# Patient Record
Sex: FEMALE | Race: WHITE | NOT HISPANIC OR LATINO | Employment: OTHER
[De-identification: names, ages, dates, MRNs, and addresses within clinical notes are randomized per-mention and may not be internally consistent; named-entity substitution may affect disease eponyms.]

---

## 2017-01-03 ENCOUNTER — RECORDS - HEALTHEAST (OUTPATIENT)
Dept: ADMINISTRATIVE | Facility: OTHER | Age: 77
End: 2017-01-03

## 2017-01-05 ENCOUNTER — RECORDS - HEALTHEAST (OUTPATIENT)
Dept: ADMINISTRATIVE | Facility: OTHER | Age: 77
End: 2017-01-05

## 2017-01-13 ENCOUNTER — RECORDS - HEALTHEAST (OUTPATIENT)
Dept: ADMINISTRATIVE | Facility: OTHER | Age: 77
End: 2017-01-13

## 2017-01-19 ENCOUNTER — RECORDS - HEALTHEAST (OUTPATIENT)
Dept: ADMINISTRATIVE | Facility: OTHER | Age: 77
End: 2017-01-19

## 2017-01-23 ENCOUNTER — RECORDS - HEALTHEAST (OUTPATIENT)
Dept: ADMINISTRATIVE | Facility: OTHER | Age: 77
End: 2017-01-23

## 2017-01-31 ENCOUNTER — RECORDS - HEALTHEAST (OUTPATIENT)
Dept: ADMINISTRATIVE | Facility: OTHER | Age: 77
End: 2017-01-31

## 2017-02-07 ENCOUNTER — RECORDS - HEALTHEAST (OUTPATIENT)
Dept: ADMINISTRATIVE | Facility: OTHER | Age: 77
End: 2017-02-07

## 2017-02-21 ENCOUNTER — RECORDS - HEALTHEAST (OUTPATIENT)
Dept: ADMINISTRATIVE | Facility: OTHER | Age: 77
End: 2017-02-21

## 2017-02-24 ENCOUNTER — RECORDS - HEALTHEAST (OUTPATIENT)
Dept: ADMINISTRATIVE | Facility: OTHER | Age: 77
End: 2017-02-24

## 2017-03-08 ENCOUNTER — AMBULATORY - HEALTHEAST (OUTPATIENT)
Dept: PHYSICAL MEDICINE AND REHAB | Facility: CLINIC | Age: 77
End: 2017-03-08

## 2017-03-08 ENCOUNTER — RECORDS - HEALTHEAST (OUTPATIENT)
Dept: ADMINISTRATIVE | Facility: OTHER | Age: 77
End: 2017-03-08

## 2017-03-08 DIAGNOSIS — M62.838 MUSCLE SPASM: ICD-10-CM

## 2017-03-08 DIAGNOSIS — G24.3 CERVICAL DYSTONIA: ICD-10-CM

## 2017-03-09 ENCOUNTER — RECORDS - HEALTHEAST (OUTPATIENT)
Dept: ADMINISTRATIVE | Facility: OTHER | Age: 77
End: 2017-03-09

## 2017-03-15 ENCOUNTER — RECORDS - HEALTHEAST (OUTPATIENT)
Dept: ADMINISTRATIVE | Facility: OTHER | Age: 77
End: 2017-03-15

## 2017-03-21 ENCOUNTER — HOSPITAL ENCOUNTER (OUTPATIENT)
Dept: PHYSICAL MEDICINE AND REHAB | Facility: CLINIC | Age: 77
Discharge: HOME | End: 2017-03-21
Attending: PHYSICIAN ASSISTANT

## 2017-03-21 ENCOUNTER — OFFICE VISIT - HEALTHEAST (OUTPATIENT)
Dept: INTERNAL MEDICINE | Facility: CLINIC | Age: 77
End: 2017-03-21

## 2017-03-21 DIAGNOSIS — M25.552 CHRONIC ARTHRALGIAS OF KNEES AND HIPS: ICD-10-CM

## 2017-03-21 DIAGNOSIS — M54.2 CHRONIC NECK PAIN: ICD-10-CM

## 2017-03-21 DIAGNOSIS — M47.812 CERVICAL SPONDYLOSIS: ICD-10-CM

## 2017-03-21 DIAGNOSIS — M25.551 CHRONIC ARTHRALGIAS OF KNEES AND HIPS: ICD-10-CM

## 2017-03-21 DIAGNOSIS — G89.29 CHRONIC ARTHRALGIAS OF KNEES AND HIPS: ICD-10-CM

## 2017-03-21 DIAGNOSIS — E03.9 HYPOTHYROIDISM: ICD-10-CM

## 2017-03-21 DIAGNOSIS — G24.3 CERVICAL DYSTONIA: ICD-10-CM

## 2017-03-21 DIAGNOSIS — G89.29 CHRONIC NECK PAIN: ICD-10-CM

## 2017-03-21 DIAGNOSIS — K21.9 GERD (GASTROESOPHAGEAL REFLUX DISEASE): ICD-10-CM

## 2017-03-21 DIAGNOSIS — I82.409 DVT (DEEP VENOUS THROMBOSIS) (H): ICD-10-CM

## 2017-03-21 DIAGNOSIS — M25.561 CHRONIC ARTHRALGIAS OF KNEES AND HIPS: ICD-10-CM

## 2017-03-21 DIAGNOSIS — I10 HTN (HYPERTENSION): ICD-10-CM

## 2017-03-21 DIAGNOSIS — M54.81 OCCIPITAL NEURALGIA: ICD-10-CM

## 2017-03-21 DIAGNOSIS — M47.816 OSTEOARTHRITIS OF LUMBAR SPINE: ICD-10-CM

## 2017-03-21 DIAGNOSIS — M47.812 FACET ARTHROPATHY, CERVICAL: ICD-10-CM

## 2017-03-21 DIAGNOSIS — E78.5 HLD (HYPERLIPIDEMIA): ICD-10-CM

## 2017-03-21 DIAGNOSIS — M25.562 CHRONIC ARTHRALGIAS OF KNEES AND HIPS: ICD-10-CM

## 2017-03-21 DIAGNOSIS — M19.90 OSTEOARTHRITIS: ICD-10-CM

## 2017-03-21 DIAGNOSIS — H20.00 ACUTE IRITIS: ICD-10-CM

## 2017-03-21 ASSESSMENT — MIFFLIN-ST. JEOR
SCORE: 1297.09
SCORE: 1301.63

## 2017-03-22 ENCOUNTER — COMMUNICATION - HEALTHEAST (OUTPATIENT)
Dept: INTERNAL MEDICINE | Facility: CLINIC | Age: 77
End: 2017-03-22

## 2017-03-22 ENCOUNTER — COMMUNICATION - HEALTHEAST (OUTPATIENT)
Dept: PHYSICAL MEDICINE AND REHAB | Facility: CLINIC | Age: 77
End: 2017-03-22

## 2017-03-29 ENCOUNTER — RECORDS - HEALTHEAST (OUTPATIENT)
Dept: ADMINISTRATIVE | Facility: OTHER | Age: 77
End: 2017-03-29

## 2017-03-30 ENCOUNTER — RECORDS - HEALTHEAST (OUTPATIENT)
Dept: ADMINISTRATIVE | Facility: OTHER | Age: 77
End: 2017-03-30

## 2017-04-03 ENCOUNTER — RECORDS - HEALTHEAST (OUTPATIENT)
Dept: RADIOLOGY | Facility: CLINIC | Age: 77
End: 2017-04-03

## 2017-04-06 ENCOUNTER — HOSPITAL ENCOUNTER (OUTPATIENT)
Dept: PHYSICAL MEDICINE AND REHAB | Facility: CLINIC | Age: 77
Discharge: HOME | End: 2017-04-06
Attending: PHYSICIAN ASSISTANT

## 2017-04-06 DIAGNOSIS — M12.88 OTHER SPECIFIC ARTHROPATHIES, NOT ELSEWHERE CLASSIFIED, OTHER SPECIFIED SITE: ICD-10-CM

## 2017-04-06 DIAGNOSIS — M47.812 FACET ARTHROPATHY, CERVICAL: ICD-10-CM

## 2017-04-06 ASSESSMENT — MIFFLIN-ST. JEOR: SCORE: 1298.45

## 2017-04-07 ENCOUNTER — COMMUNICATION - HEALTHEAST (OUTPATIENT)
Dept: PHYSICAL MEDICINE AND REHAB | Facility: CLINIC | Age: 77
End: 2017-04-07

## 2017-04-10 ENCOUNTER — COMMUNICATION - HEALTHEAST (OUTPATIENT)
Dept: PHYSICAL MEDICINE AND REHAB | Facility: CLINIC | Age: 77
End: 2017-04-10

## 2017-04-18 ENCOUNTER — OFFICE VISIT - HEALTHEAST (OUTPATIENT)
Dept: INTERNAL MEDICINE | Facility: CLINIC | Age: 77
End: 2017-04-18

## 2017-04-18 DIAGNOSIS — K21.9 GERD (GASTROESOPHAGEAL REFLUX DISEASE): ICD-10-CM

## 2017-04-18 DIAGNOSIS — M47.812 FACET ARTHROPATHY, CERVICAL: ICD-10-CM

## 2017-04-18 DIAGNOSIS — E03.9 HYPOTHYROIDISM: ICD-10-CM

## 2017-04-18 DIAGNOSIS — I10 HTN (HYPERTENSION): ICD-10-CM

## 2017-04-18 DIAGNOSIS — Z01.818 PREOP EXAMINATION: ICD-10-CM

## 2017-04-18 DIAGNOSIS — M19.90 OSTEOARTHRITIS: ICD-10-CM

## 2017-04-18 DIAGNOSIS — F32.9 REACTIVE DEPRESSION: ICD-10-CM

## 2017-04-18 DIAGNOSIS — E78.5 HLD (HYPERLIPIDEMIA): ICD-10-CM

## 2017-04-18 DIAGNOSIS — I82.409 DVT (DEEP VENOUS THROMBOSIS) (H): ICD-10-CM

## 2017-04-18 LAB
ATRIAL RATE - MUSE: 53 BPM
DIASTOLIC BLOOD PRESSURE - MUSE: NORMAL MMHG
INTERPRETATION ECG - MUSE: NORMAL
P AXIS - MUSE: 35 DEGREES
PR INTERVAL - MUSE: 178 MS
QRS DURATION - MUSE: 98 MS
QT - MUSE: 420 MS
QTC - MUSE: 394 MS
R AXIS - MUSE: 25 DEGREES
SYSTOLIC BLOOD PRESSURE - MUSE: NORMAL MMHG
T AXIS - MUSE: 77 DEGREES
VENTRICULAR RATE- MUSE: 53 BPM

## 2017-04-18 ASSESSMENT — MIFFLIN-ST. JEOR: SCORE: 1269.88

## 2017-04-19 ENCOUNTER — COMMUNICATION - HEALTHEAST (OUTPATIENT)
Dept: INTERNAL MEDICINE | Facility: CLINIC | Age: 77
End: 2017-04-19

## 2017-04-21 ENCOUNTER — HOSPITAL ENCOUNTER (OUTPATIENT)
Dept: PHYSICAL MEDICINE AND REHAB | Facility: CLINIC | Age: 77
Discharge: HOME | End: 2017-04-21
Attending: PHYSICIAN ASSISTANT

## 2017-04-21 DIAGNOSIS — G89.29 CHRONIC NECK PAIN: ICD-10-CM

## 2017-04-21 DIAGNOSIS — G24.3 CERVICAL DYSTONIA: ICD-10-CM

## 2017-04-21 DIAGNOSIS — M47.812 FACET ARTHROPATHY, CERVICAL: ICD-10-CM

## 2017-04-21 DIAGNOSIS — M54.2 CHRONIC NECK PAIN: ICD-10-CM

## 2017-04-29 ENCOUNTER — COMMUNICATION - HEALTHEAST (OUTPATIENT)
Dept: SCHEDULING | Facility: CLINIC | Age: 77
End: 2017-04-29

## 2017-05-09 ENCOUNTER — OFFICE VISIT - HEALTHEAST (OUTPATIENT)
Dept: INTERNAL MEDICINE | Facility: CLINIC | Age: 77
End: 2017-05-09

## 2017-05-09 DIAGNOSIS — F32.A DEPRESSION: ICD-10-CM

## 2017-05-10 ENCOUNTER — COMMUNICATION - HEALTHEAST (OUTPATIENT)
Dept: INTERNAL MEDICINE | Facility: CLINIC | Age: 77
End: 2017-05-10

## 2017-05-11 ASSESSMENT — MIFFLIN-ST. JEOR: SCORE: 1274.41

## 2017-05-16 ENCOUNTER — SURGERY - HEALTHEAST (OUTPATIENT)
Dept: SURGERY | Facility: CLINIC | Age: 77
End: 2017-05-16

## 2017-05-16 ENCOUNTER — ANESTHESIA - HEALTHEAST (OUTPATIENT)
Dept: SURGERY | Facility: CLINIC | Age: 77
End: 2017-05-16

## 2017-05-16 ENCOUNTER — HOME CARE/HOSPICE - HEALTHEAST (OUTPATIENT)
Dept: HOME HEALTH SERVICES | Facility: HOME HEALTH | Age: 77
End: 2017-05-16

## 2017-05-17 ASSESSMENT — MIFFLIN-ST. JEOR: SCORE: 1274.41

## 2017-05-22 ENCOUNTER — OFFICE VISIT - HEALTHEAST (OUTPATIENT)
Dept: GERIATRICS | Facility: CLINIC | Age: 77
End: 2017-05-22

## 2017-05-22 ENCOUNTER — HOME CARE/HOSPICE - HEALTHEAST (OUTPATIENT)
Dept: HOME HEALTH SERVICES | Facility: HOME HEALTH | Age: 77
End: 2017-05-22

## 2017-05-22 DIAGNOSIS — F39 MOOD DISORDER (H): ICD-10-CM

## 2017-05-22 DIAGNOSIS — E03.9 HYPOTHYROIDISM: ICD-10-CM

## 2017-05-22 DIAGNOSIS — Z96.642 HISTORY OF TOTAL LEFT HIP REPLACEMENT: ICD-10-CM

## 2017-05-22 DIAGNOSIS — D50.0 ANEMIA, BLOOD LOSS: ICD-10-CM

## 2017-05-22 DIAGNOSIS — I10 HYPERTENSION: ICD-10-CM

## 2017-05-23 ENCOUNTER — OFFICE VISIT - HEALTHEAST (OUTPATIENT)
Dept: GERIATRICS | Facility: CLINIC | Age: 77
End: 2017-05-23

## 2017-05-23 DIAGNOSIS — D50.0 ANEMIA, BLOOD LOSS: ICD-10-CM

## 2017-05-23 DIAGNOSIS — I10 HYPERTENSION: ICD-10-CM

## 2017-05-23 DIAGNOSIS — Z96.642 HISTORY OF TOTAL LEFT HIP REPLACEMENT: ICD-10-CM

## 2017-05-24 ENCOUNTER — AMBULATORY - HEALTHEAST (OUTPATIENT)
Dept: GERIATRICS | Facility: CLINIC | Age: 77
End: 2017-05-24

## 2017-05-24 ENCOUNTER — COMMUNICATION - HEALTHEAST (OUTPATIENT)
Dept: GERIATRICS | Facility: CLINIC | Age: 77
End: 2017-05-24

## 2017-05-25 ENCOUNTER — HOME CARE/HOSPICE - HEALTHEAST (OUTPATIENT)
Dept: HOME HEALTH SERVICES | Facility: HOME HEALTH | Age: 77
End: 2017-05-25

## 2017-05-25 ENCOUNTER — COMMUNICATION - HEALTHEAST (OUTPATIENT)
Dept: INTERNAL MEDICINE | Facility: CLINIC | Age: 77
End: 2017-05-25

## 2017-05-26 ENCOUNTER — HOME CARE/HOSPICE - HEALTHEAST (OUTPATIENT)
Dept: HOME HEALTH SERVICES | Facility: HOME HEALTH | Age: 77
End: 2017-05-26

## 2017-05-29 ENCOUNTER — HOME CARE/HOSPICE - HEALTHEAST (OUTPATIENT)
Dept: HOME HEALTH SERVICES | Facility: HOME HEALTH | Age: 77
End: 2017-05-29

## 2017-05-30 ENCOUNTER — HOME CARE/HOSPICE - HEALTHEAST (OUTPATIENT)
Dept: HOME HEALTH SERVICES | Facility: HOME HEALTH | Age: 77
End: 2017-05-30

## 2017-05-31 ENCOUNTER — OFFICE VISIT - HEALTHEAST (OUTPATIENT)
Dept: INTERNAL MEDICINE | Facility: CLINIC | Age: 77
End: 2017-05-31

## 2017-05-31 DIAGNOSIS — F34.1 DYSTHYMIA: ICD-10-CM

## 2017-05-31 DIAGNOSIS — D64.9 ANEMIA: ICD-10-CM

## 2017-05-31 DIAGNOSIS — M54.2 NECK PAIN: ICD-10-CM

## 2017-05-31 DIAGNOSIS — M19.90 OSTEOARTHRITIS: ICD-10-CM

## 2017-06-01 ENCOUNTER — HOME CARE/HOSPICE - HEALTHEAST (OUTPATIENT)
Dept: HOME HEALTH SERVICES | Facility: HOME HEALTH | Age: 77
End: 2017-06-01

## 2017-06-02 ENCOUNTER — HOME CARE/HOSPICE - HEALTHEAST (OUTPATIENT)
Dept: HOME HEALTH SERVICES | Facility: HOME HEALTH | Age: 77
End: 2017-06-02

## 2017-06-02 ENCOUNTER — COMMUNICATION - HEALTHEAST (OUTPATIENT)
Dept: INTERNAL MEDICINE | Facility: CLINIC | Age: 77
End: 2017-06-02

## 2017-06-05 ENCOUNTER — HOME CARE/HOSPICE - HEALTHEAST (OUTPATIENT)
Dept: HOME HEALTH SERVICES | Facility: HOME HEALTH | Age: 77
End: 2017-06-05

## 2017-06-06 ENCOUNTER — HOME CARE/HOSPICE - HEALTHEAST (OUTPATIENT)
Dept: HOME HEALTH SERVICES | Facility: HOME HEALTH | Age: 77
End: 2017-06-06

## 2017-06-08 ENCOUNTER — HOME CARE/HOSPICE - HEALTHEAST (OUTPATIENT)
Dept: HOME HEALTH SERVICES | Facility: HOME HEALTH | Age: 77
End: 2017-06-08

## 2017-06-09 ENCOUNTER — RECORDS - HEALTHEAST (OUTPATIENT)
Dept: ADMINISTRATIVE | Facility: OTHER | Age: 77
End: 2017-06-09

## 2017-06-09 ENCOUNTER — HOME CARE/HOSPICE - HEALTHEAST (OUTPATIENT)
Dept: HOME HEALTH SERVICES | Facility: HOME HEALTH | Age: 77
End: 2017-06-09

## 2017-06-12 ENCOUNTER — AMBULATORY - HEALTHEAST (OUTPATIENT)
Dept: LAB | Facility: CLINIC | Age: 77
End: 2017-06-12

## 2017-06-12 ENCOUNTER — HOME CARE/HOSPICE - HEALTHEAST (OUTPATIENT)
Dept: HOME HEALTH SERVICES | Facility: HOME HEALTH | Age: 77
End: 2017-06-12

## 2017-06-12 DIAGNOSIS — Z47.1 ORTHOPEDIC AFTERCARE FOR JOINT REPLACEMENT: ICD-10-CM

## 2017-06-12 DIAGNOSIS — I82.409 DVT (DEEP VENOUS THROMBOSIS) (H): ICD-10-CM

## 2017-06-13 ENCOUNTER — AMBULATORY - HEALTHEAST (OUTPATIENT)
Dept: LAB | Facility: CLINIC | Age: 77
End: 2017-06-13

## 2017-06-13 DIAGNOSIS — Z47.1 ORTHOPEDIC AFTERCARE FOR JOINT REPLACEMENT: ICD-10-CM

## 2017-06-13 DIAGNOSIS — I82.409 DVT (DEEP VENOUS THROMBOSIS) (H): ICD-10-CM

## 2017-06-16 ENCOUNTER — AMBULATORY - HEALTHEAST (OUTPATIENT)
Dept: LAB | Facility: CLINIC | Age: 77
End: 2017-06-16

## 2017-06-16 DIAGNOSIS — I82.409 DVT (DEEP VENOUS THROMBOSIS) (H): ICD-10-CM

## 2017-06-16 DIAGNOSIS — D64.9 ANEMIA: ICD-10-CM

## 2017-06-16 DIAGNOSIS — Z47.1 ORTHOPEDIC AFTERCARE FOR JOINT REPLACEMENT: ICD-10-CM

## 2017-06-19 ENCOUNTER — AMBULATORY - HEALTHEAST (OUTPATIENT)
Dept: LAB | Facility: CLINIC | Age: 77
End: 2017-06-19

## 2017-06-19 DIAGNOSIS — I82.409 DVT (DEEP VENOUS THROMBOSIS) (H): ICD-10-CM

## 2017-06-19 DIAGNOSIS — Z47.1 ORTHOPEDIC AFTERCARE FOR JOINT REPLACEMENT: ICD-10-CM

## 2017-06-20 ENCOUNTER — HOSPITAL ENCOUNTER (OUTPATIENT)
Dept: PHYSICAL MEDICINE AND REHAB | Facility: CLINIC | Age: 77
Discharge: HOME | End: 2017-06-20
Attending: PHYSICIAN ASSISTANT

## 2017-06-20 DIAGNOSIS — M54.2 CHRONIC NECK PAIN: ICD-10-CM

## 2017-06-20 DIAGNOSIS — G89.29 CHRONIC NECK PAIN: ICD-10-CM

## 2017-06-20 DIAGNOSIS — M47.812 FACET ARTHROPATHY, CERVICAL: ICD-10-CM

## 2017-06-22 ENCOUNTER — AMBULATORY - HEALTHEAST (OUTPATIENT)
Dept: LAB | Facility: CLINIC | Age: 77
End: 2017-06-22

## 2017-06-22 DIAGNOSIS — I82.409 DVT (DEEP VENOUS THROMBOSIS) (H): ICD-10-CM

## 2017-06-22 DIAGNOSIS — Z47.1 ORTHOPEDIC AFTERCARE FOR JOINT REPLACEMENT: ICD-10-CM

## 2017-06-26 ENCOUNTER — AMBULATORY - HEALTHEAST (OUTPATIENT)
Dept: LAB | Facility: CLINIC | Age: 77
End: 2017-06-26

## 2017-06-26 DIAGNOSIS — Z47.1 ORTHOPEDIC AFTERCARE FOR JOINT REPLACEMENT: ICD-10-CM

## 2017-06-26 DIAGNOSIS — I82.409 DVT (DEEP VENOUS THROMBOSIS) (H): ICD-10-CM

## 2017-06-27 ENCOUNTER — COMMUNICATION - HEALTHEAST (OUTPATIENT)
Dept: SCHEDULING | Facility: CLINIC | Age: 77
End: 2017-06-27

## 2017-06-29 ENCOUNTER — OFFICE VISIT - HEALTHEAST (OUTPATIENT)
Dept: INTERNAL MEDICINE | Facility: CLINIC | Age: 77
End: 2017-06-29

## 2017-06-29 DIAGNOSIS — K59.00 CONSTIPATION: ICD-10-CM

## 2017-06-29 DIAGNOSIS — D64.9 ANEMIA: ICD-10-CM

## 2017-07-06 ENCOUNTER — COMMUNICATION - HEALTHEAST (OUTPATIENT)
Dept: PHYSICAL MEDICINE AND REHAB | Facility: CLINIC | Age: 77
End: 2017-07-06

## 2017-07-07 ENCOUNTER — AMBULATORY - HEALTHEAST (OUTPATIENT)
Dept: PHYSICAL MEDICINE AND REHAB | Facility: CLINIC | Age: 77
End: 2017-07-07

## 2017-07-07 ENCOUNTER — HOSPITAL ENCOUNTER (OUTPATIENT)
Dept: PHYSICAL MEDICINE AND REHAB | Facility: CLINIC | Age: 77
Discharge: HOME | End: 2017-07-07
Attending: PHYSICIAN ASSISTANT

## 2017-07-07 DIAGNOSIS — Z79.01 ON WARFARIN THERAPY: ICD-10-CM

## 2017-07-07 DIAGNOSIS — M47.812 FACET ARTHROPATHY, CERVICAL: ICD-10-CM

## 2017-07-07 DIAGNOSIS — M12.88 OTHER SPECIFIC ARTHROPATHIES, NOT ELSEWHERE CLASSIFIED, OTHER SPECIFIED SITE: ICD-10-CM

## 2017-07-21 ENCOUNTER — HOSPITAL ENCOUNTER (OUTPATIENT)
Dept: PHYSICAL MEDICINE AND REHAB | Facility: CLINIC | Age: 77
Discharge: HOME | End: 2017-07-21
Attending: PHYSICIAN ASSISTANT

## 2017-07-21 DIAGNOSIS — M47.812 FACET ARTHROPATHY, CERVICAL: ICD-10-CM

## 2017-08-03 ENCOUNTER — COMMUNICATION - HEALTHEAST (OUTPATIENT)
Dept: INTERNAL MEDICINE | Facility: CLINIC | Age: 77
End: 2017-08-03

## 2017-08-03 DIAGNOSIS — Z88.9 HISTORY OF SEASONAL ALLERGIES: ICD-10-CM

## 2017-08-30 ENCOUNTER — OFFICE VISIT - HEALTHEAST (OUTPATIENT)
Dept: INTERNAL MEDICINE | Facility: CLINIC | Age: 77
End: 2017-08-30

## 2017-08-30 DIAGNOSIS — D50.9 IRON DEFICIENCY ANEMIA: ICD-10-CM

## 2017-08-30 DIAGNOSIS — E78.5 HLD (HYPERLIPIDEMIA): ICD-10-CM

## 2017-08-30 DIAGNOSIS — I10 HTN (HYPERTENSION): ICD-10-CM

## 2017-08-30 DIAGNOSIS — E03.9 ACQUIRED HYPOTHYROIDISM: ICD-10-CM

## 2017-08-30 DIAGNOSIS — F34.1 DYSTHYMIA: ICD-10-CM

## 2017-08-30 ASSESSMENT — MIFFLIN-ST. JEOR: SCORE: 1288.02

## 2017-09-04 ENCOUNTER — COMMUNICATION - HEALTHEAST (OUTPATIENT)
Dept: SCHEDULING | Facility: CLINIC | Age: 77
End: 2017-09-04

## 2017-09-04 ASSESSMENT — MIFFLIN-ST. JEOR
SCORE: 1268.74
SCORE: 1292.33

## 2017-09-05 ENCOUNTER — ANESTHESIA - HEALTHEAST (OUTPATIENT)
Dept: SURGERY | Facility: HOSPITAL | Age: 77
End: 2017-09-05

## 2017-09-05 ENCOUNTER — SURGERY - HEALTHEAST (OUTPATIENT)
Dept: SURGERY | Facility: HOSPITAL | Age: 77
End: 2017-09-05

## 2017-09-05 ASSESSMENT — MIFFLIN-ST. JEOR: SCORE: 1271.92

## 2017-09-07 ENCOUNTER — COMMUNICATION - HEALTHEAST (OUTPATIENT)
Dept: SCHEDULING | Facility: CLINIC | Age: 77
End: 2017-09-07

## 2017-09-13 ENCOUNTER — OFFICE VISIT - HEALTHEAST (OUTPATIENT)
Dept: INTERNAL MEDICINE | Facility: CLINIC | Age: 77
End: 2017-09-13

## 2017-09-13 DIAGNOSIS — K80.50 CHOLEDOCHOLITHIASIS: ICD-10-CM

## 2017-09-13 DIAGNOSIS — R79.89 ABNORMAL LFTS: ICD-10-CM

## 2017-09-13 DIAGNOSIS — K80.50 COMMON BILE DUCT CALCULUS: ICD-10-CM

## 2017-09-14 ENCOUNTER — COMMUNICATION - HEALTHEAST (OUTPATIENT)
Dept: INTERNAL MEDICINE | Facility: CLINIC | Age: 77
End: 2017-09-14

## 2017-09-26 ENCOUNTER — RECORDS - HEALTHEAST (OUTPATIENT)
Dept: ADMINISTRATIVE | Facility: OTHER | Age: 77
End: 2017-09-26

## 2017-11-02 ENCOUNTER — COMMUNICATION - HEALTHEAST (OUTPATIENT)
Dept: INTERNAL MEDICINE | Facility: CLINIC | Age: 77
End: 2017-11-02

## 2017-11-02 DIAGNOSIS — F34.1 DYSTHYMIA: ICD-10-CM

## 2017-11-15 ENCOUNTER — RECORDS - HEALTHEAST (OUTPATIENT)
Dept: ADMINISTRATIVE | Facility: OTHER | Age: 77
End: 2017-11-15

## 2017-12-19 ENCOUNTER — AMBULATORY - HEALTHEAST (OUTPATIENT)
Dept: INTERNAL MEDICINE | Facility: CLINIC | Age: 77
End: 2017-12-19

## 2017-12-26 ENCOUNTER — COMMUNICATION - HEALTHEAST (OUTPATIENT)
Dept: INTERNAL MEDICINE | Facility: CLINIC | Age: 77
End: 2017-12-26

## 2018-01-02 ENCOUNTER — OFFICE VISIT - HEALTHEAST (OUTPATIENT)
Dept: INTERNAL MEDICINE | Facility: CLINIC | Age: 78
End: 2018-01-02

## 2018-01-02 DIAGNOSIS — J32.9 RHINOSINUSITIS: ICD-10-CM

## 2018-01-02 DIAGNOSIS — R05.9 COUGH: ICD-10-CM

## 2018-01-03 ENCOUNTER — RECORDS - HEALTHEAST (OUTPATIENT)
Dept: ADMINISTRATIVE | Facility: OTHER | Age: 78
End: 2018-01-03

## 2018-02-15 ENCOUNTER — AMBULATORY - HEALTHEAST (OUTPATIENT)
Dept: INTERNAL MEDICINE | Facility: CLINIC | Age: 78
End: 2018-02-15

## 2018-02-15 ENCOUNTER — COMMUNICATION - HEALTHEAST (OUTPATIENT)
Dept: INTERNAL MEDICINE | Facility: CLINIC | Age: 78
End: 2018-02-15

## 2018-02-15 DIAGNOSIS — K80.50 CHOLEDOCHOLITHIASIS: ICD-10-CM

## 2018-02-16 ENCOUNTER — COMMUNICATION - HEALTHEAST (OUTPATIENT)
Dept: INTERNAL MEDICINE | Facility: CLINIC | Age: 78
End: 2018-02-16

## 2018-03-15 ENCOUNTER — COMMUNICATION - HEALTHEAST (OUTPATIENT)
Dept: INTERNAL MEDICINE | Facility: CLINIC | Age: 78
End: 2018-03-15

## 2018-03-16 ENCOUNTER — AMBULATORY - HEALTHEAST (OUTPATIENT)
Dept: INTERNAL MEDICINE | Facility: CLINIC | Age: 78
End: 2018-03-16

## 2018-03-16 DIAGNOSIS — K80.50 COMMON BILE DUCT CALCULUS: ICD-10-CM

## 2018-03-23 ENCOUNTER — RECORDS - HEALTHEAST (OUTPATIENT)
Dept: ADMINISTRATIVE | Facility: OTHER | Age: 78
End: 2018-03-23

## 2018-05-04 ENCOUNTER — COMMUNICATION - HEALTHEAST (OUTPATIENT)
Dept: INTERNAL MEDICINE | Facility: CLINIC | Age: 78
End: 2018-05-04

## 2018-05-04 DIAGNOSIS — F34.1 DYSTHYMIA: ICD-10-CM

## 2018-05-16 ENCOUNTER — RECORDS - HEALTHEAST (OUTPATIENT)
Dept: ADMINISTRATIVE | Facility: OTHER | Age: 78
End: 2018-05-16

## 2018-05-18 ENCOUNTER — RECORDS - HEALTHEAST (OUTPATIENT)
Dept: ADMINISTRATIVE | Facility: OTHER | Age: 78
End: 2018-05-18

## 2018-05-23 ENCOUNTER — RECORDS - HEALTHEAST (OUTPATIENT)
Dept: ADMINISTRATIVE | Facility: OTHER | Age: 78
End: 2018-05-23

## 2018-05-30 ENCOUNTER — RECORDS - HEALTHEAST (OUTPATIENT)
Dept: ADMINISTRATIVE | Facility: OTHER | Age: 78
End: 2018-05-30

## 2018-05-31 ENCOUNTER — OFFICE VISIT - HEALTHEAST (OUTPATIENT)
Dept: NEUROSURGERY | Facility: CLINIC | Age: 78
End: 2018-05-31

## 2018-05-31 DIAGNOSIS — M47.812 FACET ARTHROPATHY, CERVICAL: ICD-10-CM

## 2018-05-31 ASSESSMENT — MIFFLIN-ST. JEOR: SCORE: 1295.96

## 2018-06-05 ENCOUNTER — HOSPITAL ENCOUNTER (OUTPATIENT)
Dept: PHYSICAL MEDICINE AND REHAB | Facility: CLINIC | Age: 78
Discharge: HOME | End: 2018-06-05
Attending: PHYSICIAN ASSISTANT

## 2018-06-05 DIAGNOSIS — M54.81 OCCIPITAL NEURALGIA OF LEFT SIDE: ICD-10-CM

## 2018-06-05 DIAGNOSIS — M54.2 CHRONIC NECK PAIN: ICD-10-CM

## 2018-06-05 DIAGNOSIS — G89.29 CHRONIC NECK PAIN: ICD-10-CM

## 2018-06-05 DIAGNOSIS — M47.812 FACET ARTHROPATHY, CERVICAL: ICD-10-CM

## 2018-06-07 ENCOUNTER — AMBULATORY - HEALTHEAST (OUTPATIENT)
Dept: PHYSICAL MEDICINE AND REHAB | Facility: CLINIC | Age: 78
End: 2018-06-07

## 2018-06-07 ENCOUNTER — HOSPITAL ENCOUNTER (OUTPATIENT)
Dept: PHYSICAL MEDICINE AND REHAB | Facility: CLINIC | Age: 78
Discharge: HOME | End: 2018-06-07
Attending: PHYSICIAN ASSISTANT

## 2018-06-07 ENCOUNTER — AMBULATORY - HEALTHEAST (OUTPATIENT)
Dept: NEUROSURGERY | Facility: CLINIC | Age: 78
End: 2018-06-07

## 2018-06-07 DIAGNOSIS — M12.88 OTHER SPECIFIC ARTHROPATHIES, NOT ELSEWHERE CLASSIFIED, OTHER SPECIFIED SITE: ICD-10-CM

## 2018-06-07 DIAGNOSIS — M47.812 FACET ARTHROPATHY, CERVICAL: ICD-10-CM

## 2018-06-12 ENCOUNTER — AMBULATORY - HEALTHEAST (OUTPATIENT)
Dept: PHYSICAL MEDICINE AND REHAB | Facility: CLINIC | Age: 78
End: 2018-06-12

## 2018-06-12 DIAGNOSIS — M47.812 CERVICAL SPONDYLOSIS: ICD-10-CM

## 2018-06-13 ENCOUNTER — COMMUNICATION - HEALTHEAST (OUTPATIENT)
Dept: INTERNAL MEDICINE | Facility: CLINIC | Age: 78
End: 2018-06-13

## 2018-06-13 ENCOUNTER — HOSPITAL ENCOUNTER (OUTPATIENT)
Dept: PHYSICAL MEDICINE AND REHAB | Facility: CLINIC | Age: 78
Discharge: HOME | End: 2018-06-13
Attending: PHYSICIAN ASSISTANT

## 2018-06-13 DIAGNOSIS — M47.812 CERVICAL SPONDYLOSIS: ICD-10-CM

## 2018-06-19 ENCOUNTER — RECORDS - HEALTHEAST (OUTPATIENT)
Dept: RADIOLOGY | Facility: CLINIC | Age: 78
End: 2018-06-19

## 2018-06-20 ENCOUNTER — AMBULATORY - HEALTHEAST (OUTPATIENT)
Dept: INTERNAL MEDICINE | Facility: CLINIC | Age: 78
End: 2018-06-20

## 2018-06-20 DIAGNOSIS — Z12.31 VISIT FOR SCREENING MAMMOGRAM: ICD-10-CM

## 2018-06-27 ENCOUNTER — HOSPITAL ENCOUNTER (OUTPATIENT)
Dept: PHYSICAL MEDICINE AND REHAB | Facility: CLINIC | Age: 78
Discharge: HOME | End: 2018-06-27
Attending: PHYSICIAN ASSISTANT

## 2018-06-27 DIAGNOSIS — G89.29 CHRONIC NECK PAIN: ICD-10-CM

## 2018-06-27 DIAGNOSIS — M47.812 CERVICAL SPONDYLOSIS: ICD-10-CM

## 2018-06-27 DIAGNOSIS — M54.2 CHRONIC NECK PAIN: ICD-10-CM

## 2018-06-28 ENCOUNTER — HOSPITAL ENCOUNTER (OUTPATIENT)
Dept: MAMMOGRAPHY | Facility: CLINIC | Age: 78
Discharge: HOME | End: 2018-06-28

## 2018-06-28 ENCOUNTER — OFFICE VISIT - HEALTHEAST (OUTPATIENT)
Dept: INTERNAL MEDICINE | Facility: CLINIC | Age: 78
End: 2018-06-28

## 2018-06-28 ENCOUNTER — COMMUNICATION - HEALTHEAST (OUTPATIENT)
Dept: INTERNAL MEDICINE | Facility: CLINIC | Age: 78
End: 2018-06-28

## 2018-06-28 ENCOUNTER — AMBULATORY - HEALTHEAST (OUTPATIENT)
Dept: INTERNAL MEDICINE | Facility: CLINIC | Age: 78
End: 2018-06-28

## 2018-06-28 DIAGNOSIS — F34.1 DYSTHYMIA: ICD-10-CM

## 2018-06-28 DIAGNOSIS — I10 HTN (HYPERTENSION): ICD-10-CM

## 2018-06-28 DIAGNOSIS — R10.31 RLQ ABDOMINAL PAIN: ICD-10-CM

## 2018-06-28 DIAGNOSIS — R10.2 PELVIC PAIN IN FEMALE: ICD-10-CM

## 2018-06-28 DIAGNOSIS — E03.9 ACQUIRED HYPOTHYROIDISM: ICD-10-CM

## 2018-06-28 DIAGNOSIS — Z12.31 VISIT FOR SCREENING MAMMOGRAM: ICD-10-CM

## 2018-06-28 DIAGNOSIS — E78.5 HLD (HYPERLIPIDEMIA): ICD-10-CM

## 2018-06-28 LAB
ALBUMIN SERPL-MCNC: 3.9 G/DL (ref 3.5–5)
ALP SERPL-CCNC: 71 U/L (ref 45–120)
ALT SERPL W P-5'-P-CCNC: 15 U/L (ref 0–45)
AST SERPL W P-5'-P-CCNC: 20 U/L (ref 0–40)
BASOPHILS # BLD AUTO: 0 THOU/UL (ref 0–0.2)
BASOPHILS NFR BLD AUTO: 0 % (ref 0–2)
BILIRUB DIRECT SERPL-MCNC: 0.2 MG/DL
BILIRUB SERPL-MCNC: 0.5 MG/DL (ref 0–1)
C REACTIVE PROTEIN LHE: 0.3 MG/DL (ref 0–0.8)
EOSINOPHIL # BLD AUTO: 0.1 THOU/UL (ref 0–0.4)
EOSINOPHIL NFR BLD AUTO: 1 % (ref 0–6)
ERYTHROCYTE [DISTWIDTH] IN BLOOD BY AUTOMATED COUNT: 13 % (ref 11–14.5)
HCT VFR BLD AUTO: 36 % (ref 35–47)
HGB BLD-MCNC: 12.2 G/DL (ref 12–16)
LYMPHOCYTES # BLD AUTO: 2 THOU/UL (ref 0.8–4.4)
LYMPHOCYTES NFR BLD AUTO: 31 % (ref 20–40)
MCH RBC QN AUTO: 31.6 PG (ref 27–34)
MCHC RBC AUTO-ENTMCNC: 33.9 G/DL (ref 32–36)
MCV RBC AUTO: 93 FL (ref 80–100)
MONOCYTES # BLD AUTO: 0.7 THOU/UL (ref 0–0.9)
MONOCYTES NFR BLD AUTO: 11 % (ref 2–10)
NEUTROPHILS # BLD AUTO: 3.7 THOU/UL (ref 2–7.7)
NEUTROPHILS NFR BLD AUTO: 57 % (ref 50–70)
PLATELET # BLD AUTO: 280 THOU/UL (ref 140–440)
PMV BLD AUTO: 6.4 FL (ref 7–10)
PROT SERPL-MCNC: 6.7 G/DL (ref 6–8)
RBC # BLD AUTO: 3.86 MILL/UL (ref 3.8–5.4)
TSH SERPL DL<=0.005 MIU/L-ACNC: 1.18 UIU/ML (ref 0.3–5)
WBC: 6.4 THOU/UL (ref 4–11)

## 2018-07-03 ENCOUNTER — RECORDS - HEALTHEAST (OUTPATIENT)
Dept: ADMINISTRATIVE | Facility: OTHER | Age: 78
End: 2018-07-03

## 2018-07-09 ENCOUNTER — COMMUNICATION - HEALTHEAST (OUTPATIENT)
Dept: INTERNAL MEDICINE | Facility: CLINIC | Age: 78
End: 2018-07-09

## 2018-07-11 ENCOUNTER — HOSPITAL ENCOUNTER (OUTPATIENT)
Dept: PHYSICAL MEDICINE AND REHAB | Facility: CLINIC | Age: 78
Discharge: HOME | End: 2018-07-11
Attending: PHYSICIAN ASSISTANT

## 2018-07-11 ENCOUNTER — AMBULATORY - HEALTHEAST (OUTPATIENT)
Dept: PHYSICAL MEDICINE AND REHAB | Facility: CLINIC | Age: 78
End: 2018-07-11

## 2018-07-11 DIAGNOSIS — M54.16 LUMBAR RADICULITIS: ICD-10-CM

## 2018-07-11 DIAGNOSIS — M47.812 FACET ARTHROPATHY, CERVICAL: ICD-10-CM

## 2018-07-11 DIAGNOSIS — M54.2 CHRONIC NECK PAIN: ICD-10-CM

## 2018-07-11 DIAGNOSIS — M47.812 CERVICAL SPONDYLOSIS: ICD-10-CM

## 2018-07-11 DIAGNOSIS — M54.50 LUMBAR SPINE PAIN: ICD-10-CM

## 2018-07-11 DIAGNOSIS — G89.29 CHRONIC NECK PAIN: ICD-10-CM

## 2018-07-11 DIAGNOSIS — M54.81 OCCIPITAL NEURALGIA OF LEFT SIDE: ICD-10-CM

## 2018-07-11 DIAGNOSIS — M48.062 SPINAL STENOSIS OF LUMBAR REGION WITH NEUROGENIC CLAUDICATION: ICD-10-CM

## 2018-07-13 ENCOUNTER — RECORDS - HEALTHEAST (OUTPATIENT)
Dept: ADMINISTRATIVE | Facility: OTHER | Age: 78
End: 2018-07-13

## 2018-07-17 ENCOUNTER — RECORDS - HEALTHEAST (OUTPATIENT)
Dept: ADMINISTRATIVE | Facility: OTHER | Age: 78
End: 2018-07-17

## 2018-07-24 ENCOUNTER — AMBULATORY - HEALTHEAST (OUTPATIENT)
Dept: INTERNAL MEDICINE | Facility: CLINIC | Age: 78
End: 2018-07-24

## 2018-07-24 ENCOUNTER — COMMUNICATION - HEALTHEAST (OUTPATIENT)
Dept: INTERNAL MEDICINE | Facility: CLINIC | Age: 78
End: 2018-07-24

## 2018-07-24 ENCOUNTER — AMBULATORY - HEALTHEAST (OUTPATIENT)
Dept: LAB | Facility: CLINIC | Age: 78
End: 2018-07-24

## 2018-07-24 DIAGNOSIS — E03.9 ACQUIRED HYPOTHYROIDISM: ICD-10-CM

## 2018-07-24 DIAGNOSIS — I10 HTN (HYPERTENSION): ICD-10-CM

## 2018-07-24 DIAGNOSIS — E78.5 HLD (HYPERLIPIDEMIA): ICD-10-CM

## 2018-07-24 LAB
ALBUMIN SERPL-MCNC: 3.8 G/DL (ref 3.5–5)
ALP SERPL-CCNC: 65 U/L (ref 45–120)
ALT SERPL W P-5'-P-CCNC: 19 U/L (ref 0–45)
ANION GAP SERPL CALCULATED.3IONS-SCNC: 7 MMOL/L (ref 5–18)
AST SERPL W P-5'-P-CCNC: 22 U/L (ref 0–40)
BILIRUB SERPL-MCNC: 0.6 MG/DL (ref 0–1)
BUN SERPL-MCNC: 25 MG/DL (ref 8–28)
CALCIUM SERPL-MCNC: 9.5 MG/DL (ref 8.5–10.5)
CHLORIDE BLD-SCNC: 105 MMOL/L (ref 98–107)
CHOLEST SERPL-MCNC: 202 MG/DL
CO2 SERPL-SCNC: 31 MMOL/L (ref 22–31)
CREAT SERPL-MCNC: 1.01 MG/DL (ref 0.6–1.1)
FASTING STATUS PATIENT QL REPORTED: YES
GFR SERPL CREATININE-BSD FRML MDRD: 53 ML/MIN/1.73M2
GLUCOSE BLD-MCNC: 91 MG/DL (ref 70–125)
HDLC SERPL-MCNC: 61 MG/DL
LDLC SERPL CALC-MCNC: 117 MG/DL
POTASSIUM BLD-SCNC: 4.3 MMOL/L (ref 3.5–5)
PROT SERPL-MCNC: 6.2 G/DL (ref 6–8)
SODIUM SERPL-SCNC: 143 MMOL/L (ref 136–145)
TRIGL SERPL-MCNC: 119 MG/DL
TSH SERPL DL<=0.005 MIU/L-ACNC: 2.43 UIU/ML (ref 0.3–5)

## 2018-07-25 ENCOUNTER — HOSPITAL ENCOUNTER (OUTPATIENT)
Dept: PHYSICAL MEDICINE AND REHAB | Facility: CLINIC | Age: 78
Discharge: HOME | End: 2018-07-25
Attending: PHYSICIAN ASSISTANT

## 2018-07-25 DIAGNOSIS — M47.812 FACET ARTHROPATHY, CERVICAL: ICD-10-CM

## 2018-07-25 DIAGNOSIS — M54.81 OCCIPITAL NEURALGIA OF LEFT SIDE: ICD-10-CM

## 2018-07-25 DIAGNOSIS — M54.16 LUMBAR RADICULITIS: ICD-10-CM

## 2018-07-25 DIAGNOSIS — M48.061 LUMBAR SPINAL STENOSIS: ICD-10-CM

## 2018-07-30 ENCOUNTER — COMMUNICATION - HEALTHEAST (OUTPATIENT)
Dept: INTERNAL MEDICINE | Facility: CLINIC | Age: 78
End: 2018-07-30

## 2018-07-30 DIAGNOSIS — Z88.9 HISTORY OF SEASONAL ALLERGIES: ICD-10-CM

## 2018-08-01 ENCOUNTER — COMMUNICATION - HEALTHEAST (OUTPATIENT)
Dept: INTERNAL MEDICINE | Facility: CLINIC | Age: 78
End: 2018-08-01

## 2018-08-01 DIAGNOSIS — K21.9 GERD (GASTROESOPHAGEAL REFLUX DISEASE): ICD-10-CM

## 2018-08-02 ENCOUNTER — COMMUNICATION - HEALTHEAST (OUTPATIENT)
Dept: INTERNAL MEDICINE | Facility: CLINIC | Age: 78
End: 2018-08-02

## 2018-08-02 DIAGNOSIS — F34.1 DYSTHYMIA: ICD-10-CM

## 2018-08-14 ENCOUNTER — COMMUNICATION - HEALTHEAST (OUTPATIENT)
Dept: INTERNAL MEDICINE | Facility: CLINIC | Age: 78
End: 2018-08-14

## 2018-08-14 DIAGNOSIS — I10 HTN (HYPERTENSION): ICD-10-CM

## 2018-09-10 ENCOUNTER — COMMUNICATION - HEALTHEAST (OUTPATIENT)
Dept: PHYSICAL MEDICINE AND REHAB | Facility: CLINIC | Age: 78
End: 2018-09-10

## 2018-09-15 ENCOUNTER — COMMUNICATION - HEALTHEAST (OUTPATIENT)
Dept: INTERNAL MEDICINE | Facility: CLINIC | Age: 78
End: 2018-09-15

## 2018-09-15 DIAGNOSIS — E03.9 ACQUIRED HYPOTHYROIDISM: ICD-10-CM

## 2018-09-20 ENCOUNTER — OFFICE VISIT - HEALTHEAST (OUTPATIENT)
Dept: INTERNAL MEDICINE | Facility: CLINIC | Age: 78
End: 2018-09-20

## 2018-09-20 DIAGNOSIS — I10 HTN (HYPERTENSION): ICD-10-CM

## 2018-09-20 DIAGNOSIS — K21.9 GERD (GASTROESOPHAGEAL REFLUX DISEASE): ICD-10-CM

## 2018-09-20 DIAGNOSIS — E03.9 ACQUIRED HYPOTHYROIDISM: ICD-10-CM

## 2018-09-20 DIAGNOSIS — Z01.818 PREOPERATIVE EXAMINATION: ICD-10-CM

## 2018-09-20 DIAGNOSIS — H26.9 CATARACT: ICD-10-CM

## 2018-09-20 DIAGNOSIS — E78.5 HLD (HYPERLIPIDEMIA): ICD-10-CM

## 2018-09-20 DIAGNOSIS — M19.90 OSTEOARTHRITIS: ICD-10-CM

## 2018-09-20 DIAGNOSIS — F34.1 DYSTHYMIA: ICD-10-CM

## 2018-09-20 ASSESSMENT — MIFFLIN-ST. JEOR: SCORE: 1300.5

## 2018-10-03 ENCOUNTER — COMMUNICATION - HEALTHEAST (OUTPATIENT)
Dept: INTERNAL MEDICINE | Facility: CLINIC | Age: 78
End: 2018-10-03

## 2018-10-03 DIAGNOSIS — Z88.9 HISTORY OF SEASONAL ALLERGIES: ICD-10-CM

## 2018-10-09 ENCOUNTER — COMMUNICATION - HEALTHEAST (OUTPATIENT)
Dept: PHYSICAL MEDICINE AND REHAB | Facility: CLINIC | Age: 78
End: 2018-10-09

## 2018-11-04 ENCOUNTER — COMMUNICATION - HEALTHEAST (OUTPATIENT)
Dept: INTERNAL MEDICINE | Facility: CLINIC | Age: 78
End: 2018-11-04

## 2018-11-06 ENCOUNTER — COMMUNICATION - HEALTHEAST (OUTPATIENT)
Dept: INTERNAL MEDICINE | Facility: CLINIC | Age: 78
End: 2018-11-06

## 2018-12-12 ENCOUNTER — COMMUNICATION - HEALTHEAST (OUTPATIENT)
Dept: INTERNAL MEDICINE | Facility: CLINIC | Age: 78
End: 2018-12-12

## 2018-12-12 DIAGNOSIS — E03.9 ACQUIRED HYPOTHYROIDISM: ICD-10-CM

## 2019-01-30 ENCOUNTER — RECORDS - HEALTHEAST (OUTPATIENT)
Dept: ADMINISTRATIVE | Facility: OTHER | Age: 79
End: 2019-01-30

## 2019-02-13 ENCOUNTER — RECORDS - HEALTHEAST (OUTPATIENT)
Dept: ADMINISTRATIVE | Facility: OTHER | Age: 79
End: 2019-02-13

## 2019-02-19 ENCOUNTER — RECORDS - HEALTHEAST (OUTPATIENT)
Dept: ADMINISTRATIVE | Facility: OTHER | Age: 79
End: 2019-02-19

## 2019-02-19 ENCOUNTER — HOSPITAL ENCOUNTER (OUTPATIENT)
Dept: PHYSICAL MEDICINE AND REHAB | Facility: CLINIC | Age: 79
Discharge: HOME | End: 2019-02-19
Attending: PHYSICIAN ASSISTANT

## 2019-02-19 DIAGNOSIS — M47.812 FACET ARTHROPATHY, CERVICAL: ICD-10-CM

## 2019-02-19 DIAGNOSIS — M53.3 CHRONIC RIGHT SI JOINT PAIN: ICD-10-CM

## 2019-02-19 DIAGNOSIS — G89.29 CHRONIC RIGHT SI JOINT PAIN: ICD-10-CM

## 2019-02-19 DIAGNOSIS — M54.2 CHRONIC NECK PAIN: ICD-10-CM

## 2019-02-19 DIAGNOSIS — G89.29 CHRONIC NECK PAIN: ICD-10-CM

## 2019-02-22 ENCOUNTER — COMMUNICATION - HEALTHEAST (OUTPATIENT)
Dept: PHYSICAL MEDICINE AND REHAB | Facility: CLINIC | Age: 79
End: 2019-02-22

## 2019-05-29 ENCOUNTER — RECORDS - HEALTHEAST (OUTPATIENT)
Dept: ADMINISTRATIVE | Facility: OTHER | Age: 79
End: 2019-05-29

## 2019-06-10 ENCOUNTER — COMMUNICATION - HEALTHEAST (OUTPATIENT)
Dept: INTERNAL MEDICINE | Facility: CLINIC | Age: 79
End: 2019-06-10

## 2019-06-10 DIAGNOSIS — I10 HTN (HYPERTENSION): ICD-10-CM

## 2019-07-09 ENCOUNTER — COMMUNICATION - HEALTHEAST (OUTPATIENT)
Dept: SCHEDULING | Facility: CLINIC | Age: 79
End: 2019-07-09

## 2019-07-09 ENCOUNTER — OFFICE VISIT - HEALTHEAST (OUTPATIENT)
Dept: INTERNAL MEDICINE | Facility: CLINIC | Age: 79
End: 2019-07-09

## 2019-07-09 DIAGNOSIS — R19.8 ABDOMINAL FULLNESS: ICD-10-CM

## 2019-07-09 DIAGNOSIS — R14.2 BELCHING: ICD-10-CM

## 2019-07-09 DIAGNOSIS — R53.83 FATIGUE, UNSPECIFIED TYPE: ICD-10-CM

## 2019-07-09 LAB
ALBUMIN SERPL-MCNC: 3.8 G/DL (ref 3.5–5)
ALP SERPL-CCNC: 66 U/L (ref 45–120)
ALT SERPL W P-5'-P-CCNC: 19 U/L (ref 0–45)
ANION GAP SERPL CALCULATED.3IONS-SCNC: 9 MMOL/L (ref 5–18)
AST SERPL W P-5'-P-CCNC: 25 U/L (ref 0–40)
BASOPHILS # BLD AUTO: 0 THOU/UL (ref 0–0.2)
BASOPHILS NFR BLD AUTO: 0 % (ref 0–2)
BILIRUB SERPL-MCNC: 0.4 MG/DL (ref 0–1)
BUN SERPL-MCNC: 23 MG/DL (ref 8–28)
C REACTIVE PROTEIN LHE: 0.3 MG/DL (ref 0–0.8)
CALCIUM SERPL-MCNC: 10 MG/DL (ref 8.5–10.5)
CHLORIDE BLD-SCNC: 103 MMOL/L (ref 98–107)
CO2 SERPL-SCNC: 29 MMOL/L (ref 22–31)
CREAT SERPL-MCNC: 0.96 MG/DL (ref 0.6–1.1)
EOSINOPHIL # BLD AUTO: 0.1 THOU/UL (ref 0–0.4)
EOSINOPHIL NFR BLD AUTO: 1 % (ref 0–6)
ERYTHROCYTE [DISTWIDTH] IN BLOOD BY AUTOMATED COUNT: 14.1 % (ref 11–14.5)
GFR SERPL CREATININE-BSD FRML MDRD: 56 ML/MIN/1.73M2
GLUCOSE BLD-MCNC: 102 MG/DL (ref 70–125)
HCT VFR BLD AUTO: 35.3 % (ref 35–47)
HGB BLD-MCNC: 11.8 G/DL (ref 12–16)
LYMPHOCYTES # BLD AUTO: 1.6 THOU/UL (ref 0.8–4.4)
LYMPHOCYTES NFR BLD AUTO: 32 % (ref 20–40)
MCH RBC QN AUTO: 30.4 PG (ref 27–34)
MCHC RBC AUTO-ENTMCNC: 33.5 G/DL (ref 32–36)
MCV RBC AUTO: 91 FL (ref 80–100)
MONOCYTES # BLD AUTO: 0.5 THOU/UL (ref 0–0.9)
MONOCYTES NFR BLD AUTO: 10 % (ref 2–10)
NEUTROPHILS # BLD AUTO: 2.9 THOU/UL (ref 2–7.7)
NEUTROPHILS NFR BLD AUTO: 57 % (ref 50–70)
PLATELET # BLD AUTO: 313 THOU/UL (ref 140–440)
PMV BLD AUTO: 6.7 FL (ref 7–10)
POTASSIUM BLD-SCNC: 3.8 MMOL/L (ref 3.5–5)
PROT SERPL-MCNC: 6.7 G/DL (ref 6–8)
RBC # BLD AUTO: 3.89 MILL/UL (ref 3.8–5.4)
SODIUM SERPL-SCNC: 141 MMOL/L (ref 136–145)
WBC: 5.1 THOU/UL (ref 4–11)

## 2019-07-09 ASSESSMENT — MIFFLIN-ST. JEOR: SCORE: 1295.96

## 2019-07-10 ENCOUNTER — COMMUNICATION - HEALTHEAST (OUTPATIENT)
Dept: INTERNAL MEDICINE | Facility: CLINIC | Age: 79
End: 2019-07-10

## 2019-07-10 ENCOUNTER — HOSPITAL ENCOUNTER (OUTPATIENT)
Dept: ULTRASOUND IMAGING | Facility: HOSPITAL | Age: 79
Discharge: HOME | End: 2019-07-10

## 2019-07-10 DIAGNOSIS — R14.2 BELCHING: ICD-10-CM

## 2019-07-10 DIAGNOSIS — R53.83 FATIGUE, UNSPECIFIED TYPE: ICD-10-CM

## 2019-07-10 DIAGNOSIS — R19.8 ABDOMINAL FULLNESS: ICD-10-CM

## 2019-07-11 ENCOUNTER — OFFICE VISIT - HEALTHEAST (OUTPATIENT)
Dept: INTERNAL MEDICINE | Facility: CLINIC | Age: 79
End: 2019-07-11

## 2019-07-11 DIAGNOSIS — R10.11 RUQ ABDOMINAL PAIN: ICD-10-CM

## 2019-07-11 DIAGNOSIS — K80.50 COMMON BILE DUCT CALCULUS: ICD-10-CM

## 2019-07-19 ENCOUNTER — RECORDS - HEALTHEAST (OUTPATIENT)
Dept: ADMINISTRATIVE | Facility: OTHER | Age: 79
End: 2019-07-19

## 2019-07-21 ENCOUNTER — COMMUNICATION - HEALTHEAST (OUTPATIENT)
Dept: INTERNAL MEDICINE | Facility: CLINIC | Age: 79
End: 2019-07-21

## 2019-07-21 DIAGNOSIS — K21.9 GERD (GASTROESOPHAGEAL REFLUX DISEASE): ICD-10-CM

## 2019-07-21 DIAGNOSIS — E78.5 HLD (HYPERLIPIDEMIA): ICD-10-CM

## 2019-07-23 ENCOUNTER — OFFICE VISIT - HEALTHEAST (OUTPATIENT)
Dept: INTERNAL MEDICINE | Facility: CLINIC | Age: 79
End: 2019-07-23

## 2019-07-23 DIAGNOSIS — I10 ESSENTIAL HYPERTENSION: ICD-10-CM

## 2019-07-23 DIAGNOSIS — E03.9 ACQUIRED HYPOTHYROIDISM: ICD-10-CM

## 2019-07-23 DIAGNOSIS — F34.1 DYSTHYMIA: ICD-10-CM

## 2019-07-23 DIAGNOSIS — E78.01 FAMILIAL HYPERCHOLESTEROLEMIA: ICD-10-CM

## 2019-07-23 DIAGNOSIS — Z01.818 PREOPERATIVE EXAMINATION: ICD-10-CM

## 2019-07-23 DIAGNOSIS — K21.9 GASTROESOPHAGEAL REFLUX DISEASE WITHOUT ESOPHAGITIS: ICD-10-CM

## 2019-07-23 DIAGNOSIS — K80.50 COMMON BILE DUCT CALCULUS: ICD-10-CM

## 2019-07-23 DIAGNOSIS — Z86.718 HISTORY OF DVT (DEEP VEIN THROMBOSIS): ICD-10-CM

## 2019-07-23 ASSESSMENT — MIFFLIN-ST. JEOR: SCORE: 1298.23

## 2019-07-24 LAB
ATRIAL RATE - MUSE: 47 BPM
DIASTOLIC BLOOD PRESSURE - MUSE: NORMAL MMHG
INTERPRETATION ECG - MUSE: NORMAL
P AXIS - MUSE: 27 DEGREES
PR INTERVAL - MUSE: 152 MS
QRS DURATION - MUSE: 96 MS
QT - MUSE: 428 MS
QTC - MUSE: 378 MS
R AXIS - MUSE: 14 DEGREES
SYSTOLIC BLOOD PRESSURE - MUSE: NORMAL MMHG
T AXIS - MUSE: 63 DEGREES
VENTRICULAR RATE- MUSE: 47 BPM

## 2019-07-25 ENCOUNTER — ANESTHESIA - HEALTHEAST (OUTPATIENT)
Dept: SURGERY | Facility: HOSPITAL | Age: 79
End: 2019-07-25

## 2019-07-25 ENCOUNTER — COMMUNICATION - HEALTHEAST (OUTPATIENT)
Dept: INTERNAL MEDICINE | Facility: CLINIC | Age: 79
End: 2019-07-25

## 2019-07-25 ASSESSMENT — MIFFLIN-ST. JEOR: SCORE: 1295.96

## 2019-07-26 ENCOUNTER — SURGERY - HEALTHEAST (OUTPATIENT)
Dept: SURGERY | Facility: HOSPITAL | Age: 79
End: 2019-07-26

## 2019-08-03 ENCOUNTER — COMMUNICATION - HEALTHEAST (OUTPATIENT)
Dept: INTERNAL MEDICINE | Facility: CLINIC | Age: 79
End: 2019-08-03

## 2019-08-05 ENCOUNTER — OFFICE VISIT - HEALTHEAST (OUTPATIENT)
Dept: INTERNAL MEDICINE | Facility: CLINIC | Age: 79
End: 2019-08-05

## 2019-08-05 ENCOUNTER — COMMUNICATION - HEALTHEAST (OUTPATIENT)
Dept: SCHEDULING | Facility: CLINIC | Age: 79
End: 2019-08-05

## 2019-08-05 DIAGNOSIS — R10.13 EPIGASTRIC PAIN: ICD-10-CM

## 2019-08-05 DIAGNOSIS — K21.9 GASTROESOPHAGEAL REFLUX DISEASE WITHOUT ESOPHAGITIS: ICD-10-CM

## 2019-08-05 DIAGNOSIS — K29.00 OTHER ACUTE GASTRITIS WITHOUT HEMORRHAGE: ICD-10-CM

## 2019-08-06 ENCOUNTER — AMBULATORY - HEALTHEAST (OUTPATIENT)
Dept: LAB | Facility: CLINIC | Age: 79
End: 2019-08-06

## 2019-08-06 DIAGNOSIS — R10.13 EPIGASTRIC PAIN: ICD-10-CM

## 2019-08-07 ENCOUNTER — RECORDS - HEALTHEAST (OUTPATIENT)
Dept: ADMINISTRATIVE | Facility: OTHER | Age: 79
End: 2019-08-07

## 2019-08-07 LAB
H PYLORI AG STL QL IA: NORMAL
REPORT STATUS: NORMAL
SPECIMEN DESCRIPTION: NORMAL

## 2019-08-08 ENCOUNTER — HOSPITAL ENCOUNTER (OUTPATIENT)
Dept: RADIOLOGY | Facility: HOSPITAL | Age: 79
Discharge: HOME | End: 2019-08-08

## 2019-08-08 DIAGNOSIS — R14.2 FUNCTIONAL BURPING DISORDER: ICD-10-CM

## 2019-08-12 ENCOUNTER — COMMUNICATION - HEALTHEAST (OUTPATIENT)
Dept: INTERNAL MEDICINE | Facility: CLINIC | Age: 79
End: 2019-08-12

## 2019-08-14 ENCOUNTER — OFFICE VISIT - HEALTHEAST (OUTPATIENT)
Dept: INTERNAL MEDICINE | Facility: CLINIC | Age: 79
End: 2019-08-14

## 2019-08-14 DIAGNOSIS — K21.9 GASTROESOPHAGEAL REFLUX DISEASE WITHOUT ESOPHAGITIS: ICD-10-CM

## 2019-08-15 ENCOUNTER — COMMUNICATION - HEALTHEAST (OUTPATIENT)
Dept: INTERNAL MEDICINE | Facility: CLINIC | Age: 79
End: 2019-08-15

## 2019-08-20 ENCOUNTER — COMMUNICATION - HEALTHEAST (OUTPATIENT)
Dept: INTERNAL MEDICINE | Facility: CLINIC | Age: 79
End: 2019-08-20

## 2019-08-20 DIAGNOSIS — K21.9 GASTROESOPHAGEAL REFLUX DISEASE WITHOUT ESOPHAGITIS: ICD-10-CM

## 2019-08-20 DIAGNOSIS — K44.9 HIATAL HERNIA: ICD-10-CM

## 2019-08-26 ENCOUNTER — OFFICE VISIT - HEALTHEAST (OUTPATIENT)
Dept: SURGERY | Facility: CLINIC | Age: 79
End: 2019-08-26

## 2019-08-26 DIAGNOSIS — K44.9 HIATAL HERNIA: ICD-10-CM

## 2019-08-26 ASSESSMENT — MIFFLIN-ST. JEOR: SCORE: 1277.82

## 2019-09-04 ENCOUNTER — OFFICE VISIT - HEALTHEAST (OUTPATIENT)
Dept: INTERNAL MEDICINE | Facility: CLINIC | Age: 79
End: 2019-09-04

## 2019-09-04 DIAGNOSIS — E03.9 ACQUIRED HYPOTHYROIDISM: ICD-10-CM

## 2019-09-04 DIAGNOSIS — K44.9 HIATAL HERNIA: ICD-10-CM

## 2019-09-04 DIAGNOSIS — F34.1 DYSTHYMIA: ICD-10-CM

## 2019-09-04 DIAGNOSIS — Z01.818 PREOPERATIVE EXAMINATION: ICD-10-CM

## 2019-09-04 DIAGNOSIS — K21.9 GASTROESOPHAGEAL REFLUX DISEASE WITHOUT ESOPHAGITIS: ICD-10-CM

## 2019-09-04 DIAGNOSIS — I10 ESSENTIAL HYPERTENSION: ICD-10-CM

## 2019-09-04 ASSESSMENT — MIFFLIN-ST. JEOR: SCORE: 1286.89

## 2019-09-04 ASSESSMENT — PATIENT HEALTH QUESTIONNAIRE - PHQ9: SUM OF ALL RESPONSES TO PHQ QUESTIONS 1-9: 3

## 2019-09-05 ASSESSMENT — MIFFLIN-ST. JEOR: SCORE: 1268.74

## 2019-09-09 ENCOUNTER — ANESTHESIA - HEALTHEAST (OUTPATIENT)
Dept: SURGERY | Facility: AMBULATORY SURGERY CENTER | Age: 79
End: 2019-09-09

## 2019-09-10 ENCOUNTER — SURGERY - HEALTHEAST (OUTPATIENT)
Dept: SURGERY | Facility: AMBULATORY SURGERY CENTER | Age: 79
End: 2019-09-10

## 2019-09-10 ASSESSMENT — MIFFLIN-ST. JEOR: SCORE: 1268.74

## 2019-09-11 ENCOUNTER — COMMUNICATION - HEALTHEAST (OUTPATIENT)
Dept: INTERNAL MEDICINE | Facility: CLINIC | Age: 79
End: 2019-09-11

## 2019-09-11 DIAGNOSIS — J32.9 RHINOSINUSITIS: ICD-10-CM

## 2019-09-20 ENCOUNTER — OFFICE VISIT - HEALTHEAST (OUTPATIENT)
Dept: SURGERY | Facility: CLINIC | Age: 79
End: 2019-09-20

## 2019-09-20 DIAGNOSIS — K44.9 HIATAL HERNIA: ICD-10-CM

## 2019-09-20 DIAGNOSIS — K29.00 OTHER ACUTE GASTRITIS WITHOUT HEMORRHAGE: ICD-10-CM

## 2019-09-20 ASSESSMENT — MIFFLIN-ST. JEOR: SCORE: 1268.74

## 2019-09-29 ENCOUNTER — COMMUNICATION - HEALTHEAST (OUTPATIENT)
Dept: INTERNAL MEDICINE | Facility: CLINIC | Age: 79
End: 2019-09-29

## 2019-09-29 DIAGNOSIS — R10.13 EPIGASTRIC PAIN: ICD-10-CM

## 2019-09-30 ENCOUNTER — COMMUNICATION - HEALTHEAST (OUTPATIENT)
Dept: INTERNAL MEDICINE | Facility: CLINIC | Age: 79
End: 2019-09-30

## 2019-10-01 ENCOUNTER — AMBULATORY - HEALTHEAST (OUTPATIENT)
Dept: INTERNAL MEDICINE | Facility: CLINIC | Age: 79
End: 2019-10-01

## 2019-10-01 DIAGNOSIS — K21.9 GASTROESOPHAGEAL REFLUX DISEASE WITHOUT ESOPHAGITIS: ICD-10-CM

## 2019-10-11 ENCOUNTER — COMMUNICATION - HEALTHEAST (OUTPATIENT)
Dept: INTERNAL MEDICINE | Facility: CLINIC | Age: 79
End: 2019-10-11

## 2019-10-11 DIAGNOSIS — F34.1 DYSTHYMIA: ICD-10-CM

## 2019-10-16 ENCOUNTER — COMMUNICATION - HEALTHEAST (OUTPATIENT)
Dept: INTERNAL MEDICINE | Facility: CLINIC | Age: 79
End: 2019-10-16

## 2019-10-16 DIAGNOSIS — Z88.9 HISTORY OF SEASONAL ALLERGIES: ICD-10-CM

## 2019-11-01 ENCOUNTER — OFFICE VISIT - HEALTHEAST (OUTPATIENT)
Dept: SURGERY | Facility: CLINIC | Age: 79
End: 2019-11-01

## 2019-11-01 DIAGNOSIS — K44.9 HIATAL HERNIA: ICD-10-CM

## 2019-11-01 ASSESSMENT — MIFFLIN-ST. JEOR: SCORE: 1268.74

## 2019-11-05 ENCOUNTER — RECORDS - HEALTHEAST (OUTPATIENT)
Dept: ADMINISTRATIVE | Facility: OTHER | Age: 79
End: 2019-11-05

## 2019-11-27 ENCOUNTER — COMMUNICATION - HEALTHEAST (OUTPATIENT)
Dept: INTERNAL MEDICINE | Facility: CLINIC | Age: 79
End: 2019-11-27

## 2019-11-27 ENCOUNTER — OFFICE VISIT - HEALTHEAST (OUTPATIENT)
Dept: INTERNAL MEDICINE | Facility: CLINIC | Age: 79
End: 2019-11-27

## 2019-11-27 DIAGNOSIS — Z78.0 POST-MENOPAUSAL: ICD-10-CM

## 2019-11-27 DIAGNOSIS — Z00.00 ENCOUNTER FOR MEDICARE ANNUAL WELLNESS EXAM: ICD-10-CM

## 2019-11-27 DIAGNOSIS — K21.9 GASTROESOPHAGEAL REFLUX DISEASE WITHOUT ESOPHAGITIS: ICD-10-CM

## 2019-11-27 DIAGNOSIS — F34.1 DYSTHYMIA: ICD-10-CM

## 2019-11-27 DIAGNOSIS — M70.62 GREATER TROCHANTERIC BURSITIS OF LEFT HIP: ICD-10-CM

## 2019-11-27 DIAGNOSIS — I10 ESSENTIAL HYPERTENSION: ICD-10-CM

## 2019-11-27 DIAGNOSIS — J31.0 CHRONIC RHINITIS: ICD-10-CM

## 2019-11-27 DIAGNOSIS — E78.01 FAMILIAL HYPERCHOLESTEROLEMIA: ICD-10-CM

## 2019-11-27 DIAGNOSIS — E03.9 ACQUIRED HYPOTHYROIDISM: ICD-10-CM

## 2019-11-27 DIAGNOSIS — M19.90 OSTEOARTHRITIS, UNSPECIFIED OSTEOARTHRITIS TYPE, UNSPECIFIED SITE: ICD-10-CM

## 2019-11-27 LAB
CHOLEST SERPL-MCNC: 197 MG/DL
FASTING STATUS PATIENT QL REPORTED: YES
HDLC SERPL-MCNC: 57 MG/DL
LDLC SERPL CALC-MCNC: 123 MG/DL
TRIGL SERPL-MCNC: 86 MG/DL

## 2019-11-27 ASSESSMENT — PATIENT HEALTH QUESTIONNAIRE - PHQ9: SUM OF ALL RESPONSES TO PHQ QUESTIONS 1-9: 0

## 2019-11-27 ASSESSMENT — MIFFLIN-ST. JEOR: SCORE: 1277.82

## 2019-12-06 ENCOUNTER — RECORDS - HEALTHEAST (OUTPATIENT)
Dept: ADMINISTRATIVE | Facility: OTHER | Age: 79
End: 2019-12-06

## 2020-01-15 ENCOUNTER — COMMUNICATION - HEALTHEAST (OUTPATIENT)
Dept: INTERNAL MEDICINE | Facility: CLINIC | Age: 80
End: 2020-01-15

## 2020-01-16 ENCOUNTER — AMBULATORY - HEALTHEAST (OUTPATIENT)
Dept: INTERNAL MEDICINE | Facility: CLINIC | Age: 80
End: 2020-01-16

## 2020-01-16 DIAGNOSIS — J01.10 ACUTE NON-RECURRENT FRONTAL SINUSITIS: ICD-10-CM

## 2020-01-20 ENCOUNTER — COMMUNICATION - HEALTHEAST (OUTPATIENT)
Dept: SCHEDULING | Facility: CLINIC | Age: 80
End: 2020-01-20

## 2020-01-31 ENCOUNTER — COMMUNICATION - HEALTHEAST (OUTPATIENT)
Dept: SCHEDULING | Facility: CLINIC | Age: 80
End: 2020-01-31

## 2020-01-31 ENCOUNTER — OFFICE VISIT - HEALTHEAST (OUTPATIENT)
Dept: INTERNAL MEDICINE | Facility: CLINIC | Age: 80
End: 2020-01-31

## 2020-01-31 DIAGNOSIS — J11.1 INFLUENZA: ICD-10-CM

## 2020-01-31 DIAGNOSIS — R05.9 COUGH: ICD-10-CM

## 2020-01-31 LAB
FLUAV AG SPEC QL IA: NORMAL
FLUBV AG SPEC QL IA: NORMAL

## 2020-02-03 ENCOUNTER — COMMUNICATION - HEALTHEAST (OUTPATIENT)
Dept: SCHEDULING | Facility: CLINIC | Age: 80
End: 2020-02-03

## 2020-02-03 ENCOUNTER — OFFICE VISIT - HEALTHEAST (OUTPATIENT)
Dept: FAMILY MEDICINE | Facility: CLINIC | Age: 80
End: 2020-02-03

## 2020-02-03 DIAGNOSIS — R05.9 COUGH: ICD-10-CM

## 2020-02-10 ENCOUNTER — OFFICE VISIT - HEALTHEAST (OUTPATIENT)
Dept: INTERNAL MEDICINE | Facility: CLINIC | Age: 80
End: 2020-02-10

## 2020-02-10 DIAGNOSIS — J32.9 RHINOSINUSITIS: ICD-10-CM

## 2020-03-03 ENCOUNTER — COMMUNICATION - HEALTHEAST (OUTPATIENT)
Dept: INTERNAL MEDICINE | Facility: CLINIC | Age: 80
End: 2020-03-03

## 2020-03-03 DIAGNOSIS — K21.9 GASTROESOPHAGEAL REFLUX DISEASE WITHOUT ESOPHAGITIS: ICD-10-CM

## 2020-03-03 DIAGNOSIS — K44.9 HIATAL HERNIA: ICD-10-CM

## 2020-03-09 ENCOUNTER — RECORDS - HEALTHEAST (OUTPATIENT)
Dept: ADMINISTRATIVE | Facility: OTHER | Age: 80
End: 2020-03-09

## 2020-03-16 ENCOUNTER — COMMUNICATION - HEALTHEAST (OUTPATIENT)
Dept: INTERNAL MEDICINE | Facility: CLINIC | Age: 80
End: 2020-03-16

## 2020-03-16 DIAGNOSIS — K21.9 GASTROESOPHAGEAL REFLUX DISEASE WITHOUT ESOPHAGITIS: ICD-10-CM

## 2020-03-20 ENCOUNTER — COMMUNICATION - HEALTHEAST (OUTPATIENT)
Dept: INTERNAL MEDICINE | Facility: CLINIC | Age: 80
End: 2020-03-20

## 2020-03-29 ENCOUNTER — COMMUNICATION - HEALTHEAST (OUTPATIENT)
Dept: INTERNAL MEDICINE | Facility: CLINIC | Age: 80
End: 2020-03-29

## 2020-03-29 DIAGNOSIS — K21.9 GASTROESOPHAGEAL REFLUX DISEASE WITHOUT ESOPHAGITIS: ICD-10-CM

## 2020-04-20 ENCOUNTER — OFFICE VISIT - HEALTHEAST (OUTPATIENT)
Dept: INTERNAL MEDICINE | Facility: CLINIC | Age: 80
End: 2020-04-20

## 2020-04-20 ENCOUNTER — AMBULATORY - HEALTHEAST (OUTPATIENT)
Dept: CARE COORDINATION | Facility: CLINIC | Age: 80
End: 2020-04-20

## 2020-04-20 ENCOUNTER — COMMUNICATION - HEALTHEAST (OUTPATIENT)
Dept: INTERNAL MEDICINE | Facility: CLINIC | Age: 80
End: 2020-04-20

## 2020-04-20 DIAGNOSIS — E66.9 OBESITY: ICD-10-CM

## 2020-04-20 DIAGNOSIS — K62.5 BRIGHT RED RECTAL BLEEDING: ICD-10-CM

## 2020-04-20 DIAGNOSIS — E11.9 DIABETES MELLITUS (H): ICD-10-CM

## 2020-04-20 DIAGNOSIS — K59.01 SLOW TRANSIT CONSTIPATION: ICD-10-CM

## 2020-04-21 ENCOUNTER — COMMUNICATION - HEALTHEAST (OUTPATIENT)
Dept: NURSING | Facility: CLINIC | Age: 80
End: 2020-04-21

## 2020-04-24 ENCOUNTER — RECORDS - HEALTHEAST (OUTPATIENT)
Dept: ADMINISTRATIVE | Facility: OTHER | Age: 80
End: 2020-04-24

## 2020-04-28 ENCOUNTER — RECORDS - HEALTHEAST (OUTPATIENT)
Dept: ADMINISTRATIVE | Facility: OTHER | Age: 80
End: 2020-04-28

## 2020-04-28 ENCOUNTER — OFFICE VISIT - HEALTHEAST (OUTPATIENT)
Dept: INTERNAL MEDICINE | Facility: CLINIC | Age: 80
End: 2020-04-28

## 2020-04-28 ENCOUNTER — COMMUNICATION - HEALTHEAST (OUTPATIENT)
Dept: INTERNAL MEDICINE | Facility: CLINIC | Age: 80
End: 2020-04-28

## 2020-04-28 DIAGNOSIS — K59.00 CONSTIPATION, UNSPECIFIED CONSTIPATION TYPE: ICD-10-CM

## 2020-04-28 DIAGNOSIS — R10.13 EPIGASTRIC PAIN: ICD-10-CM

## 2020-04-28 DIAGNOSIS — R11.0 NAUSEA: ICD-10-CM

## 2020-04-29 ENCOUNTER — AMBULATORY - HEALTHEAST (OUTPATIENT)
Dept: INTERNAL MEDICINE | Facility: CLINIC | Age: 80
End: 2020-04-29

## 2020-04-29 DIAGNOSIS — K59.00 CONSTIPATION: ICD-10-CM

## 2020-04-30 ENCOUNTER — AMBULATORY - HEALTHEAST (OUTPATIENT)
Dept: LAB | Facility: CLINIC | Age: 80
End: 2020-04-30

## 2020-04-30 DIAGNOSIS — K59.00 CONSTIPATION: ICD-10-CM

## 2020-05-04 ENCOUNTER — AMBULATORY - HEALTHEAST (OUTPATIENT)
Dept: LAB | Facility: CLINIC | Age: 80
End: 2020-05-04

## 2020-05-04 ENCOUNTER — COMMUNICATION - HEALTHEAST (OUTPATIENT)
Dept: INTERNAL MEDICINE | Facility: CLINIC | Age: 80
End: 2020-05-04

## 2020-05-04 DIAGNOSIS — K59.00 CONSTIPATION: ICD-10-CM

## 2020-05-04 DIAGNOSIS — R10.13 EPIGASTRIC PAIN: ICD-10-CM

## 2020-05-04 LAB
ALBUMIN SERPL-MCNC: 3.6 G/DL (ref 3.5–5)
ALP SERPL-CCNC: 64 U/L (ref 45–120)
ALT SERPL W P-5'-P-CCNC: 17 U/L (ref 0–45)
ANION GAP SERPL CALCULATED.3IONS-SCNC: 8 MMOL/L (ref 5–18)
AST SERPL W P-5'-P-CCNC: 21 U/L (ref 0–40)
BASOPHILS # BLD AUTO: 0 THOU/UL (ref 0–0.2)
BASOPHILS NFR BLD AUTO: 1 % (ref 0–2)
BILIRUB SERPL-MCNC: 0.4 MG/DL (ref 0–1)
BUN SERPL-MCNC: 19 MG/DL (ref 8–28)
CALCIUM SERPL-MCNC: 9.1 MG/DL (ref 8.5–10.5)
CHLORIDE BLD-SCNC: 104 MMOL/L (ref 98–107)
CO2 SERPL-SCNC: 32 MMOL/L (ref 22–31)
CREAT SERPL-MCNC: 0.87 MG/DL (ref 0.6–1.1)
EOSINOPHIL # BLD AUTO: 0.1 THOU/UL (ref 0–0.4)
EOSINOPHIL NFR BLD AUTO: 1 % (ref 0–6)
ERYTHROCYTE [DISTWIDTH] IN BLOOD BY AUTOMATED COUNT: 14.6 % (ref 11–14.5)
GFR SERPL CREATININE-BSD FRML MDRD: >60 ML/MIN/1.73M2
GLUCOSE BLD-MCNC: 91 MG/DL (ref 70–125)
HCT VFR BLD AUTO: 38.2 % (ref 35–47)
HGB BLD-MCNC: 11.8 G/DL (ref 12–16)
LYMPHOCYTES # BLD AUTO: 1.2 THOU/UL (ref 0.8–4.4)
LYMPHOCYTES NFR BLD AUTO: 30 % (ref 20–40)
MCH RBC QN AUTO: 30.6 PG (ref 27–34)
MCHC RBC AUTO-ENTMCNC: 30.9 G/DL (ref 32–36)
MCV RBC AUTO: 99 FL (ref 80–100)
MONOCYTES # BLD AUTO: 0.5 THOU/UL (ref 0–0.9)
MONOCYTES NFR BLD AUTO: 13 % (ref 2–10)
NEUTROPHILS # BLD AUTO: 2.2 THOU/UL (ref 2–7.7)
NEUTROPHILS NFR BLD AUTO: 56 % (ref 50–70)
PLATELET # BLD AUTO: 262 THOU/UL (ref 140–440)
PMV BLD AUTO: 9.1 FL (ref 8.5–12.5)
POTASSIUM BLD-SCNC: 4 MMOL/L (ref 3.5–5)
PROT SERPL-MCNC: 6.3 G/DL (ref 6–8)
RBC # BLD AUTO: 3.85 MILL/UL (ref 3.8–5.4)
SODIUM SERPL-SCNC: 144 MMOL/L (ref 136–145)
TSH SERPL DL<=0.005 MIU/L-ACNC: 2.25 UIU/ML (ref 0.3–5)
WBC: 3.9 THOU/UL (ref 4–11)

## 2020-05-05 LAB
GLIADIN IGA SER-ACNC: 2.1 U/ML
GLIADIN IGG SER-ACNC: <0.4 U/ML
IGA SERPL-MCNC: 456 MG/DL (ref 65–400)
TTG IGA SER-ACNC: 1 U/ML
TTG IGG SER-ACNC: <0.6 U/ML

## 2020-05-07 ENCOUNTER — COMMUNICATION - HEALTHEAST (OUTPATIENT)
Dept: INTERNAL MEDICINE | Facility: CLINIC | Age: 80
End: 2020-05-07

## 2020-05-08 ENCOUNTER — RECORDS - HEALTHEAST (OUTPATIENT)
Dept: ADMINISTRATIVE | Facility: OTHER | Age: 80
End: 2020-05-08

## 2020-07-31 ENCOUNTER — COMMUNICATION - HEALTHEAST (OUTPATIENT)
Dept: INTERNAL MEDICINE | Facility: CLINIC | Age: 80
End: 2020-07-31

## 2020-07-31 ENCOUNTER — COMMUNICATION - HEALTHEAST (OUTPATIENT)
Dept: SCHEDULING | Facility: CLINIC | Age: 80
End: 2020-07-31

## 2020-08-03 ENCOUNTER — COMMUNICATION - HEALTHEAST (OUTPATIENT)
Dept: INTERNAL MEDICINE | Facility: CLINIC | Age: 80
End: 2020-08-03

## 2020-08-05 ENCOUNTER — COMMUNICATION - HEALTHEAST (OUTPATIENT)
Dept: CARDIOLOGY | Facility: CLINIC | Age: 80
End: 2020-08-05

## 2020-08-05 ENCOUNTER — RECORDS - HEALTHEAST (OUTPATIENT)
Dept: ADMINISTRATIVE | Facility: OTHER | Age: 80
End: 2020-08-05

## 2020-08-06 ENCOUNTER — OFFICE VISIT - HEALTHEAST (OUTPATIENT)
Dept: CARDIOLOGY | Facility: CLINIC | Age: 80
End: 2020-08-06

## 2020-08-06 ENCOUNTER — OFFICE VISIT - HEALTHEAST (OUTPATIENT)
Dept: INTERNAL MEDICINE | Facility: CLINIC | Age: 80
End: 2020-08-06

## 2020-08-06 DIAGNOSIS — I25.10 CORONARY ARTERY DISEASE DUE TO CALCIFIED CORONARY LESION: ICD-10-CM

## 2020-08-06 DIAGNOSIS — E78.01 FAMILIAL HYPERCHOLESTEROLEMIA: ICD-10-CM

## 2020-08-06 DIAGNOSIS — R07.9 CHEST PAIN, UNSPECIFIED TYPE: ICD-10-CM

## 2020-08-06 DIAGNOSIS — I10 ESSENTIAL HYPERTENSION: ICD-10-CM

## 2020-08-06 DIAGNOSIS — I25.84 CORONARY ARTERY DISEASE DUE TO CALCIFIED CORONARY LESION: ICD-10-CM

## 2020-08-06 DIAGNOSIS — K59.01 SLOW TRANSIT CONSTIPATION: ICD-10-CM

## 2020-08-06 DIAGNOSIS — I25.9 CHEST PAIN DUE TO MYOCARDIAL ISCHEMIA, UNSPECIFIED ISCHEMIC CHEST PAIN TYPE: ICD-10-CM

## 2020-08-06 DIAGNOSIS — N64.4 BREAST PAIN, LEFT: ICD-10-CM

## 2020-08-06 ASSESSMENT — MIFFLIN-ST. JEOR: SCORE: 1250.14

## 2020-08-08 ENCOUNTER — COMMUNICATION - HEALTHEAST (OUTPATIENT)
Dept: INTERNAL MEDICINE | Facility: CLINIC | Age: 80
End: 2020-08-08

## 2020-08-10 ENCOUNTER — COMMUNICATION - HEALTHEAST (OUTPATIENT)
Dept: INTERNAL MEDICINE | Facility: CLINIC | Age: 80
End: 2020-08-10

## 2020-08-11 ENCOUNTER — HOSPITAL ENCOUNTER (OUTPATIENT)
Dept: CT IMAGING | Facility: CLINIC | Age: 80
Discharge: HOME | End: 2020-08-11
Attending: INTERNAL MEDICINE

## 2020-08-11 ENCOUNTER — COMMUNICATION - HEALTHEAST (OUTPATIENT)
Dept: INTERNAL MEDICINE | Facility: CLINIC | Age: 80
End: 2020-08-11

## 2020-08-11 DIAGNOSIS — I25.9 CHEST PAIN DUE TO MYOCARDIAL ISCHEMIA, UNSPECIFIED ISCHEMIC CHEST PAIN TYPE: ICD-10-CM

## 2020-08-11 ASSESSMENT — MIFFLIN-ST. JEOR: SCORE: 1267.6

## 2020-08-17 ENCOUNTER — HOSPITAL ENCOUNTER (OUTPATIENT)
Dept: MAMMOGRAPHY | Facility: CLINIC | Age: 80
Discharge: HOME | End: 2020-08-17

## 2020-08-17 DIAGNOSIS — N64.4 BREAST PAIN, LEFT: ICD-10-CM

## 2020-08-27 ENCOUNTER — COMMUNICATION - HEALTHEAST (OUTPATIENT)
Dept: INTERNAL MEDICINE | Facility: CLINIC | Age: 80
End: 2020-08-27

## 2020-08-27 DIAGNOSIS — E78.5 HLD (HYPERLIPIDEMIA): ICD-10-CM

## 2020-08-27 DIAGNOSIS — E03.9 ACQUIRED HYPOTHYROIDISM: ICD-10-CM

## 2020-08-29 ENCOUNTER — COMMUNICATION - HEALTHEAST (OUTPATIENT)
Dept: INTERNAL MEDICINE | Facility: CLINIC | Age: 80
End: 2020-08-29

## 2020-08-29 DIAGNOSIS — I10 ESSENTIAL HYPERTENSION: ICD-10-CM

## 2020-08-31 ENCOUNTER — COMMUNICATION - HEALTHEAST (OUTPATIENT)
Dept: INTERNAL MEDICINE | Facility: CLINIC | Age: 80
End: 2020-08-31

## 2020-09-10 ENCOUNTER — AMBULATORY - HEALTHEAST (OUTPATIENT)
Dept: NURSING | Facility: CLINIC | Age: 80
End: 2020-09-10

## 2020-09-10 DIAGNOSIS — Z23 NEED FOR INFLUENZA VACCINATION: ICD-10-CM

## 2020-09-15 ENCOUNTER — COMMUNICATION - HEALTHEAST (OUTPATIENT)
Dept: CARDIOLOGY | Facility: CLINIC | Age: 80
End: 2020-09-15

## 2020-09-16 ENCOUNTER — OFFICE VISIT - HEALTHEAST (OUTPATIENT)
Dept: CARDIOLOGY | Facility: CLINIC | Age: 80
End: 2020-09-16

## 2020-09-16 DIAGNOSIS — I10 ESSENTIAL HYPERTENSION: ICD-10-CM

## 2020-09-16 DIAGNOSIS — E78.01 FAMILIAL HYPERCHOLESTEROLEMIA: ICD-10-CM

## 2020-09-16 DIAGNOSIS — I25.10 CORONARY ARTERY DISEASE INVOLVING NATIVE CORONARY ARTERY OF NATIVE HEART WITHOUT ANGINA PECTORIS: ICD-10-CM

## 2020-09-16 ASSESSMENT — MIFFLIN-ST. JEOR: SCORE: 1289.82

## 2020-09-21 ENCOUNTER — OFFICE VISIT - HEALTHEAST (OUTPATIENT)
Dept: INTERNAL MEDICINE | Facility: CLINIC | Age: 80
End: 2020-09-21

## 2020-09-21 DIAGNOSIS — I10 ESSENTIAL HYPERTENSION: ICD-10-CM

## 2020-09-21 DIAGNOSIS — E78.01 FAMILIAL HYPERCHOLESTEROLEMIA: ICD-10-CM

## 2020-09-21 DIAGNOSIS — K21.9 GASTROESOPHAGEAL REFLUX DISEASE WITHOUT ESOPHAGITIS: ICD-10-CM

## 2020-09-21 DIAGNOSIS — E03.9 ACQUIRED HYPOTHYROIDISM: ICD-10-CM

## 2020-09-21 DIAGNOSIS — I25.10 CORONARY ARTERY DISEASE INVOLVING NATIVE CORONARY ARTERY OF NATIVE HEART WITHOUT ANGINA PECTORIS: ICD-10-CM

## 2020-09-21 DIAGNOSIS — H02.834 DERMATOCHALASIS OF LEFT UPPER EYELID: ICD-10-CM

## 2020-09-21 DIAGNOSIS — Z01.818 PREOPERATIVE EXAMINATION: ICD-10-CM

## 2020-09-21 LAB
ERYTHROCYTE [DISTWIDTH] IN BLOOD BY AUTOMATED COUNT: 12.8 % (ref 11–14.5)
HCT VFR BLD AUTO: 36.2 % (ref 35–47)
HGB BLD-MCNC: 12.5 G/DL (ref 12–16)
MCH RBC QN AUTO: 31.9 PG (ref 27–34)
MCHC RBC AUTO-ENTMCNC: 34.5 G/DL (ref 32–36)
MCV RBC AUTO: 92 FL (ref 80–100)
PLATELET # BLD AUTO: 283 THOU/UL (ref 140–440)
PMV BLD AUTO: 6.8 FL (ref 7–10)
RBC # BLD AUTO: 3.93 MILL/UL (ref 3.8–5.4)
WBC: 5.4 THOU/UL (ref 4–11)

## 2020-09-21 ASSESSMENT — MIFFLIN-ST. JEOR: SCORE: 1276.21

## 2020-09-30 ENCOUNTER — COMMUNICATION - HEALTHEAST (OUTPATIENT)
Dept: INTERNAL MEDICINE | Facility: CLINIC | Age: 80
End: 2020-09-30

## 2020-09-30 DIAGNOSIS — J32.9 RHINOSINUSITIS: ICD-10-CM

## 2020-10-05 LAB
ANION GAP SERPL CALCULATED.3IONS-SCNC: 8 MMOL/L (ref 5–18)
BUN SERPL-MCNC: 22 MG/DL (ref 8–28)
CALCIUM SERPL-MCNC: 9.4 MG/DL (ref 8.5–10.5)
CHLORIDE BLD-SCNC: 103 MMOL/L (ref 98–107)
CO2 SERPL-SCNC: 31 MMOL/L (ref 22–31)
CREAT SERPL-MCNC: 1.12 MG/DL (ref 0.6–1.1)
GFR SERPL CREATININE-BSD FRML MDRD: 47 ML/MIN/1.73M2
GLUCOSE BLD-MCNC: 92 MG/DL (ref 70–125)
POTASSIUM BLD-SCNC: 4.3 MMOL/L (ref 3.5–5)
SODIUM SERPL-SCNC: 142 MMOL/L (ref 136–145)

## 2020-10-29 ENCOUNTER — AMBULATORY - HEALTHEAST (OUTPATIENT)
Dept: LAB | Facility: CLINIC | Age: 80
End: 2020-10-29

## 2020-10-29 ENCOUNTER — OFFICE VISIT - HEALTHEAST (OUTPATIENT)
Dept: INTERNAL MEDICINE | Facility: CLINIC | Age: 80
End: 2020-10-29

## 2020-10-29 DIAGNOSIS — I25.10 CORONARY ARTERY DISEASE INVOLVING NATIVE CORONARY ARTERY OF NATIVE HEART WITHOUT ANGINA PECTORIS: ICD-10-CM

## 2020-10-29 DIAGNOSIS — K21.9 GASTROESOPHAGEAL REFLUX DISEASE WITHOUT ESOPHAGITIS: ICD-10-CM

## 2020-10-29 DIAGNOSIS — Z01.818 PREOPERATIVE EXAMINATION: ICD-10-CM

## 2020-10-29 DIAGNOSIS — N18.31 STAGE 3A CHRONIC KIDNEY DISEASE (H): ICD-10-CM

## 2020-10-29 DIAGNOSIS — K59.00 CONSTIPATION, UNSPECIFIED CONSTIPATION TYPE: ICD-10-CM

## 2020-10-29 DIAGNOSIS — E03.9 ACQUIRED HYPOTHYROIDISM: ICD-10-CM

## 2020-10-29 DIAGNOSIS — I10 ESSENTIAL HYPERTENSION: ICD-10-CM

## 2020-10-29 DIAGNOSIS — R13.10 DYSPHAGIA, UNSPECIFIED TYPE: ICD-10-CM

## 2020-10-29 DIAGNOSIS — K80.50 COMMON BILE DUCT CALCULUS: ICD-10-CM

## 2020-10-29 LAB
ALT SERPL W P-5'-P-CCNC: 19 U/L (ref 0–45)
ANION GAP SERPL CALCULATED.3IONS-SCNC: 9 MMOL/L (ref 5–18)
BUN SERPL-MCNC: 22 MG/DL (ref 8–28)
CALCIUM SERPL-MCNC: 9.2 MG/DL (ref 8.5–10.5)
CHLORIDE BLD-SCNC: 103 MMOL/L (ref 98–107)
CHOLEST SERPL-MCNC: 156 MG/DL
CO2 SERPL-SCNC: 29 MMOL/L (ref 22–31)
CREAT SERPL-MCNC: 1.06 MG/DL (ref 0.6–1.1)
ERYTHROCYTE [DISTWIDTH] IN BLOOD BY AUTOMATED COUNT: 11.8 % (ref 11–14.5)
FASTING STATUS PATIENT QL REPORTED: NO
GFR SERPL CREATININE-BSD FRML MDRD: 50 ML/MIN/1.73M2
GLUCOSE BLD-MCNC: 86 MG/DL (ref 70–125)
HCT VFR BLD AUTO: 38.6 % (ref 35–47)
HDLC SERPL-MCNC: 66 MG/DL
HGB BLD-MCNC: 12.8 G/DL (ref 12–16)
LDLC SERPL CALC-MCNC: 71 MG/DL
MCH RBC QN AUTO: 31.6 PG (ref 27–34)
MCHC RBC AUTO-ENTMCNC: 33.1 G/DL (ref 32–36)
MCV RBC AUTO: 95 FL (ref 80–100)
PLATELET # BLD AUTO: 246 THOU/UL (ref 140–440)
PMV BLD AUTO: 6.7 FL (ref 7–10)
POTASSIUM BLD-SCNC: 4.3 MMOL/L (ref 3.5–5)
RBC # BLD AUTO: 4.05 MILL/UL (ref 3.8–5.4)
SODIUM SERPL-SCNC: 141 MMOL/L (ref 136–145)
TRIGL SERPL-MCNC: 95 MG/DL
WBC: 5.1 THOU/UL (ref 4–11)

## 2020-10-29 ASSESSMENT — MIFFLIN-ST. JEOR: SCORE: 1294.35

## 2020-12-13 ENCOUNTER — COMMUNICATION - HEALTHEAST (OUTPATIENT)
Dept: INTERNAL MEDICINE | Facility: CLINIC | Age: 80
End: 2020-12-13

## 2021-01-07 ENCOUNTER — OFFICE VISIT - HEALTHEAST (OUTPATIENT)
Dept: INTERNAL MEDICINE | Facility: CLINIC | Age: 81
End: 2021-01-07

## 2021-01-07 DIAGNOSIS — I25.10 CORONARY ARTERY DISEASE INVOLVING NATIVE CORONARY ARTERY OF NATIVE HEART WITHOUT ANGINA PECTORIS: ICD-10-CM

## 2021-01-07 DIAGNOSIS — F34.1 DYSTHYMIA: ICD-10-CM

## 2021-01-07 DIAGNOSIS — E03.9 ACQUIRED HYPOTHYROIDISM: ICD-10-CM

## 2021-01-07 DIAGNOSIS — E78.2 MIXED HYPERLIPIDEMIA: ICD-10-CM

## 2021-01-07 DIAGNOSIS — M19.90 OSTEOARTHRITIS, UNSPECIFIED OSTEOARTHRITIS TYPE, UNSPECIFIED SITE: ICD-10-CM

## 2021-01-07 DIAGNOSIS — I10 ESSENTIAL HYPERTENSION: ICD-10-CM

## 2021-01-07 DIAGNOSIS — N18.31 STAGE 3A CHRONIC KIDNEY DISEASE (H): ICD-10-CM

## 2021-01-07 DIAGNOSIS — M47.812 FACET ARTHROPATHY, CERVICAL: ICD-10-CM

## 2021-01-07 DIAGNOSIS — Z00.00 ENCOUNTER FOR ANNUAL WELLNESS EXAM IN MEDICARE PATIENT: ICD-10-CM

## 2021-01-07 DIAGNOSIS — K21.9 GASTROESOPHAGEAL REFLUX DISEASE WITHOUT ESOPHAGITIS: ICD-10-CM

## 2021-01-07 ASSESSMENT — PATIENT HEALTH QUESTIONNAIRE - PHQ9: SUM OF ALL RESPONSES TO PHQ QUESTIONS 1-9: 4

## 2021-01-07 ASSESSMENT — MIFFLIN-ST. JEOR: SCORE: 1298.89

## 2021-01-08 ENCOUNTER — COMMUNICATION - HEALTHEAST (OUTPATIENT)
Dept: CARDIOLOGY | Facility: CLINIC | Age: 81
End: 2021-01-08

## 2021-01-20 ENCOUNTER — OFFICE VISIT - HEALTHEAST (OUTPATIENT)
Dept: INTERNAL MEDICINE | Facility: CLINIC | Age: 81
End: 2021-01-20

## 2021-01-20 DIAGNOSIS — E78.2 MIXED HYPERLIPIDEMIA: ICD-10-CM

## 2021-01-20 DIAGNOSIS — Z86.718 HISTORY OF DVT (DEEP VEIN THROMBOSIS): ICD-10-CM

## 2021-01-20 DIAGNOSIS — N18.31 STAGE 3A CHRONIC KIDNEY DISEASE (H): ICD-10-CM

## 2021-01-20 DIAGNOSIS — I10 ESSENTIAL HYPERTENSION: ICD-10-CM

## 2021-01-20 DIAGNOSIS — I25.10 CORONARY ARTERY DISEASE INVOLVING NATIVE CORONARY ARTERY OF NATIVE HEART WITHOUT ANGINA PECTORIS: ICD-10-CM

## 2021-01-20 DIAGNOSIS — F34.1 DYSTHYMIA: ICD-10-CM

## 2021-01-20 DIAGNOSIS — Z01.818 PREOPERATIVE EXAMINATION: ICD-10-CM

## 2021-01-20 DIAGNOSIS — E03.9 ACQUIRED HYPOTHYROIDISM: ICD-10-CM

## 2021-01-20 DIAGNOSIS — K25.9 GASTRIC ULCER WITHOUT HEMORRHAGE OR PERFORATION, UNSPECIFIED CHRONICITY: ICD-10-CM

## 2021-01-20 DIAGNOSIS — K21.9 GASTROESOPHAGEAL REFLUX DISEASE WITHOUT ESOPHAGITIS: ICD-10-CM

## 2021-01-20 LAB
ANION GAP SERPL CALCULATED.3IONS-SCNC: 8 MMOL/L (ref 5–18)
BUN SERPL-MCNC: 26 MG/DL (ref 8–28)
CALCIUM SERPL-MCNC: 9.2 MG/DL (ref 8.5–10.5)
CHLORIDE BLD-SCNC: 102 MMOL/L (ref 98–107)
CO2 SERPL-SCNC: 31 MMOL/L (ref 22–31)
CREAT SERPL-MCNC: 1 MG/DL (ref 0.6–1.1)
CREAT UR-MCNC: 38.5 MG/DL
ERYTHROCYTE [DISTWIDTH] IN BLOOD BY AUTOMATED COUNT: 12.8 % (ref 11–14.5)
GFR SERPL CREATININE-BSD FRML MDRD: 53 ML/MIN/1.73M2
GLUCOSE BLD-MCNC: 81 MG/DL (ref 70–125)
HCT VFR BLD AUTO: 36.4 % (ref 35–47)
HGB BLD-MCNC: 12.2 G/DL (ref 12–16)
MCH RBC QN AUTO: 31.6 PG (ref 27–34)
MCHC RBC AUTO-ENTMCNC: 33.6 G/DL (ref 32–36)
MCV RBC AUTO: 94 FL (ref 80–100)
MICROALBUMIN UR-MCNC: <0.5 MG/DL (ref 0–1.99)
MICROALBUMIN/CREAT UR: NORMAL MG/G{CREAT}
PLATELET # BLD AUTO: 251 THOU/UL (ref 140–440)
PMV BLD AUTO: 6.5 FL (ref 7–10)
POTASSIUM BLD-SCNC: 4 MMOL/L (ref 3.5–5)
RBC # BLD AUTO: 3.86 MILL/UL (ref 3.8–5.4)
SODIUM SERPL-SCNC: 141 MMOL/L (ref 136–145)
WBC: 4.4 THOU/UL (ref 4–11)

## 2021-01-20 ASSESSMENT — MIFFLIN-ST. JEOR: SCORE: 1298.89

## 2021-01-31 ENCOUNTER — COMMUNICATION - HEALTHEAST (OUTPATIENT)
Dept: INTERNAL MEDICINE | Facility: CLINIC | Age: 81
End: 2021-01-31

## 2021-01-31 DIAGNOSIS — M19.90 OSTEOARTHRITIS, UNSPECIFIED OSTEOARTHRITIS TYPE, UNSPECIFIED SITE: ICD-10-CM

## 2021-01-31 DIAGNOSIS — F34.1 DYSTHYMIA: ICD-10-CM

## 2021-02-18 ENCOUNTER — AMBULATORY - HEALTHEAST (OUTPATIENT)
Dept: NURSING | Facility: CLINIC | Age: 81
End: 2021-02-18

## 2021-03-11 ENCOUNTER — AMBULATORY - HEALTHEAST (OUTPATIENT)
Dept: NURSING | Facility: CLINIC | Age: 81
End: 2021-03-11

## 2021-04-05 ENCOUNTER — RECORDS - HEALTHEAST (OUTPATIENT)
Dept: ADMINISTRATIVE | Facility: OTHER | Age: 81
End: 2021-04-05

## 2021-04-12 ENCOUNTER — COMMUNICATION - HEALTHEAST (OUTPATIENT)
Dept: SCHEDULING | Facility: CLINIC | Age: 81
End: 2021-04-12

## 2021-04-12 ENCOUNTER — OFFICE VISIT - HEALTHEAST (OUTPATIENT)
Dept: CARDIOLOGY | Facility: CLINIC | Age: 81
End: 2021-04-12

## 2021-04-12 DIAGNOSIS — I25.10 CORONARY ARTERY DISEASE INVOLVING NATIVE CORONARY ARTERY OF NATIVE HEART WITHOUT ANGINA PECTORIS: ICD-10-CM

## 2021-04-12 DIAGNOSIS — E78.2 MIXED HYPERLIPIDEMIA: ICD-10-CM

## 2021-04-12 DIAGNOSIS — I10 ESSENTIAL HYPERTENSION: ICD-10-CM

## 2021-04-12 DIAGNOSIS — Z01.810 PRE-OPERATIVE CARDIOVASCULAR EXAMINATION: ICD-10-CM

## 2021-04-12 ASSESSMENT — MIFFLIN-ST. JEOR: SCORE: 1298.89

## 2021-04-13 ENCOUNTER — AMBULATORY - HEALTHEAST (OUTPATIENT)
Dept: SURGERY | Facility: CLINIC | Age: 81
End: 2021-04-13

## 2021-04-13 DIAGNOSIS — Z11.59 ENCOUNTER FOR SCREENING FOR OTHER VIRAL DISEASES: ICD-10-CM

## 2021-04-19 ENCOUNTER — OFFICE VISIT - HEALTHEAST (OUTPATIENT)
Dept: INTERNAL MEDICINE | Facility: CLINIC | Age: 81
End: 2021-04-19

## 2021-04-19 DIAGNOSIS — M17.11 PRIMARY OSTEOARTHRITIS OF RIGHT KNEE: ICD-10-CM

## 2021-04-19 DIAGNOSIS — N18.31 STAGE 3A CHRONIC KIDNEY DISEASE (H): ICD-10-CM

## 2021-04-19 DIAGNOSIS — I25.10 CORONARY ARTERY DISEASE INVOLVING NATIVE CORONARY ARTERY OF NATIVE HEART WITHOUT ANGINA PECTORIS: ICD-10-CM

## 2021-04-19 DIAGNOSIS — F34.1 DYSTHYMIA: ICD-10-CM

## 2021-04-19 DIAGNOSIS — K21.9 GASTROESOPHAGEAL REFLUX DISEASE WITHOUT ESOPHAGITIS: ICD-10-CM

## 2021-04-19 DIAGNOSIS — Z01.818 PREOPERATIVE EXAMINATION: ICD-10-CM

## 2021-04-19 DIAGNOSIS — I10 ESSENTIAL HYPERTENSION: ICD-10-CM

## 2021-04-19 DIAGNOSIS — E03.9 ACQUIRED HYPOTHYROIDISM: ICD-10-CM

## 2021-04-19 DIAGNOSIS — E78.2 MIXED HYPERLIPIDEMIA: ICD-10-CM

## 2021-04-19 DIAGNOSIS — J31.0 CHRONIC RHINITIS: ICD-10-CM

## 2021-04-19 LAB
ANION GAP SERPL CALCULATED.3IONS-SCNC: 7 MMOL/L (ref 5–18)
ATRIAL RATE - MUSE: 47 BPM
BUN SERPL-MCNC: 28 MG/DL (ref 8–28)
CALCIUM SERPL-MCNC: 9.1 MG/DL (ref 8.5–10.5)
CHLORIDE BLD-SCNC: 100 MMOL/L (ref 98–107)
CO2 SERPL-SCNC: 31 MMOL/L (ref 22–31)
CREAT SERPL-MCNC: 0.98 MG/DL (ref 0.6–1.1)
DIASTOLIC BLOOD PRESSURE - MUSE: NORMAL
ERYTHROCYTE [DISTWIDTH] IN BLOOD BY AUTOMATED COUNT: 14.6 % (ref 11–14.5)
GFR SERPL CREATININE-BSD FRML MDRD: 54 ML/MIN/1.73M2
GLUCOSE BLD-MCNC: 80 MG/DL (ref 70–125)
HCT VFR BLD AUTO: 34.9 % (ref 35–47)
HGB BLD-MCNC: 11.2 G/DL (ref 12–16)
INTERPRETATION ECG - MUSE: NORMAL
MCH RBC QN AUTO: 30.9 PG (ref 27–34)
MCHC RBC AUTO-ENTMCNC: 32.1 G/DL (ref 32–36)
MCV RBC AUTO: 96 FL (ref 80–100)
P AXIS - MUSE: 33 DEGREES
PLATELET # BLD AUTO: 232 THOU/UL (ref 140–440)
PMV BLD AUTO: 8.6 FL (ref 7–10)
POTASSIUM BLD-SCNC: 4.2 MMOL/L (ref 3.5–5)
PR INTERVAL - MUSE: 180 MS
QRS DURATION - MUSE: 96 MS
QT - MUSE: 440 MS
QTC - MUSE: 389 MS
R AXIS - MUSE: 10 DEGREES
RBC # BLD AUTO: 3.63 MILL/UL (ref 3.8–5.4)
SODIUM SERPL-SCNC: 138 MMOL/L (ref 136–145)
SYSTOLIC BLOOD PRESSURE - MUSE: NORMAL
T AXIS - MUSE: 54 DEGREES
TSH SERPL DL<=0.005 MIU/L-ACNC: 2.06 UIU/ML (ref 0.3–5)
VENTRICULAR RATE- MUSE: 47 BPM
WBC: 5.9 THOU/UL (ref 4–11)

## 2021-04-19 ASSESSMENT — MIFFLIN-ST. JEOR: SCORE: 1298.89

## 2021-04-19 NOTE — ASSESSMENT & PLAN NOTE
No anginal symptoms and good functional capacity. She was cleared by cardiology to proceed from a cardiac standpoint

## 2021-04-25 ENCOUNTER — AMBULATORY - HEALTHEAST (OUTPATIENT)
Dept: FAMILY MEDICINE | Facility: CLINIC | Age: 81
End: 2021-04-25

## 2021-04-25 DIAGNOSIS — Z11.59 ENCOUNTER FOR SCREENING FOR OTHER VIRAL DISEASES: ICD-10-CM

## 2021-04-26 LAB
SARS-COV-2 PCR COMMENT: NORMAL
SARS-COV-2 RNA SPEC QL NAA+PROBE: NEGATIVE
SARS-COV-2 VIRUS SPECIMEN SOURCE: NORMAL

## 2021-04-27 ENCOUNTER — RECORDS - HEALTHEAST (OUTPATIENT)
Dept: ADMINISTRATIVE | Facility: OTHER | Age: 81
End: 2021-04-27

## 2021-04-27 ENCOUNTER — COMMUNICATION - HEALTHEAST (OUTPATIENT)
Dept: SCHEDULING | Facility: CLINIC | Age: 81
End: 2021-04-27

## 2021-04-27 ASSESSMENT — MIFFLIN-ST. JEOR: SCORE: 1271.67

## 2021-04-29 ENCOUNTER — RECORDS - HEALTHEAST (OUTPATIENT)
Dept: ADMINISTRATIVE | Facility: OTHER | Age: 81
End: 2021-04-29

## 2021-04-29 ENCOUNTER — ANESTHESIA - HEALTHEAST (OUTPATIENT)
Dept: SURGERY | Facility: CLINIC | Age: 81
End: 2021-04-29

## 2021-04-29 ENCOUNTER — SURGERY - HEALTHEAST (OUTPATIENT)
Dept: SURGERY | Facility: CLINIC | Age: 81
End: 2021-04-29
Payer: COMMERCIAL

## 2021-04-29 ASSESSMENT — MIFFLIN-ST. JEOR: SCORE: 1285.28

## 2021-05-04 ENCOUNTER — OFFICE VISIT - HEALTHEAST (OUTPATIENT)
Dept: INTERNAL MEDICINE | Facility: CLINIC | Age: 81
End: 2021-05-04

## 2021-05-04 DIAGNOSIS — Z09 HOSPITAL DISCHARGE FOLLOW-UP: ICD-10-CM

## 2021-05-04 DIAGNOSIS — Z98.890 STATUS POST KNEE SURGERY: ICD-10-CM

## 2021-05-11 ENCOUNTER — COMMUNICATION - HEALTHEAST (OUTPATIENT)
Dept: INTERNAL MEDICINE | Facility: CLINIC | Age: 81
End: 2021-05-11

## 2021-05-11 DIAGNOSIS — F34.1 DYSTHYMIA: ICD-10-CM

## 2021-05-13 ENCOUNTER — RECORDS - HEALTHEAST (OUTPATIENT)
Dept: ADMINISTRATIVE | Facility: OTHER | Age: 81
End: 2021-05-13

## 2021-05-14 ENCOUNTER — COMMUNICATION - HEALTHEAST (OUTPATIENT)
Dept: INTERNAL MEDICINE | Facility: CLINIC | Age: 81
End: 2021-05-14
Payer: COMMERCIAL

## 2021-05-20 ENCOUNTER — COMMUNICATION - HEALTHEAST (OUTPATIENT)
Dept: INTERNAL MEDICINE | Facility: CLINIC | Age: 81
End: 2021-05-20

## 2021-05-26 ASSESSMENT — PATIENT HEALTH QUESTIONNAIRE - PHQ9
SUM OF ALL RESPONSES TO PHQ QUESTIONS 1-9: 3
SUM OF ALL RESPONSES TO PHQ QUESTIONS 1-9: 0

## 2021-05-30 VITALS — BODY MASS INDEX: 27.94 KG/M2 | WEIGHT: 178 LBS | HEIGHT: 67 IN

## 2021-05-30 VITALS — WEIGHT: 172 LBS | BODY MASS INDEX: 27 KG/M2 | HEIGHT: 67 IN

## 2021-05-30 VITALS — BODY MASS INDEX: 28.09 KG/M2 | WEIGHT: 179 LBS | HEIGHT: 67 IN

## 2021-05-30 VITALS — WEIGHT: 172 LBS | BODY MASS INDEX: 27.35 KG/M2

## 2021-05-30 VITALS — BODY MASS INDEX: 26.49 KG/M2 | WEIGHT: 174.8 LBS | HEIGHT: 68 IN

## 2021-05-30 NOTE — TELEPHONE ENCOUNTER
----- Message from BHARGAV Joyce sent at 7/10/2019  2:04 PM CDT -----  Call patient: the results of the ultrasound show gas over the bile duct which make it difficult to tell if she has a stone or if it is just gas. The lab tests did not show any elevations in the white blood cell count, normal electrolytes, and normal kidney and liver function. No elevation in inflammatory markers.     How does she feel today? If she feels worse or the same as yesterday, please add her to my schedule for tomorrow to recheck. End of the day is okay to add her on (I do not currently have any openings)

## 2021-05-30 NOTE — ANESTHESIA CARE TRANSFER NOTE
Last vitals:   Vitals:    07/26/19 1330   BP: 136/73   Pulse: (!) 59   Resp: 15   Temp:    SpO2: 100%   temp: 98.7 F    Patient's level of consciousness is drowsy but awake. Dentition unchanged. Denies pain. Denies nausea.   Spontaneous respirations: yes  Maintains airway independently: yes  Dentition unchanged: yes  Oropharynx: oropharynx clear of all foreign objects    QCDR Measures:  ASA# 20 - Surgical Safety Checklist: WHO surgical safety checklist completed prior to induction    PQRS# 430 - Adult PONV Prevention: 4558F - Pt received => 2 anti-emetic agents (different classes) preop & intraop  ASA# 8 - Peds PONV Prevention: NA - Not pediatric patient, not GA or 2 or more risk factors NOT present  PQRS# 424 - Sobeida-op Temp Management: 4559F - At least one body temp DOCUMENTED => 35.5C or 95.9F within required timeframe  PQRS# 426 - PACU Transfer Protocol: - Transfer of care checklist used  ASA# 14 - Acute Post-op Pain: ASA14B - Patient did NOT experience pain >= 7 out of 10

## 2021-05-30 NOTE — PROGRESS NOTES
Preoperative Exam    Scheduled Procedure: ERCP  Surgery Date:  7/26/2019  Surgery Location: [facility], fax [phone]    Surgeon:  Dr Pastrana    Assessment/Plan:     1. Preoperative examination  2. Common bile duct calculus, ERCP x 5 in past. Cannot tolerate ursodiol  APPROVAL GIVEN to proceed with proposed procedure, without further diagnostic evaluation    3. Essential hypertension  - stable     4. Familial hypercholesterolemia  - continue simvastatin     5. History of DVT (deep vein thrombosis), right LE 12/2016  - stop aspirin     6. Gastroesophageal reflux disease without esophagitis  - More reflux symptoms recently, likely r/t CBD stone. Continue pantoprazole, will need to address further if not improved after surgery     7. Dysthymia  - continue citalopram, currently experiencing more anxiety with current symptoms     8. Acquired hypothyroidism  - euthyroid         Have you had prior anesthesia?: Yes  Have you or any family members had a previous anesthesia reaction:  No  Do you or any family members have a history of a clotting or bleeding disorder?: No  Cardiac Risk Assessment: no increased risk for major cardiac complications    APPROVAL GIVEN to proceed with proposed procedure, without further diagnostic evaluation    Functional Status: Independent  Patient plans to recover at home with family.     Subjective:      Geeta Berry is a 78 y.o. female who presents for a preoperative consultation. She has a history of recurrent common bile duct stones and has had multiple ERCPs for stone removal.  She recently began to have abdominal discomfort mostly in the upper abdomen and right abdomen with associated belching and indigestion. U/s showed a triangular 1 cm echogenic focus near the bile duct bifurcation. She was started on Actigall, previously she was intolerant of this medication but she is willing to try again. She is scheduled for an ERCP.    She has a history of constipation which is  currently well controlled with senna/docusate.    She is a history of hypothyroidism and is euthyroid as of her last blood test with the levothyroxine.    Hypertension was reviewed, she is not expensing any lightheadedness or dizziness with hydrochlorothiazide daily.    She has a history of anxiety and depression.  Her anxiety has been worse since having more symptoms but overall she is managing okay with citalopram 5 mg daily.    She continues a statin for hypercholesterolemia.    All other systems reviewed and are negative, other than those listed in the HPI.    Pertinent History  Do you have difficulty breathing or chest pain after walking up a flight of stairs: No  History of obstructive sleep apnea: No  Steroid use in the last 6 months: No  Frequent Aspirin/NSAID use: Yes: baby aspirin, tylenol occasionally   Prior Blood Transfusion: No  Prior Blood Transfusion Reaction: No  If for some reason prior to, during or after the procedure, if it is medically indicated, would you be willing to have a blood transfusion?:  There is no transfusion refusal.    Current Outpatient Medications   Medication Sig Dispense Refill     aluminum-magnesium hydroxide-simethicone (MAALOX ADVANCED) 200-200-20 mg/5 mL Susp Take 5 mL by mouth every 6 (six) hours as needed.       amoxicillin (AMOXIL) 500 MG capsule Take 2,000 mg by mouth once as needed (prior to dental procedures).       aspirin 81 MG EC tablet Take 81 mg by mouth daily.       azelastine (ASTELIN) 137 mcg (0.1 %) nasal spray 1 SPRAY INTO EACH NOSTRIL AT BEDTIME. USE IN EACH NOSTRIL AS DIRECTED 30 mL 2     calcium carbonate (OS-ALOK) 600 mg calcium (1,500 mg) tablet Take 600 mg by mouth daily.        cetirizine (ZYRTEC) 10 MG tablet Take 10 mg by mouth daily.       citalopram (CELEXA) 10 MG tablet Take 1 tablet (10 mg total) by mouth daily. (Patient taking differently: Take 5 mg by mouth daily. ) 90 tablet 3     fluticasone (FLONASE) 50 mcg/actuation nasal spray 1 spray  into each nostril daily. 16 g 3     gabapentin (NEURONTIN) 100 MG capsule Take 100 mg by mouth daily. Increase dose as directed by chart.  May increase to 1 tabs 3 times daily 90 capsule 2     hydroCHLOROthiazide (HYDRODIURIL) 25 MG tablet Take 1 tablet (25 mg total) by mouth daily. 90 tablet 2     levothyroxine (SYNTHROID, LEVOTHROID) 50 MCG tablet Take 1 tablet (50 mcg total) by mouth daily. 90 tablet 2     multivitamin with minerals (THERA-M) 9 mg iron-400 mcg Tab tablet Take 1 tablet by mouth daily.       pantoprazole (PROTONIX) 40 MG tablet TAKE 1 TABLET (40 MG TOTAL) BY MOUTH 2 (TWO) TIMES A DAY. 180 tablet 3     polyethylene glycol (MIRALAX) 17 gram packet Take 17 g by mouth 2 (two) times a day.        senna-docusate (PERICOLACE) 8.6-50 mg tablet Take 1 tablet by mouth 2 (two) times a day.        simvastatin (ZOCOR) 20 MG tablet TAKE 1 TABLET BY MOUTH EVERYDAY AT BEDTIME 90 tablet 3     No current facility-administered medications for this visit.         Allergies   Allergen Reactions     Atorvastatin Other (See Comments)     Leg pain     Codeine Nausea Only     Dipyridamole Other (See Comments)     Patient reports medication was injected during a stress test and it was thought she had a heart attack following administration       Patient Active Problem List   Diagnosis     History of DVT (deep vein thrombosis), right LE 12/2016     Acute iritis, h/o in 2012     Common bile duct calculus, ERCP x 5 in past. Cannot tolerate ursodiol     Constipation     GERD (gastroesophageal reflux disease)     HTN (hypertension)     HLD (hyperlipidemia)     Hypothyroidism     Osteoarthritis of lumbar spine     Osteoarthritis; knees, hips, cervical spine      Facet arthropathy, cervical     Dysthymia       Past Medical History:   Diagnosis Date     Chronic neck pain      Common bile duct calculus      Depression      DVT (deep venous thrombosis) (H)      GERD (gastroesophageal reflux disease)      History of blood clots       HLD (hyperlipidemia)      HTN (hypertension)      Hypothyroidism        Past Surgical History:   Procedure Laterality Date     CHOLECYSTECTOMY  1980     ERCP      x5 over 8 years. Last 8/2016     ERCP N/A 9/5/2017    Procedure: ENDOSCOPIC RETROGRADE CHOLANGIOPANCREATOGRAPHY, STONE EXTRACTION;  Surgeon: Henry Eller MD;  Location: [facility] Main OR;  Service:      HIP ARTHROSCOPY Left 1983     HIP SURGERY  2012    revision     REPLACEMENT TOTAL KNEE Right 2015     REVISION TOTAL HIP ARTHROPLASTY Left 5/16/2017    Procedure: REVISION LEFT TOTAL HIP ARTHROPLASTY, BOTH COMPONENTS;  Surgeon: Tyson Peoples MD;  Location: [facility] Main OR;  Service:      SHOULDER SURGERY Left 2010       Social History     Socioeconomic History     Marital status:      Spouse name: Not on file     Number of children: 2     Years of education: Not on file     Highest education level: Not on file   Occupational History     Occupation: Retired   Social Needs     Financial resource strain: Not on file     Food insecurity:     Worry: Not on file     Inability: Not on file     Transportation needs:     Medical: Not on file     Non-medical: Not on file   Tobacco Use     Smoking status: Never Smoker     Smokeless tobacco: Never Used   Substance and Sexual Activity     Alcohol use: No     Drug use: No     Sexual activity: Not on file   Lifestyle     Physical activity:     Days per week: Not on file     Minutes per session: Not on file     Stress: Not on file   Relationships     Social connections:     Talks on phone: Not on file     Gets together: Not on file     Attends [employer] service: Not on file     Active member of club or organization: Not on file     Attends meetings of clubs or organizations: Not on file     Relationship status: Not on file     Intimate partner violence:     Fear of current or ex partner: Not on file     Emotionally abused: Not on file     Physically abused: Not on file     Forced sexual activity: Not on file  "  Other Topics Concern     Not on file   Social History Narrative    4/6/17: The patient lives with her  in a condo.       Objective:     Vitals:    07/23/19 1133   BP: 112/66   Pulse: 74   Weight: 176 lb 8 oz (80.1 kg)   Height: 5' 7\" (1.702 m)         Physical Exam:  Gen: Well developed, well nourished, no acute distress but appears uncomfortable   HEENT: normocephalic/atraumatic, PERRLA/EOMI, TMs: Gray, normal light reflex, no nasal discharge.  Oral mucosa: no erythema/exudate  Neck: No LAD/masses/thyromegaly/bruits  Lungs: clear bilaterally  Heart: regular rate and rhythm  Abdomen: Normal bowel sounds, soft, mild RUQ and epigastric discomfort, non-distended, no masses, no rebound/guarding  Lymphatics: no supraclavicular/cervical LAD. No edema.  Neuro: A&O x 3.  Musculoskeletal: no gross deformities.  Skin: no rashes or lesions.        There are no Patient Instructions on file for this visit.      Labs:  Recent Results (from the past 240 hour(s))   Electrocardiogram Perform and Read    Collection Time: 07/23/19 11:41 AM   Result Value Ref Range    SYSTOLIC BLOOD PRESSURE  mmHg    DIASTOLIC BLOOD PRESSURE  mmHg    VENTRICULAR RATE 47 BPM    ATRIAL RATE 47 BPM    P-R INTERVAL 152 ms    QRS DURATION 96 ms    Q-T INTERVAL 428 ms    QTC CALCULATION (BEZET) 378 ms    P Axis 27 degrees    R AXIS 14 degrees    T AXIS 63 degrees    MUSE DIAGNOSIS       Sinus bradycardia  Otherwise normal ECG  When compared with ECG of 05-SEP-2017 17:19,  Nonspecific T wave abnormality, worse in Anterior leads  QT has shortened  Confirmed by DONTRELL CALDERON, LES LOC:YADIRA ([id number]) on 7/24/2019 2:10:21 PM         Immunization History   Administered Date(s) Administered     Influenza high dose, seasonal 08/30/2017, 09/20/2018     Influenza, inj, historic,unspecified 09/14/2016     Pneumo Conj 13-V (2010&after) 09/14/2015     Pneumo Polysac 23-V 01/25/2006     Td,adult,historic,unspecified 01/25/2006     Tdap 11/20/2013     ZOSTER, LIVE " 09/26/2008           Electronically signed by BHARGAV Pearson 07/25/19 11:34 AM

## 2021-05-30 NOTE — TELEPHONE ENCOUNTER
Patient Returning Call  Reason for call:  Patient returning phone call, she confirmed that she will be able to make the 1:40 pm appt for today with Jacy Mishra  Information relayed to patient:  Confirmed appt time for today will work  Patient has additional questions:  No  If YES, what are your questions/concerns:  n/a  Okay to leave a detailed message?: No call back needed

## 2021-05-30 NOTE — PROGRESS NOTES
Internal Medicine Office Visit  [facility]  Patient Name: Geeta Berry  Patient Age: 78 y.o.  YOB: 1940  MRN: [id number]    Date of Visit: 2019  Reason for Office Visit:   Chief Complaint   Patient presents with     Follow-up     still not feeling better           Assessment / Plan / Medical Decision Makin. RUQ abdominal pain  2. Common bile duct calculus, ERCP x 5 in past. Cannot tolerate ursodiol  - Ambulatory referral to Gastroenterology, scheduled for  for follow up. Suspect that she may have another common bile duct calculus. LFTs are normal and she does not have any red flag findings on exam so we discussed deferring CT scan as she may need a repeat ERCP vs MRCP for a recurrent choledocholithiasis. We reviewed s/s for which she should seek more emergent evaluation           Health Maintenance Review  Health Maintenance   Topic Date Due     ADVANCE DIRECTIVES DISCUSSED WITH PATIENT  1957     ZOSTER VACCINES (2 of 3) 2008     FALL RISK ASSESSMENT  2018     DEPRESSION FOLLOW UP  2019     INFLUENZA VACCINE RULE BASED (1) 2019     TD 18+ HE  2023     PNEUMOCOCCAL POLYSACCHARIDE VACCINE AGE 65 AND OVER  Completed     PNEUMOCOCCAL CONJUGATE VACCINE FOR ADULTS (PCV13 OR PREVNAR)  Completed     DXA SCAN  Discontinued         I am having Geeta Berry maintain her calcium carbonate, polyethylene glycol, senna-docusate, aluminum-magnesium hydroxide-simethicone, multivitamin with minerals, amoxicillin, aspirin, simvastatin, gabapentin, pantoprazole, citalopram, cetirizine, azelastine, fluticasone propionate, levothyroxine, and hydroCHLOROthiazide.      HPI:  Geeta Berry is a 78 y.o. year old who presents to the office today for follow up of abdominal fullness, nausea, and belching. Symptoms continue, have not worsened or improved. She is somewhat constipated today and took miralax this morning. No fevers or  chills. U/s yesterday showed 1 cm echogenic focus near the bile duct bifurcation in the liver, unable to ascertain if this is intrahepatic choledocholithiasis or air.       Review of Systems:  A 10-point ROS is negative except as stated in HPI         Current Scheduled Meds:  Outpatient Encounter Medications as of 7/11/2019   Medication Sig Dispense Refill     aluminum-magnesium hydroxide-simethicone (MAALOX ADVANCED) 200-200-20 mg/5 mL Susp Take 5 mL by mouth every 6 (six) hours as needed.       amoxicillin (AMOXIL) 500 MG capsule Take 2,000 mg by mouth once as needed (prior to dental procedures).       aspirin 81 MG EC tablet Take 81 mg by mouth daily.       azelastine (ASTELIN) 137 mcg (0.1 %) nasal spray 1 SPRAY INTO EACH NOSTRIL AT BEDTIME. USE IN EACH NOSTRIL AS DIRECTED 30 mL 2     calcium carbonate (OS-ALOK) 600 mg calcium (1,500 mg) tablet Take 600 mg by mouth daily.        cetirizine (ZYRTEC) 10 MG tablet Take 10 mg by mouth daily.       citalopram (CELEXA) 10 MG tablet Take 1 tablet (10 mg total) by mouth daily. (Patient taking differently: Take 5 mg by mouth daily. ) 90 tablet 3     fluticasone (FLONASE) 50 mcg/actuation nasal spray 1 spray into each nostril daily. 16 g 3     gabapentin (NEURONTIN) 100 MG capsule Take 100 mg by mouth daily. Increase dose as directed by chart.  May increase to 1 tabs 3 times daily 90 capsule 2     hydroCHLOROthiazide (HYDRODIURIL) 25 MG tablet Take 1 tablet (25 mg total) by mouth daily. 90 tablet 2     levothyroxine (SYNTHROID, LEVOTHROID) 50 MCG tablet Take 1 tablet (50 mcg total) by mouth daily. 90 tablet 2     multivitamin with minerals (THERA-M) 9 mg iron-400 mcg Tab tablet Take 1 tablet by mouth daily.       pantoprazole (PROTONIX) 40 MG tablet TAKE 1 TABLET (40 MG TOTAL) BY MOUTH 2 (TWO) TIMES A DAY. 180 tablet 3     polyethylene glycol (MIRALAX) 17 gram packet Take 17 g by mouth 2 (two) times a day.        senna-docusate (PERICOLACE) 8.6-50 mg tablet Take 1 tablet  by mouth 2 (two) times a day.        simvastatin (ZOCOR) 20 MG tablet Take 1 tablet (20 mg total) by mouth at bedtime. 90 tablet 3     No facility-administered encounter medications on file as of 7/11/2019.      Past Medical History:   Diagnosis Date     Chronic neck pain      Common bile duct calculus      Depression      DVT (deep venous thrombosis) (H)      GERD (gastroesophageal reflux disease)      History of blood clots      HLD (hyperlipidemia)      HTN (hypertension)      Hypothyroidism      Past Surgical History:   Procedure Laterality Date     CHOLECYSTECTOMY  1980     ERCP      x5 over 8 years. Last 8/2016     ERCP N/A 9/5/2017    Procedure: ENDOSCOPIC RETROGRADE CHOLANGIOPANCREATOGRAPHY, STONE EXTRACTION;  Surgeon: Henry Eller MD;  Location: [facility] Main OR;  Service:      HIP ARTHROSCOPY Left 1983     HIP SURGERY  2012    revision     REPLACEMENT TOTAL KNEE Right 2015     REVISION TOTAL HIP ARTHROPLASTY Left 5/16/2017    Procedure: REVISION LEFT TOTAL HIP ARTHROPLASTY, BOTH COMPONENTS;  Surgeon: Tyson Peoples MD;  Location: [facility] OR;  Service:      SHOULDER SURGERY Left 2010     Social History     Tobacco Use     Smoking status: Never Smoker     Smokeless tobacco: Never Used   Substance Use Topics     Alcohol use: No     Drug use: No       Objective / Physical Examination:  Vitals:    07/11/19 1409   BP: 96/60   Pulse: 64   Temp: 97.8  F (36.6  C)   TempSrc: Oral   SpO2: 99%   Weight: 176 lb (79.8 kg)     Wt Readings from Last 3 Encounters:   07/11/19 176 lb (79.8 kg)   07/09/19 176 lb (79.8 kg)   02/19/19 179 lb 1.6 oz (81.2 kg)     Body mass index is 27.57 kg/m .     General Appearance: Alert and oriented, cooperative, affect appropriate, speech clear, in no apparent distress  Lungs: Clear to auscultation bilaterally. Normal inspiratory and expiratory effort  Cardiovascular: Regular rate, normal S1, S2.   Abdomen: Bowel sounds active all four quadrants. Soft, mild RUQ, epigastric and  RLQ tenderness to palpation. No rebound tenderness or guarding. No hepatomegaly or splenomegaly.    Orders Placed This Encounter   Procedures     Ambulatory referral to Gastroenterology   Followup: Return in about 3 months (around 10/11/2019) for Next scheduled follow up, Annual physical. earlier if needed.      Jacy Mishra, CNP

## 2021-05-30 NOTE — ANESTHESIA POSTPROCEDURE EVALUATION
Patient: Geeta Berry  ENDOSCOPIC RETROGRADE CHOLANGIOPANCREATOGRAPHY, REMOVAL OF SLUDGE, DILATION OF STRICTURE  Anesthesia type: general    Patient location: PACU  Last vitals:   Vitals Value Taken Time   /74 7/26/2019  1:50 PM   Temp 36.8  C (98.3  F) 7/26/2019  1:29 PM   Pulse 53 7/26/2019  1:54 PM   Resp 18 7/26/2019  1:54 PM   SpO2 89 % 7/26/2019  1:54 PM   Vitals shown include unvalidated device data.  Post vital signs: stable  Level of consciousness: awake and responds to simple questions  Post-anesthesia pain: pain controlled  Post-anesthesia nausea and vomiting: no  Pulmonary: unassisted, return to baseline  Cardiovascular: stable and blood pressure at baseline  Hydration: adequate  Anesthetic events: no    QCDR Measures:  ASA# 11 - Sobeida-op Cardiac Arrest: ASA11B - Patient did NOT experience unanticipated cardiac arrest  ASA# 12 - Sobeida-op Mortality Rate: ASA12B - Patient did NOT die  ASA# 13 - PACU Re-Intubation Rate: ASA13B - Patient did NOT require a new airway mgmt  ASA# 10 - Composite Anes Safety: ASA10A - No serious adverse event    Additional Notes:

## 2021-05-30 NOTE — PROGRESS NOTES
Internal Medicine Office Visit  [facility]  Patient Name: Geeta Berry  Patient Age: 78 y.o.  YOB: 1940  MRN: [id number]    Date of Visit: 2019  Reason for Office Visit:   Chief Complaint   Patient presents with     Abdominal Pain     Gastroesophageal Reflux           Assessment / Plan / Medical Decision Makin. Abdominal fullness  2. Fatigue, unspecified type  3. Belching  - No red flag findings on exam and vital signs stable. We discussed possible broad differentials including but not limited to ulcer but she does not have typical epigastric discomfort, choledocholithiasis but no pain, viral gastroenteritis, reflux, or an infectious process.  We will proceed with labs today.  She has already eaten something today so we will plan to do an ultrasound of the abdomen tomorrow to evaluate the common bile duct. She is advised to seek emergent care if she develops severe abdominal pain, high fever, inability to remain hydrated. When labs and imaging are back will call to check tomorrow will check in with her.   - Advised supportive cares for now: clear fluids, Pepto bismol, rest          Health Maintenance Review  Health Maintenance   Topic Date Due     ADVANCE DIRECTIVES DISCUSSED WITH PATIENT  1958     ZOSTER VACCINES (2 of 3) 2008     FALL RISK ASSESSMENT  2018     DEPRESSION FOLLOW UP  2019     INFLUENZA VACCINE RULE BASED (1) 2019     TD 18+ HE  2023     PNEUMOCOCCAL POLYSACCHARIDE VACCINE AGE 65 AND OVER  Completed     PNEUMOCOCCAL CONJUGATE VACCINE FOR ADULTS (PCV13 OR PREVNAR)  Completed     DXA SCAN  Discontinued         I am having Geeta Berry maintain her calcium carbonate, polyethylene glycol, senna-docusate, aluminum-magnesium hydroxide-simethicone, multivitamin with minerals, amoxicillin, aspirin, simvastatin, gabapentin, pantoprazole, citalopram, cetirizine, azelastine, fluticasone propionate,  levothyroxine, and hydroCHLOROthiazide.      HPI:  Geeta Berry is a 78 y.o. year old who presents to the office today with her spouse for a complaint of 4 days of feeling fatigued and worn out.  She notes that she has had more acid reflux and belching.  She has an abdominal discomfort described as a full feeling no abdominal pain.  She denies any vomiting but has felt mildly nauseous.  Stools have been formed and not hard or soft.  She notes more right lateral hip and leg pain.  She has been taking acetaminophen and Tums.  Tums will temporarily relieve the reflux and belching.  She does have a prior history of a stomach ulcer, common bile duct calculus requiring ERCP x5 in the past, reflux.  She has not had fever or chills.      Review of Systems- pertinent positive in bold:  A 10-point ROS is negative except as stated in HPI. Negative for melena, hematochezia        Current Scheduled Meds:  Outpatient Encounter Medications as of 7/9/2019   Medication Sig Dispense Refill     aluminum-magnesium hydroxide-simethicone (MAALOX ADVANCED) 200-200-20 mg/5 mL Susp Take 5 mL by mouth every 6 (six) hours as needed.       amoxicillin (AMOXIL) 500 MG capsule Take 2,000 mg by mouth once as needed (prior to dental procedures).       aspirin 81 MG EC tablet Take 81 mg by mouth daily.       azelastine (ASTELIN) 137 mcg (0.1 %) nasal spray 1 SPRAY INTO EACH NOSTRIL AT BEDTIME. USE IN EACH NOSTRIL AS DIRECTED 30 mL 2     calcium carbonate (OS-ALOK) 600 mg calcium (1,500 mg) tablet Take 600 mg by mouth daily.        cetirizine (ZYRTEC) 10 MG tablet Take 10 mg by mouth daily.       citalopram (CELEXA) 10 MG tablet Take 1 tablet (10 mg total) by mouth daily. (Patient taking differently: Take 5 mg by mouth daily. ) 90 tablet 3     fluticasone (FLONASE) 50 mcg/actuation nasal spray 1 spray into each nostril daily. 16 g 3     gabapentin (NEURONTIN) 100 MG capsule Take 100 mg by mouth daily. Increase dose as directed by chart.  May  increase to 1 tabs 3 times daily 90 capsule 2     hydroCHLOROthiazide (HYDRODIURIL) 25 MG tablet Take 1 tablet (25 mg total) by mouth daily. 90 tablet 2     levothyroxine (SYNTHROID, LEVOTHROID) 50 MCG tablet Take 1 tablet (50 mcg total) by mouth daily. 90 tablet 2     multivitamin with minerals (THERA-M) 9 mg iron-400 mcg Tab tablet Take 1 tablet by mouth daily.       pantoprazole (PROTONIX) 40 MG tablet TAKE 1 TABLET (40 MG TOTAL) BY MOUTH 2 (TWO) TIMES A DAY. 180 tablet 3     polyethylene glycol (MIRALAX) 17 gram packet Take 17 g by mouth 2 (two) times a day.        senna-docusate (PERICOLACE) 8.6-50 mg tablet Take 1 tablet by mouth 2 (two) times a day.        simvastatin (ZOCOR) 20 MG tablet Take 1 tablet (20 mg total) by mouth at bedtime. 90 tablet 3     No facility-administered encounter medications on file as of 7/9/2019.      Past Medical History:   Diagnosis Date     Chronic neck pain      Common bile duct calculus      Depression      DVT (deep venous thrombosis) (H)      GERD (gastroesophageal reflux disease)      History of blood clots      HLD (hyperlipidemia)      HTN (hypertension)      Hypothyroidism      Past Surgical History:   Procedure Laterality Date     CHOLECYSTECTOMY  1980     ERCP      x5 over 8 years. Last 8/2016     ERCP N/A 9/5/2017    Procedure: ENDOSCOPIC RETROGRADE CHOLANGIOPANCREATOGRAPHY, STONE EXTRACTION;  Surgeon: Henry Eller MD;  Location: [facility] OR;  Service:      HIP ARTHROSCOPY Left 1983     HIP SURGERY  2012    revision     REPLACEMENT TOTAL KNEE Right 2015     REVISION TOTAL HIP ARTHROPLASTY Left 5/16/2017    Procedure: REVISION LEFT TOTAL HIP ARTHROPLASTY, BOTH COMPONENTS;  Surgeon: Tyson Peoples MD;  Location: [facility] OR;  Service:      SHOULDER SURGERY Left 2010     Social History     Tobacco Use     Smoking status: Never Smoker     Smokeless tobacco: Never Used   Substance Use Topics     Alcohol use: No     Drug use: No       Objective / Physical  "Examination:  Vitals:    07/09/19 1356   BP: 126/62   Pulse: 70   Temp: 97.6  F (36.4  C)   TempSrc: Oral   Weight: 176 lb (79.8 kg)   Height: 5' 7\" (1.702 m)     Wt Readings from Last 3 Encounters:   07/09/19 176 lb (79.8 kg)   02/19/19 179 lb 1.6 oz (81.2 kg)   09/20/18 177 lb (80.3 kg)     Body mass index is 27.57 kg/m .     General Appearance: Alert and oriented, cooperative, affect appropriate, speech clear, in no apparent distress  ENT: eyes non-icteric   Lungs: Clear to auscultation bilaterally. Normal inspiratory and expiratory effort  Cardiovascular: Regular rate, normal S1, S2  Abdomen: Bowel sounds active all four quadrants. Soft, mild left lateral/lower abdominal tenderness once with palpation but then unable to reproduce. No other tenderness. No rebound tenderness or guarding. No hepatomegaly or splenomegaly.  Extremities: No edema      Orders Placed This Encounter   Procedures     US Abdomen Complete     Comprehensive Metabolic Panel     C-Reactive Protein(CRP)     HM1 (CBC with Diff)   Followup: Return if symptoms worsen or fail to improve. earlier if needed.        Jacy Mishra, CNP    "

## 2021-05-30 NOTE — TELEPHONE ENCOUNTER
Pt has open order for US but it was completed on another order.  No imaging orders found - please advise if pt is needing MRI

## 2021-05-30 NOTE — TELEPHONE ENCOUNTER
Refill Approved    Rx renewed per Medication Renewal Policy. Medication was last renewed on 6/28/2018 .      Jim Bauer, [facility] Connection Triage/Med Refill 7/21/2019     Requested Prescriptions   Pending Prescriptions Disp Refills     simvastatin (ZOCOR) 20 MG tablet [Pharmacy Med Name: SIMVASTATIN 20 MG TABLET] 90 tablet 3     Sig: TAKE 1 TABLET BY MOUTH EVERYDAY AT BEDTIME       Statins Refill Protocol (Hmg CoA Reductase Inhibitors) Passed - 7/21/2019 10:23 AM        Passed - PCP or prescribing provider visit in past 12 months      Last office visit with prescriber/PCP: 7/11/2019 Jacy Mishra FNP OR same dept: 7/11/2019 Jacy Mishra FNP OR same specialty: 7/11/2019 Jacy Mishra FNP  Last physical: 9/20/2018 Last MTM visit: Visit date not found   Next visit within 3 mo: Visit date not found  Next physical within 3 mo: Visit date not found  Prescriber OR PCP: BHARGAV Pearson  Last diagnosis associated with med order: 1. GERD (gastroesophageal reflux disease)  - pantoprazole (PROTONIX) 40 MG tablet [Pharmacy Med Name: PANTOPRAZOLE SOD DR 40 MG TAB]; TAKE 1 TABLET (40 MG TOTAL) BY MOUTH 2 (TWO) TIMES A DAY.  Dispense: 180 tablet; Refill: 3    If protocol passes may refill for 12 months if within 3 months of last provider visit (or a total of 15 months).             pantoprazole (PROTONIX) 40 MG tablet [Pharmacy Med Name: PANTOPRAZOLE SOD DR 40 MG TAB] 180 tablet 3     Sig: TAKE 1 TABLET (40 MG TOTAL) BY MOUTH 2 (TWO) TIMES A DAY.       GI Medications Refill Protocol Passed - 7/21/2019 10:23 AM        Passed - PCP or prescribing provider visit in last 12 or next 3 months.     Last office visit with prescriber/PCP: 7/11/2019 Jacy Mishra FNP OR rodrigo dept: 7/11/2019 Jacy Mishra FNP OR same specialty: 7/11/2019 Jacy Mishra FNP  Last physical: 9/20/2018 Last MTM visit: Visit date not found   Next visit within 3 mo: Visit date not found  Next physical within 3 mo:  Visit date not found  Prescriber OR PCP: BHARGAV Pearson  Last diagnosis associated with med order: 1. GERD (gastroesophageal reflux disease)  - pantoprazole (PROTONIX) 40 MG tablet [Pharmacy Med Name: PANTOPRAZOLE SOD DR 40 MG TAB]; TAKE 1 TABLET (40 MG TOTAL) BY MOUTH 2 (TWO) TIMES A DAY.  Dispense: 180 tablet; Refill: 3    If protocol passes may refill for 12 months if within 3 months of last provider visit (or a total of 15 months).

## 2021-05-30 NOTE — ANESTHESIA PREPROCEDURE EVALUATION
Anesthesia Evaluation      Patient summary reviewed   No history of anesthetic complications     Airway   Mallampati: II  Neck ROM: limited   Pulmonary - normal exam                          Cardiovascular - normal exam  Exercise tolerance: > or = 4 METS  (+) hypertension, , hypercholesterolemia,     ECG reviewed        Neuro/Psych    (+) depression,     Endo/Other    (+) hypothyroidism, arthritis,      GI/Hepatic/Renal    (+) GERD,             Dental - normal exam                          Anesthesia Plan  Planned anesthetic: general endotracheal  - glidescope  ASA 2   Induction: intravenous   Anesthetic plan and risks discussed with: patient  Anesthesia plan special considerations: video-assisted,   Post-op plan: routine recovery

## 2021-05-30 NOTE — TELEPHONE ENCOUNTER
Left message on patients personal cell that I put her in for the 140 appt time and if it does not work to call us back

## 2021-05-31 VITALS — WEIGHT: 176 LBS | BODY MASS INDEX: 27.57 KG/M2

## 2021-05-31 VITALS — HEIGHT: 67 IN | WEIGHT: 176 LBS | BODY MASS INDEX: 27.62 KG/M2

## 2021-05-31 VITALS — WEIGHT: 173 LBS | BODY MASS INDEX: 27.5 KG/M2

## 2021-05-31 VITALS — BODY MASS INDEX: 27.25 KG/M2 | HEIGHT: 67 IN | WEIGHT: 173.6 LBS

## 2021-05-31 VITALS — BODY MASS INDEX: 28.3 KG/M2 | WEIGHT: 178 LBS

## 2021-05-31 VITALS — WEIGHT: 173 LBS | HEIGHT: 67 IN | BODY MASS INDEX: 27.15 KG/M2

## 2021-05-31 VITALS — BODY MASS INDEX: 26.63 KG/M2 | WEIGHT: 170 LBS

## 2021-05-31 VITALS — BODY MASS INDEX: 27.5 KG/M2 | WEIGHT: 173 LBS

## 2021-05-31 VITALS — BODY MASS INDEX: 27.63 KG/M2 | WEIGHT: 173.8 LBS

## 2021-05-31 VITALS — WEIGHT: 178.8 LBS | BODY MASS INDEX: 28.43 KG/M2

## 2021-05-31 NOTE — TELEPHONE ENCOUNTER
Pt is scheduled for follow up from swallow study.  Pt is wondering if she still needs Mammogram?  She got a call to schedule

## 2021-05-31 NOTE — PROGRESS NOTES
Internal Medicine Office Visit  [facility]  Patient Name: Geeta Berry  Patient Age: 78 y.o.  YOB: 1940  MRN: [id number]    Date of Visit: 2019  Reason for Office Visit:   Chief Complaint   Patient presents with     Hospital Visit Follow Up           Assessment / Plan / Medical Decision Makin. Epigastric pain  2. Gastroesophageal reflux disease without esophagitis  3. Other acute gastritis without hemorrhage  - Symptoms most consistent with gastritis and/or GERD. Will check stool for h. Pylori. She already has GI follow up on Wednesday  - START: ranitidine (ZANTAC) 150 MG tablet; Take 1 tablet (150 mg total) by mouth at bedtime.  She will take this in addition to protonix 40 mg two times a day   - Stop aspirin for now         Health Maintenance Review  Health Maintenance   Topic Date Due     ADVANCE CARE PLANNING  1958     MEDICARE ANNUAL WELLNESS VISIT  2004     ZOSTER VACCINES (2 of 3) 2008     FALL RISK ASSESSMENT  2018     DEPRESSION FOLLOW UP  2019     INFLUENZA VACCINE RULE BASED (1) 2019     TD 18+ HE  2023     PNEUMOCOCCAL POLYSACCHARIDE VACCINE AGE 65 AND OVER  Completed     PNEUMOCOCCAL CONJUGATE VACCINE FOR ADULTS (PCV13 OR PREVNAR)  Completed     DXA SCAN  Discontinued         I am having Geeta Berry start on ranitidine. I am also having her maintain her calcium carbonate, polyethylene glycol, senna-docusate, aluminum-magnesium hydroxide-simethicone, multivitamin with minerals, amoxicillin, aspirin, citalopram, azelastine, fluticasone propionate, levothyroxine, hydroCHLOROthiazide, simvastatin, and pantoprazole.      HPI:  Geeta Berry is a 78 y.o. year old who presents to the office today for follow up of an ED visit. On  she had a ERCP for recurrent symptomatic choledocholithiasis with findings of stenosis of the sphincterotomy site and a small amount of sludge in the CBD. Balloon was used  "to remove sludge and dilate.  She was seen on Saturday 8/3 for RUQ pain with normal white count, LFTs, lipase, troponin. Today she continues to have belching, nausea w/o vomiting which is worse in the morning, and epigastric discomfort. She is following a bland diet. She describes a feeling of \"fullness in the esophagus\". She is scheduled to see GI this Wednesday.     Review of Systems- pertinent positive in bold:  Constitutional: Fever, chills, night sweats, fainting, weight change, fatigue, seizures, dizziness, sleeping difficulties, loud snoring/pauses in breathing  Eyes: change in vision, blurred or double vision, redness/eye pain  Ears, nose, mouth, throat: change in hearing, ear pain, hoarseness, difficulty swallowing, sores in the mouth or throat  Respiratory: shortness of breath, cough, bloody sputum, wheezing  Cardiovascular: chest pain, palpitations   Gastrointestinal: abdominal pain, heartburn/indigestion, nausea/vomiting, change in appetite, change in bowel habits, constipation or diarrhea, rectal bleeding/dark stools, difficulty swallowing  Urinary: painful urination, frequent urination, urinary urgency/incontinence, blood in urine/dark urine, nocturia  Musculoskeletal: backache/back pain (new or increasing), weakness, joint pain/stiffness (new or increasing), muscle cramps, swelling of hands, feet, ankles, leg pain/redness      Current Scheduled Meds:  Outpatient Encounter Medications as of 8/5/2019   Medication Sig Dispense Refill     aluminum-magnesium hydroxide-simethicone (MAALOX ADVANCED) 200-200-20 mg/5 mL Susp Take 5 mL by mouth every 6 (six) hours as needed.       amoxicillin (AMOXIL) 500 MG capsule Take 2,000 mg by mouth once as needed (prior to dental procedures).       aspirin 81 MG EC tablet Take 81 mg by mouth daily.       azelastine (ASTELIN) 137 mcg (0.1 %) nasal spray 1 SPRAY INTO EACH NOSTRIL AT BEDTIME. USE IN EACH NOSTRIL AS DIRECTED 30 mL 2     calcium carbonate (OS-ALOK) 600 mg " calcium (1,500 mg) tablet Take 600 mg by mouth daily.        citalopram (CELEXA) 10 MG tablet Take 1 tablet (10 mg total) by mouth daily. (Patient taking differently: Take 5 mg by mouth daily. ) 90 tablet 3     fluticasone (FLONASE) 50 mcg/actuation nasal spray 1 spray into each nostril daily. 16 g 3     hydroCHLOROthiazide (HYDRODIURIL) 25 MG tablet Take 1 tablet (25 mg total) by mouth daily. 90 tablet 2     levothyroxine (SYNTHROID, LEVOTHROID) 50 MCG tablet Take 1 tablet (50 mcg total) by mouth daily. 90 tablet 2     multivitamin with minerals (THERA-M) 9 mg iron-400 mcg Tab tablet Take 1 tablet by mouth daily.       pantoprazole (PROTONIX) 40 MG tablet TAKE 1 TABLET (40 MG TOTAL) BY MOUTH 2 (TWO) TIMES A DAY. 180 tablet 3     polyethylene glycol (MIRALAX) 17 gram packet Take 17 g by mouth daily.              senna-docusate (PERICOLACE) 8.6-50 mg tablet Take 1 tablet by mouth 2 (two) times a day.        simvastatin (ZOCOR) 20 MG tablet TAKE 1 TABLET BY MOUTH EVERYDAY AT BEDTIME 90 tablet 3     ranitidine (ZANTAC) 150 MG tablet Take 1 tablet (150 mg total) by mouth at bedtime. 30 tablet 1     No facility-administered encounter medications on file as of 8/5/2019.      Past Medical History:   Diagnosis Date     Arthritis     osteo     Chronic neck pain      Common bile duct calculus      Depression      DVT (deep venous thrombosis) (H)      GERD (gastroesophageal reflux disease)      History of blood clots 2017    DVT right lower extremity     HLD (hyperlipidemia)      HTN (hypertension)      Hypothyroidism      Infectious viral hepatitis     hepatitis A in 1960s     Past Surgical History:   Procedure Laterality Date     bilateral TIMO      with revision on both hips     CHOLECYSTECTOMY  1980     ERCP      x5 over 8 years. Last 8/2016     ERCP N/A 9/5/2017    Procedure: ENDOSCOPIC RETROGRADE CHOLANGIOPANCREATOGRAPHY, STONE EXTRACTION;  Surgeon: Henry Eller MD;  Location: [facility];  Service:      ERCP N/A  7/26/2019    Procedure: ENDOSCOPIC RETROGRADE CHOLANGIOPANCREATOGRAPHY, REMOVAL OF SLUDGE, DILATION OF STRICTURE;  Surgeon: Ryan Pastrana MD;  Location: [facility] OR;  Service: Gastroenterology     HIP ARTHROSCOPY Left 1983     HIP SURGERY  2012    revision     JOINT REPLACEMENT       REPLACEMENT TOTAL KNEE Left 2015     REVISION TOTAL HIP ARTHROPLASTY Left 5/16/2017    Procedure: REVISION LEFT TOTAL HIP ARTHROPLASTY, BOTH COMPONENTS;  Surgeon: Tyson Peoples MD;  Location: [facility] OR;  Service:      SHOULDER SURGERY Left 2010    left total shoulder replacement     XR ERCP BILIARY AND PANCREAS  7/26/2019     Social History     Tobacco Use     Smoking status: Never Smoker     Smokeless tobacco: Never Used   Substance Use Topics     Alcohol use: No     Drug use: No       Objective / Physical Examination:  Study Result     EXAM: CT ABDOMEN PELVIS WO ORAL W IV CONTRAST  LOCATION: [facility]  DATE/TIME: 8/3/2019 1:49 PM     INDICATION: Upper abdominal pain ongoing 1 week s/p ERCP  COMPARISON: ERCP dated 07/26/2019.  TECHNIQUE: Helical enhanced thin-section CT scan of the abdomen and pelvis was performed following injection of IV contrast. Multiplanar reformats were obtained. Dose reduction techniques were used.  CONTRAST: Iohexol (Omni) 75mL     FINDINGS:   LUNG BASES: Negative.     ABDOMEN: The gallbladder is surgically absent. There is pneumobilia with air in the left intrahepatic ducts and the common bile duct. Small amount of fluid in the distal common bile duct at the pancreatic head no radiopaque common duct stones. There is   mild diffuse thickening of the duodenal bulb and distal stomach suggesting gastritis/duodenitis. No free intraperitoneal air or fluid no abscess is identified. No evidence for hepatic or splenic laceration no focal hepatic masses. No pancreatic masses   or. Pancreatic inflammatory change. No pseudocyst.  The adrenal glands at the right kidney are normal. There is  mild prominence of the left upper collecting system and ureter no radiopaque stones are seen in the visualized left upper collecting system or ureter. The distal ureter is obscured by streak   artifact from bilateral hip arthroplasties. If there is clinical concern for obstructing distal left ureteral stone upright KUB post CT  no abdominal aortic aneurysm. There is a small anterior abdominal wall hernia in the midline containing only fat no bowel obstruction or free intraperitoneal air or fluid no evidence for appendicitis no abdominal lymphadenopathy. May be helpful to assess   for obstruction of contrast column in the ureter.     PELVIS: No evidence for diverticulitis or abscess no stones in the visualized bladder again the bladder and distal ureters are partially obscured by streak artifact. No pelvic lymphadenopathy.     MUSCULOSKELETAL: Bilateral total hip arthroplasties. No acute fracture or suspicious bony lesion. There are degenerative changes in the lower lumbar spine with disc space narrowing and vacuum phenomenon.     IMPRESSION:   CONCLUSION:   1.  Pneumobilia. No evidence for retained common duct stone.  2.  Mild thickening of the distal stomach and proximal duodenum suggesting gastritis/duodenitis. No evidence for bowel perforation.  3.  Mild prominence the left upper collecting system and ureter no stones are identified the distal ureters are obscured by streak artifact from hip arthroplasties if there is clinical concern for left renal colic upright KUB may be helpful to evaluate   for obstruction of excreted contrast in the left ureter.       Vitals:    08/05/19 1406   BP: 106/70   Pulse: 68   Temp: 97.8  F (36.6  C)   TempSrc: Oral   Weight: 175 lb (79.4 kg)     Wt Readings from Last 3 Encounters:   08/05/19 175 lb (79.4 kg)   08/03/19 171 lb (77.6 kg)   07/25/19 176 lb (79.8 kg)     Body mass index is 27.41 kg/m .     Constitutional: Appears uncomfortable, less energetic than baseline for patient    Respiratory: Clear to auscultation bilaterally. Normal inspiratory and expiratory effort  Cardiovascular: Regular rate and rhythm. No murmurs, rubs, or gallops. No edema  Gastrointestinal: Bowel sounds active all four quadrants. Soft, mild epigastric, RUQ tenderness. No rebound tenderness or guarding       Orders Placed This Encounter   Procedures     H. pylori Antigen, Stool   Followup: Return in about 4 weeks (around 9/2/2019) for Recheck. earlier if needed.      Jacy Mishra, CNP

## 2021-05-31 NOTE — TELEPHONE ENCOUNTER
She does not need to continue with annual mammography. She could elect to do this test every other year which would be in 2020 for her OR she could stop screening altogether since she is over age 74.

## 2021-05-31 NOTE — TELEPHONE ENCOUNTER
Triage call:   ERCP on 7/26 and started feeling ill on Saturday- went to ER- they ran tests and diagnosed gastritis. Advised to F/U in clinic today or tomorrow.     Patient reports that she still feels ill today. Nauseated and weak- feverish- patient reports that it is a very low grade fever. On a bland diet. No diarrhea or problems urinating. Drinking adequate fluids. Resting at home. Patient would like to be seen.      Triaged to be seen today - reviewed additional care advice and patient verbalizes understanding. Patient warm transferred to scheduling for appointment. Appointment scheduled with Dr May @ 2:15pm today.     Karen Colvin RN [facility] Connection Triage/Med Refill 8/5/2019 11:12 AM    Reason for Disposition    Patient wants to be seen    Protocols used: WEAKNESS (GENERALIZED) AND FATIGUE-A-OH

## 2021-05-31 NOTE — PROGRESS NOTES
HPI: Geeta Berry is a 78 y.o. female referred to see me by Jacy Mishra FNP for a hiatal hernia and severe esophageal reflux.  This is a 78-year-old female with several year history of esophageal reflux.  She had a recent ERCP 6 weeks ago for retained common bile duct stones.  She since recovered from this and has been followed up by gastroenterology for evaluation of her dysphasia.  She is diagnosed as having questionable functional dyspepsia excessive belching, dysphagia in the upper mid esophageal region with discomfort in the epigastric region.  She denies any food associations with symptomatology.  Denies any dark-colored or black stool.  She does have intermittent planes of reflux and is currently taking Protonix as well as Zantac for her reflux.  She denies any nighttime coughs.  Subsequent to this visit she underwent an esophagram with the findings of significant gastroesophageal reflux disease.  Also a small hiatal hernia.    Allergies:Atorvastatin; Codeine; and Dipyridamole    Past Medical History:   Diagnosis Date     Arthritis     osteo     Chronic neck pain      Common bile duct calculus      Depression      DVT (deep venous thrombosis) (H)      GERD (gastroesophageal reflux disease)      History of blood clots 2017    DVT right lower extremity     HLD (hyperlipidemia)      HTN (hypertension)      Hypothyroidism      Infectious viral hepatitis     hepatitis A in 1960s       Past Surgical History:   Procedure Laterality Date     bilateral TIMO      with revision on both hips     CHOLECYSTECTOMY  1980     ERCP      x5 over 8 years. Last 8/2016     ERCP N/A 9/5/2017    Procedure: ENDOSCOPIC RETROGRADE CHOLANGIOPANCREATOGRAPHY, STONE EXTRACTION;  Surgeon: Henry Eller MD;  Location: [facility];  Service:      ERCP N/A 7/26/2019    Procedure: ENDOSCOPIC RETROGRADE CHOLANGIOPANCREATOGRAPHY, REMOVAL OF SLUDGE, DILATION OF STRICTURE;  Surgeon: Ryan Pastrana MD;  Location: [facility];   Service: Gastroenterology     HIP ARTHROSCOPY Left 1983     HIP SURGERY  2012    revision     JOINT REPLACEMENT       REPLACEMENT TOTAL KNEE Left 2015     REVISION TOTAL HIP ARTHROPLASTY Left 5/16/2017    Procedure: REVISION LEFT TOTAL HIP ARTHROPLASTY, BOTH COMPONENTS;  Surgeon: Tyson Peoples MD;  Location: [facility] OR;  Service:      SHOULDER SURGERY Left 2010    left total shoulder replacement     XR ERCP BILIARY AND PANCREAS  7/26/2019       CURRENT MEDS:    Current Outpatient Medications:      aluminum-magnesium hydroxide-simethicone (MAALOX ADVANCED) 200-200-20 mg/5 mL Susp, Take 5 mL by mouth every 6 (six) hours as needed., Disp: , Rfl:      amoxicillin (AMOXIL) 500 MG capsule, Take 2,000 mg by mouth once as needed (prior to dental procedures)., Disp: , Rfl:      azelastine (ASTELIN) 137 mcg (0.1 %) nasal spray, 1 SPRAY INTO EACH NOSTRIL AT BEDTIME. USE IN EACH NOSTRIL AS DIRECTED, Disp: 30 mL, Rfl: 2     calcium carbonate (OS-ALOK) 600 mg calcium (1,500 mg) tablet, Take 600 mg by mouth daily. , Disp: , Rfl:      citalopram (CELEXA) 10 MG tablet, Take 1 tablet (10 mg total) by mouth daily. (Patient taking differently: Take 5 mg by mouth daily. ), Disp: 90 tablet, Rfl: 3     diphenhydrAMINE-acetaminophen (TYLENOL PM EXTRA STRENGTH)  mg Tab, , Disp: , Rfl:      fluticasone (FLONASE) 50 mcg/actuation nasal spray, 1 spray into each nostril daily., Disp: 16 g, Rfl: 3     hydroCHLOROthiazide (HYDRODIURIL) 25 MG tablet, Take 1 tablet (25 mg total) by mouth daily., Disp: 90 tablet, Rfl: 2     levothyroxine (SYNTHROID, LEVOTHROID) 50 MCG tablet, Take 1 tablet (50 mcg total) by mouth daily., Disp: 90 tablet, Rfl: 2     multivitamin with minerals (THERA-M) 9 mg iron-400 mcg Tab tablet, Take 1 tablet by mouth daily., Disp: , Rfl:      pantoprazole (PROTONIX) 40 MG tablet, , Disp: , Rfl:      polyethylene glycol (MIRALAX) 17 gram packet, Take 17 g by mouth daily.    , Disp: , Rfl:      ranitidine (ZANTAC)  "150 MG tablet, Take 1 tablet (150 mg total) by mouth at bedtime., Disp: 30 tablet, Rfl: 1     senna-docusate (PERICOLACE) 8.6-50 mg tablet, Take 1 tablet by mouth 2 (two) times a day. , Disp: , Rfl:      simvastatin (ZOCOR) 20 MG tablet, TAKE 1 TABLET BY MOUTH EVERYDAY AT BEDTIME, Disp: 90 tablet, Rfl: 3     aspirin 81 MG EC tablet, Take 81 mg by mouth daily., Disp: , Rfl:     Family History   Problem Relation Age of Onset     Colon cancer Father      Diabetes Sister      Heart disease Mother         reports that she has never smoked. She has never used smokeless tobacco. She reports that she does not drink alcohol or use drugs.    Review of Systems:  The 12 system review is within normal limits except for as mentioned above.  General ROS: No complaints or constitutional symptoms  Ophthalmic ROS: No complaints of visual changes  Skin: No complaints or symptoms   Endocrine: No complaints or symptoms  Hematologic/Lymphatic: No symptoms or complaints  Psychiatric: No symptoms or complaints  Respiratory ROS: no cough, shortness of breath, or wheezing  Cardiovascular ROS: no chest pain or dyspnea on exertion  Gastrointestinal ROS: As per HPI  Genito-Urinary ROS: no dysuria, trouble voiding, or hematuria  Musculoskeletal ROS: no joint or muscle pain  Neurological ROS: no TIA or stroke symptoms      EXAM:  /74   Pulse (!) 53   Resp 14   Ht 5' 7\" (1.702 m)   Wt 172 lb (78 kg)   SpO2 92%   BMI 26.94 kg/m    GENERAL: Well developed female, No acute distress, pleasant and conversant   EYES: Pupils equal, round and reactive, no scleral icterus  CARDIAC: Regular rate and rhythm, no obvious murmurs noted  CHEST/LUNG: Clear to ascultation bilaterally, No ronchi, No wheezes  ABDOMEN: Soft, tender to palpation in the epigastric region with no obvious masses noted  SKIN: Pink, warm and dry, no obvious rashes or lesions   NEURO:No focal deficits, ambulatory  MUSCULOSKELETAL:No obvious deformities, no swelling, normal " appearing      LABS:  Lab Results   Component Value Date    WBC 4.3 08/03/2019    HGB 12.5 08/03/2019    HCT 38.3 08/03/2019    MCV 95 08/03/2019     08/03/2019     INR/Prothrombin Time      Lab Results   Component Value Date    ALT 19 08/03/2019    AST 23 08/03/2019    ALKPHOS 75 08/03/2019    BILITOT 0.4 08/03/2019       IMAGES:   Relevant images were reviewed and discussed with the patient.  Notable findings were:   EXAM: XR ESOPHAGRAM  LOCATION: [facility]  DATE/TIME: 8/8/2019 9:44 AM     INDICATION: Eructation  COMPARISON: None.     TECHNIQUE: Routine.     FINDINGS:  FLUOROSCOPIC TIME: 1.1 minutes  NUMBER OF IMAGES: 16     ESOPHAGUS: Normal peristalsis. No stricture, mass lesion, or mucosal abnormality. Small sliding hiatal hernia. Large amount of gastroesophageal reflux to the upper thoracic esophagus.     IMPRESSION:   CONCLUSION:  1.  Large amount of gastroesophageal reflux  Assessment/Plan:   Geeta Berry is a 78 y.o. female with signs and symptoms consistent with severe gastroesophageal reflux disease.  I discussed the findings and went over the images of the esophagram as well as a CT scan.  She has had multiple ERCPs but is unclear whether or not she has had any biopsies of her GE junction.  I will look through her notes to see if these were done.  If not then we will plan on EGD with biopsies to ensure there is no mucosal changes.  Pathophysiology of hiatal hernia as well as gastroesophageal reflux disease was discussed.  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  She has not had any formal biopsies of her GE junction that I can see based on her records and she does not think she has had any endoscopies with biopsies recently.  She was told that on her ERCP she does have a hiatal hernia.  Having said that, in order to evaluate for any mucosal changes we will get her  scheduled for an EGD with possible robotic Nissen fundoplication in the future.    Will Nash D.O. [facility]  [phone]  [facility] Department of Surgery

## 2021-05-31 NOTE — TELEPHONE ENCOUNTER
New Appointment Needed  What is the reason for the visit:    Inpatient/ED Follow Up Appt Request  At what hospital or facility were you seen?: [facility]    What is the reason you were seen?: Pain related to prior ERCP procedure on 7/26  What date were you admitted?: date: 08/03/19  What date were you discharged?: date: 08/03/19  What was the recommended timeframe for your follow up appointment?: 1-2 days Monday or Tuesday   Provider Preference: PCP only  How soon do you need to be seen?: tomorrow  Waitlist offered?: No  Okay to leave a detailed message:  Yes

## 2021-06-01 ENCOUNTER — COMMUNICATION - HEALTHEAST (OUTPATIENT)
Dept: INTERNAL MEDICINE | Facility: CLINIC | Age: 81
End: 2021-06-01
Payer: COMMERCIAL

## 2021-06-01 VITALS — BODY MASS INDEX: 27.25 KG/M2 | WEIGHT: 174 LBS

## 2021-06-01 VITALS — HEIGHT: 67 IN | BODY MASS INDEX: 27.62 KG/M2 | WEIGHT: 176 LBS

## 2021-06-01 NOTE — ANESTHESIA POSTPROCEDURE EVALUATION
Patient: Geeta Berry  ESOPHAGOGASTRODUODENOSCOPY (EGD)  Anesthesia type: general    Patient location: Phase II Recovery  Last vitals:   Vitals Value Taken Time   /57 9/10/2019 10:30 AM   Temp 36.2  C (97.2  F) 9/10/2019  9:31 AM   Pulse 57 9/10/2019 10:34 AM   Resp 16 9/10/2019 10:30 AM   SpO2 95 % 9/10/2019 10:34 AM   Vitals shown include unvalidated device data.  Post vital signs: stable  Level of consciousness: awake and responds to simple questions  Post-anesthesia pain: pain controlled  Post-anesthesia nausea and vomiting: no  Pulmonary: unassisted, return to baseline  Cardiovascular: stable and blood pressure at baseline  Hydration: adequate  Anesthetic events: no    QCDR Measures:  ASA# 11 - Sobeida-op Cardiac Arrest: ASA11B - Patient did NOT experience unanticipated cardiac arrest  ASA# 12 - Sobeida-op Mortality Rate: ASA12B - Patient did NOT die  ASA# 13 - PACU Re-Intubation Rate: ASA13B - Patient did NOT require a new airway mgmt  ASA# 10 - Composite Anes Safety: ASA10A - No serious adverse event    Additional Notes:

## 2021-06-01 NOTE — TELEPHONE ENCOUNTER
Refill Approved    Rx renewed per Medication Renewal Policy. Medication was last renewed on 8/5/19, last OV 9/4/19.    Jovita Leonard, [facility] Triage/Med Refill 9/29/2019     Requested Prescriptions   Pending Prescriptions Disp Refills     ranitidine (ZANTAC) 150 MG tablet [Pharmacy Med Name: RANITIDINE 150 MG TABLET] 30 tablet 1     Sig: TAKE 1 TABLET (150 MG TOTAL) BY MOUTH AT BEDTIME.       GI Medications Refill Protocol Passed - 9/29/2019 12:37 AM        Passed - PCP or prescribing provider visit in last 12 or next 3 months.     Last office visit with prescriber/PCP: 8/14/2019 Jacy Mishra FNP OR same dept: 8/14/2019 Jacy Mishra FNP OR same specialty: 8/14/2019 Jacy Mishra FNP  Last physical: 9/4/2019 Last MTM visit: Visit date not found   Next visit within 3 mo: Visit date not found  Next physical within 3 mo: Visit date not found  Prescriber OR PCP: BHARGAV Pearson  Last diagnosis associated with med order: 1. Epigastric pain  - ranitidine (ZANTAC) 150 MG tablet [Pharmacy Med Name: RANITIDINE 150 MG TABLET]; Take 1 tablet (150 mg total) by mouth at bedtime.  Dispense: 30 tablet; Refill: 1    If protocol passes may refill for 12 months if within 3 months of last provider visit (or a total of 15 months).

## 2021-06-01 NOTE — ANESTHESIA PREPROCEDURE EVALUATION
Anesthesia Evaluation      Patient summary reviewed   No history of anesthetic complications     Airway   Mallampati: II   Pulmonary - normal exam     ROS comment: Sinus drainage                         Cardiovascular - normal exam  Exercise tolerance: > or = 4 METS  (+) , hypercholesterolemia,     Hypertension: patient denies history of HTN.  ECG reviewed (SB)  Rhythm: regular  Rate: normal,      ROS comment: 9/17 TTE  1. Normal left ventricular size and systolic performance with a visually estimated ejection fraction of 60-65%.   2. There is trace aortic insufficiency.   3. Normal right ventricular size and systolic performance.         Neuro/Psych    (+) neuromuscular disease,  depression,     Comments: Neck pain  History of injections    Endo/Other    (+) hypothyroidism,      Comments: LE DVT    GI/Hepatic/Renal    (+) hiatal hernia, GERD (treated with protonix),     Impaired hepatic function: History of Hepatitis A 40 years ago.    Comments: Chronic belching     Other findings: Results for WILVER LEMONS (MRN [id number]) as of 9/10/2019 08:40    8/3/2019 10:08  Sodium: 142  Potassium: 3.9  Chloride: 102  CO2: 30  Anion Gap, Calculation: 10  BUN: 16  Creatinine: 1.02  GFR MDRD Af Amer: >60  Results for WILVER LEMONS (MRN [id number]) as of 9/10/2019 08:40    8/3/2019 10:08  WBC: 4.3  RBC: 4.05  Hemoglobin: 12.5  Hematocrit: 38.3  MCV: 95  MCH: 30.9  MCHC: 32.6  RDW: 15.5 (H)  Platelets: 272        Dental - normal exam                        Anesthesia Plan  Planned anesthetic: general endotracheal and total IV anesthesia  HH is causing chronic belching and reflux - would do better to protect the airway during procedure  GAETT  Antiemetics  RSI  ASA 2   Induction: intravenous   Anesthetic plan and risks discussed with: patient  Anesthesia plan special considerations: video-assisted, rapid sequence induction,   Post-op plan: routine recovery

## 2021-06-01 NOTE — ANESTHESIA CARE TRANSFER NOTE
Last vitals:   Vitals:    09/10/19 0931   BP: 113/58   Pulse: 63   Resp: 18   Temp: 36.2  C (97.2  F)   SpO2: 97%     Pt brought to phase 2 on 6L O2. Monitors applied. VSS.    Patient's level of consciousness is drowsy  Spontaneous respirations: yes  Maintains airway independently: yes  Dentition unchanged: yes  Oropharynx: oropharynx clear of all foreign objects    QCDR Measures:  ASA# 20 - Surgical Safety Checklist: WHO surgical safety checklist completed prior to induction    PQRS# 430 - Adult PONV Prevention: 4558F - Pt received => 2 anti-emetic agents (different classes) preop & intraop  ASA# 8 - Peds PONV Prevention: NA - Not pediatric patient, not GA or 2 or more risk factors NOT present  PQRS# 424 - Sobeida-op Temp Management: NA - MAC anesthesia or case < 60 minutes  PQRS# 426 - PACU Transfer Protocol: - Transfer of care checklist used  ASA# 14 - Acute Post-op Pain: ASA14B - Patient did NOT experience pain >= 7 out of 10

## 2021-06-01 NOTE — PROGRESS NOTES
" HPI: Geeta Berry is here for follow up after her EGD.  She continues to have loose bowel movements as well as intermittent belching.  She has mild discomfort in the abdominal region but otherwise is doing well.    Allergies, Medications, Social History, Past Medical History and Past Surgical History were reviewed and are noted in the chart.    /64   Pulse 61   Resp 14   Ht 5' 7\" (1.702 m)   Wt 170 lb (77.1 kg)   SpO2 98%   BMI 26.63 kg/m    Body mass index is 26.63 kg/m .      EXAM:   GENERAL: Appears well  Abdomen-soft    Incision 09/10/19 Surgical Throat (Active)         DIAGNOSIS:  A) STOMACH, ANTRUM, BIOPSY:       - ANTRAL MUCOSA WITH MODERATE CHRONIC GASTRITIS       - NO INTESTINAL METAPLASIA       - NO HELICOBACTER PYLORI ON HE AND IHC STAIN    B) STOMACH, FUNDUS, BIOPSY:       - FUNDIC MUCOSA WITH MILD CHRONIC INFLAMMATION       - NO INTESTINAL METAPLASIA       - NO HELICOBACTER PYLORI ON HE STAIN    C) GASTRIC ESOPHAGEAL JUNCTION, BIOPSY:       - SCANT SUPERFICIAL GASTRIC COLUMNAR AND SQUAMOUS EPITHELIAL CELLS       - NEGATIVE FOR INTESTINAL METAPLASIA  Assessment/Plan: Geeta Berry continues to do relatively well.  We discussed the pathology results of her biopsies.  She does have a component of gastritis.  Her hiatal hernia is also likely contributing to the epigastric discomfort in the excessive belching she is having.  However, her loose bowel movements are likely secondary to the excessive MiraLAX she says she is taking.  I have instructed her to hold back on taking her MiraLAX 4-5 times a day and see if we can bring it back down to a normal regimen.  Additionally, I have recommended that she maintain a food journal to see if there are any particular foods that increase her epigastric discomfort.  I will order some Carafate for her gastritis.  I have instructed her to hold off on taking her Prilosec but she may continue with her Zantac.  She will follow-up with me as needed in the next " month or 2    Will Nash DO [facility] Department of Surgery

## 2021-06-01 NOTE — PROGRESS NOTES
Preoperative Exam    Scheduled Procedure: ESOPHAGOGASTRODUODENOSCOPY (EGD)  Surgery Date:  9/11/2019  Surgery Location: [facility], fax [phone]    Surgeon:  Dr Nash    Assessment/Plan:     1. Preoperative examination  2. Gastroesophageal reflux disease without esophagitis  3. Hiatal hernia  - APPROVAL GIVEN to proceed with proposed procedure, without further diagnostic evaluation. She can call for approval to proceed with Nissen Fundoplication if this is scheduled within 30 days of this preoperative visit and will addend note after a quick phone review with her.   - CONTINUE: pantoprazole (PROTONIX) 40 MG tablet; Take 1 tablet (40 mg total) by mouth 2 (two) times a day.  Dispense: 180 tablet; Refill: 1    4. Essential hypertension  - stable   - hydroCHLOROthiazide (HYDRODIURIL) 25 MG tablet; Take 1 tablet (25 mg total) by mouth daily.  Dispense: 90 tablet; Refill: 3    5. Acquired hypothyroidism  - levothyroxine (SYNTHROID, LEVOTHROID) 50 MCG tablet; Take 1 tablet (50 mcg total) by mouth daily.  Dispense: 90 tablet; Refill: 3      Have you had prior anesthesia?: Yes  Have you or any family members had a previous anesthesia reaction:  No  Do you or any family members have a history of a clotting or bleeding disorder?: No    Functional Status: Independent  Patient plans to recover at home with family.     Subjective:      Geeta Berry is a 79 y.o. female who presents for a preoperative consultation. She has findings of a hiatal hernia and severe esophageal reflux.  She had an ERCP 6 weeks ago for retained common bile duct stones but minimal improvement in her symptoms overall.  She saw general surgery regarding this and has an EGD with biopsy with possible robotic Nissen fundoplication afterward.     Continues to have sinus fullness and pressure. She continues HOB elevation, astelin nasal spray plus flonase, and OTC antihistamine. Symptoms are not improved or worsened.     She has recurrent OA  related joint pains.     Hypothyroidism reviewed, she feels symptomatically euthyroid.     HLD is treated with simvastatin, no myalgias.     Depression reviewed, she is doing well with citralopram 5 mg daily.     All other systems reviewed and are negative, other than those listed in the HPI.    Pertinent History  Do you have difficulty breathing or chest pain after walking up a flight of stairs: No  History of obstructive sleep apnea: No  Steroid use in the last 6 months: No  Frequent Aspirin/NSAID use: Yes: stopped baby aspirin last month  Prior Blood Transfusion: No  Prior Blood Transfusion Reaction: No  If for some reason prior to, during or after the procedure, if it is medically indicated, would you be willing to have a blood transfusion?:  There is no transfusion refusal.    Current Outpatient Medications   Medication Sig Dispense Refill     aluminum-magnesium hydroxide-simethicone (MAALOX ADVANCED) 200-200-20 mg/5 mL Susp Take 5 mL by mouth every 6 (six) hours as needed.       amoxicillin (AMOXIL) 500 MG capsule Take 2,000 mg by mouth once as needed (prior to dental procedures).       aspirin 81 MG EC tablet Take 81 mg by mouth daily.       azelastine (ASTELIN) 137 mcg (0.1 %) nasal spray 1 SPRAY INTO EACH NOSTRIL AT BEDTIME. USE IN EACH NOSTRIL AS DIRECTED 30 mL 2     calcium carbonate (OS-ALOK) 600 mg calcium (1,500 mg) tablet Take 600 mg by mouth daily.        citalopram (CELEXA) 10 MG tablet Take 1 tablet (10 mg total) by mouth daily. (Patient taking differently: Take 5 mg by mouth daily. ) 90 tablet 3     diphenhydrAMINE-acetaminophen (TYLENOL PM EXTRA STRENGTH)  mg Tab        fluticasone (FLONASE) 50 mcg/actuation nasal spray 1 spray into each nostril daily. 16 g 3     multivitamin with minerals (THERA-M) 9 mg iron-400 mcg Tab tablet Take 1 tablet by mouth daily.       pantoprazole (PROTONIX) 40 MG tablet Take 1 tablet (40 mg total) by mouth 2 (two) times a day. 180 tablet 1     polyethylene  glycol (MIRALAX) 17 gram packet Take 17 g by mouth daily.              ranitidine (ZANTAC) 150 MG tablet Take 1 tablet (150 mg total) by mouth at bedtime. 30 tablet 1     senna-docusate (PERICOLACE) 8.6-50 mg tablet Take 1 tablet by mouth 2 (two) times a day.        simvastatin (ZOCOR) 20 MG tablet TAKE 1 TABLET BY MOUTH EVERYDAY AT BEDTIME 90 tablet 3     hydroCHLOROthiazide (HYDRODIURIL) 25 MG tablet Take 1 tablet (25 mg total) by mouth daily. 90 tablet 3     levothyroxine (SYNTHROID, LEVOTHROID) 50 MCG tablet Take 1 tablet (50 mcg total) by mouth daily. 90 tablet 3     No current facility-administered medications for this visit.         Allergies   Allergen Reactions     Atorvastatin Other (See Comments)     Leg pain     Codeine Nausea Only     Dipyridamole Other (See Comments)     Patient reports medication was injected during a stress test and it was thought she had a heart attack following administration       Patient Active Problem List   Diagnosis     History of DVT (deep vein thrombosis), right LE 12/2016     Acute iritis, h/o in 2012     Common bile duct calculus, ERCP x 6 in past. Cannot tolerate ursodiol     Constipation     GERD (gastroesophageal reflux disease)     HTN (hypertension)     HLD (hyperlipidemia)     Hypothyroidism     Osteoarthritis of lumbar spine     Osteoarthritis; knees, hips, cervical spine      Facet arthropathy, cervical     Dysthymia     Hiatal hernia       Past Medical History:   Diagnosis Date     Arthritis     osteo     Chronic neck pain      Common bile duct calculus      Depression      DVT (deep venous thrombosis) (H)      GERD (gastroesophageal reflux disease)      History of blood clots 2017    DVT right lower extremity     HLD (hyperlipidemia)      HTN (hypertension)      Hypothyroidism      Infectious viral hepatitis     hepatitis A in 1960s       Past Surgical History:   Procedure Laterality Date     bilateral TIMO      with revision on both hips     CHOLECYSTECTOMY  1980      ERCP      x5 over 8 years. Last 8/2016     ERCP N/A 9/5/2017    Procedure: ENDOSCOPIC RETROGRADE CHOLANGIOPANCREATOGRAPHY, STONE EXTRACTION;  Surgeon: Henry Eller MD;  Location: [facility] OR;  Service:      ERCP N/A 7/26/2019    Procedure: ENDOSCOPIC RETROGRADE CHOLANGIOPANCREATOGRAPHY, REMOVAL OF SLUDGE, DILATION OF STRICTURE;  Surgeon: Ryan Pastrana MD;  Location: [facility] OR;  Service: Gastroenterology     HIP ARTHROSCOPY Left 1983     HIP SURGERY  2012    revision     JOINT REPLACEMENT       REPLACEMENT TOTAL KNEE Left 2015     REVISION TOTAL HIP ARTHROPLASTY Left 5/16/2017    Procedure: REVISION LEFT TOTAL HIP ARTHROPLASTY, BOTH COMPONENTS;  Surgeon: Tyson Peoples MD;  Location: [facility] Main OR;  Service:      SHOULDER SURGERY Left 2010    left total shoulder replacement     XR ERCP BILIARY AND PANCREAS  7/26/2019       Social History     Socioeconomic History     Marital status:      Spouse name: Not on file     Number of children: 2     Years of education: Not on file     Highest education level: Not on file   Occupational History     Occupation: Retired   Social Needs     Financial resource strain: Not on file     Food insecurity:     Worry: Not on file     Inability: Not on file     Transportation needs:     Medical: Not on file     Non-medical: Not on file   Tobacco Use     Smoking status: Never Smoker     Smokeless tobacco: Never Used   Substance and Sexual Activity     Alcohol use: No     Drug use: No     Sexual activity: Not on file   Lifestyle     Physical activity:     Days per week: Not on file     Minutes per session: Not on file     Stress: Not on file   Relationships     Social connections:     Talks on phone: Not on file     Gets together: Not on file     Attends [employer] service: Not on file     Active member of club or organization: Not on file     Attends meetings of clubs or organizations: Not on file     Relationship status: Not on file     Intimate partner  "violence:     Fear of current or ex partner: Not on file     Emotionally abused: Not on file     Physically abused: Not on file     Forced sexual activity: Not on file   Other Topics Concern     Not on file   Social History Narrative    4/6/17: The patient lives with her  in a condo.         Objective:     Vitals:    09/04/19 1110   BP: 124/62   Pulse: 70   Weight: 174 lb (78.9 kg)   Height: 5' 7\" (1.702 m)         Physical Exam:  Gen: Well developed, well nourished, no acute distress.  HEENT: normocephalic/atraumatic, PERRLA/EOMI, TMs: Gray, normal light reflex, no nasal discharge.  Oral mucosa: no erythema/exudate  Neck: No LAD/masses/thyromegaly/bruits  Lungs: clear bilaterally  Heart: regular rate and rhythm, no murmurs/gallops/rubs  Abdomen: Normal bowel sounds, soft, mild RUQ and epigastric tenderness, non-distended, no masses, neg Roy's/McBurney's, no rebound/guarding  Lymphatics: no supraclavicular/axillary/epitrochlear/inguinal LAD. No edema.  Neuro: A&O x 3  Psych: Behavior appropriate, engaging.  Thought processes congruent, non-tangential.  Musculoskeletal: no gross deformities.  Skin: no rashes or lesions.        There are no Patient Instructions on file for this visit.    Immunization History   Administered Date(s) Administered     Influenza high dose,seasonal,PF, 65+ yrs 08/30/2017, 09/20/2018, 09/04/2019     Influenza, inj, historic,unspecified 09/14/2016     Pneumo Conj 13-V (2010&after) 09/14/2015     Pneumo Polysac 23-V 01/25/2006     Td,adult,historic,unspecified 01/25/2006     Tdap 11/20/2013     ZOSTER, LIVE 09/26/2008           Electronically signed by BHARGAV Pearson 09/06/19 11:11 AM    "

## 2021-06-01 NOTE — TELEPHONE ENCOUNTER
Refill Approved    Rx renewed per Medication Renewal Policy. Medication was last renewed on 11/7/18.    Candie Zelaya, [facility] Triage/Med Refill 9/11/2019     Requested Prescriptions   Pending Prescriptions Disp Refills     fluticasone propionate (FLONASE) 50 mcg/actuation nasal spray [Pharmacy Med Name: FLUTICASONE PROP 50 MCG SPRAY]  3     Sig: SPRAY 1 SPRAY INTO EACH NOSTRIL EVERY DAY       Nasal Steroid Refill Protocol Passed - 9/11/2019  1:49 AM        Passed - Patient has had office visit/physical in last 2 years     Last office visit with prescriber/PCP: 8/14/2019 OR same dept: 8/14/2019 Jacy Mishra FNP OR same specialty: 8/14/2019 Jacy Mishra FNP Last physical: 9/4/2019 Last MTM visit: Visit date not found    Next appt within 3 mo: Visit date not found  Next physical within 3 mo: Visit date not found  Prescriber OR PCP: BHARGAV Pearson  Last diagnosis associated with med order: There are no diagnoses linked to this encounter.   If protocol passes may refill for 12 months if within 3 months of last provider visit (or a total of 15 months).

## 2021-06-02 ENCOUNTER — OFFICE VISIT - HEALTHEAST (OUTPATIENT)
Dept: FAMILY MEDICINE | Facility: CLINIC | Age: 81
End: 2021-06-02

## 2021-06-02 VITALS — WEIGHT: 177 LBS | BODY MASS INDEX: 27.78 KG/M2 | HEIGHT: 67 IN

## 2021-06-02 VITALS — WEIGHT: 179.1 LBS | BODY MASS INDEX: 28.05 KG/M2

## 2021-06-02 DIAGNOSIS — R53.83 FATIGUE, UNSPECIFIED TYPE: ICD-10-CM

## 2021-06-02 DIAGNOSIS — M79.661 RIGHT CALF PAIN: ICD-10-CM

## 2021-06-02 LAB
ANION GAP SERPL CALCULATED.3IONS-SCNC: 10 MMOL/L (ref 5–18)
BASOPHILS # BLD AUTO: 0 THOU/UL (ref 0–0.2)
BASOPHILS NFR BLD AUTO: 1 % (ref 0–2)
BUN SERPL-MCNC: 27 MG/DL (ref 8–28)
CALCIUM SERPL-MCNC: 9 MG/DL (ref 8.5–10.5)
CHLORIDE BLD-SCNC: 104 MMOL/L (ref 98–107)
CO2 SERPL-SCNC: 28 MMOL/L (ref 22–31)
CREAT SERPL-MCNC: 0.97 MG/DL (ref 0.6–1.1)
EOSINOPHIL # BLD AUTO: 0.1 THOU/UL (ref 0–0.4)
EOSINOPHIL NFR BLD AUTO: 1 % (ref 0–6)
ERYTHROCYTE [DISTWIDTH] IN BLOOD BY AUTOMATED COUNT: 15.4 % (ref 11–14.5)
GFR SERPL CREATININE-BSD FRML MDRD: 55 ML/MIN/1.73M2
GLUCOSE BLD-MCNC: 89 MG/DL (ref 70–125)
HCT VFR BLD AUTO: 30.9 % (ref 35–47)
HGB BLD-MCNC: 9.6 G/DL (ref 12–16)
IMM GRANULOCYTES # BLD: 0 THOU/UL
IMM GRANULOCYTES NFR BLD: 0 %
LYMPHOCYTES # BLD AUTO: 1.6 THOU/UL (ref 0.8–4.4)
LYMPHOCYTES NFR BLD AUTO: 28 % (ref 20–40)
MCH RBC QN AUTO: 30 PG (ref 27–34)
MCHC RBC AUTO-ENTMCNC: 31.1 G/DL (ref 32–36)
MCV RBC AUTO: 97 FL (ref 80–100)
MONOCYTES # BLD AUTO: 0.6 THOU/UL (ref 0–0.9)
MONOCYTES NFR BLD AUTO: 11 % (ref 2–10)
NEUTROPHILS # BLD AUTO: 3.4 THOU/UL (ref 2–7.7)
NEUTROPHILS NFR BLD AUTO: 59 % (ref 50–70)
PLATELET # BLD AUTO: 269 THOU/UL (ref 140–440)
PMV BLD AUTO: 8 FL (ref 7–10)
POTASSIUM BLD-SCNC: 4 MMOL/L (ref 3.5–5)
RBC # BLD AUTO: 3.2 MILL/UL (ref 3.8–5.4)
SODIUM SERPL-SCNC: 142 MMOL/L (ref 136–145)
WBC: 5.7 THOU/UL (ref 4–11)

## 2021-06-02 NOTE — TELEPHONE ENCOUNTER
Refill Approved    Rx renewed per Medication Renewal Policy. Medication was last renewed on 10/4/18.    Candie Zelaya, [facility] Triage/Med Refill 10/17/2019     Requested Prescriptions   Pending Prescriptions Disp Refills     azelastine (ASTELIN) 137 mcg (0.1 %) nasal spray [Pharmacy Med Name: AZELASTINE 0.1% (137 MCG) SPRY]  2     Si SPRAY INTO EACH NOSTRIL AT BEDTIME. USE IN EACH NOSTRIL AS DIRECTED       Nasal Spray Protocol Passed - 10/16/2019  9:52 AM        Passed - Patient has had office visit/physical in last 1 year     Last office visit with prescriber/PCP: 2019 Jacy Mishra FNP OR same dept: 2019 Jacy Mishra FNP OR same specialty: 2019 Jacy Mishra FNP Last physical: 2019 Last MTM visit: Visit date not found    Next visit within 3 mo: Visit date not found  Next physical within 3 mo: Visit date not found  Prescriber OR PCP: BHARGAV Pearson  Last diagnosis associated with med order: 1. History of seasonal allergies  - azelastine (ASTELIN) 137 mcg (0.1 %) nasal spray [Pharmacy Med Name: AZELASTINE 0.1% (137 MCG) SPRY]; 1 SPRAY INTO EACH NOSTRIL AT BEDTIME. USE IN EACH NOSTRIL AS DIRECTED; Refill: 2

## 2021-06-02 NOTE — PROGRESS NOTES
" HPI: Geeta Berry is here for follow up to discuss plans going forward with regards to her paraesophageal hernia.  Overall she is doing very well.  After the Carafate and starting the Pepcid she has very minimal complaints.    Allergies, Medications, Social History, Past Medical History and Past Surgical History were reviewed and are noted in the chart.    /60 (Patient Site: Right Arm, Patient Position: Sitting, Cuff Size: Adult Regular)   Pulse (!) 55   Ht 5' 7\" (1.702 m)   Wt 170 lb (77.1 kg)   SpO2 91%   Breastfeeding? No   BMI 26.63 kg/m    Body mass index is 26.63 kg/m .      EXAM:   GENERAL: Appears well      Incision 09/10/19 Surgical Throat (Active)       Assessment/Plan: Geeta Berry continues to do well.  It appears as though she is much improved since her last visit.  Is likely that the Carafate and the newly prescribed Pepcid have helped her.  At this point we discussed operative versus observational management strategies.  Given her age and her abatement of symptoms it may be in her best interest to simply watch and wait to see how she does.  She is agreeable to this and would like to hold off on surgery if at all possible.  I think this is reasonable and we can continue to monitor.  If she has any further complaints or any other questions she can return to see me at any time for further discussion.    Will Nash  DO [facility] Department of Surgery  "

## 2021-06-02 NOTE — TELEPHONE ENCOUNTER
RN cannot approve Refill Request    RN can NOT refill this medication patient reports taking medication differently.   5 mg daily on 9/20/2018. Last office visit: 8/14/2019 Jacy Mishra FNP Last Physical: 9/4/2019 Last MTM visit: Visit date not found Last visit same specialty: 8/14/2019 Jacy Mishra FNP.  Next visit within 3 mo: Visit date not found  Next physical within 3 mo: Visit date not found  Last OV 9/4/2019 pre-op    Catrina Manuel, [facility] Triage/Med Refill 10/12/2019    Requested Prescriptions   Pending Prescriptions Disp Refills     citalopram (CELEXA) 10 MG tablet [Pharmacy Med Name: CITALOPRAM HBR 10 MG TABLET] 90 tablet 3     Sig: TAKE 1 TABLET BY MOUTH EVERY DAY       [facility] Refill Protocol  Passed - 10/11/2019  1:49 AM        Passed - PCP or prescribing provider visit in last year     Last office visit with prescriber/PCP: 8/14/2019 Jacy Mishra FNP OR same dept: 8/14/2019 Jacy Mishra FNP OR same specialty: 8/14/2019 Jacy Mishra FNP  Last physical: 9/4/2019 Last MTM visit: Visit date not found   Next visit within 3 mo: Visit date not found  Next physical within 3 mo: Visit date not found  Prescriber OR PCP: BHARGAV Pearson  Last diagnosis associated with med order: 1. Dysthymia  - citalopram (CELEXA) 10 MG tablet [Pharmacy Med Name: CITALOPRAM HBR 10 MG TABLET]; TAKE 1 TABLET BY MOUTH EVERY DAY  Dispense: 90 tablet; Refill: 3    If protocol passes may refill for 12 months if within 3 months of last provider visit (or a total of 15 months).

## 2021-06-03 VITALS
SYSTOLIC BLOOD PRESSURE: 114 MMHG | DIASTOLIC BLOOD PRESSURE: 60 MMHG | WEIGHT: 170 LBS | BODY MASS INDEX: 26.68 KG/M2 | HEART RATE: 55 BPM | OXYGEN SATURATION: 91 % | HEIGHT: 67 IN

## 2021-06-03 VITALS
DIASTOLIC BLOOD PRESSURE: 64 MMHG | BODY MASS INDEX: 26.68 KG/M2 | SYSTOLIC BLOOD PRESSURE: 122 MMHG | RESPIRATION RATE: 14 BRPM | HEIGHT: 67 IN | OXYGEN SATURATION: 98 % | WEIGHT: 170 LBS | HEART RATE: 61 BPM

## 2021-06-03 VITALS — HEIGHT: 67 IN | BODY MASS INDEX: 27 KG/M2 | WEIGHT: 172 LBS

## 2021-06-03 VITALS
HEIGHT: 67 IN | DIASTOLIC BLOOD PRESSURE: 62 MMHG | WEIGHT: 174 LBS | SYSTOLIC BLOOD PRESSURE: 124 MMHG | BODY MASS INDEX: 27.31 KG/M2 | HEART RATE: 70 BPM

## 2021-06-03 VITALS — BODY MASS INDEX: 27.62 KG/M2 | HEIGHT: 67 IN | WEIGHT: 176 LBS

## 2021-06-03 VITALS — WEIGHT: 170 LBS | HEIGHT: 67 IN | BODY MASS INDEX: 26.68 KG/M2

## 2021-06-03 VITALS — WEIGHT: 176 LBS | BODY MASS INDEX: 27.57 KG/M2

## 2021-06-03 VITALS — HEIGHT: 67 IN | BODY MASS INDEX: 27.62 KG/M2 | WEIGHT: 176 LBS

## 2021-06-03 VITALS — BODY MASS INDEX: 27.7 KG/M2 | WEIGHT: 176.5 LBS | HEIGHT: 67 IN

## 2021-06-03 VITALS — WEIGHT: 171 LBS | BODY MASS INDEX: 26.78 KG/M2

## 2021-06-03 VITALS — BODY MASS INDEX: 27.41 KG/M2 | WEIGHT: 175 LBS

## 2021-06-03 NOTE — PROGRESS NOTES
Assessment and Plan:     1. Encounter for Medicare annual wellness exam  - The following high BMI interventions were performed this visit: encouragement to exercise    2. Gastroesophageal reflux disease without esophagitis  - Continue pantoprazole and famotidine  - Dietary measures reviewed, she generally follows these recommendations     3. Essential hypertension  - stable     4. Familial hypercholesterolemia  - Continue simvastatin  - Lipid Profile    5. Acquired hypothyroidism  - Euthyroid with labs. Continue levothyroxine     6. Dysthymia  -Some situational depression but doing well with citalopram 5 mg daily    7. Greater trochanteric bursitis of left hip  -Offered corticosteroid injection, she has an appointment with orthopedics and will plan to get this injection at that appointment    8. Osteoarthritis, unspecified osteoarthritis type, unspecified site  -Reviewed recommendations to use acetaminophen as needed, over-the-counter muscle rubs, capsaicin     9. Post-menopausal  - DXA Bone Density Scan; Future    10. Chronic rhinitis  -Offered montelukast, she declines.  Continue Astelin nasal spray and fluticasone nasal spray.        The patient's current medical problems were reviewed.    The following health maintenance schedule was reviewed with the patient and provided in printed form in the after visit summary:   Health Maintenance   Topic Date Due     ADVANCE CARE PLANNING  08/29/1958     DEPRESSION FOLLOW UP  05/27/2020     MEDICARE ANNUAL WELLNESS VISIT  11/27/2020     FALL RISK ASSESSMENT  11/27/2020     TD 18+ HE  11/20/2023     LIPID  11/27/2024     PNEUMOCOCCAL IMMUNIZATION 65+ LOW/MEDIUM RISK  Completed     INFLUENZA VACCINE RULE BASED  Completed     ZOSTER VACCINES  Completed     DXA SCAN  Discontinued        Subjective:   Chief Complaint: Geeta Berry is an 79 y.o. female here for an Annual Wellness visit.     HPI: Geeta Berry is an 79 y.o. female here for an Annual Wellness visit.  She has  several additional concerns that she would like to address today.    She describes clear drainage and watering of her eyes.  She has been using rewetting drops as recommended by her ophthalmologist.  She also has chronic rhinitis.     Reflux was reviewed.  She is generally doing much better than she was previously and deferred any further surgical intervention for hiatal hernia.  She is advised not to restart aspirin due to recent study showing benefit does not outweigh risk for primary prevention.    She has had left lateral hip pain.  She does have a prior history of a hip replacement on the side.  It is hard to walk, stand, bend over due to this pain.  Cortisone injection was offered by orthopedics for greater trochanteric bursitis but she was not ready to at the time of her last visit.  Acetaminophen does provide her some relief.    She has been experience in left lateral upper arm pain.  It feels sore.  She does have a prior history of left shoulder surgery.    Depression was reviewed.  She struggles with her 's declining cognitive health and other health issues.  She walks for stress relief.  Overall she feels that she is doing okay given her circumstances.    Review of Systems:  Please see above.  The rest of the review of systems are negative for all systems.    Patient Care Team:  Jacy Mishra FNP as PCP - General (Nurse Practitioner)  Jacy Mishra FNP as Assigned PCP     Patient Active Problem List   Diagnosis     History of DVT (deep vein thrombosis), right LE 12/2016     Acute iritis, h/o in 2012     Common bile duct calculus, ERCP x 6 in past. Cannot tolerate ursodiol     Constipation     GERD (gastroesophageal reflux disease)     HTN (hypertension)     HLD (hyperlipidemia)     Hypothyroidism     Osteoarthritis of lumbar spine     Osteoarthritis; knees, hips, cervical spine      Facet arthropathy, cervical     Dysthymia     Hiatal hernia     Chronic rhinitis     Past Medical  History:   Diagnosis Date     Arthritis     osteo     Chronic neck pain      Common bile duct calculus      Depression      DVT (deep venous thrombosis) (H)      GERD (gastroesophageal reflux disease)      History of blood clots 2017    DVT right lower extremity     HLD (hyperlipidemia)      HTN (hypertension)      Hypothyroidism      Infectious viral hepatitis     hepatitis A in 1960s      Past Surgical History:   Procedure Laterality Date     bilateral TIMO      with revision on both hips     CHOLECYSTECTOMY  1980     ERCP      x5 over 8 years. Last 8/2016     ERCP N/A 9/5/2017    Procedure: ENDOSCOPIC RETROGRADE CHOLANGIOPANCREATOGRAPHY, STONE EXTRACTION;  Surgeon: Henry Eller MD;  Location: [facility];  Service:      ERCP N/A 7/26/2019    Procedure: ENDOSCOPIC RETROGRADE CHOLANGIOPANCREATOGRAPHY, REMOVAL OF SLUDGE, DILATION OF STRICTURE;  Surgeon: Ryan Pastrana MD;  Location: [facility];  Service: Gastroenterology     HIP ARTHROSCOPY Left 1983     HIP SURGERY  2012    revision     JOINT REPLACEMENT       MA ESOPHAGOGASTRODUODENOSCOPY TRANSORAL DIAGNOSTIC N/A 9/10/2019    Procedure: ESOPHAGOGASTRODUODENOSCOPY (EGD);  Surgeon: Will Nash DO;  Location: [facility];  Service: General     REPLACEMENT TOTAL KNEE Left 2015     REVISION TOTAL HIP ARTHROPLASTY Left 5/16/2017    Procedure: REVISION LEFT TOTAL HIP ARTHROPLASTY, BOTH COMPONENTS;  Surgeon: Tyson Peoples MD;  Location: [facility] OR;  Service:      SHOULDER SURGERY Left 2010    left total shoulder replacement     XR ERCP BILIARY AND PANCREAS  7/26/2019      Family History   Problem Relation Age of Onset     Colon cancer Father      Diabetes Sister      Heart disease Mother       Social History     Socioeconomic History     Marital status:      Spouse name: Not on file     Number of children: 2     Years of education: Not on file     Highest education level: Not on file   Occupational History     Occupation:  Retired   Social Needs     Financial resource strain: Not on file     Food insecurity:     Worry: Not on file     Inability: Not on file     Transportation needs:     Medical: Not on file     Non-medical: Not on file   Tobacco Use     Smoking status: Never Smoker     Smokeless tobacco: Never Used   Substance and Sexual Activity     Alcohol use: No     Drug use: No     Sexual activity: Not on file   Lifestyle     Physical activity:     Days per week: Not on file     Minutes per session: Not on file     Stress: Not on file   Relationships     Social connections:     Talks on phone: Not on file     Gets together: Not on file     Attends Caoda[employer] service: Not on file     Active member of club or organization: Not on file     Attends meetings of clubs or organizations: Not on file     Relationship status: Not on file     Intimate partner violence:     Fear of current or ex partner: Not on file     Emotionally abused: Not on file     Physically abused: Not on file     Forced sexual activity: Not on file   Other Topics Concern     Not on file   Social History Narrative    4/6/17: The patient lives with her  in a condo.      Current Outpatient Medications   Medication Sig Dispense Refill     aluminum-magnesium hydroxide-simethicone (MAALOX ADVANCED) 200-200-20 mg/5 mL Susp Take 5 mL by mouth every 6 (six) hours as needed.       amoxicillin (AMOXIL) 500 MG capsule Take 2,000 mg by mouth once as needed (prior to dental procedures).       azelastine (ASTELIN) 137 mcg (0.1 %) nasal spray 1 SPRAY INTO EACH NOSTRIL AT BEDTIME. USE IN EACH NOSTRIL AS DIRECTED 30 mL 11     calcium carbonate (OS-ALOK) 600 mg calcium (1,500 mg) tablet Take 600 mg by mouth daily.        citalopram (CELEXA) 10 MG tablet Take 0.5 tablets (5 mg total) by mouth daily. 90 tablet 1     diphenhydrAMINE-acetaminophen (TYLENOL PM EXTRA STRENGTH)  mg Tab        fluticasone propionate (FLONASE) 50 mcg/actuation nasal spray SPRAY 1 SPRAY INTO EACH  "NOSTRIL EVERY DAY 48 g 3     hydroCHLOROthiazide (HYDRODIURIL) 25 MG tablet Take 1 tablet (25 mg total) by mouth daily. 90 tablet 3     levothyroxine (SYNTHROID, LEVOTHROID) 50 MCG tablet Take 1 tablet (50 mcg total) by mouth daily. 90 tablet 3     multivitamin with minerals (THERA-M) 9 mg iron-400 mcg Tab tablet Take 1 tablet by mouth daily.       pantoprazole (PROTONIX) 40 MG tablet Take 1 tablet (40 mg total) by mouth 2 (two) times a day. 180 tablet 1     polyethylene glycol (MIRALAX) 17 gram packet Take 17 g by mouth daily.              senna-docusate (PERICOLACE) 8.6-50 mg tablet Take 1 tablet by mouth 2 (two) times a day.        simvastatin (ZOCOR) 20 MG tablet TAKE 1 TABLET BY MOUTH EVERYDAY AT BEDTIME 90 tablet 3     No current facility-administered medications for this visit.       Objective:   Vital Signs:   Visit Vitals  /70   Pulse 72   Ht 5' 7\" (1.702 m)   Wt 172 lb (78 kg)   BMI 26.94 kg/m         VisionScreening:  No exam data present     PHYSICAL EXAM  Gen: Well developed, well nourished, no acute distress.  HEENT: normocephalic/atraumatic, PERRL/EOMI, TMs: Gray, normal light reflex, no nasal discharge.  Oral mucosa: no erythema/exudate  Neck: No LAD/masses/thyromegaly/bruits  Lungs: clear bilaterally  Heart: regular rate and rhythm, no murmurs/gallops/rubs  Abdomen: Normal bowel sounds, soft, non-tender, non-distended, no masses, neg Roy's/McBurney's, no rebound/guarding  Lymphatics: no supraclavicular/cervical LAD. No edema.  Neuro: A&O x 3  Psych: Behavior appropriate, engaging.  Thought processes congruent, non-tangential.  Musculoskeletal: no gross deformities.  Skin: no rashes or lesions.      Assessment Results 11/27/2019   Activities of Daily Living No help needed   Instrumental Activities of Daily Living No help needed   Mini Cog Total Score 3   Some recent data might be hidden     A Mini-Cog score of 0-2 suggests the possibility of dementia, score of 3-5 suggests no " dementia    Identified Health Risks:     The patient was provided with suggestions to help her develop a healthy physical lifestyle.   Patient's advanced directive was discussed and I am comfortable with the patient's wishes. She is a FULL CODE

## 2021-06-03 NOTE — PATIENT INSTRUCTIONS - HE
Patient Education     Your Health Risk Assessment indicates you feel you are not in good physical health.    A healthy lifestyle helps keep the body fit and the mind alert. It helps protect you from disease, helps you fight disease, and helps prevent chronic disease (disease that doesn't go away) from getting worse. This is important as you get older and begin to notice twinges in muscles and joints and a decline in the strength and stamina you once took for granted. A healthy lifestyle includes good healthcare, good nutrition, weight control, recreation, and regular exercise. Avoid harmful substances and do what you can to keep safe. Another part of a healthy lifestyle is stay mentally active and socially involved.    Good healthcare     Have a wellness visit every year.     If you have new symptoms, let us know right away. Don't wait until the next checkup.     Take medicines exactly as prescribed and keep your medicines in a safe place. Tell us if your medicine causes problems.   Healthy diet and weight control     Eat 3 or 4 small, nutritious, low-fat, high-fiber meals a day. Include a variety of fruits, vegetables, and whole-grain foods.     Make sure you get enough calcium in your diet. Calcium, vitamin D, and exercise help prevent osteoporosis (bone thinning).     If you live alone, try eating with others when you can. That way you get a good meal and have company while you eat it.     Try to keep a healthy weight. If you eat more calories than your body uses for energy, it will be stored as fat and you will gain weight.     Recreation   Recreation is not limited to sports and team events. It includes any activity that provides relaxation, interest, enjoyment, and exercise. Recreation provides an outlet for physical, mental, and social energy. It can give a sense of worth and achievement. It can help you stay healthy.         Advance Directive  Patient s advance directive was discussed and I am comfortable  with the patient s wishes.  Patient Education   Personalized Prevention Plan  You are due for the preventive services outlined below.  Your care team is available to assist you in scheduling these services.  If you have already completed any of these items, please share that information with your care team to update in your medical record.  Health Maintenance   Topic Date Due     ADVANCE CARE PLANNING  08/29/1958     DEPRESSION FOLLOW UP  05/27/2020     MEDICARE ANNUAL WELLNESS VISIT  11/27/2020     FALL RISK ASSESSMENT  11/27/2020     TD 18+ HE  11/20/2023     LIPID  11/27/2024     PNEUMOCOCCAL IMMUNIZATION 65+ LOW/MEDIUM RISK  Completed     INFLUENZA VACCINE RULE BASED  Completed     ZOSTER VACCINES  Completed     DXA SCAN  Discontinued

## 2021-06-04 ENCOUNTER — OFFICE VISIT - HEALTHEAST (OUTPATIENT)
Dept: FAMILY MEDICINE | Facility: CLINIC | Age: 81
End: 2021-06-04

## 2021-06-04 VITALS — HEIGHT: 68 IN | BODY MASS INDEX: 25.46 KG/M2 | WEIGHT: 168 LBS

## 2021-06-04 VITALS
DIASTOLIC BLOOD PRESSURE: 70 MMHG | SYSTOLIC BLOOD PRESSURE: 120 MMHG | BODY MASS INDEX: 27 KG/M2 | HEIGHT: 67 IN | WEIGHT: 172 LBS | HEART RATE: 72 BPM

## 2021-06-04 DIAGNOSIS — K44.9 HIATAL HERNIA: ICD-10-CM

## 2021-06-04 DIAGNOSIS — R79.9 ABNORMAL FINDING OF BLOOD CHEMISTRY, UNSPECIFIED: ICD-10-CM

## 2021-06-04 DIAGNOSIS — E55.9 VITAMIN D DEFICIENCY, UNSPECIFIED: ICD-10-CM

## 2021-06-04 DIAGNOSIS — R53.83 FATIGUE, UNSPECIFIED TYPE: ICD-10-CM

## 2021-06-04 DIAGNOSIS — R09.A2 GLOBUS SENSATION: ICD-10-CM

## 2021-06-04 DIAGNOSIS — D64.9 ANEMIA, UNSPECIFIED TYPE: ICD-10-CM

## 2021-06-04 DIAGNOSIS — K92.2 GASTROINTESTINAL HEMORRHAGE, UNSPECIFIED GASTROINTESTINAL HEMORRHAGE TYPE: ICD-10-CM

## 2021-06-04 LAB
ALBUMIN SERPL-MCNC: 3.6 G/DL (ref 3.5–5)
ALP SERPL-CCNC: 85 U/L (ref 45–120)
ALT SERPL W P-5'-P-CCNC: 13 U/L (ref 0–45)
AST SERPL W P-5'-P-CCNC: 20 U/L (ref 0–40)
BILIRUB DIRECT SERPL-MCNC: 0.2 MG/DL
BILIRUB SERPL-MCNC: 0.3 MG/DL (ref 0–1)
ERYTHROCYTE [DISTWIDTH] IN BLOOD BY AUTOMATED COUNT: 15.4 % (ref 11–14.5)
FERRITIN SERPL-MCNC: 50 NG/ML (ref 10–130)
HCT VFR BLD AUTO: 29.8 % (ref 35–47)
HGB BLD-MCNC: 9.3 G/DL (ref 12–16)
MCH RBC QN AUTO: 30.2 PG (ref 27–34)
MCHC RBC AUTO-ENTMCNC: 31.2 G/DL (ref 32–36)
MCV RBC AUTO: 97 FL (ref 80–100)
PLATELET # BLD AUTO: 272 THOU/UL (ref 140–440)
PMV BLD AUTO: 8.1 FL (ref 7–10)
PROT SERPL-MCNC: 6.2 G/DL (ref 6–8)
RBC # BLD AUTO: 3.08 MILL/UL (ref 3.8–5.4)
WBC: 4.6 THOU/UL (ref 4–11)

## 2021-06-05 VITALS — BODY MASS INDEX: 26.7 KG/M2 | BODY MASS INDEX: 26.7 KG/M2 | HEIGHT: 68 IN | WEIGHT: 173 LBS

## 2021-06-05 VITALS — HEIGHT: 68 IN | BODY MASS INDEX: 26.23 KG/M2 | BODY MASS INDEX: 26.23 KG/M2 | WEIGHT: 170 LBS

## 2021-06-05 LAB — 25(OH)D3 SERPL-MCNC: 38.6 NG/ML (ref 30–80)

## 2021-06-05 NOTE — TELEPHONE ENCOUNTER
"Patient has taken Tamilful x 3 days for flu like symptoms. Stating her rapid flu test in clinic was negative. Patient reporting \"hard coughing.\" Afebrile. Denies difficulty breathing. Increased sinus pain and pressure. Ongoing fatigue. Patient feels she is worsening since seen on 1/31/20.  Per discharge instructions on clinic notes to follow up with in 3 days.  Advised patient to be seen today in clinic.    Appointment scheduled for 245 pm today.       Eugenie Singh RN  [facility]      Reason for Disposition    Using nasal washes and pain medicine > 24 hours and sinus pain (around cheekbone or eye) persists    Protocols used: INFLUENZA FOLLOW-UP CALL-A-OH      "

## 2021-06-05 NOTE — TELEPHONE ENCOUNTER
Last OV   19 Physical     10. Chronic rhinitis  -Offered montelukast, she declines.  Continue Astelin nasal spray and fluticasone nasal spray.      Nothing scheduled     azelastine (ASTELIN) 137 mcg (0.1 %) nasal spray 30 mL 11 10/17/2019  No   Si SPRAY INTO EACH NOSTRIL AT BEDTIME. USE IN EACH NOSTRIL AS DIRECTED   Sent to pharmacy as: azelastine 137 mcg (0.1 %) nasal spray aerosol (ASTELIN)   E-Prescribing Status: Receipt confirmed by pharmacy (10/17/2019  2:29 PM CDT)     fluticasone propionate (FLONASE) 50 mcg/actuation nasal spray 48 g 3 2019  No   Sig: SPRAY 1 SPRAY INTO EACH NOSTRIL EVERY DAY   Sent to pharmacy as: fluticasone propionate (FLONASE) 50 mcg/actuation nasal spray   E-Prescribing Status: Receipt confirmed by pharmacy (2019 10:05 PM CDT)

## 2021-06-05 NOTE — TELEPHONE ENCOUNTER
Staying with mom, daughter.    Mother started to vomit 6:30am.  She has been vomiting out of nose as well.    Patient's  is in the hospital with daughter there now.  Mom (patient) home alone.    Not with patient now.    No diarrhea.    Trying to sip 7up.    Now dry heaving. Vomiting x 5 hours 5 or more times.    Going to bring her to the ED.    Linn Gutiérrez RN [facility] Nurse Advisor      Reason for Disposition    MODERATE vomiting (e.g., 3 - 5 times/day) and age > 60    Protocols used: VOMITING-A-OH

## 2021-06-05 NOTE — TELEPHONE ENCOUNTER
Geeta was into ED for vomiting and diarrhea and was treated for virus.  Now Geeta is extremely weak and chest is congested.  Symptoms on Wednesday, Jan 29th.  Geeta is having aching also.  Denies fever.  Cough is not keeping her up at night.  Geeta is requesting an appointment.    Reason for Disposition    [1] MODERATE weakness (i.e., interferes with work, school, normal activities) AND [2] persists > 3 days    Protocols used: WEAKNESS (GENERALIZED) AND FATIGUE-A-AH

## 2021-06-05 NOTE — PROGRESS NOTES
"Internal Medicine Office Visit  [facility]  Patient Name: Geeta Berry  Patient Age: 79 y.o.  YOB: 1940  MRN: [id number]    Date of Visit: 2020  Reason for Office Visit:   Chief Complaint   Patient presents with     Cough     she feels \"warn out\" and weak, chills,  has a cough, no fever. was seen at [facility] on  for vomiting           Assessment / Plan / Medical Decision Makin. Cough  2. Influenza  - oseltamivir (TAMIFLU) 75 MG capsule; Take 1 capsule (75 mg total) by mouth 2 (two) times a day for 5 days.  Dispense: 10 capsule; Refill: 0  Recommend fluids, rest, utilization of over-the-counter pain and fever reducer, limiting exposure to others she may utilize over-the-counter Delsym for her cough, throat loss injures warm water with honey    Patient advised if symptoms persist, worsen or new symptoms arise they are to seek medical care.  All patients questions addressed. Patient verbalized understanding and agreement with plan.       Orders Placed This Encounter   Procedures     Influenza A/B Rapid Test- Nasal Swab   Followup: Return in about 3 days (around 2/3/2020). earlier if needed.    Health Maintenance Review  Health Maintenance   Topic Date Due     DEPRESSION ACTION PLAN  1940     MEDICARE ANNUAL WELLNESS VISIT  2020     FALL RISK ASSESSMENT  2020     TD 18+ HE  2023     LIPID  2024     ADVANCE CARE PLANNING  2024     PNEUMOCOCCAL IMMUNIZATION 65+ LOW/MEDIUM RISK  Completed     INFLUENZA VACCINE RULE BASED  Completed     ZOSTER VACCINES  Completed     DXA SCAN  Discontinued         I am having Geeta Berry start on oseltamivir. I am also having her maintain her calcium carbonate, polyethylene glycol, senna-docusate, aluminum-magnesium hydroxide-simethicone, multivitamin with minerals, amoxicillin, simvastatin, diphenhydrAMINE-acetaminophen, hydroCHLOROthiazide, levothyroxine, pantoprazole, fluticasone " propionate, citalopram, azelastine, and ondansetron.      HPI:  Geeta Berry is a 79 y.o. year old who presents to the office today with a chief concern of less than 24-hour history of headache, body aches fever, chills, weakness without diaphoresis no shortness of breath, chest pain, chest pressure palpitations.  Patient does report a mild cough.  She states that she is been taking some Tylenol as by mild relief though not total relief of symptoms.  Patient was seen in the ED on 1/20/2020 for gastroenteritis which she stated lasted approximate 24 hours resolved and felt as though she had been back to baseline prior to yesterday.  She had been visiting her  in transitional care unit and had gone to [facility] and felt as though she may have picked something up there.          Review of Systems- pertinent positive in bold:  Constitutional: Fever, chills, night sweats, fainting, weight change, fatigue, dizziness, sleeping difficulties, loud snoring/pauses in breathing  Eyes: change in vision, blurred or double vision, redness/eye pain  Ears, nose, mouth, throat: change in hearing, ear pain, hoarseness, difficulty swallowing, sores in the mouth or throat  Respiratory: shortness of breath, cough, bloody sputum, wheezing  Cardiovascular: chest pain, palpitations   Gastrointestinal: abdominal pain, heartburn/indigestion, nausea/vomiting, change in appetite, change in bowel habits, constipation or diarrhea, rectal bleeding/dark stools, difficulty swallowing  Urinary: painful urination, frequent urination, urinary urgency/incontinence, blood in urine/dark urine, nocturia (new or increasing), muscle cramps, swelling of hands, feet, ankles, leg pain/redness  Skin: change in moles/freckles, rash, nodules  Hematologic/lymphatic: swollen lymph glands, abnormal bruising/bleeding  Endocrine: excessive thirst/urination, cold or heat intolerance  Neurologic/emotional: worrisome memory change, numbness/tingling,  anxiety, mood swings      Current Scheduled Meds:  Outpatient Encounter Medications as of 1/31/2020   Medication Sig Dispense Refill     aluminum-magnesium hydroxide-simethicone (MAALOX ADVANCED) 200-200-20 mg/5 mL Susp Take 5 mL by mouth every 6 (six) hours as needed.       azelastine (ASTELIN) 137 mcg (0.1 %) nasal spray 1 SPRAY INTO EACH NOSTRIL AT BEDTIME. USE IN EACH NOSTRIL AS DIRECTED 30 mL 11     calcium carbonate (OS-ALOK) 600 mg calcium (1,500 mg) tablet Take 600 mg by mouth daily.        citalopram (CELEXA) 10 MG tablet Take 0.5 tablets (5 mg total) by mouth daily. 90 tablet 1     diphenhydrAMINE-acetaminophen (TYLENOL PM EXTRA STRENGTH)  mg Tab        fluticasone propionate (FLONASE) 50 mcg/actuation nasal spray SPRAY 1 SPRAY INTO EACH NOSTRIL EVERY DAY 48 g 3     hydroCHLOROthiazide (HYDRODIURIL) 25 MG tablet Take 1 tablet (25 mg total) by mouth daily. 90 tablet 3     levothyroxine (SYNTHROID, LEVOTHROID) 50 MCG tablet Take 1 tablet (50 mcg total) by mouth daily. 90 tablet 3     multivitamin with minerals (THERA-M) 9 mg iron-400 mcg Tab tablet Take 1 tablet by mouth daily.       ondansetron (ZOFRAN ODT) 4 MG disintegrating tablet Take 1 tablet (4 mg total) by mouth every 8 (eight) hours as needed for nausea. 12 tablet 0     pantoprazole (PROTONIX) 40 MG tablet Take 1 tablet (40 mg total) by mouth 2 (two) times a day. 180 tablet 1     polyethylene glycol (MIRALAX) 17 gram packet Take 17 g by mouth daily.              senna-docusate (PERICOLACE) 8.6-50 mg tablet Take 1 tablet by mouth 2 (two) times a day.        simvastatin (ZOCOR) 20 MG tablet TAKE 1 TABLET BY MOUTH EVERYDAY AT BEDTIME 90 tablet 3     amoxicillin (AMOXIL) 500 MG capsule Take 2,000 mg by mouth once as needed (prior to dental procedures).       oseltamivir (TAMIFLU) 75 MG capsule Take 1 capsule (75 mg total) by mouth 2 (two) times a day for 5 days. 10 capsule 0     No facility-administered encounter medications on file as of  1/31/2020.      Past Medical History:   Diagnosis Date     Arthritis     osteo     Chronic neck pain      Common bile duct calculus      Depression      DVT (deep venous thrombosis) (H)      GERD (gastroesophageal reflux disease)      History of blood clots 2017    DVT right lower extremity     HLD (hyperlipidemia)      HTN (hypertension)      Hypothyroidism      Infectious viral hepatitis     hepatitis A in 1960s     Past Surgical History:   Procedure Laterality Date     bilateral TIMO      with revision on both hips     CHOLECYSTECTOMY  1980     ERCP      x5 over 8 years. Last 8/2016     ERCP N/A 9/5/2017    Procedure: ENDOSCOPIC RETROGRADE CHOLANGIOPANCREATOGRAPHY, STONE EXTRACTION;  Surgeon: Henry Eller MD;  Location: [facility] [place];  Service:      ERCP N/A 7/26/2019    Procedure: ENDOSCOPIC RETROGRADE CHOLANGIOPANCREATOGRAPHY, REMOVAL OF SLUDGE, DILATION OF STRICTURE;  Surgeon: Ryan Pastrana MD;  Location: [facility] [place];  Service: Gastroenterology     HIP ARTHROSCOPY Left 1983     HIP SURGERY  2012    revision     JOINT REPLACEMENT       FL ESOPHAGOGASTRODUODENOSCOPY TRANSORAL DIAGNOSTIC N/A 9/10/2019    Procedure: ESOPHAGOGASTRODUODENOSCOPY (EGD);  Surgeon: Will Nash DO;  Location: [facility] [place] [facility];  Service: General     REPLACEMENT TOTAL KNEE Left 2015     REVISION TOTAL HIP ARTHROPLASTY Left 5/16/2017    Procedure: REVISION LEFT TOTAL HIP ARTHROPLASTY, BOTH COMPONENTS;  Surgeon: Tyson Peoples MD;  Location: [facility] OR;  Service:      SHOULDER SURGERY Left 2010    left total shoulder replacement     XR ERCP BILIARY AND PANCREAS  7/26/2019     Social History     Tobacco Use     Smoking status: Never Smoker     Smokeless tobacco: Never Used   Substance Use Topics     Alcohol use: No     Drug use: No     Family History   Problem Relation Age of Onset     Colon cancer Father      Diabetes Sister      Heart disease Mother      Social History     Social History Narrative    4/6/17:  The patient lives with her  in a condo.       Objective / Physical Examination:  Vitals:    01/31/20 1246   BP: 116/68   Pulse: 67   Temp: 98.5  F (36.9  C)   TempSrc: Oral   SpO2: 99%   Weight: 169 lb (76.7 kg)     Wt Readings from Last 3 Encounters:   01/31/20 169 lb (76.7 kg)   01/20/20 165 lb (74.8 kg)   11/27/19 172 lb (78 kg)     Body mass index is 26.47 kg/m .     General Appearance: Alert and oriented, cooperative, affect appropriate, speech clear, in no apparent distress  Head: Normocephalic, atraumatic  Ears: Tympanic membrane clear with landmarks well visualized bilaterally  Eyes: PERRL,  Conjunctivae clear and sclerae non-icteric  Nose: Septum midline, nares patent,  mucosa moist and without drainage  Throat: Lips and mucosa moist. Teeth in good repair, pharynx without erythema or exudate  Neck: Supple, trachea midline. No cervical adenopathy  Lungs: Clear to auscultation bilaterally. No adventitious lung sounds noted. Normal inspiratory and expiratory effort  Cardiovascular: Regular rate, normal S1, S2. No murmurs, rubs, or gallops  Extremities: Pulses 2+ and equal throughout. No edema.   Integumentary: Warm and dry.   Neuro: Alert and oriented, follows commands appropriately.     No results found.  Recent Results (from the past 240 hour(s))   Influenza A/B Rapid Test- Nasal Swab   Result Value Ref Range    Influenza  A, Rapid Antigen No Influenza A antigen detected No Influenza A antigen detected    Influenza B, Rapid Antigen No Influenza B antigen detected No Influenza B antigen detected           Natacha Ratliff, CNP

## 2021-06-05 NOTE — PROGRESS NOTES
Assessment/Plan:        1. Cough  Exam findings were discussed  - XR Chest 2 Views-normal chest per report    Plan:  Supportive management was discussed  - codeine-guaiFENesin (CODEINE-GUAIFENESIN)  mg/5 mL liquid; Take 5-10 mL by mouth 4 (four) times a day as needed for cough.  Dispense: 200 mL; Refill: 0   Close follow up if no significant change or improvement as anticipated.        At the conclusion of the encounter the plan of care, disposition and all questions were answered and reviewed, and the patient acknowledged understanding and was involved in the decision making regarding the overall care plan.     Patient Care Team:  Jacy Mishra FNP as PCP - General (Nurse Practitioner)  Jacy Mishra FNP as Assigned PCP          Subjective:    Patient ID:   Geeta Berry is a 79 y.o. female with a history of hypertension and hyperlipidemia comes in follow-up of a cough which started and was seen 3 days ago, testing negative for the flu but also started on Tamiflu.  She states that the coughing ( dry) has become worse over the course, and feeling very tired, wondering if there is more  going on.  She is concerned about its contagiousness with her  will be returning home from the [facility]CU having his hip replaced.  She has been treating the cough with Tessalon Perles which has not been helping much at all.  She denies fevers chills or having shortness of breath        Review of Systems  Allergy: reviewed  General : negative  A complete 5 point review of systems was obtained and is negative other than what is stated in the HPI.       The following patient's history were reviewed and updated as appropriate:   She  has a past medical history of Arthritis, Chronic neck pain, Common bile duct calculus, Depression, DVT (deep venous thrombosis) (H), GERD (gastroesophageal reflux disease), History of blood clots (2017), HLD (hyperlipidemia), HTN (hypertension), Hypothyroidism, and Infectious viral  hepatitis..      Outpatient Encounter Medications as of 2/3/2020   Medication Sig Dispense Refill     aluminum-magnesium hydroxide-simethicone (MAALOX ADVANCED) 200-200-20 mg/5 mL Susp Take 5 mL by mouth every 6 (six) hours as needed.       amoxicillin (AMOXIL) 500 MG capsule Take 2,000 mg by mouth once as needed (prior to dental procedures).       azelastine (ASTELIN) 137 mcg (0.1 %) nasal spray 1 SPRAY INTO EACH NOSTRIL AT BEDTIME. USE IN EACH NOSTRIL AS DIRECTED 30 mL 11     calcium carbonate (OS-ALOK) 600 mg calcium (1,500 mg) tablet Take 600 mg by mouth daily.        citalopram (CELEXA) 10 MG tablet Take 0.5 tablets (5 mg total) by mouth daily. 90 tablet 1     diphenhydrAMINE-acetaminophen (TYLENOL PM EXTRA STRENGTH)  mg Tab        fluticasone propionate (FLONASE) 50 mcg/actuation nasal spray SPRAY 1 SPRAY INTO EACH NOSTRIL EVERY DAY 48 g 3     hydroCHLOROthiazide (HYDRODIURIL) 25 MG tablet Take 1 tablet (25 mg total) by mouth daily. 90 tablet 3     levothyroxine (SYNTHROID, LEVOTHROID) 50 MCG tablet Take 1 tablet (50 mcg total) by mouth daily. 90 tablet 3     multivitamin with minerals (THERA-M) 9 mg iron-400 mcg Tab tablet Take 1 tablet by mouth daily.       ondansetron (ZOFRAN ODT) 4 MG disintegrating tablet Take 1 tablet (4 mg total) by mouth every 8 (eight) hours as needed for nausea. 12 tablet 0     oseltamivir (TAMIFLU) 75 MG capsule Take 1 capsule (75 mg total) by mouth 2 (two) times a day for 5 days. 10 capsule 0     pantoprazole (PROTONIX) 40 MG tablet Take 1 tablet (40 mg total) by mouth 2 (two) times a day. 180 tablet 1     polyethylene glycol (MIRALAX) 17 gram packet Take 17 g by mouth daily.              senna-docusate (PERICOLACE) 8.6-50 mg tablet Take 1 tablet by mouth 2 (two) times a day.        simvastatin (ZOCOR) 20 MG tablet TAKE 1 TABLET BY MOUTH EVERYDAY AT BEDTIME 90 tablet 3     No facility-administered encounter medications on file as of 2/3/2020.          Objective:   /64  (Patient Site: Right Arm, Patient Position: Sitting, Cuff Size: Adult Regular)   Pulse 97   Temp 98.3  F (36.8  C)   Wt 170 lb 8 oz (77.3 kg)   SpO2 94%   BMI 26.70 kg/m        Physical Exam  General Appearance:    Alert, well hydrated, no distress   Throat:   mucous membranes moist, pharynx normal without lesions   Neck:   Supple, symmetrical, trachea midline, no adenopathy;     thyroid:  no enlargement/tenderness/nodules;    Lungs:     clear to auscultation, no wheezes, rales or rhonchi, symmetric air entry     Heart:    Regular rate and rhythm, S1 and S2 normal, no murmur, rub   or gallop, no edema    Skin:   Skin color, texture, turgor normal, no rashes or lesions

## 2021-06-06 NOTE — TELEPHONE ENCOUNTER
famotidine (PEPCID) 40 MG tablet [[id number]]     Electronically signed by: Jacy Mishra FNP on 10/01/19 1440  Status: Discontinued    Ordering user: Jacy Mishra FNP 10/01/19 1440  Authorized by: Jacy Mishra FNP    Frequency: QPM 10/01/19 - 11/27/19  Discontinued by: Jacy Mishra FNP 11/27/19 0837 [Therapy completed]

## 2021-06-06 NOTE — TELEPHONE ENCOUNTER
Refill Approved    Rx renewed per Medication Renewal Policy. Medication was last renewed on 9/4/19.    Candie Zelaya, [facility] Connection Triage/Med Refill 3/3/2020     Requested Prescriptions   Pending Prescriptions Disp Refills     famotidine (PEPCID) 40 MG tablet [Pharmacy Med Name: FAMOTIDINE 40 MG TABLET] 90 tablet 1     Sig: TAKE 1 TABLET BY MOUTH EVERY DAY IN THE EVENING       GI Medications Refill Protocol Passed - 3/3/2020  2:10 AM        Passed - PCP or prescribing provider visit in last 12 or next 3 months.     Last office visit with prescriber/PCP: 2/10/2020 Jacy Mishra FNP OR same dept: 2/10/2020 Jacy Mishra FNP OR same specialty: 2/10/2020 Jacy Mishra FNP  Last physical: 11/27/2019 Last MTM visit: Visit date not found   Next visit within 3 mo: Visit date not found  Next physical within 3 mo: Visit date not found  Prescriber OR PCP: BHARGAV Pearson  Last diagnosis associated with med order: 1. Gastroesophageal reflux disease without esophagitis  - famotidine (PEPCID) 40 MG tablet [Pharmacy Med Name: FAMOTIDINE 40 MG TABLET]; TAKE 1 TABLET BY MOUTH EVERY DAY IN THE EVENING  Dispense: 90 tablet; Refill: 1  - pantoprazole (PROTONIX) 40 MG tablet [Pharmacy Med Name: PANTOPRAZOLE SOD DR 40 MG TAB]; TAKE 1 TABLET BY MOUTH TWICE A DAY  Dispense: 180 tablet; Refill: 1    2. Hiatal hernia  - pantoprazole (PROTONIX) 40 MG tablet [Pharmacy Med Name: PANTOPRAZOLE SOD DR 40 MG TAB]; TAKE 1 TABLET BY MOUTH TWICE A DAY  Dispense: 180 tablet; Refill: 1    If protocol passes may refill for 12 months if within 3 months of last provider visit (or a total of 15 months).             pantoprazole (PROTONIX) 40 MG tablet [Pharmacy Med Name: PANTOPRAZOLE SOD DR 40 MG TAB] 180 tablet 1     Sig: TAKE 1 TABLET BY MOUTH TWICE A DAY       GI Medications Refill Protocol Passed - 3/3/2020  2:10 AM        Passed - PCP or prescribing provider visit in last 12 or next 3 months.     Last office visit with  prescriber/PCP: 2/10/2020 Jacy Mishra FNP OR same dept: 2/10/2020 Jacy Mishra FNP OR same specialty: 2/10/2020 Jacy Mishra FNP  Last physical: 11/27/2019 Last MTM visit: Visit date not found   Next visit within 3 mo: Visit date not found  Next physical within 3 mo: Visit date not found  Prescriber OR PCP: BHARGAV Pearson  Last diagnosis associated with med order: 1. Gastroesophageal reflux disease without esophagitis  - famotidine (PEPCID) 40 MG tablet [Pharmacy Med Name: FAMOTIDINE 40 MG TABLET]; TAKE 1 TABLET BY MOUTH EVERY DAY IN THE EVENING  Dispense: 90 tablet; Refill: 1  - pantoprazole (PROTONIX) 40 MG tablet [Pharmacy Med Name: PANTOPRAZOLE SOD DR 40 MG TAB]; TAKE 1 TABLET BY MOUTH TWICE A DAY  Dispense: 180 tablet; Refill: 1    2. Hiatal hernia  - pantoprazole (PROTONIX) 40 MG tablet [Pharmacy Med Name: PANTOPRAZOLE SOD DR 40 MG TAB]; TAKE 1 TABLET BY MOUTH TWICE A DAY  Dispense: 180 tablet; Refill: 1    If protocol passes may refill for 12 months if within 3 months of last provider visit (or a total of 15 months).               famotidine (PEPCID) 40 MG tablet [[id number]]     Electronically signed by: Jacy Mishra FNP on 10/01/19 1440 Status: Discontinued   Ordering user: Jacy Mishra FNP 10/01/19 1440 Authorized by: Jacy Mishra FNP   Frequency: QPM 10/01/19 - 11/27/19  Discontinued by: Jacy Mishra FNP 11/27/19 0832 [Therapy completed]

## 2021-06-06 NOTE — PROGRESS NOTES
Internal Medicine Office Visit  [facility]   Patient Name: Geeta Berry  Patient Age: 79 y.o.  YOB: 1940  MRN: [id number]    Date of Visit: 2/10/2020  Reason for Office Visit:   Chief Complaint   Patient presents with     Cough           Assessment / Plan / Medical Decision Makin. Rhinosinusitis  - Patient was recently treated with Augmentin so will advance therapy to a respiratory fluoroquinolone, levofloxacin.  She is advised to monitor for joint pains.  - levoFLOXacin (LEVAQUIN) 500 MG tablet; Take 1 tablet (500 mg total) by mouth daily for 10 days.  Dispense: 10 tablet; Refill: 0  - Supportive cares reviewed including saline rinses and steamy showers  - Return if symptoms do not resolve after 10 day treatment      Health Maintenance Review  Health Maintenance   Topic Date Due     DEPRESSION ACTION PLAN  1940     MEDICARE ANNUAL WELLNESS VISIT  2020     FALL RISK ASSESSMENT  2020     TD 18+ HE  2023     LIPID  2024     ADVANCE CARE PLANNING  2024     PNEUMOCOCCAL IMMUNIZATION 65+ LOW/MEDIUM RISK  Completed     INFLUENZA VACCINE RULE BASED  Completed     ZOSTER VACCINES  Completed     DXA SCAN  Discontinued         I am having Geeta Berry start on levoFLOXacin. I am also having her maintain her calcium carbonate, polyethylene glycol, senna-docusate, aluminum-magnesium hydroxide-simethicone, multivitamin with minerals, amoxicillin, simvastatin, diphenhydrAMINE-acetaminophen, hydroCHLOROthiazide, levothyroxine, pantoprazole, fluticasone propionate, citalopram, azelastine, ondansetron, and codeine-guaiFENesin.      HPI:  Geeta Berry is a 79 y.o. year old who presents to the office today not feeling well. She was seen on  with a sinus infection and prescribed Augmentin.  She felt better after taking the antibiotic but then on  became ill with nausea and vomiting and was seen in the emergency room.  He was discharged to  home with supportive care including ondansetron.  She was then seen in internal medicine again on 1/30 with flulike symptoms and prescribed Tamiflu.  She returned on 2/3 with a cough and provided a cough syrup with codeine.  The codeine made her constipated and she did not feel well so she discontinued the medication.  Her symptoms now include yellow and green mucus, head pain particularly over the forehead, she feels weak and tired, she continues to cough but denies shortness of breath.  She has had intermittent teeth pain. She has been using over-the-counter Delsym but this does not seem to provide much relief for the cough.  Cough drops are effective. No fevers.     Review of Systems- pertinent positive in bold:  A 10-point ROS is negative except as stated in HPI         Current Scheduled Meds:  Outpatient Encounter Medications as of 2/10/2020   Medication Sig Dispense Refill     aluminum-magnesium hydroxide-simethicone (MAALOX ADVANCED) 200-200-20 mg/5 mL Susp Take 5 mL by mouth every 6 (six) hours as needed.       amoxicillin (AMOXIL) 500 MG capsule Take 2,000 mg by mouth once as needed (prior to dental procedures).       azelastine (ASTELIN) 137 mcg (0.1 %) nasal spray 1 SPRAY INTO EACH NOSTRIL AT BEDTIME. USE IN EACH NOSTRIL AS DIRECTED 30 mL 11     calcium carbonate (OS-ALOK) 600 mg calcium (1,500 mg) tablet Take 600 mg by mouth daily.        citalopram (CELEXA) 10 MG tablet Take 0.5 tablets (5 mg total) by mouth daily. 90 tablet 1     codeine-guaiFENesin (CODEINE-GUAIFENESIN)  mg/5 mL liquid Take 5-10 mL by mouth 4 (four) times a day as needed for cough. 200 mL 0     diphenhydrAMINE-acetaminophen (TYLENOL PM EXTRA STRENGTH)  mg Tab        fluticasone propionate (FLONASE) 50 mcg/actuation nasal spray SPRAY 1 SPRAY INTO EACH NOSTRIL EVERY DAY 48 g 3     hydroCHLOROthiazide (HYDRODIURIL) 25 MG tablet Take 1 tablet (25 mg total) by mouth daily. 90 tablet 3     levoFLOXacin (LEVAQUIN) 500 MG tablet  Take 1 tablet (500 mg total) by mouth daily for 10 days. 10 tablet 0     levothyroxine (SYNTHROID, LEVOTHROID) 50 MCG tablet Take 1 tablet (50 mcg total) by mouth daily. 90 tablet 3     multivitamin with minerals (THERA-M) 9 mg iron-400 mcg Tab tablet Take 1 tablet by mouth daily.       ondansetron (ZOFRAN ODT) 4 MG disintegrating tablet Take 1 tablet (4 mg total) by mouth every 8 (eight) hours as needed for nausea. 12 tablet 0     pantoprazole (PROTONIX) 40 MG tablet Take 1 tablet (40 mg total) by mouth 2 (two) times a day. 180 tablet 1     polyethylene glycol (MIRALAX) 17 gram packet Take 17 g by mouth daily.              senna-docusate (PERICOLACE) 8.6-50 mg tablet Take 1 tablet by mouth 2 (two) times a day.        simvastatin (ZOCOR) 20 MG tablet TAKE 1 TABLET BY MOUTH EVERYDAY AT BEDTIME 90 tablet 3     No facility-administered encounter medications on file as of 2/10/2020.        Past Medical History:   Diagnosis Date     Arthritis     osteo     Chronic neck pain      Common bile duct calculus      Depression      DVT (deep venous thrombosis) (H)      GERD (gastroesophageal reflux disease)      History of blood clots 2017    DVT right lower extremity     HLD (hyperlipidemia)      HTN (hypertension)      Hypothyroidism      Infectious viral hepatitis     hepatitis A in 1960s     Past Surgical History:   Procedure Laterality Date     bilateral TIMO      with revision on both hips     CHOLECYSTECTOMY  1980     ERCP      x5 over 8 years. Last 8/2016     ERCP N/A 9/5/2017    Procedure: ENDOSCOPIC RETROGRADE CHOLANGIOPANCREATOGRAPHY, STONE EXTRACTION;  Surgeon: Henry Eller MD;  Location: [facility] [place];  Service:      ERCP N/A 7/26/2019    Procedure: ENDOSCOPIC RETROGRADE CHOLANGIOPANCREATOGRAPHY, REMOVAL OF SLUDGE, DILATION OF STRICTURE;  Surgeon: Ryan Pastrana MD;  Location: [facility] OR;  Service: Gastroenterology     HIP ARTHROSCOPY Left 1983     HIP SURGERY  2012    revision     JOINT REPLACEMENT        AK ESOPHAGOGASTRODUODENOSCOPY TRANSORAL DIAGNOSTIC N/A 9/10/2019    Procedure: ESOPHAGOGASTRODUODENOSCOPY (EGD);  Surgeon: Will Nash DO;  Location: [facility] OR;  Service: General     REPLACEMENT TOTAL KNEE Left 2015     REVISION TOTAL HIP ARTHROPLASTY Left 5/16/2017    Procedure: REVISION LEFT TOTAL HIP ARTHROPLASTY, BOTH COMPONENTS;  Surgeon: Tyson Peoples MD;  Location: [facility] OR;  Service:      SHOULDER SURGERY Left 2010    left total shoulder replacement     XR ERCP BILIARY AND PANCREAS  7/26/2019     Social History     Tobacco Use     Smoking status: Never Smoker     Smokeless tobacco: Never Used   Substance Use Topics     Alcohol use: No     Drug use: No       Objective / Physical Examination:  Vitals:    02/10/20 1400   BP: 122/72   Pulse: (!) 56   Temp: 98.3  F (36.8  C)   TempSrc: Oral   SpO2: 98%     Wt Readings from Last 3 Encounters:   02/03/20 170 lb 8 oz (77.3 kg)   01/31/20 169 lb (76.7 kg)   01/20/20 165 lb (74.8 kg)     There is no height or weight on file to calculate BMI.     Constitutional: In no apparent distress  ENT: Tympanic membrane clear with landmarks well visualized bilaterally. Septum midline, nares patent, no visible polyps, mucosa moist and without drainage. Lips and mucosa moist. Pharynx without erythema or exudate. Neck is supple, trachea midline. She has left cervical adenopathy. +frontal and maxillary tenderness.   Respiratory: Clear to auscultation bilaterally. Normal inspiratory and expiratory effort. No crackles or wheezing   Cardiovascular: Regular rate and rhythm. No murmurs, rubs, or gallops. No edema  Psych: Alert and oriented x3.     No orders of the defined types were placed in this encounter.  Followup: Return if symptoms worsen or fail to improve. earlier if needed.        Jacy Mishra, CNP

## 2021-06-07 ENCOUNTER — COMMUNICATION - HEALTHEAST (OUTPATIENT)
Dept: SCHEDULING | Facility: CLINIC | Age: 81
End: 2021-06-07

## 2021-06-07 NOTE — TELEPHONE ENCOUNTER
Disp  Refills  Start  End  JEVON    famotidine (PEPCID) 40 MG tablet (Discontinued)  90 tablet  1  10/1/2019  11/27/2019  No    Sig - Route: Take 1 tablet (40 mg total) by mouth every evening. - Oral    Sent to pharmacy as: famotidine 40 mg tablet (PEPCID)    Reason for Discontinue: Therapy completed    E-Prescribing Status: Receipt confirmed by pharmacy (10/1/2019  2:40 PM CDT)       OV  10/01/2020     HPI: Geeta Berry is here for follow up to discuss plans going forward with regards to her paraesophageal hernia.  Overall she is doing very well.  After the Carafate and starting the Pepcid she has very minimal complaints.       AWV 11/27/2019    Assessment and Plan:      1. Encounter for Medicare annual wellness exam  - The following high BMI interventions were performed this visit: encouragement to exercise     2. Gastroesophageal reflux disease without esophagitis  - Continue pantoprazole and famotidine  - Dietary measures reviewed, she generally follows these recommendations      3. Essential hypertension  - stable      4. Familial hypercholesterolemia  - Continue simvastatin  - Lipid Profile     5. Acquired hypothyroidism  - Euthyroid with labs. Continue levothyroxine      6. Dysthymia  -Some situational depression but doing well with citalopram 5 mg daily     7. Greater trochanteric bursitis of left hip  -Offered corticosteroid injection, she has an appointment with orthopedics and will plan to get this injection at that appointment     8. Osteoarthritis, unspecified osteoarthritis type, unspecified site  -Reviewed recommendations to use acetaminophen as needed, over-the-counter muscle rubs, capsaicin      9. Post-menopausal  - DXA Bone Density Scan; Future     10. Chronic rhinitis  -Offered montelukast, she declines.  Continue Astelin nasal spray and fluticasone nasal spray.

## 2021-06-07 NOTE — PROGRESS NOTES
"Geeta Berry is a 79 y.o. female who is being evaluated via a billable telephone visit.      The patient has been notified of following:     \"This telephone visit will be conducted via a call between you and your physician/provider. We have found that certain health care needs can be provided without the need for a physical exam.  This service lets us provide the care you need with a short phone conversation.  If a prescription is necessary we can send it directly to your pharmacy.  If lab work is needed we can place an order for that and you can then stop by our lab to have the test done at a later time.    Telephone visits are billed at different rates depending on your insurance coverage. During this emergency period, for some insurers they may be billed the same as an in-person visit.  Please reach out to your insurance provider with any questions.    If during the course of the call the physician/provider feels a telephone visit is not appropriate, you will not be charged for this service.\"    Patient has given verbal consent to a Telephone visit? Yes    Patient would like to receive their AVS by AVS Preference: [place].    Internal Medicine Office Visit- TELEPHONE VISIT  [facility]   Patient Name: Geeta Berry  Patient Age: 79 y.o.  YOB: 1940  MRN: [id number]    Date of Visit: 2020  Reason for Telephone Visit:   Chief Complaint   Patient presents with     GI Problem     lots of burping, pressure in abdomen - gets worse with eating/intake - scheduled for blood work today     Constipation     started lactulose from GI MD on saturday - had diarrhea and now has resolved       Assessment / Plan / Medical Decision Makin. Epigastric pain  2. Nausea  3. Constipation, unspecified constipation type    Keep GI follow up  Trial ondansetron PRN  Add CMP, she has a history of recurrent common bile duct calculus, will evaluate LFTs. I emailed her a hard copy of the lab " order so that she could have this drawn with other labs she will have done today at [facility].  Consider also functional dyspepsia, gastritis (she has a history of this), and/or hiatal hernia causing epigastric discomfort.   Consider carafate if xray indicates resolution of stool burden and symptoms continue       Telephone visit duration: 19 minutes     Health Maintenance Review  Health Maintenance   Topic Date Due     MEDICARE ANNUAL WELLNESS VISIT  11/27/2020     FALL RISK ASSESSMENT  11/27/2020     TD 18+ HE  11/20/2023     LIPID  11/27/2024     ADVANCE CARE PLANNING  11/27/2024     DEPRESSION ACTION PLAN  Completed     PNEUMOCOCCAL IMMUNIZATION 65+ LOW/MEDIUM RISK  Completed     INFLUENZA VACCINE RULE BASED  Completed     ZOSTER VACCINES  Completed     DXA SCAN  Discontinued         I have discontinued Geeta Berry's codeine-guaiFENesin and pantoprazole. I am also having her maintain her calcium carbonate, polyethylene glycol, senna-docusate, aluminum-magnesium hydroxide-simethicone, multivitamin with minerals, amoxicillin, simvastatin, diphenhydrAMINE-acetaminophen, hydroCHLOROthiazide, levothyroxine, fluticasone propionate, citalopram, azelastine, famotidine, acetaminophen, lactulose, and ondansetron.      HPI:  Geeta Berry is 79 y.o. year old and was contacted today for a telephone visit due to the Coronavirus pandemic. She was seen in the ED on 4/17 with severe constipation and followed up in internal medicine on 4/20 and was referred to GI. With GI she was prescribed lactulose and stools have since been softer. She has lost 8 lbs over the past 2 months. She has a nauseated feeling and a discomfort in the upper part of her stomach which comes and goes. She is able to keep hydrated and is eating. She is supposed to have an abdominal xray and lab work, if there continues to be a large stool burden a colon cleanse was proposed, however, if no etiology noted she will likely proceed with colonoscopy.       Review  of Systems- pertinent positive in bold:  Constitutional: Fever, chills, night sweats, weight change, fatigue  Respiratory: shortness of breath  Cardiovascular: chest pain   Gastrointestinal: abdominal pain, heartburn/indigestion, nausea/vomiting, change in appetite, change in bowel habits, constipation or diarrhea, rectal bleeding/dark stools, difficulty swallowing        Current Scheduled Meds:  Outpatient Encounter Medications as of 4/28/2020   Medication Sig Dispense Refill     acetaminophen (TYLENOL) 500 MG tablet Take 500 mg by mouth 2 (two) times a day.       azelastine (ASTELIN) 137 mcg (0.1 %) nasal spray 1 SPRAY INTO EACH NOSTRIL AT BEDTIME. USE IN EACH NOSTRIL AS DIRECTED 30 mL 11     calcium carbonate (OS-ALOK) 600 mg calcium (1,500 mg) tablet Take 600 mg by mouth daily.        citalopram (CELEXA) 10 MG tablet Take 0.5 tablets (5 mg total) by mouth daily. 90 tablet 1     famotidine (PEPCID) 40 MG tablet Take 1 tablet (40 mg total) by mouth every evening. 90 tablet 1     fluticasone propionate (FLONASE) 50 mcg/actuation nasal spray SPRAY 1 SPRAY INTO EACH NOSTRIL EVERY DAY 48 g 3     hydroCHLOROthiazide (HYDRODIURIL) 25 MG tablet Take 1 tablet (25 mg total) by mouth daily. 90 tablet 3     lactulose (ENULOSE) 10 gram/15 mL (15 mL) Take 30 g by mouth once.       levothyroxine (SYNTHROID, LEVOTHROID) 50 MCG tablet Take 1 tablet (50 mcg total) by mouth daily. 90 tablet 3     multivitamin with minerals (THERA-M) 9 mg iron-400 mcg Tab tablet Take 1 tablet by mouth daily.       senna-docusate (PERICOLACE) 8.6-50 mg tablet Take 1 tablet by mouth 2 (two) times a day.        simvastatin (ZOCOR) 20 MG tablet TAKE 1 TABLET BY MOUTH EVERYDAY AT BEDTIME 90 tablet 3     aluminum-magnesium hydroxide-simethicone (MAALOX ADVANCED) 200-200-20 mg/5 mL Susp Take 5 mL by mouth every 6 (six) hours as needed.       amoxicillin (AMOXIL) 500 MG capsule Take 2,000 mg by mouth once as needed (prior to dental procedures).        diphenhydrAMINE-acetaminophen (TYLENOL PM EXTRA STRENGTH)  mg Tab        ondansetron (ZOFRAN ODT) 4 MG disintegrating tablet Take 1 tablet (4 mg total) by mouth every 8 (eight) hours as needed for nausea. 12 tablet 0     polyethylene glycol (MIRALAX) 17 gram packet Take 17 g by mouth daily.              [DISCONTINUED] codeine-guaiFENesin (CODEINE-GUAIFENESIN)  mg/5 mL liquid Take 5-10 mL by mouth 4 (four) times a day as needed for cough. 200 mL 0     [DISCONTINUED] ondansetron (ZOFRAN ODT) 4 MG disintegrating tablet Take 1 tablet (4 mg total) by mouth every 8 (eight) hours as needed for nausea. 12 tablet 0     [DISCONTINUED] pantoprazole (PROTONIX) 40 MG tablet TAKE 1 TABLET BY MOUTH TWICE A  tablet 3     No facility-administered encounter medications on file as of 4/28/2020.        Past Medical History:   Diagnosis Date     Arthritis     osteo     Chronic neck pain      Common bile duct calculus      Depression      DVT (deep venous thrombosis) (H)      GERD (gastroesophageal reflux disease)      History of blood clots 2017    DVT right lower extremity     HLD (hyperlipidemia)      HTN (hypertension)      Hypothyroidism      Infectious viral hepatitis     hepatitis A in 1960s     Past Surgical History:   Procedure Laterality Date     bilateral TIMO      with revision on both hips     CHOLECYSTECTOMY  1980     ERCP      x5 over 8 years. Last 8/2016     ERCP N/A 9/5/2017    Procedure: ENDOSCOPIC RETROGRADE CHOLANGIOPANCREATOGRAPHY, STONE EXTRACTION;  Surgeon: Henry Eller MD;  [place] [facility] [place]  Service:      ERCP N/A 7/26/2019    Procedure: ENDOSCOPIC RETROGRADE CHOLANGIOPANCREATOGRAPHY, REMOVAL OF SLUDGE, DILATION OF STRICTURE;  Surgeon: Ryan Pastrana MD;  Location: [facility] OR;  Service: Gastroenterology     HIP ARTHROSCOPY Left 1983     HIP SURGERY  2012    revision     JOINT REPLACEMENT       VT ESOPHAGOGASTRODUODENOSCOPY TRANSORAL DIAGNOSTIC N/A 9/10/2019    Procedure:  ESOPHAGOGASTRODUODENOSCOPY (EGD);  Surgeon: Will Nash DO;  Location: [facility] OR;  Service: General     REPLACEMENT TOTAL KNEE Left 2015     REVISION TOTAL HIP ARTHROPLASTY Left 5/16/2017    Procedure: REVISION LEFT TOTAL HIP ARTHROPLASTY, BOTH COMPONENTS;  Surgeon: Tyson Peoples MD;  Location: [facility] OR;  Service:      SHOULDER SURGERY Left 2010    left total shoulder replacement     XR ERCP BILIARY AND PANCREAS  7/26/2019     Social History     Tobacco Use     Smoking status: Never Smoker     Smokeless tobacco: Never Used   Substance Use Topics     Alcohol use: No     Drug use: No       Objective / Physical Examination:  Insight is good. Thought process is logical. Patient is speaking in full sentences.     Orders Placed This Encounter   Procedures     Comprehensive Metabolic Panel   Followup: Return in about 3 months (around 7/28/2020) for Recheck. earlier if needed.

## 2021-06-07 NOTE — TELEPHONE ENCOUNTER
Called patient to attempt to prescreen her for her lab visit today. She was not available . Will call when available.

## 2021-06-07 NOTE — PROGRESS NOTES
The Clinic Community Health Worker spoke with the patient today due to ED/Hospital Discharge to discuss possible Clinic Care Coordination enrollment.  The service was described to the patient and immediate needs were discussed.  The patient declined enrollment at this time.  The PCP is encouraged to refer in the future if the patient's needs change.      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 Has been to the ED frequently for constipation, emesis. Seen in clinic for flu and cough. Patient has many medications. Needs [facility] for assistance with managing meds, conditions, and having need for resources in community.

## 2021-06-07 NOTE — TELEPHONE ENCOUNTER
Adria Denney,  I just need the CMP that I ordered yesterday. As long as the orders from [facility] are in the system as well this should be it for the lab tests.     Regarding the xray, just make sure Nai knows where she can go for this. She was hoping to go to [facility] in the [place] office (note that this is not an [facility] location)

## 2021-06-07 NOTE — TELEPHONE ENCOUNTER
Jacy,    Did you want any other labs ordered? Once I receive the xray order I will enter it into the computer.    Jumana CRUZ LPN .......... 7:13 AM  04/29/20

## 2021-06-07 NOTE — TELEPHONE ENCOUNTER
"Called and spoke with patient.  She reports that she was evaluated and treated for constipation in the ED on 4/17/20.  Patient states that she has had 5 bowel movements so far today.  The initial 4 bowel movements were small and soft. The 5th was bigger and soft.  Patient reports nausea with no emesis.   Patient has eaten breakfast (cereal with banana) and tolerated well. She is hydrating adequately.  Patient denies any other symptoms or concerns at this time.    Patient was advised to continue managing the symptoms at home.  Patient to call the clinic or seek medical assistance if the symptoms persist or worsen and she has a virtual call scheduled for later today for ED follow-up.    Patient verbalized understanding and is agreeable with the plan of care.      Reason for Disposition    Mild constipation    Patient wants to be seen    Constipation persists > 1 week while using care advice    Answer Assessment - Initial Assessment Questions  1. STOOL PATTERN OR FREQUENCY: \"How often do you pass bowel movements (BMs)?\"  (Normal range: tid to q 3 days)  \"When was the last BM passed?\"        Patient reports having bowel movements X 5 today  2. STRAINING: \"Do you have to strain to have a BM?\"       No  3. RECTAL PAIN: \"Does your rectum hurt when the stool comes out?\" If so, ask: \"Do you have hemorrhoids? How bad is the pain?\"  (Scale 1-10; or mild, moderate, severe)      No  4. STOOL COMPOSITION: \"Are the stools hard?\"       Initial 4 bowel movements were small and soft. The 5th bowel movement was bigger and soft.  5. BLOOD ON STOOLS: \"Has there been any blood on the toilet tissue or on the surface of the BM?\" If so, ask: \"When was the last time?\"       No  6. CHRONIC CONSTIPATION: \"Is this a new problem for you?\"  If no, ask: How long have you had this problem?\" (days, weeks, months)       No  7. CHANGES IN DIET: \"Have there been any recent changes in your diet?\"       No  8. MEDICATIONS: \"Have you been taking any new " "medications?\"      Miralax and Senna-docusate 8.6-50 mg tablet   9. LAXATIVES: \"Have you been using any laxatives or enemas?\"  If yes, ask \"What, how often, and when was the last time?\"      No  10. CAUSE: \"What do you think is causing the constipation?\"         constipation  11. OTHER SYMPTOMS: \"Do you have any other symptoms?\" (e.g., abdominal pain, fever, vomiting)       No    Protocols used: CONSTIPATION-A-[place]      "

## 2021-06-07 NOTE — PROGRESS NOTES
"Geeta Berry is a 79 y.o. female who is being evaluated via a billable telephone visit.      The patient has been notified of following:     Patient has given verbal consent to a Telephone visit? Yes       Geeta Berry is a 79 y.o. female who presents for an ED follow up.    Thursday night was trying to have a BM and was constipated. She tried to pull it out herself and it hurt so much. Called daughter and went to the ED because couldn't sit or lay down due to the pain. In ED AXR showed nonspecific air fluid levels, no bowel dilation. There was paucity of bowel gas in left abdomen and pelvis. ED physician did fecal disimpaction and gave IV fentanyl for the pain. She got an enema, then had a very large bowel movement.     She is passing gas. Every 2 hours going to bathroom.Soft bowel movements, not diarrhea. Red spot every once in a while; has been using hemorrhoid cream twice a day. This morning already has had 5 BM's. Small little \"gunshot\" bowel movements. Last time, felt some pressure and then stool seemed larger. Drinking lots of fluids. Trying to eat more fiber. Still feels like she has some stool sitting in rectum, but no longer having the pain.     Taking miralax one to two times a day. Taking senna-docusate twice day. Usually has a BM once a day or twice a day. For last several months about once a month has been feeling more constipated and will do a suppository.     2015 colonoscopy had internal hemorrhoids and diverticulosis, no polyps; recommended to stop colon cancer screening.  Dad had colon cancer, didn't treat, and passed away 89.     Assessment/Plan: 78 y/o with hx of hypothyroidism, HTN, chronic constipation, and CBD calculus who presents for ED follow up after fecal disimpaction.    1. Slow transit constipation  2. Bright red rectal bleeding  Chronic constipation, but worse in last year. Last colonoscopy 2015 with diverticulosis and internal hemorrhoids, otherwise normal and recommended no " further colon cancer screening. Now having intermittent bright red rectal bleeding per rectum. She is doing better after fecal disimpaction from ED, now having bowel movements multiple times a day but still feeling like she has more to evacuate. D/w her to continue her senna-docusate, miralax, and to add metamucil to help increase her fiber intake. She is trying to drink more fluids and eat more fiber. D/w her whether she wants to see [facility] to discuss repeat colonoscopy at her age given the changes in her stools over last year and the rectal bleeding which could be due to hemorrhoids, or something more concerning like malignancy. She would like to do so.   - Ambulatory referral to Gastroenterology    F/u with PCP in 1 month to follow up constipation.    Phone call duration:  15 minutes    Carli Wooten MD

## 2021-06-09 NOTE — PROGRESS NOTES
ASSESSMENT: Geeta Berry is a 76 y.o. female with past medical history significant for DVT (on Xarelto), hypothyroidism, hypertension who presents today for new patient evaluation of left greater than right neck pain without radicular symptoms.  An MRI of the cervical spine showed edema involving the left atlantoaxial joint with a joint effusion.  The patient also has severe multilevel foraminal stenosis, facet arthropathy, and disc degeneration.  I believe the patient is most symptomatic from the C1/2 degeneration.  She may also have mild cervical dystonia and bilateral occipital neuralgia.    NDI:  60  WHO 5: 7     PLAN:  A shared decision making model was used.  The patient's values and choices were respected.  The following represents what was discussed and decided upon by the physician assistant and the patient.      1.  DIAGNOSTIC TESTS:   I reviewed the report of the MRI cervical spine.  We will obtain the actual images.    2.  PHYSICAL THERAPY: No further physical therapy is ordered.  The patient completed physical therapy through [facility] in the fall 2016.  She did not feel that it was helpful.    3.  MEDICATIONS: No changes are made to the patient's medications.  She uses tramadol 100 mg daily and extra strength Tylenol 5 times per day.    4.  INTERVENTIONS: I did discuss the patient's case with Dr. Stallworth.  She is willing to proceed with a left C1 to facet joint injection.  The patient will need to be off of her Xarelto for at least 4 days before the injection.  -I did call Dr. Peoples's nurse at [facility] to find out if she is able to have steroid injections prior to her knee surgery.  Per the nurse, she is able to have corticosteroid injections performed up to 2 weeks prior to her knee surgery, which is scheduled for April 20, 2017.  (Therefore, she could have steroid injections up until April 6, 2017.)  -If the patient does not have relief after that injection, I would recommend trying a  bilateral occipital nerve block.     5.  PATIENT EDUCATION: The patient was in agreement the above plan.  All questions were answered.    6.  FOLLOW-UP:   A nurse will call the patient with an update regarding her care plan.        SUBJECTIVE:  Geeta Berry  Is a 76 y.o. female who presents today in consultation at the request of Dr. Long for new patient evaluation of neck pain.  The patient states she has had neck pain for the past 1.5 years.  She denies any injury or event to cause the pain.  It is getting worse.  The patient previously been seeing Dr. Long from [facility].  She has had extensive conservative treatment including physical therapy and multiple injections including radiofrequency ablation and Botox injections.  Unfortunately, her symptoms persist.    The patient complains of left greater than right neck pain.  The pain is located at the top of her neck.  The pain radiates up into the head, especially on the left side.  The head pain radiates from the back of the head up to the top of the head behind the eye.  The pain also extends into the left greater than right upper trapezius muscle.  She denies any pain radiating further down the arm.  The pain is aggravated with turning her head.  Patient states that her range of motion is severely restricted.  The head pain is also aggravated with laying on her left side or lying flat on her back.  She denies any alleviating factors.  She tried using heat and ice which provided temporary relief of her pain.  The patient denies any numbness, tingling, or weakness in her upper extremities.  The patient does have significant orthopedic issues, which the patient states contributing to her legs feeling weak.  She is scheduled for right total knee arthroplasty next month.  The patient states that she is in need of a redo surgery for her left hip.    The patient completed physical therapy in the fall 2016 through [facility].  She did not feel that was  helpful.  She has tried cervical traction.  The patient does not go to a chiropractor.  She has never had any spine surgery.  The patient had a left C2, C3, C4, C5, C6 radiofrequency ablation on July 1, 2016.  The patient did not feel that that provided significant relief of her pain.  The patient has had Botox injections ×2.  Her most recent set of Botox injections was on February 24, 2017.  The patient reports that this has provided at the most 30% relief of her pain.  The patient uses tramadol 100 mg daily.  She also uses extra strength Tylenol 5 times per day.  The patient has tried multiple muscle relaxants.  She has not tolerated them due to drowsiness and dizziness.    Current Outpatient Prescriptions on File Prior to Encounter   Medication Sig Dispense Refill     acetaminophen (TYLENOL) 500 MG tablet Take 500 mg by mouth every 6 (six) hours as needed for pain.       albuterol sulfate 90 mcg/actuation AePB Inhale.       aluminum-magnesium hydroxide 200-200 mg/5 mL suspension Take by mouth every 6 (six) hours as needed for indigestion.       calcium carbonate (OS-ALOK) 600 mg calcium (1,500 mg) tablet Take 600 mg by mouth 2 (two) times a day with meals.       cholecalciferol, vitamin D3, 400 unit Tab Take 400 Units by mouth daily.       fexofenadine (ALLEGRA) 60 MG tablet Take 60 mg by mouth daily.       fluticasone (FLONASE) 50 mcg/actuation nasal spray SHAKE LQ AND U 2 SPRAYS IEN D  3     hydroCHLOROthiazide (HYDRODIURIL) 25 MG tablet TK 1 T PO D  3     levothyroxine (SYNTHROID, LEVOTHROID) 50 MCG tablet TK 1 T PO D  3     lidocaine (LIDODERM) 5 % Place 1 patch on the skin daily. Remove & Discard patch within 12 hours or as directed by MD       OMEGA-3/DHA/EPA/FISH OIL (FISH OIL-OMEGA-3 FATTY ACIDS) 300-1,000 mg capsule Take 2 g by mouth daily.       pantoprazole (PROTONIX) 40 MG tablet Take 40 mg by mouth daily.       polyethylene glycol (MIRALAX) 17 gram packet Take 17 g by mouth daily.       simvastatin  (ZOCOR) 20 MG tablet TK 1 T PO QPM  3     traMADol (ULTRAM-ER) 100 MG 24 hr tablet TK 1 T PO D  1     triamcinolone (KENALOG) 0.025 % cream Apply topically 2 (two) times a day.       XARELTO 20 mg Tab TK 1 T PO QPM WITH A MEAL  3       Allergies   Allergen Reactions     Atorvastatin      Codeine      Dipyridamole        Past medical history: Anxiety, DVT, hepatitis, shortness of breath, ulcers    Past surgical history: Left knee replacement 2014, left hip replacement 1983, right hip replacement 2016 complicated by infection and redone in 2010, left shoulder surgery.  The patient is also scheduled for a right total knee arthroplasty on April 20, 2017.    Family history: Father had colon cancer, sister had diabetes    Social history: The patient is .  She is a retired  of a volunteer program.  She denies tobacco, alcohol, or illicit drug use.    ROS: Positive for cough, dizziness, shortness of breath, anxiety, indigestion, back pain, joint pain, leg pain, headache.  Specifically negative for dysphagia, imbalance, fine motor skill difficulties, bowel/bladder dysfunction, fevers,chills, appetite changes, unexplained weight loss.   Otherwise 13 systems reviewed are negative.  Please see the patient's intake questionnaire from today for details.      OBJECTIVE:  PHYSICAL EXAMINATION:  CONSTITUTIONAL:  Vital signs as above.  No acute distress.  The patient is well nourished and well groomed.  PSYCHIATRIC:  The patient is awake, alert, oriented to person, place, time and answering questions appropriately with clear speech.    HEENT:  Normocephalic, atraumatic.  Sclera clear.    SKIN:  Skin over the face, bilateral upper extremities, and neck is clean, dry, intact without rashes.  LYMPH NODES:  No palpable or tender anterior/posterior cervical, submandibular, or supraclavicular lymph nodes.    MUSCLE STRENGTH:  5/5 strength for the bilateral shoulder abductors, elbow flexors/extensors, wrist  extensors, finger flexors/abductors.  NEURO:  CN III-XII are grossly intact.  1+ symmetric biceps, brachioradialis, triceps reflexes bilaterally.  Sensation to light touch is intact over bilateral upper extremities throughout.  Negative Hwang's bilaterally.    VASCULAR:  2/4 radial pulses bilaterally.  Warm upper limbs bilaterally.  Capillary refill in the upper extremities is less than 1 second.  MUSCULOSKELETAL: Cervical range of motion is severely restricted with lateral rotation, left greater than right.  It is also moderately restricted with extension and mildly restricted with flexion.  The patient tenderness palpation of the left upper cervical facets.  The patient also tenderness to palpation over the greater occipital nerves, left greater than right.    RESULTS: I reviewed the report of the MRI cervical spine from particulate dated November 21, 2016.  This shows degenerative disc disease with loss of disc space height at C5-6 and C6-7.  It also shows edema involving the left atlantoaxial joint with joint effusion.  At C2-3 there is moderate left foraminal stenosis due to uncovertebral and facet joint hypertrophy.  At C3-4 there is severe left and moderate right foraminal stenosis secondary to uncovertebral and facet joint hypertrophy.  At C4-5 there is severe bilateral foraminal stenosis secondary to uncovertebral and facet hypertrophy.  At C5-6 there is severe bilateral foraminal stenosis, right greater than left.  At C6-7 there is mild central canal stenosis and severe bilateral foraminal stenosis, right greater than left.  The radiologist compared this MRI to a previous MRI from 2015.  It was significant for increasing edema along the left atlantoaxial joint with effusion.  Please see report for further details.

## 2021-06-10 NOTE — PROGRESS NOTES
Internal Medicine Office Visit  Patient Name: Geeta Berry  Patient Age: 76 y.o.  YOB: 1940  MRN: [id number]  ?  Date of Visit: 2017  Reason for Office Visit:   Chief Complaint   Patient presents with     Follow-up     Surgery a week form today, left hip and knee pain.       Assessment / Plan / Medical Decision Makin. Depression  - Basic Metabolic Panel today to assure no electrolyte/sodium changes prior to scheduled surgery with recent medication change  - Continue citalopram 10 mg daily. Rec'd 8 months of treatment           Health Maintenance Review  Health Maintenance   Topic Date Due     ADVANCE DIRECTIVES DISCUSSED WITH PATIENT  1957     DXA SCAN  2004     DEPRESSION FOLLOW UP  2017     FALL RISK ASSESSMENT  2018     TD 18+ HE  2023     PNEUMOCOCCAL POLYSACCHARIDE VACCINE AGE 65 AND OVER  Completed     INFLUENZA VACCINE RULE BASED  Completed     PNEUMOCOCCAL CONJUGATE VACCINE FOR ADULTS (PCV13 OR PREVNAR)  Completed     ZOSTER VACCINE  Completed         I am having Ms. Berry maintain her fluticasone, traMADol, hydroCHLOROthiazide, simvastatin, levothyroxine, acetaminophen, albuterol sulfate, aluminum-magnesium hydroxide, calcium carbonate, cholecalciferol (vitamin D3), fexofenadine, lidocaine, fish oil-omega-3 fatty acids, pantoprazole, polyethylene glycol, SENNOSIDES (SENNA LAXATIVE ORAL), and citalopram.     HPI:   Encounter Diagnoses   Name Primary?     Depression Yes      Geeta Berry 76-year-old female who presents to the office today for follow-up of depression.  She recently started citalopram 10 mg daily.  She states that she has tolerated this medication well, no side effects that she is noticed.  Does not feel as tearful or upset as she was previously. Has more motivation, does not feel down or depressed aside from feeling discouraged about her hip and knee pain.     She has a revision of the left hip scheduled for . She is anxious to have  this procedure to get some pain relief.     Review of Systems: No thoughts of suicide or self harm.  Pertinent findings as in HPI    Current Scheduled Meds:  Outpatient Encounter Prescriptions as of 5/9/2017   Medication Sig Dispense Refill     acetaminophen (TYLENOL) 500 MG tablet Take 500 mg by mouth every 6 (six) hours as needed for pain.       albuterol sulfate 90 mcg/actuation AePB Inhale.       aluminum-magnesium hydroxide 200-200 mg/5 mL suspension Take by mouth every 6 (six) hours as needed for indigestion.       calcium carbonate (OS-ALOK) 600 mg calcium (1,500 mg) tablet Take 600 mg by mouth 2 (two) times a day with meals.       cholecalciferol, vitamin D3, 400 unit Tab Take 400 Units by mouth daily.       citalopram (CELEXA) 10 MG tablet Take 1 tablet (10 mg total) by mouth daily. 90 tablet 1     fexofenadine (ALLEGRA) 60 MG tablet Take 60 mg by mouth daily.       fluticasone (FLONASE) 50 mcg/actuation nasal spray SHAKE LQ AND U 2 SPRAYS IEN D  3     hydroCHLOROthiazide (HYDRODIURIL) 25 MG tablet TK 1 T PO D  3     levothyroxine (SYNTHROID, LEVOTHROID) 50 MCG tablet TK 1 T PO D  3     lidocaine (LIDODERM) 5 % Place 1 patch on the skin daily. Remove & Discard patch within 12 hours or as directed by MD       OMEGA-3/DHA/EPA/FISH OIL (FISH OIL-OMEGA-3 FATTY ACIDS) 300-1,000 mg capsule Take 2 g by mouth daily.       pantoprazole (PROTONIX) 40 MG tablet Take 40 mg by mouth daily.       polyethylene glycol (MIRALAX) 17 gram packet Take 17 g by mouth daily.       SENNOSIDES (SENNA LAXATIVE ORAL) Take by mouth.       simvastatin (ZOCOR) 20 MG tablet TK 1 T PO QPM  3     traMADol (ULTRAM-ER) 100 MG 24 hr tablet TK 1 T PO D  1     [DISCONTINUED] citalopram (CELEXA) 10 MG tablet Take 1 tablet (10 mg total) by mouth daily. 30 tablet 1     No facility-administered encounter medications on file as of 5/9/2017.      Past Medical History:   Diagnosis Date     Chronic neck pain      DVT (deep venous thrombosis)      GERD  (gastroesophageal reflux disease)      History of blood clots      HLD (hyperlipidemia)      HTN (hypertension)      Hypothyroidism      Past Surgical History:   Procedure Laterality Date     CHOLECYSTECTOMY  1980     ERCP      x5 over 8 years. Last 8/2016     HIP ARTHROSCOPY Left 1983     HIP SURGERY  2012    revision     REPLACEMENT TOTAL KNEE Right 2015     SHOULDER SURGERY Left 2010     Social History   Substance Use Topics     Smoking status: Never Smoker     Smokeless tobacco: Never Used     Alcohol use No       Objective / Physical Examination:  Vitals:    05/09/17 1254   BP: 110/66   Patient Site: Right Arm   Patient Position: Sitting   Cuff Size: Adult Regular   Pulse: (!) 51   Weight: 173 lb 12.8 oz (78.8 kg)     Wt Readings from Last 3 Encounters:   05/09/17 173 lb 12.8 oz (78.8 kg)   04/21/17 172 lb (78 kg)   04/18/17 172 lb (78 kg)     Body mass index is 27.63 kg/(m^2).     General Appearance: Alert and oriented, cooperative, affect appropriate, speech clear, in no apparent distress  Psych: Affect appropriate.  She is well groomed.  Maintains appropriate eye contact.  Seems to have more energy, good outlook today.  Insight good.    Orders Placed This Encounter   Procedures     Basic Metabolic Panel   Followup: Return in about 3 months (around 8/9/2017) for Next scheduled follow up. earlier if needed.    Total time spent with patient was 15 minutes with >50% of time spent in face-to-face counseling regarding the above plan       Jacy Mishra CNP  [facility]

## 2021-06-10 NOTE — ANESTHESIA POSTPROCEDURE EVALUATION
Patient: Geeta Berry  REVISION LEFT TOTAL HIP ARTHROPLASTY, BOTH COMPONENTS  Anesthesia type: spinal    Patient location: PACU  Last vitals:   Vitals:    05/16/17 1554   BP: 141/79   Pulse: 87   Resp: 16   Temp: 37.2  C (99  F)   SpO2: 100%     Post vital signs: stable  Level of consciousness: awake and responds to simple questions  Post-anesthesia pain: pain controlled  Post-anesthesia nausea and vomiting: no  Pulmonary: unassisted, return to baseline  Cardiovascular: stable and blood pressure at baseline  Hydration: adequate  Anesthetic events: no    QCDR Measures:  ASA# 11 - Sobeida-op Cardiac Arrest: ASA11B - Patient did NOT experience unanticipated cardiac arrest  ASA# 12 - Sobeida-op Mortality Rate: ASA12B - Patient did NOT die  ASA# 13 - PACU Re-Intubation Rate: NA - No ETT / LMA used for case  ASA# 10 - Composite Anes Safety: ASA10A - No serious adverse event  ASA# 38 - New Corneal Injury: ASA38A - No new exposure keratitis or corneal abrasion in PACU    Additional Notes:

## 2021-06-10 NOTE — PROGRESS NOTES
[facility] Patient Outreach:     Discharge Date::  5/23/17      Reason for TCU stay (discharge diagnosis)::  Left TIMO(revision), acute blood loss anemia      Are you feeling better, the same or worse since your discharge?:  Patient is feeling better          As part of your discharge plan, did they discuss home care with you?: Yes        Have your seen them yet, or are they scheduled to visit?: Yes                Do you have any follow up visits scheduled with your PCP or Specialist?:  Yes, with PCP      (RN) Is it scheduled soon enough (3-5 days)?: No        (RN) Is the patient okay with moving appointment up (if RN feels appropriate)?: Yes            (RN) Patient transferred to [facility]? **If immediate concers (e.g. patient is feeling worse and/or not taking new medictations), send in basket message to PCP with quick summary of concern.: Yes

## 2021-06-10 NOTE — PROGRESS NOTES
[facility]    Facility:   [facility] [[id number]]   Code Status: FULL CODE     Geeta is a 76 y.o. female who had a left total hip replacement in the past.  The prosthesis was failing it as result she was hospitalized on May 16 for complete revision of the left total hip with both components replaced.  She had 4 g drop in hemoglobin.  No other perioperative complications.  After stabilization she was transferred to the TCU for hip rehabilitation.  She and staff felt that she was doing very well.  Needed no further rehabilitative effort and good manage more comfortably at home.  She is being discharged today to home with services  hg  1. History of total left hip replacement   therapist feel that she can manage comfortably with walker and the assistance that she has with her family.  Pain is adequately controlled.  Wound is clean and there is an significant swelling.  Plan- discharge home with services.  30 oxycodone 5 mg as needed pain.  Instructed that if she needs more she will need to review this with her family physician and encouraged gradual reduction in opioid use.     2. Anemia, blood loss   hemoglobin has dropped from preop 12.7 to a amber of 8.1 on May 18.  Oral iron was started.  She was encouraged to follow-up with her family physician within the next few days for monitoring of her hemoglobin    3. Hypertension   blood pressures have been normal since admission to the TCU.       Lab Results   Component Value Date    HGB 8.1 (L) 05/18/2017    HGB 8.2 (L) 05/17/2017    HGB 11.9 (L) 05/16/2017    HGB 12.7 04/18/2017     Equipment needs: Carlos  I certify that services are/were furnished while this patient was under the care of a physician and that a physician or an allowed non-physician practitioner (NPP), had a face-to-face encounter that meets the physician face-to-face encounter requirements. The encounter was in whole, or in part, related to the primary reason  for home health. The patient is confined to his/her home and needs intermittent skilled nursing, physical therapy, speech-language pathology, or the continued need for occupational therapy. A plan of care has been established by a physician and is periodically reviewed by a physician.  Date of face-to-face encounter: May 23, 2017  She is in need of the following services: Physical therapy/Occupational Therapy.  Home health aide.  RN  Reason for the services is: Recent left total hip replacement.  Needs physical therapy/occupational therapy to optimize physical function toward independence.  Home health aide for safety with bathing.  RN for medication management and warfarin management and follow-up hemoglobin  She is homebound because of temporary disability secondary to recent hip replacement, need for walker, inability to drive.  Absences from home would require considerable and taxing effort.  The patient is, or has been, under my care and I have initiated the establishment of the plan of care. This patient will be followed by a physician who will periodically review the plan of care.      Electronically signed by: Manav Villavicencio MD

## 2021-06-10 NOTE — PROGRESS NOTES
[facility]      Facility:    [facility] [[id number]]  Code Status: FULL CODE      Chief Complaint/Reason for Visit:  Chief Complaint   Patient presents with     H & P       HPI:   Geeta is a 76 y.o. female who had a left total hip replacement in the past.  The prosthesis was failing it as result she was hospitalized on May 16 for complete revision of the left total hip with both components replaced.  She had 4 g drop in hemoglobin.  No other perioperative complications.  After stabilization she was transferred to the TCU for hip rehabilitation    Past Medical History:  Past Medical History:   Diagnosis Date     Chronic neck pain      Depression      DVT (deep venous thrombosis)      GERD (gastroesophageal reflux disease)      History of blood clots      HLD (hyperlipidemia)      HTN (hypertension)      Hypothyroidism            Surgical History:  Past Surgical History:   Procedure Laterality Date     CHOLECYSTECTOMY  1980     ERCP      x5 over 8 years. Last 8/2016     HIP ARTHROSCOPY Left 1983     HIP SURGERY  2012    revision     REPLACEMENT TOTAL KNEE Right 2015     REVISION TOTAL HIP ARTHROPLASTY Left 5/16/2017    Procedure: REVISION LEFT TOTAL HIP ARTHROPLASTY, BOTH COMPONENTS;  Surgeon: Tyson Peoples MD;  Location: [facility] OR;  Service:      SHOULDER SURGERY Left 2010       Family History:   Family History   Problem Relation Age of Onset     Colon cancer Father      Diabetes Sister      Heart disease Mother        Social History:    Social History     Social History     Marital status:      Spouse name: N/A     Number of children: 2     Years of education: N/A     Occupational History     Retired      Social History Main Topics     Smoking status: Never Smoker     Smokeless tobacco: Never Used     Alcohol use No     Drug use: No     Sexual activity: Not on file     Other Topics Concern     Not on file     Social History Narrative    4/6/17: The patient  lives with her  in a condo.          Review of Systems   Constitutional: Negative for fatigue, fever and unexpected weight change.   HENT: Negative for congestion, drooling, hearing loss, rhinorrhea, sore throat and trouble swallowing.    Eyes: Negative for discharge, redness and visual disturbance.   Respiratory: Negative for cough, shortness of breath and wheezing.    Cardiovascular: Negative for chest pain, palpitations and leg swelling.        Hypertension   Gastrointestinal: Negative for abdominal distention, constipation, diarrhea, nausea and vomiting.   Endocrine:        Hypothyroidism   Genitourinary: Negative for difficulty urinating, dysuria, enuresis, frequency and urgency.   Musculoskeletal: Positive for arthralgias (Knees) and neck pain. Negative for back pain, gait problem and joint swelling.   Skin: Negative for color change, pallor and rash.   Allergic/Immunologic: Negative for immunocompromised state.   Neurological: Negative for tremors, weakness, light-headedness and headaches.   Hematological: Does not bruise/bleed easily.        Anemia   Psychiatric/Behavioral: Negative for agitation, behavioral problems, confusion, dysphoric mood and sleep disturbance. The patient is not nervous/anxious.         vital signs reviewed since admission.  Low normal blood pressures.  Remainder the vital signs normal and stable.    Physical Exam   Constitutional: She is oriented to person, place, and time. She appears well-developed and well-nourished. She is cooperative. She is easily aroused. No distress.   HENT:   Head: Normocephalic and atraumatic.   Right Ear: External ear normal.   Left Ear: External ear normal.   Nose: Nose normal.   Eyes: Conjunctivae and EOM are normal. Pupils are equal, round, and reactive to light. Right eye exhibits no discharge. Left eye exhibits no discharge. No scleral icterus.   Neck: Phonation normal. No JVD present. No tracheal deviation present. No thyroid mass and no  thyromegaly present.   Cardiovascular: Normal rate, regular rhythm, normal heart sounds and intact distal pulses.  Exam reveals no gallop and no friction rub.    No murmur heard.  Pulmonary/Chest: Effort normal and breath sounds normal. No stridor. No respiratory distress. She has no wheezes. She has no rales.   Abdominal: Soft. Bowel sounds are normal. She exhibits no distension. There is no tenderness. There is no rebound and no guarding.   Musculoskeletal: She exhibits edema (Trace edema right lower leg).   Lymphadenopathy:     She has no cervical adenopathy.   Neurological: She is alert, oriented to person, place, and time and easily aroused. Coordination normal.   Skin: Skin is warm and dry. No rash noted. She is not diaphoretic. No erythema. No pallor.   Surgical wound clean and dry   Psychiatric: She has a normal mood and affect. Her behavior is normal. Thought content normal.   Vitals reviewed.      Medication List:  Current Outpatient Prescriptions   Medication Sig     acetaminophen (TYLENOL) 500 MG tablet Take 2 tablets (1,000 mg total) by mouth every 6 (six) hours as needed for pain.     azelastine (ASTELIN) 137 mcg (0.1 %) nasal spray 1 spray into each nostril at bedtime. Use in each nostril as directed      calcium carbonate (OS-ALOK) 600 mg calcium (1,500 mg) tablet Take 600 mg by mouth daily.      citalopram (CELEXA) 10 MG tablet Take 1 tablet (10 mg total) by mouth daily.     docusate sodium (COLACE) 100 MG capsule Take 1 capsule (100 mg total) by mouth 2 (two) times a day as needed for constipation.     fexofenadine (ALLEGRA) 180 MG tablet Take 180 mg by mouth daily.     fluticasone (FLONASE) 50 mcg/actuation nasal spray 1 spray into each nostril daily.     hydroCHLOROthiazide (HYDRODIURIL) 25 MG tablet Take 25 mg by mouth daily.     levothyroxine (SYNTHROID, LEVOTHROID) 50 MCG tablet Take 50 mcg by mouth daily.     OMEGA-3/DHA/EPA/FISH OIL (FISH OIL-OMEGA-3 FATTY ACIDS) 300-1,000 mg capsule Take 1 g  by mouth daily.      oxyCODONE (ROXICODONE) 5 MG immediate release tablet Take 1-2 tablets (5-10 mg total) by mouth every 4 (four) hours as needed for pain. Take 1 tab for pain 1-7/10, take 2 tabs for pain 8-10/10.     pantoprazole (PROTONIX) 40 MG tablet Take 40 mg by mouth 2 (two) times a day.      polyethylene glycol (MIRALAX) 17 gram packet Take 17 g by mouth daily.     senna-docusate (PERICOLACE) 8.6-50 mg tablet Take 1-2 tablets by mouth daily as needed for constipation.     simvastatin (ZOCOR) 20 MG tablet Take 20 mg by mouth at bedtime.     warfarin (COUMADIN) 2.5 MG tablet Take 2 tabs (5mg) Fri and Sat. Take 1 tab (2.5mg) Sun. Then take 1 to 2 tabs (2.5 to 5 mg) by mouth daily based on INR results as directed.       Labs:  Lab Results   Component Value Date    HGB 8.1 (L) 05/18/2017    HGB 8.2 (L) 05/17/2017    HGB 11.9 (L) 05/16/2017    HGB 12.7 04/18/2017     Results for orders placed or performed in visit on 05/09/17   Basic Metabolic Panel   Result Value Ref Range    Sodium 141 136 - 145 mmol/L    Potassium 4.3 3.5 - 5.0 mmol/L    Chloride 101 98 - 107 mmol/L    CO2 29 22 - 31 mmol/L    Anion Gap, Calculation 11 5 - 18 mmol/L    Glucose 75 70 - 125 mg/dL    Calcium 9.5 8.5 - 10.5 mg/dL    BUN 27 8 - 28 mg/dL    Creatinine 0.89 0.60 - 1.10 mg/dL    GFR MDRD Af Amer >60 >60 mL/min/1.73m2    GFR MDRD Non Af Amer >60 >60 mL/min/1.73m2     Lab Results   Component Value Date    TSH 2.77 04/18/2017           Assessment:    ICD-10-CM    1. 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  First left hip replacement 35 years ago.  History of teenage slipped femoral epiphysis.  Progressively worsening pain over the past 2 months with feeling of instability and inability to ambulate without a walker.  Underwent left total hip revision.  For gram blood loss no other complications.  Currently  on warfarin prophylaxis    2. Anemia, blood loss D50.0  preop hemoglobin 11.9.  Postop hemoglobin 8.1.  Will begin oral iron supplement    3. Hypertension I10  blood pressures low normal.  HCT 25 mg daily.  Potassium 4.3    4. Mood disorder F39  low-dose citalopram 10 mg.  Patient is upbeat, cheerful, anxious to be home.     5. Hypothyroidism E03.9  50 mcg L-thyroxine.  Mid normal TSH in April         Plan:  PT/OT for left hip rehabilitation.  Monitor hemoglobin.  Patient is bearing weight, ambulating comfortably with walker.  Will have PT and OT assess.  She may be able to be discharged quickly.  She has minimal pain med requirements using only Tylenol and infrequent oxycodone.      Electronically signed by: Manav Villavicencio MD

## 2021-06-10 NOTE — ANESTHESIA PREPROCEDURE EVALUATION
Anesthesia Evaluation      Patient summary reviewed   No history of anesthetic complications     Airway   Mallampati: II  Neck ROM: limited   Pulmonary - normal exam    breath sounds clear to auscultation  (-) shortness of breath, sleep apnea, not a smoker                         Cardiovascular   Exercise tolerance: > or = 4 METS  (+) hypertension, , hypercholesterolemia,     (-) angina, murmur  ECG reviewed  Rhythm: regular  Rate: normal,    no murmur   ROS comment: DVT history     Neuro/Psych    (+) depression,     Endo/Other    (+) hypothyroidism, arthritis,      GI/Hepatic/Renal    (+) GERD,             Dental - normal exam   (+) caps                       Anesthesia Plan  Planned anesthetic: spinal    ASA 3   Induction: intravenous   Anesthetic plan and risks discussed with: patient  Anesthesia plan special considerations: antiemetics,   Post-op plan: routine recovery
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
## 2021-06-10 NOTE — TELEPHONE ENCOUNTER
RN triage call from patient  She reports that she woke up in the middle of the night with left breast pain, very sore  Does not feel lumps did not see any redness  No fever  Pain does extend to Left shoulder and down her arm with numbness off and on in her arm and hand  Patient does state she has a lot of stress right  Hx of high blood pressure and high cholesterol  Pain is constant does not change with movement or rest.    Gave disposition for 911 call now and patient was agreeable. She will call to follow up with PCP afterwards.  Rosalina Albright, RN  [facility] Triage Nurse  8:48 AM  7/31/2020        Reason for Disposition    Chest pain lasting longer than 5 minutes and ANY of the following:* Over 50 years old* Over 30 years old and at least one cardiac risk factor (i.e., high blood pressure, diabetes, high cholesterol, obesity, smoker or strong family history of heart disease)* Pain is crushing, pressure-like, or heavy * Took nitroglycerin and chest pain was not relieved* History of heart disease (i.e., angina, heart attack, bypass surgery, angioplasty, CHF)    Protocols used: CHEST PAIN-A-[place]

## 2021-06-10 NOTE — ANESTHESIA PROCEDURE NOTES
Spinal Block    Patient location during procedure: OR  Start time: 5/16/2017 1:10 PM  End time: 5/16/2017 1:20 PM  Reason for block: primary anesthetic    Staffing:  Performing  Anesthesiologist: ABIGAIL ROWAN    Preanesthetic Checklist  Completed: patient identified, risks, benefits, and alternatives discussed, timeout performed, consent obtained, airway assessed, oxygen available, suction available, emergency drugs available and hand hygiene performed  Spinal Block  Patient position: sitting  Prep: ChloraPrep and site prepped and draped  Patient monitoring: heart rate, cardiac monitor, continuous pulse ox and blood pressure  Approach: midline  Location: L3-4  Injection technique: single-shot  Needle type: pencil-tip   Needle gauge: 24 G

## 2021-06-10 NOTE — TELEPHONE ENCOUNTER
Wellness Screening Tool  Symptom Screening:  Do you have one of the following NEW symptoms:    Fever (subjective or >100.0)?  No         A new cough?  No    Shortness of breath?  No     Chills? No     New loss of taste or smell? No     Generalized body aches? No     New persistent headache? No     New sore throat? No     Nausea, vomiting, or diarrhea?  No    Within the past 3 weeks, have you been exposed to someone with a known positive illness below:    COVID-19 (known or suspected)?  No    Chicken pox?  No    Mealses?  No    Pertussis?  No    Patient notified of visitor policy- They may have one person accompany them to their appointment, but they will need to wear a mask and will be screened upon arrival for symptoms: Yes  Pt informed to wear a mask: Yes  Pt notified if they develop any symptoms listed above, prior to their appointment, they are to call the clinic directly at [phone] for further instructions.  Yes  Patient's appointment status: Patient will be seen in clinic as scheduled on 8/6

## 2021-06-10 NOTE — PROGRESS NOTES
Internal Medicine Office Visit  [facility]   Patient Name: Geeta Berry  Patient Age: 79 y.o.  YOB: 1940  MRN: [id number]    Date of Visit: 2020  Reason for Office Visit:   Chief Complaint   Patient presents with     Hospital Visit Follow Up     still having the pain           Assessment / Plan / Medical Decision Makin. Slow transit constipation  - She will go to imaging today to do abdominal xray ordered by GI. If no order present, I will send in the order and fax results to her GI provider  - May need to repeat colon cleanse. Consider prophylactic medication with Linzess possibly if she has recurrent significant stool burden despite use of miralax and a dietary fiber supplement every day   - XR Abdomen 2 Views; Future  - XR Abdomen 2 Views    2. Breast pain, left  - Mammo Diagnostic Bilateral; Future  - US Breast Left Limited 1-3 Quadrants; Future    3. Chest pain, unspecified type  Cardiac evaluation pending with CTA calcium score         Health Maintenance Review  Health Maintenance   Topic Date Due     INFLUENZA VACCINE RULE BASED (1) 2020     MEDICARE ANNUAL WELLNESS VISIT  2020     FALL RISK ASSESSMENT  2020     TD 18+ HE  2023     LIPID  2024     ADVANCE CARE PLANNING  2024     DEPRESSION ACTION PLAN  Completed     PNEUMOCOCCAL IMMUNIZATION 65+ LOW/MEDIUM RISK  Completed     ZOSTER VACCINES  Completed     HEPATITIS B VACCINES  Aged Out     DXA SCAN  Discontinued         I have discontinued Geeta Berry's aluminum-magnesium hydroxide-simethicone. I am also having her maintain her calcium carbonate, polyethylene glycol, senna-docusate, multivitamin with minerals, amoxicillin, simvastatin, diphenhydrAMINE-acetaminophen, hydroCHLOROthiazide, levothyroxine, fluticasone propionate, citalopram, azelastine, famotidine, acetaminophen, lactulose, ondansetron, and omeprazole.      HPI:  Geeta Berry is a 79 y.o. year old who  presents to the office today for follow up of a recent ED visit. On 7/31 she was seen with a c/o persistent soreness of the left chest wall. She has achy pain over the left shoulder and into the left arm. Troponin was negative. New T wave inversion, nonspecific. No evidence of acute ischemia or infarction. CT of the chest was obtained and negative for PE, dissection, developing pneumonia, pleural effusion, or pulmonary congestion. She was referred to cardiology rapid access and had an appointment just prior to this visit. A CTA with calcium score was ordered. She continues to experience a pain in the left breast particularly with palpation. She notes that her bra seems to fit tighter.     I spoke with her for a VV on 4/28 with a c/o constipation, nausea, abdominal discomfort. She saw GI and had an abdominal xray and lab work, there was a moderate amount of stool burden so she did a colon cleanse, cannot recall whether she felt better or not. She continues miralax two times a day and docusate two times a day. She is again experiencing nausea and vomiting. She spoke with GI yesterday for a virtual visit and an abdominal xray was ordered. She believes the order was sent here to the [facility], however, we have had issues in getting the orders through in the past.     She will soon have a left eyelid lift surgery as it is impacting her vision.     Review of Systems- pertinent positive in bold:  Negative for dyspnea with exertion. Pertinent findings as in HPI       Current Scheduled Meds:  Outpatient Encounter Medications as of 8/6/2020   Medication Sig Dispense Refill     acetaminophen (TYLENOL) 500 MG tablet Take 500 mg by mouth 2 (two) times a day.       amoxicillin (AMOXIL) 500 MG capsule Take 2,000 mg by mouth once as needed (prior to dental procedures).       azelastine (ASTELIN) 137 mcg (0.1 %) nasal spray 1 SPRAY INTO EACH NOSTRIL AT BEDTIME. USE IN EACH NOSTRIL AS DIRECTED 30 mL 11     calcium carbonate  (OS-ALOK) 600 mg calcium (1,500 mg) tablet Take 600 mg by mouth daily.        citalopram (CELEXA) 10 MG tablet Take 0.5 tablets (5 mg total) by mouth daily. 90 tablet 1     diphenhydrAMINE-acetaminophen (TYLENOL PM EXTRA STRENGTH)  mg Tab        famotidine (PEPCID) 40 MG tablet Take 1 tablet (40 mg total) by mouth every evening. 90 tablet 1     fluticasone propionate (FLONASE) 50 mcg/actuation nasal spray SPRAY 1 SPRAY INTO EACH NOSTRIL EVERY DAY 48 g 3     hydroCHLOROthiazide (HYDRODIURIL) 25 MG tablet Take 1 tablet (25 mg total) by mouth daily. 90 tablet 3     lactulose (ENULOSE) 10 gram/15 mL (15 mL) Take 30 g by mouth once.       levothyroxine (SYNTHROID, LEVOTHROID) 50 MCG tablet Take 1 tablet (50 mcg total) by mouth daily. 90 tablet 3     multivitamin with minerals (THERA-M) 9 mg iron-400 mcg Tab tablet Take 1 tablet by mouth daily.       omeprazole (PRILOSEC) 20 MG capsule Take 20 mg by mouth daily before breakfast.       ondansetron (ZOFRAN ODT) 4 MG disintegrating tablet Take 1 tablet (4 mg total) by mouth every 8 (eight) hours as needed for nausea. 12 tablet 0     polyethylene glycol (MIRALAX) 17 gram packet Take 17 g by mouth daily.              senna-docusate (PERICOLACE) 8.6-50 mg tablet Take 1 tablet by mouth 2 (two) times a day.        simvastatin (ZOCOR) 20 MG tablet TAKE 1 TABLET BY MOUTH EVERYDAY AT BEDTIME 90 tablet 3     [] valACYclovir (VALTREX) 1000 MG tablet Take 1 tablet (1,000 mg total) by mouth 2 (two) times a day for 7 days. 14 tablet 0     [DISCONTINUED] aluminum-magnesium hydroxide-simethicone (MAALOX ADVANCED) 200-200-20 mg/5 mL Susp Take 5 mL by mouth every 6 (six) hours as needed.       No facility-administered encounter medications on file as of 2020.        Past Medical History:   Diagnosis Date     Arthritis     osteo     Chronic neck pain      Common bile duct calculus      Coronary artery disease due to calcified coronary lesion 2020     Depression      DVT  (deep venous thrombosis) (H)      GERD (gastroesophageal reflux disease)      History of blood clots 2017    DVT right lower extremity     HLD (hyperlipidemia)      HTN (hypertension)      Hypothyroidism      Infectious viral hepatitis     hepatitis A in 1960s     Past Surgical History:   Procedure Laterality Date     bilateral TIMO      with revision on both hips     CHOLECYSTECTOMY  1980     ERCP      x5 over 8 years. Last 8/2016     ERCP N/A 9/5/2017    Procedure: ENDOSCOPIC RETROGRADE CHOLANGIOPANCREATOGRAPHY, STONE EXTRACTION;  Surgeon: Henry Eller MD;  Location: [facility];  Service:      ERCP N/A 7/26/2019    Procedure: ENDOSCOPIC RETROGRADE CHOLANGIOPANCREATOGRAPHY, REMOVAL OF SLUDGE, DILATION OF STRICTURE;  Surgeon: Ryan Pastrana MD;  Location: [facility] OR;  Service: Gastroenterology     HIP ARTHROSCOPY Left 1983     HIP SURGERY  2012    revision     JOINT REPLACEMENT       MN ESOPHAGOGASTRODUODENOSCOPY TRANSORAL DIAGNOSTIC N/A 9/10/2019    Procedure: ESOPHAGOGASTRODUODENOSCOPY (EGD);  Surgeon: Will Nash DO;  Location: [facility] OR;  Service: General     REPLACEMENT TOTAL KNEE Left 2015     REVISION TOTAL HIP ARTHROPLASTY Left 5/16/2017    Procedure: REVISION LEFT TOTAL HIP ARTHROPLASTY, BOTH COMPONENTS;  Surgeon: Tyson Peoples MD;  Location: [facility] OR;  Service:      SHOULDER SURGERY Left 2010    left total shoulder replacement     XR ERCP BILIARY AND PANCREAS  7/26/2019     Social History     Tobacco Use     Smoking status: Never Smoker     Smokeless tobacco: Never Used   Substance Use Topics     Alcohol use: No     Drug use: No       Objective / Physical Examination:  Vitals:    08/06/20 1026   BP: 104/62   Pulse: 68   Weight: 168 lb (76.2 kg)     Wt Readings from Last 3 Encounters:   08/06/20 168 lb (76.2 kg)   08/06/20 167 lb (75.8 kg)   07/31/20 169 lb (76.7 kg)     Body mass index is 26.31 kg/m .     Study Result     EXAM: CTA CHEST PE RUN  LOCATION: [facility]  [facility]  DATE/TIME: 7/31/2020 10:52 AM     INDICATION: Left chest pain.  COMPARISON: CT abdomen 08/03/2019. No prior chest CTs.  TECHNIQUE: CT chest pulmonary angiogram during arterial phase injection of IV contrast. Multiplanar reformats and MIP reconstructions were performed. Dose reduction techniques were used.   CONTRAST: Iohexol (Omni) 75 mL.     FINDINGS:  ANGIOGRAM CHEST: Pulmonary arteries are normal caliber and negative for pulmonary emboli. Thoracic aorta is negative for dissection. No CT evidence of right heart strain.     LUNGS AND PLEURA: The lungs are clear. No pleural effusion or pneumothorax.     MEDIASTINUM/AXILLAE: No lymphadenopathy. Moderate coronary artery calcification.     UPPER ABDOMEN: Stable pneumobilia.     MUSCULOSKELETAL: Normal.     IMPRESSION:   1.  Negative CTA of the chest. No evidence of pulmonary embolus. The lungs are clear.  2.  Moderate coronary artery calcification.              Constitutional: In no apparent distress  Respiratory: Clear to auscultation bilaterally. Normal inspiratory and expiratory effort  Cardiovascular: Regular rate and rhythm. No edema.   Breasts: right breast is non-tender, no masses/skin changes, nipple discharge, or axillary LAD. The left breast is tenderness at 2:00 with some fullness in this area although no distinct/well circumscribed mass. No skin dimpling or puckering. No left axillary LAD or left nipple discharge   Psych: Alert and oriented x3.     Orders Placed This Encounter   Procedures     XR Abdomen 2 Views     Mammo Diagnostic Bilateral     US Breast Left Limited 1-3 Quadrants   Followup: Return in about 3 months (around 11/6/2020) for preopereative physical for eyelid surgery . earlier if needed.          Jacy Mishra, CNP

## 2021-06-10 NOTE — TELEPHONE ENCOUNTER
Tried calling pt and no answer. Needs cardiac assessment immediately. I will try again in a few min.

## 2021-06-10 NOTE — ANESTHESIA CARE TRANSFER NOTE
Last vitals:   Vitals:    05/16/17 1554   BP: 141/79   Pulse: 87   Resp: 16   Temp: 37.2  C (99  F)   SpO2: 100%     Patient's level of consciousness is drowsy  Spontaneous respirations: yes  Maintains airway independently: yes  Dentition unchanged: yes  Oropharynx: oropharynx clear of all foreign objects    QCDR Measures:  ASA# 20 - Surgical Safety Checklist: ASA20A - Safety Checks Done  PQRS# 430 - Adult PONV Prevention: 4558F - Pt received => 2 anti-emetic agents (different classes) preop & intraop  ASA# 8 - Peds PONV Prevention: NA - Not pediatric patient, not GA or 2 or more risk factors NOT present  PQRS# 424 - Sobeida-op Temp Management: 4559F - At least one body temp DOCUMENTED => 35.5C or 95.9F within required timeframe  PQRS# 426 - PACU Transfer Protocol: - Transfer of care checklist used  ASA# 14 - Acute Post-op Pain: ASA14B - Patient did NOT experience pain >= 7 out of 10    I completed my SBAR handoff to the receiving nurse per policy and procedure.

## 2021-06-11 NOTE — TELEPHONE ENCOUNTER
Last Office Visit  8/6/2020 Jacy Mishra, BHARGAV  Notes:  1. Slow transit constipation  - She will go to imaging today to do abdominal xray ordered by GI. If no order present, I will send in the order and fax results to her GI provider  - May need to repeat colon cleanse. Consider prophylactic medication with Linzess possibly if she has recurrent significant stool burden despite use of miralax and a dietary fiber supplement every day   - XR Abdomen 2 Views; Future  - XR Abdomen 2 Views     2. Breast pain, left  - Mammo Diagnostic Bilateral; Future  - US Breast Left Limited 1-3 Quadrants; Future     3. Chest pain, unspecified type  Cardiac evaluation pending with CTA calcium score        Last Filled:  simvastatin (ZOCOR) 20 MG tablet  90 tablet  3  7/21/2019   No    Sig: TAKE 1 TABLET BY MOUTH EVERYDAY AT BEDTIME    Sent to pharmacy as: simvastatin (ZOCOR) 20 MG tablet    E-Prescribing Status: Receipt confirmed by pharmacy (7/21/2019 11:32 AM CDT)        Next OV:  Nothing scheduled         Medication teed up for provider signature

## 2021-06-11 NOTE — PATIENT INSTRUCTIONS - HE
Geeta Berry,    It is my pleasure to see you today at the [facility].    My recommendations for this visit are:    1.  Discontinue simvastatin, star Crestor 20 mg at bedtime  2.  Recheck Lipid profile and LFT in 2 months  3.  Monitor symptoms including chest pain, exertional shortness of breath, fatigue.  4.  Will see you again in 6 months    Kathie Lemons MD, PhD

## 2021-06-11 NOTE — PROGRESS NOTES
Assessment:   Geeta Berry is a 76 y.o. y.o. female with past medical history significant for DVT, hypothyroidism, hypertension who presents today for follow-up regarding left greater than right neck pain without radicular symptoms.  MRI cervical spine shows edema involving the left atlantoaxial joint with joint effusion.  The patient is status post a left C1-2 facet joint injection on April 6, 2017 which provided 70-75% relief of her pain for over 6 weeks.  Patient's pain flared back up again while recovering from a left total hip arthroplasty and needing to use a walker.       Plan:     A shared decision making plan was used.  The patient's values and choices were respected.  The following represents what was discussed and decided upon by the physician assistant and the patient.      1.  DIAGNOSTIC TESTS: I reviewed the MRI cervical spine.  No further diagnostic tests were ordered.    2.  PHYSICAL THERAPY: No further physical therapy was ordered.  The patient completed physical therapy in the fall 2016.  I encouraged her to do her exercises.    3.  MEDICATIONS: No changes are made to the patient's medications.  She is currently using oxycodone for her postoperative pain.  She is also using Tylenol twice daily.    4.  INTERVENTIONS: I offered the patient a repeat left C1-2 facet joint injection.  The patient indicated she would like to proceed and an order was placed.  Of note, the patient had significant dizziness after her last injection.  I did tell the patient I cannot guarantee that she went to have another similar reaction.  She voiced understanding to this.  Additionally, the patient has been on Coumadin since her hip replacement surgery.  Her last dose of Coumadin is scheduled to be June 30.  I told patient she would have to be off of Coumadin for 4 days prior to her injection and her INR will need to be 1.2 her last.  She voiced understanding to this.    5.  PATIENT EDUCATION: The patient was in agreement  the above plan.  All questions were answered.    6.  FOLLOW-UP: The patient return to the clinic sometime after 4 July for her repeat left C1-2 facet joint injection.  If she has any questions or concerns in the meantime, she should not hesitate to contact our clinic.    Subjective:     Geeta Berry is a 76 y.o. female who presents today for follow-up regarding left-sided neck pain without radicular symptoms.  The patient status post a left C1-C2 facet joint injection on April 6, 2017.  The patient reports this injection provided 70-75% relief of her pain for about 6 weeks.  The patient states that since then her pain has returned.  She attributes this to recovering from hip surgery and needing to use a walker as she is only partial weightbearing at this point.    The patient complains of left-sided neck pain.  The pain does not radiate down the arm.  She does feel some pain radiate up into the left side of the head causing headache.  She rates her pain today as an 8-10 out of 10.  At its best it is a 2 out of 10.  At its worst it is a 10 out of 10.  The patient's pain is aggravated with turning her neck to the left and getting out of bed.  Her pain is alleviated with keeping her neck in a neutral position.  The patient denies any numbness, tingling, or weakness in the arms.    The patient to physical therapy for her neck in the fall 2016.  She has been doing her home exercises.  She has been using oxycodone for her postoperative pain.  She is no longer using tramadol.  She also uses Tylenol as needed.    Past medical history is reviewed and is pertinent for her left total hip arthroplasty on May 16, 2017.    Family history is reviewed and is unchanged in the interim.    Review of Systems:  Negative for numbness/tingling, loss of bowel/bladder, footdrop, weakness, headache, dizziness, nausea/vomiting, blurry vision, balance changes.     Objective:   CONSTITUTIONAL:  Vital signs as above.  No acute distress.  The  patient is well nourished and well groomed.    PSYCHIATRIC:  The patient is awake, alert, oriented to person, place and time.  The patient is answering questions appropriately with clear speech.  Normal affect.  HEENT: Normocephalic, atraumatic.  Sclera clear.  Neck is supple.  SKIN:  Skin over the face, neck bilateral upper extremities is clean, dry, intact without rashes.  MUSCULOSKELETAL: The patient significant tenderness palpation of the left upper cervical facets as well as the left upper trapezius muscle.  Cervical range of motion is severely restricted with flexion, extension, lateral rotation to the right, and especially with lateral rotation to left.  The patient has 5/5 strength for the bilateral shoulder abductors, elbow flexors/extensors, wrist extensors, finger flexors/abductors.   NEUROLOGICAL:    Sensation to light touch is intact over bilateral upper extremities throughout.    RESULTS:  MRI cervical spine from an outside facility dated November 21, 2016 was reviewed. This shows degenerative disc disease with loss of disc space height at C5-6 and C6-7. It also shows edema involving the left atlantoaxial joint with joint effusion. At C2-3 there is moderate left foraminal stenosis due to uncovertebral and facet joint hypertrophy. At C3-4 there is severe left and moderate right foraminal stenosis secondary to uncovertebral and facet joint hypertrophy. At C4-5 there is severe bilateral foraminal stenosis secondary to uncovertebral and facet hypertrophy. At C5-6 there is severe bilateral foraminal stenosis, right greater than left. At C6-7 there is mild central canal stenosis and severe bilateral foraminal stenosis, right greater than left. The radiologist compared this MRI to a previous MRI from 2015. It was significant for increasing edema along the left atlantoaxial joint with effusion. Please see report for further details.

## 2021-06-11 NOTE — TELEPHONE ENCOUNTER
Wellness Screening Tool  Symptom Screening:  Do you have one of the following NEW symptoms:    Fever (subjective or >100.0)?  No    A new cough?  No    Shortness of breath?  No     Chills? No     New loss of taste or smell? No     Generalized body aches? No     New persistent headache? No     New sore throat? No     Nausea, vomiting, or diarrhea?  No    Within the past 2 weeks, have you been exposed to someone with a known positive illness below:    COVID-19 (known or suspected)?  No    Chicken pox?  No    Mealses?  No    Pertussis?  No    Patient notified of visitor policy- They may have one person accompany them to their appointment, but they will need to wear a mask and will be screened upon arrival for symptoms: Yes  Pt informed to wear a mask: Yes  Pt notified if they develop any symptoms listed above, prior to their appointment, they are to call the clinic directly at [phone] for further instructions.  Yes  Patient's appointment status: Patient will be seen in clinic as scheduled on 9/16

## 2021-06-11 NOTE — TELEPHONE ENCOUNTER
Refill Approved    Rx renewed per Medication Renewal Policy. Medication was last renewed on 9/4/19.    Candie Zelaya, [facility] Triage/Med Refill 8/31/2020     Requested Prescriptions   Pending Prescriptions Disp Refills     hydroCHLOROthiazide (HYDRODIURIL) 25 MG tablet 90 tablet 3     Sig: Take 1 tablet (25 mg total) by mouth daily.       Diuretics/Combination Diuretics Refill Protocol  Passed - 8/29/2020  2:11 PM        Passed - Visit with PCP or prescribing provider visit in past 12 months     Last office visit with prescriber/PCP: 8/6/2020 Jacy Mishra FNP OR same dept: 8/6/2020 Jacy Mishra FNP OR same specialty: 8/6/2020 Jacy Mishra FNP  Last physical: 11/27/2019 Last MTM visit: Visit date not found   Next visit within 3 mo: Visit date not found  Next physical within 3 mo: Visit date not found  Prescriber OR PCP: BHARGAV Pearson  Last diagnosis associated with med order: 1. Essential hypertension  - hydroCHLOROthiazide (HYDRODIURIL) 25 MG tablet; Take 1 tablet (25 mg total) by mouth daily.  Dispense: 90 tablet; Refill: 3    If protocol passes may refill for 12 months if within 3 months of last provider visit (or a total of 15 months).             Passed - Serum Potassium in past 12 months      Lab Results   Component Value Date    Potassium 3.7 07/31/2020             Passed - Serum Sodium in past 12 months      Lab Results   Component Value Date    Sodium 144 07/31/2020             Passed - Blood pressure on file in past 12 months     BP Readings from Last 1 Encounters:   08/11/20 114/62             Passed - Serum Creatinine in past 12 months      Creatinine   Date Value Ref Range Status   07/31/2020 1.00 0.60 - 1.10 mg/dL Final

## 2021-06-11 NOTE — TELEPHONE ENCOUNTER
RN cannot approve Refill Request    RN can NOT refill this medication For review re Report of Leg Pain with use of other in-class medication (atorvastatin)  . Last office visit: 8/6/2020 Jacy Mishra FNP Last Physical: 11/27/2019 Last MTM visit: Visit date not found Last visit same specialty: 8/6/2020 Jacy Mishra FNP.  Next visit within 3 mo: Visit date not found  Next physical within 3 mo: Visit date not found      Jim H Juancarlos, [facility] Triage/Med Refill 8/30/2020    Requested Prescriptions   Pending Prescriptions Disp Refills     simvastatin (ZOCOR) 20 MG tablet 90 tablet 3       Statins Refill Protocol (Hmg CoA Reductase Inhibitors) Passed - 8/29/2020  9:40 AM        Passed - PCP or prescribing provider visit in past 12 months      Last office visit with prescriber/PCP: 8/6/2020 Jacy Mishra FNP OR same dept: 8/6/2020 Jacy Mishra FNP OR same specialty: 8/6/2020 Jacy Mishra FNP  Last physical: 11/27/2019 Last MTM visit: Visit date not found   Next visit within 3 mo: Visit date not found  Next physical within 3 mo: Visit date not found  Prescriber OR PCP: BHARGAV Pearson  Last diagnosis associated with med order: 1. HLD (hyperlipidemia)  - simvastatin (ZOCOR) 20 MG tablet  Dispense: 90 tablet; Refill: 3    2. Acquired hypothyroidism  - levothyroxine (SYNTHROID, LEVOTHROID) 50 MCG tablet; Take 1 tablet (50 mcg total) by mouth daily.  Dispense: 90 tablet; Refill: 2    If protocol passes may refill for 12 months if within 3 months of last provider visit (or a total of 15 months).              Signed Prescriptions Disp Refills    levothyroxine (SYNTHROID, LEVOTHROID) 50 MCG tablet 90 tablet 2     Sig: Take 1 tablet (50 mcg total) by mouth daily.       Thyroid Hormones Protocol Passed - 8/29/2020  9:40 AM        Passed - Provider visit in past 12 months or next 3 months     Last office visit with prescriber/PCP: 8/6/2020 Jacy Mishra FNP OR rodrigo dept:  8/6/2020 Jacy Mishra FNP OR same specialty: 8/6/2020 Jacy Mishra FNP  Last physical: 11/27/2019 Last MTM visit: Visit date not found   Next visit within 3 mo: Visit date not found  Next physical within 3 mo: Visit date not found  Prescriber OR PCP: BHARGAV Pearson  Last diagnosis associated with med order: 1. HLD (hyperlipidemia)  - simvastatin (ZOCOR) 20 MG tablet  Dispense: 90 tablet; Refill: 3    2. Acquired hypothyroidism  - levothyroxine (SYNTHROID, LEVOTHROID) 50 MCG tablet; Take 1 tablet (50 mcg total) by mouth daily.  Dispense: 90 tablet; Refill: 2    If protocol passes may refill for 12 months if within 3 months of last provider visit (or a total of 15 months).             Passed - TSH on file in past 12 months for patient age 12 & older     TSH   Date Value Ref Range Status   05/04/2020 2.25 0.30 - 5.00 uIU/mL Final

## 2021-06-11 NOTE — PROGRESS NOTES
Internal Medicine Office Visit  Patient Name: Geeta Berry  Patient Age: 76 y.o.  YOB: 1940  MRN: [id number]  ?  Date of Visit: 2017  Reason for Office Visit:   Chief Complaint   Patient presents with     Hospital Visit Follow Up     from hip replacement Left side. pt stated is new hip is wonderful pain is much better. no complications wondering about tramadol if she can restart or if she should not take it anymore period       Assessment / Plan / Medical Decision Makin. Anemia  2. Osteoarthritis  3. Neck pain  4. Dysthymia  - Reviewed hospital and rehab facility records and lab work   - Patient has both tramadol and oxycodone at home.  She is advised to separate taking these medications by at least 4 hours.  May switch to tramadol as pain has been more mild to moderate recently but should not take them in combination.  - Stool softener as needed, currently doing quite well with bowel program  - Continue home care/PT. Hemoglobin recheck in 2 weeks.  Reviewed recent hemoglobin drawn after discharge from the hospital which does show improvement from her discharge hemoglobin. Continue iron supplement for now  - Consider switching citalopram to duloxetine for the effect it may have on osteoarthritis pain  - Follow up in 3 months       Health Maintenance Review  Health Maintenance   Topic Date Due     ADVANCE DIRECTIVES DISCUSSED WITH PATIENT  1957     DXA SCAN  2004     DEPRESSION FOLLOW UP  2017     FALL RISK ASSESSMENT  2018     TD 18+ HE  2023     PNEUMOCOCCAL POLYSACCHARIDE VACCINE AGE 65 AND OVER  Completed     INFLUENZA VACCINE RULE BASED  Completed     PNEUMOCOCCAL CONJUGATE VACCINE FOR ADULTS (PCV13 OR PREVNAR)  Completed     ZOSTER VACCINE  Completed         I am having Ms. Berry maintain her calcium carbonate, fish oil-omega-3 fatty acids, pantoprazole, polyethylene glycol, citalopram, fexofenadine, fluticasone, azelastine, hydroCHLOROthiazide,  levothyroxine, senna-docusate, simvastatin, warfarin, oxyCODONE, ferrous sulfate, and acetaminophen.     HPI:   Encounter Diagnoses   Name Primary?     Anemia Yes     Osteoarthritis      Neck pain      Dysthymia         Left hip- doing well after surgery.  Pain has improved dramatically and the pain in both of her knees has also improved. She was initially scheduled to undergo a right knee replacement which was placed on hold given the severity of the left hip pain and need for surgical revision.     Neck- more pain recently because of the way that ambulates with a walker. She has noticed more tension in the neck. Takes oxycodone for hip surgery related pain. Counseled that she should not combine narcotic pain medications to prevent overdose.     Sinus headaches- worse in the morning, has some post nasal drip in the morning also. Nasal sprays seem to help as this then clears up as the day progresses.     Depression- doing well with citalopram.     Anemia- blood loss anemia after surgery. Hgb recheck with home care shows trending improvement. She is taking iron, no GI distress with this supplement. Currently anticoagulated with warfarin, monitored per orthopedics.     Review of Systems: No SOA, cough. No LE swelling/pain/redness.     Current Scheduled Meds:  Outpatient Encounter Prescriptions as of 5/31/2017   Medication Sig Dispense Refill     acetaminophen (TYLENOL) 500 MG tablet Take 500 mg by mouth every 6 (six) hours as needed for pain.       azelastine (ASTELIN) 137 mcg (0.1 %) nasal spray 1 spray into each nostril at bedtime. Use in each nostril as directed        calcium carbonate (OS-ALOK) 600 mg calcium (1,500 mg) tablet Take 600 mg by mouth daily.        citalopram (CELEXA) 10 MG tablet Take 1 tablet (10 mg total) by mouth daily. 90 tablet 1     ferrous sulfate 325 (65 FE) MG tablet Take 1 tablet by mouth daily with breakfast.       fexofenadine (ALLEGRA) 180 MG tablet Take 180 mg by mouth daily.        fluticasone (FLONASE) 50 mcg/actuation nasal spray 1 spray into each nostril daily.       hydroCHLOROthiazide (HYDRODIURIL) 25 MG tablet Take 25 mg by mouth daily.       levothyroxine (SYNTHROID, LEVOTHROID) 50 MCG tablet Take 50 mcg by mouth daily.       OMEGA-3/DHA/EPA/FISH OIL (FISH OIL-OMEGA-3 FATTY ACIDS) 300-1,000 mg capsule Take 1 g by mouth daily.        oxyCODONE (OXY-IR) 5 mg capsule Take 5 mg by mouth every 4 (four) hours as needed.       pantoprazole (PROTONIX) 40 MG tablet Take 40 mg by mouth 2 (two) times a day.        polyethylene glycol (MIRALAX) 17 gram packet Take 17 g by mouth daily.       senna-docusate (PERICOLACE) 8.6-50 mg tablet Take 1-2 tablets by mouth daily as needed for constipation.       simvastatin (ZOCOR) 20 MG tablet Take 20 mg by mouth at bedtime.       warfarin (COUMADIN) 2.5 MG tablet Take 2 tabs (5mg) Fri and Sat. Take 1 tab (2.5mg) Sun. Then take 1 to 2 tabs (2.5 to 5 mg) by mouth daily based on INR results as directed. 40 tablet 1     No facility-administered encounter medications on file as of 5/31/2017.      Past Medical History:   Diagnosis Date     Chronic neck pain      Depression      DVT (deep venous thrombosis)      GERD (gastroesophageal reflux disease)      History of blood clots      HLD (hyperlipidemia)      HTN (hypertension)      Hypothyroidism      Past Surgical History:   Procedure Laterality Date     CHOLECYSTECTOMY  1980     ERCP      x5 over 8 years. Last 8/2016     HIP ARTHROSCOPY Left 1983     HIP SURGERY  2012    revision     REPLACEMENT TOTAL KNEE Right 2015     REVISION TOTAL HIP ARTHROPLASTY Left 5/16/2017    Procedure: REVISION LEFT TOTAL HIP ARTHROPLASTY, BOTH COMPONENTS;  Surgeon: Tyson Peoples MD;  Location: [facility] OR;  Service:      SHOULDER SURGERY Left 2010     Social History   Substance Use Topics     Smoking status: Never Smoker     Smokeless tobacco: Never Used     Alcohol use No       Objective / Physical Examination:  Vitals:     05/31/17 1459   BP: 104/60   Patient Site: Right Arm   Patient Position: Sitting   Cuff Size: Adult Regular   Pulse: 61   Weight: 178 lb 12.8 oz (81.1 kg)     Wt Readings from Last 3 Encounters:   05/31/17 178 lb 12.8 oz (81.1 kg)   05/17/17 173 lb (78.5 kg)   05/09/17 173 lb 12.8 oz (78.8 kg)     Body mass index is 28.43 kg/(m^2).     General Appearance: Alert and oriented, cooperative, affect appropriate, speech clear, in no apparent distress  Lungs: Clear to auscultation bilaterally. Normal inspiratory and expiratory effort  Cardiovascular: Regular rate, normal S1, S2. No murmurs, rubs, or gallops  Extremities: LLE with mild ecchymosis noted around the knee    Orders Placed This Encounter   Procedures     Hemoglobin   Followup: Return in about 3 months (around 8/31/2017) for Recheck. earlier if needed.      Jacy Mishra, CNP  [facility]

## 2021-06-11 NOTE — PROGRESS NOTES
Internal Medicine Office Visit  Patient Name: Geeta Berry  Patient Age: 76 y.o.  YOB: 1940  MRN: [id number]  ?  Date of Visit: 2017  Reason for Office Visit:   Chief Complaint   Patient presents with     Follow-up     ER visit, constipation, doing much better       Assessment / Plan / Medical Decision Makin. Constipation  2. Anemia  - ER visit and imaging reviewed  - Continue iron  - Continue stool softeners as needed.  As she is able to discontinue the narcotic for pain status post left hip surgery anticipate that she will be able to resume MiraLAX for control of constipation and discontinue the other stool softeners  - Warfarin will be discontinued this  by her orthopedic surgeon  - Follow up in 6 months. Consider bone density given bone demineralization noted on abdominal x-ray       Health Maintenance Review  Health Maintenance   Topic Date Due     ADVANCE DIRECTIVES DISCUSSED WITH PATIENT  1958     DXA SCAN  2004     DEPRESSION FOLLOW UP  2017     FALL RISK ASSESSMENT  2018     TD 18+ HE  2023     PNEUMOCOCCAL POLYSACCHARIDE VACCINE AGE 65 AND OVER  Completed     INFLUENZA VACCINE RULE BASED  Completed     PNEUMOCOCCAL CONJUGATE VACCINE FOR ADULTS (PCV13 OR PREVNAR)  Completed     ZOSTER VACCINE  Completed         I have discontinued Ms. Berry's ferrous sulfate. I have also changed her hydroCHLOROthiazide, levothyroxine, simvastatin, and pantoprazole. Additionally, I am having her maintain her calcium carbonate, fish oil-omega-3 fatty acids, polyethylene glycol, citalopram, fexofenadine, fluticasone, azelastine, senna-docusate, warfarin, oxyCODONE, acetaminophen, and docusate sodium.     HPI:   Encounter Diagnoses   Name Primary?     Constipation Yes     Anemia       Geeta Berry is a 76-year-old female who presents to the office today for follow-up.  She was seen in the emergency department with constipation.  She has constipation at  baseline but after her recent hip surgery the combination of iron pills and narcotic pain medications have increased the severity of the constipation.  She was previously doing well with the bowel regimen but states that the day of the emergency department visit she had gradually increasing bloating and pain and eventually was unable to urinate.  She tried a suppository then an enema at home without relief of symptoms.  In the emergency department she had to be catheterized and given a medication for constipation which did prove effective.  Since that time she has been able to have a bowel movement by taking a higher dose Colace.  She had some blood loss anemia after her surgery and continues to take an iron supplement.  She does feel that her constipation symptoms have worsened with this medication.  No abdominal pain today.    Review of Systems: Pertinent findings as stated in HPI.  Negative for melena or hematochezia.    Current Scheduled Meds:  Outpatient Encounter Prescriptions as of 6/29/2017   Medication Sig Dispense Refill     acetaminophen (TYLENOL) 500 MG tablet Take 500 mg by mouth every 6 (six) hours as needed for pain.       azelastine (ASTELIN) 137 mcg (0.1 %) nasal spray 1 spray into each nostril at bedtime. Use in each nostril as directed        calcium carbonate (OS-ALOK) 600 mg calcium (1,500 mg) tablet Take 600 mg by mouth daily.        citalopram (CELEXA) 10 MG tablet Take 1 tablet (10 mg total) by mouth daily. 90 tablet 1     docusate sodium (COLACE) 250 MG capsule Take 1 capsule (250 mg total) by mouth 2 (two) times a day as needed for constipation. 10 capsule 0     fexofenadine (ALLEGRA) 180 MG tablet Take 180 mg by mouth daily.       fluticasone (FLONASE) 50 mcg/actuation nasal spray 1 spray into each nostril daily.       hydroCHLOROthiazide (HYDRODIURIL) 25 MG tablet Take 1 tablet (25 mg total) by mouth daily. 90 tablet 3     levothyroxine (SYNTHROID, LEVOTHROID) 50 MCG tablet Take 1 tablet  (50 mcg total) by mouth daily. 90 tablet 3     OMEGA-3/DHA/EPA/FISH OIL (FISH OIL-OMEGA-3 FATTY ACIDS) 300-1,000 mg capsule Take 1 g by mouth daily.        oxyCODONE (OXY-IR) 5 mg capsule Take 5 mg by mouth every 4 (four) hours as needed.       pantoprazole (PROTONIX) 40 MG tablet Take 1 tablet (40 mg total) by mouth 2 (two) times a day. 180 tablet 3     polyethylene glycol (MIRALAX) 17 gram packet Take 17 g by mouth daily.       senna-docusate (PERICOLACE) 8.6-50 mg tablet Take 1-2 tablets by mouth daily as needed for constipation.       simvastatin (ZOCOR) 20 MG tablet Take 1 tablet (20 mg total) by mouth at bedtime. 90 tablet 3     warfarin (COUMADIN) 2.5 MG tablet Take 2 tabs (5mg) Fri and Sat. Take 1 tab (2.5mg) Sun. Then take 1 to 2 tabs (2.5 to 5 mg) by mouth daily based on INR results as directed. 40 tablet 1     [DISCONTINUED] ferrous sulfate 325 (65 FE) MG tablet Take 1 tablet by mouth daily with breakfast.       [DISCONTINUED] hydroCHLOROthiazide (HYDRODIURIL) 25 MG tablet Take 25 mg by mouth daily.       [DISCONTINUED] levothyroxine (SYNTHROID, LEVOTHROID) 50 MCG tablet Take 50 mcg by mouth daily.       [DISCONTINUED] pantoprazole (PROTONIX) 40 MG tablet Take 40 mg by mouth 2 (two) times a day.        [DISCONTINUED] simvastatin (ZOCOR) 20 MG tablet Take 20 mg by mouth at bedtime.       No facility-administered encounter medications on file as of 6/29/2017.      Past Medical History:   Diagnosis Date     Chronic neck pain      Depression      DVT (deep venous thrombosis)      GERD (gastroesophageal reflux disease)      History of blood clots      HLD (hyperlipidemia)      HTN (hypertension)      Hypothyroidism      Past Surgical History:   Procedure Laterality Date     CHOLECYSTECTOMY  1980     ERCP      x5 over 8 years. Last 8/2016     HIP ARTHROSCOPY Left 1983     HIP SURGERY  2012    revision     REPLACEMENT TOTAL KNEE Right 2015     REVISION TOTAL HIP ARTHROPLASTY Left 5/16/2017    Procedure: REVISION  LEFT TOTAL HIP ARTHROPLASTY, BOTH COMPONENTS;  Surgeon: Tyson Peoples MD;  Location: [facility] OR;  Service:      SHOULDER SURGERY Left 2010     Social History   Substance Use Topics     Smoking status: Never Smoker     Smokeless tobacco: Never Used     Alcohol use No       Objective / Physical Examination:  Vitals:    06/29/17 0937   BP: 120/60   Pulse: 68   Weight: 173 lb (78.5 kg)     Wt Readings from Last 3 Encounters:   06/29/17 173 lb (78.5 kg)   06/26/17 170 lb (77.1 kg)   06/20/17 178 lb (80.7 kg)     Body mass index is 27.5 kg/(m^2).     Study Result   XR ABDOMEN FLAT AND UPRIGHT  6/26/2017 6:09 PM     INDICATION: constipation  COMPARISON: None.     FINDINGS: Moderate to large volume of dense stool in the rectum. Moderate to large volume of stool elsewhere in the colon. No dilated loops of bowel or evidence of bowel obstruction. Cholecystectomy clips in the right upper abdomen. Surgical changes of   bilateral femoral acetabular arthroplasty. Skeleton appears demineralized.         General Appearance: Alert and oriented, cooperative, affect appropriate, speech clear, in no apparent distress  Abdomen: Bowel sounds active all four quadrants. Soft, non-tender. No rebound tenderness or guarding.         No orders of the defined types were placed in this encounter.  Followup: Return in about 6 months (around 12/29/2017) for Next scheduled follow up. earlier if needed.      Jacy Mishra, CNP  [facility]

## 2021-06-12 NOTE — ANESTHESIA CARE TRANSFER NOTE
Last vitals:   Vitals:    09/05/17 1701   BP: 173/79   Pulse: 77   Resp: 16   Temp: 36.2  C (97.1  F)   SpO2: 100%     Patient's level of consciousness is drowsy  Spontaneous respirations: yes  Maintains airway independently: yes  Dentition unchanged: yes  Oropharynx: oropharynx clear of all foreign objects    QCDR Measures:  ASA# 20 - Surgical Safety Checklist: WHO surgical safety checklist completed prior to induction  PQRS# 430 - Adult PONV Prevention: 4558F - Pt received => 2 anti-emetic agents (different classes) preop & intraop  ASA# 8 - Peds PONV Prevention: NA - Not pediatric patient, not GA or 2 or more risk factors NOT present  PQRS# 424 - Sobeida-op Temp Management: 4559F - At least one body temp DOCUMENTED => 35.5C or 95.9F within required timeframe  PQRS# 426 - PACU Transfer Protocol: - Transfer of care checklist used  ASA# 14 - Acute Post-op Pain: ASA14B - Patient did NOT experience pain >= 7 out of 10

## 2021-06-12 NOTE — PROGRESS NOTES
Internal Medicine Office Visit  Patient Name: Geeta Berry  Patient Age: 77 y.o.  YOB: 1940  MRN: [id number]  ?  Date of Visit: 2017  Reason for Office Visit:   Chief Complaint   Patient presents with     Follow-up     3 month follow up. Concerns about Iron level. Wanting Flu shot. Jaw cracks when opening it at random times. Neck procedure is coming up for injections.        Assessment / Plan / Medical Decision Makin. Iron deficiency anemia  2. Dysthymia  3. HTN (hypertension)  4. HLD (hyperlipidemia)  5. Acquired hypothyroidism  - Influenza vaccine today  - Follow up with [facility] as needed  - No change in current medications  - Euthyroid as of 2017  - Follow up in 6 months       Health Maintenance Review  Health Maintenance   Topic Date Due     ADVANCE DIRECTIVES DISCUSSED WITH PATIENT  1958     DXA SCAN  2004     DEPRESSION FOLLOW UP  2017     FALL RISK ASSESSMENT  2018     TD 18+ HE  2023     PNEUMOCOCCAL POLYSACCHARIDE VACCINE AGE 65 AND OVER  Completed     INFLUENZA VACCINE RULE BASED  Completed     PNEUMOCOCCAL CONJUGATE VACCINE FOR ADULTS (PCV13 OR PREVNAR)  Completed     ZOSTER VACCINE  Completed           I have discontinued Ms. Berry's oxyCODONE. I am also having her maintain her calcium carbonate, polyethylene glycol, citalopram, fluticasone, senna-docusate, acetaminophen, hydroCHLOROthiazide, levothyroxine, simvastatin, pantoprazole, and azelastine.     HPI:   Encounter Diagnoses   Name Primary?     Iron deficiency anemia Yes     Dysthymia      HTN (hypertension)      HLD (hyperlipidemia)      Acquired hypothyroidism       Geeta Berry is a 78 y/o female who presents to the office today for follow up.     Her hip feels well and her knees are stiff but not overly bothersome. She has been riding her bike now.     She continues to have neck pain and sees [facility] Regularly for this. She had a test recently to see if she is a candidate for  radio frequency ablation ablation. She had to have two injections prior to insurance coverage of an additional procedure only if these procedures are not effective. She continues to have pain and stiffness in her neck with very limited ROM.    H/o iron deficiency anemia after recent hip surgery. She was having RLS symptoms but these have improved recently. If she is tired she has more RLS symptoms but walking around helps. She is no longer taking an iron supplement due to constipation.     Constipation- senna twice daily and she remains mostly regular.     Jaw discomfort- notes that it cracks on the right side when she opens and closes her mouth. No pain. Started about 1 month ago.     Rhinitis- stopped taking fexofenadine because it was keeping her awake. Advised that she take this medication in the morning since it was effective.       Review of Systems: Neck pain as in HPI. No chest pain. Minimal b/l knee pain       Current Scheduled Meds:  No facility-administered encounter medications on file as of 8/30/2017.      Outpatient Encounter Prescriptions as of 8/30/2017   Medication Sig Dispense Refill     acetaminophen (TYLENOL) 500 MG tablet Take 500 mg by mouth every 4 (four) hours as needed for pain.        azelastine (ASTELIN) 137 mcg (0.1 %) nasal spray 1 spray into each nostril at bedtime. Use in each nostril as directed 30 mL 2     calcium carbonate (OS-ALOK) 600 mg calcium (1,500 mg) tablet Take 600 mg by mouth daily.        citalopram (CELEXA) 10 MG tablet Take 1 tablet (10 mg total) by mouth daily. 90 tablet 1     fluticasone (FLONASE) 50 mcg/actuation nasal spray 1 spray into each nostril daily.       hydroCHLOROthiazide (HYDRODIURIL) 25 MG tablet Take 1 tablet (25 mg total) by mouth daily. 90 tablet 3     levothyroxine (SYNTHROID, LEVOTHROID) 50 MCG tablet Take 1 tablet (50 mcg total) by mouth daily. 90 tablet 3     pantoprazole (PROTONIX) 40 MG tablet Take 1 tablet (40 mg total) by mouth 2 (two) times a  "day. 180 tablet 3     polyethylene glycol (MIRALAX) 17 gram packet Take 17 g by mouth 2 (two) times a day.        senna-docusate (PERICOLACE) 8.6-50 mg tablet Take 1 tablet by mouth 2 (two) times a day.        simvastatin (ZOCOR) 20 MG tablet Take 1 tablet (20 mg total) by mouth at bedtime. 90 tablet 3     [DISCONTINUED] fexofenadine (ALLEGRA) 180 MG tablet Take 180 mg by mouth daily.       [DISCONTINUED] oxyCODONE (OXY-IR) 5 mg capsule Take 5 mg by mouth every 4 (four) hours as needed.       Past Medical History:   Diagnosis Date     Chronic neck pain      Common bile duct calculus      Depression      DVT (deep venous thrombosis)      GERD (gastroesophageal reflux disease)      History of blood clots      HLD (hyperlipidemia)      HTN (hypertension)      Hypothyroidism      Past Surgical History:   Procedure Laterality Date     CHOLECYSTECTOMY  1980     ERCP      x5 over 8 years. Last 8/2016     HIP ARTHROSCOPY Left 1983     HIP SURGERY  2012    revision     REPLACEMENT TOTAL KNEE Right 2015     REVISION TOTAL HIP ARTHROPLASTY Left 5/16/2017    Procedure: REVISION LEFT TOTAL HIP ARTHROPLASTY, BOTH COMPONENTS;  Surgeon: Tyson Peoples MD;  Location: [facility];  Service:      SHOULDER SURGERY Left 2010     Social History   Substance Use Topics     Smoking status: Never Smoker     Smokeless tobacco: Never Used     Alcohol use No       Objective / Physical Examination:  Vitals:    08/30/17 1020   BP: 106/58   Pulse: 62   Weight: 176 lb (79.8 kg)   Height: 5' 6.5\" (1.689 m)     Wt Readings from Last 3 Encounters:   09/06/17 173 lb 9.6 oz (78.7 kg)   09/04/17 170 lb (77.1 kg)   08/30/17 176 lb (79.8 kg)     Body mass index is 27.98 kg/(m^2).     General Appearance: Alert and oriented, cooperative, affect appropriate, speech clear, in no apparent distress  Head: Normocephalic, atraumatic. No crepitus noted with jaw opening closing   Ears: Right TM occluded by cerumen, advised Debrox drops   Neck: Supple, " trachea midline. No carotid bruits   Lungs: Clear to auscultation bilaterally. Normal inspiratory and expiratory effort  Cardiovascular: Regular rate, normal S1, S2    Orders Placed This Encounter   Procedures     Influenza High Dose, Seasonal 65+ yrs   Followup: Return in about 6 months (around 2/28/2018) for Next scheduled follow up. earlier if needed.      Jacy Mishra, CNP  [facility]

## 2021-06-12 NOTE — PROGRESS NOTES
Internal Medicine Office Visit  Patient Name: Geeta Berry  Patient Age: 77 y.o.  YOB: 1940  MRN: [id number]  ?  Date of Visit: 2017  Reason for Office Visit:   Chief Complaint   Patient presents with     Hospital Visit Follow Up     not really doing better, stomach ache, diarrhea        Assessment / Plan / Medical Decision Makin. Choledocholithiasis  2. Abnormal LFTs  - RUQ every 6 months to monitor for gallstones. Reminder set in computer so I will be notified to contact patient regarding this. First screening scan in March, but she will present to the office sooner if symptomatic   - Avoid acetaminophen until results of hepatic panel can be reviewed due to abnormal LFTs in the hospital      Health Maintenance Review  Health Maintenance   Topic Date Due     ADVANCE DIRECTIVES DISCUSSED WITH PATIENT  1957     DXA SCAN  2004     DEPRESSION FOLLOW UP  2017     FALL RISK ASSESSMENT  2018     TD 18+ HE  2023     PNEUMOCOCCAL POLYSACCHARIDE VACCINE AGE 65 AND OVER  Completed     INFLUENZA VACCINE RULE BASED  Completed     PNEUMOCOCCAL CONJUGATE VACCINE FOR ADULTS (PCV13 OR PREVNAR)  Completed     ZOSTER VACCINE  Completed         I am having Ms. Berry maintain her calcium carbonate, polyethylene glycol, citalopram, fluticasone, senna-docusate, hydroCHLOROthiazide, levothyroxine, simvastatin, pantoprazole, azelastine, aluminum-magnesium hydroxide-simethicone, diphenhydrAMINE, multivitamin with minerals, amoxicillin, ranitidine, and aspirin.     HPI:   Encounter Diagnoses   Name Primary?     Choledocholithiasis Yes     Abnormal LFTs      Common bile duct calculus, ERCP x 5 in past. Cannot tolerate ursodiol         Geeta Berry is a 78 y/o female with a PMH of multiple episodes of ERCP following cholecystectomy who presents to the office today for follow up of recent hospitalization. She went to the ED on  with abdominal pain in the RUQ, thought that she had  passed a gallstone possibly. She was then admitted to the hospital later on 9/4-9/6 when she had another episode of abdominal pain and was seen by GI for ERCP, gallstone removed and thick sludge noted in operative report. Ursodiol was recommended to prevent gallstone recurrence but she cannot tolerate this medication. She is high risk for recurrence.     She was started on ranitidine 150 mg BID. She had some stomach upset and belching in the hospital which is better now. However, she is now having softer stools and diarrhea-- this is the first time since she returned home from the hospital. She had constipation when she first came home from the hospital and was taking stool softeners for this. No nausea.     During hospitalization she had an episode of atrial flutter. Cardiology did not recommend and further treatment or work up. She will continue aspirin and statin therapy. We discussed risks of recurrence which could lead to stroke. She has not felt any heart palpitations since returning home from the hospital. At this time, she does not wish to pursue any further evaluation and will continue statin and aspirin therapy.     She tells me she is scheduled for neck radio frequency ablation procedure. She is looking forward to this, her neck is a source of a lot of pain.     Review of Systems: No abdominal pain, heart palpitations    Current Scheduled Meds:  Outpatient Encounter Prescriptions as of 9/13/2017   Medication Sig Dispense Refill     aluminum-magnesium hydroxide-simethicone (MAALOX ADVANCED) 200-200-20 mg/5 mL Susp Take 5 mL by mouth every 6 (six) hours as needed.       amoxicillin (AMOXIL) 500 MG capsule Take 2,000 mg by mouth once as needed (prior to dental procedures).       aspirin 81 MG EC tablet Take 81 mg by mouth daily.       azelastine (ASTELIN) 137 mcg (0.1 %) nasal spray 1 spray into each nostril at bedtime. Use in each nostril as directed 30 mL 2     calcium carbonate (OS-ALOK) 600 mg calcium  (1,500 mg) tablet Take 600 mg by mouth daily.        citalopram (CELEXA) 10 MG tablet Take 1 tablet (10 mg total) by mouth daily. 90 tablet 1     diphenhydrAMINE (BENADRYL) 25 mg tablet Take 25 mg by mouth at bedtime.       fluticasone (FLONASE) 50 mcg/actuation nasal spray 1 spray into each nostril daily.       hydroCHLOROthiazide (HYDRODIURIL) 25 MG tablet Take 1 tablet (25 mg total) by mouth daily. 90 tablet 3     levothyroxine (SYNTHROID, LEVOTHROID) 50 MCG tablet Take 1 tablet (50 mcg total) by mouth daily. 90 tablet 3     multivitamin with minerals (THERA-M) 9 mg iron-400 mcg Tab tablet Take 1 tablet by mouth daily.       pantoprazole (PROTONIX) 40 MG tablet Take 1 tablet (40 mg total) by mouth 2 (two) times a day. 180 tablet 3     polyethylene glycol (MIRALAX) 17 gram packet Take 17 g by mouth 2 (two) times a day.        ranitidine (ZANTAC) 150 MG tablet Take 1 tablet (150 mg total) by mouth 2 (two) times a day for 14 days. 28 tablet 0     senna-docusate (PERICOLACE) 8.6-50 mg tablet Take 1 tablet by mouth 2 (two) times a day.        simvastatin (ZOCOR) 20 MG tablet Take 1 tablet (20 mg total) by mouth at bedtime. 90 tablet 3     No facility-administered encounter medications on file as of 9/13/2017.      Past Medical History:   Diagnosis Date     Chronic neck pain      Common bile duct calculus      Depression      DVT (deep venous thrombosis)      GERD (gastroesophageal reflux disease)      History of blood clots      HLD (hyperlipidemia)      HTN (hypertension)      Hypothyroidism      Past Surgical History:   Procedure Laterality Date     CHOLECYSTECTOMY  1980     ERCP      x5 over 8 years. Last 8/2016     ERCP N/A 9/5/2017    Procedure: ENDOSCOPIC RETROGRADE CHOLANGIOPANCREATOGRAPHY, STONE EXTRACTION;  Surgeon: Henry Eller MD;  Location: [facility] OR;  Service:      HIP ARTHROSCOPY Left 1983     HIP SURGERY  2012    revision     REPLACEMENT TOTAL KNEE Right 2015     REVISION TOTAL HIP  ARTHROPLASTY Left 2017    Procedure: REVISION LEFT TOTAL HIP ARTHROPLASTY, BOTH COMPONENTS;  Surgeon: Tyson Peoples MD;  Location: [facility];  Service:      SHOULDER SURGERY Left      Social History   Substance Use Topics     Smoking status: Never Smoker     Smokeless tobacco: Never Used     Alcohol use No       Objective / Physical Examination:  Vitals:    17 1135   BP: 120/70   Pulse: (!) 59   Temp: 98.5  F (36.9  C)   TempSrc: Oral   Weight: 170 lb (77.1 kg)     Wt Readings from Last 3 Encounters:   17 170 lb (77.1 kg)   17 173 lb 9.6 oz (78.7 kg)   17 170 lb (77.1 kg)     Body mass index is 26.63 kg/(m^2).     Echo Complete   Order# [id number]   Reading physician: Donna Dwyer MD Ordering physician: Donna Dwyer MD Study date: 17   Patient Information   Patient Name MRN Sex              Age   Geeta Berry [id number] Female 1940 (77 y.o.)   Indications   Atrial flutter   Summary        Left ventricle ejection fraction is The calculated left ventricular ejection fraction is 67%.      1. Normal left ventricular size and systolic performance with a visually estimated ejection fraction of 60-65%.   2. There is trace aortic insufficiency.   3. Normal right ventricular size and systolic performance.          General Appearance: Alert and oriented, cooperative, affect appropriate, speech clear, in no apparent distress  Lungs: Clear to auscultation bilaterally. Normal inspiratory and expiratory effort  Cardiovascular: Regular rate, normal S1, S2  Abdomen: Bowel sounds active all four quadrants. Soft, non-tender  Extremities: Pulses 2+ and equal throughout. No edema      Orders Placed This Encounter   Procedures     Hepatic Profile   Followup: Return in about 6 months (around 3/13/2018) for Next scheduled follow up. earlier if needed.        Jacy Mishra, CNP  [facility]

## 2021-06-12 NOTE — TELEPHONE ENCOUNTER
Refill Approved    Rx renewed per Medication Renewal Policy. Medication was last renewed on 9/11/2019.    Rosalina Albright, [facility] Triage/Med Refill 10/4/2020     Requested Prescriptions   Pending Prescriptions Disp Refills     fluticasone propionate (FLONASE) 50 mcg/actuation nasal spray 48 g 3       Nasal Steroid Refill Protocol Passed - 9/30/2020  4:20 PM        Passed - Patient has had office visit/physical in last 2 years     Last office visit with prescriber/PCP: 8/6/2020 OR same dept: 8/6/2020 Jacy Mishra FNP OR same specialty: 8/6/2020 Jacy Mishra FNP Last physical: 9/21/2020 Last MTM visit: Visit date not found    Next appt within 3 mo: Visit date not found  Next physical within 3 mo: Visit date not found  Prescriber OR PCP: BHARGAV Pearson  Last diagnosis associated with med order: 1. Rhinosinusitis  - fluticasone propionate (FLONASE) 50 mcg/actuation nasal spray  Dispense: 48 g; Refill: 3     If protocol passes may refill for 12 months if within 3 months of last provider visit (or a total of 15 months).

## 2021-06-12 NOTE — ANESTHESIA POSTPROCEDURE EVALUATION
Patient: Geeta Berry  ENDOSCOPIC RETROGRADE CHOLANGIOPANCREATOGRAPHY, STONE EXTRACTION  Anesthesia type: general    Patient location: PACU  Last vitals:   Vitals:    09/05/17 1930   BP: 180/79   Pulse: 67   Resp:    Temp:    SpO2: 93%     Post vital signs: stable  Level of consciousness: awake and responds to simple questions  Post-anesthesia pain: pain controlled  Post-anesthesia nausea and vomiting: no  Pulmonary: unassisted, return to baseline  Cardiovascular: stable and blood pressure at baseline  Hydration: adequate  Anesthetic events: no    QCDR Measures:  ASA# 11 - Sobeida-op Cardiac Arrest: ASA11B - Patient did NOT experience unanticipated cardiac arrest  ASA# 12 - Sobeida-op Mortality Rate: ASA12B - Patient did NOT die  ASA# 13 - PACU Re-Intubation Rate: ASA13B - Patient did NOT require a new airway mgmt  ASA# 10 - Composite Anes Safety: ASA10A - No serious adverse event    Additional Notes:noted to have parox, a fluttter- vent rate in 70's, VSS. Cards consult ordered, labs nl..

## 2021-06-12 NOTE — PROGRESS NOTES
Assessment:   Geeta Berry is a 76 y.o. y.o. female with past medical history significant for DVT, hypothyroidism, hypertension who presents today for follow-up regarding left greater than right neck pain without radicular symptoms.  MRI of the cervical spine shows edema involving the left atlantoaxial joint with joint effusion.  The patient is status post a left C1/2 facet joint injection on July 7, 2017 which provided 100% relief but only lasted as long as the local anesthetic was working.  After that, she did not receive any pain relief.  She had previously had this same injection on April 6, 2017 which provided 70-75% relief of her pain for over 6 weeks.       Plan:     A shared decision making plan was used.  The patient's values and choices were respected.  The following represents what was discussed and decided upon by the physician assistant and the patient.      1.  DIAGNOSTIC TESTS: I reviewed the MRI cervical spine.  No further diagnostic tests were ordered.    2.  PHYSICAL THERAPY: No further physical therapy is ordered.  Patient completed physical therapy in the fall 2016.  I encouraged her to do her exercises.    3.  MEDICATIONS: No changes are made to the patient's medications.  She uses Tylenol 2-3 tabs per day.  She has been using Percocet for postoperative pain after a hip surgery.    4.  INTERVENTIONS: No further interventions were ordered.  Unfortunately we cannot do radio frequency ablation targeting the C1-C2 joint.    5.  REFERRALS: I placed an order for the patient to see Dr. Murphy at [facility] neurosurgery.    6.  FOLLOW-UP: The patient follow-up with me on an as-needed basis.  We will await Dr. Murphy's recommendations.  She has any questions or concerns in the meantime, she should not hesitate to contact our clinic.    Subjective:     Geeta Berry is a 76 y.o. female who presents today for follow-up regarding left neck pain.  The patient status post a left C1/2 facet joint injection on  July 7, 2017.  Patient reports that she had 100% relief of her pain but that relief only lasted as long as the local anesthetic was working.  After that, her pain returned to baseline.  She had had this same injection on April 6, 2017 and provided 70-75% relief of her pain for over 6 weeks.    The patient continues to complain of left upper neck pain.  She denies any pain radiating down her arm.  She rates her pain today as a 6 out of 10.  At its best it is a 4 out of 10.  At its worst it is a 10 out of 10.  The patient's pain is aggravated with turning her head, especially to the left.  It is also aggravated with moving her head up and down and laying in bed.  The patient has felt some extension of the pain around the left ear.  The patient states that she has been feeling the left neck catch which is very painful.  She denies any numbness, tingling, or weakness down the arms.    The patient to physical therapy for her neck in the fall 2016.  She is doing her home exercises.  Uses Tylenol as needed for pain.  She has also been using Percocet for postoperative pain after a hip surgery.    Past medical history is reviewed and is unchanged in the interim.    Family history is reviewed and is unchanged in the interim.    Review of Systems:  Positive for headache.  Negative for numbness/tingling, loss of bowel/bladder control, footdrop, weakness, dizziness, nausea/vomiting, blurry vision, balance changes.     Objective:   CONSTITUTIONAL:  Vital signs as above.  No acute distress.  The patient is well nourished and well groomed.    PSYCHIATRIC:  The patient is awake, alert, oriented to person, place and time.  The patient is answering questions appropriately with clear speech.  Normal affect.  HEENT: Normocephalic, atraumatic.  Sclera clear.  Neck is supple.  SKIN:  Skin over the face, neck bilateral upper extremities is clean, dry, intact without rashes.  MUSCULOSKELETAL: The patient has 5/5 strength for the bilateral  shoulder abductors, elbow flexors/extensors, wrist extensors, finger flexors/abductors.   NEUROLOGICAL:  Sensation to light touch is intact over bilateral upper extremities throughout.    RESULTS:  MRI cervical spine from an outside facility dated November 21, 2016 was reviewed. This shows degenerative disc disease with loss of disc space height at C5-6 and C6-7. It also shows edema involving the left atlantoaxial joint with joint effusion. At C2-3 there is moderate left foraminal stenosis due to uncovertebral and facet joint hypertrophy. At C3-4 there is severe left and moderate right foraminal stenosis secondary to uncovertebral and facet joint hypertrophy. At C4-5 there is severe bilateral foraminal stenosis secondary to uncovertebral and facet hypertrophy. At C5-6 there is severe bilateral foraminal stenosis, right greater than left. At C6-7 there is mild central canal stenosis and severe bilateral foraminal stenosis, right greater than left. The radiologist compared this MRI to a previous MRI from 2015. It was significant for increasing edema along the left atlantoaxial joint with effusion. Please see report for further details.

## 2021-06-14 ENCOUNTER — OFFICE VISIT - HEALTHEAST (OUTPATIENT)
Dept: INTERNAL MEDICINE | Facility: CLINIC | Age: 81
End: 2021-06-14

## 2021-06-14 DIAGNOSIS — D64.9 ANEMIA, UNSPECIFIED TYPE: ICD-10-CM

## 2021-06-14 LAB
ERYTHROCYTE [DISTWIDTH] IN BLOOD BY AUTOMATED COUNT: 15 % (ref 11–14.5)
FERRITIN SERPL-MCNC: 28 NG/ML (ref 10–130)
HCT VFR BLD AUTO: 30.2 % (ref 35–47)
HGB BLD-MCNC: 9.5 G/DL (ref 12–16)
IRON SATN MFR SERPL: 10 % (ref 20–50)
IRON SERPL-MCNC: 36 UG/DL (ref 42–175)
MCH RBC QN AUTO: 30.1 PG (ref 27–34)
MCHC RBC AUTO-ENTMCNC: 31.5 G/DL (ref 32–36)
MCV RBC AUTO: 96 FL (ref 80–100)
PLATELET # BLD AUTO: 345 THOU/UL (ref 140–440)
PMV BLD AUTO: 8.4 FL (ref 7–10)
RBC # BLD AUTO: 3.16 MILL/UL (ref 3.8–5.4)
TIBC SERPL-MCNC: 367 UG/DL (ref 313–563)
TRANSFERRIN SERPL-MCNC: 294 MG/DL (ref 212–360)
WBC: 5.4 THOU/UL (ref 4–11)

## 2021-06-14 NOTE — TELEPHONE ENCOUNTER
RN cannot approve Refill Request    RN can NOT refill this medication allergy/contraindication. Last office visit: 8/6/2020 Jacy Mishra FNP Last Physical: 1/20/2021 Last MTM visit: Visit date not found Last visit same specialty: 8/6/2020 Jacy Mishra FNP.  Next visit within 3 mo: Visit date not found  Next physical within 3 mo: Visit date not found      Jovita Leonard, [facility] Triage/Med Refill 1/31/2021    Requested Prescriptions   Pending Prescriptions Disp Refills     DULoxetine (CYMBALTA) 20 MG capsule [Pharmacy Med Name: DULOXETINE HCL DR 20 MG CAP] 30 capsule 0     Sig: TAKE 1 CAPSULE BY MOUTH EVERY DAY       Tricyclics/Misc Antidepressant/Antianxiety Meds Refill Protocol Passed - 1/31/2021  1:33 PM        Passed - PCP or prescribing provider visit in last year     Last office visit with prescriber/PCP: 8/6/2020 Jacy Mishra FNP OR same dept: 8/6/2020 Jacy Mishra FNP OR same specialty: 8/6/2020 Jacy Mishra FNP  Last physical: 1/20/2021 Last MTM visit: Visit date not found   Next visit within 3 mo: Visit date not found  Next physical within 3 mo: Visit date not found  Prescriber OR PCP: BHARGAV Pearson  Last diagnosis associated with med order: 1. Osteoarthritis, unspecified osteoarthritis type, unspecified site  - DULoxetine (CYMBALTA) 20 MG capsule [Pharmacy Med Name: DULOXETINE HCL DR 20 MG CAP]; TAKE 1 CAPSULE BY MOUTH EVERY DAY  Dispense: 30 capsule; Refill: 0    2. Dysthymia  - DULoxetine (CYMBALTA) 20 MG capsule [Pharmacy Med Name: DULOXETINE HCL DR 20 MG CAP]; TAKE 1 CAPSULE BY MOUTH EVERY DAY  Dispense: 30 capsule; Refill: 0    If protocol passes may refill for 12 months if within 3 months of last provider visit (or a total of 15 months).

## 2021-06-14 NOTE — PATIENT INSTRUCTIONS - HE
- Stop taking citalopram. Start instead duloxetine (Cymbalta) 20 mg daily. I will see you in a month and see how you are doing. If this has gone well, I will increase the dose to 30 mg daily.           Patient Education   Urinary Incontinence, Female (Adult)  Urinary incontinence means loss of control of the bladder. This problem affects many women, especially as they get older. If you have incontinence, you may be embarrassed to ask for help. But know that this problem can be treated.  Types of Incontinence  There are different types of incontinence. Two of the main types are described here. You can have more than one type.    Stress incontinence. With this type, urine leaks when pressure (stress) is put on the bladder. This may happen when you cough, sneeze, or laugh. Stress incontinence most often occurs because the pelvic floor muscles that support the bladder and urethra are weak. This can happen after pregnancy and vaginal childbirth or a hysterectomy. It can also be due to excess body weight or hormone changes.    Urge incontinence (also called overactive bladder). With this type, a sudden urge to urinate is felt often. This may happen even though there may not be much urine in the bladder. The need to urinate often during the night is common. Urge incontinence most often occurs because of bladder spasms. This may be due to bladder irritation or infection. Damage to bladder nerves or pelvic muscles, constipation, and certain medicines can also lead to urge incontinence.  Treatment of urinary incontinence depends on the cause. Further evaluation is needed to find the type you have. This will likely include an exam and certain tests. Based on the results, you and your healthcare provider can then plan treatment. Until a diagnosis is made, the home care tips below can help relieve symptoms.  Home care    Do pelvic floor muscle exercises, if they are prescribed. The pelvic floor muscles help support the bladder  and urethra. Many women find that their symptoms improve when doing special exercises that strengthen these muscles. To do the exercises contract the muscles you would use to stop your stream of urine, but do this when you re not urinating. Hold for 10 seconds, then relax. Repeat 10 to 20 times in a row, at least 3 times a day. Your provider may give you other instructions for how to do the exercises and how often.    Keep a bladder diary. This helps track how often and how much you urinate over a set period of time. Bring this diary with you to your next visit with the provider. The information can help your provider learn more about your bladder problem.    Lose weight, if advised to by your provider. Excess weight puts pressure on the bladder. Your provider can help you create a weight-loss plan that s right for you. This may include exercising more and making certain diet changes.    Don't consume foods and drinks that may irritate the bladder. These can include alcohol and caffeinated drinks.    Quit smoking. Smoking and other tobacco use can lead to chronic cough that strains the pelvic floor muscles. Smoking may also damage the bladder and urethra. Talk with your provider about treatments or methods you can use to quit smoking.    If drinking large amounts of fluid causes you to have symptoms, you may be advised to limit your fluid intake. You may also be advised to drink most of your fluids during the day and to limit fluids at night.    If you re worried about urine leakage or accidents, you may wear absorbent pads to catch urine. Change the pads often. This helps reduce discomfort. It may also reduce the risk of skin or bladder infections.  Follow-up care  Follow up with your healthcare provider, or as directed. It may take some to find the right treatment for your problem. Your treatment plan may include special therapies or medicines. Certain procedures or surgery may also be options. Be sure to discuss  any questions you have with your provider.  When to seek medical advice  Call the healthcare provider right away if any of these occur:    Fever of 100.4 F (38 C) or higher, or as directed by your provider    Bladder pain or fullness    Abdominal swelling    Nausea or vomiting    Back pain    Weakness, dizziness or fainting  Date Last Reviewed: 10/1/2017    5742-8891 [employer]. [address] [place] [address]. All rights reserved. This information is not intended as a substitute for professional medical care. Always follow your healthcare professional's instructions.       Advance Directive  Patient s advance directive was discussed and I am comfortable with the patient s wishes.  Patient Education   Personalized Prevention Plan  You are due for the preventive services outlined below.  Your care team is available to assist you in scheduling these services.  If you have already completed any of these items, please share that information with your care team to update in your medical record.  Health Maintenance   Topic Date Due     MEDICARE ANNUAL WELLNESS VISIT  01/07/2022     FALL RISK ASSESSMENT  01/07/2022     TD 18+ HE  11/20/2023     ADVANCE CARE PLANNING  11/27/2024     DEPRESSION ACTION PLAN  Completed     Pneumococcal Vaccine: 65+ Years  Completed     INFLUENZA VACCINE RULE BASED  Completed     ZOSTER VACCINES  Completed     Pneumococcal Vaccine: Pediatrics (0 to 5 Years) and At-Risk Patients (6 to 64 Years)  Aged Out     HEPATITIS B VACCINES  Aged Out     DEXA SCAN  Discontinued            What is Chronic Kidney Disease?    Chronic kidney disease (CKD) is the permanent loss of kidney function.  When the kidneys are damaged, they do not remove wastes and extra water from the blood as well as they should.  The most common causes of CKD are high blood pressure and diabetes.  It can also run in families, so you may be at higher risk if you have a blood relative with kidney failure.      CKD is a silent condition (having few or no symptoms) and develops so slowly that most people do not realize they are sick until the disease is advanced.  Left undetected and untreated CKD can eventually lead to further problems including hypertension, anemia, weak bones, poor nutrition, nerve damage, and in severe cases kidney failure (requiring dialysis or transplant).  CKD also increases a patient's risk for developing diseases of the heart and blood vessels.     We can diagnose CKD through blood and urine tests.  By detecting CKD in its early stages we can prevent or delay the complications of CKD, including kidney failure and heart disease.     The severity of your kidney disease and the follow up care it requires are based on two factors:      glomerular filtration rate (GFR)  o represents your level of kidney function  o typically assessed using a blood test    the amount of albumin (a protein) that is present in your urine  o represents the degree of your kidney damage  o typically assessed from a small amount of urine collected at the time of your appointment    Things you can do to slow down or avoid kidney failure:     If you have diabetes, control your blood sugar. Studies show that keeping tight control of blood sugar can delay or prevent kidney failure     If you have high blood pressure, it is important to lower your blood pressure. Medications called ACE (angiotensin-converting enzyme) inhibitors or ARBs (angiotensin receptor blockers) are used to keep your kidney disease from getting worse.     Be active by including exercise in your daily routine.     Eat a well balanced diet. If your CKD is advanced, we may have you watch the amount of protein you eat. Too much protein can make the kidneys work too hard.     Quit smoking. Smoking damages the kidneys. It also raises blood pressure.     Discuss all of your medications, including over-the-counter medications, with your doctor. You should avoid  certain medications, including popular medications such as Advil, Motrin, Aleve (and other  NSAIDs ) which can further damage your kidneys.     For more information on Chronic Kidney Disease, please see the National Kidney Foundation.  www.kidney.org

## 2021-06-14 NOTE — PROGRESS NOTES
Assessment and Plan:     1. Encounter for annual wellness exam in Medicare patient  The following high BMI interventions were performed this visit: encouragement to exercise    2. Coronary artery disease involving native coronary artery of native heart without angina pectoris  3. Mixed hyperlipidemia  No angina  Continue statin     4. Stage 3a chronic kidney disease  Reviewed diagnosis. Continue hydration, avoid NSAIDs  ACR at next visit     5. Gastroesophageal reflux disease without esophagitis  Not controlled at this time   Reviewed low FODMAPs diet  Follow up EGD for next month     6. Essential hypertension  stable    7. Acquired hypothyroidism  Euthyroid with levothyroxine     8. Osteoarthritis, unspecified osteoarthritis type, unspecified site  9. Facet arthropathy, cervical  Pain is not well controlled  - STOP: citalopram   - START: DULoxetine (CYMBALTA) 20 MG capsule; Take 1 capsule (20 mg total) by mouth daily.  Dispense: 30 capsule; Refill: 0  - Will likely need to increase dose for therapeutic effect but will start with lowest dose first   - Encouraged use of knee brace. Recommend orthopedic follow up for possible knee injection     10. Dysthymia  Change to duloxetine as noted above   - DULoxetine (CYMBALTA) 20 MG capsule; Take 1 capsule (20 mg total) by mouth daily.  Dispense: 30 capsule; Refill: 0      The patient's current medical problems were reviewed.      The following health maintenance schedule was reviewed with the patient and provided in printed form in the after visit summary:   Health Maintenance   Topic Date Due     MEDICARE ANNUAL WELLNESS VISIT  01/07/2022     FALL RISK ASSESSMENT  01/07/2022     TD 18+ HE  11/20/2023     ADVANCE CARE PLANNING  01/07/2026     DEPRESSION ACTION PLAN  Completed     Pneumococcal Vaccine: 65+ Years  Completed     INFLUENZA VACCINE RULE BASED  Completed     ZOSTER VACCINES  Completed     Pneumococcal Vaccine: Pediatrics (0 to 5 Years) and At-Risk Patients (6  to 64 Years)  Aged Out     HEPATITIS B VACCINES  Aged Out     DEXA SCAN  Discontinued        Subjective:   Chief Complaint: Geeta Berry is an 80 y.o. female here for an Annual Wellness visit.     HPI:  Geeta Berry is an 80 y.o. female here for an Annual Wellness visit.     Note trouble falling asleep. She ends up doing a lot of tasks in the evening as her  has dementia. Then she can't wind down in the evening. She has respite from family. She tried Melatonin but she developed a headache and wasn't sure if it was due to this so she stopped. Sometimes pain keeps her awake. Avoids napping.     She has had a sensation of fullness and chest discomfort.  EGD was performed on 11/6 and she was found to have a nonobstructing Schatzki ring as well as a nonbleeding gastric ulcer.  She was advised to continue omeprazole twice daily and avoid NSAIDs.  She is scheduled for a 2-month follow-up repeat upper endoscopy.     She has been experiencing some right knee discomfort and had a cortisone junction in November. She would do a knee replacement but doesn't have care for her . Has a brace for the knee. She has generalized achy pains.     Review of Systems:  Please see above.  The rest of the review of systems are negative for all systems.    Patient Care Team:  Jacy Mishra FNP as PCP - General (Nurse Practitioner)  Jacy Mishra FNP as Assigned PCP  Lc Blanc MD as Physician (Gastroenterology)  Kathie Lemons MD as Assigned Heart and Vascular Provider  Will Nash DO as Assigned Surgical Provider     Patient Active Problem List   Diagnosis     History of DVT (deep vein thrombosis), right LE 12/2016     Acute iritis, h/o in 2012     Common bile duct calculus, ERCP x 6 in past. Cannot tolerate ursodiol     Constipation     GERD (gastroesophageal reflux disease)     HTN (hypertension)     HLD (hyperlipidemia)     Hypothyroidism     Osteoarthritis of lumbar spine     Osteoarthritis;  knees, hips, cervical spine      Facet arthropathy, cervical     Dysthymia     Hiatal hernia     Chronic rhinitis     Coronary artery disease involving native coronary artery of native heart without angina pectoris     Chronic kidney disease, stage 3     Past Medical History:   Diagnosis Date     Arthritis     osteo     Chronic neck pain      Common bile duct calculus      Coronary artery disease due to calcified coronary lesion 8/6/2020     Depression      DVT (deep venous thrombosis) (H)      GERD (gastroesophageal reflux disease)      History of blood clots 2017    DVT right lower extremity     HLD (hyperlipidemia)      HTN (hypertension)      Hypothyroidism      Infectious viral hepatitis     hepatitis A in 1960s      Past Surgical History:   Procedure Laterality Date     bilateral TIMO      with revision on both hips     CHOLECYSTECTOMY  1980     ERCP      x5 over 8 years. Last 8/2016     ERCP N/A 9/5/2017    Procedure: ENDOSCOPIC RETROGRADE CHOLANGIOPANCREATOGRAPHY, STONE EXTRACTION;  Surgeon: Henry Eller MD;  Location: [facility] [place];  Service:      ERCP N/A 7/26/2019    Procedure: ENDOSCOPIC RETROGRADE CHOLANGIOPANCREATOGRAPHY, REMOVAL OF SLUDGE, DILATION OF STRICTURE;  Surgeon: Ryan Pastrana MD;  Location: [facility] [place];  Service: Gastroenterology     HIP ARTHROSCOPY Left 1983     HIP SURGERY  2012    revision     JOINT REPLACEMENT       MN ESOPHAGOGASTRODUODENOSCOPY TRANSORAL DIAGNOSTIC N/A 9/10/2019    Procedure: ESOPHAGOGASTRODUODENOSCOPY (EGD);  Surgeon: Will Nash DO;  Location: [facility] OR;  Service: General     REPLACEMENT TOTAL KNEE Left 2015     REVISION TOTAL HIP ARTHROPLASTY Left 5/16/2017    Procedure: REVISION LEFT TOTAL HIP ARTHROPLASTY, BOTH COMPONENTS;  Surgeon: Tyson Peoples MD;  Location: [facility] OR;  Service:      SHOULDER SURGERY Left 2010    left total shoulder replacement     XR ERCP BILIARY AND PANCREAS  7/26/2019      Family History   Problem  Relation Age of Onset     Colon cancer Father      Diabetes Sister      Heart disease Mother       Social History     Socioeconomic History     Marital status:      Spouse name: Not on file     Number of children: 2     Years of education: Not on file     Highest education level: Not on file   Occupational History     Occupation: Retired   Social Needs     Financial resource strain: Not on file     Food insecurity     Worry: Not on file     Inability: Not on file     Transportation needs     Medical: Not on file     Non-medical: Not on file   Tobacco Use     Smoking status: Never Smoker     Smokeless tobacco: Never Used   Substance and Sexual Activity     Alcohol use: No     Drug use: No     Sexual activity: Not on file   Lifestyle     Physical activity     Days per week: Not on file     Minutes per session: Not on file     Stress: Not on file   Relationships     Social connections     Talks on phone: Not on file     Gets together: Not on file     Attends Anabaptist service: Not on file     Active member of club or organization: Not on file     Attends meetings of clubs or organizations: Not on file     Relationship status: Not on file     Intimate partner violence     Fear of current or ex partner: Not on file     Emotionally abused: Not on file     Physically abused: Not on file     Forced sexual activity: Not on file   Other Topics Concern     Not on file   Social History Narrative    4/6/17: The patient lives with her  in a condo.      Current Outpatient Medications   Medication Sig Dispense Refill     acetaminophen (TYLENOL) 500 MG tablet Take 500 mg by mouth 2 (two) times a day.       amoxicillin (AMOXIL) 500 MG capsule Take 2,000 mg by mouth once as needed (prior to dental procedures).       azelastine (ASTELIN) 137 mcg (0.1 %) nasal spray 1 SPRAY INTO EACH NOSTRIL AT BEDTIME. USE IN EACH NOSTRIL AS DIRECTED 30 mL 11     calcium carbonate (OS-ALOK) 600 mg calcium (1,500 mg) tablet Take 600 mg by  "mouth daily.        citalopram (CELEXA) 10 MG tablet Take 0.5 tablets (5 mg total) by mouth daily. 90 tablet 1     diphenhydrAMINE-acetaminophen (TYLENOL PM EXTRA STRENGTH)  mg Tab        famotidine (PEPCID) 40 MG tablet Take 1 tablet (40 mg total) by mouth every evening. 90 tablet 1     fluticasone propionate (FLONASE) 50 mcg/actuation nasal spray SPRAY 1 SPRAY INTO EACH NOSTRIL EVERY DAY 48 g 3     hydroCHLOROthiazide (HYDRODIURIL) 25 MG tablet Take 1 tablet (25 mg total) by mouth daily. 90 tablet 3     levothyroxine (SYNTHROID, LEVOTHROID) 50 MCG tablet Take 1 tablet (50 mcg total) by mouth daily. 90 tablet 2     multivitamin with minerals (THERA-M) 9 mg iron-400 mcg Tab tablet Take 1 tablet by mouth daily.       omeprazole (PRILOSEC) 20 MG capsule Take 20 mg by mouth daily before breakfast.       polyethylene glycol (MIRALAX) 17 gram packet Take 17 g by mouth daily.              rosuvastatin (CRESTOR) 20 MG tablet Take 1 tablet (20 mg total) by mouth at bedtime. 30 tablet 11     senna-docusate (PERICOLACE) 8.6-50 mg tablet Take 1 tablet by mouth 2 (two) times a day.        DULoxetine (CYMBALTA) 20 MG capsule Take 1 capsule (20 mg total) by mouth daily. 30 capsule 0     No current facility-administered medications for this visit.       Objective:   Vital Signs:   Visit Vitals  /58   Pulse 68   Ht 5' 7.5\" (1.715 m)   Wt 176 lb (79.8 kg)   BMI 27.16 kg/m           VisionScreening:  No exam data present     PHYSICAL EXAM  Gen: Well developed, well nourished, no acute distress.  HEENT: PERRL/EOMI, TMs: Gray, normal light reflex.  Oral mucosa: no erythema/exudate  Neck: No LAD/masses/thyromegaly/bruits  Lungs: clear bilaterally  Heart: regular rate and rhythm. Right LE trace edema   Abdomen: Normal bowel sounds, soft, non-tender, non-distended  Lymphatics: no supraclavicular/cervical LAD. No edema.  Neuro: A&O x 3        Assessment Results 1/7/2021   Activities of Daily Living No help needed   Instrumental " Activities of Daily Living No help needed   Mini Cog Total Score 5   Some recent data might be hidden     A Mini-Cog score of 0-2 suggests the possibility of dementia, score of 3-5 suggests no dementia    Identified Health Risks:     Information on urinary incontinence and treatment options given to patient.  Patient's advanced directive was discussed and I am comfortable with the patient's wishes. She is a FULL CODE but would not want to be kept alive on machines if there is not hope for her recovery

## 2021-06-15 PROBLEM — M47.812 FACET ARTHROPATHY, CERVICAL: Status: ACTIVE | Noted: 2017-04-19

## 2021-06-15 PROBLEM — Z86.718 HISTORY OF DVT (DEEP VEIN THROMBOSIS): Status: ACTIVE | Noted: 2017-02-07

## 2021-06-15 NOTE — PROGRESS NOTES
Internal Medicine Office Visit  [facility]  Patient Name: Geeta Berry  Patient Age: 77 y.o.  YOB: 1940  MRN: [id number]    Date of Visit: 2018  Reason for Office Visit:   Chief Complaint   Patient presents with     Sinusitis     Cough     x3 weeks           Assessment / Plan / Medical Decision Makin. Rhinosinusitis  2. Cough  - Start Augmentin 875/125 1 tablet BID x 7 days. Recommended OTC probiotic. Recommended dextromethorphan for cough as  Needed. Increase fluid intake. Use saline rinses. Follow up if symptoms worsen or fail to improve.       Health Maintenance Review  Health Maintenance   Topic Date Due     ADVANCE DIRECTIVES DISCUSSED WITH PATIENT  1957     DXA SCAN  2004     DEPRESSION FOLLOW UP  2017     FALL RISK ASSESSMENT  2018     TD 18+ HE  2023     PNEUMOCOCCAL POLYSACCHARIDE VACCINE AGE 65 AND OVER  Completed     INFLUENZA VACCINE RULE BASED  Completed     PNEUMOCOCCAL CONJUGATE VACCINE FOR ADULTS (PCV13 OR PREVNAR)  Completed     ZOSTER VACCINE  Completed         I am having Ms. Berry start on amoxicillin-clavulanate. I am also having her maintain her calcium carbonate, polyethylene glycol, senna-docusate, hydroCHLOROthiazide, levothyroxine, simvastatin, pantoprazole, azelastine, aluminum-magnesium hydroxide-simethicone, diphenhydrAMINE, multivitamin with minerals, amoxicillin, aspirin, citalopram, and fluticasone.      HPI:  Geeta Berry is a 77 y.o. year old who presents to the office today for a sick visit. For the past 2 weeks she has had nasal congestion and pain behind both eyes. She is blowing her nose and has bright yellow mucus consistently. She has bilateral eye drainage. She has an occasional cough which is productive of yellow phlemg. Yesterday she had a low grade fever of 99 degrees. She is taking acetaminophen for sinus/allergy and at night she takes a cough medication which helps for the  cough.     Review of Systems- pertinent positive in bold:  As in HPI       Current Scheduled Meds:  Outpatient Encounter Prescriptions as of 1/2/2018   Medication Sig Dispense Refill     aluminum-magnesium hydroxide-simethicone (MAALOX ADVANCED) 200-200-20 mg/5 mL Susp Take 5 mL by mouth every 6 (six) hours as needed.       amoxicillin (AMOXIL) 500 MG capsule Take 2,000 mg by mouth once as needed (prior to dental procedures).       aspirin 81 MG EC tablet Take 81 mg by mouth daily.       azelastine (ASTELIN) 137 mcg (0.1 %) nasal spray 1 spray into each nostril at bedtime. Use in each nostril as directed 30 mL 2     calcium carbonate (OS-ALOK) 600 mg calcium (1,500 mg) tablet Take 600 mg by mouth daily.        citalopram (CELEXA) 10 MG tablet TAKE 1 TABLET (10 MG TOTAL) BY MOUTH DAILY. 90 tablet 1     diphenhydrAMINE (BENADRYL) 25 mg tablet Take 25 mg by mouth at bedtime.       fluticasone (FLONASE) 50 mcg/actuation nasal spray 1 spray into each nostril daily. 16 g 3     hydroCHLOROthiazide (HYDRODIURIL) 25 MG tablet Take 1 tablet (25 mg total) by mouth daily. 90 tablet 3     levothyroxine (SYNTHROID, LEVOTHROID) 50 MCG tablet Take 1 tablet (50 mcg total) by mouth daily. 90 tablet 3     multivitamin with minerals (THERA-M) 9 mg iron-400 mcg Tab tablet Take 1 tablet by mouth daily.       pantoprazole (PROTONIX) 40 MG tablet Take 1 tablet (40 mg total) by mouth 2 (two) times a day. 180 tablet 3     polyethylene glycol (MIRALAX) 17 gram packet Take 17 g by mouth 2 (two) times a day.        senna-docusate (PERICOLACE) 8.6-50 mg tablet Take 1 tablet by mouth 2 (two) times a day.        simvastatin (ZOCOR) 20 MG tablet Take 1 tablet (20 mg total) by mouth at bedtime. 90 tablet 3     amoxicillin-clavulanate (AUGMENTIN) 875-125 mg per tablet Take 1 tablet by mouth 2 (two) times a day for 7 days. 14 tablet 0     No facility-administered encounter medications on file as of 1/2/2018.      Past Medical History:   Diagnosis Date      Chronic neck pain      Common bile duct calculus      Depression      DVT (deep venous thrombosis)      GERD (gastroesophageal reflux disease)      History of blood clots      HLD (hyperlipidemia)      HTN (hypertension)      Hypothyroidism      Past Surgical History:   Procedure Laterality Date     CHOLECYSTECTOMY  1980     ERCP      x5 over 8 years. Last 8/2016     ERCP N/A 9/5/2017    Procedure: ENDOSCOPIC RETROGRADE CHOLANGIOPANCREATOGRAPHY, STONE EXTRACTION;  Surgeon: Henry Eller MD;  Location: [facility] Main OR;  Service:      HIP ARTHROSCOPY Left 1983     HIP SURGERY  2012    revision     REPLACEMENT TOTAL KNEE Right 2015     REVISION TOTAL HIP ARTHROPLASTY Left 5/16/2017    Procedure: REVISION LEFT TOTAL HIP ARTHROPLASTY, BOTH COMPONENTS;  Surgeon: Tyson Peoples MD;  Location: [facility] OR;  Service:      SHOULDER SURGERY Left 2010     Social History   Substance Use Topics     Smoking status: Never Smoker     Smokeless tobacco: Never Used     Alcohol use No       Objective / Physical Examination:  Vitals:    01/02/18 1317   BP: 128/66   Pulse: 80   Temp: 98.1  F (36.7  C)   TempSrc: Oral   Weight: 176 lb (79.8 kg)     Wt Readings from Last 3 Encounters:   01/02/18 176 lb (79.8 kg)   09/13/17 170 lb (77.1 kg)   09/06/17 173 lb 9.6 oz (78.7 kg)     Body mass index is 27.57 kg/(m^2).     General Appearance: Alert and oriented, cooperative, affect appropriate, speech clear, in no apparent distress  Head: Normocephalic, atraumatic. She has bilateral ethmoidal and right maxillary tenderness   Ears: Tympanic membrane clear with landmarks well visualized bilaterally  Eyes: PERRL  Throat: Lips and mucosa moist. Pharynx without erythema or exudate  Neck: Supple, trachea midline. No cervical adenopathy  Lungs: Clear to auscultation bilaterally. Normal inspiratory and expiratory effort  Cardiovascular: Regular rate, normal S1, S2. No murmurs, rubs, or gallops        No orders of the defined types were  placed in this encounter.  Followup: Return if symptoms worsen or fail to improve. earlier if needed.        Jacy Mishra, CNP

## 2021-06-16 PROBLEM — J31.0 CHRONIC RHINITIS: Status: ACTIVE | Noted: 2019-12-03

## 2021-06-16 PROBLEM — I25.10 CORONARY ARTERY DISEASE INVOLVING NATIVE CORONARY ARTERY OF NATIVE HEART WITHOUT ANGINA PECTORIS: Status: ACTIVE | Noted: 2020-08-06

## 2021-06-16 PROBLEM — K44.9 HIATAL HERNIA: Status: ACTIVE | Noted: 2019-08-21

## 2021-06-16 PROBLEM — Z98.890 STATUS POST KNEE SURGERY: Status: ACTIVE | Noted: 2021-04-29

## 2021-06-16 PROBLEM — N18.30 CHRONIC KIDNEY DISEASE, STAGE 3 (H): Status: ACTIVE | Noted: 2020-10-29

## 2021-06-16 NOTE — TELEPHONE ENCOUNTER
New Appointment Needed  What is the reason for the visit:    Pre-Op Appt Request  When is the surgery? :  3/29/21  Where is the surgery?: [facility]  Who is the surgeon? : pam  What type of surgery is being done?:right knee replacement  Provider Preference: PCP only  How soon do you need to be seen?: next week  Waitlist offered?: No  Okay to leave a detailed message:  Yes

## 2021-06-16 NOTE — PATIENT INSTRUCTIONS - HE
- 7 days prior to surgery, stop taking aspirin  - Take your other prescription medications as you normally would including the day of surgery take with a very small sip of water

## 2021-06-17 NOTE — TELEPHONE ENCOUNTER
Last Seen 05/04/21  Post op F/u    citalopram (CELEXA) 10 MG tablet 90 tablet 3 5/11/2021  No   Sig - Route: Take 0.5 tablets (5 mg total) by mouth daily. - Oral   Sent to pharmacy as: citalopram 10 mg tablet (celeXA)   E-Prescribing Status: Receipt confirmed by pharmacy (5/11/2021  4:41 PM CDT)

## 2021-06-17 NOTE — ANESTHESIA POSTPROCEDURE EVALUATION
Patient: Nai Berry  Procedure(s):  RIGHT TOTAL KNEE ARTHROPLASTY (Right)  Anesthesia type: spinal    Patient location: PACU  Last vitals:   Vitals Value Taken Time   /59 04/29/21 1330   Temp 37  C (98.6  F) 04/29/21 1248   Pulse 61 04/29/21 1336   Resp 23 04/29/21 1336   SpO2 97 % 04/29/21 1336   Vitals shown include unvalidated device data.  Post vital signs: stable  Level of consciousness: awake and responds to simple questions  Post-anesthesia pain: pain controlled  Post-anesthesia nausea and vomiting: no  Pulmonary: unassisted, return to baseline  Cardiovascular: stable and blood pressure at baseline  Hydration: adequate  Anesthetic events: no    QCDR Measures:  ASA# 11 - Sobeida-op Cardiac Arrest: ASA11B - Patient did NOT experience unanticipated cardiac arrest  ASA# 12 - Sobeida-op Mortality Rate: ASA12B - Patient did NOT die  ASA# 13 - PACU Re-Intubation Rate: NA - No ETT / LMA used for case  ASA# 10 - Composite Anes Safety: ASA10A - No serious adverse event    Additional Notes: doing well, no nausea or vomiting; resting comfortably

## 2021-06-17 NOTE — PROGRESS NOTES
Internal Medicine Office Visit  [facility]   Patient Name: Nai Berry  Patient Age: 80 y.o.  YOB: 1940  MRN: [id number]    Date of Visit: 5/4/2021  Reason for Office Visit:   Chief Complaint   Patient presents with     Post-op follow up           Assessment / Plan / Medical Decision Making:    Problem List Items Addressed This Visit     Status post knee surgery      Other Visit Diagnoses     Hospital discharge follow-up    -  Primary         1. Inpatient records reviewed  2. Continue current medications as prescribed  3. Patient is doing well in all regards related to the surgery. She will follow up with orthopedics as scheduled    I am having Nai Berry maintain her multivitamin with minerals, amoxicillin, citalopram, azelastine, omeprazole, levothyroxine, hydroCHLOROthiazide, rosuvastatin, fluticasone propionate, fish oil-omega-3 fatty acids, senna-docusate, polyethylene glycol, rivaroxaban ANTICOAGULANT, hydrOXYzine HCL, naproxen sodium, acetaminophen, and oxyCODONE.                No orders of the defined types were placed in this encounter.  Followup: Return in about 6 months (around 11/4/2021) for Next scheduled follow up. earlier if needed.        Jacy Mishra CNP        HPI:  Nai Berry is a 80 y.o. year old who presents to the office today for follow up with her daughter, Gabi. Bowel movements have been regular with senna. Pain is managed with naproxen and acetaminophen. No coughing or shortness of breath, nausea or vomiting. She denies any fevers or chills. There is swelling in the right lower extremity but it is not painful or increasing. She is on rivaroxaban due to her history of DVT in the past.        Health Maintenance Review  Health Maintenance   Topic Date Due     MEDICARE ANNUAL WELLNESS VISIT  01/07/2022     FALL RISK ASSESSMENT  01/07/2022     TD 18+ HE  11/20/2023     ADVANCE CARE PLANNING  01/07/2026     DEPRESSION ACTION PLAN  Completed      Pneumococcal Vaccine: 65+ Years  Completed     INFLUENZA VACCINE RULE BASED  Completed     ZOSTER VACCINES  Completed     COVID-19 Vaccine  Completed     Pneumococcal Vaccine: Pediatrics (0 to 5 Years) and At-Risk Patients (6 to 64 Years)  Aged Out     HEPATITIS B VACCINES  Aged Out     DEXA SCAN  Discontinued       Current Scheduled Meds:  Outpatient Encounter Medications as of 5/4/2021   Medication Sig Dispense Refill     acetaminophen (TYLENOL) 325 MG tablet Take 3 tablets (975 mg total) by mouth every 8 (eight) hours.  0     amoxicillin (AMOXIL) 500 MG capsule Take 2,000 mg by mouth once as needed (prior to dental procedures).       azelastine (ASTELIN) 137 mcg (0.1 %) nasal spray 1 SPRAY INTO EACH NOSTRIL AT BEDTIME. USE IN EACH NOSTRIL AS DIRECTED 30 mL 11     citalopram (CELEXA) 10 MG tablet Take 0.5 tablets (5 mg total) by mouth daily. 90 tablet 1     fish oil-omega-3 fatty acids (FISH OIL) 300-1,000 mg capsule Take 1 g by mouth daily.       fluticasone propionate (FLONASE) 50 mcg/actuation nasal spray Apply 1 spray into each nostril every morning.        hydroCHLOROthiazide (HYDRODIURIL) 25 MG tablet Take 1 tablet (25 mg total) by mouth daily. 90 tablet 3     hydrOXYzine HCL (ATARAX) 10 MG tablet Take 1 tablet (10 mg total) by mouth every 6 (six) hours as needed for itching (with pain, moderate pain). 30 tablet 0     levothyroxine (SYNTHROID, LEVOTHROID) 50 MCG tablet Take 1 tablet (50 mcg total) by mouth daily. 90 tablet 2     multivitamin with minerals (THERA-M) 9 mg iron-400 mcg Tab tablet Take 1 tablet by mouth daily.       naproxen sodium (ALEVE) 220 MG tablet Take 1 tablet (220 mg total) by mouth 2 (two) times a day with meals for 21 days.  0     omeprazole (PRILOSEC) 20 MG capsule Take 20 mg by mouth daily before breakfast.       oxyCODONE (ROXICODONE) 5 MG immediate release tablet Take 1 tablet (5 mg total) by mouth every 4 (four) hours as needed for pain. 30 tablet 0     polyethylene glycol  (MIRALAX) 17 gram packet Take 1 packet (17 g total) by mouth daily. 7 packet 0     rivaroxaban ANTICOAGULANT (XARELTO) 10 mg tablet Take 1 tablet (10 mg total) by mouth daily. Continue for 3 weeks, then discontinue and resume Aspirin at that point. 21 tablet 0     rosuvastatin (CRESTOR) 20 MG tablet Take 1 tablet (20 mg total) by mouth at bedtime. 30 tablet 11     senna-docusate (PERICOLACE) 8.6-50 mg tablet Take 1-2 tablets by mouth 2 (two) times a day. Take while on oral narcotics to prevent or treat constipation. 30 tablet 0     No facility-administered encounter medications on file as of 5/4/2021.      Post Discharge Medication Reconciliation Status: discharge medications reconciled, continue medications without change      Objective / Physical Examination:  Vitals:    05/04/21 1109   BP: 94/56   Pulse: (!) 56   Temp: 98  F (36.7  C)   TempSrc: Oral   Weight: 183 lb (83 kg)     Wt Readings from Last 3 Encounters:   05/04/21 183 lb (83 kg)   04/29/21 173 lb (78.5 kg)   04/19/21 176 lb (79.8 kg)     Body mass index is 28.24 kg/m .     Constitutional: In no apparent distress  Respiratory: Clear to auscultation bilaterally. Normal inspiratory and expiratory effort  Cardiovascular: Regular rate and rhythm. No murmurs, rubs, or gallops.   Gastrointestinal: Bowel sounds active all four quadrants. Soft, non-tender.   Skin: Right knee incision is covered by a dressing but there is no surrounding erythema or tenderness. Ecchymosis on the right foot and right upper leg.  Psych: Alert and oriented x3.

## 2021-06-17 NOTE — ANESTHESIA CARE TRANSFER NOTE
Last vitals:   Vitals:    04/29/21 1248   BP: (P) 122/60   Pulse: (P) 70   Resp: (P) 20   Temp: (P) 37  C (98.6  F)   SpO2: (P) 96%     Patient's level of consciousness is awake and drowsy  Spontaneous respirations: yes  Maintains airway independently: yes  Dentition unchanged: yes  Oropharynx: oropharynx clear of all foreign objects    QCDR Measures:  ASA# 20 - Surgical Safety Checklist: WHO surgical safety checklist completed prior to induction    PQRS# 430 - Adult PONV Prevention: 4558F - Pt received => 2 anti-emetic agents (different classes) preop & intraop  ASA# 8 - Peds PONV Prevention: NA - Not pediatric patient, not GA or 2 or more risk factors NOT present  PQRS# 424 - Sobeida-op Temp Management: 4559F - At least one body temp DOCUMENTED => 35.5C or 95.9F within required timeframe  PQRS# 426 - PACU Transfer Protocol: - Transfer of care checklist used  ASA# 14 - Acute Post-op Pain: ASA14B - Patient did NOT experience pain >= 7 out of 10

## 2021-06-17 NOTE — ANESTHESIA PROCEDURE NOTES
Peripheral Block    Patient location during procedure: pre-op  Start time: 4/29/2021 9:37 AM  End time: 4/29/2021 9:46 AM  post-op analgesia per surgeon order as noted in medical record  Staffing:  Performing  Anesthesiologist: Daniel Car MD  Preanesthetic Checklist  Completed: patient identified, site marked, risks, benefits, and alternatives discussed, timeout performed, consent obtained, at patient's request, airway assessed, oxygen available, suction available, emergency drugs available and hand hygiene performed  Peripheral Block  Block type: saphenous, adductor canal block  Prep: ChloraPrep  Patient position: supine  Patient monitoring: cardiac monitor, continuous pulse oximetry, blood pressure and heart rate  Laterality: right  Injection technique: ultrasound guided    Ultrasound used to visualize needle placement in proximity to nerve being blocked: yes   US used to visualize anesthetic spread  Visualized anatomic structures normal  No Pathological Findings  Permanent ultrasound image captured for medical record  Sterile gel and probe cover used for ultrasound.  Needle  Needle type: Stimuplex   Needle gauge: 21 G  Needle length: 4 in  no peripheral nerve catheter placed  Assessment  Injection assessment: negative aspiration for heme, no difficulty with injection, no paresthesia on injection and incremental injection

## 2021-06-17 NOTE — ANESTHESIA PROCEDURE NOTES
Spinal Block    Patient location during procedure: OR  Start time: 4/29/2021 10:56 AM  End time: 4/29/2021 11:00 AM  Reason for block: primary anesthetic    Staffing:  Performing  Anesthesiologist: Daniel Car MD    Preanesthetic Checklist  Completed: patient identified, risks, benefits, and alternatives discussed, timeout performed, consent obtained, airway assessed, oxygen available, suction available, emergency drugs available and hand hygiene performed  Spinal Block  Patient position: sitting  Prep: ChloraPrep  Patient monitoring: heart rate, cardiac monitor, continuous pulse ox and blood pressure  Approach: midline  Location: L3-4  Injection technique: single-shot  Needle type: pencil-tip   Needle gauge: 25 G      Additional Notes:  One needle pass, good CSF flow in all four quadrants, no heme appreciated

## 2021-06-17 NOTE — ANESTHESIA PREPROCEDURE EVALUATION
Anesthesia Evaluation      Patient summary reviewed   No history of anesthetic complications     Airway   Mallampati: II  Neck ROM: full   Pulmonary - normal exam   (-) not a smoker    ROS comment: Chronic rhinitis.                         Cardiovascular - normal exam  (+) hypertension, CAD, ,     ECG reviewed (SB)     ROS comment: DVT.     Neuro/Psych    (+) depression, chronic pain    Endo/Other    (+) hypothyroidism, arthritis, obesity,      GI/Hepatic/Renal    (+) hiatal hernia, GERD,   chronic renal disease,     Comments: Hep A.          Dental - normal exam                        Anesthesia Plan  Planned anesthetic: peripheral nerve block and spinal  Saphenous nerve block for POP per surgeon request.  Decadron, Zofran.  Diprivan infusion.  Ketamine (0.5 mg/kg).  Magnesium.  ASA 3     Anesthetic plan and risks discussed with: patient  Anesthesia plan special considerations: antiemetics,   Post-op plan: routine recovery

## 2021-06-17 NOTE — TELEPHONE ENCOUNTER
Last seen 05/04/21 Post Op follow up    citalopram (CELEXA) 10 MG tablet 90 tablet 1 10/14/2019  No   Sig - Route: Take 0.5 tablets (5 mg total) by mouth daily. - Oral

## 2021-06-18 ENCOUNTER — COMMUNICATION - HEALTHEAST (OUTPATIENT)
Dept: CARDIOLOGY | Facility: CLINIC | Age: 81
End: 2021-06-18

## 2021-06-18 DIAGNOSIS — I25.10 CORONARY ARTERY DISEASE INVOLVING NATIVE CORONARY ARTERY OF NATIVE HEART WITHOUT ANGINA PECTORIS: ICD-10-CM

## 2021-06-18 LAB
HEMOCCULT SP1 STL QL: POSITIVE
HEMOCCULT SP2 STL QL: POSITIVE
HEMOCCULT SP3 STL QL: POSITIVE

## 2021-06-18 NOTE — PROGRESS NOTES
Pt was here today for her Left C2-3, C3-4, & C4-5 Facet Injections.  Procedure was Cx'd, d/2 pt having a steroid injection 1 week ago at another facility.  Dr. Stallworth reminded pt that she needed to wait at least 1 more week prior to getting a steroid injection with us.  Pt will be calling back to let us know when she would like to schedule, after going home and taking a look at her calendar.

## 2021-06-18 NOTE — PROGRESS NOTES
Neurosurgery consultation was requested by: Former Pt of Dr. Moran, (Yury Auguste)  Pain: Neck pain   Radicular Pain is present:Radiates into head and into left shoulder and left arm   Lhermitte sign: No  Motor complaints: Denies weakness  Sensory complaints: Denies numbness and tingling   Gait and balance issues: Denies   Bowel or bladder issues: Denies  Duration of SX is:year   The symptoms are worse with: moving head   The symptoms are better with: staying still   Injury: Denies   Severity is:Mild - moderate  Patient has tried the following conservative measures: has done radiofrequency and lessoned symtpoms  NDI score is : 56%  HALLIE Felix

## 2021-06-18 NOTE — PROGRESS NOTES
NEUROSURGERY CONSULTATION NOTE:    Assessment:    1. Facet arthropathy, cervical  Ambulatory referral to Spine Care       Plan: She needs to have the facets on the left side at C2-3, 3 -4 and C4-5 addressed.  I have spoken with Dr. Stallworth.  I have ordered a referral to spine care.  This is not a specific surgical issue.  She does not have radiculopathy.    Geeta BISWAS Kristi   [address]  [facility] [address]  77 y.o. female is sent to me in consultation   by BHARGAV Pearson     CC:    Chief Complaint   Patient presents with     Neck Pain       HPI:  Neurosurgery consultation was requested by: Former Pt of Dr. Moran, (Yury Auguste)  Pain: Neck pain   Radicular Pain is present:Radiates into head and into left shoulder.  Left arm is only occasionally involved.  Lhermitte sign: No  Motor complaints: Denies weakness  Sensory complaints: Denies numbness and tingling   Gait and balance issues: Denies   Bowel or bladder issues: Denies  Duration of SX is:years  The symptoms are worse with: moving head   The symptoms are better with: staying still   Injury: Denies   Severity is:Mild - moderate  Patient has tried the following conservative measures: Has done radiofrequency and lessoned symtpoms.  This RF lesion was done at C4-5, 5 6 and 6 7.  NDI score is : 56%      PROBLEM LIST:  1. Facet arthropathy, cervical            REVIEW OF SYSTEMS:  A 12 point review of systems was completed and reviewed with patient.  She has seasonal allergies and significant arthritis.  The neurological review is listed above and otherwise, the review is negative.      Past Medical History:   Diagnosis Date     Chronic neck pain      Common bile duct calculus      Depression      DVT (deep venous thrombosis)      GERD (gastroesophageal reflux disease)      History of blood clots      HLD (hyperlipidemia)      HTN (hypertension)      Hypothyroidism          Past Surgical History:   Procedure Laterality Date     CHOLECYSTECTOMY   1980     ERCP      x5 over 8 years. Last 8/2016     ERCP N/A 9/5/2017    Procedure: ENDOSCOPIC RETROGRADE CHOLANGIOPANCREATOGRAPHY, STONE EXTRACTION;  Surgeon: Henry Eller MD;  Location: [facility] OR;  Service:      HIP ARTHROSCOPY Left 1983     HIP SURGERY  2012    revision     REPLACEMENT TOTAL KNEE Right 2015     REVISION TOTAL HIP ARTHROPLASTY Left 5/16/2017    Procedure: REVISION LEFT TOTAL HIP ARTHROPLASTY, BOTH COMPONENTS;  Surgeon: Tyson Peoples MD;  Location: [facility] OR;  Service:      SHOULDER SURGERY Left 2010         MEDICATIONS:  Current Outpatient Prescriptions   Medication Sig Dispense Refill     aluminum-magnesium hydroxide-simethicone (MAALOX ADVANCED) 200-200-20 mg/5 mL Susp Take 5 mL by mouth every 6 (six) hours as needed.       amoxicillin (AMOXIL) 500 MG capsule Take 2,000 mg by mouth once as needed (prior to dental procedures).       aspirin 81 MG EC tablet Take 81 mg by mouth daily.       azelastine (ASTELIN) 137 mcg (0.1 %) nasal spray 1 spray into each nostril at bedtime. Use in each nostril as directed 30 mL 2     calcium carbonate (OS-ALOK) 600 mg calcium (1,500 mg) tablet Take 600 mg by mouth daily.        citalopram (CELEXA) 10 MG tablet Take 1 tablet (10 mg total) by mouth daily. 90 tablet 0     fluticasone (FLONASE) 50 mcg/actuation nasal spray 1 spray into each nostril daily. 16 g 3     hydroCHLOROthiazide (HYDRODIURIL) 25 MG tablet Take 1 tablet (25 mg total) by mouth daily. 90 tablet 3     Lactobacillus rhamnosus GG (CULTURELLE) 10-15 Billion cell capsule Take 1 capsule by mouth daily.       levothyroxine (SYNTHROID, LEVOTHROID) 50 MCG tablet Take 1 tablet (50 mcg total) by mouth daily. 90 tablet 3     multivitamin with minerals (THERA-M) 9 mg iron-400 mcg Tab tablet Take 1 tablet by mouth daily.       pantoprazole (PROTONIX) 40 MG tablet Take 1 tablet (40 mg total) by mouth 2 (two) times a day. 180 tablet 3     polyethylene glycol (MIRALAX) 17 gram packet Take 17  "g by mouth 2 (two) times a day.        senna-docusate (PERICOLACE) 8.6-50 mg tablet Take 1 tablet by mouth 2 (two) times a day.        simvastatin (ZOCOR) 20 MG tablet Take 1 tablet (20 mg total) by mouth at bedtime. 90 tablet 3     diphenhydrAMINE (BENADRYL) 25 mg tablet Take 25 mg by mouth at bedtime.       No current facility-administered medications for this visit.          ALLERGIES/SENSITIVITIES:     Allergies   Allergen Reactions     Atorvastatin Other (See Comments)     Leg pain     Codeine Nausea Only     Dipyridamole Other (See Comments)     Patient reports medication was injected during a stress test and it was thought she had a heart attack following administration       PERTINENT SOCIAL HISTORY:   Social History     Social History     Marital status:      Spouse name: N/A     Number of children: 2     Years of education: N/A     Occupational History     Retired      Social History Main Topics     Smoking status: Never Smoker     Smokeless tobacco: Never Used     Alcohol use No     Drug use: No     Sexual activity: Not Asked     Other Topics Concern     None     Social History Narrative    4/6/17: The patient lives with her  in a condo.         FAMILY HISTORY:  Family History   Problem Relation Age of Onset     Colon cancer Father      Diabetes Sister      Heart disease Mother         PHYSICAL EXAM:   Constitutional: /67  Pulse (!) 50  Ht 5' 7\" (1.702 m)  Wt 176 lb (79.8 kg)  SpO2 95%  BMI 27.57 kg/m2    General appearance: Appropriately groomed.  No acute distress.  Interactive.     Mental Status: Mental status: Alert and oriented, mood and affect appropriate, language reception and expression normal, recent and remote memory is normal, higher cortical function normal. Attention span, concentration and ability to follow commands is normal.       Cranial Nerves: Face is symmetric.  Extraocular movements are full, conjugate and without nystagmus.  Hearing is preserved.  Shoulder " position is symmetric.  Tongue is midline with normal motion.       Motor: Motor exam nl bilateral UE. Tone nl, bulk nl and strength 5/5 all groups.      Sensory: Sensory exam by subjective report intact to LT,PP,Position and Vib. in the UE and  LE.     Station and Gait:  Station and Gait- nl stride length,  balance and adelina..     Reflexes; supinator, biceps, triceps, knee/ ankle jerk intact. No hoffmans/babinski/ clonus.    IMAGING:  I have personally reviewed the images and discussed the findings with Geeta Berry.  She has significant facet arthropathy on the left.  This is present at C2-3, C3-4 and C4-5.  She had an injection at C1-2 in the past that gave her significant relief of her pain.  I am not quite sure why she had an injection at C4-5, C5-6 and C6-7 for radiofrequency.    CC:     Jacy Mishra, [id number] [place] [address]

## 2021-06-18 NOTE — PROGRESS NOTES
Received outside records regarding cervical ablation, physical therapy notes and office notes today from [facility]. Paper records to be scanned into patient's chart after provider reviews.

## 2021-06-18 NOTE — PROGRESS NOTES
Assessment:   Geeta Berry is a 77 y.o. y.o. female with past medical history significant for DVT, hypothyroidism, hypertension who presents today for follow-up regarding left-sided neck pain.  MRI cervical spine shows moderate to advanced cervical spondylosis with left-sided facet arthropathy from C2-3 through C4-5.  Patient had a left C4-5, C5-6, C6-7 radio frequency ablation through [facility] in November 2017 which provided 50% relief of her pain, and her pain has been gradually returning since then.  She was seen by Dr. Moran on May 31, 2018.  Dr. Moran has recommended treatment for the left C2-3, C3-4, C4-5 facet joints.  -The patient does have some mild left arm pain which could potentially represent a left cervical radiculitis she does have multilevel moderate to severe foraminal stenosis.  -Patient also had significant tenderness palpation of the left occipital nerve.  She may have occipital neuralgia.       Plan:     A shared decision making plan was used.  The patient's values and choices were respected.  The following represents what was discussed and decided upon by the physician assistant and the patient.      1.  DIAGNOSTIC TESTS: I reviewed the MRI cervical spine.  No further diagnostic tests were ordered.    2.  PHYSICAL THERAPY: No further physical therapy is ordered.  The patient participate in physical therapy after her radiofrequency ablation in November 2017.  She does her home exercises on a regular basis.    3.  MEDICATIONS: No changes are made to the patient's medications.  Uses Tylenol twice daily.  She tried using tramadol but did not tolerate side effects.    4.  INTERVENTIONS: I recommended a left C2, C3, C4, C5 medial branch block as the next step.  I suspect that the patient is symptomatic from these joints which appear most severely arthritic on her most recent MRI.  If she has a positive response to the medial branch blocks, I recommend a left C2-3, C3-4, C4-5 facet joint  injection.  If that provided only short-term relief of her pain, we could pursue radiofrequency ablation for those joints.  The patient is in agreement with this plan.  An order was placed for the left C2, C3, C4, C5 medial branch blocks.    5.  PATIENT EDUCATION: Patient is in agreement with the above plan.  All questions were answered.    6.  FOLLOW-UP: Patient return to the clinic for her left C2, C3, C4, C5 the branch block.  She has any questions or concerns in the meantime, she should not hesitate to contact our clinic.    Subjective:     Geeta Berry is a 77 y.o. female who presents today for follow-up regarding chronic left neck pain.  I last saw the patient in July 2017.  She did a left C1-2 facet joint injection ×2.  The first injection provided significant relief of her pain lasting 6 weeks.  The second injection provided 100% relief of her pain but only lasted as long as the local anesthetic was working.  The patient states that she was at [facility] following up for a hip problem and she ended up being seen there for her neck pain.  She had a left C4, C5, C6, C7 radiofrequency ablation in November 2017.  The patient reports this provided 50% relief of her pain.  Since the radiofrequency ablation, her pain is gradually been returning.  A friend recommended that she see Dr. Moran.  She saw Dr. Moran on May 31, 2018.  Dr. Moran recommended that she follow-up with us here to have treatment for the left C2-3, C3-4, C4-5 facet joints as these do appear to be the most arthritic joints of her most recent MRI spine.    Patient complains of left-sided neck pain.  Pain spans from the base of the skull into the upper trapezius muscle.  And radiates up into the head.  Patient states that she feels like she got hit in the back of the head with a baseball bat.  When it is severe this causes a headache.  She states that she occasionally feels a tired and sore sensation down the left arm, she is not sure if this  is coming from the neck coming from her shoulder.  She has had left shoulder surgery.  Patient rates her pain today as a 2-4 out of 10.  At its best it is a 4 out of 10.  At its worst is an 8 out of 10.  Patient's pain is aggravated with movement of her neck.  It is alleviated with keeping her neck still.  She feels generally weak but denies focal weakness.  Denies any numbness or tingling.    The patient did physical therapy following her radiofrequency ablation in November 2017.  She does her home exercises on a daily basis.  Uses Tylenol 1 tab twice daily.  She tried tramadol but did not tolerate side effects.    Past medical history is reviewed and is pertinent for the radiofrequency ablation at [facility].    Family history is reviewed and is unchanged in the interim.    Review of Systems:  Positive for weakness, headache, blurry vision.  Negative for numbness/tingling, loss of bowel/bladder control, footdrop, dizziness, nausea/vomiting, balance changes.     Objective:   CONSTITUTIONAL:  Vital signs as above.  No acute distress.  The patient is well nourished and well groomed.    PSYCHIATRIC:  The patient is awake, alert, oriented to person, place and time.  The patient is answering questions appropriately with clear speech.  Normal affect.  HEENT: Normocephalic, atraumatic.  Sclera clear.  Neck is supple.  SKIN:  Skin over the face, neck bilateral upper extremities is clean, dry, intact without rashes.  MUSCULOSKELETAL: Cervical range of motion is severely restricted in all directions.  Positive Kemps test on the left.  Tender to palpation of the cervical facets throughout.  Tender to palpation of left greater occipital nerve.  The patient has 5/5 strength for the bilateral shoulder abductors, elbow flexors/extensors, wrist extensors, finger flexors/abductors.   NEUROLOGICAL: 1-2+ biceps, brachioradialis, triceps reflexes bilaterally.  Negative Hwang's bilaterally.  Sensation to light touch is intact  over bilateral upper extremities throughout.    RESULTS: I reviewed the MRI cervical spine from [facility] dated May 21, 2018.  Patient has moderate to advanced cervical spondylosis with disc degeneration greatest from C4 through the abdomen and preferential left-sided facet arthropathy C2 C5.  There are also degenerative changes of the left atlantoaxial joint.  There is mild spinal canal stenosis at C5-6 and C6-7.  There is multilevel moderate to severe foraminal stenosis C3 through C7.  Please see report for further details.

## 2021-06-19 NOTE — PROGRESS NOTES
Received outside records from [facility]. Treating provider to review and to be scanned into patient's chart.      MRI cervical spine report  5/21/18  MRI lumbar spine report 5/21/18

## 2021-06-19 NOTE — PROGRESS NOTES
Procedures  Pre-procedure diagnosis: Left occipital neuralgia  Post-procedure diagnosis:  Same  PROCEDURE: Left occipital nerve block.    Preprocedure pain score: 5/10  Postprocedure pain score: 0/10    The risks of the procedure were discussed with the patient.  These include infection, bleeding, increased pain, no change in pain were discussed with the patient.  The patient gave written and verbal consent to proceed with the procedure.    The most tender area of the left occipital nerve at the nuchal ridge was palpated.  This area was then cleansed with a betadine swab x 3.  A solution of 1 mL of 40 mg of Depomedrol mixed with 2 mL of 1% lidocaine was drawn up.  A 27 guage 1.25 inch needle was inserted down to os.  After aspiration was negative, 1 mL of the solution was injected.  Without withdrawing the needle from the skin, the needle was redirected.  After aspiration was negative, 1 mL of the solution was injected.  Without withdrawing the needle from the skin, the needle was once again redirected.  After aspiration was negative, 1 mL of the remaining solution was injected.      The patient tolerated the procedure well.  The patient was monitored for a short period of time and then discharged under her own power.

## 2021-06-19 NOTE — PROGRESS NOTES
Assessment:   Geeta Berry is a 77 y.o. y.o. female with past medical history significant for DVT, hypothyroidism, hypertension who presents today for follow-up regarding 2 areas of pain.  1.  Left-sided neck and head pain.  MRI cervical spine shows moderate to advanced cervical spondylosis with left-sided facet arthropathy from C2-3 through C4-5.  Patient status post a left C2-3, C3-4, C4-5 facet joint injection on June 27, 2018 which provided 50-60% relief of her neck pain.  She continued to have pain in the occipital region.  She is status post a left occipital nerve block on July 11, 2018 which has provided 80% relief of her occipital pain.  2.  One-year history of right low back pain with radiation into the right hip and groin.  MRI lumbar spine shows foraminal stenosis on the right at L1-L2.  There is also significant spinal canal stenosis at L4-5.  In my opinion, I think she is likely more symptomatic from the foraminal stenosis at L1-L2 based on the distribution of her pain.  She did have a bilateral L5 transforaminal epidural steroid injection at [facility] on June 6, 2018 which provided 50% relief of her pain but only lasted 2 weeks.       Plan:     A shared decision making plan was used.  The patient's values and choices were respected.  The following represents what was discussed and decided upon by the physician assistant and the patient.      1.  DIAGNOSTIC TESTS: I reviewed the MRI cervical spine and MRI lumbar spine.  No further diagnostic tests were ordered.    2.  PHYSICAL THERAPY: No further physical therapy was ordered.  Patient participating physical therapy in November 2017.      3.  MEDICATIONS: Gabapentin 100 mg as prescribed.  She is given the dosage titration chart.  She may increase her dose to 100 mg 3 times daily.  We could consider titrating this dose higher, depending on her response.  -Patient can continue using Tylenol as needed.    4.  INTERVENTIONS: No interventions were  ordered.  Patient has had cervical facet joint injections, lumbar epidural steroid injection, and a left occipital nerve block since June 2018.  I told the patient that she can only have 2 more steroid injections before June 2019.  I recommended that if pain is at a manageable level now, that we hold off on any additional steroid injections, just in case pain worsens later on this year.  Patient was in agreement with this plan.  -If right low back/groin pain fails to improve, I would recommend a right L1-L2 transforaminal epidural steroid injection.  -If left occipital pain were to return, we could repeat the left occipital nerve block.  -If neck pain were to return, we could consider repeating a left C2-3, C3-4, C4-5 facet joint injections.  -Patient has also had radiofrequency ablation left C4 through C7.    5.  PATIENT EDUCATION: Patient is in agreement with the above plan.  All questions were answered.    6.  FOLLOW-UP: I offered to follow-up with the patient to see how she responds to the gabapentin.  She declined.  She prefers to follow-up as needed.  If she has any questions or concerns, she should not hesitate to call.  Left neck and head pain and right low back    Subjective:     Geeta Berry is a 77 y.o. female who presents today for follow-up regarding pain radiating to the right groin.  Patient status post a left occipital nerve block on July 11, 2018.  Patient reports 80% improvement in her left occipital pain since the accident on her back.    Patient states that she continues to have intermittent pain in the left occipital region.  She continues to have mild pain in the left neck.  She is still unable to turn her head to the left.  She also continues to complain of right low back pain.  Pain radiates into the right lateral hip and around into the right lower pelvic area/groin.  Patient states that last week she woke up with severe pain in this region and had difficulty straightening up when she got  out of bed.  Patient states that she applied ice for several days and did improve.  She rates her pain today as a 3 out of 10.  At its best it is a 2 out of 10.  At its worst it is a 4 out of 10 in the neck and an 8 out of 10 in the lower back.  Pain is aggravated with walking for the lower back and turning her neck the neck pain.  Pain is alleviated with not moving.  Patient denies any numbness, tingling, or weakness down the legs.    Patient did physical therapy last year.  She uses Tylenol as needed for pain.    Past medical history is reviewed and is unchanged in the interim.    Family history is reviewed and is unchanged in the interim.    Review of Systems:  Positive for headache, blurry vision.  Negative for numbness/tingling, loss of bowel/bladder, footdrop, weakness, dizziness, nausea/vomiting.     Objective:   CONSTITUTIONAL:  Vital signs as above.  No acute distress.  The patient is well nourished and well groomed.    PSYCHIATRIC:  The patient is awake, alert, oriented to person, place and time.  The patient is answering questions appropriately with clear speech.  Normal affect.  HEENT: Normocephalic, atraumatic.  Sclera clear.  Neck is supple.  SKIN:  Skin over the face, neck bilateral upper extremities is clean, dry, intact without rashes.  MUSCULOSKELETAL: The patient has 5/5 strength for the bilateral shoulder abductors, elbow flexors/extensors, wrist extensors, finger flexors/abductors, hip flexors, knee flexors/extensors, ankle dorsi/plantar flexors.  Cervical range of motion is restricted, especially with lateral rotation to the left.  Patient is tenderness palpation right mid lumbar paraspinous muscles.  NEUROLOGICAL:    Sensation to light touch is intact over bilateral upper and lower extremities throughout.    RESULTS: I reviewed the MRI cervical spine from [facility] dated May 21, 2018.  Patient has moderate to advanced cervical spondylosis with disc degeneration greatest from C4  through the abdomen and preferential left-sided facet arthropathy C2 C5.  There are also degenerative changes of the left atlantoaxial joint.  There is mild spinal canal stenosis at C5-6 and C6-7.  There is multilevel moderate to severe foraminal stenosis C3 through C7.  Please see report for further details.      I also reviewed the MRI lumbar spine from [facility] dated May 21, 2018.  There is mild right L1-L2 foraminal stenosis.  At L2-3 there is left lateral recess stenosis due to disc bulge.  At L4-5 there is significant facet arthropathy and a disc bulge which results in moderate spinal stenosis.  Please see report for further details.

## 2021-06-19 NOTE — PROGRESS NOTES
Assessment:   Geeta Berry is a 77 y.o. y.o. female with past medical history significant for DVT, hypothyroidism, hypertension who presents today for follow-up regarding 2 areas of pain.  1.  Left-sided neck pain.  MRI cervical spine shows moderate to advanced cervical spondylosis with left-sided facet arthropathy from C2-3 through C4-5.  The patient status post a left C2-3, C3-4, C4-5 facet joint injection on June 27, 2018 which has provided 50-60% relief of her neck pain.  Her primary area of pain is now in the occipital region.  I suspect that this is due to occipital neuralgia.  2. One-year history of right low back pain radiating into the right hip and groin.  Patient had an MRI lumbar spine2.  Which shows significant spinal canal stenosis at L4-5.  In my opinion, there also appears to be some foraminal stenosis on the right at L2-3.  The patient status post a bilateral L5 transforaminal epidural steroid injections at [facility] on June 6, 2018 which provided 50% relief of her pain but only lasted 2 weeks.  I think she may be more symptomatic from the foraminal stenosis on the right at L2-3.  Patient did demonstrate slight weakness in right hip flexors on exam.       Plan:     A shared decision making plan was used.  The patient's values and choices were respected.  The following represents what was discussed and decided upon by the physician assistant and the patient.      1.  DIAGNOSTIC TESTS: I reviewed the MRI cervical spine.  Also reviewed the MRI lumbar spine.  We will have the patient sign a release of information so that I can obtain the radiology report.    2.  PHYSICAL THERAPY: No further physical therapy is ordered.  Patient participated in physical therapy in November 2017.  She does her home exercises.    3.  MEDICATIONS: No changes are made to the patient's medications.  She uses Tylenol twice daily.    4.  INTERVENTIONS:    -A left occipital nerve block was performed today.  Please see  separate procedure note for details.  -patient may also benefit from  interventional pain management for the right low back/hip and groin.  In my opinion, patient could benefit from a right L2-3 transforaminal epidural steroid injection.  She also some tenderness palpation over the right SI joint so we could consider an SI joint injection as well..    5.  PATIENT EDUCATION: Patient is in agreement with the above plan.  All questions were answered.     6.  FOLLOW-UP: I will see the patient back in the clinic in 2 weeks.  If she has any questions or concerns in the meantime, she should not hesitate to contact the clinic.    Subjective:     Geeta Berry is a 77 y.o. female who presents today for follow-up regarding 2 areas of pain.    Primary concern is neck pain.  Patient status post a left C2-3, C3-4, C4-5 facet joint injection on June 27, 2018.  Patient reports that this injection has provided 50-60% relief of her neck pain.  He states the pain in the neck has improved significantly, however, her main concern is now pain in the back of the head.  The patient states that it feels like someone hit her in the back of the head.  She had to lay on her back for an ultrasound recently and that made her pain much worse.  She denies any pain radiating down the arms.    Secondary concern is a 1 year history of right low back pain radiating into the hip and groin.  Patient has been working with [facility] for this pain.  She has a history of a right hip replacement.  Patient had a bilateral L5 transforaminal epidural steroid injection on June 6, 2018 which provided 50% relief of her pain but only lasted 2 weeks.  He begins in the right low back.  It extends into the upper buttock, to the lateral hip, and into the groin.  It occasionally extends down the proximal anterolateral thigh.  She denies any pain radiating distal to that.  She denies any numbness or tingling down the legs.  She does feel weak in the right leg,  especially when climbing stairs.    Patient participated physical therapy in November 2017.  She does home exercises.  Uses Tylenol twice daily.    Past medical history is reviewed and is unchanged in the interim.    Family history is reviewed and is unchanged in the interim.    Review of Systems:  Positive for headache, blurry vision.  Negative for numbness/tingling, loss of bowel/bladder control, footdrop, weakness, dizziness, nausea/vomiting, balance changes.     Objective:   CONSTITUTIONAL:  Vital signs as above.  No acute distress.  The patient is well nourished and well groomed.    PSYCHIATRIC:  The patient is awake, alert, oriented to person, place and time.  The patient is answering questions appropriately with clear speech.  Normal affect.  HEENT: Normocephalic, atraumatic.  Sclera clear.  Neck is supple.  SKIN:  Skin over the face, neck bilateral upper extremities is clean, dry, intact without rashes.  MUSCULOSKELETAL: The patient has 5/5 strength for the bilateral shoulder abductors, elbow flexors/extensors, wrist extensors, finger flexors/abductors.  The patient has 4/5 strength right hip flexors, otherwise 5/5 strength left hip flexors, bilateral knee flexors/extensors, ankle dorsi/plantar flexors.  Range of motion the right hip is mildly restricted with  internal and external rotation.  NEUROLOGICAL: 1-2+ biceps, brachioradialis, triceps reflexes bilaterally.  1+ bilateral patellar, absent bilateral Achilles reflexes.  Negative Hwang's bilaterally.  Sensation to light touch is intact over bilateral upper and lower extremity throughout.  Patient significant tenderness palpation over the left greater occipital nerve (prison between the inion in the left mastoid process).    RESULTS:    I reviewed the MRI cervical spine from [facility] dated May 21, 2018.  Patient has moderate to advanced cervical spondylosis with disc degeneration greatest from C4 through the abdomen and preferential  left-sided facet arthropathy C2 C5.  There are also degenerative changes of the left atlantoaxial joint.  There is mild spinal canal stenosis at C5-6 and C6-7.  There is multilevel moderate to severe foraminal stenosis C3 through C7.  Please see report for further details.       I reviewed an MRI lumbar spine from an outside facility dated May 21, 2018.  She appears to have autofusion at L5-S1.  At L4-5 there is what I would describe is at least moderate spinal canal stenosis.  The patient also has multilevel foraminal stenosis.  There appears to be significant foraminal stenosis on the right at L2-3.

## 2021-06-20 NOTE — LETTER
Letter by Jacy Mishra FNP at      Author: Jacy Mishra FNP Service: -- Author Type: --    Filed:  Encounter Date: 8/8/2020 Status: (Other)         Nai Berry  [address]  [facility] [address]         August 8, 2020         Dear Dr. Blanc,    Below are the results from Nai Kristi's recent visit:    Resulted Orders   XR Abdomen 2 Views    Narrative    EXAM DATE:         08/06/2020    EXAM: X-RAY ABDOMEN, 2 VIEWS  LOCATION: [facility]  DATE/TIME: 8/6/2020 11:15 AM    INDICATION: Constipation  COMPARISON: 05/04/2020    IMPRESSION: The bowel gas pattern is nonobstructive with a moderate stool burden  in the colon. No free air. Surgical clips in the gallbladder fossa. Stable  bilateral hip arthroplasty. Severe degenerative changes of the spine. Stable  pneumobilia.                 Sincerely,        Electronically signed by BHARGAV Pearson   P: [phone]

## 2021-06-20 NOTE — PROGRESS NOTES
Assessment/Plan:      Visit for Preoperative Exam.     1. Preoperative examination  2. Cataract  - Patient is approved for surgery  - May take medications with a sip of water on the morning of surgery     3. HTN (hypertension)  - Stable, continue HCTZ    4. HLD (hyperlipidemia)  - Tolerates simvastatin, continue     5. GERD (gastroesophageal reflux disease)  - Stable     6. Acquired hypothyroidism  - euthyroid with last check     7. Dysthymia  - Continue citalopram, okay to take 1/2 dose: (CELEXA) 10 MG tablet; Take 1 tablet (10 mg total) by mouth daily.  Dispense: 90 tablet; Refill: 3    8. Osteoarthritis  - Continue [facility] follow up  - Cervicalgia is the best it has felt in years      - Shingrix vaccine recommended, she will check on cost       Subjective:     Scheduled Procedure: Cataract Rt Eye  Surgery Date:  10/01/2018  Surgery Location:  [facility]  Surgeon:  Dr Sosa    HPI: Geeta ZULAY Berry is here for a preoperative physical for a catarct procedure. Distance vision is effected, progressively worsening.     Anxiety/depression- she stopped taking citalopram but became angry and weepy. Has now resumed citalopram at 5 mg daily.     HTN- blood pressure is well controlled with current medication. No lightheadedness or dizziness associated with treatment.     HLD- She is taking a statin for this, denies myalgias or weakness associated with treatment.     Hypothyroidism- euthyroid with last check.     Osteoarthritis- neck feels the best it has in a long time. Weaning off of gabapentin as she did not like how she felt when taking the medication. She also has right low back pain/sciatica. Doing well if she keeps up with her exercises.     GERD- stable    Has restless legs in the evenings, worse in the spring and fall and seems to be most associated with weather changes. Acetaminophen was effective.     Current Outpatient Prescriptions   Medication Sig Dispense Refill     aluminum-magnesium hydroxide-simethicone  (MAALOX ADVANCED) 200-200-20 mg/5 mL Susp Take 5 mL by mouth every 6 (six) hours as needed.       amoxicillin (AMOXIL) 500 MG capsule Take 2,000 mg by mouth once as needed (prior to dental procedures).       aspirin 81 MG EC tablet Take 81 mg by mouth daily.       azelastine (ASTELIN) 137 mcg (0.1 %) nasal spray 1 SPRAY INTO EACH NOSTRIL AT BEDTIME. USE IN EACH NOSTRIL AS DIRECTED 30 mL 2     calcium carbonate (OS-ALOK) 600 mg calcium (1,500 mg) tablet Take 600 mg by mouth daily.        cetirizine (ZYRTEC) 10 MG tablet Take 10 mg by mouth daily.       citalopram (CELEXA) 10 MG tablet Take 1 tablet (10 mg total) by mouth daily. (Patient taking differently: Take 5 mg by mouth daily. ) 90 tablet 3     fluticasone (FLONASE) 50 mcg/actuation nasal spray 1 spray into each nostril daily. 16 g 3     gabapentin (NEURONTIN) 100 MG capsule Take 100 mg by mouth daily. Increase dose as directed by chart.  May increase to 1 tabs 3 times daily 90 capsule 2     hydroCHLOROthiazide (HYDRODIURIL) 25 MG tablet TAKE 1 TABLET (25 MG TOTAL) BY MOUTH DAILY. 90 tablet 2     levothyroxine (SYNTHROID, LEVOTHROID) 50 MCG tablet TAKE 1 TABLET (50 MCG TOTAL) BY MOUTH DAILY. 90 tablet 2     multivitamin with minerals (THERA-M) 9 mg iron-400 mcg Tab tablet Take 1 tablet by mouth daily.       pantoprazole (PROTONIX) 40 MG tablet TAKE 1 TABLET (40 MG TOTAL) BY MOUTH 2 (TWO) TIMES A DAY. 180 tablet 3     polyethylene glycol (MIRALAX) 17 gram packet Take 17 g by mouth 2 (two) times a day.        senna-docusate (PERICOLACE) 8.6-50 mg tablet Take 1 tablet by mouth 2 (two) times a day.        simvastatin (ZOCOR) 20 MG tablet Take 1 tablet (20 mg total) by mouth at bedtime. 90 tablet 3     No current facility-administered medications for this visit.        Allergies   Allergen Reactions     Atorvastatin Other (See Comments)     Leg pain     Codeine Nausea Only     Dipyridamole Other (See Comments)     Patient reports medication was injected during a  stress test and it was thought she had a heart attack following administration       Immunization History   Administered Date(s) Administered     Influenza high dose, seasonal 08/30/2017, 09/20/2018     Influenza, inj, historic,unspecified 09/14/2016     Pneumo Conj 13-V (2010&after) 09/14/2015     Pneumo Polysac 23-V 01/25/2006     Td,adult,historic,unspecified 01/25/2006     Tdap 11/20/2013     ZOSTER, LIVE 09/26/2008       Patient Active Problem List   Diagnosis     History of DVT (deep vein thrombosis), right LE 12/2016     Acute iritis, h/o in 2012     Common bile duct calculus, ERCP x 5 in past. Cannot tolerate ursodiol     Constipation     GERD (gastroesophageal reflux disease)     HTN (hypertension)     HLD (hyperlipidemia)     Hypothyroidism     Osteoarthritis of lumbar spine     Osteoarthritis; knees, hips, cervical spine      Psoriasis     Facet arthropathy, cervical     Dysthymia       Past Medical History:   Diagnosis Date     Chronic neck pain      Common bile duct calculus      Depression      DVT (deep venous thrombosis) (H)      GERD (gastroesophageal reflux disease)      History of blood clots      HLD (hyperlipidemia)      HTN (hypertension)      Hypothyroidism        Social History     Social History     Marital status:      Spouse name: N/A     Number of children: 2     Years of education: N/A     Occupational History     Retired      Social History Main Topics     Smoking status: Never Smoker     Smokeless tobacco: Never Used     Alcohol use No     Drug use: No     Sexual activity: Not on file     Other Topics Concern     Not on file     Social History Narrative    4/6/17: The patient lives with her  in a condo.       Past Surgical History:   Procedure Laterality Date     CHOLECYSTECTOMY  1980     ERCP      x5 over 8 years. Last 8/2016     ERCP N/A 9/5/2017    Procedure: ENDOSCOPIC RETROGRADE CHOLANGIOPANCREATOGRAPHY, STONE EXTRACTION;  Surgeon: Henry Eller MD;  Location: [facility];  Service:      HIP ARTHROSCOPY Left 1983     HIP SURGERY  2012    revision     REPLACEMENT TOTAL KNEE Right 2015     REVISION TOTAL HIP ARTHROPLASTY Left 5/16/2017    Procedure: REVISION LEFT TOTAL HIP ARTHROPLASTY, BOTH COMPONENTS;  Surgeon: Tyson Peoples MD;  Location: [facility];  Service:      SHOULDER SURGERY Left 2010           History of Present Illness  Recent Health  Fever: no  Chills: no  Fatigue: no  Chest Pain: no  Cough: no  Dyspnea: no  Urinary Frequency: no  Nausea: no  Vomiting: no  Diarrhea: no  Abdominal Pain: no  Easy Bruising: no  Lower Extremity Swelling: no  Poor Exercise Tolerance: no    Pertinent History  Prior Anesthesia: yes  Previous Anesthesia Reaction:  no  Diabetes: no  Cardiovascular Disease: no  Pulmonary Disease: no  Renal Disease: no  GI Disease: no  Sleep Apnea: no  Thromboembolic Problems: no  Clotting Disorder: no  Bleeding Disorder: no  Impaired Immunity: no  Steroid use in the last 6 months: no  Frequent Aspirin use: no    Family history: There is no family history of abnormal reaction to anesthesia or sudden unexplained death    Review of Systems: Positives in bold  Constitutional: Fever, chills, night sweats, fainting, weight change, fatigue, seizures, dizziness, sleeping difficulties, loud snoring/pauses in breathing  Eyes: change in vision, blurred or double vision, redness/eye pain  Ears, nose, mouth, throat: change in hearing, ear pain, hoarseness, difficulty swallowing, sores in the mouth or throat  Respiratory: shortness of breath, cough, bloody sputum, wheezing  Cardiovascular: chest pain, palpitations   Gastrointestinal: abdominal pain, heartburn/indigestion, nausea/vomiting, change in appetite, change in bowel habits, constipation or diarrhea, rectal bleeding/dark stools, difficulty swallowing  Urinary: painful urination, frequent urination, urinary urgency/incontinence, blood in urine/dark urine, nocturia  Genital: WOMEN: vaginal discharge  "or odor, bleeding/pain with intercourse, pelvic pain, vulvar/vaginal itching or burning, excessive menstrual bleeding, problems with sexual function  Musculoskeletal: backache/back pain, weakness, joint pain/stiffness, muscle cramps, swelling of hands, feet, ankles, leg pain/redness  Skin: change in moles/freckles, rash, nodules  Hematologic/lymphatic: swollen lymph glands, abnormal bruising/bleeding  Endocrine: excessive thirst/urination, cold or heat intolerance  Breast: breast lump, breast pain, nipple discharge/skin changes  Neurologic/emotional: worrisome memory change, numbness/tingling, anxiety, mood swings      Objective:       Vitals:    09/20/18 0945   BP: 108/60   Pulse: 64   Weight: 177 lb (80.3 kg)   Height: 5' 7\" (1.702 m)         Physical Exam:  General Appearance: Alert, cooperative, no distress, appears stated age  Head: Normocephalic, without obvious abnormality, atraumatic  Eyes: PERRL, conjunctiva/corneas clear, EOM's intact  Ears: Normal TM's and external ear canals, both ears  Throat: Lips, mucosa, and tongue normal; teeth and gums normal  Neck: Supple, symmetrical, trachea midline, no adenopathy;  thyroid: not enlarged, symmetric, no tenderness/mass/nodules  Lungs: Clear to auscultation bilaterally, respirations unlabored  Heart: Regular rate and rhythm, S1 and S2 normal, no murmur, rub, or gallop  Abdomen: Soft, non-tender, bowel sounds active all four quadrants,  no masses, no organomegaly  Extremities: Extremities normal, atraumatic, no cyanosis or edema  Skin: Skin color, texture, turgor normal, no rashes or lesions  Lymph nodes: Cervical, supraclavicular nodes normal  Neurologic: Normal        No results found for this or any previous visit (from the past 240 hour(s)).        Jacy Mishra, CNP  [facility]       "

## 2021-06-21 ENCOUNTER — COMMUNICATION - HEALTHEAST (OUTPATIENT)
Dept: INTERNAL MEDICINE | Facility: CLINIC | Age: 81
End: 2021-06-21

## 2021-06-21 DIAGNOSIS — E03.9 ACQUIRED HYPOTHYROIDISM: ICD-10-CM

## 2021-06-22 ENCOUNTER — AMBULATORY - HEALTHEAST (OUTPATIENT)
Dept: INTERNAL MEDICINE | Facility: CLINIC | Age: 81
End: 2021-06-22

## 2021-06-22 DIAGNOSIS — D50.0 IRON DEFICIENCY ANEMIA DUE TO CHRONIC BLOOD LOSS: ICD-10-CM

## 2021-06-24 NOTE — TELEPHONE ENCOUNTER
"Pt returned call. She reports \"I am feeling so much better. Please let Krista know this. Thank you so much for checking in. As much as I like you guys, I hope that you don't have to hear from me for a long time\". Informed pt not to hesitate to call with any questions or concerns, and to follow-up with [facility] as needed.   "

## 2021-06-24 NOTE — TELEPHONE ENCOUNTER
Call to pt in follow-up from last appt on 2/19/2019 with Krista SARGENT. Left message to return call.

## 2021-06-24 NOTE — PROGRESS NOTES
Assessment:   Geeta Berry is a 78 y.o. y.o. female with past medical history significant for DVT, hypothyroidism, hypertension who presents today for follow-up regarding 2 areas of pain.  1.  Acute on chronic bilateral neck pain at approximately the C2-3, C3-4 levels.  MRI cervical spine from May 2018 showed advanced cervical spondylosis with preferential left-sided facet arthropathy C2 through C5.  Patient has previously had improvement in neck pain with cervical facet joint injections on the left and a radiofrequency ablation on the right.  She has also had occipital nerve pain in the past which has improved with an occipital nerve block.  Patient reports that her current pain is different than any of her previous episodes of pain in the location and quality of the pain.  I do think this is from cervical facet arthropathy.  She has actually had significant improvement in her pain over the past 24 hours.  2.  Chronic right upper buttock pain.  MRI lumbar spine shows foraminal stenosis on the right at L1-L2 as well as significant spinal stenosis L4-5.  She has previously seem symptomatic from the L1-L2 foraminal stenosis with pain radiating to the left hip, groin, and lower pelvis.  However, now pain is localized to the upper buttock.  This pain actually seems most consistent with sacroiliac joint dysfunction.       Plan:     A shared decision making plan was used.  The patient's values and choices were respected.  The following represents what was discussed and decided upon by the physician assistant and the patient.      1.  DIAGNOSTIC TESTS: I reviewed the MRI scan from May 2018.  No additional diagnostic tests were ordered.  If her pain fails to improve significantly over the next few days, I will order an updated MRI cervical spine.  Patient is quite concerned and that this pain felt much different than her typical neck pain.  She does have a daughter with a history of brain cancer, which makes the patient  feel quite anxious.    2.  PHYSICAL THERAPY: No further physical therapy was ordered.  Patient participate in physical therapy in November 2017.  She does her home exercises every day.    3.  MEDICATIONS: No changes are made to the patient's medications.  She did not tolerate gabapentin.  She uses Tylenol as needed.  She has not been taking Tylenol because she felt like it was making her lightheaded while she was taking antibiotics for a finger infection.  I told the patient once she completes her antibiotics, she should try the Tylenol again.  She has tolerated in the past.  Patient reports that she does not want to take any additional pain relief medications.    4.  INTERVENTIONS: No interventions were ordered.  Patient could potentially benefit from interventional pain management if pain fails to improve.  I would like to see the updated MRI cervical spine first.    5.  PATIENT EDUCATION: Patient is in agreement the above plan.  All questions were answered.    6.  FOLLOW-UP: A nurse will call the patient at the end of the week.  If her pain is not improved, I will order the MRI cervical spine and we will follow-up accordingly.  If she has any other questions or concerns in the meantime, she should not hesitate to call.    Subjective:     Geeta Berry is a 78 y.o. female who presents today for follow-up regarding 2 areas of pain.    Primary concern is acute on chronic neck pain.  Patient has previously had neck pain related to cervical facet arthropathy.  She has had left cervical facet joint injections and a right cervical rhizotomy.  Both of these were helpful.  In the past, she has also been symptomatic from occipital neuralgia which is improved after occipital nerve block.  Patient states that her current pain feels different than any of her other episodes of pain.  She states that this pain is on both sides equally.  It is located in the upper cervical spine, almost at the craniocervical junction.  She  states that the pain comes and goes.  She states that it feels like something catches in her neck and she cannot move her neck for a period of time.  This began a couple of weeks ago.  She denies any injury to cause the pain.  She does admit that she has been doing some exercise machines, but that she did not feel like these were straining that area of her neck.  The pain is worse with lying on her back.  Certain movements of the neck also trigger the pain.  Applying ice and heat helps.  She denies any pain in the occipital region.  She denies any pain radiating into the shoulders or down the arms.  She denies any numbness, tingling, or weakness down the arms.    Next concern is right upper buttock pain.  Patient has previously had right low back and leg symptoms.  When I last saw her she is experiencing pain radiating into the right lower pelvis and groin which I thought was related to foraminal stenosis at L1-L2.  Patient reports that she is no longer having the pain wrapping around.  This pain is localized in the right upper buttock only.  She denies any pain radiating further down the leg.  She denies any numbness, tingling, or weakness down the legs.  The pain is worse when she first gets up in the morning.  She states that the pain improves as she does her exercises.    Overall, patient rates her pain today is a 2 out of 10.  At its best it is a 2 out of 10.  At its worst it is a 6 out of 10.  Patient did physical therapy November 2017.  She does exercises for her neck and her lower back every day.  She did not tolerate the gabapentin I prescribed so she stopped taking it.  She had been using Tylenol as needed.  She is currently taking an antibiotic for a finger infection.  Patient felt the combination of the antibiotic and the Tylenol causing her to feel lightheaded so she stopped taking the Tylenol.    Past medical history is reviewed and is pertinent for current treatment for right middle finger skin  infection with Keflex.    Family history is reviewed and is unchanged in the interim.    Review of Systems:  Positive for headache.  Negative for numbness/tingling, loss of bowel/bladder control, footdrop, weakness, dizziness, nausea/vomiting, blurry vision, balance changes.     Objective:   CONSTITUTIONAL:  Vital signs as above.  No acute distress.  The patient is well nourished and well groomed.    PSYCHIATRIC:  The patient is awake, alert, oriented to person, place and time.  The patient is answering questions appropriately with clear speech.  Normal affect.  HEENT: Normocephalic, atraumatic.  Sclera clear.  Neck is supple.  SKIN:  Skin over the face, neck bilateral upper extremities is clean, dry, intact without rashes.  MUSCULOSKELETAL: The patient has 5/5 strength for the bilateral shoulder abductors, elbow flexors/extensors, wrist extensors, finger flexors/abductors, hip flexors, knee flexors/extensors, ankle dorsi/plantar flexors.  Tender palpation bilateral upper cervical facets.  Cervical range of motion is significantly restricted with all movements.  Positive Kemps maneuver bilaterally.  Significant tenderness palpation right sacroiliac joint.  NEUROLOGICAL: 1-2+ biceps, brachioradialis, triceps, patellar, and Achilles reflexes bilaterally.  Negative Hwang's bilaterally.  No ankle clonus.  Negative Babinski's bilaterally.  Sensation to light touch is intact over bilateral upper and lower extremities throughout.    RESULTS:    I reviewed the MRI cervical spine from [facility] dated May 21, 2018.  Patient has moderate to advanced cervical spondylosis with disc degeneration greatest from C4 through the abdomen and preferential left-sided facet arthropathy C2 C5.  There are also degenerative changes of the left atlantoaxial joint.  There is mild spinal canal stenosis at C5-6 and C6-7.  There is multilevel moderate to severe foraminal stenosis C3 through C7.  Please see report for further  details.      I also reviewed the MRI lumbar spine from [facility] dated May 21, 2018.  There is mild right L1-L2 foraminal stenosis.  At L2-3 there is left lateral recess stenosis due to disc bulge.  At L4-5 there is significant facet arthropathy and a disc bulge which results in moderate spinal stenosis.  Please see report for further details.

## 2021-06-25 NOTE — TELEPHONE ENCOUNTER
New Appointment Needed  What is the reason for the visit:    1 WEEK FU  Provider Preference: Danica or Jenifer  How soon do you need to be seen?: 6/11/21  Waitlist offered?: No  Okay to leave a detailed message:  Yes

## 2021-06-25 NOTE — PROGRESS NOTES
Progress Notes by Jacy Mishra FNP at 3/21/2017 10:20 AM     Author: Jacy Mishra FNP Service: -- Author Type: Nurse Practitioner    Filed: 3/27/2017  2:59 PM Encounter Date: 3/21/2017 Status: Addendum    : Jacy Mishra FNP (Nurse Practitioner)    Related Notes: Original Note by Jacy Mishra FNP (Nurse Practitioner) filed at 3/22/2017  8:39 AM       Internal Medicine Office Visit  Patient Name: Geeta Berry  Patient Age: 76 y.o.  YOB: 1940  MRN: [id number]  ?  Date of Visit: 3/21/2017  Reason for Office Visit:   Chief Complaint   Patient presents with   ? Establish Care     New patient, [facility]       Assessment / Plan / Medical Decision Makin. Chronic arthralgias of knees and hips  2. Osteoarthritis of lumbar spine  3. Osteoarthritis  4. Hypothyroidism  5. HTN (hypertension)  6. HLD (hyperlipidemia)  7. GERD (gastroesophageal reflux disease)  8. DVT (deep venous thrombosis)  9. Acute iritis, h/o in   10. Paronychia     -Reviewed recent lab results done 2016 through [facility]. Euthyroid as of 16 with stable results over past 2 years of checks noted. Repeat 2017  -Check rheumatoid factor screen although her symptoms seem most consistent with a history of osteoarthritis.  She does have a remote history of iritis  -Regarding reported left knee and hip pain.  We discussed pain treatment options of pain care referral, follow-up with orthopedics for a another opinion, pain patches such as lidocaine or diclofenac.  She elects to follow-up with orthopedics at this time  -Appointment scheduled for later today regarding cervical neck pain with spine clinic  -We will need to have a preoperative physical for right total knee replacement.  Her orthopedic surgeon will coordinate with hematology regarding anticoagulation bridging during this procedure  -Regarding paronychia, advised hand soaks and using a cuticle pusher to  push the lateral sides of the nailbed tissue away from the nail.  There is no active infection seen on exam today and no pus or drainage.  - Follow up for pre-op physical in the next 2 weeks      Health Maintenance Review  Health Maintenance   Topic Date Due   ? ADVANCE DIRECTIVES DISCUSSED WITH PATIENT  08/29/1957   ? DXA SCAN  08/29/2004   ? FALL RISK ASSESSMENT  03/21/2018   ? TD 18+ HE  11/20/2023   ? PNEUMOCOCCAL POLYSACCHARIDE VACCINE AGE 65 AND OVER  Completed   ? INFLUENZA VACCINE RULE BASED  Completed   ? PNEUMOCOCCAL CONJUGATE VACCINE FOR ADULTS (PCV13 OR PREVNAR)  Completed   ? ZOSTER VACCINE  Completed         I am having Ms. Berry maintain her fluticasone, traMADol, hydroCHLOROthiazide, XARELTO, simvastatin, levothyroxine, acetaminophen, albuterol sulfate, aluminum-magnesium hydroxide, calcium carbonate, cholecalciferol (vitamin D3), fexofenadine, lidocaine, fish oil-omega-3 fatty acids, pantoprazole, polyethylene glycol, and triamcinolone.     HPI:   Encounter Diagnoses   Name Primary?   ? Chronic arthralgias of knees and hips Yes   ? Osteoarthritis of lumbar spine    ? Osteoarthritis    ? Hypothyroidism    ? HTN (hypertension)    ? HLD (hyperlipidemia)    ? GERD (gastroesophageal reflux disease)    ? DVT (deep venous thrombosis)    ? Acute iritis, h/o in 2012    ? Paronychia         Geeta Berry is a 77 y/o female who presents to the office today to establish care.     She was diagnosed with a right LE DVT 12/2016 after reporting a several month history of dyspnea. She was negative for PE. She was started on Xarelto for this. She was then evaluated by asthma and allergy for the dyspnea although this reportedly improved after hospitalization.     H/o severe degenerative arthritis with multiple joint replacement surgeries. Right knee arthroplasty discussed but delayed due to recent DVT.  Is also complaining of left hip pain and left leg weakness.  She was told previously by orthopedics that the cement  "from her previous hip replacement seems to be deteriorating.  The pain is so severe that sometimes the pain in the left knee is worse than the right knee for which she is scheduled to have replacement.  She is questioning whether she may have an inflammatory arthritis.  She has had some workup for this in the past but was told that her symptoms most resemble and osteoarthritis.  She has a remote history of iritis in the past.  She would like to know what she could do further for pain.  She is currently taking 5 Tylenol throughout the day, typically takes a dose every 4-5 hours.  She has tried Vicodin and Percocet in the past but these made her dizzy and \"knock me out\".  She has used lidocaine patches which did not seem helpful but lidocaine gel did provide her some relief.  She is currently taking extended release tramadol but this relieves the pain in her neck not in the lower extremities.    She has a history of mild duct stones and has had ERCP procedures ×5 in the fat in the past.    She has a history of headache which worsened in response to seasonal allergies.  Symptoms improve and she remains on an antihistamine such as fexofenadine.    We reviewed her history of hypertension, she states she is doing well with no concerns of side effects regarding current medications.    She has a history of hypothyroidism, last check was 12/2016 and euthyroid at that time.    There is a history of hyperlipidemia, tolerate simvastatin 20 mg daily well.    She is taking vitamin D and calcium supplement for bone health.    There is a history of constipation for which she takes a as needed medication.    She has acid reflux with pantoprazole 40 mg daily this is well controlled.    She has a new concern today regarding right hand third digit.  There is occasional drainage and pus around to the lateral nail fold. No pain/drainage today.       Review of Systems: If positive, the following ROS is bolded. As indicated on ROS on " health history form:  Constitutional: Fever, chills, night sweats fainting, unexplained weight change, fatigue  Eyes: Visual changes, eye pain, double vision, blurred vision  HENT: Changes in hearing, ear pain, tinnitus, hoarseness, difficulty swallowing  Respiratory: Wheeze, shortness of breath, cough, and exercise intolerance   Cardiovascular: Chest pain, dyspnea, tachycardia, palpitations, syncope, dizziness or lightheadedness  Gastrointestinal: Nausea, vomiting, diarrhea, dyspepsia, irregular BMs, and melena   Genitourinary: Dysuria, frequency, hematuria, nocturia  Integumentary: Pruritis, rashes, lesions, wounds   Musculoskeletal: Backache/back pain, weakness, joint pain/stiffness, swelling of hands, feet, ankles, leg pain/redness  Neurological: Changes in balance, mental status, paresthesias, weakness, headache  Behavioral/Psych: Difficulty concentrating, excessive moodiness, depression or anxiety, insomnia    Current Scheduled Meds:  Outpatient Encounter Prescriptions as of 3/21/2017   Medication Sig Dispense Refill   ? acetaminophen (TYLENOL) 500 MG tablet Take 500 mg by mouth every 6 (six) hours as needed for pain.     ? albuterol sulfate 90 mcg/actuation AePB Inhale.     ? aluminum-magnesium hydroxide 200-200 mg/5 mL suspension Take by mouth every 6 (six) hours as needed for indigestion.     ? calcium carbonate (OS-ALOK) 600 mg calcium (1,500 mg) tablet Take 600 mg by mouth 2 (two) times a day with meals.     ? cholecalciferol, vitamin D3, 400 unit Tab Take 400 Units by mouth daily.     ? fexofenadine (ALLEGRA) 60 MG tablet Take 60 mg by mouth daily.     ? fluticasone (FLONASE) 50 mcg/actuation nasal spray SHAKE LQ AND U 2 SPRAYS IEN D  3   ? hydroCHLOROthiazide (HYDRODIURIL) 25 MG tablet TK 1 T PO D  3   ? levothyroxine (SYNTHROID, LEVOTHROID) 50 MCG tablet TK 1 T PO D  3   ? lidocaine (LIDODERM) 5 % Place 1 patch on the skin daily. Remove & Discard patch within 12 hours or as directed by MD     ?  "OMEGA-3/DHA/EPA/FISH OIL (FISH OIL-OMEGA-3 FATTY ACIDS) 300-1,000 mg capsule Take 2 g by mouth daily.     ? pantoprazole (PROTONIX) 40 MG tablet Take 40 mg by mouth daily.     ? polyethylene glycol (MIRALAX) 17 gram packet Take 17 g by mouth daily.     ? simvastatin (ZOCOR) 20 MG tablet TK 1 T PO QPM  3   ? traMADol (ULTRAM-ER) 100 MG 24 hr tablet TK 1 T PO D  1   ? triamcinolone (KENALOG) 0.025 % cream Apply topically 2 (two) times a day.     ? XARELTO 20 mg Tab TK 1 T PO QPM WITH A MEAL  3     No facility-administered encounter medications on file as of 3/21/2017.      No past medical history on file.  No past surgical history on file.  Social History   Substance Use Topics   ? Smoking status: Never Smoker   ? Smokeless tobacco: Never Used   ? Alcohol use No       Objective / Physical Examination:  Vitals:    03/21/17 1023   BP: 112/70   Patient Site: Left Arm   Patient Position: Sitting   Cuff Size: Adult Regular   Pulse: (!) 59   Weight: 178 lb (80.7 kg)   Height: 5' 6.5\" (1.689 m)     Wt Readings from Last 3 Encounters:   03/21/17 179 lb (81.2 kg)   03/21/17 178 lb (80.7 kg)     Body mass index is 28.3 kg/(m^2).     XR Femur Lt 2 Views1/19/2017  [facility]  Result Narrative       FINDINGS:  Knee prosthesis appears in normal anatomic alignment. Bilateral hip prostheses appear in normal alignment. No definite hardware complication. No acute finding.         XR Pelvis W Lt Lateral Hip1/19/2017  [facility]  Result Narrative       FINDINGS:  Knee prosthesis appears in normal anatomic alignment. Bilateral hip prostheses appear in normal alignment. No definite hardware complication. No acute finding.         US VENOUS RIGHT LOWER EXTREM [id number]/30/2016  [facility]  Result Impression   IMPRESSION:    Exam is positive for deep venous thrombosis with occlusive thrombus in the gastrocnemius vein of the mid calf.    Critical results called to DAYDAY DILLARD on 12/30/2016 at 4:50 pm.   Result Narrative "   COMPARISON:  None.    CLINICAL HISTORY:  calf pain and SOB    FINDINGS: The venous system of the right lower extremity was visualized using color-flow Doppler technique.  The common femoral, proximal deep femoral, femoral, popliteal, and visualized portions of the posterior tibial and peroneal veins show normal compressibility, color flow, and response to augmentation.     Thrombus is seen with in the gastrocnemius vein of the mid calf.    The greater saphenous vein has normal compression.        CT Angio Chest W IV Cont PE Study12/30/2016  [facility]  Result Impression   IMPRESSION:  1. No pulmonary embolism identified.  2. Stable CT of the chest.   Result Narrative   COMPARISON: None.    TECHNIQUE: Images were obtained through the chest following the administration of 100 mL IOPAMIDOL 76 % IV SOLN contrast using a pulmonary embolus protocol.    FINDINGS: No pulmonary embolism. No adenopathy. Lungs stable. No effusion. Limited imaging of the upper abdomen stable including stable biliary dilatation and prominent pneumobilia. No aggressive or destructive bony lesions.     XR Portable Chest 1 View12/30/2016  [facility]  Result Impression   IMPRESSION:  Single portable AP chest shows normal cardiomediastinal silhouette and pulmonary vasculature.  Lungs are well aerated and clear.  No significant effusion.          ECG 12 Lead [id number]/30/2016  [facility]  Result Narrative   Sinus bradycardia  Nonspecific T wave abnormality  Abnormal ECG  When compared with ECG of 09-DEC-2016 13:49,  Nonspecific T wave abnormality now evident in Anterior leads  Confirmed by DAYDAY DILLARD ([id number]),  GA LEE ([id number]) on 1/1/2017 12:32:26 PM           General Appearance: Alert and oriented, cooperative, affect appropriate, speech clear, in no apparent distress  HENT: conjunctivae clear, sclera non-icteric   MSK: lateral rotation of the neck restricted     Orders Placed This Encounter   Procedures   ? Rheumatoid  Factor Screen   Followup: Return in about 2 weeks (around 4/4/2017) for Recheck, preop physical . earlier if needed.    Time with patient: 60 minutes with >50% of time spent in face-to-face counseling and coordination of care    Jacy Mishra, NERI  [facility]

## 2021-06-25 NOTE — PROGRESS NOTES
Progress Notes by Jacy Mishra FNP at 4/18/2017 10:45 AM     Author: Jacy Mishra FNP Service: -- Author Type: Nurse Practitioner    Filed: 4/19/2017  6:46 AM Encounter Date: 4/18/2017 Status: Signed    : Jacy Mishra FNP (Nurse Practitioner)       Assessment/Plan:      Visit for Preoperative Exam.        1. Patient is medically optimized for scheduled surgery  2. Standard VTE prophylaxis throughout surgery; hx of recent DVT 12/2016. Per thrombosis clinic, recommend Xarelto 10 mg daily x 1 month after surgery for VTE prophylaxis  3. Patient is advised to take only citalopram with a small sip of water the morning of surgery. Avoid NSAID x 7 days prior to surgery. She declines any other pain medications due to narcotic intolerance in the past.   4.  Suggest temporary rehabilitation facility placement post surgery due to patient's role as a caregiver for her .  5. Start citalopram 10 mg daily. Follow up in 3 weeks for recheck. Reviewed possible side effects     Time with patient: 40 minutes with >50% of time spent in counseling and coordination of care; discussion of new diagnosis of depression and treatment options     Subjective:     Scheduled Procedure: Revision of total hip arthroplasty, left   Surgery Date:  5/16/17  Surgery Location:  [facility]  Surgeon:  Dr. Tyson Peoples   HPI: The patient is a 76-year-old female who presents to the office today for preoperative physical.  She is scheduled to undergo a revision of the left hip.  There is a history of previous arthroscopic of the left hip 34 years ago, pain in this hip has worsened significantly recently leading her to cancel a right knee replacement in lieu of this surgery first.  She is ambulating with a walker and her gait is guarded.      History of DVT 12/2016.  She is followed through [facility].  She was recently taken off of her anticoagulation therapy.  It was advised at her last appointment that she  "should resume Xarelto 10 mg daily ×1 month following joint replacement surgeries.    Hypertension: Well controlled with current medications.    Hypothyroidism: Euthyroid as of last check.  Will repeat today.    Constipation: Recently experienced constipation over the weekend and took extra doses of her laxative medications.  She reports some diarrhea related to this and cramping.  She states that her temperature was 98 and was concerned that this represented a fever as her baseline temperature is typically 97.    Seasonal allergies: Well controlled with fexofenadine and fluticasone nasal spray.    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  Her chronic pain and the stress of caring for her  who also has significant medical conditions has been trying to her of late.  Her daughter states that her mother has been more tearful recently.  She does not seem \"like herself\".  Patient admits that she has been feeling down and depressed.  She would consider a medication for this and her daughter strongly encourages her to do so.    Current Outpatient Prescriptions   Medication Sig Dispense Refill   ? acetaminophen (TYLENOL) 500 MG tablet Take 500 mg by mouth every 6 (six) hours as needed for pain.     ? albuterol sulfate 90 mcg/actuation AePB Inhale.     ? aluminum-magnesium hydroxide 200-200 mg/5 mL suspension Take by mouth every 6 (six) hours as needed for indigestion.     ? calcium carbonate (OS-ALOK) 600 mg calcium (1,500 mg) tablet Take 600 mg by mouth 2 (two) times a day with meals.     ? cholecalciferol, vitamin D3, 400 unit Tab Take 400 Units by mouth daily.     ? fexofenadine (ALLEGRA) 60 MG tablet Take 60 mg by mouth daily.     ? fluticasone (FLONASE) 50 mcg/actuation nasal spray SHAKE LQ AND U 2 SPRAYS IEN D  3   ? hydroCHLOROthiazide (HYDRODIURIL) 25 MG tablet TK 1 T PO D  3   ? levothyroxine (SYNTHROID, LEVOTHROID) 50 MCG tablet TK 1 T PO D  3   ? lidocaine " (LIDODERM) 5 % Place 1 patch on the skin daily. Remove & Discard patch within 12 hours or as directed by MD     ? OMEGA-3/DHA/EPA/FISH OIL (FISH OIL-OMEGA-3 FATTY ACIDS) 300-1,000 mg capsule Take 2 g by mouth daily.     ? pantoprazole (PROTONIX) 40 MG tablet Take 40 mg by mouth daily.     ? polyethylene glycol (MIRALAX) 17 gram packet Take 17 g by mouth daily.     ? simvastatin (ZOCOR) 20 MG tablet TK 1 T PO QPM  3   ? traMADol (ULTRAM-ER) 100 MG 24 hr tablet TK 1 T PO D  1   ? citalopram (CELEXA) 10 MG tablet Take 1 tablet (10 mg total) by mouth daily. 30 tablet 1     No current facility-administered medications for this visit.        Allergies   Allergen Reactions   ? Atorvastatin    ? Codeine    ? Dipyridamole        Immunization History   Administered Date(s) Administered   ? Influenza, inj, historic 09/14/2016   ? Pneumo Conj 13-V (2010&after) 09/14/2015   ? Pneumo Polysac 23-V 01/25/2006   ? Td, historic 01/25/2006   ? Tdap 11/20/2013   ? ZOSTER 09/26/2008       Patient Active Problem List   Diagnosis   ? DVT (deep venous thrombosis), right LE 12/2016   ? Acute iritis, h/o in 2012   ? Common bile duct calculus, ERCP x 5 in past    ? Constipation   ? GERD (gastroesophageal reflux disease)   ? HTN (hypertension)   ? HLD (hyperlipidemia)   ? Hypothyroidism   ? Osteoarthritis of lumbar spine   ? Osteoarthritis; knees, hips, cervical spine    ? Psoriasis   ? Paronychia   ? Facet arthropathy, cervical       Past Medical History:   Diagnosis Date   ? Chronic neck pain    ? DVT (deep venous thrombosis)    ? GERD (gastroesophageal reflux disease)    ? HLD (hyperlipidemia)    ? HTN (hypertension)    ? Hypothyroidism        Social History     Social History   ? Marital status:      Spouse name: N/A   ? Number of children: 2   ? Years of education: N/A     Occupational History   ? Retired      Social History Main Topics   ? Smoking status: Never Smoker   ? Smokeless tobacco: Never Used   ? Alcohol use No   ? Drug  use: No   ? Sexual activity: Not on file     Other Topics Concern   ? Not on file     Social History Narrative    4/6/17: The patient lives with her  in a condo.       Past Surgical History:   Procedure Laterality Date   ? CHOLECYSTECTOMY  1980   ? ERCP      x5 over 8 years. Last 8/2016   ? HIP ARTHROSCOPY Left 1983   ? HIP SURGERY  2012    revision   ? REPLACEMENT TOTAL KNEE Right 2015   ? SHOULDER SURGERY Left 2010           History of Present Illness  Recent Health  Fever: no  Chills: no  Fatigue: no  Chest Pain: no  Cough: no  Dyspnea: no  Urinary Frequency: no  Nausea: no  Vomiting: no  Diarrhea: no  Abdominal Pain: no  Easy Bruising: no  Lower Extremity Swelling: no  Poor Exercise Tolerance: no    Pertinent History  Prior Anesthesia: yes  Previous Anesthesia Reaction: no  Diabetes: no  Cardiovascular Disease: no  Pulmonary Disease: no  Renal Disease: no  GI Disease: no  Sleep Apnea: no  Thromboembolic Problems: yes, recent DVT 12/2016   Clotting Disorder: no  Bleeding Disorder: no  Transfusion Reaction: no, had a transfusion after left hip replacement over 30 years ago   Impaired Immunity: no  Steroid use in the last 6 months: no  Frequent Aspirin use: no    Family history: There is no family history of abnormal reaction to anesthesia or sudden unexplained death    Social history: no dentures/partial plates     Review of Systems: Positives in bold  CONST: Fevers, chills, sweats, fatigue, appetite or weight change, temperature intolerance  EYES: Double vision, blurry vision, pain, redness  ENT: Drainage, congestion, nosebleeds, sinus problems, sores on lips/mouth/tongue/throat, dental work, change in voice  RESP: Cough, shortness of breath, wheezing, asthma  BREAST/SKIN: Lumps, pain, drainage sores, rash  CVS: Chest pain, heart murmur, DVT, PE, edema, palpitations  GI: Swallowing difficulties, heartburn, abdominal pain, nausea, vomiting, diarrhea, constipation, black or bloody stools, hemorrhoids. On ROS  "patient indicates abdominal cramping/pain, heartburn/indigestion, change in appetite, diarrhea associated with taking a large dose of laxatives over the weekend. Symptoms have now resolved  URINARY: Problems urinating, frequent urination  REPRODUCTIVE:  Female: Abnormal bleeding, discharge  MSK: Joint pain, swelling, stiffness, back or neck pain  ENDO: Changes in hair/skin, excessive thirst, urination, diabetes  LYMPH/HEME: Other masses, swelling, unusual bruising or bleeding  NEURO: Headache, head injury, blackout, confusion, seizure, difficulty with vision, speech, walking, weakness in arms/feet/face  MENTAL HEALTH: Anxiety, depression, insomnia, abuse      Objective:       Vitals:    04/18/17 1045   BP: 114/68   Pulse: (!) 56   Weight: 172 lb (78 kg)   Height: 5' 6.5\" (1.689 m)         Physical Exam:  General Appearance: Alert, cooperative, no distress, appears stated age  Head: Normocephalic, without obvious abnormality, atraumatic  Eyes: PERRL, conjunctiva/corneas clear, EOM's intact  Ears: Normal TM's and external ear canals, both ears  Nose: Nares normal, septum midline,mucosa normal, no drainage  Throat: Lips, mucosa, and tongue normal; teeth and gums normal  Neck: Supple, symmetrical, trachea midline, no adenopathy;  thyroid: not enlarged, symmetric, no tenderness/mass/nodules; no carotid bruit  Lungs: Clear to auscultation bilaterally, respirations unlabored  Heart: Regular rate and rhythm, S1 and S2 normal, no murmur, rub, or gallop  Abdomen: Soft, non-tender, bowel sounds active all four quadrants,  no masses, no organomegaly  Extremities: Extremities normal, atraumatic, no cyanosis or edema  Skin: Skin color, texture, turgor normal, no rashes or lesions  Lymph nodes: Cervical, supraclavicular nodes normal  Neurologic: Normal   Psych: tearful, appears somewhat depressed        Recent Results (from the past 240 hour(s))   Electrocardiogram Perform and Read   Result Value Ref Range    SYSTOLIC BLOOD " PRESSURE  mmHg    DIASTOLIC BLOOD PRESSURE  mmHg    VENTRICULAR RATE 53 BPM    ATRIAL RATE 53 BPM    P-R INTERVAL 178 ms    QRS DURATION 98 ms    Q-T INTERVAL 420 ms    QTC CALCULATION (BEZET) 394 ms    P Axis 35 degrees    R AXIS 25 degrees    T AXIS 77 degrees    MUSE DIAGNOSIS       Sinus bradycardia  Otherwise normal ECG  No previous ECGs available  Confirmed by KATHLEEN AVINA MD LOC:[facility] ([id number]) on 4/18/2017 1:02:02 PM     Comprehensive Metabolic Panel   Result Value Ref Range    Sodium 141 136 - 145 mmol/L    Potassium 4.1 3.5 - 5.0 mmol/L    Chloride 102 98 - 107 mmol/L    CO2 27 22 - 31 mmol/L    Anion Gap, Calculation 12 5 - 18 mmol/L    Glucose 87 70 - 125 mg/dL    BUN 22 8 - 28 mg/dL    Creatinine 0.93 0.60 - 1.10 mg/dL    GFR MDRD Af Amer >60 >60 mL/min/1.73m2    GFR MDRD Non Af Amer 58 (L) >60 mL/min/1.73m2    Bilirubin, Total 0.4 0.0 - 1.0 mg/dL    Calcium 9.7 8.5 - 10.5 mg/dL    Protein, Total 6.8 6.0 - 8.0 g/dL    Albumin 3.7 3.5 - 5.0 g/dL    Alkaline Phosphatase 95 45 - 120 U/L    AST 21 0 - 40 U/L    ALT 19 0 - 45 U/L   Thyroid Stimulating Hormone (TSH)   Result Value Ref Range    TSH 2.77 0.30 - 5.00 uIU/mL   HM2(CBC w/o Differential)   Result Value Ref Range    WBC 5.4 4.0 - 11.0 thou/uL    RBC 4.10 3.80 - 5.40 mill/uL    Hemoglobin 12.7 12.0 - 16.0 g/dL    Hematocrit 38.3 35.0 - 47.0 %    MCV 93 80 - 100 fL    MCH 31.0 27.0 - 34.0 pg    MCHC 33.2 32.0 - 36.0 g/dL    RDW 11.8 11.0 - 14.5 %    Platelets 328 140 - 440 thou/uL    MPV 6.5 (L) 7.0 - 10.0 fL   INR   Result Value Ref Range    INR 0.94 0.90 - 1.10   Urinalysis-UC if Indicated   Result Value Ref Range    Color, UA Yellow Colorless, Yellow, Straw, Light Yellow    Clarity, UA Clear Clear    Glucose, UA Negative Negative    Bilirubin, UA Negative Negative    Ketones, UA Negative Negative    Specific Gravity, UA 1.010 1.005 - 1.030    Blood, UA Negative Negative    pH, UA 6.0 5.0 - 8.0    Protein, UA Negative Negative mg/dL    Urobilinogen, UA  0.2 E.U./dL 0.2 E.U./dL, 1.0 E.U./dL    Nitrite, UA Negative Negative    Leukocytes, UA Negative Negative               1. Preop examination  2. Osteoarthritis  3. GERD (gastroesophageal reflux disease)  4. HLD (hyperlipidemia)  5. HTN (hypertension)  6. Hypothyroidism  7. DVT (deep venous thrombosis)  8. Reactive depression        Jacy Mishra, CNP  [facility]

## 2021-06-25 NOTE — TELEPHONE ENCOUNTER
Called and lvm for patient stating Jacy is out of Fridays and Dr. Burgess is booked on Friday. She is scheduled with PCP on 6/14 at 12:25 pm and we will see her then

## 2021-06-26 NOTE — TELEPHONE ENCOUNTER
Results for orders placed or performed in visit on 06/14/21   HM2(CBC w/o Differential)   Result Value Ref Range    WBC 5.4 4.0 - 11.0 thou/uL    RBC 3.16 (L) 3.80 - 5.40 mill/uL    Hemoglobin 9.5 (L) 12.0 - 16.0 g/dL    Hematocrit 30.2 (L) 35.0 - 47.0 %    MCV 96 80 - 100 fL    MCH 30.1 27.0 - 34.0 pg    MCHC 31.5 (L) 32.0 - 36.0 g/dL    RDW 15.0 (H) 11.0 - 14.5 %    Platelets 345 140 - 440 thou/uL    MPV 8.4 7.0 - 10.0 fL   Iron and Transferrin Iron Binding Capacity   Result Value Ref Range    Iron 36 (L) 42 - 175 ug/dL    Transferrin 294 212 - 360 mg/dL    Transferrin Saturation, Calculated 10 (L) 20 - 50 %    Transferrin IBC, Calculated 367 313 - 563 ug/dL   Ferritin   Result Value Ref Range    Ferritin 28 10 - 130 ng/mL   Occult Blood(ICT)   Result Value Ref Range    Fecal Occult Bld (ICT) 1 Positive (!) Negative    Fecal Occult Blood (ICT) 2 Positive (!) Negative    Fecal Occult Blood (ICT) 3 Positive (!) Negative

## 2021-06-26 NOTE — PROGRESS NOTES
Internal Medicine Office Visit  [facility]   Patient Name: Nai Berry  Patient Age: 80 y.o.  YOB: 1940  MRN: [id number]    Date of Visit: 6/14/2021  Reason for Office Visit:   Chief Complaint   Patient presents with     Follow-up           Assessment / Plan / Medical Decision Making:    Problem List Items Addressed This Visit     None      Visit Diagnoses     Anemia, unspecified type    -  Primary    Relevant Orders    HM2(CBC w/o Differential) (Completed)    Iron and Transferrin Iron Binding Capacity (Completed)    Ferritin (Completed)         Continue with bigger dose of omeprazole, 40 mg daily for now.  We will plan to continue this for the next 3 months and then decrease dose to 20 mg daily thereafter.  She is also advised to start an iron supplement, ferrous sulfate 325 mg daily.  We will recheck her hemoglobin and iron panel today.  If this is stable or increased compared to last week continue with this plan.  If it is dropping we will need to have her schedule an upper endoscopy.  She is advised that if she has dark stools, hematochezia, increasing fatigue or weakness she should be seen in the emergency department.  She has returned the stool sample for occult blood, there has been a delay associated with lab supply needed to process the sample but she will be notified of these results as soon as possible    I have discontinued Nai Berry's aspirin. I am also having her maintain her multivitamin with minerals, amoxicillin, azelastine, levothyroxine, hydroCHLOROthiazide, rosuvastatin, fluticasone propionate, fish oil-omega-3 fatty acids, polyethylene glycol, acetaminophen, citalopram, omeprazole, and docusate sodium.        Orders Placed This Encounter   Procedures     HM2(CBC w/o Differential)     Iron and Transferrin Iron Binding Capacity     Ferritin   Followup: Return in about 3 months (around 9/14/2021) for Next scheduled follow up. earlier if  "needed.        Jacy Mishra, CNP        HPI:  Nai Berry is a 80 y.o. year old who presents to the office today for fatigue and anemia. Her energy level has been low and she feels more winded when she rides the stationary bike or walks for exercise. Her hemoglobin was found to be 9.6 6/2/21, 9.3 6/4. She was taking Xarelto x 3 weeks then switched to aspirin after knee surgery.  She was then seen in primary care for reevaluation of her hemoglobin and advised to discontinue aspirin.  She still feels tired.  No epigastric pain. Stools are regular and without blood or melena.     She has a history of upper endoscopy 11/6/2020 and was found to have a nonobstructing Schatzki's ring as well as a nonbleeding gastric ulcer.  She was advised to continue omeprazole twice daily and avoid NSAIDs.  She then underwent a repeat upper endoscopy February 2021 which showed a normal esophagus, small hiatal hernia, gastritis.    The bottoms of her feet feel cold and the bottoms of the feet feels \"like paper\" described as smoother than usual.     Health Maintenance Review  Health Maintenance   Topic Date Due     MEDICARE ANNUAL WELLNESS VISIT  01/07/2022     FALL RISK ASSESSMENT  01/07/2022     TD 18+ HE  11/20/2023     ADVANCE CARE PLANNING  01/07/2026     DEPRESSION ACTION PLAN  Completed     Pneumococcal Vaccine: 65+ Years  Completed     INFLUENZA VACCINE RULE BASED  Completed     ZOSTER VACCINES  Completed     COVID-19 Vaccine  Completed     Pneumococcal Vaccine: Pediatrics (0 to 5 Years) and At-Risk Patients (6 to 64 Years)  Aged Out     HEPATITIS B VACCINES  Aged Out     DEXA SCAN  Discontinued       Current Scheduled Meds:  Outpatient Encounter Medications as of 6/14/2021   Medication Sig Dispense Refill     acetaminophen (TYLENOL) 325 MG tablet Take 3 tablets (975 mg total) by mouth every 8 (eight) hours.  0     amoxicillin (AMOXIL) 500 MG capsule Take 2,000 mg by mouth once as needed (prior to dental procedures).       " azelastine (ASTELIN) 137 mcg (0.1 %) nasal spray 1 SPRAY INTO EACH NOSTRIL AT BEDTIME. USE IN EACH NOSTRIL AS DIRECTED 30 mL 11     citalopram (CELEXA) 10 MG tablet Take 0.5 tablets (5 mg total) by mouth daily. 90 tablet 3     docusate sodium (COLACE) 100 MG capsule Take 100 mg by mouth 2 (two) times a day.       fish oil-omega-3 fatty acids (FISH OIL) 300-1,000 mg capsule Take 1 g by mouth daily.       fluticasone propionate (FLONASE) 50 mcg/actuation nasal spray Apply 1 spray into each nostril every morning.        hydroCHLOROthiazide (HYDRODIURIL) 25 MG tablet Take 1 tablet (25 mg total) by mouth daily. 90 tablet 3     levothyroxine (SYNTHROID, LEVOTHROID) 50 MCG tablet Take 1 tablet (50 mcg total) by mouth daily. 90 tablet 2     multivitamin with minerals (THERA-M) 9 mg iron-400 mcg Tab tablet Take 1 tablet by mouth daily.       omeprazole (PRILOSEC) 40 MG capsule Take 1 capsule (40 mg total) by mouth daily before breakfast. 90 capsule 0     polyethylene glycol (MIRALAX) 17 gram packet Take 1 packet (17 g total) by mouth daily. 7 packet 0     rosuvastatin (CRESTOR) 20 MG tablet Take 1 tablet (20 mg total) by mouth at bedtime. 30 tablet 11     [DISCONTINUED] aspirin 81 MG EC tablet Take 81 mg by mouth daily.       No facility-administered encounter medications on file as of 6/14/2021.          Objective / Physical Examination:  Vitals:    06/14/21 1206   BP: 120/58   Pulse: 76   Weight: 175 lb (79.4 kg)     Wt Readings from Last 3 Encounters:   06/14/21 175 lb (79.4 kg)   06/04/21 174 lb 11.2 oz (79.2 kg)   06/02/21 174 lb (78.9 kg)     Body mass index is 27 kg/m .     Constitutional: In no apparent distress  Eyes: No conjunctival pallor  Respiratory: Clear to auscultation bilaterally. Normal inspiratory and expiratory effort  Cardiovascular: Regular rate and rhythm. No murmurs, rubs, or gallops.   Psych: Alert and oriented x3.

## 2021-06-26 NOTE — PROGRESS NOTES
Assessment & Plan     Nai was seen today for follow-up.    Diagnoses and all orders for this visit:    1. Fatigue, unspecified type  Ferritin    Hepatic Profile    Electrocardiogram Perform and Read   2. Gastrointestinal hemorrhage, unspecified gastrointestinal hemorrhage type     3. Anemia, unspecified type  HM2(CBC w/o Differential)    Vitamin D, Total (25-Hydroxy)    Occult Blood(ICT)   4. Globus sensation  omeprazole (PRILOSEC) 40 MG capsule   5. Hiatal hernia  omeprazole (PRILOSEC) 40 MG capsule   6. Abnormal finding of blood chemistry, unspecified   Ferritin   7. Vitamin D deficiency, unspecified   Vitamin D, Total (25-Hydroxy)     Patient feeling of fatigue has been persistent and progressing over the last few weeks.  Reassured that patient's vitals are stable.  EKG in the office was unremarkable.  Endorses no other symptoms besides a general sense of fatigue. EKG nida in the office today.  Blood work in the urgent care did show anemia with H/H 9.6/30.9.  This is lower than her H/H [id number].  Right after her TKA, hemoglobin was 8.6 in the hospital.  Patient is having increased GERD symptoms, feels worn out, has been on some sort of blood thinner for several weeks now-I suspect that patient may have a UGIB.  Would like patient to stop aspirin at this time.  I would like her to double the dose of her Prilosec.  We will get FOBT + additional blood work including hepatic profile, ferritin levels, repeat CBC today and vitamin D to make sure none of those are contributing to her symptoms.  Would also like her to stay hydrated.      Should follow-up with her PCP in 1 week for repeat blood work.  In the interim, if patient starts to have progressive shortness of breath, lightheadedness, dizziness syncopal episode, chest pain, fawad bloody bowel movements, abdominal pain, fevers or chills, she should go to the emergency room right away.       37 minutes spent on the date of the encounter doing chart  "review, history and exam, documentation and further activities per the note  {       Return in about 1 month (around 7/4/2021) for Fatigue follow up with PCP .    Milagro Burgess DO  [facility]   Nai Berry is 80 y.o. and presents today for the following health issues     HPI     Patient is an 80-year-old female with PMH of HTN, HLD, hypothyroidism, CAD, history of DVT, CKD stage III, OA of hips/knees and dysthymia who presents today for fatigue.    Patient recently underwent TKA of the right knee about 5 weeks ago.  The surgery itself went well.  Was on Xarelto x3 weeks and then was switched to aspirin daily.  Patient started to notice that she was feeling very tired after the surgery.  Since then, since the fatigue has gotten worse.  The onset has been somewhat slow/gradual.  Patient is not getting out of breath with activity, no lightheadedness, dizziness or syncope.  No chest pain but she feels feels \"wiped out\".  Her energy levels in the morning are the lowest.  Get better throughout the day.  She can only do about 15 minutes of housework before she has to lay down to recuperate.  Is also belching a lot and has some breakthrough reflux symptoms.Feels her food is getting stuck in her throat.   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 No fevers, chills or weight loss. No respiratory distress. No COVID exposures    Patient denies any skipped beats.  No lapses in consciousness, no abdominal pain, no hemoptysis and no fawad bloody bowel movements.  Bowel movements have been regular without any significant darkening.  No body aches.  No changes in her diet.      Review of Systems2  As per HPI     Current Outpatient Medications on File Prior to Visit   Medication Sig Dispense Refill     acetaminophen (TYLENOL) 325 MG tablet Take 3 tablets (975 mg total) by mouth every 8 (eight) hours.  0     aspirin 81 MG EC " tablet Take 81 mg by mouth daily.       azelastine (ASTELIN) 137 mcg (0.1 %) nasal spray 1 SPRAY INTO EACH NOSTRIL AT BEDTIME. USE IN EACH NOSTRIL AS DIRECTED 30 mL 11     citalopram (CELEXA) 10 MG tablet Take 0.5 tablets (5 mg total) by mouth daily. 90 tablet 3     fish oil-omega-3 fatty acids (FISH OIL) 300-1,000 mg capsule Take 1 g by mouth daily.       fluticasone propionate (FLONASE) 50 mcg/actuation nasal spray Apply 1 spray into each nostril every morning.        hydroCHLOROthiazide (HYDRODIURIL) 25 MG tablet Take 1 tablet (25 mg total) by mouth daily. 90 tablet 3     levothyroxine (SYNTHROID, LEVOTHROID) 50 MCG tablet Take 1 tablet (50 mcg total) by mouth daily. 90 tablet 2     multivitamin with minerals (THERA-M) 9 mg iron-400 mcg Tab tablet Take 1 tablet by mouth daily.       polyethylene glycol (MIRALAX) 17 gram packet Take 1 packet (17 g total) by mouth daily. 7 packet 0     rosuvastatin (CRESTOR) 20 MG tablet Take 1 tablet (20 mg total) by mouth at bedtime. 30 tablet 11     amoxicillin (AMOXIL) 500 MG capsule Take 2,000 mg by mouth once as needed (prior to dental procedures).       No current facility-administered medications on file prior to visit.            Objective    /63 (Patient Site: Right Arm, Patient Position: Sitting, Cuff Size: Adult Regular)   Pulse 71   Resp 16   Wt 174 lb 11.2 oz (79.2 kg)   Breastfeeding No   BMI 26.96 kg/m    Body mass index is 26.96 kg/m .     Physical Exam   Constitutional: She appears well-developed and well-nourished. No distress.   HENT:   Head: Normocephalic and atraumatic.   Mouth/Throat: Oropharynx is clear and moist.   Eyes: Conjunctivae and EOM are normal. Right eye exhibits no discharge. Left eye exhibits no discharge. No scleral icterus.   Neck: Neck supple. No thyromegaly present.   Cardiovascular: Normal rate, regular rhythm, normal heart sounds and intact distal pulses. Exam reveals no gallop and no friction rub.   No murmur  heard.  Pulmonary/Chest: Effort normal and breath sounds normal. No respiratory distress. She has no wheezes. She has no rales. She exhibits no tenderness.   Abdominal: Soft. Bowel sounds are normal. She exhibits no distension and no mass. There is no abdominal tenderness. There is no rebound and no guarding.   Musculoskeletal: Normal range of motion.         General: No tenderness, deformity or edema.   Lymphadenopathy:     She has no cervical adenopathy.   Neurological: She is alert. No cranial nerve deficit.   Skin: Skin is warm and dry. No rash noted. She is not diaphoretic. No erythema. No pallor.   Psychiatric: She has a normal mood and affect.

## 2021-06-29 NOTE — PROGRESS NOTES
Progress Notes by Jacy Mishra FNP at 9/21/2020  2:30 PM     Author: Jacy Mishra FNP Service: -- Author Type: Nurse Practitioner    Filed: 9/22/2020  9:21 AM Encounter Date: 9/21/2020 Status: Signed    : Jacy Mishra FNP (Nurse Practitioner)       [address], [address]  [place] [id number]  Dept: [phone]  Dept Fax: [phone]  Primary Provider: Jacy Mishra FNP      PREOPERATIVE EVALUATION:  Today's date: 9/21/2020    Geeta Berry is a 80 y.o. female who presents for a preoperative evaluation.    Surgical Information:  Surgery Details 9/21/2020   Surgery/Procedure: Internal Ptosis Repair, Bilateral Upper Eyelids   Surgery Location: [facility]   Surgeon: Dr Grace   Surgery Date: 9/25/2020   Where patient plans to recover: at home with family     Fax number for surgical facility: [phone]  Type of Anesthesia Anticipated: Local with MAC    Subjective     HPI related to upcoming procedure: has vision impairment due to ptosis.     Stomach feeling better. Has to watch her diet.     The fullness/pain in the chest has resolved. No acute cardiac findings, mammogram was normal.     CAD was noted on CT coronary scan. She is tolerating rosuvastatin well.    GERD is currently well controlled with omeprazole.     HTN reviewed, blood pressure is well controlled with current medication. No lightheadedness or dizziness associated with treatment.       Preop Questions 9/21/2020   Have you ever had a heart attack or stroke? No   Have you ever had surgery on your heart or blood vessels, such as a stent placement, a coronary artery bypass, or surgery on an artery in your head, neck, heart, or legs? No   Do you have chest pain with activity? No   Do you have a history of  heart failure? No   Do you currently have a cold, bronchitis or symptoms of other infection? No   Do you have a cough, shortness of breath, or wheezing? No   Do you or anyone in  your family have previous history of blood clots? No   Do you or does anyone in your family have a serious bleeding problem such as prolonged bleeding following surgeries or cuts? No   Have you ever had problems with anemia or been told to take iron pills? No   Have you had any abnormal blood loss such as black, tarry or bloody stools, or abnormal vaginal bleeding? No   Have you ever had a blood transfusion? YES - after a hip arthroplasty    Have you ever had a transfusion reaction? No   Are you willing to have a blood transfusion if it is medically needed before, during, or after your surgery? Yes   Have you or any of your relatives ever had problems with anesthesia? No   Do you have sleep apnea, excessive snoring or daytime drowsiness? No   Do you have any artifical heart valves or other implanted medical devices like a pacemaker, defibrillator, or continuous glucose monitor? No   Do you have artificial joints? YES - right hip, left hip, left knee, left shoulder    Are you allergic to latex? No   Is there any chance that you may be pregnant? No           Review of Systems  CONSTITUTIONAL:NEGATIVE for fever, chills, change in weight  INTEGUMENTARY/SKIN: NEGATIVE for worrisome rashes, moles or lesions  EYES: lid problem ptosis  ENT/MOUTH: NEGATIVE for ear, mouth and throat problems  RESP: NEGATIVE for significant cough or SOB  CV: NEGATIVE for chest pain, palpitations or peripheral edema  GI: NEGATIVE for nausea, abdominal pain, heartburn, or change in bowel habits   female: negative for discharge  MUSCULOSKELETAL: arthralgias related to OA   NEURO: NEGATIVE for weakness, dizziness or paresthesias  ENDOCRINE: NEGATIVE for temperature intolerance, skin/hair changes  HEME: NEGATIVE for bleeding problems  PSYCHIATRIC: NEGATIVE for changes in mood or affect    Patient Active Problem List    Diagnosis Date Noted   ? Coronary artery disease involving native coronary artery of native heart without angina pectoris  08/06/2020   ? Chronic rhinitis 12/03/2019   ? Hiatal hernia 08/21/2019   ? Dysthymia 06/01/2017   ? Facet arthropathy, cervical 04/19/2017   ? History of DVT (deep vein thrombosis), right LE 12/2016 02/07/2017   ? Common bile duct calculus, ERCP x 6 in past. Cannot tolerate ursodiol 07/22/2016   ? Constipation 07/22/2016   ? Osteoarthritis of lumbar spine 05/09/2012   ? Osteoarthritis; knees, hips, cervical spine  05/09/2012   ? Acute iritis, h/o in 2012 04/13/2012   ? Hypothyroidism 11/10/2011   ? HTN (hypertension) 01/25/2010   ? GERD (gastroesophageal reflux disease) 07/07/2009   ? HLD (hyperlipidemia) 07/07/2009     Past Medical History:   Diagnosis Date   ? Arthritis     osteo   ? Chronic neck pain    ? Common bile duct calculus    ? Coronary artery disease due to calcified coronary lesion 8/6/2020   ? Depression    ? DVT (deep venous thrombosis) (H)    ? GERD (gastroesophageal reflux disease)    ? History of blood clots 2017    DVT right lower extremity   ? HLD (hyperlipidemia)    ? HTN (hypertension)    ? Hypothyroidism    ? Infectious viral hepatitis     hepatitis A in 1960s     Past Surgical History:   Procedure Laterality Date   ? bilateral TIMO      with revision on both hips   ? CHOLECYSTECTOMY  1980   ? ERCP      x5 over 8 years. Last 8/2016   ? ERCP N/A 9/5/2017    Procedure: ENDOSCOPIC RETROGRADE CHOLANGIOPANCREATOGRAPHY, STONE EXTRACTION;  Surgeon: Henry Eller MD;  Location: [facility];  Service:    ? ERCP N/A 7/26/2019    Procedure: ENDOSCOPIC RETROGRADE CHOLANGIOPANCREATOGRAPHY, REMOVAL OF SLUDGE, DILATION OF STRICTURE;  Surgeon: Ryan Pastrana MD;  Location: [facility];  Service: Gastroenterology   ? HIP ARTHROSCOPY Left 1983   ? HIP SURGERY  2012    revision   ? JOINT REPLACEMENT     ? RI ESOPHAGOGASTRODUODENOSCOPY TRANSORAL DIAGNOSTIC N/A 9/10/2019    Procedure: ESOPHAGOGASTRODUODENOSCOPY (EGD);  Surgeon: Will Nash DO;  Location: [facility];  Service: General    ? REPLACEMENT TOTAL KNEE Left 2015   ? REVISION TOTAL HIP ARTHROPLASTY Left 5/16/2017    Procedure: REVISION LEFT TOTAL HIP ARTHROPLASTY, BOTH COMPONENTS;  Surgeon: Tyson Peoples MD;  Location: [facility] OR;  Service:    ? SHOULDER SURGERY Left 2010    left total shoulder replacement   ? XR ERCP BILIARY AND PANCREAS  7/26/2019     Current Outpatient Medications   Medication Sig Dispense Refill   ? acetaminophen (TYLENOL) 500 MG tablet Take 500 mg by mouth 2 (two) times a day.     ? amoxicillin (AMOXIL) 500 MG capsule Take 2,000 mg by mouth once as needed (prior to dental procedures).     ? azelastine (ASTELIN) 137 mcg (0.1 %) nasal spray 1 SPRAY INTO EACH NOSTRIL AT BEDTIME. USE IN EACH NOSTRIL AS DIRECTED 30 mL 11   ? calcium carbonate (OS-ALOK) 600 mg calcium (1,500 mg) tablet Take 600 mg by mouth daily.      ? citalopram (CELEXA) 10 MG tablet Take 0.5 tablets (5 mg total) by mouth daily. 90 tablet 1   ? diphenhydrAMINE-acetaminophen (TYLENOL PM EXTRA STRENGTH)  mg Tab      ? famotidine (PEPCID) 40 MG tablet Take 1 tablet (40 mg total) by mouth every evening. 90 tablet 1   ? fluticasone propionate (FLONASE) 50 mcg/actuation nasal spray SPRAY 1 SPRAY INTO EACH NOSTRIL EVERY DAY 48 g 3   ? hydroCHLOROthiazide (HYDRODIURIL) 25 MG tablet Take 1 tablet (25 mg total) by mouth daily. 90 tablet 3   ? levothyroxine (SYNTHROID, LEVOTHROID) 50 MCG tablet Take 1 tablet (50 mcg total) by mouth daily. 90 tablet 2   ? multivitamin with minerals (THERA-M) 9 mg iron-400 mcg Tab tablet Take 1 tablet by mouth daily.     ? omeprazole (PRILOSEC) 20 MG capsule Take 20 mg by mouth daily before breakfast.     ? polyethylene glycol (MIRALAX) 17 gram packet Take 17 g by mouth daily.            ? rosuvastatin (CRESTOR) 20 MG tablet Take 1 tablet (20 mg total) by mouth at bedtime. 30 tablet 11   ? senna-docusate (PERICOLACE) 8.6-50 mg tablet Take 1 tablet by mouth 2 (two) times a day.        No current facility-administered  "medications for this visit.        Allergies   Allergen Reactions   ? Atorvastatin Other (See Comments)     Leg pain   ? Codeine Nausea Only   ? Dipyridamole Other (See Comments)     Patient reports medication was injected during a stress test and it was thought she had a heart attack following administration       Social History     Tobacco Use   ? Smoking status: Never Smoker   ? Smokeless tobacco: Never Used   Substance Use Topics   ? Alcohol use: No      Family History   Problem Relation Age of Onset   ? Colon cancer Father    ? Diabetes Sister    ? Heart disease Mother         Social History     Substance and Sexual Activity   Drug Use No           Objective   /60   Pulse 72   Ht 5' 7.5\" (1.715 m)   Wt 171 lb (77.6 kg)   BMI 26.39 kg/m    Physical Exam    GENERAL APPEARANCE: healthy, alert and no distress     EYES: EOMI, PERRL. Bilateral upper eyelid dermatochalasis      HENT: ear canals and TM's normal and nose and mouth without ulcers or lesions     NECK: no adenopathy, no asymmetry, masses, or scars and thyroid normal to palpation     RESP: lungs clear to auscultation - no rales, rhonchi or wheezes       CV: regular rates and rhythm, normal S1 S2, no S3 or S4 and no murmur, click or rub     ABDOMEN:  soft, nontender, no HSM or masses and bowel sounds normal     MS: extremities normal- no gross deformities noted, no evidence of inflammation in joints     SKIN: no suspicious lesions or rashes     NEURO: Normal strength and tone, sensory exam grossly normal, mentation intact and speech normal     PSYCH: mentation appears normal. and affect normal/bright     LYMPHATICS: No cervical adenopathy    Recent Labs   Lab Test 07/31/20  0940 05/04/20  0917   HGB 12.2 11.8*    262   INR 0.98  --     144   K 3.7 4.0   CREATININE 1.00 0.87        PRE-OP Diagnostics:   Recent Results (from the past 48 hour(s))   Basic Metabolic Panel    Collection Time: 09/21/20  3:25 PM   Result Value Ref Range    " Sodium 142 mmol/L    Potassium 4.3 mmol/L    Chloride 103 mmol/L    CO2 31 mmol/L    Anion Gap, Calculation 8 mmol/L    Glucose 92 mg/dL    Calcium 9.4 mg/dL    BUN 22 mg/dL    Creatinine 1.12 (H) mg/dL    GFR MDRD Af Amer 57 (L) mL/min/1.73m2    GFR MDRD Non Af Amer 47 (L) mL/min/1.73m2   HM2(CBC w/o Differential)    Collection Time: 09/21/20  3:25 PM   Result Value Ref Range    WBC 5.4 4.0 - 11.0 thou/uL    RBC 3.93 3.80 - 5.40 mill/uL    Hemoglobin 12.5 12.0 - 16.0 g/dL    Hematocrit 36.2 35.0 - 47.0 %    MCV 92 80 - 100 fL    MCH 31.9 27.0 - 34.0 pg    MCHC 34.5 32.0 - 36.0 g/dL    RDW 12.8 11.0 - 14.5 %    Platelets 283 140 - 440 thou/uL    MPV 6.8 (L) 7.0 - 10.0 fL         }     Assessment & Plan       The proposed surgical procedure is considered LOW risk.    REVISED CARDIAC RISK INDEX   The patient has the following serious cardiovascular risks for perioperative complications:  Coronary Artery Disease (MI, positive stress test, angina, Qs on EKG) = 1 point    INTERPRETATION: 1 point: Class II (low risk - 0.9% complication rate)    1. Preoperative examination  2. Dermatochalasis of left upper eyelid    3. Coronary artery disease involving native coronary artery of native heart without angina pectoris  Continue high potency statin. No angina. Recent coronary CTA without obstructive lesion     4. Essential hypertension  stable    5. Familial hypercholesterolemia  Continue statin     6. Acquired hypothyroidism  Euthyroid     7. Gastroesophageal reflux disease without esophagitis  Stable       The patient has the following additional risks and recommendations for perioperative complications:      MEDICATION INSTRUCTIONS:  Patient is to take all scheduled medications on the day of surgery    RECOMMENDATION:  APPROVAL GIVEN to proceed with proposed procedure, without further diagnostic evaluation.    No follow-ups on file.    Signed Electronically by: BHARGAV Pearson    Copy of this evaluation report is provided  to requesting physician.

## 2021-06-29 NOTE — PROGRESS NOTES
Progress Notes by Kathie Lemons MD at 8/6/2020  9:20 AM     Author: Kathie Lemons MD Service: -- Author Type: Physician    Filed: 8/6/2020 10:08 AM Encounter Date: 8/6/2020 Status: Signed    : Kathie Lemons MD (Physician)           Click to link to [facility] HEART [facility] NOTE    Thank you, Dr. Mishra, for asking me to see Geeta Berry in consultation at [facility] to evaluate chest pain.      Assessment/Plan:   1.  Intermittent chest pain: Patient developed intermittent chest pain over last 7 days, aching pain, severe initially, gradually getting better.  The patient has reproducible chest pain.  She complains of fatigue and mild dyspnea on exertion.  She has no fever chills no cough.  Her chest CTA was reported moderate coronary calcification.  She has several risk factors including her age, hypertension, dyslipidemia.  Patient has severe side effects from previous pharmacological nuclear stress test.  After discussion, coronary CT angiogram is requested for further evaluation to rule out obstructive coronary artery disease.    2.  Essential hypertension: Her blood pressure is controlled with the hydrochlorothiazide.    3.  Dyslipidemia: She is on Zocor.  She could not tolerate due to Lipitor.  Based on her coronary CT angiogram findings, consider to change Zocor to Crestor at next visit.      Thank you for the opportunity to be involved in the care of Geeta Berry. If you have any questions, please feel free to contact me.  I will see the patient again in 2 months and as needed.    Much or all of the text in this note was generated through the use of Dragon Dictate voice-to-text software. Errors in spelling or words which seem out of context are unintentional.   Sound alike errors, in particular, may have escaped editing.       History of Present Illness:   It is my pleasure to see Geeta Berry at the [facility] for evaluation of Consult. Geeta BISWAS Rachelchevy  is a 79 y.o. female with a medical history of essential hypertension, dyslipidemia, hypothyroidism, history of DVT, and hiatal hernia.    Patient developed intermittent chest pain over last week.  She described her chest pain as located left anterior chest, aching pain, lasted in variable duration, not typically associated with exertion.  She complains of fatigue, mild shortness of breath on exertion.  She had no palpitations, dizziness, orthopnea, PND or leg edema.  Her blood pressure and heart rate are controlled.  She states that she has some mild reproducible chest pain to left anterior chest.    She had ER visit on July 31, 2020.  Her ECG showed a sinus bradycardia, no obvious ischemic ST-T change.  Her troponin was normal.    She had chest CTA which was reported negative for pulmonary embolism.  But patient was found to have moderate coronary artery calcification.    Past Medical History:     Patient Active Problem List   Diagnosis   ? History of DVT (deep vein thrombosis), right LE 12/2016   ? Acute iritis, h/o in 2012   ? Common bile duct calculus, ERCP x 6 in past. Cannot tolerate ursodiol   ? Constipation   ? GERD (gastroesophageal reflux disease)   ? HTN (hypertension)   ? HLD (hyperlipidemia)   ? Hypothyroidism   ? Osteoarthritis of lumbar spine   ? Osteoarthritis; knees, hips, cervical spine    ? Facet arthropathy, cervical   ? Dysthymia   ? Hiatal hernia   ? Chronic rhinitis       Past Surgical History:     Past Surgical History:   Procedure Laterality Date   ? bilateral TIMO      with revision on both hips   ? CHOLECYSTECTOMY  1980   ? ERCP      x5 over 8 years. Last 8/2016   ? ERCP N/A 9/5/2017    Procedure: ENDOSCOPIC RETROGRADE CHOLANGIOPANCREATOGRAPHY, STONE EXTRACTION;  Surgeon: Henry Eller MD;  Location: [facility];  Service:    ? ERCP N/A 7/26/2019    Procedure: ENDOSCOPIC RETROGRADE CHOLANGIOPANCREATOGRAPHY, REMOVAL OF SLUDGE, DILATION OF STRICTURE;  Surgeon: Ryan Pastrana MD;   Location: [facility] Main OR;  Service: Gastroenterology   ? HIP ARTHROSCOPY Left 1983   ? HIP SURGERY  2012    revision   ? JOINT REPLACEMENT     ? [place] ESOPHAGOGASTRODUODENOSCOPY TRANSORAL DIAGNOSTIC N/A 9/10/2019    Procedure: ESOPHAGOGASTRODUODENOSCOPY (EGD);  Surgeon: Will Nash DO;  Location: [facility] Main OR;  Service: General   ? REPLACEMENT TOTAL KNEE Left 2015   ? REVISION TOTAL HIP ARTHROPLASTY Left 5/16/2017    Procedure: REVISION LEFT TOTAL HIP ARTHROPLASTY, BOTH COMPONENTS;  Surgeon: Tyson Peoples MD;  Location: [facility] Main OR;  Service:    ? SHOULDER SURGERY Left 2010    left total shoulder replacement   ? XR ERCP BILIARY AND PANCREAS  7/26/2019       Family History:     Family History   Problem Relation Age of Onset   ? Colon cancer Father    ? Diabetes Sister    ? Heart disease Mother        Social History:    reports that she has never smoked. She has never used smokeless tobacco. She reports that she does not drink alcohol or use drugs.    Review of Systems:   General: Weight Loss  Eyes: Visual Distubance  Ears/Nose/Throat: WNL  Lungs: WNL  Heart: Chest Pain, Arm Pain  Stomach: Constipation, Heartburn, Nausea  Bladder: WNL  Muscle/Joints: Joint Pain, Muscle Pain  Skin: WNL  Nervous System: WNL  Mental Health: Confusion, Anxiety     Blood: WNL    Meds:     Current Outpatient Medications:   ?  acetaminophen (TYLENOL) 500 MG tablet, Take 500 mg by mouth 2 (two) times a day., Disp: , Rfl:   ?  amoxicillin (AMOXIL) 500 MG capsule, Take 2,000 mg by mouth once as needed (prior to dental procedures)., Disp: , Rfl:   ?  azelastine (ASTELIN) 137 mcg (0.1 %) nasal spray, 1 SPRAY INTO EACH NOSTRIL AT BEDTIME. USE IN EACH NOSTRIL AS DIRECTED, Disp: 30 mL, Rfl: 11  ?  calcium carbonate (OS-ALOK) 600 mg calcium (1,500 mg) tablet, Take 600 mg by mouth daily. , Disp: , Rfl:   ?  citalopram (CELEXA) 10 MG tablet, Take 0.5 tablets (5 mg total) by mouth daily., Disp: 90 tablet, Rfl: 1  ?  fluticasone  "propionate (FLONASE) 50 mcg/actuation nasal spray, SPRAY 1 SPRAY INTO EACH NOSTRIL EVERY DAY, Disp: 48 g, Rfl: 3  ?  hydroCHLOROthiazide (HYDRODIURIL) 25 MG tablet, Take 1 tablet (25 mg total) by mouth daily., Disp: 90 tablet, Rfl: 3  ?  levothyroxine (SYNTHROID, LEVOTHROID) 50 MCG tablet, Take 1 tablet (50 mcg total) by mouth daily., Disp: 90 tablet, Rfl: 3  ?  multivitamin with minerals (THERA-M) 9 mg iron-400 mcg Tab tablet, Take 1 tablet by mouth daily., Disp: , Rfl:   ?  omeprazole (PRILOSEC) 20 MG capsule, Take 20 mg by mouth daily before breakfast., Disp: , Rfl:   ?  ondansetron (ZOFRAN ODT) 4 MG disintegrating tablet, Take 1 tablet (4 mg total) by mouth every 8 (eight) hours as needed for nausea., Disp: 12 tablet, Rfl: 0  ?  polyethylene glycol (MIRALAX) 17 gram packet, Take 17 g by mouth daily.    , Disp: , Rfl:   ?  simvastatin (ZOCOR) 20 MG tablet, TAKE 1 TABLET BY MOUTH EVERYDAY AT BEDTIME, Disp: 90 tablet, Rfl: 3  ?  valACYclovir (VALTREX) 1000 MG tablet, Take 1 tablet (1,000 mg total) by mouth 2 (two) times a day for 7 days., Disp: 14 tablet, Rfl: 0  ?  diphenhydrAMINE-acetaminophen (TYLENOL PM EXTRA STRENGTH)  mg Tab, , Disp: , Rfl:   ?  famotidine (PEPCID) 40 MG tablet, Take 1 tablet (40 mg total) by mouth every evening., Disp: 90 tablet, Rfl: 1  ?  lactulose (ENULOSE) 10 gram/15 mL (15 mL), Take 30 g by mouth once., Disp: , Rfl:   ?  senna-docusate (PERICOLACE) 8.6-50 mg tablet, Take 1 tablet by mouth 2 (two) times a day. , Disp: , Rfl:      Allergies:   Atorvastatin; Codeine; and Dipyridamole    Objective:      Physical Exam  167 lb (75.8 kg)  5' 7\" (1.702 m)  Body mass index is 26.16 kg/m .  /70 (Patient Site: Right Arm, Patient Position: Sitting, Cuff Size: Adult Large)   Pulse 68   Resp 14   Ht 5' 7\" (1.702 m)   Wt 167 lb (75.8 kg)   BMI 26.16 kg/m      General Appearance:   Awake, Alert, No acute distress.   HEENT:  Pupil equal, reactive to light. No scleral icterus; the mucous " membranes were moist. No oral ulcers or thrush.    Neck: No cervical bruits. No JVD. No thyromegaly. No lymph node enlargement or tenderness.   Chest: The spine was straight. The chest was symmetric.   Lungs:   Respirations unlabored. Lungs are clear to auscultation. No crackles. No wheezing.   Cardiovascular:   RRR, normal first and second heart sounds with no murmurs. No rubs or gallops.    Abdomen:  Soft. No tenderness. Non-distended. Bowels sounds are present   Extremities: Equal posterior tibial pulses. No leg edema.   Skin: No rashes or ulcers. Warm, Dry.   Musculoskeletal: Tenderness to left breast area. No deformity.   Neurologic: Mood and affect are appropriate. No focal deficits.         EKG:  Personally reivewed  Sinus bradycardia   Otherwise normal ECG   When compared with ECG of 23-JUL-2019 11:41,   No significant change was found     Cardiac Imaging Studies  ECHO on 9-:  1. Normal left ventricular size and systolic performance with a visually estimated ejection fraction of 60-65%.   2. There is trace aortic insufficiency.   3. Normal right ventricular size and systolic performance.      Chest CTA:  IMPRESSION:   1.  Negative CTA of the chest. No evidence of pulmonary embolus. The lungs are clear.  2.  Moderate coronary artery calcification.       Lab Review   Lab Results   Component Value Date     07/31/2020    K 3.7 07/31/2020     07/31/2020    CO2 29 07/31/2020    BUN 22 07/31/2020    CREATININE 1.00 07/31/2020    CALCIUM 9.2 07/31/2020     Lab Results   Component Value Date    WBC 4.9 07/31/2020    HGB 12.2 07/31/2020    HCT 37.3 07/31/2020    MCV 96 07/31/2020     07/31/2020     Lab Results   Component Value Date    CHOL 197 11/27/2019    TRIG 86 11/27/2019    HDL 57 11/27/2019     Lab Results   Component Value Date    TROPONINI <0.01 07/31/2020     Lab Results   Component Value Date    TSH 2.25 05/04/2020

## 2021-06-29 NOTE — PROGRESS NOTES
Progress Notes by Kathie Lemons MD at 9/16/2020  2:30 PM     Author: Kathie Lemons MD Service: -- Author Type: Physician    Filed: 9/16/2020  3:53 PM Encounter Date: 9/16/2020 Status: Signed    : Kathie Lemons MD (Physician)           Click to link to [facility] Heart [facility] HEART [facility] NOTE       Assessment/Plan:   1.  Coronary artery disease: Her coronary CT angiogram was reported significantly calcified plaques in coronary arteries, but no obstructive lesion.  Her chest pain was resolved.  We discussed the management of coronary artery disease including lifestyle modification, risk factor control.    2.  Essential hypertension: Her blood pressure is controlled with the hydrochlorothiazide.    3.  Dyslipidemia: Her last LDL was 123.  Discontinued simvastatin 20 mg at bedtime.  Start Crestor 20 mg at bedtime.  Repeat lipid profile and liver function in 2 months.    Thank you for the opportunity to be involved in the care of Geeta Berry. If you have any questions, please feel free to contact me.  I will see the patient again in 6 months and as needed.    Much or all of the text in this note was generated through the use of Dragon Dictate voice-to-text software. Errors in spelling or words which seem out of context are unintentional.   Sound alike errors, in particular, may have escaped editing.       History of Present Illness:   It is my pleasure to see Geeta Berry at the [facility] Heart [facility] clinic for evaluation of Follow-up and discussing coronary CT angiogram report. Geeta Berry is a 80 y.o. female with a medical history of essential hypertension, dyslipidemia, hypothyroidism, history of DVT, and hiatal hernia.    The patient states that her we discussed if she had no shortness of breath, palpitations, dizziness, orthopnea, PND or leg edema.  Her blood pressure and heart rate are controlled.      Her coronary CT angiogram was reported total coronary calcium score significantly elevated to  832, but no obvious obstructive lesion, less than 50% stenosis in proximal to mid LAD, first diagonal branch, proximal to mid RCA.    Past Medical History:     Patient Active Problem List   Diagnosis   ? History of DVT (deep vein thrombosis), right LE 12/2016   ? Acute iritis, h/o in 2012   ? Common bile duct calculus, ERCP x 6 in past. Cannot tolerate ursodiol   ? Constipation   ? GERD (gastroesophageal reflux disease)   ? HTN (hypertension)   ? HLD (hyperlipidemia)   ? Hypothyroidism   ? Osteoarthritis of lumbar spine   ? Osteoarthritis; knees, hips, cervical spine    ? Facet arthropathy, cervical   ? Dysthymia   ? Hiatal hernia   ? Chronic rhinitis   ? Coronary artery disease due to calcified coronary lesion       Past Surgical History:     Past Surgical History:   Procedure Laterality Date   ? bilateral TIMO      with revision on both hips   ? CHOLECYSTECTOMY  1980   ? ERCP      x5 over 8 years. Last 8/2016   ? ERCP N/A 9/5/2017    Procedure: ENDOSCOPIC RETROGRADE CHOLANGIOPANCREATOGRAPHY, STONE EXTRACTION;  Surgeon: Henry Eller MD;  Location: [facility];  Service:    ? ERCP N/A 7/26/2019    Procedure: ENDOSCOPIC RETROGRADE CHOLANGIOPANCREATOGRAPHY, REMOVAL OF SLUDGE, DILATION OF STRICTURE;  Surgeon: Ryan Pastrana MD;  Location: [facility] OR;  Service: Gastroenterology   ? HIP ARTHROSCOPY Left 1983   ? HIP SURGERY  2012    revision   ? JOINT REPLACEMENT     ? [place] ESOPHAGOGASTRODUODENOSCOPY TRANSORAL DIAGNOSTIC N/A 9/10/2019    Procedure: ESOPHAGOGASTRODUODENOSCOPY (EGD);  Surgeon: Will Nash DO;  Location: [facility] OR;  Service: General   ? REPLACEMENT TOTAL KNEE Left 2015   ? REVISION TOTAL HIP ARTHROPLASTY Left 5/16/2017    Procedure: REVISION LEFT TOTAL HIP ARTHROPLASTY, BOTH COMPONENTS;  Surgeon: Tyson Peoples MD;  Location: [facility] OR;  Service:    ? SHOULDER SURGERY Left 2010    left total shoulder replacement   ? XR ERCP BILIARY AND PANCREAS  7/26/2019       Family  History:     Family History   Problem Relation Age of Onset   ? Colon cancer Father    ? Diabetes Sister    ? Heart disease Mother        Social History:    reports that she has never smoked. She has never used smokeless tobacco. She reports that she does not drink alcohol or use drugs.    Review of Systems:   General: WNL  Eyes: WNL  Ears/Nose/Throat: WNL  Lungs: WNL  Heart: WNL  Stomach: Constipation, Heartburn  Bladder: WNL  Muscle/Joints: Joint Pain, Muscle Pain  Skin: WNL  Nervous System: WNL  Mental Health: Anxiety     Blood: WNL    Meds:     Current Outpatient Medications:   ?  acetaminophen (TYLENOL) 500 MG tablet, Take 500 mg by mouth 2 (two) times a day., Disp: , Rfl:   ?  amoxicillin (AMOXIL) 500 MG capsule, Take 2,000 mg by mouth once as needed (prior to dental procedures)., Disp: , Rfl:   ?  azelastine (ASTELIN) 137 mcg (0.1 %) nasal spray, 1 SPRAY INTO EACH NOSTRIL AT BEDTIME. USE IN EACH NOSTRIL AS DIRECTED, Disp: 30 mL, Rfl: 11  ?  calcium carbonate (OS-ALOK) 600 mg calcium (1,500 mg) tablet, Take 600 mg by mouth daily. , Disp: , Rfl:   ?  citalopram (CELEXA) 10 MG tablet, Take 0.5 tablets (5 mg total) by mouth daily., Disp: 90 tablet, Rfl: 1  ?  fluticasone propionate (FLONASE) 50 mcg/actuation nasal spray, SPRAY 1 SPRAY INTO EACH NOSTRIL EVERY DAY, Disp: 48 g, Rfl: 3  ?  hydroCHLOROthiazide (HYDRODIURIL) 25 MG tablet, Take 1 tablet (25 mg total) by mouth daily., Disp: 90 tablet, Rfl: 3  ?  levothyroxine (SYNTHROID, LEVOTHROID) 50 MCG tablet, Take 1 tablet (50 mcg total) by mouth daily., Disp: 90 tablet, Rfl: 2  ?  multivitamin with minerals (THERA-M) 9 mg iron-400 mcg Tab tablet, Take 1 tablet by mouth daily., Disp: , Rfl:   ?  omeprazole (PRILOSEC) 20 MG capsule, Take 20 mg by mouth daily before breakfast., Disp: , Rfl:   ?  polyethylene glycol (MIRALAX) 17 gram packet, Take 17 g by mouth daily.    , Disp: , Rfl:   ?  senna-docusate (PERICOLACE) 8.6-50 mg tablet, Take 1 tablet by mouth 2 (two)  "times a day. , Disp: , Rfl:   ?  diphenhydrAMINE-acetaminophen (TYLENOL PM EXTRA STRENGTH)  mg Tab, , Disp: , Rfl:   ?  famotidine (PEPCID) 40 MG tablet, Take 1 tablet (40 mg total) by mouth every evening., Disp: 90 tablet, Rfl: 1  ?  lactulose (ENULOSE) 10 gram/15 mL (15 mL), Take 30 g by mouth once., Disp: , Rfl:   ?  ondansetron (ZOFRAN ODT) 4 MG disintegrating tablet, Take 1 tablet (4 mg total) by mouth every 8 (eight) hours as needed for nausea., Disp: 12 tablet, Rfl: 0  ?  rosuvastatin (CRESTOR) 20 MG tablet, Take 1 tablet (20 mg total) by mouth at bedtime., Disp: 30 tablet, Rfl: 11     Allergies:   Atorvastatin; Codeine; and Dipyridamole    Objective:      Physical Exam  174 lb (78.9 kg)  5' 7.5\" (1.715 m)  Body mass index is 26.85 kg/m .  /64 (Patient Site: Right Arm, Patient Position: Sitting, Cuff Size: Adult Regular)   Pulse (!) 56   Resp 16   Ht 5' 7.5\" (1.715 m)   Wt 174 lb (78.9 kg)   BMI 26.85 kg/m      General Appearance:   Awake, Alert, No acute distress.   HEENT:  Pupil equal, reactive to light. No scleral icterus; the mucous membranes were moist. No oral ulcers or thrush.    Neck: No cervical bruits. No JVD. No thyromegaly. No lymph node enlargement or tenderness.   Chest: The spine was straight. The chest was symmetric.   Lungs:   Respirations unlabored. Lungs are clear to auscultation. No crackles. No wheezing.   Cardiovascular:   RRR, normal first and second heart sounds with no murmurs. No rubs or gallops.    Abdomen:  Soft. No tenderness. Non-distended. Bowels sounds are present   Extremities: Equal posterior tibial pulses. No leg edema.   Skin: No rashes or ulcers. Warm, Dry.   Musculoskeletal: Tenderness to left breast area. No deformity.   Neurologic: Mood and affect are appropriate. No focal deficits.         EKG:  Personally reivewed  Sinus bradycardia   Otherwise normal ECG   When compared with ECG of 23-JUL-2019 11:41,   No significant change was found     Cardiac " Imaging Studies  ECHO on 9-:  1. Normal left ventricular size and systolic performance with a visually estimated ejection fraction of 60-65%.   2. There is trace aortic insufficiency.   3. Normal right ventricular size and systolic performance.      Coronary CT angiogram on 8-:    The total Agatston calcium score is 832. A calcium score in this range places the individual in the 100th percentile when compared to an age and gender matched control group and implies a very high risk of cardiac events in the next ten years.    Short left main with mild stenosis.    Calcified plaques in proximal to mid LAD and first diagonal branch with mild stenosis less than 50% stenosis.    Normal left circumflex coronary artery.  Mild disease in proximal to mid RCA less than 40% stenosis. Right dominant system..       Lab Review   Lab Results   Component Value Date     07/31/2020    K 3.7 07/31/2020     07/31/2020    CO2 29 07/31/2020    BUN 22 07/31/2020    CREATININE 1.00 07/31/2020    CALCIUM 9.2 07/31/2020     Lab Results   Component Value Date    WBC 4.9 07/31/2020    HGB 12.2 07/31/2020    HCT 37.3 07/31/2020    MCV 96 07/31/2020     07/31/2020     Lab Results   Component Value Date    CHOL 197 11/27/2019    TRIG 86 11/27/2019    HDL 57 11/27/2019     Lab Results   Component Value Date    TROPONINI <0.01 07/31/2020     Lab Results   Component Value Date    TSH 2.25 05/04/2020

## 2021-06-29 NOTE — PROGRESS NOTES
Progress Notes by Jacy Mishra FNP at 10/29/2020 12:50 PM     Author: Jacy Mishra FNP Service: -- Author Type: Nurse Practitioner    Filed: 10/30/2020 11:00 AM Encounter Date: 10/29/2020 Status: Signed    : Jacy Mishra FNP (Nurse Practitioner)       [address], [facility] [address]  [facility] [address]  Dept: [phone]  Dept Fax: [phone]  Primary Provider: Jacy Mishra FNP    PREOPERATIVE EVALUATION:  Today's date: 10/29/2020    Geeta Berry is a 80 y.o. female who presents for a preoperative evaluation.    Surgical Information:  Surgery/Procedure: Upper endoscopy  Surgery Location: [facility]  Surgeon: Dr Palafox  Surgery Date: 11/6/2020  Time of Surgery: 6:00 AM  Where patient plans to recover: At home with family  Fax number for surgical facility: [phone]      Subjective     HPI related to upcoming procedure: Geeta Berry is an 79 y/o female here for a preop physical. She notes difficulty swallowing.     The right knee has been bothering her again. She has a cortisone injection scheduled for tomorrow.     CAD was noted on CT coronary scan. She is tolerating rosuvastatin well.     GERD is currently well controlled with omeprazole.      HTN reviewed, blood pressure is well controlled with current medication. No lightheadedness or dizziness associated with treatment.    Preop Questions 10/29/2020   Have you ever had a heart attack or stroke? No   Have you ever had surgery on your heart or blood vessels, such as a stent placement, a coronary artery bypass, or surgery on an artery in your head, neck, heart, or legs? No   Do you have chest pain with activity? No   Do you have a history of  heart failure? No   Do you currently have a cold, bronchitis or symptoms of other infection? No   Do you have a cough, shortness of breath, or wheezing? No   Do you or anyone in your family have previous history of blood clots? No   Do you or does  anyone in your family have a serious bleeding problem such as prolonged bleeding following surgeries or cuts? No   Have you ever had problems with anemia or been told to take iron pills? No   Have you had any abnormal blood loss such as black, tarry or bloody stools, or abnormal vaginal bleeding? No   Have you ever had a blood transfusion? No   Have you ever had a transfusion reaction? -   Are you willing to have a blood transfusion if it is medically needed before, during, or after your surgery? Yes   Have you or any of your relatives ever had problems with anesthesia? No   Do you have sleep apnea, excessive snoring or daytime drowsiness? No   Do you have any artifical heart valves or other implanted medical devices like a pacemaker, defibrillator, or continuous glucose monitor? No   Do you have artificial joints? YES   Are you allergic to latex? No   Is there any chance that you may be pregnant? -     Health Care Directive:  Patient has a Health Care Directive on file.       Review of Systems  CONSTITUTIONAL: NEGATIVE for fever, chills, change in weight  INTEGUMENTARY/SKIN: NEGATIVE for worrisome rashes, moles or lesions  EYES: NEGATIVE for vision changes or irritation  ENT/MOUTH: NEGATIVE for ear, mouth and throat problems  RESP: NEGATIVE for significant cough or SOB  BREAST: NEGATIVE for masses, tenderness or discharge  CV: NEGATIVE for chest pain, palpitations or peripheral edema  GI: NEGATIVE for nausea, abdominal pain, heartburn, or change in bowel habits  : NEGATIVE for frequency, dysuria, or hematuria  MUSCULOSKELETAL: NEGATIVE for significant arthralgias or myalgia  NEURO: NEGATIVE for weakness, dizziness or paresthesias  ENDOCRINE: NEGATIVE for temperature intolerance, skin/hair changes  HEME: NEGATIVE for bleeding problems  PSYCHIATRIC: NEGATIVE for changes in mood or affect    Patient Active Problem List    Diagnosis Date Noted   ? Chronic kidney disease, stage 3 10/29/2020   ? Coronary artery disease  involving native coronary artery of native heart without angina pectoris 08/06/2020   ? Chronic rhinitis 12/03/2019   ? Hiatal hernia 08/21/2019   ? Dysthymia 06/01/2017   ? Facet arthropathy, cervical 04/19/2017   ? History of DVT (deep vein thrombosis), right LE 12/2016 02/07/2017   ? Common bile duct calculus, ERCP x 6 in past. Cannot tolerate ursodiol 07/22/2016   ? Constipation 07/22/2016   ? Osteoarthritis of lumbar spine 05/09/2012   ? Osteoarthritis; knees, hips, cervical spine  05/09/2012   ? Acute iritis, h/o in 2012 04/13/2012   ? Hypothyroidism 11/10/2011   ? HTN (hypertension) 01/25/2010   ? GERD (gastroesophageal reflux disease) 07/07/2009   ? HLD (hyperlipidemia) 07/07/2009     Past Medical History:   Diagnosis Date   ? Arthritis     osteo   ? Chronic neck pain    ? Common bile duct calculus    ? Coronary artery disease due to calcified coronary lesion 8/6/2020   ? Depression    ? DVT (deep venous thrombosis) (H)    ? GERD (gastroesophageal reflux disease)    ? History of blood clots 2017    DVT right lower extremity   ? HLD (hyperlipidemia)    ? HTN (hypertension)    ? Hypothyroidism    ? Infectious viral hepatitis     hepatitis A in 1960s     Past Surgical History:   Procedure Laterality Date   ? bilateral TIMO      with revision on both hips   ? CHOLECYSTECTOMY  1980   ? ERCP      x5 over 8 years. Last 8/2016   ? ERCP N/A 9/5/2017    Procedure: ENDOSCOPIC RETROGRADE CHOLANGIOPANCREATOGRAPHY, STONE EXTRACTION;  Surgeon: Henry Eller MD;  Location: [facility];  Service:    ? ERCP N/A 7/26/2019    Procedure: ENDOSCOPIC RETROGRADE CHOLANGIOPANCREATOGRAPHY, REMOVAL OF SLUDGE, DILATION OF STRICTURE;  Surgeon: Ryan Pastrana MD;  Location: [facility] OR;  Service: Gastroenterology   ? HIP ARTHROSCOPY Left 1983   ? HIP SURGERY  2012    revision   ? JOINT REPLACEMENT     ? MD ESOPHAGOGASTRODUODENOSCOPY TRANSORAL DIAGNOSTIC N/A 9/10/2019    Procedure: ESOPHAGOGASTRODUODENOSCOPY (EGD);  Surgeon:  Will Nash, DO;  Location: [facility];  Service: General   ? REPLACEMENT TOTAL KNEE Left 2015   ? REVISION TOTAL HIP ARTHROPLASTY Left 5/16/2017    Procedure: REVISION LEFT TOTAL HIP ARTHROPLASTY, BOTH COMPONENTS;  Surgeon: Tyson Peoples MD;  Location: [facility] OR;  Service:    ? SHOULDER SURGERY Left 2010    left total shoulder replacement   ? XR ERCP BILIARY AND PANCREAS  7/26/2019     Current Outpatient Medications   Medication Sig Dispense Refill   ? acetaminophen (TYLENOL) 500 MG tablet Take 500 mg by mouth 2 (two) times a day.     ? amoxicillin (AMOXIL) 500 MG capsule Take 2,000 mg by mouth once as needed (prior to dental procedures).     ? azelastine (ASTELIN) 137 mcg (0.1 %) nasal spray 1 SPRAY INTO EACH NOSTRIL AT BEDTIME. USE IN EACH NOSTRIL AS DIRECTED 30 mL 11   ? calcium carbonate (OS-ALOK) 600 mg calcium (1,500 mg) tablet Take 600 mg by mouth daily.      ? citalopram (CELEXA) 10 MG tablet Take 0.5 tablets (5 mg total) by mouth daily. 90 tablet 1   ? diphenhydrAMINE-acetaminophen (TYLENOL PM EXTRA STRENGTH)  mg Tab      ? famotidine (PEPCID) 40 MG tablet Take 1 tablet (40 mg total) by mouth every evening. 90 tablet 1   ? fluticasone propionate (FLONASE) 50 mcg/actuation nasal spray SPRAY 1 SPRAY INTO EACH NOSTRIL EVERY DAY 48 g 3   ? hydroCHLOROthiazide (HYDRODIURIL) 25 MG tablet Take 1 tablet (25 mg total) by mouth daily. 90 tablet 3   ? levothyroxine (SYNTHROID, LEVOTHROID) 50 MCG tablet Take 1 tablet (50 mcg total) by mouth daily. 90 tablet 2   ? multivitamin with minerals (THERA-M) 9 mg iron-400 mcg Tab tablet Take 1 tablet by mouth daily.     ? omeprazole (PRILOSEC) 20 MG capsule Take 20 mg by mouth daily before breakfast.     ? polyethylene glycol (MIRALAX) 17 gram packet Take 17 g by mouth daily.            ? rosuvastatin (CRESTOR) 20 MG tablet Take 1 tablet (20 mg total) by mouth at bedtime. 30 tablet 11   ? senna-docusate (PERICOLACE) 8.6-50 mg tablet Take 1 tablet  "by mouth 2 (two) times a day.        No current facility-administered medications for this visit.        Allergies   Allergen Reactions   ? Atorvastatin Other (See Comments)     Leg pain   ? Codeine Nausea Only   ? Dipyridamole Other (See Comments)     Patient reports medication was injected during a stress test and it was thought she had a heart attack following administration       Social History     Tobacco Use   ? Smoking status: Never Smoker   ? Smokeless tobacco: Never Used   Substance Use Topics   ? Alcohol use: No      Family History   Problem Relation Age of Onset   ? Colon cancer Father    ? Diabetes Sister    ? Heart disease Mother      Social History     Substance and Sexual Activity   Drug Use No        Objective     /64   Pulse 70   Ht 5' 7.5\" (1.715 m)   Wt 175 lb (79.4 kg)   BMI 27.00 kg/m    Physical Exam    GENERAL APPEARANCE: healthy, alert and no distress     EYES: EOMI, PERRL     HENT: ear canals and TM's normal and nose and mouth without ulcers or lesions     NECK: no adenopathy, no asymmetry, masses, or scars and thyroid normal to palpation     RESP: lungs clear to auscultation - no rales, rhonchi or wheezes     CV: regular rates and rhythm, normal S1 S2, no S3 or S4 and no murmur, click or rub     ABDOMEN:  soft, nontender, no HSM or masses and bowel sounds normal     MS: extremities normal- no gross deformities noted     SKIN: no suspicious lesions or rashes     NEURO: Normal strength and tone, sensory exam grossly normal, mentation intact and speech normal     PSYCH: mentation appears normal. and affect normal/bright     LYMPHATICS: No cervical adenopathy    Recent Labs   Lab Test 10/29/20  1330 09/21/20  1525 07/31/20  0940   HGB 12.8 12.5 12.2    283 243   INR  --   --  0.98    142 144   K 4.3 4.3 3.7   CREATININE 1.06 1.12* 1.00        PRE-OP Diagnostics:   Recent Results (from the past 168 hour(s))   Basic Metabolic Panel    Collection Time: 10/29/20  1:30 PM "   Result Value Ref Range    Sodium 141 136 - 145 mmol/L    Potassium 4.3 3.5 - 5.0 mmol/L    Chloride 103 98 - 107 mmol/L    CO2 29 22 - 31 mmol/L    Anion Gap, Calculation 9 5 - 18 mmol/L    Glucose 86 70 - 125 mg/dL    Calcium 9.2 8.5 - 10.5 mg/dL    BUN 22 8 - 28 mg/dL    Creatinine 1.06 0.60 - 1.10 mg/dL    GFR MDRD Af Amer 60 (L) >60 mL/min/1.73m2    GFR MDRD Non Af Amer 50 (L) >60 mL/min/1.73m2   HM2(CBC w/o Differential)    Collection Time: 10/29/20  1:30 PM   Result Value Ref Range    WBC 5.1 4.0 - 11.0 thou/uL    RBC 4.05 3.80 - 5.40 mill/uL    Hemoglobin 12.8 12.0 - 16.0 g/dL    Hematocrit 38.6 35.0 - 47.0 %    MCV 95 80 - 100 fL    MCH 31.6 27.0 - 34.0 pg    MCHC 33.1 32.0 - 36.0 g/dL    RDW 11.8 11.0 - 14.5 %    Platelets 246 140 - 440 thou/uL    MPV 6.7 (L) 7.0 - 10.0 fL   Lipid Profile    Collection Time: 10/29/20  1:30 PM   Result Value Ref Range    Triglycerides 95 <=149 mg/dL    Cholesterol 156 <=199 mg/dL    LDL Calculated 71 <=129 mg/dL    HDL Cholesterol 66 >=50 mg/dL    Patient Fasting > 8hrs? No    ALT (SGPT)    Collection Time: 10/29/20  1:30 PM   Result Value Ref Range    ALT 19 0 - 45 U/L       REVISED CARDIAC RISK INDEX (RCRI)   The patient has the following serious cardiovascular risks for perioperative complications:   - Coronary Artery Disease (MI, positive stress test, angina, Qs on EKG) = 1 point    RCRI INTERPRETATION: 1 point: Class II (low risk - 0.9% complication rate)       Assessment & Plan      The proposed surgical procedure is considered LOW risk.    Preoperative examination  Dysphagia  - Basic Metabolic Panel  - HM2(CBC w/o Differential)  - Stop NSAIDs and supplements 7 days prior to procedure     Essential hypertension  stable    Common bile duct calculus, ERCP x 6 in past. Cannot tolerate ursodiol  Gastroesophageal reflux disease without esophagitis  - Continue omeprazole     Constipation, unspecified constipation type  Doing well with fiber supplement     Acquired  hypothyroidism  Continue levothyroxine, euthyroid     CKD stage 3  - repeat BMP today     RECOMMENDATION:  APPROVAL GIVEN to proceed with proposed procedure, without further diagnostic evaluation.    Signed Electronically by: BHARGAV Pearson    Copy of this evaluation report is provided to requesting physician.

## 2021-06-30 NOTE — PROGRESS NOTES
Progress Notes by Jacy Mishra FNP at 4/19/2021 12:50 PM     Author: Jacy Mishra FNP Service: -- Author Type: Nurse Practitioner    Filed: 4/21/2021  8:17 AM Encounter Date: 4/19/2021 Status: Signed    : Jacy Mishra FNP (Nurse Practitioner)       [address], [address]  [facility] [address]  Dept: [phone]  Dept Fax: [phone]  Primary Provider: Jacy Mishra FNP      PREOPERATIVE EVALUATION:  Today's date: 4/19/2021    Geeta Berry is a 80 y.o. female who presents for a preoperative evaluation.    Surgical Information:  Surgery/Procedure: RIGHT TOTAL KNEE ARTHROPLASTY  Surgery Location: [facility]  Surgeon: Dr Issa  Surgery Date: 4/29/2021  Time of Surgery: TBD  Where patient plans to recover: At home with family  Fax number for surgical facility: Note does not need to be faxed, will be available electronically in Epic.      Assessment & Plan     The proposed surgical procedure is considered INTERMEDIATE risk.    Problem List Items Addressed This Visit     Chronic kidney disease, stage 3     Stable. Repeat BMP today         Chronic rhinitis     Continue astelin nasal spray, flonase          Coronary artery disease involving native coronary artery of native heart without angina pectoris     No anginal symptoms and good functional capacity. She was cleared by cardiology to proceed from a cardiac standpoint          Dysthymia    GERD (gastroesophageal reflux disease)     Stable. Continue omeprazole          HLD (hyperlipidemia)     Continue statin         HTN (hypertension)     Stable          Hypothyroidism     Euthyroid with 5/2020 check          Relevant Orders    Thyroid Stimulating Hormone (TSH) (Completed)    Osteoarthritis; knees, hips, cervical spine       Other Visit Diagnoses     Preoperative examination    -  Primary    Relevant Orders    Basic Metabolic Panel (Completed)    HM2(CBC w/o Differential) (Completed)     Electrocardiogram Perform and Read (Completed)        - 7 days prior to surgery, stop taking aspirin  - Take your other prescription medications as you normally would including the day of surgery take with a very small sip of water      RECOMMENDATION:  APPROVAL GIVEN to proceed with proposed procedure, without further diagnostic evaluation.        Subjective     HPI related to upcoming procedure: Geeta Berry is an 79 y/o female who presents to the office today for a preoperative physical.     Mood is stable with citalopram.     GERD is still intermittent despite therapy with omeprazole and famotidine.     She is treated for HTN, no lightheadedness or dizziness associated with therapy. No chest pain or dyspnea.     Chronic constipation is managed with miralax and senna-docusate.     Preop Questions 4/16/2021   Have you ever had a heart attack or stroke? No   Have you ever had surgery on your heart or blood vessels, such as a stent placement, a coronary artery bypass, or surgery on an artery in your head, neck, heart, or legs? No   Do you have chest pain with activity? No   Do you have a history of  heart failure? No   Do you currently have a cold, bronchitis or symptoms of other infection? No   Do you have a cough, shortness of breath, or wheezing? No   Do you or anyone in your family have previous history of blood clots? No   Do you or does anyone in your family have a serious bleeding problem such as prolonged bleeding following surgeries or cuts? No   Have you ever had problems with anemia or been told to take iron pills? No   Have you had any abnormal blood loss such as black, tarry or bloody stools, or abnormal vaginal bleeding? No   Have you ever had a blood transfusion? No   Have you ever had a transfusion reaction? -   Are you willing to have a blood transfusion if it is medically needed before, during, or after your surgery? Yes   Have you or any of your relatives ever had problems with anesthesia? No    Do you have sleep apnea, excessive snoring or daytime drowsiness? No   Do you have any artifical heart valves or other implanted medical devices like a pacemaker, defibrillator, or continuous glucose monitor? No   Do you have artificial joints? YES - left knee, bilateral hip   Are you allergic to latex? No   Is there any chance that you may be pregnant? -     Health Care Directive:  Patient has a Health Care Directive on file.      Preoperative Review of :    reviewed - no record of controlled substances prescribed.      Review of Systems  CONSTITUTIONAL: NEGATIVE for fever, chills, change in weight  INTEGUMENTARY/SKIN: NEGATIVE for worrisome rashes, moles or lesions  EYES: NEGATIVE for vision changes or irritation  ENT/MOUTH: NEGATIVE for ear, mouth and throat problems  RESP: NEGATIVE for significant cough or SOB  BREAST: NEGATIVE for masses, tenderness or discharge  CV: NEGATIVE for chest pain, palpitations or peripheral edema  GI: NEGATIVE for nausea, abdominal pain, heartburn, or change in bowel habits  : NEGATIVE for frequency, dysuria, or hematuria  NEURO: NEGATIVE for weakness, dizziness or paresthesias  MSK: +arthralgias   ENDOCRINE: NEGATIVE for temperature intolerance, skin/hair changes  HEME: NEGATIVE for bleeding problems  PSYCHIATRIC: NEGATIVE for changes in mood or affect    Patient Active Problem List    Diagnosis Date Noted   ? Chronic kidney disease, stage 3 10/29/2020   ? Coronary artery disease involving native coronary artery of native heart without angina pectoris 08/06/2020   ? Chronic rhinitis 12/03/2019   ? Hiatal hernia 08/21/2019   ? Dysthymia 06/01/2017   ? Facet arthropathy, cervical 04/19/2017   ? History of DVT (deep vein thrombosis), right LE 12/2016 02/07/2017   ? Common bile duct calculus, ERCP x 6 in past. Cannot tolerate ursodiol 07/22/2016   ? Constipation 07/22/2016   ? Osteoarthritis of lumbar spine 05/09/2012   ? Osteoarthritis; knees, hips, cervical spine  05/09/2012    ? Acute iritis, h/o in 2012 04/13/2012   ? Hypothyroidism 11/10/2011   ? HTN (hypertension) 01/25/2010   ? GERD (gastroesophageal reflux disease) 07/07/2009   ? HLD (hyperlipidemia) 07/07/2009     Past Medical History:   Diagnosis Date   ? Arthritis     osteo   ? Chronic neck pain    ? Common bile duct calculus    ? Coronary artery disease due to calcified coronary lesion 8/6/2020   ? Depression    ? DVT (deep venous thrombosis) (H)    ? GERD (gastroesophageal reflux disease)    ? History of blood clots 2017    DVT right lower extremity   ? HLD (hyperlipidemia)    ? HTN (hypertension)    ? Hypothyroidism    ? Infectious viral hepatitis     hepatitis A in 1960s     Past Surgical History:   Procedure Laterality Date   ? bilateral TIMO      with revision on both hips   ? CHOLECYSTECTOMY  1980   ? ERCP      x5 over 8 years. Last 8/2016   ? ERCP N/A 9/5/2017    Procedure: ENDOSCOPIC RETROGRADE CHOLANGIOPANCREATOGRAPHY, STONE EXTRACTION;  Surgeon: Henry Eller MD;  Location: [facility];  Service:    ? ERCP N/A 7/26/2019    Procedure: ENDOSCOPIC RETROGRADE CHOLANGIOPANCREATOGRAPHY, REMOVAL OF SLUDGE, DILATION OF STRICTURE;  Surgeon: Ryan Pastrana MD;  Location: [facility];  Service: Gastroenterology   ? HIP ARTHROSCOPY Left 1983   ? HIP SURGERY  2012    revision   ? JOINT REPLACEMENT     ? OK ESOPHAGOGASTRODUODENOSCOPY TRANSORAL DIAGNOSTIC N/A 9/10/2019    Procedure: ESOPHAGOGASTRODUODENOSCOPY (EGD);  Surgeon: Will Nash DO;  Location: [facility] OR;  Service: General   ? REPLACEMENT TOTAL KNEE Left 2015   ? REVISION TOTAL HIP ARTHROPLASTY Left 5/16/2017    Procedure: REVISION LEFT TOTAL HIP ARTHROPLASTY, BOTH COMPONENTS;  Surgeon: Tyson Peoples MD;  Location: [facility] OR;  Service:    ? SHOULDER SURGERY Left 2010    left total shoulder replacement   ? XR ERCP BILIARY AND PANCREAS  7/26/2019     Current Outpatient Medications   Medication Sig Dispense Refill   ? acetaminophen  (TYLENOL) 500 MG tablet Take 500 mg by mouth 2 (two) times a day.     ? amoxicillin (AMOXIL) 500 MG capsule Take 2,000 mg by mouth once as needed (prior to dental procedures).     ? azelastine (ASTELIN) 137 mcg (0.1 %) nasal spray 1 SPRAY INTO EACH NOSTRIL AT BEDTIME. USE IN EACH NOSTRIL AS DIRECTED 30 mL 11   ? calcium carbonate (OS-ALOK) 600 mg calcium (1,500 mg) tablet Take 600 mg by mouth daily.      ? citalopram (CELEXA) 10 MG tablet Take 0.5 tablets (5 mg total) by mouth daily. 90 tablet 1   ? diphenhydrAMINE-acetaminophen (TYLENOL PM EXTRA STRENGTH)  mg Tab      ? famotidine (PEPCID) 40 MG tablet Take 1 tablet (40 mg total) by mouth every evening. 90 tablet 1   ? fluticasone propionate (FLONASE) 50 mcg/actuation nasal spray SPRAY 1 SPRAY INTO EACH NOSTRIL EVERY DAY 48 g 3   ? hydroCHLOROthiazide (HYDRODIURIL) 25 MG tablet Take 1 tablet (25 mg total) by mouth daily. 90 tablet 3   ? levothyroxine (SYNTHROID, LEVOTHROID) 50 MCG tablet Take 1 tablet (50 mcg total) by mouth daily. 90 tablet 2   ? multivitamin with minerals (THERA-M) 9 mg iron-400 mcg Tab tablet Take 1 tablet by mouth daily.     ? omeprazole (PRILOSEC) 20 MG capsule Take 20 mg by mouth daily before breakfast.     ? polyethylene glycol (MIRALAX) 17 gram packet Take 17 g by mouth daily.            ? rosuvastatin (CRESTOR) 20 MG tablet Take 1 tablet (20 mg total) by mouth at bedtime. 30 tablet 11   ? senna-docusate (PERICOLACE) 8.6-50 mg tablet Take 1 tablet by mouth 2 (two) times a day.        No current facility-administered medications for this visit.        Allergies   Allergen Reactions   ? Atorvastatin Other (See Comments)     Leg pain   ? Codeine Nausea Only   ? Dipyridamole Other (See Comments)     Patient reports medication was injected during a stress test and it was thought she had a heart attack following administration   ? Duloxetine Headache       Social History     Tobacco Use   ? Smoking status: Never Smoker   ? Smokeless tobacco:  "Never Used   Substance Use Topics   ? Alcohol use: No      Family History   Problem Relation Age of Onset   ? Colon cancer Father    ? Diabetes Sister    ? Heart disease Mother      Social History     Substance and Sexual Activity   Drug Use No        Objective     /60   Pulse 60   Ht 5' 7.5\" (1.715 m)   Wt 176 lb (79.8 kg)   BMI 27.16 kg/m    Physical Exam    GENERAL APPEARANCE: healthy, alert and no distress     EYES: EOMI, PERRL     HENT: ear canals and TM's normal and nose and mouth without ulcers or lesions     NECK: no adenopathy, no asymmetry, masses, or scars and thyroid normal to palpation     RESP: lungs clear to auscultation - no rales, rhonchi or wheezes     CV: regular rates and rhythm, normal S1 S2, no S3 or S4 and no murmur, click or rub     ABDOMEN:  soft, nontender, no HSM or masses and bowel sounds normal     NEURO: Normal strength and tone, sensory exam grossly normal, mentation intact and speech normal     PSYCH: mentation appears normal. and affect normal/bright     LYMPHATICS: No cervical adenopathy    Recent Labs   Lab Test 04/19/21  1343 01/20/21  1213 07/31/20  0940 07/31/20  0940   HGB 11.2* 12.2   < > 12.2    251   < > 243   INR  --   --   --  0.98    141   < > 144   K 4.2 4.0   < > 3.7   CREATININE 0.98 1.00   < > 1.00    < > = values in this interval not displayed.        PRE-OP Diagnostics:   Recent Results (from the past 168 hour(s))   Electrocardiogram Perform and Read    Collection Time: 04/19/21 12:48 PM   Result Value Ref Range    SYSTOLIC BLOOD PRESSURE      DIASTOLIC BLOOD PRESSURE      VENTRICULAR RATE 47 BPM    ATRIAL RATE 47 BPM    P-R INTERVAL 180 ms    QRS DURATION 96 ms    Q-T INTERVAL 440 ms    QTC CALCULATION (BEZET) 389 ms    P Axis 33 degrees    R AXIS 10 degrees    T AXIS 54 degrees    MUSE DIAGNOSIS       Sinus bradycardia  Normal ECG  When compared with ECG of 31-JUL-2020 09:46,  No significant change was found  Confirmed by ZOE HASSAN MD " LOC:YADIRA ([id number]) on 4/19/2021 2:46:34 PM     Basic Metabolic Panel    Collection Time: 04/19/21  1:43 PM   Result Value Ref Range    Sodium 138 136 - 145 mmol/L    Potassium 4.2 3.5 - 5.0 mmol/L    Chloride 100 98 - 107 mmol/L    CO2 31 22 - 31 mmol/L    Anion Gap, Calculation 7 5 - 18 mmol/L    Glucose 80 70 - 125 mg/dL    Calcium 9.1 8.5 - 10.5 mg/dL    BUN 28 8 - 28 mg/dL    Creatinine 0.98 0.60 - 1.10 mg/dL    GFR MDRD Af Amer >60 >60 mL/min/1.73m2    GFR MDRD Non Af Amer 54 (L) >60 mL/min/1.73m2   HM2(CBC w/o Differential)    Collection Time: 04/19/21  1:43 PM   Result Value Ref Range    WBC 5.9 4.0 - 11.0 thou/uL    RBC 3.63 (L) 3.80 - 5.40 mill/uL    Hemoglobin 11.2 (L) 12.0 - 16.0 g/dL    Hematocrit 34.9 (L) 35.0 - 47.0 %    MCV 96 80 - 100 fL    MCH 30.9 27.0 - 34.0 pg    MCHC 32.1 32.0 - 36.0 g/dL    RDW 14.6 (H) 11.0 - 14.5 %    Platelets 232 140 - 440 thou/uL    MPV 8.6 7.0 - 10.0 fL   Thyroid Stimulating Hormone (TSH)    Collection Time: 04/19/21  1:43 PM   Result Value Ref Range    TSH 2.06 0.30 - 5.00 uIU/mL         REVISED CARDIAC RISK INDEX (RCRI)   The patient has the following serious cardiovascular risks for perioperative complications:   - Coronary Artery Disease (MI, positive stress test, angina, Qs on EKG) = 1 point    RCRI INTERPRETATION: 1 point: Class II (low risk - 0.9% complication rate)         Signed Electronically by: BHARGAV Pearson    Copy of this evaluation report is provided to requesting physician.

## 2021-06-30 NOTE — PROGRESS NOTES
Progress Notes by Kathie Lemons MD at 4/12/2021  3:10 PM     Author: Kathie Lemons MD Service: -- Author Type: Physician    Filed: 4/12/2021  3:51 PM Encounter Date: 4/12/2021 Status: Signed    : Kathie Lemons MD (Physician)           Click to link to [facility] NOTE       Assessment/Plan:   1.  Preop cardiovascular clearance: The patient has no chest pain or shortness of breath.  She does exercise riding bicycle several miles a day without chest pain or shortness of breath.  Her physical capacity is more than 4 METS.  There is no indication of cardiac evaluation prior to surgery.  The patient is cleared for right knee surgery with a low risk from a cardiology standpoint.    2.  Coronary artery disease: Her coronary CT angiogram was reported significantly calcified plaques in coronary arteries, but no obstructive lesion.  She has no chest pain or shortness of breath.  Continue aspirin 81 mg daily Crestor 20 mg at bedtime.    3.  Essential hypertension: Her blood pressure is controlled with the hydrochlorothiazide.    4.  Dyslipidemia: Her last LDL was 71, HDL 66.  Continue Crestor 20 mg at bedtime.    Thank you for the opportunity to be involved in the care of Geeta Berry. If you have any questions, please feel free to contact me.  I will see the patient again in follow-up months and as needed.    Much or all of the text in this note was generated through the use of Dragon Dictate voice-to-text software. Errors in spelling or words which seem out of context are unintentional.   Sound alike errors, in particular, may have escaped editing.       History of Present Illness:   It is my pleasure to see Geeta Berry at the [facility] clinic for evaluation of Follow-up. Geeta Berry is a 80 y.o. female with a medical history of coronary artery disease, essential hypertension, dyslipidemia, hypothyroidism, history of DVT, and hiatal hernia.    The patient states that she  has been doing quite well over last 6 months.  She denies any chest pain, shortness of breath, palpitations, dizziness, orthopnea, PND or leg edema.  She does exercise by riding biker for several miles a day without chest pain or symptoms.  Her blood pressure and heart rate are controlled.  LDL was also well controlled.    Her coronary CT angiogram was reported total coronary calcium score significantly elevated to 832, but no obvious obstructive lesion, less than 50% stenosis in proximal to mid LAD, first diagonal branch, proximal to mid RCA.    Past Medical History:     Patient Active Problem List   Diagnosis   ? History of DVT (deep vein thrombosis), right LE 12/2016   ? Acute iritis, h/o in 2012   ? Common bile duct calculus, ERCP x 6 in past. Cannot tolerate ursodiol   ? Constipation   ? GERD (gastroesophageal reflux disease)   ? HTN (hypertension)   ? HLD (hyperlipidemia)   ? Hypothyroidism   ? Osteoarthritis of lumbar spine   ? Osteoarthritis; knees, hips, cervical spine    ? Facet arthropathy, cervical   ? Dysthymia   ? Hiatal hernia   ? Chronic rhinitis   ? Coronary artery disease involving native coronary artery of native heart without angina pectoris   ? Chronic kidney disease, stage 3       Past Surgical History:     Past Surgical History:   Procedure Laterality Date   ? bilateral TIMO      with revision on both hips   ? CHOLECYSTECTOMY  1980   ? ERCP      x5 over 8 years. Last 8/2016   ? ERCP N/A 9/5/2017    Procedure: ENDOSCOPIC RETROGRADE CHOLANGIOPANCREATOGRAPHY, STONE EXTRACTION;  Surgeon: Henry Eller MD;  Location: [facility];  Service:    ? ERCP N/A 7/26/2019    Procedure: ENDOSCOPIC RETROGRADE CHOLANGIOPANCREATOGRAPHY, REMOVAL OF SLUDGE, DILATION OF STRICTURE;  Surgeon: Ryan Pastrana MD;  Location: [facility];  Service: Gastroenterology   ? HIP ARTHROSCOPY Left 1983   ? HIP SURGERY  2012    revision   ? JOINT REPLACEMENT     ? CT ESOPHAGOGASTRODUODENOSCOPY TRANSORAL DIAGNOSTIC  N/A 9/10/2019    Procedure: ESOPHAGOGASTRODUODENOSCOPY (EGD);  Surgeon: Will Nash DO;  Location: [facility] OR;  Service: General   ? REPLACEMENT TOTAL KNEE Left 2015   ? REVISION TOTAL HIP ARTHROPLASTY Left 5/16/2017    Procedure: REVISION LEFT TOTAL HIP ARTHROPLASTY, BOTH COMPONENTS;  Surgeon: Tyson Peoples MD;  Location: [facility] OR;  Service:    ? SHOULDER SURGERY Left 2010    left total shoulder replacement   ? XR ERCP BILIARY AND PANCREAS  7/26/2019       Family History:     Family History   Problem Relation Age of Onset   ? Colon cancer Father    ? Diabetes Sister    ? Heart disease Mother        Social History:    reports that she has never smoked. She has never used smokeless tobacco. She reports that she does not drink alcohol or use drugs.    Review of Systems:   General: WNL  Eyes: WNL  Ears/Nose/Throat: WNL  Lungs: WNL  Heart: Leg Swelling  Stomach: WNL  Bladder: WNL  Muscle/Joints: Joint Pain  Skin: WNL  Nervous System: WNL  Mental Health: WNL     Blood: WNL    Meds:     Current Outpatient Medications:   ?  acetaminophen (TYLENOL) 500 MG tablet, Take 500 mg by mouth 2 (two) times a day., Disp: , Rfl:   ?  amoxicillin (AMOXIL) 500 MG capsule, Take 2,000 mg by mouth once as needed (prior to dental procedures)., Disp: , Rfl:   ?  aspirin 81 mg chewable tablet, Chew 81 mg daily., Disp: , Rfl:   ?  azelastine (ASTELIN) 137 mcg (0.1 %) nasal spray, 1 SPRAY INTO EACH NOSTRIL AT BEDTIME. USE IN EACH NOSTRIL AS DIRECTED, Disp: 30 mL, Rfl: 11  ?  calcium carbonate (OS-ALOK) 600 mg calcium (1,500 mg) tablet, Take 600 mg by mouth daily. , Disp: , Rfl:   ?  citalopram (CELEXA) 10 MG tablet, Take 0.5 tablets (5 mg total) by mouth daily., Disp: 90 tablet, Rfl: 1  ?  fluticasone propionate (FLONASE) 50 mcg/actuation nasal spray, SPRAY 1 SPRAY INTO EACH NOSTRIL EVERY DAY, Disp: 48 g, Rfl: 3  ?  hydroCHLOROthiazide (HYDRODIURIL) 25 MG tablet, Take 1 tablet (25 mg total) by mouth daily., Disp: 90  "tablet, Rfl: 3  ?  levothyroxine (SYNTHROID, LEVOTHROID) 50 MCG tablet, Take 1 tablet (50 mcg total) by mouth daily., Disp: 90 tablet, Rfl: 2  ?  multivitamin with minerals (THERA-M) 9 mg iron-400 mcg Tab tablet, Take 1 tablet by mouth daily., Disp: , Rfl:   ?  omeprazole (PRILOSEC) 20 MG capsule, Take 20 mg by mouth daily before breakfast., Disp: , Rfl:   ?  polyethylene glycol (MIRALAX) 17 gram packet, Take 17 g by mouth daily.    , Disp: , Rfl:   ?  rosuvastatin (CRESTOR) 20 MG tablet, Take 1 tablet (20 mg total) by mouth at bedtime., Disp: 30 tablet, Rfl: 11  ?  senna-docusate (PERICOLACE) 8.6-50 mg tablet, Take 1 tablet by mouth 2 (two) times a day. , Disp: , Rfl:   ?  diphenhydrAMINE-acetaminophen (TYLENOL PM EXTRA STRENGTH)  mg Tab, , Disp: , Rfl:   ?  famotidine (PEPCID) 40 MG tablet, Take 1 tablet (40 mg total) by mouth every evening., Disp: 90 tablet, Rfl: 1     Allergies:   Atorvastatin, Codeine, Dipyridamole, and Duloxetine    Objective:      Physical Exam  176 lb (79.8 kg)  5' 7.5\" (1.715 m)  Body mass index is 27.16 kg/m .  /50 (Patient Site: Left Arm, Patient Position: Sitting, Cuff Size: Adult Regular)   Pulse (!) 52   Resp 16   Ht 5' 7.5\" (1.715 m)   Wt 176 lb (79.8 kg)   BMI 27.16 kg/m      General Appearance:   Awake, Alert, No acute distress.   HEENT:  Pupil equal, reactive to light. No scleral icterus; the mucous membranes were moist. No oral ulcers or thrush.    Neck: No cervical bruits. No JVD. No thyromegaly. No lymph node enlargement or tenderness.   Chest: The spine was straight. The chest was symmetric.   Lungs:   Respirations unlabored. Lungs are clear to auscultation. No crackles. No wheezing.   Cardiovascular:   RRR, normal first and second heart sounds with no murmurs. No rubs or gallops.    Abdomen:  Soft. No tenderness. Non-distended. Bowels sounds are present   Extremities: Equal posterior tibial pulses. No leg edema.   Skin: No rashes or ulcers. Warm, Dry. "   Musculoskeletal: Tenderness to left breast area. No deformity.   Neurologic: Mood and affect are appropriate. No focal deficits.         EKG:  Personally reivewed  Sinus bradycardia   Otherwise normal ECG   When compared with ECG of 23-JUL-2019 11:41,   No significant change was found     Cardiac Imaging Studies  ECHO on 9-:  1. Normal left ventricular size and systolic performance with a visually estimated ejection fraction of 60-65%.   2. There is trace aortic insufficiency.   3. Normal right ventricular size and systolic performance.      Coronary CT angiogram on 8-:    The total Agatston calcium score is 832. A calcium score in this range places the individual in the 100th percentile when compared to an age and gender matched control group and implies a very high risk of cardiac events in the next ten years.    Short left main with mild stenosis.    Calcified plaques in proximal to mid LAD and first diagonal branch with mild stenosis less than 50% stenosis.    Normal left circumflex coronary artery.  Mild disease in proximal to mid RCA less than 40% stenosis. Right dominant system..       Lab Review   Lab Results   Component Value Date     01/20/2021    K 4.0 01/20/2021     01/20/2021    CO2 31 01/20/2021    BUN 26 01/20/2021    CREATININE 1.00 01/20/2021    CALCIUM 9.2 01/20/2021     Lab Results   Component Value Date    WBC 4.4 01/20/2021    HGB 12.2 01/20/2021    HCT 36.4 01/20/2021    MCV 94 01/20/2021     01/20/2021     Lab Results   Component Value Date    CHOL 156 10/29/2020    TRIG 95 10/29/2020    HDL 66 10/29/2020     Lab Results   Component Value Date    TROPONINI <0.01 07/31/2020     Lab Results   Component Value Date    TSH 2.25 05/04/2020

## 2021-06-30 NOTE — PROGRESS NOTES
Progress Notes by Jacy Mishra FNP at 1/20/2021 11:35 AM     Author: Jacy Mishra FNP Service: -- Author Type: Nurse Practitioner    Filed: 1/22/2021 12:44 PM Encounter Date: 1/20/2021 Status: Signed    : Jacy Mishra FNP (Nurse Practitioner)       [address], [address]  [facility] [address]  Dept: [phone]  Dept Fax: [phone]  Primary Provider: Jacy Mishra FNP      PREOPERATIVE EVALUATION:  Today's date: 1/20/2021    Geeta Berry is a 80 y.o. female who presents for a preoperative evaluation.    Surgical Information:  Surgery/Procedure: EGD  Surgery Location: [facility]  Surgeon: Dr Baez  Surgery Date: 2/8/2020  Time of Surgery: 10:00 AM  Where patient plans to recover: At home with family  Fax number for surgical facility: Note does not need to be faxed, will be available electronically in Epic.      Subjective     HPI related to upcoming procedure: Geeta Berry is an 79 y/o female who presents to the office for a preoperative physical.     She has had a sensation of fullness and chest discomfort.  EGD was performed on 11/6 and she was found to have a nonobstructing Schatzki ring as well as a nonbleeding gastric ulcer.  She was advised to continue omeprazole twice daily and avoid NSAIDs.  She is scheduled for a 2-month follow-up repeat upper endoscopy. We discussed a low FODMAPs diet at her last visit. She no longer has a fullness in the upper chest but feels a fullness in the epigastric area and is belching.     She reported an increase in osteoarthritis pain.  Citalopram was discontinued and duloxetine started at 20 mg daily.  Orthopedic follow-up was recommended for repeat knee injection- she has not yet scheduled this. She had a splitting headache after taking duloxetine and so she stopped the medication.     Preop Questions 1/20/2021   Have you ever had a heart attack or stroke? No   Have you ever had surgery on your  heart or blood vessels, such as a stent placement, a coronary artery bypass, or surgery on an artery in your head, neck, heart, or legs? No   Do you have chest pain with activity? No   Do you have a history of  heart failure? No   Do you currently have a cold, bronchitis or symptoms of other infection? No   Do you have a cough, shortness of breath, or wheezing? No   Do you or anyone in your family have previous history of blood clots? No   Do you or does anyone in your family have a serious bleeding problem such as prolonged bleeding following surgeries or cuts? No   Have you ever had problems with anemia or been told to take iron pills? No   Have you had any abnormal blood loss such as black, tarry or bloody stools, or abnormal vaginal bleeding? No   Have you ever had a blood transfusion? No   Have you ever had a transfusion reaction? -   Are you willing to have a blood transfusion if it is medically needed before, during, or after your surgery? Yes   Have you or any of your relatives ever had problems with anesthesia? No   Do you have sleep apnea, excessive snoring or daytime drowsiness? No   Do you have any artifical heart valves or other implanted medical devices like a pacemaker, defibrillator, or continuous glucose monitor? No   Do you have artificial joints? YES - left shoulder, left knee    Are you allergic to latex? No   Is there any chance that you may be pregnant? -     Health Care Directive:  Patient has a Health Care Directive on file.     Preoperative Review of :    reviewed - prescribed codeine cough syrup in February 2020    Review of Systems  CONSTITUTIONAL: NEGATIVE for fever, chills, change in weight  INTEGUMENTARY/SKIN: NEGATIVE for worrisome rashes, moles or lesions  EYES: NEGATIVE for vision changes or irritation  ENT/MOUTH: NEGATIVE for ear, mouth and throat problems  RESP: NEGATIVE for significant cough or SOB  BREAST: NEGATIVE for masses, tenderness or discharge  CV: NEGATIVE for chest  pain, palpitations or peripheral edema  GI: NEGATIVE for nausea, or change in bowel habits +dyspepsia, belching, abdominal fullness and early satiety   : NEGATIVE for frequency, dysuria, or hematuria  MUSCULOSKELETAL: POSITIVE for arthralgias r/t osteoarthritis   NEURO: NEGATIVE for weakness, dizziness or paresthesias  ENDOCRINE: NEGATIVE for temperature intolerance, skin/hair changes  HEME: NEGATIVE for bleeding problems  PSYCHIATRIC: NEGATIVE for changes in mood or affect    Patient Active Problem List    Diagnosis Date Noted   ? Chronic kidney disease, stage 3 10/29/2020   ? Coronary artery disease involving native coronary artery of native heart without angina pectoris 08/06/2020   ? Chronic rhinitis 12/03/2019   ? Hiatal hernia 08/21/2019   ? Dysthymia 06/01/2017   ? Facet arthropathy, cervical 04/19/2017   ? History of DVT (deep vein thrombosis), right LE 12/2016 02/07/2017   ? Common bile duct calculus, ERCP x 6 in past. Cannot tolerate ursodiol 07/22/2016   ? Constipation 07/22/2016   ? Osteoarthritis of lumbar spine 05/09/2012   ? Osteoarthritis; knees, hips, cervical spine  05/09/2012   ? Acute iritis, h/o in 2012 04/13/2012   ? Hypothyroidism 11/10/2011   ? HTN (hypertension) 01/25/2010   ? GERD (gastroesophageal reflux disease) 07/07/2009   ? HLD (hyperlipidemia) 07/07/2009     Past Medical History:   Diagnosis Date   ? Arthritis     osteo   ? Chronic neck pain    ? Common bile duct calculus    ? Coronary artery disease due to calcified coronary lesion 8/6/2020   ? Depression    ? DVT (deep venous thrombosis) (H)    ? GERD (gastroesophageal reflux disease)    ? History of blood clots 2017    DVT right lower extremity   ? HLD (hyperlipidemia)    ? HTN (hypertension)    ? Hypothyroidism    ? Infectious viral hepatitis     hepatitis A in 1960s     Past Surgical History:   Procedure Laterality Date   ? bilateral TIMO      with revision on both hips   ? CHOLECYSTECTOMY  1980   ? ERCP      x5 over 8 years.  Last 8/2016   ? ERCP N/A 9/5/2017    Procedure: ENDOSCOPIC RETROGRADE CHOLANGIOPANCREATOGRAPHY, STONE EXTRACTION;  Surgeon: Henry Eller MD;  [facility];  Service:    ? ERCP N/A 7/26/2019    Procedure: ENDOSCOPIC RETROGRADE CHOLANGIOPANCREATOGRAPHY, REMOVAL OF SLUDGE, DILATION OF STRICTURE;  Surgeon: Ryan Pastrana MD;  Location: [facility] OR;  Service: Gastroenterology   ? HIP ARTHROSCOPY Left 1983   ? HIP SURGERY  2012    revision   ? JOINT REPLACEMENT     ? MA ESOPHAGOGASTRODUODENOSCOPY TRANSORAL DIAGNOSTIC N/A 9/10/2019    Procedure: ESOPHAGOGASTRODUODENOSCOPY (EGD);  Surgeon: Will Nash DO;  Location: [facility] OR;  Service: General   ? REPLACEMENT TOTAL KNEE Left 2015   ? REVISION TOTAL HIP ARTHROPLASTY Left 5/16/2017    Procedure: REVISION LEFT TOTAL HIP ARTHROPLASTY, BOTH COMPONENTS;  Surgeon: Tyson Peoples MD;  Location: [facility] OR;  Service:    ? SHOULDER SURGERY Left 2010    left total shoulder replacement   ? XR ERCP BILIARY AND PANCREAS  7/26/2019     Current Outpatient Medications   Medication Sig Dispense Refill   ? acetaminophen (TYLENOL) 500 MG tablet Take 500 mg by mouth 2 (two) times a day.     ? amoxicillin (AMOXIL) 500 MG capsule Take 2,000 mg by mouth once as needed (prior to dental procedures).     ? azelastine (ASTELIN) 137 mcg (0.1 %) nasal spray 1 SPRAY INTO EACH NOSTRIL AT BEDTIME. USE IN EACH NOSTRIL AS DIRECTED 30 mL 11   ? calcium carbonate (OS-ALOK) 600 mg calcium (1,500 mg) tablet Take 600 mg by mouth daily.      ? citalopram (CELEXA) 10 MG tablet Take 0.5 tablets (5 mg total) by mouth daily. 90 tablet 1   ? diphenhydrAMINE-acetaminophen (TYLENOL PM EXTRA STRENGTH)  mg Tab      ? famotidine (PEPCID) 40 MG tablet Take 1 tablet (40 mg total) by mouth every evening. 90 tablet 1   ? fluticasone propionate (FLONASE) 50 mcg/actuation nasal spray SPRAY 1 SPRAY INTO EACH NOSTRIL EVERY DAY 48 g 3   ? hydroCHLOROthiazide (HYDRODIURIL) 25 MG  "tablet Take 1 tablet (25 mg total) by mouth daily. 90 tablet 3   ? levothyroxine (SYNTHROID, LEVOTHROID) 50 MCG tablet Take 1 tablet (50 mcg total) by mouth daily. 90 tablet 2   ? multivitamin with minerals (THERA-M) 9 mg iron-400 mcg Tab tablet Take 1 tablet by mouth daily.     ? omeprazole (PRILOSEC) 20 MG capsule Take 20 mg by mouth daily before breakfast.     ? polyethylene glycol (MIRALAX) 17 gram packet Take 17 g by mouth daily.            ? rosuvastatin (CRESTOR) 20 MG tablet Take 1 tablet (20 mg total) by mouth at bedtime. 30 tablet 11   ? senna-docusate (PERICOLACE) 8.6-50 mg tablet Take 1 tablet by mouth 2 (two) times a day.        No current facility-administered medications for this visit.        Allergies   Allergen Reactions   ? Atorvastatin Other (See Comments)     Leg pain   ? Codeine Nausea Only   ? Dipyridamole Other (See Comments)     Patient reports medication was injected during a stress test and it was thought she had a heart attack following administration   ? Duloxetine Headache       Social History     Tobacco Use   ? Smoking status: Never Smoker   ? Smokeless tobacco: Never Used   Substance Use Topics   ? Alcohol use: No      Family History   Problem Relation Age of Onset   ? Colon cancer Father    ? Diabetes Sister    ? Heart disease Mother      Social History     Substance and Sexual Activity   Drug Use No        Objective     /70   Pulse 74   Ht 5' 7.5\" (1.715 m)   Wt 176 lb (79.8 kg)   BMI 27.16 kg/m    Physical Exam    GENERAL APPEARANCE: healthy, alert and no distress     EYES: EOMI, PERRL     HENT: ear canals and TM's normal and nose and mouth without ulcers or lesions     NECK: no adenopathy, no asymmetry, masses, or scars and thyroid normal to palpation     RESP: lungs clear to auscultation - no rales, rhonchi or wheezes     CV: regular rates and rhythm, normal S1 S2, no S3 or S4 and no murmur, click or rub     ABDOMEN:  soft, nontender, no HSM or masses and bowel sounds " normal     MS: extremities normal- no gross deformities noted     SKIN: no suspicious lesions or rashes     NEURO: Normal strength and tone     PSYCH: mentation appears normal. and affect normal/bright     LYMPHATICS: No cervical adenopathy    Recent Labs   Lab Test 01/20/21  1213 10/29/20  1330 07/31/20  0940 07/31/20  0940   HGB 12.2 12.8   < > 12.2    246   < > 243   INR  --   --   --  0.98    141   < > 144   K 4.0 4.3   < > 3.7   CREATININE 1.00 1.06   < > 1.00    < > = values in this interval not displayed.        PRE-OP Diagnostics:   Recent Results (from the past 168 hour(s))   Basic Metabolic Panel    Collection Time: 01/20/21 12:13 PM   Result Value Ref Range    Sodium 141 136 - 145 mmol/L    Potassium 4.0 3.5 - 5.0 mmol/L    Chloride 102 98 - 107 mmol/L    CO2 31 22 - 31 mmol/L    Anion Gap, Calculation 8 5 - 18 mmol/L    Glucose 81 70 - 125 mg/dL    Calcium 9.2 8.5 - 10.5 mg/dL    BUN 26 8 - 28 mg/dL    Creatinine 1.00 0.60 - 1.10 mg/dL    GFR MDRD Af Amer >60 >60 mL/min/1.73m2    GFR MDRD Non Af Amer 53 (L) >60 mL/min/1.73m2   HM2(CBC w/o Differential)    Collection Time: 01/20/21 12:13 PM   Result Value Ref Range    WBC 4.4 4.0 - 11.0 thou/uL    RBC 3.86 3.80 - 5.40 mill/uL    Hemoglobin 12.2 12.0 - 16.0 g/dL    Hematocrit 36.4 35.0 - 47.0 %    MCV 94 80 - 100 fL    MCH 31.6 27.0 - 34.0 pg    MCHC 33.6 32.0 - 36.0 g/dL    RDW 12.8 11.0 - 14.5 %    Platelets 251 140 - 440 thou/uL    MPV 6.5 (L) 7.0 - 10.0 fL   Microalbumin, Random Urine    Collection Time: 01/20/21  7:08 PM   Result Value Ref Range    Microalbumin, Random Urine <0.50 0.00 - 1.99 mg/dL    Creatinine, Urine 38.5 mg/dL    Microalbumin/Creatinine Ratio Random Urine           REVISED CARDIAC RISK INDEX (RCRI)   The patient has the following serious cardiovascular risks for perioperative complications:   - No serious cardiac risks = 0 points    RCRI INTERPRETATION: 0 points: Class I (very low risk - 0.4% complication rate)        Assessment & Plan      The proposed surgical procedure is considered LOW risk.    Preoperative examination  Gastric ulcer without hemorrhage or perforation, unspecified chronicity  Gastroesophageal reflux disease without esophagitis  - continue omeprazole and famotidine     History of DVT (deep vein thrombosis), right LE 12/2016  Treatment completed     Stage 3a chronic kidney disease  - stable  - Microalbumin, Random Urine    Coronary artery disease involving native coronary artery of native heart without angina pectoris  Mixed hyperlipidemia  No angina  Continue statin    Essential hypertension  Stable    Acquired hypothyroidism  Euthyroid     Dysthymia  Continue with citalopram. Did not tolerate duloxetine (started for OA pain and mood)          RECOMMENDATION:  APPROVAL GIVEN to proceed with proposed procedure, without further diagnostic evaluation.    Signed Electronically by: BHARGAV Pearson    Copy of this evaluation report is provided to requesting physician.          Personal collateral

## 2021-07-03 NOTE — ADDENDUM NOTE
Addendum Note by Dm Branch MA at 3/21/2017 11:59 PM     Author: Dm Branch MA Service: -- Author Type: Medical Assistant    Filed: 3/28/2017  8:51 AM Date of Service: 3/21/2017 11:59 PM Status: Signed    : Dm Branch MA (Medical Assistant)    Encounter addended by: Dm Branch MA on: 3/28/2017  8:51 AM<BR>     Actions taken: Visit Navigator Flowsheet section accepted

## 2021-07-04 NOTE — PROGRESS NOTES
Progress Notes by Hima Kate PA-C at 6/2/2021  1:00 PM     Author: Hima Kate PA-C Service: -- Author Type: Physician Assistant    Filed: 6/2/2021  4:34 PM Encounter Date: 6/2/2021 Status: Signed    : Hima Kate PA-C (Physician Assistant)       Chief Complaint   Patient presents with   ? Fatigue     X4-5 days and getting worse, started aspirin 81 recently, higher pulse in the mornings,          Clinical Decision Making:  Multiple etiologies and diagnoses were considered to include but not limited to GI bleed, anemia, DVT secondary to her right total knee arthroplasty, Baker's cyst, depression.    I had a conversation with the daughter, Gabi, and the patient dating that she does have noted low red blood cell and hemoglobin hematocrit on her CBC which could account for her fatigue. Patient is directed to have follow-up with her primary care provider to do a anemia work-up and also assumed GI bleed for the cause of her low hemoglobin hematocrit and RBCs. Patient will make an appointment to have close follow-up with her primary care provider, Jacy Mishra, before leaving the clinic today. Patient is also under social stress with taking care of her  who she is the primary caregiver and her  has multiple medical needs and she is also having sleep difficulty with falling asleep staying asleep and early morning awakening. There could be a component of depression which is also adding to her fatigue. However, the patient is back to see her primary care provider for reevaluation and treatment with close follow-up. Indication for emergent return to the ER was gone over and questions were answered to patient and daughter satisfaction.    40 min spent on the date of the encounter in chart review, patient visit, review of tests, documentation and/or discussion with other providers about the issues documented above.     At the end of the encounter, I discussed results, diagnosis, medications. Discussed  red flags for immediate return to clinic/ER, as well as indications for follow up if no improvement. Patient understood and agreed to plan. Patient was stable for discharge.    1. Fatigue, unspecified type  HM1(CBC and Differential)    Basic Metabolic Panel   2. Right calf pain  US Venous Leg Right         Patient Instructions   Patient has anemia noted on her CBC.  Follow-up with your primary care provider in the next 2 days to work-up for anemia as well as possible diagnosis of GI bleed because of the anemia.    If you should develop dizziness lightheadedness shortness of breath heart palpitations go to the ER for evaluation and treatment.      Patient Education     When You Have Gastrointestinal (GI) Bleeding    Blood in your vomit or stool can be a sign of gastrointestinal (GI) bleeding. GI bleeding can be scary. But the cause may not be serious. You should always see a doctor if you have GI bleeding.  The GI tract is the path through which food travels in the body. Food passes from the mouth down the esophagus. This is the tube from the mouth to the stomach. Food starts to break down in the stomach. It then moves through the duodenum, the first part of the small intestine. Nutrients are absorbed as food travels through the small intestine. What is left passes into the colon (large intestine) as waste. The colon removes water from the waste. Waste continues from the colon to the rectum (where stool is stored). Waste then leaves the body through the anus. The upper GI tract is from the mouth through the duodenum. The lower GI tract is from the end of the duodenum to the anus.  Causes of GI bleeding  GI bleeding can be caused by many different problems. Some of the more common causes include:    Swollen veins in the anus (hemorrhoids)    Swollen veins in the esophagus (varices)    Sore on the lining of the GI tract (ulcer)    Cuts or scrapes in the mouth or throat    Infection caused by germs such as bacteria or  parasites    Food allergies, such as milk allergy in young children    Medicines, especially aspirin, blood thinners, and NSAIDs (non-steroidal anti-inflammatory drugs) such as ibuprofen    Inflammation of the GI tract (gastritis or esophagitis)    Colitis (Crohn's disease or ulcerative colitis)    Cancer (tumors or polyps)    Abnormal pouches in the colon (diverticula)    Tears in the esophagus or anus    Nosebleed    Abnormal blood vessels in the GI tract (angiodysplasia)  Diagnosing the cause of blood in stool  If blood is coming out in your stool, you may have a lower GI tract problem or a very fast upper GI tract bleed. Bleeding from the GI tract can be bright red. Or it may look dark and tarry. Tests may also find blood in your stool that cant be seen with the eye (occult blood). To find out the cause, tests that may be ordered include:    Blood tests. A blood sample is taken and sent to a lab for exam.    Hemoccult test. Checks a stool sample for blood.    Stool culture. Checks a stool sample for bacteria or parasites.    X-ray, ultrasound, nuclear scan, or CT scan. Imaging tests that take pictures of the digestive tract.    Colonoscopy or sigmoidoscopy. This test uses a flexible tube with a tiny camera. The tube is inserted through your anus into your rectum to see the inside of your colon. Your provider can also take a tiny tissue sample (biopsy) and treat a bleeding source    Capsule endoscopy. This test uses a tiny camera that is swallowed, passes through the intestine, and takes pictures of the small intestine that is more difficult to reach with scopes.  Diagnosing the cause of blood in vomit  If you are vomiting blood or something that looks like coffee grounds, you may have an upper GI tract problem. To find the cause, tests that may be done include:    Upper endoscopy. A flexible tube with a tiny camera is inserted through your mouth and throat to see inside your upper GI tract. This lets your  provider take a tiny tissue sample (biopsy) and treat a bleeding source.    Nasogastric lavage. The healthcare provider may withdraw some of the fluid in the stomach to test it for bleeding. This can sometimes tell if you have upper GI or lower GI bleeding.    X-ray, ultrasound, nuclear scan, or CT scan. Imaging tests that take pictures of your digestive tract.    Upper GI series. X-rays of the upper part of your GI tract taken after swallowing a contrast drink. .    Enteroscopy. This sends a flexible tube or a small, swallowed capsule camera into your small intestine.  When to call your healthcare provider  Call your healthcare provider right away if you have any of the following:    Fever of 100.4 F (38.0 ) or higher    Signs of fluid loss (dehydration). These include a dry, sticky mouth, decreased urine output, and very dark urine.    Belly (abdominal) pain  Call 911  Call 911, or get medical care right away if any of the following occur:    Bleeding from your mouth or anus that can't be stopped    Bleeding along with feeling lightheaded or dizzy  Date Last Reviewed: 7/1/2016 2000-2019 [employer]. [address] [place] [address]. All rights reserved. This information is not intended as a substitute for professional medical care. Always follow your healthcare professional's instructions.           Patient Education     Anemia  Anemia is a condition that occurs when your body does not have enough healthy red blood cells (RBCs). RBCs are the parts of your blood that carry oxygen all over your body. A protein called hemoglobin allows your RBCs to absorb and release oxygen. Without enough RBCs or hemoglobin, your body doesn't get enough oxygen. Symptoms of anemia may then occur.    What are the symptoms of anemia?  Some people with anemia have no symptoms. But most people have symptoms that range from mild to severe. These can include:    Tiredness (fatigue)    Weakness    Pale  skin    Shortness of breath    Dizziness or fainting    Rapid heartbeat    Trouble doing normal amounts of activity    Yellowing of your eyes, skin, or mouth and dark urine (jaundice)  What causes anemia?  Anemia can occur when your body:    Loses too much blood    Does not make enough RBCs    Destroys your RBCs at a faster rate than it can replace them    Does not make a normal amount of hemoglobin in your RBCs  These problems can occur for many reasons, including:    A condition that you are born with (congenital or inherited), such as sickle cell disease or thalassemia    Heavy bleeding for any reason, including injury, surgery, childbirth, or even heavy menstrual periods    Being low in certain nutrients, such as iron, folate, or vitamin B-12    Certain long-term (chronic) conditions such as diabetes, arthritis, or kidney disease    Certain chronic infections such as tuberculosis or HIV    Exposure to certain medicines, such as those used for chemotherapy  There are different types of anemia. Your healthcare provider can tell you more about the type of anemia you have and what may have caused it.  How is anemia diagnosed?  To diagnose anemia, your healthcare provider orders blood tests. These can include:    Complete blood cell count (CBC). This test measures the amounts of the different types of blood cells.    Blood smear. This test checks the size and shape of your blood cells. To do the test, a drop of your blood is looked at under a microscope. A stain is used to make the blood cells easier to see.    Iron studies. These tests measure the amount of iron in your blood. Your body needs iron to make hemoglobin in your RBCs.    Vitamin B-12 and folate studies. These tests check for some of the components that help give RBCs a normal size and shape.    Reticulocyte count. This test measures the amount of new RBCs that your bone marrow makes.    Hemoglobin electrophoresis. This test checks for problems with your  hemoglobin in RBCs.    Bone marrow biopsy. This test evaluates the bone marrow where RBCs are made.  How is anemia treated?  Treatment for anemia is based on the type of anemia, its cause, and the severity of your symptoms. Treatments may include:    Diet changes. This includes increasing the amount of certain nutrients in your diet, such as iron, vitamin B-12, or folate. Your healthcare provider may also prescribe nutrient supplements.    Medicines. Certain medicines treat the cause of your anemia. Others help build new RBCs or ease symptoms. If a medicine is the cause of your anemia, you may need to stop or change it.    Blood transfusions. Replacing some of your blood can increase the number of healthy RBCs in your body.    Surgery. In some cases, your healthcare provider may do surgery to treat the underlying cause of anemia. If you need surgery, your healthcare provider will explain the procedure and outline the risks and benefits for you.  What are the long-term concerns?  If you have a certain type of anemia, you can expect a full recovery after treatment. If you have other types of anemia (especially a type you're born with), you will need to manage it for life. Your healthcare provider can tell you more.  Date Last Reviewed: 4/1/2019 2000-2019 [employer]. [address] [place] [address], [place] [address]. All rights reserved. This information is not intended as a substitute for professional medical care. Always follow your healthcare professional's instructions.                HPI:  Nai Berry is a 80 y.o. female who presents today accompanied by her daughter Gabi with a chief complaint of having fatigue that has been worsening over the last 4 to 5 days. She states that she recently started an aspirin 81 mg/day. She has had some stomach upset and pain and increased belching but no overt emesis, no melena or bright red blood per rectum. Patient shares that she has difficulty sleeping with  going to sleep at 10:30 PM with difficulty falling asleep and getting up at least 2-3 times and walking in the hallways of her condo. She also has early morning awakening between 4 and 5 in the morning and then gets up at 7 AM in the morning. She has social stressors with her  who has rheumatoid arthritis knee pain decreased mobility and diabetes and skin problems. Patient  has been under home health care since 2020. Patient has been very active and walks daily and takes care of her  as mentioned. She feels that she has had quite a bit more fatigue and sleepiness over the last 4 to 5 days. She has not had overt syncope presyncope pleuritic chest pain shortness of breath heart palpitations or dizziness. She does use Tylenol daily in addition to her baby aspirin.    Patient recently had a total knee arthroplasty on the right side and had a ultrasound after her surgery which did find a Baker's cyst. He is now having pain and swelling in the right calf. She has also some tenderness over the right anterior medial aspect of the thigh.    History obtained from daughter Gabi, chart review and the patient.    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 Arthritis     osteo   ? Chronic neck pain    ? Common bile duct calculus    ? Coronary artery disease due to calcified coronary lesion 8/6/2020   ? Depression    ? DVT (deep venous thrombosis) (H)    ? GERD (gastroesophageal reflux disease)    ? History of blood clots 2017    DVT right lower extremity   ? HLD (hyperlipidemia)    ? HTN (hypertension)    ? Hypothyroidism    ? Infectious viral hepatitis     hepatitis A in 1960s       Social History     Tobacco Use   ? Smoking status: Never Smoker   ? Smokeless tobacco: Never Used   Substance Use Topics   ? Alcohol use: No       Review of Systems  As above in HPI, otherwise balance of Review of Systems are negative.    Vitals:    06/02/21 1306   BP: 123/68   Patient Site: Right Arm   Patient Position: Sitting   Cuff Size: Adult Regular   Pulse: 63   Temp: 98.5  F (36.9  C)   TempSrc: Oral   SpO2: 98%   Weight: 174 lb (78.9 kg)       Physical Exam    General: Patient is resting comfortably no acute distress is afebrile  HEENT: Head is normocephalic atraumatic   eyes are PERRL EOMI sclera anicteric   TMs are clear bilaterally  Throat is with mild pharyngeal wall erythema and no exudate  No cervical lymphadenopathy present  LUNGS: Clear to auscultation bilaterally  HEART: Regular rate and rhythm  Skin: Without rash non-diaphoretic      Labs:  Recent Results (from the past 72 hour(s))   Basic Metabolic Panel   Result Value Ref Range    Sodium 142 136 - 145 mmol/L    Potassium 4.0 3.5 - 5.0 mmol/L    Chloride 104 98 - 107 mmol/L    CO2 28 22 - 31 mmol/L    Anion Gap, Calculation 10 5 - 18 mmol/L    Glucose 89 70 - 125 mg/dL    Calcium 9.0 8.5 - 10.5 mg/dL    BUN 27 8 - 28 mg/dL    Creatinine 0.97 0.60 - 1.10 mg/dL    GFR MDRD Af Amer >60 >60 mL/min/1.73m2    GFR MDRD Non Af Amer 55 (L) >60 mL/min/1.73m2    HM1 (CBC with Diff)   Result Value Ref Range    WBC 5.7 4.0 - 11.0 thou/uL    RBC 3.20 (L) 3.80 - 5.40 mill/uL    Hemoglobin 9.6 (L) 12.0 - 16.0 g/dL    Hematocrit 30.9 (L) 35.0 - 47.0 %    MCV 97 80 - 100 fL    MCH 30.0 27.0 - 34.0 pg    MCHC 31.1 (L) 32.0 - 36.0 g/dL    RDW 15.4 (H) 11.0 - 14.5 %    Platelets 269 140 - 440 thou/uL    MPV 8.0 7.0 - 10.0 fL    Neutrophils % 59 50 - 70 %    Lymphocytes % 28 20 - 40 %    Monocytes % 11 (H) 2 - 10 %    Eosinophils % 1 0 - 6 %    Basophils % 1 0 - 2 %    Immature Granulocyte % 0 <=0 %    Neutrophils Absolute 3.4 2.0 - 7.7 thou/uL    Lymphocytes Absolute 1.6 0.8 - 4.4 thou/uL    Monocytes Absolute 0.6 0.0 - 0.9 thou/uL    Eosinophils Absolute 0.1 0.0 - 0.4 thou/uL    Basophils Absolute 0.0 0.0 - 0.2 thou/uL    Immature Granulocyte Absolute 0.0 <=0.0 thou/uL       Radiology:  I have personally ordered and preliminarily reviewed the following xray, I have noted     Us Venous Leg Right    Result Date: 6/2/2021  EXAM DATE:         06/02/2021 EXAM: US LOWER EXTREMITY VEINS LTD RIGHT LOCATION: [facility] DATE/TIME: 6/2/2021 2:00 PM INDICATION: Right calf pain and swelling with Baker's cyst and status post TKA. COMPARISON: None. TECHNIQUE: Venous Duplex ultrasound of the right lower extremity with and without compression, augmentation and duplex. Color flow and spectral Doppler with waveform analysis performed. FINDINGS: Exam includes the common femoral, femoral, popliteal, and contralateral common femoral veins as well as segmentally visualized deep calf veins and greater saphenous vein. RIGHT: No deep vein thrombosis. No superficial thrombophlebitis. Small right popliteal fossa cyst measures 1.6 x 1.6 x 0.9 cm. IMPRESSION: 1.  No deep venous thrombosis in the right lower extremity.

## 2021-07-04 NOTE — PATIENT INSTRUCTIONS - HE
Patient Instructions by Hima Kate PA-C at 6/2/2021  1:00 PM     Author: Hima Kate PA-C Service: -- Author Type: Physician Assistant    Filed: 6/2/2021  3:11 PM Encounter Date: 6/2/2021 Status: Addendum    : Hima Kate PA-C (Physician Assistant)    Related Notes: Original Note by Hima Kate PA-C (Physician Assistant) filed at 6/2/2021  3:09 PM       Patient has anemia noted on her CBC.  Follow-up with your primary care provider in the next 2 days to work-up for anemia as well as possible diagnosis of GI bleed because of the anemia.    If you should develop dizziness lightheadedness shortness of breath heart palpitations go to the ER for evaluation and treatment.      Patient Education     When You Have Gastrointestinal (GI) Bleeding    Blood in your vomit or stool can be a sign of gastrointestinal (GI) bleeding. GI bleeding can be scary. But the cause may not be serious. You should always see a doctor if you have GI bleeding.  The GI tract is the path through which food travels in the body. Food passes from the mouth down the esophagus. This is the tube from the mouth to the stomach. Food starts to break down in the stomach. It then moves through the duodenum, the first part of the small intestine. Nutrients are absorbed as food travels through the small intestine. What is left passes into the colon (large intestine) as waste. The colon removes water from the waste. Waste continues from the colon to the rectum (where stool is stored). Waste then leaves the body through the anus. The upper GI tract is from the mouth through the duodenum. The lower GI tract is from the end of the duodenum to the anus.  Causes of GI bleeding  GI bleeding can be caused by many different problems. Some of the more common causes include:    Swollen veins in the anus (hemorrhoids)    Swollen veins in the esophagus (varices)    Sore on the lining of the GI tract (ulcer)    Cuts or scrapes in the mouth or  throat    Infection caused by germs such as bacteria or parasites    Food allergies, such as milk allergy in young children    Medicines, especially aspirin, blood thinners, and NSAIDs (non-steroidal anti-inflammatory drugs) such as ibuprofen    Inflammation of the GI tract (gastritis or esophagitis)    Colitis (Crohn's disease or ulcerative colitis)    Cancer (tumors or polyps)    Abnormal pouches in the colon (diverticula)    Tears in the esophagus or anus    Nosebleed    Abnormal blood vessels in the GI tract (angiodysplasia)  Diagnosing the cause of blood in stool  If blood is coming out in your stool, you may have a lower GI tract problem or a very fast upper GI tract bleed. Bleeding from the GI tract can be bright red. Or it may look dark and tarry. Tests may also find blood in your stool that cant be seen with the eye (occult blood). To find out the cause, tests that may be ordered include:    Blood tests. A blood sample is taken and sent to a lab for exam.    Hemoccult test. Checks a stool sample for blood.    Stool culture. Checks a stool sample for bacteria or parasites.    X-ray, ultrasound, nuclear scan, or CT scan. Imaging tests that take pictures of the digestive tract.    Colonoscopy or sigmoidoscopy. This test uses a flexible tube with a tiny camera. The tube is inserted through your anus into your rectum to see the inside of your colon. Your provider can also take a tiny tissue sample (biopsy) and treat a bleeding source    Capsule endoscopy. This test uses a tiny camera that is swallowed, passes through the intestine, and takes pictures of the small intestine that is more difficult to reach with scopes.  Diagnosing the cause of blood in vomit  If you are vomiting blood or something that looks like coffee grounds, you may have an upper GI tract problem. To find the cause, tests that may be done include:    Upper endoscopy. A flexible tube with a tiny camera is inserted through your mouth and throat  to see inside your upper GI tract. This lets your provider take a tiny tissue sample (biopsy) and treat a bleeding source.    Nasogastric lavage. The healthcare provider may withdraw some of the fluid in the stomach to test it for bleeding. This can sometimes tell if you have upper GI or lower GI bleeding.    X-ray, ultrasound, nuclear scan, or CT scan. Imaging tests that take pictures of your digestive tract.    Upper GI series. X-rays of the upper part of your GI tract taken after swallowing a contrast drink. .    Enteroscopy. This sends a flexible tube or a small, swallowed capsule camera into your small intestine.  When to call your healthcare provider  Call your healthcare provider right away if you have any of the following:    Fever of 100.4 F (38.0 ) or higher    Signs of fluid loss (dehydration). These include a dry, sticky mouth, decreased urine output, and very dark urine.    Belly (abdominal) pain  Call 911  Call 911, or get medical care right away if any of the following occur:    Bleeding from your mouth or anus that can't be stopped    Bleeding along with feeling lightheaded or dizzy  Date Last Reviewed: 7/1/2016 2000-2019 [email or web address] [address] [place] [address]. All rights reserved. This information is not intended as a substitute for professional medical care. Always follow your healthcare professional's instructions.           Patient Education     Anemia  Anemia is a condition that occurs when your body does not have enough healthy red blood cells (RBCs). RBCs are the parts of your blood that carry oxygen all over your body. A protein called hemoglobin allows your RBCs to absorb and release oxygen. Without enough RBCs or hemoglobin, your body doesn't get enough oxygen. Symptoms of anemia may then occur.    What are the symptoms of anemia?  Some people with anemia have no symptoms. But most people have symptoms that range from mild to severe. These can  include:    Tiredness (fatigue)    Weakness    Pale skin    Shortness of breath    Dizziness or fainting    Rapid heartbeat    Trouble doing normal amounts of activity    Yellowing of your eyes, skin, or mouth and dark urine (jaundice)  What causes anemia?  Anemia can occur when your body:    Loses too much blood    Does not make enough RBCs    Destroys your RBCs at a faster rate than it can replace them    Does not make a normal amount of hemoglobin in your RBCs  These problems can occur for many reasons, including:    A condition that you are born with (congenital or inherited), such as sickle cell disease or thalassemia    Heavy bleeding for any reason, including injury, surgery, childbirth, or even heavy menstrual periods    Being low in certain nutrients, such as iron, folate, or vitamin B-12    Certain long-term (chronic) conditions such as diabetes, arthritis, or kidney disease    Certain chronic infections such as tuberculosis or HIV    Exposure to certain medicines, such as those used for chemotherapy  There are different types of anemia. Your healthcare provider can tell you more about the type of anemia you have and what may have caused it.  How is anemia diagnosed?  To diagnose anemia, your healthcare provider orders blood tests. These can include:    Complete blood cell count (CBC). This test measures the amounts of the different types of blood cells.    Blood smear. This test checks the size and shape of your blood cells. To do the test, a drop of your blood is looked at under a microscope. A stain is used to make the blood cells easier to see.    Iron studies. These tests measure the amount of iron in your blood. Your body needs iron to make hemoglobin in your RBCs.    Vitamin B-12 and folate studies. These tests check for some of the components that help give RBCs a normal size and shape.    Reticulocyte count. This test measures the amount of new RBCs that your bone marrow makes.    Hemoglobin  electrophoresis. This test checks for problems with your hemoglobin in RBCs.    Bone marrow biopsy. This test evaluates the bone marrow where RBCs are made.  How is anemia treated?  Treatment for anemia is based on the type of anemia, its cause, and the severity of your symptoms. Treatments may include:    Diet changes. This includes increasing the amount of certain nutrients in your diet, such as iron, vitamin B-12, or folate. Your healthcare provider may also prescribe nutrient supplements.    Medicines. Certain medicines treat the cause of your anemia. Others help build new RBCs or ease symptoms. If a medicine is the cause of your anemia, you may need to stop or change it.    Blood transfusions. Replacing some of your blood can increase the number of healthy RBCs in your body.    Surgery. In some cases, your healthcare provider may do surgery to treat the underlying cause of anemia. If you need surgery, your healthcare provider will explain the procedure and outline the risks and benefits for you.  What are the long-term concerns?  If you have a certain type of anemia, you can expect a full recovery after treatment. If you have other types of anemia (especially a type you're born with), you will need to manage it for life. Your healthcare provider can tell you more.  Date Last Reviewed: 4/1/2019 2000-2019 [employer] [address] [place] [address], [place] [address]. All rights reserved. This information is not intended as a substitute for professional medical care. Always follow your healthcare professional's instructions.

## 2021-07-07 ENCOUNTER — AMBULATORY - HEALTHEAST (OUTPATIENT)
Dept: LAB | Facility: CLINIC | Age: 81
End: 2021-07-07

## 2021-07-07 ENCOUNTER — AMBULATORY - HEALTHEAST (OUTPATIENT)
Dept: INTERNAL MEDICINE | Facility: CLINIC | Age: 81
End: 2021-07-07

## 2021-07-07 DIAGNOSIS — D50.8 OTHER IRON DEFICIENCY ANEMIA: ICD-10-CM

## 2021-07-07 DIAGNOSIS — D50.0 IRON DEFICIENCY ANEMIA DUE TO CHRONIC BLOOD LOSS: ICD-10-CM

## 2021-07-07 LAB — HGB BLD-MCNC: 9.5 G/DL (ref 12–16)

## 2021-08-01 ENCOUNTER — HEALTH MAINTENANCE LETTER (OUTPATIENT)
Age: 81
End: 2021-08-01

## 2021-08-03 PROBLEM — T84.018A FAILED TOTAL HIP ARTHROPLASTY (H): Status: RESOLVED | Noted: 2017-05-16 | Resolved: 2018-01-02

## 2021-08-03 PROBLEM — Z96.649 FAILED TOTAL HIP ARTHROPLASTY (H): Status: RESOLVED | Noted: 2017-05-16 | Resolved: 2018-01-02

## 2021-09-03 ENCOUNTER — ANCILLARY PROCEDURE (OUTPATIENT)
Dept: MAMMOGRAPHY | Facility: CLINIC | Age: 81
End: 2021-09-03
Attending: NURSE PRACTITIONER
Payer: COMMERCIAL

## 2021-09-03 DIAGNOSIS — Z12.31 VISIT FOR SCREENING MAMMOGRAM: ICD-10-CM

## 2021-09-03 PROCEDURE — 77063 BREAST TOMOSYNTHESIS BI: CPT

## 2021-09-21 ENCOUNTER — TRANSFERRED RECORDS (OUTPATIENT)
Dept: HEALTH INFORMATION MANAGEMENT | Facility: CLINIC | Age: 81
End: 2021-09-21

## 2021-09-23 ENCOUNTER — OFFICE VISIT (OUTPATIENT)
Dept: INTERNAL MEDICINE | Facility: CLINIC | Age: 81
End: 2021-09-23
Payer: COMMERCIAL

## 2021-09-23 VITALS
HEIGHT: 68 IN | OXYGEN SATURATION: 96 % | WEIGHT: 175 LBS | SYSTOLIC BLOOD PRESSURE: 120 MMHG | BODY MASS INDEX: 26.52 KG/M2 | HEART RATE: 56 BPM | DIASTOLIC BLOOD PRESSURE: 60 MMHG

## 2021-09-23 DIAGNOSIS — D50.9 IRON DEFICIENCY ANEMIA, UNSPECIFIED IRON DEFICIENCY ANEMIA TYPE: ICD-10-CM

## 2021-09-23 DIAGNOSIS — R41.3 MEMORY CHANGE: ICD-10-CM

## 2021-09-23 DIAGNOSIS — D50.0 IRON DEFICIENCY ANEMIA DUE TO CHRONIC BLOOD LOSS: ICD-10-CM

## 2021-09-23 DIAGNOSIS — R19.5 OCCULT BLOOD POSITIVE STOOL: ICD-10-CM

## 2021-09-23 DIAGNOSIS — R73.01 IMPAIRED FASTING GLUCOSE: ICD-10-CM

## 2021-09-23 DIAGNOSIS — R73.03 PREDIABETES: ICD-10-CM

## 2021-09-23 DIAGNOSIS — I10 ESSENTIAL HYPERTENSION: ICD-10-CM

## 2021-09-23 DIAGNOSIS — M54.16 LUMBAR RADICULOPATHY: ICD-10-CM

## 2021-09-23 DIAGNOSIS — F34.1 DYSTHYMIA: ICD-10-CM

## 2021-09-23 DIAGNOSIS — E03.9 ACQUIRED HYPOTHYROIDISM: ICD-10-CM

## 2021-09-23 DIAGNOSIS — N18.31 STAGE 3A CHRONIC KIDNEY DISEASE (H): ICD-10-CM

## 2021-09-23 DIAGNOSIS — G62.9 NEUROPATHY: Primary | ICD-10-CM

## 2021-09-23 LAB
ERYTHROCYTE [DISTWIDTH] IN BLOOD BY AUTOMATED COUNT: 18.3 % (ref 10–15)
HBA1C MFR BLD: 5.9 % (ref 0–5.6)
HCT VFR BLD AUTO: 30.2 % (ref 35–47)
HGB BLD-MCNC: 9.4 G/DL (ref 11.7–15.7)
MCH RBC QN AUTO: 27.5 PG (ref 26.5–33)
MCHC RBC AUTO-ENTMCNC: 31.1 G/DL (ref 31.5–36.5)
MCV RBC AUTO: 88 FL (ref 78–100)
PLATELET # BLD AUTO: 252 10E3/UL (ref 150–450)
RBC # BLD AUTO: 3.42 10E6/UL (ref 3.8–5.2)
TSH SERPL DL<=0.005 MIU/L-ACNC: 1.85 UIU/ML (ref 0.3–5)
VIT B12 SERPL-MCNC: 427 PG/ML (ref 213–816)
WBC # BLD AUTO: 4.3 10E3/UL (ref 4–11)

## 2021-09-23 PROCEDURE — 99215 OFFICE O/P EST HI 40 MIN: CPT | Mod: 25 | Performed by: NURSE PRACTITIONER

## 2021-09-23 PROCEDURE — 82728 ASSAY OF FERRITIN: CPT | Performed by: NURSE PRACTITIONER

## 2021-09-23 PROCEDURE — G0008 ADMIN INFLUENZA VIRUS VAC: HCPCS | Performed by: NURSE PRACTITIONER

## 2021-09-23 PROCEDURE — 83550 IRON BINDING TEST: CPT | Performed by: NURSE PRACTITIONER

## 2021-09-23 PROCEDURE — 90662 IIV NO PRSV INCREASED AG IM: CPT | Performed by: NURSE PRACTITIONER

## 2021-09-23 PROCEDURE — 85027 COMPLETE CBC AUTOMATED: CPT | Performed by: NURSE PRACTITIONER

## 2021-09-23 PROCEDURE — 84443 ASSAY THYROID STIM HORMONE: CPT | Performed by: NURSE PRACTITIONER

## 2021-09-23 PROCEDURE — 83036 HEMOGLOBIN GLYCOSYLATED A1C: CPT | Performed by: NURSE PRACTITIONER

## 2021-09-23 PROCEDURE — 82607 VITAMIN B-12: CPT | Performed by: NURSE PRACTITIONER

## 2021-09-23 PROCEDURE — 36415 COLL VENOUS BLD VENIPUNCTURE: CPT | Performed by: NURSE PRACTITIONER

## 2021-09-23 RX ORDER — FLUTICASONE PROPIONATE 50 MCG
1 SPRAY, SUSPENSION (ML) NASAL
COMMUNITY
End: 2021-12-23

## 2021-09-23 RX ORDER — ACETAMINOPHEN 500 MG
500-1000 TABLET ORAL
COMMUNITY
End: 2022-06-04

## 2021-09-23 RX ORDER — LEVOTHYROXINE SODIUM 50 UG/1
50 TABLET ORAL
COMMUNITY
Start: 2021-06-24 | End: 2022-06-15

## 2021-09-23 RX ORDER — ROSUVASTATIN CALCIUM 20 MG/1
TABLET, COATED ORAL
COMMUNITY
Start: 2021-06-18 | End: 2022-04-13

## 2021-09-23 RX ORDER — CITALOPRAM HYDROBROMIDE 10 MG/1
5 TABLET ORAL EVERY MORNING
COMMUNITY
Start: 2021-08-16 | End: 2022-06-30

## 2021-09-23 RX ORDER — OMEPRAZOLE 40 MG/1
CAPSULE, DELAYED RELEASE ORAL
COMMUNITY
Start: 2021-06-04 | End: 2021-12-03

## 2021-09-23 RX ORDER — ASPIRIN 81 MG
100 TABLET, DELAYED RELEASE (ENTERIC COATED) ORAL 2 TIMES DAILY
COMMUNITY

## 2021-09-23 ASSESSMENT — PATIENT HEALTH QUESTIONNAIRE - PHQ9: SUM OF ALL RESPONSES TO PHQ QUESTIONS 1-9: 5

## 2021-09-23 ASSESSMENT — MIFFLIN-ST. JEOR: SCORE: 1294.35

## 2021-09-23 NOTE — ASSESSMENT & PLAN NOTE
Uncertain etiology. Will do TSH, B12, CBC today  Trial capsaicin cream OTC  Could do EMG if worsening. Could also consider other medications such as changing from citalopram to duloxetine or starting gabapentin

## 2021-09-23 NOTE — PROGRESS NOTES
Internal Medicine Office Visit  [facility]   Patient Name: Geeta Berry  Patient Age: 81 year old  YOB: 1940  MRN: [id number]    Date of Visit: 9/23/2021  Patient presents with:  Generalized Body Aches: hips,back and legs also bottom of feet           Assessment / Plan / Medical Decision Making:    Problem List Items Addressed This Visit        Nervous and Auditory    Neuropathy - Primary     Uncertain etiology. Will do TSH, B12, CBC today  Trial capsaicin cream OTC  Could do EMG if worsening. Could also consider other medications such as changing from citalopram to duloxetine or starting gabapentin          Relevant Medications    citalopram (CELEXA) 10 MG tablet    Other Relevant Orders    Vitamin B12 (Completed)    TSH with free T4 reflex (Completed)    CBC with platelets (Completed)    Lumbar radiculopathy     Worsening pain despite regular low back exercises and acetaminophen as needed. Pain has been ongoing for years but worsening recently. She would consider ANGELO if an option. Will proceed with MRI imaging then consider referral to [facility]           Relevant Medications    acetaminophen (TYLENOL) 500 MG tablet    citalopram (CELEXA) 10 MG tablet    Other Relevant Orders    MR Lumbar Spine w/o Contrast       Endocrine    Hypothyroidism     TSH checked today-- pt is euthyroid. Continue levothyroxine          Relevant Medications    levothyroxine (SYNTHROID/LEVOTHROID) 50 MCG tablet       Circulatory    HTN (hypertension)     Stable             Urinary    Chronic kidney disease, stage 3     Stable renal function on 6/2021 labs             Hematologic    Iron deficiency anemia due to chronic blood loss     Repeat CBC to assure resolution of anemia. If still anemic, she may need to undergo both colonoscopy and upper endoscopy in light of +FOBT and MING.     Addendum: hgb is 9.4, relatively unchanged from last check 3 months ago. Will send [employer] result message that  upper/lower endoscopy recommended to evaluate this further             Behavioral    Dysthymia    Relevant Medications    citalopram (CELEXA) 10 MG tablet    Other Relevant Orders    TSH with free T4 reflex (Completed)      Other Visit Diagnoses     Memory change        Relevant Orders    Vitamin B12 (Completed)    TSH with free T4 reflex (Completed)    Impaired fasting glucose        Relevant Orders    Hemoglobin A1c (Completed)    Iron deficiency anemia, unspecified iron deficiency anemia type        Relevant Orders    Adult Gastro Ref - Procedure Only    Iron and iron binding capacity    Ferritin (Completed)    Occult blood positive stool        Relevant Orders    Adult Gastro Ref - Procedure Only    Iron and iron binding capacity    Ferritin (Completed)    Prediabetes               I am having Geeta Berry maintain her Cholecalciferol (VITAMIN D3 PO), Omega-3 Fatty Acids (OMEGA-3 FISH OIL PO), polyethylene glycol, triamcinolone, azelastine, hydrochlorothiazide, levothyroxine, acetaminophen, citalopram, fluticasone, omeprazole, rosuvastatin, and docusate sodium.          Orders Placed This Encounter   Procedures     MR Lumbar Spine w/o Contrast     ID FLU VACCINE, INCREASED ANTIGEN, PRESV FREE     Vitamin B12     TSH with free T4 reflex     CBC with platelets     Hemoglobin A1c     Iron and iron binding capacity     Ferritin     Adult Gastro Ref - Procedure Only   Followup: Return in about 6 months (around 3/23/2022) for Follow up. earlier if needed.    43 minutes spent on the date of the encounter doing chart review, review of test results, interpretation of tests, patient visit and documentation     Jacy Mishra NP, CNP        HPI:  Geeta Berry is a 81 year old year old who presents to the office today for follow up.     Her feet feel like they are cold at night and the bottoms of the feet feel like she is walking on paper.     She is getting low back pain particularly when standing in the kitchen or walking  around to go shopping which has been going on for years and has recently worsened. The pain goes into the anterolateral legs to the knees and occasionally below the knees. She does exercises daily.     She had findings of iron deficiency anemia after her 4/2021 right TKA while taking DOAC for VTE prophylaxis. She was started on a PPI and iron supplement. Due for a repeat hgb check.     Health Maintenance Review  Health Maintenance   Topic Date Due     ANNUAL REVIEW OF HM ORDERS  Never done     DEPRESSION ACTION PLAN  Never done     LIPID  10/29/2021     MEDICARE ANNUAL WELLNESS VISIT  01/07/2022     MICROALBUMIN  01/20/2022     PHQ-9  03/23/2022     BMP  06/02/2022     FALL RISK ASSESSMENT  09/23/2022     DTAP/TDAP/TD IMMUNIZATION (3 - Td or Tdap) 11/20/2023     ADVANCE CARE PLANNING  01/07/2026     INFLUENZA VACCINE  Completed     Pneumococcal Vaccine: 65+ Years  Completed     ZOSTER IMMUNIZATION  Completed     COVID-19 Vaccine  Completed     IPV IMMUNIZATION  Aged Out     MENINGITIS IMMUNIZATION  Aged Out     A1C  Discontinued     DEXA  Discontinued     DIABETIC FOOT EXAM  Discontinued     EYE EXAM  Discontinued       Current Scheduled Meds:  Outpatient Encounter Medications as of 9/23/2021   Medication Sig Dispense Refill     azelastine (ASTELIN) 0.1 % nasal spray Spray 1 spray into both nostrils At Bedtime 30 mL 3     Cholecalciferol (VITAMIN D3 PO) Take by mouth daily       docusate sodium (COLACE) 100 MG tablet Take 100 mg by mouth 2 times daily       hydrochlorothiazide (HYDRODIURIL) 25 MG tablet TAKE 1 TABLET BY MOUTH EVERY DAY 90 tablet 0     levothyroxine (SYNTHROID/LEVOTHROID) 50 MCG tablet Take 50 mcg by mouth       Omega-3 Fatty Acids (OMEGA-3 FISH OIL PO)        polyethylene glycol (MIRALAX/GLYCOLAX) packet Take 1 packet by mouth daily       rosuvastatin (CRESTOR) 20 MG tablet TAKE 1 TABLET BY MOUTH EVERYDAY AT BEDTIME       triamcinolone (KENALOG) 0.025 % cream Apply topically 2 times daily        "acetaminophen (TYLENOL) 500 MG tablet Take 500-1,000 mg by mouth       citalopram (CELEXA) 10 MG tablet TAKE 1/2 TABLET BY MOUTH DAILY       fluticasone (FLONASE) 50 MCG/ACT nasal spray 1 spray       omeprazole (PRILOSEC) 40 MG DR capsule TAKE 1 CAPSULE BY MOUTH EVERY DAY BEFORE BREAKFAST       [DISCONTINUED] ASPIRIN NOT PRESCRIBED (INTENTIONAL) continuous prn for other Antiplatelet medication not prescribed intentionally due to MD       [DISCONTINUED] Celecoxib (CELEBREX PO)        [DISCONTINUED] DiphenhydrAMINE HCl (BENADRYL PO)        [DISCONTINUED] hydrochlorothiazide (MICROZIDE) 12.5 MG capsule Take 12.5 mg by mouth daily       [DISCONTINUED] LEVOTHYROXINE SODIUM PO        [DISCONTINUED] SIMVASTATIN PO        [DISCONTINUED] sucralfate (CARAFATE) 1 GM/10ML suspension Take 10 mLs (1 g) by mouth 4 times daily 420 mL 1     No facility-administered encounter medications on file as of 9/23/2021.         Objective / Physical Examination:  Vitals:    09/23/21 1313   BP: 120/60   BP Location: Right arm   Patient Position: Sitting   Pulse: 56   SpO2: 96%   Weight: 79.4 kg (175 lb)   Height: 1.715 m (5' 7.5\")     Wt Readings from Last 3 Encounters:   09/23/21 79.4 kg (175 lb)   06/14/21 79.4 kg (175 lb)   06/04/21 79.2 kg (174 lb 11.2 oz)     Body mass index is 27 kg/m .     Constitutional: In no apparent distress  Respiratory: Clear to auscultation bilaterally. Normal inspiratory and expiratory effort  Cardiovascular: Regular rate and rhythm. No murmurs, rubs, or gallops. No edema.   Psych: Alert and oriented x3.     "

## 2021-09-24 PROBLEM — M54.16 LUMBAR RADICULOPATHY: Status: ACTIVE | Noted: 2021-09-24

## 2021-09-24 LAB
FERRITIN SERPL-MCNC: 28 NG/ML (ref 10–130)
IRON SATN MFR SERPL: 15 % (ref 15–46)
IRON SERPL-MCNC: 56 UG/DL (ref 35–180)
TIBC SERPL-MCNC: 362 UG/DL (ref 240–430)

## 2021-09-24 NOTE — ASSESSMENT & PLAN NOTE
Worsening pain despite regular low back exercises and acetaminophen as needed. Pain has been ongoing for years but worsening recently. She would consider ANGELO if an option. Will proceed with MRI imaging then consider referral to the [facility]

## 2021-09-24 NOTE — ASSESSMENT & PLAN NOTE
Repeat CBC to assure resolution of anemia. If still anemic, she may need to undergo both colonoscopy and upper endoscopy in light of +FOBT and MING.     Addendum: hgb is 9.4, relatively unchanged from last check 3 months ago. Will send [employer] result message that upper/lower endoscopy recommended to evaluate this further

## 2021-10-11 ENCOUNTER — HOSPITAL ENCOUNTER (OUTPATIENT)
Dept: MRI IMAGING | Facility: HOSPITAL | Age: 81
Discharge: HOME | End: 2021-10-11
Attending: NURSE PRACTITIONER | Admitting: NURSE PRACTITIONER
Payer: COMMERCIAL

## 2021-10-11 DIAGNOSIS — M54.16 LUMBAR RADICULOPATHY: Primary | ICD-10-CM

## 2021-10-11 DIAGNOSIS — M54.16 LUMBAR RADICULOPATHY: ICD-10-CM

## 2021-10-11 PROCEDURE — 72148 MRI LUMBAR SPINE W/O DYE: CPT

## 2021-10-12 NOTE — PROGRESS NOTES
Assessment:   Geeta Berry is a 81 year old y.o. female with past medical history significant for neuropathy, GERD, hyperlipidemia, hypothyroidism, hypertension, coronary artery disease, chronic kidney disease stage III, iron deficiency anemia, dysthymia who presents today for follow-up regarding chronic and worsening bilateral low back pain with radiation into the bilateral lower extremities with associated limited walking and standing tolerance.  My review of an MRI lumbar spine shows grade 1 degenerative spondylolisthesis L4-5 with severe spinal stenosis.  Suspect she is symptomatic from lumbar spinal stenosis.  She may also be symptomatic from lower lumbar facet arthropathy.  Additionally patient is having some pain and restriction with range of motion in the left hip.  She has a history of left hip replacement x2.  She is scheduled to see her hip surgeon on Monday.  She is neurologically intact.       Plan:     A shared decision making plan was used.  The patient's values and choices were respected.  The following represents what was discussed and decided upon by the physician assistant and the patient.      1.  DIAGNOSTIC TESTS: I reviewed the MRI lumbar spine.  No further diagnostic tests were ordered.    2.  PHYSICAL THERAPY: Patient participated in physical therapy in November 2017.  She still does exercises on a daily basis for her lower back but she is interested to find out if there may be additional exercises to perform.  Entered a referral for the patient to return to physical therapy.    3.  MEDICATIONS: No changes are made to the patient's medications.  She takes Tylenol as needed.  -Patient did not tolerate gabapentin.    4.  INTERVENTIONS:    -I offered bilateral L4-5 transforaminal epidural steroid injections.  Patient indicated she would like to proceed and an order was placed.  -If this does not help, we could try an L5-S1 interlaminar epidural steroid injection.  -If that does not help we  could consider medial branch blocks of the work-up toward radiofrequency ablation.    5.  PATIENT EDUCATION:    -If symptoms persist would recommend a referral to neurosurgery.    6.  FOLLOW-UP: Patient will follow up with me 2 weeks after her bilateral L4-5 transforaminal epidural steroid injections.  If she has questions or concerns in the meantime, she should not hesitate to call.    Subjective:     Geeta Berry is a 81 year old female who presents today for follow-up regarding chronic and worsening bilateral low back pain with radiation into the bilateral lower extremities.  Patient reports that symptoms have been getting more severe over the past 1 year.  She notes that she is doing a lot of caregiving for her  which is difficult for her back.    Patient complains of bilateral low back pain.  Pain spans across low back in a broadband distribution at the belt line.  Pain radiates into the bilateral buttocks and wraps around into the anterior thighs bilaterally to the knees.  Patient presents back pain is more severe on the left than the right that leg pain is equally severe on both sides.  Recently she has been experiencing some more pain about the left hip.  She has a history of a left hip replacement x2.  She is scheduled to see her hip surgeon on Monday.  She is having difficulty crossing her left leg over her right.  She denies numbness or tingling.  She does feel weak in the left hip.  She rates her pain today as a 3 out of 10.  At its worst it is a 6 out of 10.  At its best it is a 3 out of 10.  Pain is aggravated with standing for more than 10 minutes.  Prolonged sitting can also aggravate the pain.  She is only able to walk 1/4 mile before she has to stop and rest.  She feels best that she can lie down on a heating pad.  Sitting in a recliner with heating pad also helps.  She notes she can stand longer if she can lean forward on the counter.  She has numbness and tingling on the plantar aspect of  both feet due to neuropathy.    Patient did physical therapy most recently November 2017.  She still does home exercises on a daily basis for her back.  She takes Tylenol as needed which is helpful.    Review of Systems:  Positive for weakness, headache.  Negative for numbness/tingling, loss of bowel/bladder control, inability to urinate, pain much worse at night, trip/stumble/falls medical D swallowing, difficulty with hand skills, fevers, unintentional weight loss.     Objective:   CONSTITUTIONAL:  Vital signs as above.  No acute distress.  The patient is well nourished and well groomed.    PSYCHIATRIC:  The patient is awake, alert, oriented to person, place and time.  The patient is answering questions appropriately with clear speech.  Normal affect.  HEENT: Normocephalic, atraumatic.  Sclera clear.    SKIN:  Skin over the face, posterior torso, bilateral upper and lower extremities is clean, dry, intact without rashes.  VASCULAR: No significant lower extremity edema.  MUSCULOSKELETAL:  Gait is non-antalgic.  The patient is able to rise onto toes and heels bilaterally with support.  Mild tenderness over the bilateral lower lumbar paraspinal muscles.      The patient has 5/5 strength for the bilateral hip flexors, knee flexors/extensors, ankle dorsiflexors/plantar flexors, ankle evertors/invertors.    NEUROLOGICAL: Trace patellar, achilles reflexes which are symmetric bilaterally.  No ankle clonus bilaterally.  Diminished sensation plantar aspect both feet.     RESULTS: I reviewed the MRI lumbar spine from [facility] dated October 11, 2021.  This shows S-shaped curvature of lumbar spine.  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  There is degenerative spondylolisthesis L4-5 grade 1.  At L4-5 there is severe spinal stenosis with mild to moderate bilateral foraminal stenosis.  At L3-4 there is moderate spinal canal stenosis with moderate bilateral foraminal stenosis.  At L2-3 there is mild to moderate  spinal canal stenosis with severe left and moderate to severe right foraminal stenosis.  Please see report for further details.

## 2021-10-13 ENCOUNTER — OFFICE VISIT (OUTPATIENT)
Dept: PHYSICAL MEDICINE AND REHAB | Facility: CLINIC | Age: 81
End: 2021-10-13
Attending: NURSE PRACTITIONER
Payer: COMMERCIAL

## 2021-10-13 VITALS
SYSTOLIC BLOOD PRESSURE: 129 MMHG | HEIGHT: 68 IN | HEART RATE: 61 BPM | WEIGHT: 180 LBS | BODY MASS INDEX: 27.28 KG/M2 | DIASTOLIC BLOOD PRESSURE: 59 MMHG

## 2021-10-13 DIAGNOSIS — M48.062 SPINAL STENOSIS OF LUMBAR REGION WITH NEUROGENIC CLAUDICATION: Primary | ICD-10-CM

## 2021-10-13 DIAGNOSIS — M47.816 LUMBAR FACET ARTHROPATHY: ICD-10-CM

## 2021-10-13 PROCEDURE — 99214 OFFICE O/P EST MOD 30 MIN: CPT | Performed by: PHYSICIAN ASSISTANT

## 2021-10-13 ASSESSMENT — MIFFLIN-ST. JEOR: SCORE: 1317.03

## 2021-10-13 ASSESSMENT — PAIN SCALES - GENERAL: PAINLEVEL: MILD PAIN (3)

## 2021-10-13 NOTE — LETTER
10/13/2021         RE: Geeta Berry  [address]  [facility] [address]        Dear Colleague,    Thank you for referring your patient, Geeta Berry, to the [facility] [place]. Please see a copy of my visit note below.    Assessment:   Geeta Berry is a 81 year old y.o. female with past medical history significant for neuropathy, GERD, hyperlipidemia, hypothyroidism, hypertension, coronary artery disease, chronic kidney disease stage III, iron deficiency anemia, dysthymia who presents today for follow-up regarding chronic and worsening bilateral low back pain with radiation into the bilateral lower extremities with associated limited walking and standing tolerance.  My review of an MRI lumbar spine shows grade 1 degenerative spondylolisthesis L4-5 with severe spinal stenosis.  Suspect she is symptomatic from lumbar spinal stenosis.  She may also be symptomatic from lower lumbar facet arthropathy.  Additionally patient is having some pain and restriction with range of motion in the left hip.  She has a history of left hip replacement x2.  She is scheduled to see her hip surgeon on Monday.  She is neurologically intact.       Plan:     A shared decision making plan was used.  The patient's values and choices were respected.  The following represents what was discussed and decided upon by the physician assistant and the patient.      1.  DIAGNOSTIC TESTS: I reviewed the MRI lumbar spine.  No further diagnostic tests were ordered.    2.  PHYSICAL THERAPY: Patient participated in physical therapy in November 2017.  She still does exercises on a daily basis for her lower back but she is interested to find out if there may be additional exercises to perform.  Entered a referral for the patient to return to physical therapy.    3.  MEDICATIONS: No changes are made to the patient's medications.  She takes Tylenol as needed.  -Patient did not tolerate gabapentin.    4.  INTERVENTIONS:     -I offered bilateral L4-5 transforaminal epidural steroid injections.  Patient indicated she would like to proceed and an order was placed.  -If this does not help, we could try an L5-S1 interlaminar epidural steroid injection.  -If that does not help we could consider medial branch blocks of the work-up toward radiofrequency ablation.    5.  PATIENT EDUCATION:    -If symptoms persist would recommend a referral to neurosurgery.    6.  FOLLOW-UP: Patient will follow up with me 2 weeks after her bilateral L4-5 transforaminal epidural steroid injections.  If she has questions or concerns in the meantime, she should not hesitate to call.    Subjective:     Geeta Berry is a 81 year old female who presents today for follow-up regarding chronic and worsening bilateral low back pain with radiation into the bilateral lower extremities.  Patient reports that symptoms have been getting more severe over the past 1 year.  She notes that she is doing a lot of caregiving for her  which is difficult for her back.    Patient complains of bilateral low back pain.  Pain spans across low back in a broadband distribution at the belt line.  Pain radiates into the bilateral buttocks and wraps around into the anterior thighs bilaterally to the knees.  Patient presents back pain is more severe on the left than the right that leg pain is equally severe on both sides.  Recently she has been experiencing some more pain about the left hip.  She has a history of a left hip replacement x2.  She is scheduled to see her hip surgeon on Monday.  She is having difficulty crossing her left leg over her right.  She denies numbness or tingling.  She does feel weak in the left hip.  She rates her pain today as a 3 out of 10.  At its worst it is a 6 out of 10.  At its best it is a 3 out of 10.  Pain is aggravated with standing for more than 10 minutes.  Prolonged sitting can also aggravate the pain.  She is only able to walk 1/4 mile before she  has to stop and rest.  She feels best that she can lie down on a heating pad.  Sitting in a recliner with heating pad also helps.  She notes she can stand longer if she can lean forward on the counter.  She has numbness and tingling on the plantar aspect of both feet due to neuropathy.    Patient did physical therapy most recently November 2017.  She still does home exercises on a daily basis for her back.  She takes Tylenol as needed which is helpful.    Review of Systems:  Positive for weakness, headache.  Negative for numbness/tingling, loss of bowel/bladder control, inability to urinate, pain much worse at night, trip/stumble/falls medical D swallowing, difficulty with hand skills, fevers, unintentional weight loss.     Objective:   CONSTITUTIONAL:  Vital signs as above.  No acute distress.  The patient is well nourished and well groomed.    PSYCHIATRIC:  The patient is awake, alert, oriented to person, place and time.  The patient is answering questions appropriately with clear speech.  Normal affect.  HEENT: Normocephalic, atraumatic.  Sclera clear.    SKIN:  Skin over the face, posterior torso, bilateral upper and lower extremities is clean, dry, intact without rashes.  VASCULAR: No significant lower extremity edema.  MUSCULOSKELETAL:  Gait is non-antalgic.  The patient is able to rise onto toes and heels bilaterally with support.  Mild tenderness over the bilateral lower lumbar paraspinal muscles.      The patient has 5/5 strength for the bilateral hip flexors, knee flexors/extensors, ankle dorsiflexors/plantar flexors, ankle evertors/invertors.    NEUROLOGICAL: Trace patellar, achilles reflexes which are symmetric bilaterally.  No ankle clonus bilaterally.  Diminished sensation plantar aspect both feet.     RESULTS: I reviewed the MRI lumbar spine from [facility] dated October 11, 2021.  This shows S-shaped curvature of lumbar spine.  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  There is degenerative  spondylolisthesis L4-5 grade 1.  At L4-5 there is severe spinal stenosis with mild to moderate bilateral foraminal stenosis.  At L3-4 there is moderate spinal canal stenosis with moderate bilateral foraminal stenosis.  At L2-3 there is mild to moderate spinal canal stenosis with severe left and moderate to severe right foraminal stenosis.  Please see report for further details.          Again, thank you for allowing me to participate in the care of your patient.        Sincerely,        Krista Kaplan PA-C

## 2021-10-13 NOTE — PATIENT INSTRUCTIONS
A bilateral L4/5 epidural steroid injection has been ordered today.      Please note that this injection uses cortisone.  The cortisone may somewhat weaken the immune system.  It is unknown how much the immune system is weakened.  It is unknown if it is weakened to the point that you may be more likely to get the COVID-19 virus, or if you do get the COVID-19 virus, if you would be sicker than you would have been if you had not had the cortisone injection.  If you do not wish to proceed with the injection, please let the nurse/physician know and do NOT schedule the injection.    Please note that since your immune system is weakened from the cortisone, having a flu vaccine/shot may be less effective if you have this vaccine within 2 weeks from your cortisone injection.  It is advised to wait 2 weeks after your cortisone injection to have the flu shot (or if you have the flu shot first, wait 2 weeks before you have the cortisone injection).    Please schedule this injection at least 1 week  from now to allow time for insurance prior authorization.  On the day of your injection, you cannot be sick or taking antibiotics.  If you become sick and are prescribed, please call the clinic so your injection can be rescheduled for once you have completed your antibiotics.  You will need to bring a  with you for your injection.   If you have any questions or concerns prior to your injection, please do not hesitate to call the nurse navigation line at [phone] or contact Krista Kaplan through [employer].

## 2021-10-18 ENCOUNTER — TRANSFERRED RECORDS (OUTPATIENT)
Dept: HEALTH INFORMATION MANAGEMENT | Facility: CLINIC | Age: 81
End: 2021-10-18
Payer: COMMERCIAL

## 2021-10-18 NOTE — PATIENT INSTRUCTIONS
Please follow up two weeks post procedure with Krista to evaluate your plan of care.       DISCHARGE INSTRUCTIONS    During office hours (8:00 a.m.- 4:00 p.m.) questions or concerns may be answered  by calling Spine Center Navigation Nurses at  [phone].  Messages received after hours will be returned the following business day.      In the case of an emergency, please dial 911 or seek assistance at the nearest Emergency Room/Urgent Care facility.     All Patients:    ? You may experience an increase in your symptoms for the first 2 days (It may take anywhere between 2 days- 2 weeks for the steroid to have maximum effect).    ? You may use ice on the injection site, as frequently as 20 minutes each hour if needed.    ? You may take your pain medicine.    ? You may continue taking your regular medication after your injection. If you have had a Medial Branch Block you may resume pain medication once your pain diary is completed.    ? You may shower. No swimming, tub bath or hot tub for 48 hours.  You may remove your bandaid/bandage as soon as you are home.    ? You may resume light activities, as tolerated.    ? Resume your usual diet as tolerated.    ? It is strongly advised that you do not drive for 1-3 hours post injection.    ? If you have had oral sedation:  Do not drive for 8 hours post injection.      ? If you have had IV sedation:  Do not drive for 24 hours post injection.  Do not operate hazardous machinery or make important personal/business decisions for 24 hours.      POSSIBLE STEROID SIDE EFFECTS (If steroid/cortisone was used for your procedure)    -If you experience these symptoms, it should only last for a short period      Swelling of the legs                Skin redness (flushing)       Mouth (oral) irritation     Blood sugar (glucose) levels              Sweats                      Mood changes    Headache    Sleeplessness    Weakened immune system for up to 14 days, which could increase the risk  of carol ann the COVID-19 virus and/or experiencing more severe symptoms of the disease, if exposed.    Decreased effectiveness of the flu vaccine if given within 2 weeks of the steroid.         POSSIBLE PROCEDURE SIDE EFFECTS  -Call the Spine Center if you are concerned    Increased Pain             Increased numbness/tingling        Nausea/Vomiting            Bruising/bleeding at site        Redness or swelling                                                Difficulty walking        Weakness             Fever greater than 100.5    *In the event of a severe headache after an epidural steroid injection that is relieved by lying down, please call the [facility] to speak with a clinical staff member*

## 2021-10-20 ENCOUNTER — HOSPITAL ENCOUNTER (OUTPATIENT)
Dept: PHYSICAL THERAPY | Facility: REHABILITATION | Age: 81
End: 2021-10-20
Attending: PHYSICIAN ASSISTANT
Payer: COMMERCIAL

## 2021-10-20 DIAGNOSIS — M62.81 GENERALIZED MUSCLE WEAKNESS: ICD-10-CM

## 2021-10-20 DIAGNOSIS — M48.062 SPINAL STENOSIS OF LUMBAR REGION WITH NEUROGENIC CLAUDICATION: Primary | ICD-10-CM

## 2021-10-20 DIAGNOSIS — M25.60 JOINT STIFFNESS OF SPINE: ICD-10-CM

## 2021-10-20 PROCEDURE — 97161 PT EVAL LOW COMPLEX 20 MIN: CPT | Mod: GP | Performed by: PHYSICAL THERAPIST

## 2021-10-20 PROCEDURE — 97110 THERAPEUTIC EXERCISES: CPT | Mod: GP | Performed by: PHYSICAL THERAPIST

## 2021-10-20 NOTE — PROGRESS NOTES
[facility]          OUTPATIENT PHYSICAL THERAPY ORTHOPEDIC EVALUATION  PLAN OF TREATMENT FOR OUTPATIENT REHABILITATION  (COMPLETE FOR INITIAL CLAIMS ONLY)  Patient's Last Name, First Name, M.I.  YOB: 1940  Geeta Berry    Provider s Name:  [facility]   Medical Record No.  [id number]   Start of Care Date:  10/20/21   Onset Date:  02/01/21   Type:     _X__PT   ___OT   ___SLP Medical Diagnosis:  M48.062 (ICD-10-CM) - Spinal stenosis of lumbar region with neurogenic claudication     PT Diagnosis:  Chronic low back pain secondary to facet arthropathy and lumbar stenosis   Visits from SOC:  1      _________________________________________________________________________________  Plan of Treatment/Functional Goals:  manual therapy, ROM, strengthening, stretching     Electrical stimulation     Goals  Goal Identifier: Self-management/HEP  Goal Description: Patient will be independent in self-management of condition and HEP.  Target Date: 12/29/21    Goal Identifier: Standing  Goal Description: Patient will be able to  the kitchen to make a meal or bake for 30 minutes with <=2/10 pain in the lower back/legs.  Target Date: 01/12/22    Goal Identifier: Sitting  Goal Description: Patient will be able to sit for 2 hours in order to watch a movie at the end of the day with <=2/10 pain for improved QOL.  Target Date: 12/29/21    Goal Identifier: Heating pad  Goal Description: Patient will not need to use heating pad for pain relief >1x/day for improved QOL.  Target Date: 12/29/21        Therapy Frequency:  1 time/week  Predicted Duration of Therapy Intervention:  6-12 weeks    Natalie Malone, PT, DPT, MHA                 I CERTIFY THE NEED FOR THESE SERVICES FURNISHED UNDER        THIS PLAN OF TREATMENT AND WHILE UNDER MY CARE     (Physician co-signature of this document indicates review and certification of the therapy plan).                        Certification Date From:  10/20/21   Certification Date To:  01/17/22    Referring Provider:  Krista Kaplan PA-C    Initial Assessment        See Epic Evaluation Start of Care Date: 10/20/21                10/20/21 0900   General Information   Type of Visit Initial OP Ortho PT Evaluation   Start of Care Date 10/20/21   Referring Physician Krista Kaplan PA-C   Patient/Family Goals Statement Pain relief in back and left leg   Orders Evaluate and Treat   Date of Order 10/13/21   Certification Required? Yes   Medical Diagnosis M48.062 (ICD-10-CM) - Spinal stenosis of lumbar region with neurogenic claudication   Surgical/Medical history reviewed Yes   Precautions/Limitations no known precautions/limitations       Present No   Body Part(s)   Body Part(s) Lumbar Spine/SI   Presentation and Etiology   Pertinent history of current problem (include personal factors and/or comorbidities that impact the POC) The patient reports that she provides full time care for her .  She met with her hip surgeon on Monday.  She has had 2 hip replacements on both sides.  The hip looks good at this time per her surgeon and he believes her symptoms into her leg is coming from her lower back.  Her pain started around 8 months ago.  It bothers her the most when she is trying to  the kitchen for more than 10 minutes.  She tries to bike daily and this feels better than walking.  She can also only sit so long before she has to get up.  She will use the heating pad during the day when she needs to take a break.  Walking isn't that bad.  The pain starts in the lower back and then travels into the legs with more pain on the L than the R.  She denies any numbness or tingling symptoms.  She will wear a lumbar brace if she gets uncomfortable which she has had for about a year.  Bending is very problematic for her and she needs to use her reacher.  She had her R knee done around 5 months ago.   Impairments  A. Pain   Functional Limitations perform activities of daily living   Symptom Location Lower back and legs   How/Where did it occur From Degenerative Joint Disease   Onset date of current episode/exacerbation 02/01/21   Chronicity Chronic   Pain rating (0-10 point scale) Best (/10);Worst (/10)   Best (/10) 3   Worst (/10) 7   Pain quality C. Aching   Frequency of pain/symptoms A. Constant   Pain/symptoms are: Worse during the day   Pain/symptoms exacerbated by A. Sitting;B. Walking   Pain/symptoms eased by C. Rest;E. Changing positions;G. Heat;I. OTC medication(s)   Progression of symptoms since onset: Worsened   Current / Previous Interventions   Diagnostic Tests: MRI   MRI Results Results   MRI results Multilevel spondylotic change of the lumbar spine as described including severe neural foraminal narrowing at L2-L3 and severe spinal canal stenosis at L4-L5.   Prior Level of Function   Prior Level of Function-Mobility Able to help  with bathing   Current Level of Function   Patient role/employment history F. Retired   Living environment Apartment/condo   Fall Risk Screen   Fall screen completed by PT   Have you fallen 2 or more times in the past year? No   Have you fallen and had an injury in the past year? No   Is patient a fall risk? No   Abuse Screen (yes response referral indicated)   Feels Unsafe at Home or Work/School no   Feels Threatened by Someone no   Does Anyone Try to Keep You From Having Contact with Others or Doing Things Outside Your Home? no   Physical Signs of Abuse Present no   System Outcome Measures   Outcome Measures Low Back Pain (see Oswestry and Mari)   Lumbar Spine/SI Objective Findings   Posture Decreased lumbar lordosis, mild increased upper thoracic kyphosis, mild forward head   Flexion ROM Just below knees with pulling in the hamstrings   Extension ROM Pinching on the R lower back with mod/major movement loss   Right Side Bending ROM Mod/major movement loss with pulling and  pain   Left Side Bending ROM Mod/major movement loss with pulling and pain   Pelvic Screen Pelvic landmarks appear level supine   Hip Screen Decreased hip ER/IR ROM bilaterally   Hip Flexion (L2) Strength Pain L in hip with strength testing; 4+/5 on the R   Hip Abduction Strength 4/5 L, 4+/5 R   Hip Adduction Strength 5/5   Knee Flexion Strength 5   Knee Extension (L3) Strength 5   Ankle Dorsiflexion (L4) Strength 5   Lumbar/SI Flexibility Comments Good knee ROM bilaterally; decreased ankle DF bilaterally   Slump Test + L   Segmental Mobility Significant hypomobility throughout lumbar spine   Palpation Tenderness hip muscluature posteriorly   Planned Therapy Interventions   Planned Therapy Interventions manual therapy;ROM;strengthening;stretching   Planned Modality Interventions   Planned Modality Interventions Electrical stimulation   Clinical Impression   Criteria for Skilled Therapeutic Interventions Met yes, treatment indicated   PT Diagnosis Chronic low back pain secondary to facet arthropathy and lumbar stenosis   Influenced by the following impairments Pain, impaired strength, ROM   Functional limitations due to impairments Standing > 10 minutes, sitting for long periods, walking, caring for    Clinical Presentation Stable/Uncomplicated   Clinical Decision Making (Complexity) Low complexity   Therapy Frequency 1 time/week   Predicted Duration of Therapy Intervention (days/wks) 6-12 weeks   Risk & Benefits of therapy have been explained Yes   Patient, Family & other staff in agreement with plan of care Yes   Clinical Impression Comments Geeta Berry is an 81 year old female who presents to PT with complaints of lower back and bilateral leg pain.  Onset of symptoms was 8 months ago in 02/2021.  She has PMH positive for bilateral TIMO x 2, bilateral TKA, GERD, hypothyroidism, hyperlipidemia, HTN, CAD, OA, chronic stage 3 kidney disease, and DVT.  She demonstrates impairments of pain, ROM, and strength and  functionally is limited in standing > 10 minutes, sitting for long periods, walking, and caring for her .  Her symptoms are most consistent with lumbar stenosis and facet arthropathy with neurogenic claudication.  She is appropriate for skilled 1:1 physical therapy services in order to address the listed impairments and functional limitations.   ORTHO GOALS   PT Ortho Eval Goals 1;2;3;4   Ortho Goal 1   Goal Identifier Self-management/HEP   Goal Description Patient will be independent in self-management of condition and HEP.   Target Date 12/29/21   Ortho Goal 2   Goal Identifier Standing   Goal Description Patient will be able to  the kitchen to make a meal or bake for 30 minutes with <=2/10 pain in the lower back/legs.   Target Date 01/12/22   Ortho Goal 3   Goal Identifier Sitting   Goal Description Patient will be able to sit for 2 hours in order to watch a movie at the end of the day with <=2/10 pain for improved QOL.   Target Date 12/29/21   Ortho Goal 4   Goal Identifier Heating pad   Goal Description Patient will not need to use heating pad for pain relief >1x/day for improved QOL.   Target Date 12/29/21   Total Evaluation Time   PT Eval, Low Complexity Minutes (73441) 34   Therapy Certification   Certification date from 10/20/21   Certification date to 01/17/22   Medical Diagnosis M48.062 (ICD-10-CM) - Spinal stenosis of lumbar region with neurogenic claudication     Natalie Malone, PT, DPT, MHA  10/20/2021

## 2021-10-21 ENCOUNTER — ANCILLARY PROCEDURE (OUTPATIENT)
Dept: PHYSICAL MEDICINE AND REHAB | Facility: CLINIC | Age: 81
End: 2021-10-21
Attending: PHYSICIAN ASSISTANT
Payer: COMMERCIAL

## 2021-10-21 VITALS
TEMPERATURE: 97.9 F | DIASTOLIC BLOOD PRESSURE: 68 MMHG | SYSTOLIC BLOOD PRESSURE: 128 MMHG | HEART RATE: 56 BPM | OXYGEN SATURATION: 97 %

## 2021-10-21 DIAGNOSIS — M48.062 SPINAL STENOSIS OF LUMBAR REGION WITH NEUROGENIC CLAUDICATION: ICD-10-CM

## 2021-10-21 PROCEDURE — 64483 NJX AA&/STRD TFRM EPI L/S 1: CPT | Mod: LT | Performed by: PHYSICAL MEDICINE & REHABILITATION

## 2021-10-21 PROCEDURE — 99207 PAIN TRANSFORAMINAL ESI INJ LUMBOSACRAL ONE LEVEL LEFT: CPT | Mod: LT | Performed by: PHYSICAL MEDICINE & REHABILITATION

## 2021-10-21 RX ORDER — METHYLPREDNISOLONE ACETATE 80 MG/ML
INJECTION, SUSPENSION INTRA-ARTICULAR; INTRALESIONAL; INTRAMUSCULAR; SOFT TISSUE
Status: COMPLETED | OUTPATIENT
Start: 2021-10-21 | End: 2021-10-21

## 2021-10-21 RX ORDER — LIDOCAINE HYDROCHLORIDE 10 MG/ML
INJECTION, SOLUTION EPIDURAL; INFILTRATION; INTRACAUDAL; PERINEURAL
Status: COMPLETED | OUTPATIENT
Start: 2021-10-21 | End: 2021-10-21

## 2021-10-21 RX ADMIN — METHYLPREDNISOLONE ACETATE 80 MG: 80 INJECTION, SUSPENSION INTRA-ARTICULAR; INTRALESIONAL; INTRAMUSCULAR; SOFT TISSUE at 09:23

## 2021-10-21 RX ADMIN — LIDOCAINE HYDROCHLORIDE 2 ML: 10 INJECTION, SOLUTION EPIDURAL; INFILTRATION; INTRACAUDAL; PERINEURAL at 09:23

## 2021-10-21 ASSESSMENT — PAIN SCALES - GENERAL
PAINLEVEL: MILD PAIN (2)
PAINLEVEL: MILD PAIN (2)

## 2021-11-01 ENCOUNTER — OFFICE VISIT (OUTPATIENT)
Dept: FAMILY MEDICINE | Facility: CLINIC | Age: 81
End: 2021-11-01
Payer: COMMERCIAL

## 2021-11-01 ENCOUNTER — MYC MEDICAL ADVICE (OUTPATIENT)
Dept: INTERNAL MEDICINE | Facility: CLINIC | Age: 81
End: 2021-11-01

## 2021-11-01 VITALS
SYSTOLIC BLOOD PRESSURE: 134 MMHG | HEART RATE: 52 BPM | BODY MASS INDEX: 26.43 KG/M2 | OXYGEN SATURATION: 98 % | WEIGHT: 171.3 LBS | RESPIRATION RATE: 16 BRPM | DIASTOLIC BLOOD PRESSURE: 73 MMHG | TEMPERATURE: 97.8 F

## 2021-11-01 DIAGNOSIS — R07.81 RIB PAIN ON LEFT SIDE: ICD-10-CM

## 2021-11-01 DIAGNOSIS — D50.0 IRON DEFICIENCY ANEMIA DUE TO CHRONIC BLOOD LOSS: Primary | ICD-10-CM

## 2021-11-01 DIAGNOSIS — R53.83 FATIGUE, UNSPECIFIED TYPE: Primary | ICD-10-CM

## 2021-11-01 DIAGNOSIS — R63.4 WEIGHT LOSS: ICD-10-CM

## 2021-11-01 LAB
ALBUMIN SERPL-MCNC: 4 G/DL (ref 3.5–5)
ALP SERPL-CCNC: 71 U/L (ref 45–120)
ALT SERPL W P-5'-P-CCNC: 17 U/L (ref 0–45)
ANION GAP SERPL CALCULATED.3IONS-SCNC: 7 MMOL/L (ref 5–18)
AST SERPL W P-5'-P-CCNC: 23 U/L (ref 0–40)
ATRIAL RATE - MUSE: 47 BPM
BASOPHILS # BLD AUTO: 0 10E3/UL (ref 0–0.2)
BASOPHILS NFR BLD AUTO: 0 %
BILIRUB SERPL-MCNC: 0.4 MG/DL (ref 0–1)
BUN SERPL-MCNC: 24 MG/DL (ref 8–28)
CALCIUM SERPL-MCNC: 9.8 MG/DL (ref 8.5–10.5)
CHLORIDE BLD-SCNC: 104 MMOL/L (ref 98–107)
CO2 SERPL-SCNC: 30 MMOL/L (ref 22–31)
CREAT SERPL-MCNC: 1.01 MG/DL (ref 0.6–1.1)
DIASTOLIC BLOOD PRESSURE - MUSE: NORMAL MMHG
EOSINOPHIL # BLD AUTO: 0.1 10E3/UL (ref 0–0.7)
EOSINOPHIL NFR BLD AUTO: 1 %
ERYTHROCYTE [DISTWIDTH] IN BLOOD BY AUTOMATED COUNT: 18.4 % (ref 10–15)
GFR SERPL CREATININE-BSD FRML MDRD: 52 ML/MIN/1.73M2
GLUCOSE BLD-MCNC: 97 MG/DL (ref 70–125)
HCT VFR BLD AUTO: 33.9 % (ref 35–47)
HGB BLD-MCNC: 10.5 G/DL (ref 11.7–15.7)
IMM GRANULOCYTES # BLD: 0 10E3/UL
IMM GRANULOCYTES NFR BLD: 0 %
INTERPRETATION ECG - MUSE: NORMAL
LYMPHOCYTES # BLD AUTO: 1.3 10E3/UL (ref 0.8–5.3)
LYMPHOCYTES NFR BLD AUTO: 23 %
MCH RBC QN AUTO: 28.1 PG (ref 26.5–33)
MCHC RBC AUTO-ENTMCNC: 31 G/DL (ref 31.5–36.5)
MCV RBC AUTO: 91 FL (ref 78–100)
MONOCYTES # BLD AUTO: 0.6 10E3/UL (ref 0–1.3)
MONOCYTES NFR BLD AUTO: 12 %
NEUTROPHILS # BLD AUTO: 3.5 10E3/UL (ref 1.6–8.3)
NEUTROPHILS NFR BLD AUTO: 63 %
P AXIS - MUSE: 17 DEGREES
PLATELET # BLD AUTO: 281 10E3/UL (ref 150–450)
POTASSIUM BLD-SCNC: 3.7 MMOL/L (ref 3.5–5)
PR INTERVAL - MUSE: 188 MS
PROT SERPL-MCNC: 7.1 G/DL (ref 6–8)
QRS DURATION - MUSE: 110 MS
QT - MUSE: 456 MS
QTC - MUSE: 403 MS
R AXIS - MUSE: 16 DEGREES
RBC # BLD AUTO: 3.74 10E6/UL (ref 3.8–5.2)
SODIUM SERPL-SCNC: 141 MMOL/L (ref 136–145)
SYSTOLIC BLOOD PRESSURE - MUSE: NORMAL MMHG
T AXIS - MUSE: 54 DEGREES
VENTRICULAR RATE- MUSE: 47 BPM
WBC # BLD AUTO: 5.5 10E3/UL (ref 4–11)

## 2021-11-01 PROCEDURE — 80053 COMPREHEN METABOLIC PANEL: CPT | Performed by: FAMILY MEDICINE

## 2021-11-01 PROCEDURE — 93010 ELECTROCARDIOGRAM REPORT: CPT | Performed by: INTERNAL MEDICINE

## 2021-11-01 PROCEDURE — 93005 ELECTROCARDIOGRAM TRACING: CPT | Performed by: FAMILY MEDICINE

## 2021-11-01 PROCEDURE — 85025 COMPLETE CBC W/AUTO DIFF WBC: CPT | Performed by: FAMILY MEDICINE

## 2021-11-01 PROCEDURE — 99214 OFFICE O/P EST MOD 30 MIN: CPT | Performed by: FAMILY MEDICINE

## 2021-11-01 PROCEDURE — 36415 COLL VENOUS BLD VENIPUNCTURE: CPT | Performed by: FAMILY MEDICINE

## 2021-11-01 ASSESSMENT — ENCOUNTER SYMPTOMS
UNEXPECTED WEIGHT CHANGE: 1
WHEEZING: 0
LIGHT-HEADEDNESS: 1
VOMITING: 0
HEADACHES: 1
APPETITE CHANGE: 1
HEMATURIA: 0
EYE DISCHARGE: 1
NAUSEA: 0
FEVER: 0
DIARRHEA: 0
JOINT SWELLING: 0
HEMATOCHEZIA: 0
SPEECH DIFFICULTY: 0
SLEEP DISTURBANCE: 1
DYSURIA: 0
CONSTIPATION: 0

## 2021-11-01 NOTE — PROGRESS NOTES
Assessment & Plan     Geeta was seen today for generalized weakness.    Diagnoses and all orders for this visit:    Fatigue, unspecified type, with associated myalgias the past 4 days.  Patient has a known history of anemia, but her Hemoglobin has improved over the past month, currently 10.5.  Differential was considered, including (but not limited to):  viral illness, insomnia, decreased oral intake,  and recent social stressors.  CMP is pending.  EKG is stable/unchanged (with continued sinus bradycardia), as compared with the 4/19/2021 study.  Patient is not on a beta-blocker.  Differential of myalgias also includes medication side effect (e.g. Crestor).  Patient has had side effects related to statin treatment in the past, per chart review.  Patient has a known history of hypothyroidism, with normal TSH study 9/23/2021.      -     CBC with platelets and differential; Future  -     Comprehensive metabolic panel (BMP + Alb, Alk Phos, ALT, AST, Total. Bili, TP); Future  -     CBC with platelets and differential  -     Comprehensive metabolic panel (BMP + Alb, Alk Phos, ALT, AST, Total. Bili, TP)  -     EKG 12-lead, tracing only     Weight loss.  The patient has lost ~9 lbs in the past 2 weeks, likely due to social stressors and dietary changes.  Differential was considered.  Labs are pending, as noted above.    Rib pain on left side, likely strain.  Pain has been stable/improving the past week, noted to be reproducible on exam.    Discussed risks and benefits of treatment strategies.    Patient declines a further workup through an ER, but she is in agreement with the following plan:    Patient Instructions     We will notify you of your test results as discussed.    Proceed with the EGD and Colonoscopy studies to further evaluate your anemia, as previously scheduled.    Follow up with primary care in 1 week.    Follow-up sooner if worsening symptoms.    Follow up in the ER immediately if:  severe/worsening chest/rib  "pain, breathing concerns, or other severe/emergent symptoms, as discussed.      Discuss Crestor treatment at follow up appointment (if continued myalgias/symptoms), only as appropriate.    Return for Follow up, as noted in Patient Instructions.    Krissy Hendrickson MD  [facility] MOON Connor is a 81 year old female, who presents to clinic today for the following health issues, accompanied by her sister-in-law:    Chief Complaint   Patient presents with     Generalized Weakness     x4d, achy, \"feels lousy\", gets worn out more quickly then usual\"       HPI - Fatigue    The patient is an 81-year-old female, with a known history of CAD (previously evaluated by Cardiology 09/16/2020), hypertension, CKD stage III, hyperlipidemia, hypothyroidism, iron deficiency anemia, prediabetes, neuropathy, memory changes, previous DVT, and previous brain hemorrhage.    The patient presents with fatigue, starting 4 days ago.  The patient states she gets tired more quickly than usual, but she continues to bike 1 mile per day.  Patient feels a bit more fatigued with biking, but she denies exertional chest pain.  Patient denies shortness of breath at rest.  She feels a bit short of breath with biking uphill, but she otherwise denies exertional dyspnea with doing her usual activities.  The patient has felt achy and chilled the past few days, but she has not had a fever.  Occasional cough and belching reported, but no GERD.  No recent exposures or medication changes.  Patient is caring for her 89 year old  at home.  's health has been declining, which has been difficult for the family and the patient.  Patient has a good support network, but she is not eating as much, due to her 's medical illness.    Chronic back pain continues, post recent injection.  Patient states she strained her left anterior chest/ribs doing Physical Therapy recently, at which time she heard a " "\"snap\".   Left anterior chest/rib pain is 1/10 severity, noted to be constant/unchanged the past week.    The patient has an upcoming EGD and Colonoscopy scheduled December/2021, requesting to have her Hemoglobin rechecked today.    Review of Systems   Constitutional: Positive for appetite change (Not eating as much recently.) and unexpected weight change (Weight has decreased a few pounds recently.). Negative for fever.   Eyes: Positive for discharge (Clear, left eye.) and visual disturbance (Feels like she may need a new pair of glasses, but no acute vision changes reported.).   Respiratory: Negative for wheezing.    Cardiovascular: Positive for peripheral edema (Improved.).        No orthopnea.   Gastrointestinal: Negative for constipation, diarrhea, hematochezia, nausea and vomiting.   Genitourinary: Negative for dysuria and hematuria.   Musculoskeletal: Negative for joint swelling.   Skin: Negative for rash.   Neurological: Positive for light-headedness (Lasts for 1-2 seconds with first standing up.) and headaches (Mild). Negative for syncope and speech difficulty.   Psychiatric/Behavioral: Positive for sleep disturbance (Hard to get to sleep every other night.). Negative for suicidal ideas.        Objective    /73   Pulse 52   Temp 97.8  F (36.6  C) (Oral)   Resp 16   Wt 77.7 kg (171 lb 4.8 oz)   SpO2 98%   BMI 26.43 kg/m       Initial blood pressure was 167/77.    Physical Exam   GENERAL APPEARANCE:  Awake, alert, and in no acute distress.  PSYCHIATRIC:  Pleasant, smiling affect.  HEENT: Wears glasses.  Sclera anicteric.  No conjunctivitis.  PERRLA.  Extraocular movements are intact.  Bilateral TM's and canals are within normal limit, with the exception of a small amount of cerumen noted involving the right outer ear canal.  No obvious nasal congestion.  No erythema, edema, or exudates of the oral mucosa or posterior pharynx.  Mucous membranes moist.  NECK:  Spontaneous full range of motion.  No " thyromegaly or mass.  No lymphadenopathy.  HEART:  Normal S1, S2.  Regular rate and rhythm.  No murmurs, rubs, or gallops.  Mild tenderness to palpation is noted overlying the left anterior rib cage (T6-T7 level), which recreates her pain.  LUNGS:  No respiratory distress.  Good air entry throughout the lung fields.  No wheezes, rales, or rhonchi.  ABDOMEN:  Not distended.  Soft.  Not tender.  No mass.  No organomegaly.  EXTREMITIES:  Moves 4 extremities, with intact/symmetric strength.   1+ edema to midcalf level.  No calf asymmetry, erythema, or tenderness.  Distal pulses are 2+ and bounding.  NEUROLOGIC:  Gait within normal limits.  No facial droop or acute neurologic deficits.  SKIN:  No rash, pallor, or diaphoresis.    EKG:  Sinus Bradycardia, with ventricular rate 47 bpm.  Nonspecific ST/T wave abnormalities.   No significant changes noted, as compared with the 4/19/2021 EKG.  See the final Cardiology report in the Laboratory section below.    LABORATORY:  Office Visit on 11/01/2021   Component Date Value Ref Range Status     WBC Count 11/01/2021 5.5  4.0 - 11.0 10e3/uL Final     RBC Count 11/01/2021 3.74* 3.80 - 5.20 10e6/uL Final     Hemoglobin 11/01/2021 10.5* 11.7 - 15.7 g/dL Final     Hematocrit 11/01/2021 33.9* 35.0 - 47.0 % Final     MCV 11/01/2021 91  78 - 100 fL Final     MCH 11/01/2021 28.1  26.5 - 33.0 pg Final     MCHC 11/01/2021 31.0* 31.5 - 36.5 g/dL Final     RDW 11/01/2021 18.4* 10.0 - 15.0 % Final     Platelet Count 11/01/2021 281  150 - 450 10e3/uL Final     % Neutrophils 11/01/2021 63  % Final     % Lymphocytes 11/01/2021 23  % Final     % Monocytes 11/01/2021 12  % Final     % Eosinophils 11/01/2021 1  % Final     % Basophils 11/01/2021 0  % Final     % Immature Granulocytes 11/01/2021 0  % Final     Absolute Neutrophils 11/01/2021 3.5  1.6 - 8.3 10e3/uL Final     Absolute Lymphocytes 11/01/2021 1.3  0.8 - 5.3 10e3/uL Final     Absolute Monocytes 11/01/2021 0.6  0.0 - 1.3 10e3/uL Final      Absolute Eosinophils 11/01/2021 0.1  0.0 - 0.7 10e3/uL Final     Absolute Basophils 11/01/2021 0.0  0.0 - 0.2 10e3/uL Final     Absolute Immature Granulocytes 11/01/2021 0.0  <=0.0 10e3/uL Final     Ventricular Rate 11/01/2021 47  BPM Final     Atrial Rate 11/01/2021 47  BPM Final     ID Interval 11/01/2021 188  ms Final     QRS Duration 11/01/2021 110  ms Final     QT 11/01/2021 456  ms Final     QTc 11/01/2021 403  ms Final     P Axis 11/01/2021 17  degrees Final     R AXIS 11/01/2021 16  degrees Final     T Axis 11/01/2021 54  degrees Final     Interpretation ECG 11/01/2021    Final                    Value:Sinus bradycardia  Nonspecific ST and T wave abnormality  Abnormal ECG  When compared with ECG of 19-APR-2021 12:48,  No significant change was found  Confirmed by DEYSI CHRISTINA MD LOC: ([id number]) on 11/1/2021 4:38:28 PM        Previous Hemoglobin was 9.4 on 9/23/2021.    Urgent Care Course:  -Limitations of Urgent Care evaluation were discussed.  -Patient declines transfer to an ER for a more complete work-up (e.g. Troponin).  -EKG and preliminary labs were discussed with the patient prior to discharge.  -Follow-up was discussed prior to discharge.

## 2021-11-01 NOTE — PATIENT INSTRUCTIONS
We will notify you of your test results as discussed.    Proceed with the EGD and Colonoscopy studies to further evaluate your anemia, as previously scheduled.    Follow up with primary care in 1 week.    Follow-up sooner if worsening symptoms.    Follow up in the ER immediately if:  severe/worsening chest/rib pain, breathing concerns, or other severe/emergent symptoms, as discussed.

## 2021-11-04 ENCOUNTER — OFFICE VISIT (OUTPATIENT)
Dept: PHYSICAL MEDICINE AND REHAB | Facility: CLINIC | Age: 81
End: 2021-11-04
Payer: COMMERCIAL

## 2021-11-04 ENCOUNTER — HOSPITAL ENCOUNTER (OUTPATIENT)
Dept: PHYSICAL THERAPY | Facility: REHABILITATION | Age: 81
End: 2021-11-04
Payer: COMMERCIAL

## 2021-11-04 VITALS
SYSTOLIC BLOOD PRESSURE: 133 MMHG | DIASTOLIC BLOOD PRESSURE: 64 MMHG | HEIGHT: 68 IN | BODY MASS INDEX: 25.91 KG/M2 | HEART RATE: 53 BPM | WEIGHT: 171 LBS

## 2021-11-04 DIAGNOSIS — M47.816 LUMBAR FACET ARTHROPATHY: ICD-10-CM

## 2021-11-04 DIAGNOSIS — M62.81 GENERALIZED MUSCLE WEAKNESS: ICD-10-CM

## 2021-11-04 DIAGNOSIS — M48.062 SPINAL STENOSIS OF LUMBAR REGION WITH NEUROGENIC CLAUDICATION: Primary | ICD-10-CM

## 2021-11-04 DIAGNOSIS — M25.60 JOINT STIFFNESS OF SPINE: ICD-10-CM

## 2021-11-04 PROCEDURE — 99213 OFFICE O/P EST LOW 20 MIN: CPT | Performed by: PHYSICIAN ASSISTANT

## 2021-11-04 PROCEDURE — 97110 THERAPEUTIC EXERCISES: CPT | Mod: GP | Performed by: PHYSICAL THERAPIST

## 2021-11-04 ASSESSMENT — MIFFLIN-ST. JEOR: SCORE: 1276.21

## 2021-11-04 ASSESSMENT — PAIN SCALES - GENERAL: PAINLEVEL: MILD PAIN (2)

## 2021-11-04 NOTE — PATIENT INSTRUCTIONS
We can repeat the injection if your pain returns.  You can have 4 injections per year.  You can send me a [employer] message or  Call our nurse line [phone].    If leg cramps continue, use magnesium supplement.

## 2021-11-04 NOTE — LETTER
11/4/2021         RE: Geeta Berry  [address]  [facility] [address]        Dear Colleague,    Thank you for referring your patient, Geeta Berry, to the [facility] [place]. Please see a copy of my visit note below.    Assessment:   Geeta Berry is a 81 year old y.o. female with past medical history significant for neuropathy, GERD, hyperlipidemia, hypothyroidism, hypertension, coronary artery disease, chronic kidney disease stage III, iron deficiency anemia, dysthymia who presents today for follow-up regarding left greater than right low back pain which previously radiated into the bilateral lower extremities with associated limited walking and standing tolerance.  My review of an MRI lumbar spine shows grade 1 degenerative spondylolisthesis L4-5 with severe spinal stenosis.  Suspect she is symptomatic from lumbar spinal stenosis.  She may also be symptomatic from lower lumbar facet arthropathy.  Patient is status post a left L4-5 transforaminal epidural steroid injection October 21, 2021 which has provided 60% relief of her pain.  She denies any leg pain today.  She has mild residual left greater than right low back pain which may be related to lumbar facet arthropathy.       Plan:     A shared decision making plan was used.  The patient's values and choices were respected.  The following represents what was discussed and decided upon by the physician assistant and the patient.      1.  DIAGNOSTIC TESTS: I reviewed the MRI lumbar spine.  No further diagnostic tests were ordered.    2.  PHYSICAL THERAPY: Patient participated in physical therapy in November 2017.  She restarted physical therapy October 20, 2021 and has had 2 sessions of far.  Encouraged to continue with physical therapy and the home exercises.    3.  MEDICATIONS: No changes are made to the patient's medications.  She takes Tylenol 500 milligrams 4 times daily which is helpful.  -Patient has had some leg  cramps since the injection.  I suggest that she use a magnesium supplement.  -Patient did not tolerate gabapentin.    4.  INTERVENTIONS:    -No additional interventions were ordered.  Hopefully this injection gives her several months of relief.  If pain returns, we could repeat the left L4-5 transforaminal epidural steroid injection.  -If the L4-5 TF ANGELO does not provide at least 3 months of relief, we could try an L5-S1 interlaminar epidural steroid injection.  -If axial low back pain worsens we could consider medial branch blocks of the work-up toward radiofrequency ablation.    5.  PATIENT EDUCATION:  -If symptoms persist would recommend a referral to neurosurgery.    6.  FOLLOW-UP: Patient will follow up as needed.  If she has questions or concerns, she should not hesitate to call.    Subjective:     Geeta Berry is a 81 year old female who presents today for follow-up regarding chronic left greater than right low back pain which previously radiated to bilateral lower extremities.  Patient reports 60% improvement in her pain overall.  She reports her leg pain has resolved.  She has more mild residual left greater than right low back pain..  Patient is status post a left L4-5 transforaminal epidural steroid injection October 21, 2021.      Patient complains of left greater than right low back pain.  She denies any pain radiating to the buttocks or down the legs.  She rates her pain today as a 2 out of 10.  At its worst it is a 5-10.  At its best it is a 2 out of 10.  Pain is aggravated prolonged standing as well as sitting too long.  Pain is alleviated with lying down with a heating pad.  She denies any weakness down the legs.  She reports her neuropathy symptoms in her feet have actually improved since the injection as well.  Denies loss of bowel or bladder control.  Denies any recent fevers.    Patient is currently in physical therapy.  She has had 2 session so far.  She has more sessions scheduled.  She does  her home exercises.  She takes Tylenol 500 milligrams 4 times daily..    Past medical history is reviewed and is pertinent for fatigue.  She has anemia and has a endoscopy and colonoscopy scheduled for next month.  She states she needs to  some iron supplements.    Review of Systems:  Positive for weight loss.  Negative for weakness, numbness/tingling, loss of bowel/bladder control, inability to urinate, pain much worse at night, headache, trip/stumble/falls difficulty swallowing, difficulty with hand skills, fevers.     Objective:   CONSTITUTIONAL:  Vital signs as above.  No acute distress.  The patient is well nourished and well groomed.   Patient appears tired.  PSYCHIATRIC:  The patient is awake, alert, oriented to person, place and time.  The patient is answering questions appropriately with clear speech.  Normal affect.   HEENT: Normocephalic, atraumatic.  Sclera clear.    SKIN:  Skin over the face, posterior torso, bilateral upper and lower extremities is clean, dry, intact without rashes.  VASCULAR: No significant lower extremity edema.  MUSCULOSKELETAL:  Gait is non-antalgic.  The patient is able to rise onto toes and heels bilaterally with support.  No tenderness over the bilateral lower lumbar paraspinal muscles.      The patient has 5/5 strength for the bilateral hip flexors, knee flexors/extensors, ankle dorsiflexors/plantar flexors, ankle evertors/invertors.    NEUROLOGICAL: Trace right and 2+ left patellar, trace achilles reflexes.  No ankle clonus bilaterally.  Sensation to light touch intact bilateral lower extremities throughout.     RESULTS: I reviewed the MRI lumbar spine from [facility] dated October 11, 2021.  This shows S-shaped curvature of lumbar spine.  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  There is degenerative spondylolisthesis L4-5 grade 1.  At L4-5 there is severe spinal stenosis with mild to moderate bilateral foraminal stenosis.  At L3-4 there is moderate spinal  canal stenosis with moderate bilateral foraminal stenosis.  At L2-3 there is mild to moderate spinal canal stenosis with severe left and moderate to severe right foraminal stenosis.  Please see report for further details.          Again, thank you for allowing me to participate in the care of your patient.        Sincerely,        Krista Kaplan PA-C

## 2021-11-04 NOTE — PROGRESS NOTES
Assessment:   Geeta Berry is a 81 year old y.o. female with past medical history significant for neuropathy, GERD, hyperlipidemia, hypothyroidism, hypertension, coronary artery disease, chronic kidney disease stage III, iron deficiency anemia, dysthymia who presents today for follow-up regarding left greater than right low back pain which previously radiated into the bilateral lower extremities with associated limited walking and standing tolerance.  My review of an MRI lumbar spine shows grade 1 degenerative spondylolisthesis L4-5 with severe spinal stenosis.  Suspect she is symptomatic from lumbar spinal stenosis.  She may also be symptomatic from lower lumbar facet arthropathy.  Patient is status post a left L4-5 transforaminal epidural steroid injection October 21, 2021 which has provided 60% relief of her pain.  She denies any leg pain today.  She has mild residual left greater than right low back pain which may be related to lumbar facet arthropathy.       Plan:     A shared decision making plan was used.  The patient's values and choices were respected.  The following represents what was discussed and decided upon by the physician assistant and the patient.      1.  DIAGNOSTIC TESTS: I reviewed the MRI lumbar spine.  No further diagnostic tests were ordered.    2.  PHYSICAL THERAPY: Patient participated in physical therapy in November 2017.  She restarted physical therapy October 20, 2021 and has had 2 sessions of far.  Encouraged to continue with physical therapy and the home exercises.    3.  MEDICATIONS: No changes are made to the patient's medications.  She takes Tylenol 500 milligrams 4 times daily which is helpful.  -Patient has had some leg cramps since the injection.  I suggest that she use a magnesium supplement.  -Patient did not tolerate gabapentin.    4.  INTERVENTIONS:    -No additional interventions were ordered.  Hopefully this injection gives her several months of relief.  If pain returns,  we could repeat the left L4-5 transforaminal epidural steroid injection.  -If the L4-5 TF ANGELO does not provide at least 3 months of relief, we could try an L5-S1 interlaminar epidural steroid injection.  -If axial low back pain worsens we could consider medial branch blocks of the work-up toward radiofrequency ablation.    5.  PATIENT EDUCATION:  -If symptoms persist would recommend a referral to neurosurgery.    6.  FOLLOW-UP: Patient will follow up as needed.  If she has questions or concerns, she should not hesitate to call.    Subjective:     Geeta Berry is a 81 year old female who presents today for follow-up regarding chronic left greater than right low back pain which previously radiated to bilateral lower extremities.  Patient reports 60% improvement in her pain overall.  She reports her leg pain has resolved.  She has more mild residual left greater than right low back pain..  Patient is status post a left L4-5 transforaminal epidural steroid injection October 21, 2021.      Patient complains of left greater than right low back pain.  She denies any pain radiating to the buttocks or down the legs.  She rates her pain today as a 2 out of 10.  At its worst it is a 5-10.  At its best it is a 2 out of 10.  Pain is aggravated prolonged standing as well as sitting too long.  Pain is alleviated with lying down with a heating pad.  She denies any weakness down the legs.  She reports her neuropathy symptoms in her feet have actually improved since the injection as well.  Denies loss of bowel or bladder control.  Denies any recent fevers.    Patient is currently in physical therapy.  She has had 2 session so far.  She has more sessions scheduled.  She does her home exercises.  She takes Tylenol 500 milligrams 4 times daily..    Past medical history is reviewed and is pertinent for fatigue.  She has anemia and has a endoscopy and colonoscopy scheduled for next month.  She states she needs to  some iron  supplements.    Review of Systems:  Positive for weight loss.  Negative for weakness, numbness/tingling, loss of bowel/bladder control, inability to urinate, pain much worse at night, headache, trip/stumble/falls difficulty swallowing, difficulty with hand skills, fevers.     Objective:   CONSTITUTIONAL:  Vital signs as above.  No acute distress.  The patient is well nourished and well groomed.   Patient appears tired.  PSYCHIATRIC:  The patient is awake, alert, oriented to person, place and time.  The patient is answering questions appropriately with clear speech.  Normal affect.   HEENT: Normocephalic, atraumatic.  Sclera clear.    SKIN:  Skin over the face, posterior torso, bilateral upper and lower extremities is clean, dry, intact without rashes.  VASCULAR: No significant lower extremity edema.  MUSCULOSKELETAL:  Gait is non-antalgic.  The patient is able to rise onto toes and heels bilaterally with support.  No tenderness over the bilateral lower lumbar paraspinal muscles.      The patient has 5/5 strength for the bilateral hip flexors, knee flexors/extensors, ankle dorsiflexors/plantar flexors, ankle evertors/invertors.    NEUROLOGICAL: Trace right and 2+ left patellar, trace achilles reflexes.  No ankle clonus bilaterally.  Sensation to light touch intact bilateral lower extremities throughout.     RESULTS: I reviewed the MRI lumbar spine from [facility] dated October 11, 2021.  This shows S-shaped curvature of lumbar spine.  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  There is degenerative spondylolisthesis L4-5 grade 1.  At L4-5 there is severe spinal stenosis with mild to moderate bilateral foraminal stenosis.  At L3-4 there is moderate spinal canal stenosis with moderate bilateral foraminal stenosis.  At L2-3 there is mild to moderate spinal canal stenosis with severe left and moderate to severe right foraminal stenosis.  Please see report for further details.

## 2021-11-16 ENCOUNTER — HOSPITAL ENCOUNTER (OUTPATIENT)
Dept: PHYSICAL THERAPY | Facility: REHABILITATION | Age: 81
End: 2021-11-16
Payer: COMMERCIAL

## 2021-11-16 DIAGNOSIS — M25.60 JOINT STIFFNESS OF SPINE: ICD-10-CM

## 2021-11-16 DIAGNOSIS — M48.062 SPINAL STENOSIS OF LUMBAR REGION WITH NEUROGENIC CLAUDICATION: Primary | ICD-10-CM

## 2021-11-16 DIAGNOSIS — M62.81 GENERALIZED MUSCLE WEAKNESS: ICD-10-CM

## 2021-11-16 PROCEDURE — 97110 THERAPEUTIC EXERCISES: CPT | Mod: GP | Performed by: PHYSICAL THERAPY ASSISTANT

## 2021-11-24 ENCOUNTER — OFFICE VISIT (OUTPATIENT)
Dept: INTERNAL MEDICINE | Facility: CLINIC | Age: 81
End: 2021-11-24
Payer: COMMERCIAL

## 2021-11-24 VITALS
BODY MASS INDEX: 27.76 KG/M2 | HEART RATE: 61 BPM | SYSTOLIC BLOOD PRESSURE: 110 MMHG | TEMPERATURE: 98.1 F | OXYGEN SATURATION: 95 % | WEIGHT: 179.9 LBS | DIASTOLIC BLOOD PRESSURE: 64 MMHG | RESPIRATION RATE: 16 BRPM

## 2021-11-24 DIAGNOSIS — Z01.818 PRE-OPERATIVE GENERAL PHYSICAL EXAMINATION: Primary | ICD-10-CM

## 2021-11-24 DIAGNOSIS — I10 PRIMARY HYPERTENSION: ICD-10-CM

## 2021-11-24 DIAGNOSIS — R19.5 OCCULT BLOOD IN STOOLS: ICD-10-CM

## 2021-11-24 DIAGNOSIS — E78.2 MIXED HYPERLIPIDEMIA: ICD-10-CM

## 2021-11-24 DIAGNOSIS — I25.10 CORONARY ARTERY DISEASE INVOLVING NATIVE CORONARY ARTERY OF NATIVE HEART WITHOUT ANGINA PECTORIS: ICD-10-CM

## 2021-11-24 DIAGNOSIS — D50.0 IRON DEFICIENCY ANEMIA DUE TO CHRONIC BLOOD LOSS: ICD-10-CM

## 2021-11-24 LAB
ALT SERPL W P-5'-P-CCNC: 16 U/L (ref 0–45)
ANION GAP SERPL CALCULATED.3IONS-SCNC: 9 MMOL/L (ref 5–18)
BUN SERPL-MCNC: 26 MG/DL (ref 8–28)
CALCIUM SERPL-MCNC: 9.4 MG/DL (ref 8.5–10.5)
CHLORIDE BLD-SCNC: 103 MMOL/L (ref 98–107)
CHOLEST SERPL-MCNC: 188 MG/DL
CO2 SERPL-SCNC: 29 MMOL/L (ref 22–31)
CREAT SERPL-MCNC: 0.99 MG/DL (ref 0.6–1.1)
ERYTHROCYTE [DISTWIDTH] IN BLOOD BY AUTOMATED COUNT: 19.3 % (ref 10–15)
FASTING STATUS PATIENT QL REPORTED: NORMAL
FERRITIN SERPL-MCNC: 41 NG/ML (ref 10–130)
GFR SERPL CREATININE-BSD FRML MDRD: 53 ML/MIN/1.73M2
GLUCOSE BLD-MCNC: 82 MG/DL (ref 70–125)
HCT VFR BLD AUTO: 33.9 % (ref 35–47)
HDLC SERPL-MCNC: 60 MG/DL
HGB BLD-MCNC: 10.5 G/DL (ref 11.7–15.7)
LDLC SERPL CALC-MCNC: 106 MG/DL
MCH RBC QN AUTO: 28.8 PG (ref 26.5–33)
MCHC RBC AUTO-ENTMCNC: 31 G/DL (ref 31.5–36.5)
MCV RBC AUTO: 93 FL (ref 78–100)
PLATELET # BLD AUTO: 262 10E3/UL (ref 150–450)
POTASSIUM BLD-SCNC: 4.2 MMOL/L (ref 3.5–5)
RBC # BLD AUTO: 3.64 10E6/UL (ref 3.8–5.2)
SODIUM SERPL-SCNC: 141 MMOL/L (ref 136–145)
TRIGL SERPL-MCNC: 108 MG/DL
WBC # BLD AUTO: 4.2 10E3/UL (ref 4–11)

## 2021-11-24 PROCEDURE — 83550 IRON BINDING TEST: CPT | Performed by: NURSE PRACTITIONER

## 2021-11-24 PROCEDURE — 80048 BASIC METABOLIC PNL TOTAL CA: CPT | Performed by: NURSE PRACTITIONER

## 2021-11-24 PROCEDURE — 99214 OFFICE O/P EST MOD 30 MIN: CPT | Performed by: NURSE PRACTITIONER

## 2021-11-24 PROCEDURE — 36415 COLL VENOUS BLD VENIPUNCTURE: CPT | Performed by: NURSE PRACTITIONER

## 2021-11-24 PROCEDURE — 82728 ASSAY OF FERRITIN: CPT | Performed by: NURSE PRACTITIONER

## 2021-11-24 PROCEDURE — 80061 LIPID PANEL: CPT | Performed by: NURSE PRACTITIONER

## 2021-11-24 PROCEDURE — 85027 COMPLETE CBC AUTOMATED: CPT | Performed by: NURSE PRACTITIONER

## 2021-11-24 PROCEDURE — 84460 ALANINE AMINO (ALT) (SGPT): CPT | Performed by: NURSE PRACTITIONER

## 2021-11-24 RX ORDER — CITALOPRAM HYDROBROMIDE 10 MG/1
5 TABLET ORAL
COMMUNITY
Start: 2021-05-11 | End: 2021-11-24

## 2021-11-24 NOTE — PROGRESS NOTES
[address] [address]  Phone: [phone]  Fax: [phone]  Primary Provider: Jacy Mishra    PREOPERATIVE EVALUATION:  Today's date: 11/24/2021    Geeta Berry is a 81 year old female who presents for a preoperative evaluation.    Surgical Information:  Surgery/Procedure: Colonoscopy and Endoscopy  Surgery Location: [facility]  Surgeon: Dr. Amato  Surgery Date: 12/17/2021  Time of Surgery: 0630  Where patient plans to recover: At home with family  Fax number for surgical facility: [phone]    Type of Anesthesia Anticipated: to be determined    Assessment & Plan     The proposed surgical procedure is considered LOW risk.    Problem List Items Addressed This Visit        Endocrine    HLD (hyperlipidemia)    Relevant Orders    Lipid panel reflex to direct LDL Fasting (Completed)    ALT (Completed)       Circulatory    HTN (hypertension)     Stable          Coronary artery disease involving native coronary artery of native heart without angina pectoris     No angina.            Hematologic    Iron deficiency anemia due to chronic blood loss    Relevant Orders    Iron and iron binding capacity (Completed)    Ferritin (Completed)    CBC with platelets (Completed)    Basic metabolic panel (Completed)      Other Visit Diagnoses     Pre-operative general physical examination    -  Primary    Relevant Orders    Iron and iron binding capacity (Completed)    Ferritin (Completed)    CBC with platelets (Completed)    Basic metabolic panel (Completed)    Asymptomatic COVID-19 Virus (Coronavirus) by PCR    Occult blood in stools                Medication Instructions:  Hold medications the morning of surgery and take upon return home     RECOMMENDATION:  APPROVAL GIVEN to proceed with proposed procedure, without further diagnostic evaluation.      Subjective     HPI related to upcoming procedure: Geeta Berry is an 80 y/o female who presents  to the office for a preoperative physical. She had low hemoglobin and positive occult stool test after she was taking an anticoagulant following TKA in April. She had a recurrence of black stools about 4 weeks ago but recently stools have been normal in color. No abdominal pain but after she eats she notices a fullness/lump in the chest/throat.     CKD- stage 3a, has been stable. She does not take NSAIDs.     H/o constipation- no recent/ongoing issues with this.     CAD- Noted on coronary CT with calcium score. Patient found to have mild disease at that time. She denies chest pain or SOB. She continues a statin for prevention of progression.     GERD is generally controlled. Food can feel like it gets stuck or there is a fullness when she eats.     H/o DVT, right LE 12/2016- considered provoked after surgery. Not currently on anticoagulation.     HTN- no dizziness or lightheadedness associated with treatment.       Preop Questions 11/21/2021   1. Have you ever had a heart attack or stroke? No   2. Have you ever had surgery on your heart or blood vessels, such as a stent placement, a coronary artery bypass, or surgery on an artery in your head, neck, heart, or legs? No   3. Do you have chest pain with activity? No   4. Do you have a history of  heart failure? No   5. Do you currently have a cold, bronchitis or symptoms of other infection? No   6. Do you have a cough, shortness of breath, or wheezing? No   7. Do you or anyone in your family have previous history of blood clots? No   8. Do you or does anyone in your family have a serious bleeding problem such as prolonged bleeding following surgeries or cuts? No   9. Have you ever had problems with anemia or been told to take iron pills? YES -    10. Have you had any abnormal blood loss such as black, tarry or bloody stools, or abnormal vaginal bleeding? YES -    11. Have you ever had a blood transfusion? No   12. Are you willing to have a blood transfusion if it is  medically needed before, during, or after your surgery? Yes   13. Have you or any of your relatives ever had problems with anesthesia? No   14. Do you have sleep apnea, excessive snoring or daytime drowsiness? No   15. Do you have any artifical heart valves or other implanted medical devices like a pacemaker, defibrillator, or continuous glucose monitor? No   16. Do you have artificial joints? YES - hip, knee    17. Are you allergic to latex? No       Health Care Directive:  Patient has a Health Care Directive on file      Preoperative Review of :    reviewed - no record of controlled substances prescribed.      Review of Systems  CONSTITUTIONAL: NEGATIVE for fever, chills, change in weight  INTEGUMENTARY/SKIN: NEGATIVE for worrisome rashes, moles or lesions  EYES: NEGATIVE for vision changes or irritation  ENT/MOUTH: NEGATIVE for ear, mouth and throat problems  RESP: NEGATIVE for significant cough or SOB  CV: NEGATIVE for chest pain, palpitations or peripheral edema  GI: NEGATIVE for nausea, abdominal pain, heartburn, or change in bowel habits  : NEGATIVE for frequency, dysuria, or hematuria  MUSCULOSKELETAL: NEGATIVE for significant arthralgias or myalgia  NEURO: NEGATIVE for weakness, dizziness or paresthesias  ENDOCRINE: NEGATIVE for temperature intolerance, skin/hair changes  HEME: NEGATIVE for bleeding problems  PSYCHIATRIC: NEGATIVE for changes in mood or affect    Patient Active Problem List    Diagnosis Date Noted     Lumbar radiculopathy 09/24/2021     Priority: Medium     Iron deficiency anemia due to chronic blood loss 09/24/2021     Priority: Medium     Noted after right knee replacement 4/2021 when on DOAC for VTE prevention. +FOBT. Started on PPI and DOAC discontinued.        Neuropathy 09/23/2021     Priority: Medium     Status post knee surgery 04/29/2021     Priority: Medium     Chronic kidney disease, stage 3 (H) 10/29/2020     Priority: Medium     Coronary artery disease involving native  coronary artery of native heart without angina pectoris 08/06/2020     Priority: Medium     Noted on coronary calcium score. Pt has mild disease, considered high risk for future cardiac event        Chronic rhinitis 12/03/2019     Priority: Medium     Hiatal hernia 08/21/2019     Priority: Medium     Dysthymia 06/01/2017     Priority: Medium     H/o headache with duloxetine          Facet arthropathy, cervical 04/19/2017     Priority: Medium     History of DVT (deep vein thrombosis), right LE 12/2016 02/07/2017     Priority: Medium     Common bile duct calculus, ERCP x 6 in past. Cannot tolerate ursodiol 07/22/2016     Priority: Medium     Constipation 07/22/2016     Priority: Medium     Osteoarthritis of lumbar spine 05/09/2012     Priority: Medium     Osteoarthritis; knees, hips, cervical spine  05/09/2012     Priority: Medium     Acute iritis, h/o in 2012 04/13/2012     Priority: Medium     Hypothyroidism 11/10/2011     Priority: Medium     HTN (hypertension) 01/25/2010     Priority: Medium     GERD (gastroesophageal reflux disease) 07/07/2009     Priority: Medium     HLD (hyperlipidemia) 07/07/2009     Priority: Medium      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)      Chronic neck pain      Common bile duct calculus      Coronary artery disease due to calcified coronary lesion 8/6/2020     Depression      DVT (deep venous thrombosis) (H)      GERD (gastroesophageal reflux disease)      History of blood clots 2017    DVT right lower extremity     HLD (hyperlipidemia)      HTN (hypertension)      Hyperlipidemia      Hypertension      Hypothyroidism      Infectious viral hepatitis     hepatitis A in 1960s     Thyroid disease      Past Surgical History:   Procedure Laterality Date     ARTHROPLASTY REVISION HIP Left 5/16/2017    Procedure: REVISION LEFT TOTAL HIP ARTHROPLASTY, BOTH COMPONENTS;  Surgeon: Tyson Peoples MD;   Location: [facility] OR;  Service:      ARTHROSCOPY HIP Left 1983     C TOTAL KNEE ARTHROPLASTY Right 4/29/2021    Procedure: RIGHT TOTAL KNEE ARTHROPLASTY;  Surgeon: Satinder Issa DO;  Location: [facility] OR;  Service: Orthopedics     CHOLECYSTECTOMY  1980     ENDOSCOPIC RETROGRADE CHOLANGIOPANCREATOGRAM      x5 over 8 years. Last 8/2016     ENDOSCOPIC RETROGRADE CHOLANGIOPANCREATOGRAM N/A 9/5/2017    Procedure: ENDOSCOPIC RETROGRADE CHOLANGIOPANCREATOGRAPHY, STONE EXTRACTION;  Surgeon: Henry Eller MD;  Location: [facility] OR;  Service:      ENDOSCOPIC RETROGRADE CHOLANGIOPANCREATOGRAM N/A 7/26/2019    Procedure: ENDOSCOPIC RETROGRADE CHOLANGIOPANCREATOGRAPHY, REMOVAL OF SLUDGE, DILATION OF STRICTURE;  Surgeon: Ryan Pastrana MD;  Location: [facility] OR;  Service: Gastroenterology     HIP SURGERY  2012    revision     JOINT REPLACEMENT       ORTHOPEDIC SURGERY       OTHER SURGICAL HISTORY      bilateral THAwith revision on both hips     AL ESOPHAGOGASTRODUODENOSCOPY TRANSORAL DIAGNOSTIC N/A 9/10/2019    Procedure: ESOPHAGOGASTRODUODENOSCOPY (EGD);  Surgeon: Will Nash DO;  Location: [facility] OR;  Service: General     REPLACEMENT TOTAL KNEE Left 2015     SHOULDER SURGERY Left 2010    left total shoulder replacement     XR ERCP BILIARY AND PANCREAS  7/26/2019     Current Outpatient Medications   Medication Sig Dispense Refill     acetaminophen (TYLENOL) 500 MG tablet Take 500-1,000 mg by mouth       Cholecalciferol (VITAMIN D3 PO) Take by mouth daily       citalopram (CELEXA) 10 MG tablet TAKE 1/2 TABLET BY MOUTH DAILY       docusate sodium (COLACE) 100 MG tablet Take 100 mg by mouth 2 times daily       fluticasone (FLONASE) 50 MCG/ACT nasal spray 1 spray       levothyroxine (SYNTHROID/LEVOTHROID) 50 MCG tablet Take 50 mcg by mouth       Omega-3 Fatty Acids (OMEGA-3 FISH OIL PO) Take 1 g by mouth daily        omeprazole (PRILOSEC) 40 MG DR capsule TAKE 1 CAPSULE BY MOUTH EVERY  DAY BEFORE BREAKFAST       polyethylene glycol (MIRALAX/GLYCOLAX) packet Take 1 packet by mouth daily       rosuvastatin (CRESTOR) 20 MG tablet TAKE 1 TABLET BY MOUTH EVERYDAY AT BEDTIME       triamcinolone (KENALOG) 0.025 % cream Apply topically 2 times daily       azelastine (ASTELIN) 0.1 % nasal spray SPRAY 1 SPRAY INTO BOTH NOSTRILS AT BEDTIME 90 mL 1     hydrochlorothiazide (HYDRODIURIL) 25 MG tablet Take 1 tablet (25 mg) by mouth daily 90 tablet 3       Allergies   Allergen Reactions     Atorvastatin      Codeine      Dipyridamole         Social History     Tobacco Use     Smoking status: Never Smoker     Smokeless tobacco: Never Used   Substance Use Topics     Alcohol use: No     Family History   Problem Relation Age of Onset     Colon Cancer Father      Diabetes Sister      Heart Disease Mother      History   Drug Use No         Objective     /64   Pulse 61   Temp 98.1  F (36.7  C)   Resp 16   Wt 81.6 kg (179 lb 14.4 oz)   SpO2 95%   BMI 27.76 kg/m      Physical Exam    GENERAL APPEARANCE: healthy, alert and no distress     EYES: EOMI, PERRL     HENT: bilateral ceruminosis occludes visibility of TM. Nose and mouth without ulcers or lesions     NECK: no adenopathy, no asymmetry, masses, or scars and thyroid normal to palpation     RESP: lungs clear to auscultation - no rales, rhonchi or wheezes     CV: regular rates and rhythm, normal S1 S2, no S3 or S4 and no murmur, click or rub     ABDOMEN:  soft, nontender, no HSM or masses and bowel sounds normal     MS: extremities normal- no gross deformities noted     SKIN: no suspicious lesions or rashes     NEURO: Normal strength and tone, sensory exam grossly normal, mentation intact and speech normal     PSYCH: mentation appears normal. and affect normal/bright     LYMPHATICS: No cervical adenopathy    Recent Labs   Lab Test 11/01/21  1550 09/23/21  1408 06/04/21  0954 06/02/21  1440 04/30/21  0557 04/29/21  0921 09/21/20  1525 07/31/20  0940   HGB  10.5* 9.4*   < > 9.6*   < >  --    < > 12.2    252   < > 269  --   --    < > 243   INR  --   --   --   --   --  1.02  --  0.98     --   --  142  --   --    < > 144   POTASSIUM 3.7  --   --  4.0   < >  --    < > 3.7   CR 1.01  --   --  0.97   < >  --    < > 1.00   A1C  --  5.9*  --   --   --   --   --   --     < > = values in this interval not displayed.        Diagnostics:   Recent Results (from the past 168 hour(s))   Lipid panel reflex to direct LDL Fasting    Collection Time: 11/24/21 12:23 PM   Result Value Ref Range    Cholesterol 188 <=199 mg/dL    Triglycerides 108 <=149 mg/dL    Direct Measure HDL 60 >=50 mg/dL    LDL Cholesterol Calculated 106 <=129 mg/dL    Patient Fasting > 8hrs? Unknown    Iron and iron binding capacity    Collection Time: 11/24/21 12:23 PM   Result Value Ref Range    Iron 49 35 - 180 ug/dL    Iron Binding Capacity 308 240 - 430 ug/dL    Iron Sat Index 16 15 - 46 %   Ferritin    Collection Time: 11/24/21 12:23 PM   Result Value Ref Range    Ferritin 41 10 - 130 ng/mL   CBC with platelets    Collection Time: 11/24/21 12:23 PM   Result Value Ref Range    WBC Count 4.2 4.0 - 11.0 10e3/uL    RBC Count 3.64 (L) 3.80 - 5.20 10e6/uL    Hemoglobin 10.5 (L) 11.7 - 15.7 g/dL    Hematocrit 33.9 (L) 35.0 - 47.0 %    MCV 93 78 - 100 fL    MCH 28.8 26.5 - 33.0 pg    MCHC 31.0 (L) 31.5 - 36.5 g/dL    RDW 19.3 (H) 10.0 - 15.0 %    Platelet Count 262 150 - 450 10e3/uL   Basic metabolic panel    Collection Time: 11/24/21 12:23 PM   Result Value Ref Range    Sodium 141 136 - 145 mmol/L    Potassium 4.2 3.5 - 5.0 mmol/L    Chloride 103 98 - 107 mmol/L    Carbon Dioxide (CO2) 29 22 - 31 mmol/L    Anion Gap 9 5 - 18 mmol/L    Urea Nitrogen 26 8 - 28 mg/dL    Creatinine 0.99 0.60 - 1.10 mg/dL    Calcium 9.4 8.5 - 10.5 mg/dL    Glucose 82 70 - 125 mg/dL    GFR Estimate 53 (L) >60 mL/min/1.73m2   ALT    Collection Time: 11/24/21 12:23 PM   Result Value Ref Range    ALT 16 0 - 45 U/L          Revised  Cardiac Risk Index (RCRI):  The patient has the following serious cardiovascular risks for perioperative complications:   - Coronary Artery Disease (MI, positive stress test, angina, Qs on EKG) = 1 point     RCRI Interpretation: 1 point: Class II (low risk - 0.9% complication rate)           Signed Electronically by: Jacy Mishra NP  Copy of this evaluation report is provided to requesting physician.

## 2021-11-24 NOTE — ADDENDUM NOTE
Encounter addended by: Nuvia Castaneda PTA on: 11/24/2021 4:44 PM   Actions taken: Charge Capture section accepted

## 2021-11-25 LAB
IRON SATN MFR SERPL: 16 % (ref 15–46)
IRON SERPL-MCNC: 49 UG/DL (ref 35–180)
TIBC SERPL-MCNC: 308 UG/DL (ref 240–430)

## 2021-11-26 DIAGNOSIS — I10 ESSENTIAL (PRIMARY) HYPERTENSION: ICD-10-CM

## 2021-11-28 RX ORDER — HYDROCHLOROTHIAZIDE 25 MG/1
25 TABLET ORAL DAILY
Qty: 90 TABLET | Refills: 3 | Status: SHIPPED | OUTPATIENT
Start: 2021-11-28 | End: 2022-09-29

## 2021-11-28 NOTE — TELEPHONE ENCOUNTER
"Last Written Prescription Date:  8/28/21  Last Fill Quantity: 90,  # refills: 0   Last office visit provider:  11/24/21     Requested Prescriptions   Pending Prescriptions Disp Refills     hydrochlorothiazide (HYDRODIURIL) 25 MG tablet 90 tablet 0     Sig: Take 1 tablet (25 mg) by mouth daily       Diuretics (Including Combos) Protocol Passed - 11/26/2021  4:42 PM        Passed - Blood pressure under 140/90 in past 12 months     BP Readings from Last 3 Encounters:   11/24/21 110/64   11/04/21 133/64   11/01/21 134/73                 Passed - Recent (12 mo) or future (30 days) visit within the authorizing provider's specialty     Patient has had an office visit with the authorizing provider or a provider within the authorizing providers department within the previous 12 mos or has a future within next 30 days. See \"Patient Info\" tab in inbasket, or \"Choose Columns\" in Meds & Orders section of the refill encounter.              Passed - Medication is active on med list        Passed - Patient is age 18 or older        Passed - No active pregancy on record        Passed - Normal serum creatinine on file in past 12 months     Recent Labs   Lab Test 11/24/21  1223   CR 0.99              Passed - Normal serum potassium on file in past 12 months     Recent Labs   Lab Test 11/24/21  1223   POTASSIUM 4.2                    Passed - Normal serum sodium on file in past 12 months     Recent Labs   Lab Test 11/24/21  1223                 Passed - No positive pregnancy test in past 12 months             Ryan Duffy RN 11/28/21 10:12 AM  "

## 2021-11-30 ENCOUNTER — HOSPITAL ENCOUNTER (OUTPATIENT)
Dept: PHYSICAL THERAPY | Facility: REHABILITATION | Age: 81
End: 2021-11-30
Payer: COMMERCIAL

## 2021-11-30 DIAGNOSIS — M25.60 JOINT STIFFNESS OF SPINE: ICD-10-CM

## 2021-11-30 DIAGNOSIS — M62.81 GENERALIZED MUSCLE WEAKNESS: ICD-10-CM

## 2021-11-30 DIAGNOSIS — M48.062 SPINAL STENOSIS OF LUMBAR REGION WITH NEUROGENIC CLAUDICATION: Primary | ICD-10-CM

## 2021-11-30 PROCEDURE — 97110 THERAPEUTIC EXERCISES: CPT | Mod: GP | Performed by: PHYSICAL THERAPIST

## 2021-12-02 DIAGNOSIS — K21.9 GASTROESOPHAGEAL REFLUX DISEASE WITHOUT ESOPHAGITIS: Primary | ICD-10-CM

## 2021-12-06 RX ORDER — OMEPRAZOLE 40 MG/1
CAPSULE, DELAYED RELEASE ORAL
Qty: 90 CAPSULE | Refills: 1 | Status: SHIPPED | OUTPATIENT
Start: 2021-12-06 | End: 2022-01-18 | Stop reason: ALTCHOICE

## 2021-12-06 NOTE — TELEPHONE ENCOUNTER
"Outpatient Medication Detail     Disp Refills Start End JEVON   omeprazole (PRILOSEC) 40 MG DR capsule   6/4/2021  --   Sig: TAKE 1 CAPSULE BY MOUTH EVERY DAY BEFORE BREAKFAST   Class: Historical   Order: [id number]       Last office visit provider:  11/24/21     Routing refill request to provider for review/approval because:  Medication is reported/historical          Requested Prescriptions   Pending Prescriptions Disp Refills     omeprazole (PRILOSEC) 40 MG DR capsule 90 capsule 1     Sig: TAKE 1 CAPSULE BY MOUTH EVERY DAY BEFORE BREAKFAST       PPI Protocol Passed - 12/3/2021  3:17 PM        Passed - Not on Clopidogrel (unless Pantoprazole ordered)        Passed - No diagnosis of osteoporosis on record        Passed - Recent (12 mo) or future (30 days) visit within the authorizing provider's specialty     Patient has had an office visit with the authorizing provider or a provider within the authorizing providers department within the previous 12 mos or has a future within next 30 days. See \"Patient Info\" tab in inbasket, or \"Choose Columns\" in Meds & Orders section of the refill encounter.              Passed - Medication is active on med list        Passed - Patient is age 18 or older        Passed - No active pregnacy on record        Passed - No positive pregnancy test in past 12 months             Jordon Guerra RN 12/06/21 10:06 AM  "

## 2021-12-07 ENCOUNTER — HOSPITAL ENCOUNTER (OUTPATIENT)
Dept: PHYSICAL THERAPY | Facility: REHABILITATION | Age: 81
End: 2021-12-07
Payer: COMMERCIAL

## 2021-12-07 DIAGNOSIS — M48.062 SPINAL STENOSIS OF LUMBAR REGION WITH NEUROGENIC CLAUDICATION: Primary | ICD-10-CM

## 2021-12-07 DIAGNOSIS — M25.60 JOINT STIFFNESS OF SPINE: ICD-10-CM

## 2021-12-07 DIAGNOSIS — M62.81 GENERALIZED MUSCLE WEAKNESS: ICD-10-CM

## 2021-12-07 PROCEDURE — 97110 THERAPEUTIC EXERCISES: CPT | Mod: GP | Performed by: PHYSICAL THERAPY ASSISTANT

## 2021-12-09 NOTE — PROGRESS NOTES
Internal Medicine Office Visit  [facility]  Patient Name: Geeta Berry  Patient Age: 77 y.o.  YOB: 1940  MRN: [id number]    Date of Visit: 2018  Reason for Office Visit:   Chief Complaint   Patient presents with     Follow-up           Assessment / Plan / Medical Decision Makin. RLQ abdominal pain  - No findings for an acute abdomen today but will check a few inflammatory markers today. If negative I would like to have her complete a pelvic ultrasound as she has a firm and enlarged uterus to palpation and pelvic tenderness which corresponds with her symptoms. CT if any elevated in WBC/neutrophils and/or elevated CRP   - C-Reactive Protein(CRP)  - HM1(CBC and Differential)  - Hepatic Profile  - HM1 (CBC with Diff)    2. HTN (hypertension)  - Stable, continue current medication     3. HLD (hyperlipidemia)  - Continue simvastatin   - Lipids with next appointment    4. Acquired hypothyroidism  - Thyroid Stimulating Hormone (TSH)    5. Dysthymia   - Trial discontinuation of citalopram. May resume if needed      Health Maintenance Review  Health Maintenance   Topic Date Due     ADVANCE DIRECTIVES DISCUSSED WITH PATIENT  1957     DXA SCAN  2004     DEPRESSION FOLLOW UP  2017     FALL RISK ASSESSMENT  2018     INFLUENZA VACCINE RULE BASED (1) 2018     TD 18+ HE  2023     PNEUMOCOCCAL POLYSACCHARIDE VACCINE AGE 65 AND OVER  Completed     PNEUMOCOCCAL CONJUGATE VACCINE FOR ADULTS (PCV13 OR PREVNAR)  Completed     ZOSTER VACCINE  Completed         I am having Ms. Berry maintain her calcium carbonate, polyethylene glycol, senna-docusate, hydroCHLOROthiazide, pantoprazole, azelastine, aluminum-magnesium hydroxide-simethicone, diphenhydrAMINE, multivitamin with minerals, amoxicillin, aspirin, fluticasone, citalopram, Lactobacillus rhamnosus GG, levothyroxine, and simvastatin.      HPI:  Geeta Berry is a 77 y.o. year old who  Patient was seen post procedure. Patient was felt to be stable for discharge within 30 minutes. Findings were discussed with patient and her    presents to the office today for follow up of chronic medical conditions.     Most recently he has had a lot more neck pain.  She has seen neurosurgery and was not felt to be a trickle issue.  She was scheduled for C2-3, C3-4, C4-5 facet joint injections. Unfortunately had to schedule at a later date as she has had a steroid injection 1 week ago at another facility, had this done just yesterday. Also had a recent right lumbar spinal injection.     She has a question about some stomach/pelvic discomfort. She started taking a probiotic again. Has a pain in the right lower abdomen/pelvis area which started about 3 weeks ago. Worse after she eats, any food can cause this discomfort. No changes in her stools, she tends to have a lot of constipation. The pain in the RLQ improves after she has a bowel movement. Not feverish or chilled.     Hypothyroidism- no s/s hypo/hyper thyroidism. Due for repeat TSH    HLD- She is taking a statin for this, denies myalgias or weakness associated with treatment.     HTN- blood pressure is well controlled with current medication. No lightheadedness or dizziness associated with treatment.     Depression- mood has been very good. She was started on citalopram 10 mg approximately 1 year ago. We discussed discontinuing this medication and she is interested albeit slightly nervous.     Review of Systems- pertinent positive in bold:  Constitutional: Fever, chills, night sweats, fainting, weight change, fatigue, seizures, dizziness, sleeping difficulties, loud snoring/pauses in breathing  Eyes: change in vision, blurred or double vision, redness/eye pain  Ears, nose, mouth, throat: change in hearing, ear pain, hoarseness, difficulty swallowing, sores in the mouth or throat  Respiratory: shortness of breath, cough, bloody sputum, wheezing  Cardiovascular: chest pain, palpitations   Gastrointestinal: abdominal pain, heartburn/indigestion, nausea/vomiting, change in appetite, change in bowel  habits, constipation or diarrhea, rectal bleeding/dark stools, difficulty swallowing  Urinary: painful urination, frequent urination, urinary urgency/incontinence, blood in urine/dark urine, nocturia  Genital: WOMEN: vaginal discharge or odor, bleeding/pain with intercourse, pelvic pain, vulvar/vaginal itching or burning, excessive menstrual bleeding, problems with sexual function  Musculoskeletal: backache/back pain (new or increasing), weakness, joint pain/stiffness (new or increasing), muscle cramps, swelling of hands, feet, ankles, leg pain/redness  Skin: change in moles/freckles, rash, nodules  Hematologic/lymphatic: swollen lymph glands, abnormal bruising/bleeding  Endocrine: excessive thirst/urination, cold or heat intolerance  Breast: breast lump, breast pain, nipple discharge/skin changes  Neurologic/emotional: worrisome memory change, numbness/tingling, anxiety, mood swings      Current Scheduled Meds:  Outpatient Encounter Prescriptions as of 6/28/2018   Medication Sig Dispense Refill     aluminum-magnesium hydroxide-simethicone (MAALOX ADVANCED) 200-200-20 mg/5 mL Susp Take 5 mL by mouth every 6 (six) hours as needed.       amoxicillin (AMOXIL) 500 MG capsule Take 2,000 mg by mouth once as needed (prior to dental procedures).       aspirin 81 MG EC tablet Take 81 mg by mouth daily.       azelastine (ASTELIN) 137 mcg (0.1 %) nasal spray 1 spray into each nostril at bedtime. Use in each nostril as directed 30 mL 2     calcium carbonate (OS-ALOK) 600 mg calcium (1,500 mg) tablet Take 600 mg by mouth daily.        citalopram (CELEXA) 10 MG tablet Take 1 tablet (10 mg total) by mouth daily. 90 tablet 0     diphenhydrAMINE (BENADRYL) 25 mg tablet Take 25 mg by mouth at bedtime.       fluticasone (FLONASE) 50 mcg/actuation nasal spray 1 spray into each nostril daily. 16 g 3     hydroCHLOROthiazide (HYDRODIURIL) 25 MG tablet Take 1 tablet (25 mg total) by mouth daily. 90 tablet 3     Lactobacillus rhamnosus GG  (CULTURELLE) 10-15 Billion cell capsule Take 1 capsule by mouth daily.       levothyroxine (SYNTHROID, LEVOTHROID) 50 MCG tablet Take 1 tablet (50 mcg total) by mouth daily. 90 tablet 0     multivitamin with minerals (THERA-M) 9 mg iron-400 mcg Tab tablet Take 1 tablet by mouth daily.       pantoprazole (PROTONIX) 40 MG tablet Take 1 tablet (40 mg total) by mouth 2 (two) times a day. 180 tablet 3     polyethylene glycol (MIRALAX) 17 gram packet Take 17 g by mouth 2 (two) times a day.        senna-docusate (PERICOLACE) 8.6-50 mg tablet Take 1 tablet by mouth 2 (two) times a day.        simvastatin (ZOCOR) 20 MG tablet Take 1 tablet (20 mg total) by mouth at bedtime. 90 tablet 3     [DISCONTINUED] simvastatin (ZOCOR) 20 MG tablet Take 1 tablet (20 mg total) by mouth at bedtime. 90 tablet 3     No facility-administered encounter medications on file as of 6/28/2018.      Past Medical History:   Diagnosis Date     Chronic neck pain      Common bile duct calculus      Depression      DVT (deep venous thrombosis) (H)      GERD (gastroesophageal reflux disease)      History of blood clots      HLD (hyperlipidemia)      HTN (hypertension)      Hypothyroidism      Past Surgical History:   Procedure Laterality Date     CHOLECYSTECTOMY  1980     ERCP      x5 over 8 years. Last 8/2016     ERCP N/A 9/5/2017    Procedure: ENDOSCOPIC RETROGRADE CHOLANGIOPANCREATOGRAPHY, STONE EXTRACTION;  Surgeon: Henry Eller MD;  Location: [facility] OR;  Service:      HIP ARTHROSCOPY Left 1983     HIP SURGERY  2012    revision     REPLACEMENT TOTAL KNEE Right 2015     REVISION TOTAL HIP ARTHROPLASTY Left 5/16/2017    Procedure: REVISION LEFT TOTAL HIP ARTHROPLASTY, BOTH COMPONENTS;  Surgeon: Tyson Peoples MD;  Location: [facility] OR;  Service:      SHOULDER SURGERY Left 2010     Social History   Substance Use Topics     Smoking status: Never Smoker     Smokeless tobacco: Never Used     Alcohol use No       Objective / Physical  Examination:  Vitals:    06/28/18 0949   BP: 116/62   Pulse: 70   Weight: 174 lb (78.9 kg)     Wt Readings from Last 3 Encounters:   06/28/18 174 lb (78.9 kg)   06/05/18 174 lb (78.9 kg)   05/31/18 176 lb (79.8 kg)     Body mass index is 27.25 kg/(m^2).     General Appearance: Alert and oriented, cooperative, affect appropriate, speech clear, in no apparent distress  Neck: Limited ROM   Lungs: Clear to auscultation bilaterally. Normal inspiratory and expiratory effort  Cardiovascular: Regular rate, normal S1, S2. No murmurs, rubs, or gallops  Abdomen: Bowel sounds active all four quadrants. Soft, non-tender. The uterus is palpable and firm, she has pelvic tenderness on right and left side, worse on the right and this reproduces the discomfort she has been experiencing.   Extremities: Pulses 2+ and equal throughout. No edema    Orders Placed This Encounter   Procedures     C-Reactive Protein(CRP)     Hepatic Profile     Thyroid Stimulating Hormone (TSH)     HM1 (CBC with Diff)   Followup: Return in about 2 weeks (around 7/12/2018) for Recheck after abdominal/pelvic imaging . earlier if needed.      Jacy Mishra, CNP

## 2021-12-14 ENCOUNTER — LAB (OUTPATIENT)
Dept: LAB | Facility: CLINIC | Age: 81
End: 2021-12-14
Attending: NURSE PRACTITIONER
Payer: COMMERCIAL

## 2021-12-14 DIAGNOSIS — Z01.818 PRE-OPERATIVE GENERAL PHYSICAL EXAMINATION: ICD-10-CM

## 2021-12-14 LAB — SARS-COV-2 RNA RESP QL NAA+PROBE: NEGATIVE

## 2021-12-14 PROCEDURE — U0003 INFECTIOUS AGENT DETECTION BY NUCLEIC ACID (DNA OR RNA); SEVERE ACUTE RESPIRATORY SYNDROME CORONAVIRUS 2 (SARS-COV-2) (CORONAVIRUS DISEASE [COVID-19]), AMPLIFIED PROBE TECHNIQUE, MAKING USE OF HIGH THROUGHPUT TECHNOLOGIES AS DESCRIBED BY CMS-2020-01-R: HCPCS

## 2021-12-14 PROCEDURE — U0005 INFEC AGEN DETEC AMPLI PROBE: HCPCS

## 2021-12-21 ENCOUNTER — HOSPITAL ENCOUNTER (OUTPATIENT)
Dept: PHYSICAL THERAPY | Facility: REHABILITATION | Age: 81
End: 2021-12-21
Payer: COMMERCIAL

## 2021-12-21 DIAGNOSIS — M25.60 JOINT STIFFNESS OF SPINE: ICD-10-CM

## 2021-12-21 DIAGNOSIS — M62.81 GENERALIZED MUSCLE WEAKNESS: ICD-10-CM

## 2021-12-21 DIAGNOSIS — M48.062 SPINAL STENOSIS OF LUMBAR REGION WITH NEUROGENIC CLAUDICATION: Primary | ICD-10-CM

## 2021-12-21 PROCEDURE — 97110 THERAPEUTIC EXERCISES: CPT | Mod: GP | Performed by: PHYSICAL THERAPIST

## 2022-01-12 VITALS — BODY MASS INDEX: 26.22 KG/M2 | WEIGHT: 173 LBS | HEIGHT: 68 IN

## 2022-01-18 ENCOUNTER — OFFICE VISIT (OUTPATIENT)
Dept: INTERNAL MEDICINE | Facility: CLINIC | Age: 82
End: 2022-01-18
Payer: COMMERCIAL

## 2022-01-18 VITALS
HEIGHT: 68 IN | BODY MASS INDEX: 26.67 KG/M2 | WEIGHT: 176 LBS | DIASTOLIC BLOOD PRESSURE: 60 MMHG | HEART RATE: 60 BPM | SYSTOLIC BLOOD PRESSURE: 118 MMHG

## 2022-01-18 VITALS
HEART RATE: 97 BPM | WEIGHT: 170.5 LBS | SYSTOLIC BLOOD PRESSURE: 110 MMHG | DIASTOLIC BLOOD PRESSURE: 64 MMHG | OXYGEN SATURATION: 94 % | BODY MASS INDEX: 26.7 KG/M2 | TEMPERATURE: 98.3 F

## 2022-01-18 VITALS
BODY MASS INDEX: 26.67 KG/M2 | SYSTOLIC BLOOD PRESSURE: 100 MMHG | HEART RATE: 52 BPM | HEIGHT: 68 IN | DIASTOLIC BLOOD PRESSURE: 50 MMHG | RESPIRATION RATE: 16 BRPM | WEIGHT: 176 LBS

## 2022-01-18 VITALS
BODY MASS INDEX: 28.24 KG/M2 | DIASTOLIC BLOOD PRESSURE: 56 MMHG | SYSTOLIC BLOOD PRESSURE: 94 MMHG | HEART RATE: 56 BPM | WEIGHT: 183 LBS | TEMPERATURE: 98 F

## 2022-01-18 VITALS
HEIGHT: 67 IN | DIASTOLIC BLOOD PRESSURE: 70 MMHG | SYSTOLIC BLOOD PRESSURE: 114 MMHG | RESPIRATION RATE: 14 BRPM | WEIGHT: 167 LBS | HEART RATE: 68 BPM | BODY MASS INDEX: 26.21 KG/M2

## 2022-01-18 VITALS
HEART RATE: 74 BPM | WEIGHT: 176 LBS | DIASTOLIC BLOOD PRESSURE: 70 MMHG | SYSTOLIC BLOOD PRESSURE: 120 MMHG | HEIGHT: 68 IN | BODY MASS INDEX: 26.67 KG/M2

## 2022-01-18 VITALS
BODY MASS INDEX: 27 KG/M2 | SYSTOLIC BLOOD PRESSURE: 120 MMHG | WEIGHT: 175 LBS | DIASTOLIC BLOOD PRESSURE: 58 MMHG | HEART RATE: 76 BPM

## 2022-01-18 VITALS
OXYGEN SATURATION: 98 % | TEMPERATURE: 98.5 F | WEIGHT: 169 LBS | SYSTOLIC BLOOD PRESSURE: 122 MMHG | HEART RATE: 56 BPM | DIASTOLIC BLOOD PRESSURE: 68 MMHG | DIASTOLIC BLOOD PRESSURE: 72 MMHG | BODY MASS INDEX: 26.47 KG/M2 | TEMPERATURE: 98.3 F | HEART RATE: 67 BPM | OXYGEN SATURATION: 99 % | SYSTOLIC BLOOD PRESSURE: 116 MMHG

## 2022-01-18 VITALS
HEIGHT: 68 IN | WEIGHT: 175 LBS | DIASTOLIC BLOOD PRESSURE: 64 MMHG | BODY MASS INDEX: 26.52 KG/M2 | SYSTOLIC BLOOD PRESSURE: 126 MMHG | HEART RATE: 70 BPM

## 2022-01-18 VITALS
DIASTOLIC BLOOD PRESSURE: 68 MMHG | SYSTOLIC BLOOD PRESSURE: 123 MMHG | TEMPERATURE: 98.5 F | HEART RATE: 63 BPM | OXYGEN SATURATION: 98 % | WEIGHT: 174 LBS | BODY MASS INDEX: 26.85 KG/M2

## 2022-01-18 VITALS
DIASTOLIC BLOOD PRESSURE: 58 MMHG | HEIGHT: 68 IN | WEIGHT: 176 LBS | HEART RATE: 68 BPM | SYSTOLIC BLOOD PRESSURE: 126 MMHG | BODY MASS INDEX: 26.67 KG/M2

## 2022-01-18 VITALS
WEIGHT: 168 LBS | SYSTOLIC BLOOD PRESSURE: 104 MMHG | DIASTOLIC BLOOD PRESSURE: 62 MMHG | BODY MASS INDEX: 26.31 KG/M2 | HEART RATE: 68 BPM

## 2022-01-18 VITALS
SYSTOLIC BLOOD PRESSURE: 124 MMHG | DIASTOLIC BLOOD PRESSURE: 68 MMHG | HEIGHT: 68 IN | OXYGEN SATURATION: 96 % | TEMPERATURE: 98.1 F | WEIGHT: 180.6 LBS | HEART RATE: 62 BPM | BODY MASS INDEX: 27.37 KG/M2

## 2022-01-18 VITALS
HEART RATE: 56 BPM | RESPIRATION RATE: 16 BRPM | WEIGHT: 174 LBS | SYSTOLIC BLOOD PRESSURE: 122 MMHG | BODY MASS INDEX: 26.37 KG/M2 | HEIGHT: 68 IN | DIASTOLIC BLOOD PRESSURE: 64 MMHG

## 2022-01-18 VITALS
DIASTOLIC BLOOD PRESSURE: 63 MMHG | RESPIRATION RATE: 16 BRPM | BODY MASS INDEX: 26.96 KG/M2 | HEART RATE: 71 BPM | SYSTOLIC BLOOD PRESSURE: 110 MMHG | WEIGHT: 174.7 LBS

## 2022-01-18 VITALS
WEIGHT: 171 LBS | HEIGHT: 68 IN | DIASTOLIC BLOOD PRESSURE: 60 MMHG | HEART RATE: 72 BPM | BODY MASS INDEX: 25.91 KG/M2 | SYSTOLIC BLOOD PRESSURE: 120 MMHG

## 2022-01-18 DIAGNOSIS — M70.61 GREATER TROCHANTERIC BURSITIS OF BOTH HIPS: ICD-10-CM

## 2022-01-18 DIAGNOSIS — N18.31 STAGE 3A CHRONIC KIDNEY DISEASE (H): ICD-10-CM

## 2022-01-18 DIAGNOSIS — R13.10 DYSPHAGIA, UNSPECIFIED TYPE: Primary | ICD-10-CM

## 2022-01-18 DIAGNOSIS — M70.62 GREATER TROCHANTERIC BURSITIS OF BOTH HIPS: ICD-10-CM

## 2022-01-18 DIAGNOSIS — D50.0 IRON DEFICIENCY ANEMIA DUE TO CHRONIC BLOOD LOSS: ICD-10-CM

## 2022-01-18 PROCEDURE — 99214 OFFICE O/P EST MOD 30 MIN: CPT | Performed by: NURSE PRACTITIONER

## 2022-01-18 ASSESSMENT — PATIENT HEALTH QUESTIONNAIRE - PHQ9
SUM OF ALL RESPONSES TO PHQ QUESTIONS 1-9: 4
SUM OF ALL RESPONSES TO PHQ QUESTIONS 1-9: 2
10. IF YOU CHECKED OFF ANY PROBLEMS, HOW DIFFICULT HAVE THESE PROBLEMS MADE IT FOR YOU TO DO YOUR WORK, TAKE CARE OF THINGS AT HOME, OR GET ALONG WITH OTHER PEOPLE: NOT DIFFICULT AT ALL
SUM OF ALL RESPONSES TO PHQ QUESTIONS 1-9: 2

## 2022-01-18 ASSESSMENT — MIFFLIN-ST. JEOR: SCORE: 1319.76

## 2022-01-18 NOTE — PATIENT INSTRUCTIONS
- Try some OTC GasX to see if the belching/pressure in the chest after taking pills resolves. Swallow study to evaluate further   - Repeat CBC in 1 month. If hgb normal, stop iron pills  - Continune omeprazole 40 mg BID until 3/25/22 then reduce dose to once daily

## 2022-01-18 NOTE — ASSESSMENT & PLAN NOTE
- Repeat CBC in 1 month. If hgb normal, stop iron pills  - Continue omeprazole 40 mg BID until 3/25/22 then reduce dose to once daily

## 2022-01-18 NOTE — PROGRESS NOTES
Internal Medicine Office Visit  [facility]   Patient Name: Geeta Berry  Patient Age: 81 year old  YOB: 1940  MRN: [id number]    Date of Visit: 1/18/2022  Patient presents with:  Results: discuss endoscopy            Assessment / Plan / Medical Decision Making:    Problem List Items Addressed This Visit        Urinary    Stage 3a chronic kidney disease (H)     Stable. Continue to monitor at least yearly with labs. She avoid NSAIDs             Hematologic    Iron deficiency anemia due to chronic blood loss     - Repeat CBC in 1 month. If hgb normal, stop iron pills  - Continue omeprazole 40 mg BID until 3/25/22 then reduce dose to once daily            Relevant Medications    Ferrous Sulfate (IRON PO)      Other Visit Diagnoses     Dysphagia, unspecified type    -  Primary    - Try some OTC GasX to see if the belching/pressure in the chest after taking pills resolves. Swallow study to evaluate further     Relevant Orders    XR Video Swallow with SLP or OT - Order with Speech Therapy Referral    Speech Therapy Referral    Greater trochanteric bursitis of both hips        - Start PT. Can schedule cortisone injection if no improvement     Relevant Orders    Physical Therapy Referral            I am having Geeta Berry maintain her Cholecalciferol (VITAMIN D3 PO), Omega-3 Fatty Acids (OMEGA-3 FISH OIL PO), polyethylene glycol, levothyroxine, acetaminophen, citalopram, rosuvastatin, docusate sodium, azelastine, hydrochlorothiazide, fluticasone, omeprazole, Ferrous Sulfate (IRON PO), and Multiple Vitamin (MULTIVITAMIN ADULT PO).          Orders Placed This Encounter   Procedures     XR Video Swallow with SLP or OT - Order with Speech Therapy Referral     Speech Therapy Referral     Physical Therapy Referral   Followup: Return in about 3 months (around 4/18/2022) for Follow up. earlier if needed.        Jacy Mishra, SAMANTHA, CNP        HPI:  Geeta Berry is a 81 year old year old who  presents to the office today for follow up.     She had low hemoglobin and positive occult stool test after she was taking an anticoagulant following TKA in April. She underwent colonoscopy on 12/17/2021.  She had findings of internal hemorrhoids, diverticulosis in the sigmoid colon and descending colon.  No source of anemia was found on colonoscopy.  She also underwent upper endoscopy.  The esophagus appeared normal.  She had findings of gastritis which was biopsied and considered to be the likely source of anemia.  Duodenum was normal.  She was advised to increase omeprazole to 40 mg twice daily and avoid NSAIDs. She notes that when she swallows pills it feels heavy in her chest like they are stuck. She has to drink extra water to get them down.     Answers for HPI/ROS submitted by the patient on 1/18/2022  If you checked off any problems, how difficult have these problems made it for you to do your work, take care of things at home, or get along with other people?: Not difficult at all  PHQ9 TOTAL SCORE: 2    She notes pain over the lateral aspect of both hips. It is worse after she has walked.    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 It is becoming increasingly difficult. She has hired some additional help to assist him with bathing.    Health Maintenance Review  Health Maintenance   Topic Date Due     DEPRESSION ACTION PLAN  Never done     MEDICARE ANNUAL WELLNESS VISIT  01/07/2022     MICROALBUMIN  01/20/2022     PHQ-9  07/18/2022     FALL RISK ASSESSMENT  09/23/2022     BMP  11/24/2022     LIPID  11/24/2022     ANNUAL REVIEW OF HM ORDERS  11/24/2022     HEMOGLOBIN  11/24/2022     DTAP/TDAP/TD IMMUNIZATION (3 - Td or Tdap) 11/20/2023     ADVANCE CARE PLANNING  01/07/2026     INFLUENZA VACCINE  Completed     Pneumococcal Vaccine: 65+ Years  Completed     URINALYSIS  Completed     ZOSTER IMMUNIZATION  Completed     COVID-19 Vaccine  Completed     IPV IMMUNIZATION  Aged Out      "MENINGITIS IMMUNIZATION  Aged Out     A1C  Discontinued     DEXA  Discontinued     DIABETIC FOOT EXAM  Discontinued     EYE EXAM  Discontinued       Current Scheduled Meds:  Outpatient Encounter Medications as of 1/18/2022   Medication Sig Dispense Refill     acetaminophen (TYLENOL) 500 MG tablet Take 500-1,000 mg by mouth       azelastine (ASTELIN) 0.1 % nasal spray SPRAY 1 SPRAY INTO BOTH NOSTRILS AT BEDTIME 90 mL 1     Cholecalciferol (VITAMIN D3 PO) Take by mouth daily       citalopram (CELEXA) 10 MG tablet TAKE 1/2 TABLET BY MOUTH DAILY       docusate sodium (COLACE) 100 MG tablet Take 100 mg by mouth 2 times daily       Ferrous Sulfate (IRON PO)        fluticasone (FLONASE) 50 MCG/ACT nasal spray Spray 1 spray into both nostrils daily 16 g 3     hydrochlorothiazide (HYDRODIURIL) 25 MG tablet Take 1 tablet (25 mg) by mouth daily 90 tablet 3     levothyroxine (SYNTHROID/LEVOTHROID) 50 MCG tablet Take 50 mcg by mouth       Multiple Vitamin (MULTIVITAMIN ADULT PO)        Omega-3 Fatty Acids (OMEGA-3 FISH OIL PO) Take 1 g by mouth daily        omeprazole (PRILOSEC) 40 MG DR capsule Take 1 capsule (40 mg) by mouth 2 times daily 60 capsule 3     polyethylene glycol (MIRALAX/GLYCOLAX) packet Take 1 packet by mouth daily       rosuvastatin (CRESTOR) 20 MG tablet TAKE 1 TABLET BY MOUTH EVERYDAY AT BEDTIME       [DISCONTINUED] omeprazole (PRILOSEC) 40 MG DR capsule TAKE 1 CAPSULE BY MOUTH EVERY DAY BEFORE BREAKFAST (Patient not taking: Reported on 1/18/2022) 90 capsule 1     [DISCONTINUED] triamcinolone (KENALOG) 0.025 % cream Apply topically 2 times daily       No facility-administered encounter medications on file as of 1/18/2022.       Objective / Physical Examination:  Vitals:    01/18/22 1418   BP: 124/68   BP Location: Right arm   Patient Position: Sitting   Cuff Size: Adult Regular   Pulse: 62   Temp: 98.1  F (36.7  C)   TempSrc: Temporal   SpO2: 96%   Weight: 81.9 kg (180 lb 9.6 oz)   Height: 1.715 m (5' 7.5\") "     Wt Readings from Last 3 Encounters:   01/18/22 81.9 kg (180 lb 9.6 oz)   11/24/21 81.6 kg (179 lb 14.4 oz)   11/04/21 77.6 kg (171 lb)     Body mass index is 27.87 kg/m .     Constitutional: In no apparent distress  Psych: Alert and oriented x3.   MSK: Lateral hip pain with palpation over the greater trochanter

## 2022-01-19 PROBLEM — D50.0 IRON DEFICIENCY ANEMIA DUE TO CHRONIC BLOOD LOSS: Status: ACTIVE | Noted: 2021-09-24

## 2022-01-19 PROBLEM — N18.31 STAGE 3A CHRONIC KIDNEY DISEASE (H): Status: ACTIVE | Noted: 2020-10-29

## 2022-01-19 ASSESSMENT — PATIENT HEALTH QUESTIONNAIRE - PHQ9: SUM OF ALL RESPONSES TO PHQ QUESTIONS 1-9: 2

## 2022-01-24 ENCOUNTER — HOSPITAL ENCOUNTER (OUTPATIENT)
Dept: SPEECH THERAPY | Facility: HOSPITAL | Age: 82
End: 2022-01-24
Attending: NURSE PRACTITIONER
Payer: COMMERCIAL

## 2022-01-24 ENCOUNTER — HOSPITAL ENCOUNTER (OUTPATIENT)
Dept: RADIOLOGY | Facility: HOSPITAL | Age: 82
End: 2022-01-24
Attending: NURSE PRACTITIONER
Payer: COMMERCIAL

## 2022-01-24 DIAGNOSIS — R13.10 DYSPHAGIA, UNSPECIFIED TYPE: ICD-10-CM

## 2022-01-24 PROCEDURE — 74230 X-RAY XM SWLNG FUNCJ C+: CPT

## 2022-01-24 PROCEDURE — 92611 MOTION FLUOROSCOPY/SWALLOW: CPT | Mod: GN | Performed by: SPEECH-LANGUAGE PATHOLOGIST

## 2022-01-24 RX ORDER — BARIUM SULFATE 400 MG/ML
SUSPENSION ORAL ONCE
Status: COMPLETED | OUTPATIENT
Start: 2022-01-24 | End: 2022-01-24

## 2022-01-24 RX ADMIN — BARIUM SULFATE 20 ML: 400 SUSPENSION ORAL at 09:48

## 2022-01-24 RX ADMIN — BARIUM SULFATE 60 ML: 400 SUSPENSION ORAL at 09:49

## 2022-01-24 NOTE — PROGRESS NOTES
"Speech-Language Pathology: Video Swallow Study       01/24/22 1000       Present No   General Information   Type Of Visit Initial   Start Of Care Date 01/24/22   Referring Physician Jacy Mishra NP   Orders Evaluate And Treat   Medical Diagnosis  Dysphagia, unspecified type    -  Primary   Onset Of Illness/injury Or Date Of Surgery 01/17/22  (referral date)   Precautions/limitations No Known Precautions/limitations   Pertinent History of Current Problem/OT: Additional Occupational Profile Info Per most recent office visit, \"She also underwent upper endoscopy.  The esophagus appeared normal.  She had findings of gastritis which was biopsied and considered to be the likely source of anemia.  Duodenum was normal.  She was advised to increase omeprazole to 40 mg twice daily and avoid NSAIDs. She notes that when she swallows pills it feels heavy in her chest like they are stuck. She has to drink extra water to get them down. \"   Respiratory Status Room air   Prior Level Of Function Swallowing   Prior Level Of Function Comment Regular diet/thin liquids   General Observations Pt pleasant and cooperative throughout exam. Noted some throat clearing prior to study, patient complains of post nasal drip.    Patient/family Goals Identify cause of belching and difficulty swallowing pills.    General Information Comments Speech-Language Pathology: Video Swallow Study   Pain Assessment   Pain Reported No   Clinical Swallow Evaluation   Oral Musculature generally intact   Structural Abnormalities none present   Dentition present and adequate   Mucosal Quality good   Lingual Strength and Mobility WFL   Buccal Strength and Mobility intact   VFSS Evaluation   VFSS Additional Documentation Yes   VFSS Eval: Radiology   Radiologist Dr. Luis F Park   Views Taken left lateral   Physical Location of Procedure [facility]   VFSS Eval: Thin Liquid Texture Trial   Mode of Presentation, Thin " Liquid spoon;cup;straw;self-fed;fed by clinician   Order of Presentation 1,2,3,9,12   Preparatory Phase WFL   Oral Phase, Thin Liquid WFL   Pharyngeal Phase, Thin Liquid WFL   Rosenbek's Penetration Aspiration Scale: Thin Liquid Trial Results 1 - no aspiration, contrast does not enter airway   Diagnostic Statement WFL.    VFSS Eval: Mildly Thick Liquids    Mode of Presentation cup;self-fed   Order of Presentation 4,5   Preparatory Phase WFL   Oral Phase WFL   Pharyngeal Phase WFL   Rosenbek's Penetration Aspiration Scale 1 - no aspiration, contrast does not enter airway   Diagnostic Statement WFL.   VFSS Eval: Moderately Thick Liquids    Mode of Presentation spoon   Order of Presentation 6   Preparatory Phase WFL   Oral Phase WFL   Pharyngeal Phase WFL   Rosenbek's Penetration Aspiration Scale 1 - no aspiration, contrast does not enter airway   Diagnostic Statement WFL.   VFSS Eval: Puree Solid Texture Trial   Mode of Presentation, Puree spoon   Order of Presentation 7   Preparatory Phase WFL   Oral Phase, Puree WFL   Pharyngeal Phase, Puree WFL   Rosenbek's Penetration Aspiration Scale: Puree Food Trial Results 1 - no aspiration, contrast does not enter airway   Diagnostic Statement WFL.   VFSS Eval: Regular Texture Trial (Solid)   Mode of Presentation fed by clinician   Order of Presentation 8 (10 pill)   Preparatory Phase WFL   Oral Phase WFL   Pharyngeal Phase WFL;Residue in pyriform sinus  (Trace)   Rosenbek's Penetration Aspiration Scale 1 - no aspiration, contrast does not enter airway   Diagnostic Statement WFL. Trace residue at the level of the UES remaining in the pyriforms after swallow. A barium pill was swallowed without overt difficulty with full pharyngeal clearance. No issue observed immediately below the UES.    Swallow Compensations   Swallow Compensations Multiple swallow   Results No difficulties noted   Educational Assessment   Barriers to Learning No barriers   Esophageal Phase of Swallow    Patient reports or presents with symptoms of esophageal dysphagia Yes   Esophageal sweep performed during today s vidofluoroscopic exam  No   Esophageal comments Hx of Esophagram 8/2019. Reports sensation of chest fullness and belching after swallowing pills.    General Therapy Interventions   Intervention Comments Evaluation Only   Swallow Eval: Clinical Impressions   Skilled Criteria for Therapy Intervention No problems identified which require skilled intervention   Functional Assessment Scale (FAS) 7   Dysphagia Outcome Severity Scale (BERRY) Level 6 - BERRY   Treatment Diagnosis Normal oropharyngeal swallow.   Diet texture recommendations Regular diet;Thin liquids (level 0)   Recommended Feeding/Eating Techniques maintain upright posture during/after eating for 30 mins  (Continue to take pills 1 at a time with a sip of water. )   Demonstrates Need for Referral to Another Service   (Consider referral to GI for esophageal follow up )   Anticipated Discharge Disposition home   Risks and Benefits of Treatment have been explained. Yes   Patient, family and/or staff in agreement with Plan of Care Yes   Total Session Time   SLP Eval: VideoFluoroscopic Swallow function Minutes (07845) 45   Total Evaluation Time 45     Sheila Saldaña SLP

## 2022-02-09 ENCOUNTER — HOSPITAL ENCOUNTER (OUTPATIENT)
Dept: PHYSICAL THERAPY | Facility: REHABILITATION | Age: 82
End: 2022-02-09
Attending: NURSE PRACTITIONER
Payer: COMMERCIAL

## 2022-02-09 DIAGNOSIS — M70.62 GREATER TROCHANTERIC BURSITIS OF BOTH HIPS: ICD-10-CM

## 2022-02-09 DIAGNOSIS — M70.61 GREATER TROCHANTERIC BURSITIS OF BOTH HIPS: ICD-10-CM

## 2022-02-09 DIAGNOSIS — M25.552 BILATERAL HIP PAIN: Primary | ICD-10-CM

## 2022-02-09 DIAGNOSIS — R29.898 WEAKNESS OF BOTH HIPS: ICD-10-CM

## 2022-02-09 DIAGNOSIS — M25.551 BILATERAL HIP PAIN: Primary | ICD-10-CM

## 2022-02-09 PROCEDURE — 97140 MANUAL THERAPY 1/> REGIONS: CPT | Mod: GP | Performed by: PHYSICAL THERAPIST

## 2022-02-09 PROCEDURE — 97161 PT EVAL LOW COMPLEX 20 MIN: CPT | Mod: GP | Performed by: PHYSICAL THERAPIST

## 2022-02-09 PROCEDURE — 97110 THERAPEUTIC EXERCISES: CPT | Mod: GP | Performed by: PHYSICAL THERAPIST

## 2022-02-09 NOTE — PROGRESS NOTES
[facility]    OUTPATIENT PHYSICAL THERAPY ORTHOPEDIC EVALUATION  PLAN OF TREATMENT FOR OUTPATIENT REHABILITATION  (COMPLETE FOR INITIAL CLAIMS ONLY)  Patient's Last Name, First Name, M.I.  YOB: 1940  Geeta Berry    Provider s Name:  [facility]   Medical Record No.  [id number]   Start of Care Date:  02/09/22   Onset Date:  12/01/21   Type:     _X__PT   ___OT   ___SLP Medical Diagnosis:  Greater trochanteric bursitis of both hips         PT Diagnosis:  (P) Bilateral hip pain, weakness of both hips   Visits from SOC:  1      _________________________________________________________________________________  Plan of Treatment/Functional Goals:  gait training,balance training,joint mobilization,manual therapy,neuromuscular re-education,ROM,strengthening,stretching     Cryotherapy,Hot packs,Ultrasound     Goals  Goal Identifier: Walking   Goal Description: Pt will report ability to tolerate walking around the grocery store with B lateral hip/thigh pain of </=2/10 to return to prior level of function.   Target Date: 04/20/22    Goal Identifier: LEFS  Goal Description: Pt will improve LEFS score from 41% to >70% to indicate improved functional mobility.   Target Date: 04/20/22                                                                      Therapy Frequency:  (P) other (see comments) (every 2-3 weeks)  Predicted Duration of Therapy Intervention:  (P) 3-4 visits    Ludy Reyes, PT                 I CERTIFY THE NEED FOR THESE SERVICES FURNISHED UNDER        THIS PLAN OF TREATMENT AND WHILE UNDER MY CARE     (Physician co-signature of this document indicates review and certification of the therapy plan).                       Certification Date From:  02/09/22   Certification Date To:  05/10/22    Referring Provider:  Jacy Mishra     Initial Assessment        See  Epic Evaluation Start of Care Date: 22 1400   General Information   Type of Visit Initial OP Ortho PT Evaluation   Start of Care Date 22   Referring Physician Jacy Mishra    Patient/Family Goals Statement To decrease pain    Orders Evaluate and Treat   Date of Order 22   Certification Required? Yes   Medical Diagnosis Greater trochanteric bursitis of both hips       Body Part(s)   Body Part(s) Hip   Presentation and Etiology   Pertinent history of current problem (include personal factors and/or comorbidities that impact the POC) Pt reports that initially her back was hurting her, but she's working on HEP for this. When standing too long, her back will hurt more and her hips will hurt more. left hip hurts when lying on her side. Pt denies numbness/tingling.    Impairments A. Pain;B. Decreased WB tolerance;D. Decreased ROM;H. Impaired gait   Functional Limitations perform activities of daily living   Symptom Location B lateral hips/thighs   Onset date of current episode/exacerbation 21   Pain rating (0-10 point scale) Other   Pain rating comment seated/at rest: 3/10, with standing/walkin/10   Fall Risk Screen   Fall screen completed by PT   Have you fallen 2 or more times in the past year? No   Have you fallen and had an injury in the past year? No   Is patient a fall risk? No   Abuse Screen (yes response referral indicated)   Feels Unsafe at Home or Work/School no   Feels Threatened by Someone no   Does Anyone Try to Keep You From Having Contact with Others or Doing Things Outside Your Home? no   Physical Signs of Abuse Present no   System Outcome Measures   Outcome Measures   (LEFS 41%)   Hip Objective Findings   Side (if bilateral, select both right and left) Right;Left   Gait/Locomotion Slow pace, decreased trunk/pelvis rotation    Balance/Proprioception (Single Leg Stance) 10+ sec on L, 8 sec on R    Lumbar ROM flx WNL with pulling/tightness noted, extension  limited by 75%, left side bending limited by 50% with increased L hip/thigh pain, R side bending limited by 50%   Hip/Knee Strength Comments 30 sec sit<>stand: 0 reps   Straight Leg Raise Test negative B    NANCI Test positive B for restriction   Palpation tender B gluteus medius, left trochanteric bursa, left IT band, B hip external rotators   Right Hip Flexion PROM limited by 10%   Right Hip ER PROM WNL, pain in the low back   Right Hip IR PROM limited by 50%    Right Hip Flexion Strength 3-   Right Hip Abduction Strength 2+ with pain    Right Hip ER Strength 3+   Right Knee Flexion Strength 5   Right Knee Extension Strength 5   Right Piriformis Flexibility mild restriction, no pain    Left Hip Flexion PROM WNL   Left Hip ER PROM WNL   Left Hip IR PROM limited by 25% with left hip pain    Left Hip Flexion Strength 3+   Left Hip Abduction Strength 2+ with pain    Left Hip ER Strength 3+   Left Knee Flexion Strength 5   Left Knee Extension Strength 3+ with pain    Left Piriformis Flexibility mild restriction, no pain    Planned Therapy Interventions   Planned Therapy Interventions gait training;balance training;joint mobilization;manual therapy;neuromuscular re-education;ROM;strengthening;stretching   Planned Modality Interventions   Planned Modality Interventions Cryotherapy;Hot packs;Ultrasound   Clinical Impression   Criteria for Skilled Therapeutic Interventions Met yes, treatment indicated   PT Diagnosis Bilateral hip pain, weakness of both hips   Influenced by the following impairments decreased lumbar/hip ROM, decreased hip strength, pain    Functional limitations due to impairments difficulty standing, walking and lying on her side   Clinical Presentation Stable/Uncomplicated   Clinical Decision Making (Complexity) Low complexity   Therapy Frequency other (see comments)  (every 2-3 weeks)   Predicted Duration of Therapy Intervention (days/wks) 3-4 visits   Risk & Benefits of therapy have been explained Yes    Patient, Family & other staff in agreement with plan of care Yes   Clinical Impression Comments Pt is an 81 year-old woman with chief complaint bilateral hip pain. PMH significant for stage 3 CKD, TKA B, B TIMO (with revisions on both sides), CAD. She demonstrates symptoms consistent with left-sided bursitis with tenderness at B gluteus medius and hip external rotators as well and demonstrates decreased hip strength. She reports difficulty with standing/walking tasks d/t pain. She is appropriate for short course of therapy to decrease pain and improve functional mobility.    ORTHO GOALS   PT Ortho Eval Goals 1;2   Ortho Goal 1   Goal Identifier Walking    Goal Description Pt will report ability to tolerate walking around the grocery store with B lateral hip/thigh pain of </=2/10 to return to prior level of function.    Target Date 04/20/22   Ortho Goal 2   Goal Identifier LEFS   Goal Description Pt will improve LEFS score from 41% to >70% to indicate improved functional mobility.    Target Date 04/20/22   Total Evaluation Time   PT Eval, Low Complexity Minutes (74234) 20   Therapy Certification   Certification date from 02/09/22   Certification date to 05/10/22   Medical Diagnosis Greater trochanteric bursitis of both hips         Ludy Reyes, PT, DPT, CLT  2/9/2022

## 2022-02-15 ENCOUNTER — LAB (OUTPATIENT)
Dept: LAB | Facility: CLINIC | Age: 82
End: 2022-02-15
Payer: COMMERCIAL

## 2022-02-15 DIAGNOSIS — D50.8 OTHER IRON DEFICIENCY ANEMIA: ICD-10-CM

## 2022-02-15 DIAGNOSIS — D50.0 IRON DEFICIENCY ANEMIA DUE TO CHRONIC BLOOD LOSS: ICD-10-CM

## 2022-02-15 LAB
FERRITIN SERPL-MCNC: 41 NG/ML (ref 10–130)
HGB BLD-MCNC: 11.1 G/DL (ref 11.7–15.7)
IRON SATN MFR SERPL: 26 % (ref 15–46)
IRON SERPL-MCNC: 79 UG/DL (ref 35–180)
TIBC SERPL-MCNC: 306 UG/DL (ref 240–430)

## 2022-02-15 PROCEDURE — 83550 IRON BINDING TEST: CPT

## 2022-02-15 PROCEDURE — 36415 COLL VENOUS BLD VENIPUNCTURE: CPT

## 2022-02-15 PROCEDURE — 82728 ASSAY OF FERRITIN: CPT

## 2022-02-15 PROCEDURE — 85018 HEMOGLOBIN: CPT

## 2022-03-04 ENCOUNTER — HOSPITAL ENCOUNTER (OUTPATIENT)
Dept: PHYSICAL THERAPY | Facility: REHABILITATION | Age: 82
End: 2022-03-04
Payer: COMMERCIAL

## 2022-03-04 DIAGNOSIS — M25.552 BILATERAL HIP PAIN: Primary | ICD-10-CM

## 2022-03-04 DIAGNOSIS — M25.551 BILATERAL HIP PAIN: Primary | ICD-10-CM

## 2022-03-04 DIAGNOSIS — R29.898 WEAKNESS OF BOTH HIPS: ICD-10-CM

## 2022-03-04 PROCEDURE — 97110 THERAPEUTIC EXERCISES: CPT | Mod: GP | Performed by: PHYSICAL THERAPIST

## 2022-03-04 PROCEDURE — 97140 MANUAL THERAPY 1/> REGIONS: CPT | Mod: GP | Performed by: PHYSICAL THERAPIST

## 2022-03-12 ENCOUNTER — HEALTH MAINTENANCE LETTER (OUTPATIENT)
Age: 82
End: 2022-03-12

## 2022-03-16 ENCOUNTER — HOSPITAL ENCOUNTER (OUTPATIENT)
Dept: PHYSICAL THERAPY | Facility: REHABILITATION | Age: 82
Discharge: HOME | End: 2022-03-16
Payer: COMMERCIAL

## 2022-03-16 DIAGNOSIS — R29.898 WEAKNESS OF BOTH HIPS: ICD-10-CM

## 2022-03-16 DIAGNOSIS — M25.552 BILATERAL HIP PAIN: Primary | ICD-10-CM

## 2022-03-16 DIAGNOSIS — M25.551 BILATERAL HIP PAIN: Primary | ICD-10-CM

## 2022-03-16 PROCEDURE — 97110 THERAPEUTIC EXERCISES: CPT | Mod: GP | Performed by: PHYSICAL THERAPIST

## 2022-03-16 PROCEDURE — 97140 MANUAL THERAPY 1/> REGIONS: CPT | Mod: GP | Performed by: PHYSICAL THERAPIST

## 2022-03-29 ENCOUNTER — OFFICE VISIT (OUTPATIENT)
Dept: INTERNAL MEDICINE | Facility: CLINIC | Age: 82
End: 2022-03-29
Payer: COMMERCIAL

## 2022-03-29 VITALS
OXYGEN SATURATION: 97 % | HEIGHT: 67 IN | SYSTOLIC BLOOD PRESSURE: 118 MMHG | WEIGHT: 177 LBS | DIASTOLIC BLOOD PRESSURE: 64 MMHG | HEART RATE: 58 BPM | BODY MASS INDEX: 27.78 KG/M2

## 2022-03-29 DIAGNOSIS — Z01.818 PRE-OPERATIVE GENERAL PHYSICAL EXAMINATION: ICD-10-CM

## 2022-03-29 DIAGNOSIS — I10 PRIMARY HYPERTENSION: ICD-10-CM

## 2022-03-29 DIAGNOSIS — H02.831 DERMATOCHALASIS OF BOTH UPPER EYELIDS: ICD-10-CM

## 2022-03-29 DIAGNOSIS — N18.31 STAGE 3A CHRONIC KIDNEY DISEASE (H): Primary | ICD-10-CM

## 2022-03-29 DIAGNOSIS — D50.0 IRON DEFICIENCY ANEMIA DUE TO CHRONIC BLOOD LOSS: ICD-10-CM

## 2022-03-29 DIAGNOSIS — E03.9 ACQUIRED HYPOTHYROIDISM: ICD-10-CM

## 2022-03-29 DIAGNOSIS — E78.2 MIXED HYPERLIPIDEMIA: ICD-10-CM

## 2022-03-29 DIAGNOSIS — M70.61 GREATER TROCHANTERIC BURSITIS OF BOTH HIPS: ICD-10-CM

## 2022-03-29 DIAGNOSIS — H02.834 DERMATOCHALASIS OF BOTH UPPER EYELIDS: ICD-10-CM

## 2022-03-29 DIAGNOSIS — M70.62 GREATER TROCHANTERIC BURSITIS OF BOTH HIPS: ICD-10-CM

## 2022-03-29 LAB
ANION GAP SERPL CALCULATED.3IONS-SCNC: 13 MMOL/L (ref 5–18)
BUN SERPL-MCNC: 23 MG/DL (ref 8–28)
CALCIUM SERPL-MCNC: 9.3 MG/DL (ref 8.5–10.5)
CHLORIDE BLD-SCNC: 103 MMOL/L (ref 98–107)
CO2 SERPL-SCNC: 28 MMOL/L (ref 22–31)
CREAT SERPL-MCNC: 0.97 MG/DL (ref 0.6–1.1)
ERYTHROCYTE [DISTWIDTH] IN BLOOD BY AUTOMATED COUNT: 15.2 % (ref 10–15)
GFR SERPL CREATININE-BSD FRML MDRD: 58 ML/MIN/1.73M2
GLUCOSE BLD-MCNC: 94 MG/DL (ref 70–125)
HCT VFR BLD AUTO: 37.4 % (ref 35–47)
HGB BLD-MCNC: 11.8 G/DL (ref 11.7–15.7)
MCH RBC QN AUTO: 30.9 PG (ref 26.5–33)
MCHC RBC AUTO-ENTMCNC: 31.6 G/DL (ref 31.5–36.5)
MCV RBC AUTO: 98 FL (ref 78–100)
PLATELET # BLD AUTO: 215 10E3/UL (ref 150–450)
POTASSIUM BLD-SCNC: 3.9 MMOL/L (ref 3.5–5)
RBC # BLD AUTO: 3.82 10E6/UL (ref 3.8–5.2)
SODIUM SERPL-SCNC: 144 MMOL/L (ref 136–145)
WBC # BLD AUTO: 4.7 10E3/UL (ref 4–11)

## 2022-03-29 PROCEDURE — 36415 COLL VENOUS BLD VENIPUNCTURE: CPT | Performed by: NURSE PRACTITIONER

## 2022-03-29 PROCEDURE — 85027 COMPLETE CBC AUTOMATED: CPT | Performed by: NURSE PRACTITIONER

## 2022-03-29 PROCEDURE — 80048 BASIC METABOLIC PNL TOTAL CA: CPT | Performed by: NURSE PRACTITIONER

## 2022-03-29 PROCEDURE — 99214 OFFICE O/P EST MOD 30 MIN: CPT | Performed by: NURSE PRACTITIONER

## 2022-03-29 ASSESSMENT — PATIENT HEALTH QUESTIONNAIRE - PHQ9: SUM OF ALL RESPONSES TO PHQ QUESTIONS 1-9: 0

## 2022-03-29 NOTE — PROGRESS NOTES
[address] [address]  Phone: [phone]  Fax: [phone]  Primary Provider: Jacy Mishra    PREOPERATIVE EVALUATION:  Today's date: 3/29/2022    Geeta Berry is a 81 year old female who presents for a preoperative evaluation.    Surgical Information:  Surgery/Procedure: Bilateral upper eyelid  Surgery Location: [facility]  Surgeon: Dr. Zarate  Surgery Date: 04/15/2022  Where patient plans to recover: At home with family  Fax number for surgical facility: [phone]     Type of Anesthesia Anticipated: Local with MAC    Assessment & Plan     The proposed surgical procedure is considered LOW risk.    Problem List Items Addressed This Visit        Nervous and Auditory    Greater trochanteric bursitis of both hips     Schedule corticosteroid injection with me after surgery            Endocrine    Hyperlipidemia     Treated with statin          Hypothyroidism     Euthyroid on levothyroxine             Circulatory    Essential hypertension     Stable             Urinary    Stage 3a chronic kidney disease (H) - Primary     Stable             Hematologic    Iron deficiency anemia due to chronic blood loss     Repeat hemoglobin today is in a normal range and she is off of the iron supplement. She continues omeprazole.         Relevant Orders    CBC with platelets (Completed)    Basic metabolic panel (Completed)      Other Visit Diagnoses     Pre-operative general physical examination        Relevant Orders    CBC with platelets (Completed)    Basic metabolic panel (Completed)    Dermatochalasis of both upper eyelids              Medication Instructions:  Patient is to take all scheduled medications on the day of surgery    RECOMMENDATION:  APPROVAL GIVEN to proceed with proposed procedure, without further diagnostic evaluation.      Subjective     HPI related to upcoming procedure: Geeta Berry is an 81-year-old female who presents in the  office today for preoperative physical.  She has difficult time seeing due to upper eyelids. Her vision is impaired even with reading.     H/o anemia, related to gastritis. She is not taking iron any longer. No melena/hematochezia.     Both lateral hips hurt when she walks. PT has helped but not alleviated it altogether.       Preop Questions 3/28/2022   1. Have you ever had a heart attack or stroke? No   2. Have you ever had surgery on your heart or blood vessels, such as a stent placement, a coronary artery bypass, or surgery on an artery in your head, neck, heart, or legs? No   3. Do you have chest pain with activity? No   4. Do you have a history of  heart failure? No   5. Do you currently have a cold, bronchitis or symptoms of other infection? No   6. Do you have a cough, shortness of breath, or wheezing? No   7. Do you or anyone in your family have previous history of blood clots? No   8. Do you or does anyone in your family have a serious bleeding problem such as prolonged bleeding following surgeries or cuts? No   9. Have you ever had problems with anemia or been told to take iron pills? No   10. Have you had any abnormal blood loss such as black, tarry or bloody stools, or abnormal vaginal bleeding? YES - h/o anemia as noted above    11. Have you ever had a blood transfusion? UNKNOWN - not that she is aware of    12. Are you willing to have a blood transfusion if it is medically needed before, during, or after your surgery? Yes   13. Have you or any of your relatives ever had problems with anesthesia? No   14. Do you have sleep apnea, excessive snoring or daytime drowsiness? No   15. Do you have any artifical heart valves or other implanted medical devices like a pacemaker, defibrillator, or continuous glucose monitor? No   16. Do you have artificial joints? YES - left shoulder, bilateral hip, right total knee    17. Are you allergic to latex? No       Health Care Directive:  Patient has a Health Care  Directive on file      Preoperative Review of :   reviewed - no record of controlled substances prescribed.      Review of Systems  CONSTITUTIONAL: NEGATIVE for fever, chills, change in weight  INTEGUMENTARY/SKIN: NEGATIVE for worrisome rashes, moles or lesions  EYES: NEGATIVE for vision changes or irritation  ENT/MOUTH: NEGATIVE for ear, mouth and throat problems  RESP: NEGATIVE for significant cough or SOB  CV: NEGATIVE for chest pain, palpitations or peripheral edema  GI: NEGATIVE for nausea, abdominal pain, heartburn, or change in bowel habits  : NEGATIVE for frequency, dysuria, or hematuria  MUSCULOSKELETAL: NEGATIVE for significant arthralgias or myalgia  NEURO: NEGATIVE for weakness, dizziness or paresthesias  ENDOCRINE: NEGATIVE for temperature intolerance, skin/hair changes  HEME: NEGATIVE for bleeding problems  PSYCHIATRIC: NEGATIVE for changes in mood or affect    Patient Active Problem List    Diagnosis Date Noted     Greater trochanteric bursitis of both hips 03/30/2022     Priority: Medium     Lumbar radiculopathy 09/24/2021     Priority: Medium     Iron deficiency anemia due to chronic blood loss 09/24/2021     Priority: Medium     Noted after right knee replacement 4/2021 when on DOAC for VTE prevention. +FOBT. Started on PPI and DOAC discontinued. She underwent colonoscopy on 12/17/2021.  She had findings of internal hemorrhoids, diverticulosis in the sigmoid colon and descending colon.  No source of anemia was found on colonoscopy.  She also underwent upper endoscopy.  The esophagus appeared normal.  She had findings of gastritis which was biopsied and considered to be the likely source of anemia.  Duodenum was normal.  She was advised to increase omeprazole to 40 mg twice daily and avoid NSAIDs.       Neuropathy 09/23/2021     Priority: Medium     Status post knee surgery 04/29/2021     Priority: Medium     Stage 3a chronic kidney disease (H) 10/29/2020     Priority: Medium     Coronary  artery disease involving native coronary artery of native heart without angina pectoris 08/06/2020     Priority: Medium     Noted on coronary calcium score. Pt has mild disease, considered high risk for future cardiac event        Chronic rhinitis 12/03/2019     Priority: Medium     Hiatal hernia 08/21/2019     Priority: Medium     Dysthymia 06/01/2017     Priority: Medium     H/o headache with duloxetine          Facet arthropathy, cervical 04/19/2017     Priority: Medium     History of DVT (deep vein thrombosis), right LE 12/2016 02/07/2017     Priority: Medium     Choledocholithiasis 07/22/2016     Priority: Medium     Constipation 07/22/2016     Priority: Medium     Osteoarthritis of lumbar spine 05/09/2012     Priority: Medium     Osteoarthritis; knees, hips, cervical spine  05/09/2012     Priority: Medium     Acute iritis, h/o in 2012 04/13/2012     Priority: Medium     Hypothyroidism 02/26/2011     Priority: Medium     Essential hypertension 01/25/2010     Priority: Medium     Formatting of this note might be different from the original.  Hypertension       Gastroesophageal reflux disease 07/07/2009     Priority: Medium     Formatting of this note might be different from the original.  Gastroesophageal Reflux Disease       Hyperlipidemia 07/07/2009     Priority: Medium      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)      Chronic neck pain      Common bile duct calculus      Coronary artery disease due to calcified coronary lesion 8/6/2020     Depression      DVT (deep venous thrombosis) (H)      GERD (gastroesophageal reflux disease)      History of blood clots 2017    DVT right lower extremity     HLD (hyperlipidemia)      HTN (hypertension)      Hyperlipidemia      Hypertension      Hypothyroidism      Infectious viral hepatitis     hepatitis A in 1960s     Thyroid disease      Past Surgical History:   Procedure Laterality  Date     ARTHROPLASTY REVISION HIP Left 5/16/2017    Procedure: REVISION LEFT TOTAL HIP ARTHROPLASTY, BOTH COMPONENTS;  Surgeon: Tyson Peoples MD;  Location: [facility] OR;  Service:      ARTHROSCOPY HIP Left 1983     CHOLECYSTECTOMY  1980     ENDOSCOPIC RETROGRADE CHOLANGIOPANCREATOGRAM      x5 over 8 years. Last 8/2016     ENDOSCOPIC RETROGRADE CHOLANGIOPANCREATOGRAM N/A 9/5/2017    Procedure: ENDOSCOPIC RETROGRADE CHOLANGIOPANCREATOGRAPHY, STONE EXTRACTION;  Surgeon: Henry Eller MD;  Location: [facility] OR;  Service:      ENDOSCOPIC RETROGRADE CHOLANGIOPANCREATOGRAM N/A 7/26/2019    Procedure: ENDOSCOPIC RETROGRADE CHOLANGIOPANCREATOGRAPHY, REMOVAL OF SLUDGE, DILATION OF STRICTURE;  Surgeon: Ryan Pastrana MD;  Location: [facility] OR;  Service: Gastroenterology     HIP SURGERY  2012    revision     JOINT REPLACEMENT       ORTHOPEDIC SURGERY       OTHER SURGICAL HISTORY      bilateral THAwith revision on both hips     LA ESOPHAGOGASTRODUODENOSCOPY TRANSORAL DIAGNOSTIC N/A 9/10/2019    Procedure: ESOPHAGOGASTRODUODENOSCOPY (EGD);  Surgeon: Will Nash DO;  Location: [facility] OR;  Service: General     REPLACEMENT TOTAL KNEE Left 2015     SHOULDER SURGERY Left 2010    left total shoulder replacement     XR ERCP BILIARY AND PANCREAS  7/26/2019     [facility] TOTAL KNEE ARTHROPLASTY Right 4/29/2021    Procedure: RIGHT TOTAL KNEE ARTHROPLASTY;  Surgeon: Satinder Issa DO;  Location: [facility];  Service: Orthopedics     Current Outpatient Medications   Medication Sig Dispense Refill     acetaminophen (TYLENOL) 500 MG tablet Take 500-1,000 mg by mouth       azelastine (ASTELIN) 0.1 % nasal spray SPRAY 1 SPRAY INTO BOTH NOSTRILS AT BEDTIME 90 mL 1     Cholecalciferol (VITAMIN D3 PO) Take by mouth daily       citalopram (CELEXA) 10 MG tablet TAKE 1/2 TABLET BY MOUTH DAILY       docusate sodium (COLACE) 100 MG tablet Take 100 mg by mouth 2 times daily       fluticasone (FLONASE) 50  "MCG/ACT nasal spray Spray 1 spray into both nostrils daily 16 g 3     hydrochlorothiazide (HYDRODIURIL) 25 MG tablet Take 1 tablet (25 mg) by mouth daily 90 tablet 3     levothyroxine (SYNTHROID/LEVOTHROID) 50 MCG tablet Take 50 mcg by mouth       Multiple Vitamin (MULTIVITAMIN ADULT PO)        Omega-3 Fatty Acids (OMEGA-3 FISH OIL PO) Take 1 g by mouth daily        omeprazole (PRILOSEC) 40 MG DR capsule Take 1 capsule (40 mg) by mouth 2 times daily 60 capsule 3     polyethylene glycol (MIRALAX/GLYCOLAX) packet Take 1 packet by mouth daily       rosuvastatin (CRESTOR) 20 MG tablet TAKE 1 TABLET BY MOUTH EVERYDAY AT BEDTIME       Ferrous Sulfate (IRON PO)          Allergies   Allergen Reactions     Atorvastatin      Codeine      Dipyridamole      Duloxetine Headache        Social History     Tobacco Use     Smoking status: Never Smoker     Smokeless tobacco: Never Used   Substance Use Topics     Alcohol use: No     Family History   Problem Relation Age of Onset     Colon Cancer Father      Diabetes Sister      Heart Disease Mother      History   Drug Use No         Objective     /64 (BP Location: Right arm, Patient Position: Sitting)   Pulse 58   Ht 1.689 m (5' 6.5\")   Wt 80.3 kg (177 lb)   SpO2 97%   BMI 28.14 kg/m      Physical Exam    GENERAL APPEARANCE: healthy, alert and no distress     EYES: EOMI     HENT: ear canals and TM's normal and nose and mouth without ulcers or lesions     NECK: no adenopathy, no asymmetry, masses, or scars and thyroid normal to palpation     RESP: lungs clear to auscultation - no rales, rhonchi or wheezes     CV: regular rates and rhythm, normal S1 S2, no S3 or S4 and no murmur, click or rub     ABDOMEN:  soft, nontender, no HSM or masses and bowel sounds normal      NEURO: Normal strength and tone, sensory exam grossly normal, mentation intact and speech normal     PSYCH: mentation appears normal. and affect normal/bright     LYMPHATICS: No cervical adenopathy    Recent " Labs   Lab Test 02/15/22  1121 11/24/21  1223 11/01/21  1550 09/23/21  1408 04/30/21  0557 04/29/21  0921 09/21/20  1525 07/31/20  0940   HGB 11.1* 10.5* 10.5* 9.4*   < >  --    < > 12.2   PLT  --  262 281 252   < >  --    < > 243   INR  --   --   --   --   --  1.02  --  0.98   NA  --  141 141  --    < >  --    < > 144   POTASSIUM  --  4.2 3.7  --    < >  --    < > 3.7   CR  --  0.99 1.01  --    < >  --    < > 1.00   A1C  --   --   --  5.9*  --   --   --   --     < > = values in this interval not displayed.        Diagnostics:  Recent Results (from the past 168 hour(s))   CBC with platelets    Collection Time: 03/29/22  4:03 PM   Result Value Ref Range    WBC Count 4.7 4.0 - 11.0 10e3/uL    RBC Count 3.82 3.80 - 5.20 10e6/uL    Hemoglobin 11.8 11.7 - 15.7 g/dL    Hematocrit 37.4 35.0 - 47.0 %    MCV 98 78 - 100 fL    MCH 30.9 26.5 - 33.0 pg    MCHC 31.6 31.5 - 36.5 g/dL    RDW 15.2 (H) 10.0 - 15.0 %    Platelet Count 215 150 - 450 10e3/uL   Basic metabolic panel    Collection Time: 03/29/22  4:03 PM   Result Value Ref Range    Sodium 144 136 - 145 mmol/L    Potassium 3.9 3.5 - 5.0 mmol/L    Chloride 103 98 - 107 mmol/L    Carbon Dioxide (CO2) 28 22 - 31 mmol/L    Anion Gap 13 5 - 18 mmol/L    Urea Nitrogen 23 8 - 28 mg/dL    Creatinine 0.97 0.60 - 1.10 mg/dL    Calcium 9.3 8.5 - 10.5 mg/dL    Glucose 94 70 - 125 mg/dL    GFR Estimate 58 (L) >60 mL/min/1.73m2          Revised Cardiac Risk Index (RCRI):  The patient has the following serious cardiovascular risks for perioperative complications:   - No serious cardiac risks = 0 points     RCRI Interpretation: 0 points: Class I (very low risk - 0.4% complication rate)           Signed Electronically by: Jacy Mishra NP  Copy of this evaluation report is provided to requesting physician.

## 2022-03-30 ENCOUNTER — HOSPITAL ENCOUNTER (OUTPATIENT)
Dept: PHYSICAL THERAPY | Facility: REHABILITATION | Age: 82
Discharge: HOME | End: 2022-03-30
Payer: COMMERCIAL

## 2022-03-30 DIAGNOSIS — M25.551 BILATERAL HIP PAIN: Primary | ICD-10-CM

## 2022-03-30 DIAGNOSIS — M25.552 BILATERAL HIP PAIN: Primary | ICD-10-CM

## 2022-03-30 DIAGNOSIS — R29.898 WEAKNESS OF BOTH HIPS: ICD-10-CM

## 2022-03-30 PROBLEM — M70.61 GREATER TROCHANTERIC BURSITIS OF BOTH HIPS: Status: ACTIVE | Noted: 2022-03-30

## 2022-03-30 PROBLEM — M70.62 GREATER TROCHANTERIC BURSITIS OF BOTH HIPS: Status: ACTIVE | Noted: 2022-03-30

## 2022-03-30 PROCEDURE — 97110 THERAPEUTIC EXERCISES: CPT | Mod: GP | Performed by: PHYSICAL THERAPIST

## 2022-03-30 PROCEDURE — 97140 MANUAL THERAPY 1/> REGIONS: CPT | Mod: GP | Performed by: PHYSICAL THERAPIST

## 2022-03-30 NOTE — PROGRESS NOTES
[facility] Rehabilitation Service    Outpatient Physical Therapy Discharge Note  Patient: Geeta Berry  : 1940    Beginning/End Dates of Reporting Period:  22 to 3/30/2022     Referring Provider: Jacy Singh Diagnosis: Bilateral hip pain, weakness of both hips     Client Self Report: Pt reports that today the weather is affecting her joints again. Having left hip pain today. She is working on her HEP regularly, but still has hip soreness. She will be getting an injection on the left hip at the end of April. She feels that since start of therapy, there has been 50% improvement. WEather is a factor. When walking, gets more stiff/sore in the hips.  When walking at the grocery store, hip pain is about 5/10.     Objective Measurements:  See below    Outcome Measures (most recent score):  LEFS: 59%     Goals:  See below    Plan:  Discharge from therapy.    Discharge:    Reason for Discharge: progress toward goals, unlikely for pt to meet goals at this time    Equipment Issued: N/A    Discharge Plan: Patient to continue home program.       22 1300   Signing Clinician's Name / Credentials   Signing clinician's name / credentials Ludy Reyes, PT   Session Number   Session Number 4/3-4   Progress Note/Recertification   Recertification Due Date 05/10/22   Adult Goals   PT Ortho Eval Goals 1;2   Ortho Goal 1   Goal Identifier Walking    Goal Description Pt will report ability to tolerate walking around the grocery store with B lateral hip/thigh pain of </=2/10 to return to prior level of function.    Goal Progress Goal not met, pain is about 5/10 in B hips (at best 3/10)   Target Date 22   Date Met 22   Ortho Goal 2   Goal Identifier LEFS   Goal Description Pt will improve LEFS score from 41% to >70% to indicate improved functional mobility.    Goal Progress Goal progressing, score of 59% as of 3/30/22    Target Date 04/20/22   Subjective Report   Subjective Report Pt reports that today the weather is affecting her joints again. Having left hip pain today. She is working on her HEP regularly, but still has hip soreness. She will be getting an injection on the left hip at the end of April. She feels that since start of therapy, there has been 50% improvement. WEather is a factor. When walking, gets more stiff/sore in the hips.  When walking at the grocery store, hip pain is about 5/10.    Objective Measures   Objective Measures Objective Measure 1;Objective Measure 2   Objective Measure 1   Objective Measure lumbar AROM   Details flx WNL with R low back pain, left side bending WNL with R low back pain, right side bending limited by 25%, extension limited by 50% with right low back pain    Objective Measure 2   Objective Measure Palpation    Details tender left hip external rotators and gluteus medius   System Outcome Measures   Outcome Measures   (LEFS: 59%)   Treatment Interventions   Interventions Therapeutic Procedure/Exercise;Manual Therapy   Therapeutic Procedure/exercise   Therapeutic Procedures: strength, endurance, ROM, flexibillity minutes (64236) 30   Skilled Intervention Pt given verbal, visual and tactile cueing with demonstration to perform correct technique and appropriate alignment for exercises attempted. Provided rationale for exercises performed and educated on the importance of completing them as instructed.   Patient Response mild pain on L hip   Treatment Detail Exercises for hip strengthening and ROM: Nustep warm up, level 6 x 6 minutes, Lateral step up x 10-15 each side, hip hike off edge of step and repeated on the level ground x 10-15 each side x 2, mini squat, 10 x 2 - added to HEP.    Manual Therapy   Manual Therapy: Mobilization, MFR, MLD, friction massage minutes (87054) 12   Skilled Intervention STM   Patient Response Decreased pain    Treatment Detail B side lying, STM to gluteus  medius and hip external rotators to decrease pain, spent more time on L side.    Plan   Homework PTRx   Home program NANCI cruz stretch on R in supine, B piriformis stretches, standing hip extension, side stepping L2 band, mini squat, hip hiking   Plan for next session N/A   Comments   Comments Pt demonstrates symptoms consistent with left-sided bursitis with tenderness at B gluteus medius and hip external rotators as well and demonstrates decreased hip strength. She has been seen for 4 visits from 2/9/22 to present with treatment focused on therapeutic exercise to improve hip strength and manual therapy to decrease pain. The right hip pain has decreased considerably and she has made progress toward goals, but hasn't met any of the goals yet. She is going to be getting a L hip injection and may ask about getting a new x-ray of this hip to determine if arthroplasty components are stable. She is indep with HEP and encouraged to continue to work on this for strengthening over time.    Total Session Time   Timed Code Treatment Minutes 42   Total Treatment Time (sum of timed and untimed services) 42   AMBULATORY CLINICS ONLY-MEDICAL AND TREATMENT DIAGNOSIS   Medical Diagnosis Greater trochanteric bursitis of both hips       PT Diagnosis Bilateral hip pain, weakness of both hips     Ludy Reyes, PT, DPT, CLT  3/30/2022

## 2022-03-30 NOTE — ASSESSMENT & PLAN NOTE
Repeat hemoglobin today is in a normal range and she is off of the iron supplement. She continues omeprazole.

## 2022-04-13 ENCOUNTER — OFFICE VISIT (OUTPATIENT)
Dept: CARDIOLOGY | Facility: CLINIC | Age: 82
End: 2022-04-13
Payer: COMMERCIAL

## 2022-04-13 VITALS
DIASTOLIC BLOOD PRESSURE: 60 MMHG | HEART RATE: 63 BPM | BODY MASS INDEX: 28.46 KG/M2 | RESPIRATION RATE: 12 BRPM | SYSTOLIC BLOOD PRESSURE: 132 MMHG | WEIGHT: 179 LBS

## 2022-04-13 DIAGNOSIS — I25.119 CORONARY ARTERY DISEASE INVOLVING NATIVE CORONARY ARTERY OF NATIVE HEART WITH ANGINA PECTORIS (H): Primary | ICD-10-CM

## 2022-04-13 DIAGNOSIS — E78.5 DYSLIPIDEMIA, GOAL LDL BELOW 70: ICD-10-CM

## 2022-04-13 DIAGNOSIS — I10 ESSENTIAL HYPERTENSION: ICD-10-CM

## 2022-04-13 PROCEDURE — 99214 OFFICE O/P EST MOD 30 MIN: CPT | Performed by: INTERNAL MEDICINE

## 2022-04-13 RX ORDER — ROSUVASTATIN CALCIUM 40 MG/1
40 TABLET, COATED ORAL AT BEDTIME
Qty: 90 TABLET | Refills: 4 | Status: SHIPPED | OUTPATIENT
Start: 2022-04-13 | End: 2023-08-24

## 2022-04-13 NOTE — LETTER
4/13/2022    Jacy Mishra, SAMANTHA  [address]  [place] [place] [address]    RE: Geeta BISWAS Kristi       Dear Colleague,     I had the pleasure of seeing Geeta Berry in the [facility].  Progress Notes by Kathie Lemons MD at 4/12/2021  3:10 PM     Author: Kathie Lemons MD Service: -- Author Type: Physician    Filed: 4/12/2021  3:51 PM Encounter Date: 4/12/2021 Status: Signed    : Kathie Lemons MD (Physician)           Click to link to [facility]ART [facility] NOTE       Assessment/Plan:   1.  Coronary artery disease: The patient developed fatigue which could be anginal equivalent.  Her coronary CT angiogram in August 2020 was reported significantly elevated coronary calcium score to 882, no obstructive coronary artery disease at that time.  Her LDL was not well controlled.  Her coronary artery disease could be getting worse leading to her symptoms.  We discussed further evaluation and management.  Stress cardiac MRI is requested for evaluation of myocardial ischemia, heart function and structure.    Her aspirin 81 mg daily was discontinued, advised to restart it again.  Continue rosuvastatin.    3.  Essential hypertension: Her blood pressure is controlled with the hydrochlorothiazide.    4.  Dyslipidemia: Her last LDL was 106.  Increased Crestor to 40 mg at bedtime.  Repeat lipid profile and liver function in 3 months.    Thank you for the opportunity to be involved in the care of Geeta Berry. If you have any questions, please feel free to contact me.  I will see the patient again in follow-up 12 months and as needed.    Much or all of the text in this note was generated through the use of Dragon Dictate voice-to-text software. Errors in spelling or words which seem out of context are unintentional.   Sound alike errors, in particular, may have escaped editing.       History of Present Illness:   It is my pleasure to see Geeta BISWAS Rachelchevy at the [facility]  for evaluation of Follow-up. Geeta Berry is a 81 y.o. female with a medical history of coronary artery disease, essential hypertension, dyslipidemia, hypothyroidism, history of DVT, and hiatal hernia.    The patient states that she developed fatigue over last several months.  She had no obvious chest pain, shortness of breath.  She has very mild dyspnea on exertion.  She denies palpitations, dizziness, orthopnea, PND or leg edema.  Her blood pressure and heart rate are controlled well.  Her LDL was not controlled, last one 106.      Past Medical History:     Patient Active Problem List   Diagnosis     History of DVT (deep vein thrombosis), right LE 12/2016     Acute iritis, h/o in 2012     Common bile duct calculus, ERCP x 6 in past. Cannot tolerate ursodiol     Constipation     GERD (gastroesophageal reflux disease)     HTN (hypertension)     HLD (hyperlipidemia)     Hypothyroidism     Osteoarthritis of lumbar spine     Osteoarthritis; knees, hips, cervical spine      Facet arthropathy, cervical     Dysthymia     Hiatal hernia     Chronic rhinitis     Coronary artery disease involving native coronary artery of native heart without angina pectoris     Chronic kidney disease, stage 3       Past Surgical History:     Past Surgical History:   Procedure Laterality Date     bilateral TIMO      with revision on both hips     CHOLECYSTECTOMY  1980     ERCP      x5 over 8 years. Last 8/2016     ERCP N/A 9/5/2017    Procedure: ENDOSCOPIC RETROGRADE CHOLANGIOPANCREATOGRAPHY, STONE EXTRACTION;  Surgeon: Henry Eller MD;  Location: [facility];  Service:      ERCP N/A 7/26/2019    Procedure: ENDOSCOPIC RETROGRADE CHOLANGIOPANCREATOGRAPHY, REMOVAL OF SLUDGE, DILATION OF STRICTURE;  Surgeon: Ryan Pastrana MD;  Location: [facility];  Service: Gastroenterology     HIP ARTHROSCOPY Left 1983     HIP SURGERY  2012    revision     JOINT REPLACEMENT       ME ESOPHAGOGASTRODUODENOSCOPY TRANSORAL DIAGNOSTIC N/A  9/10/2019    Procedure: ESOPHAGOGASTRODUODENOSCOPY (EGD);  Surgeon: Will Nash DO;  Location: [facility];  Service: General     REPLACEMENT TOTAL KNEE Left 2015     REVISION TOTAL HIP ARTHROPLASTY Left 5/16/2017    Procedure: REVISION LEFT TOTAL HIP ARTHROPLASTY, BOTH COMPONENTS;  Surgeon: Tyson Peoples MD;  Location: [facility] OR;  Service:      SHOULDER SURGERY Left 2010    left total shoulder replacement     XR ERCP BILIARY AND PANCREAS  7/26/2019       Family History:     Family History   Problem Relation Age of Onset     Colon cancer Father      Diabetes Sister      Heart disease Mother        Social History:    reports that she has never smoked. She has never used smokeless tobacco. She reports that she does not drink alcohol or use drugs.    Review of Systems:   General: WNL  Eyes: WNL  Ears/Nose/Throat: WNL  Lungs: WNL  Heart: Leg Swelling  Stomach: WNL  Bladder: WNL  Muscle/Joints: Joint Pain  Skin: WNL  Nervous System: WNL  Mental Health: WNL     Blood: WNL    Meds:     Current Outpatient Medications:      acetaminophen (TYLENOL) 500 MG tablet, Take 500 mg by mouth 2 (two) times a day., Disp: , Rfl:      amoxicillin (AMOXIL) 500 MG capsule, Take 2,000 mg by mouth once as needed (prior to dental procedures)., Disp: , Rfl:      aspirin 81 mg chewable tablet, Chew 81 mg daily., Disp: , Rfl:      azelastine (ASTELIN) 137 mcg (0.1 %) nasal spray, 1 SPRAY INTO EACH NOSTRIL AT BEDTIME. USE IN EACH NOSTRIL AS DIRECTED, Disp: 30 mL, Rfl: 11     calcium carbonate (OS-ALOK) 600 mg calcium (1,500 mg) tablet, Take 600 mg by mouth daily. , Disp: , Rfl:      citalopram (CELEXA) 10 MG tablet, Take 0.5 tablets (5 mg total) by mouth daily., Disp: 90 tablet, Rfl: 1     fluticasone propionate (FLONASE) 50 mcg/actuation nasal spray, SPRAY 1 SPRAY INTO EACH NOSTRIL EVERY DAY, Disp: 48 g, Rfl: 3     hydroCHLOROthiazide (HYDRODIURIL) 25 MG tablet, Take 1 tablet (25 mg total) by mouth daily., Disp: 90 tablet,  "Rfl: 3     levothyroxine (SYNTHROID, LEVOTHROID) 50 MCG tablet, Take 1 tablet (50 mcg total) by mouth daily., Disp: 90 tablet, Rfl: 2     multivitamin with minerals (THERA-M) 9 mg iron-400 mcg Tab tablet, Take 1 tablet by mouth daily., Disp: , Rfl:      omeprazole (PRILOSEC) 20 MG capsule, Take 20 mg by mouth daily before breakfast., Disp: , Rfl:      polyethylene glycol (MIRALAX) 17 gram packet, Take 17 g by mouth daily.    , Disp: , Rfl:      rosuvastatin (CRESTOR) 20 MG tablet, Take 1 tablet (20 mg total) by mouth at bedtime., Disp: 30 tablet, Rfl: 11     senna-docusate (PERICOLACE) 8.6-50 mg tablet, Take 1 tablet by mouth 2 (two) times a day. , Disp: , Rfl:      diphenhydrAMINE-acetaminophen (TYLENOL PM EXTRA STRENGTH)  mg Tab, , Disp: , Rfl:      famotidine (PEPCID) 40 MG tablet, Take 1 tablet (40 mg total) by mouth every evening., Disp: 90 tablet, Rfl: 1     Allergies:   Atorvastatin, Codeine, Dipyridamole, and Duloxetine    Objective:      Physical Exam  176 lb (79.8 kg)  5' 7.5\" (1.715 m)  Body mass index is 27.16 kg/m .  /50 (Patient Site: Left Arm, Patient Position: Sitting, Cuff Size: Adult Regular)   Pulse (!) 52   Resp 16   Ht 5' 7.5\" (1.715 m)   Wt 176 lb (79.8 kg)   BMI 27.16 kg/m      General Appearance:   Awake, Alert, No acute distress.   HEENT:  Pupil equal, reactive to light. No scleral icterus; the mucous membranes were moist. No oral ulcers or thrush.    Neck: No cervical bruits. No JVD. No thyromegaly. No lymph node enlargement or tenderness.   Chest: The spine was straight. The chest was symmetric.   Lungs:   Respirations unlabored. Lungs are clear to auscultation. No crackles. No wheezing.   Cardiovascular:   RRR, normal first and second heart sounds with no murmurs. No rubs or gallops.    Abdomen:  Soft. No tenderness. Non-distended. Bowels sounds are present   Extremities: Equal posterior tibial pulses. No leg edema.   Skin: No rashes or ulcers. Warm, Dry. "   Musculoskeletal: Tenderness to left breast area. No deformity.   Neurologic: Mood and affect are appropriate. No focal deficits.         EKG:  Personally reivewed  Sinus bradycardia   Otherwise normal ECG   When compared with ECG of 23-JUL-2019 11:41,   No significant change was found     Cardiac Imaging Studies  ECHO on 9-:  1. Normal left ventricular size and systolic performance with a visually estimated ejection fraction of 60-65%.   2. There is trace aortic insufficiency.   3. Normal right ventricular size and systolic performance.      Coronary CT angiogram on 8-:    The total Agatston calcium score is 832. A calcium score in this range places the individual in the 100th percentile when compared to an age and gender matched control group and implies a very high risk of cardiac events in the next ten years.    Short left main with mild stenosis.    Calcified plaques in proximal to mid LAD and first diagonal branch with mild stenosis less than 50% stenosis.    Normal left circumflex coronary artery.  Mild disease in proximal to mid RCA less than 40% stenosis. Right dominant system..       Lab Review   Lab Results   Component Value Date     01/20/2021    K 4.0 01/20/2021     01/20/2021    CO2 31 01/20/2021    BUN 26 01/20/2021    CREATININE 1.00 01/20/2021    CALCIUM 9.2 01/20/2021     Lab Results   Component Value Date    WBC 4.4 01/20/2021    HGB 12.2 01/20/2021    HCT 36.4 01/20/2021    MCV 94 01/20/2021     01/20/2021     Lab Results   Component Value Date    CHOL 156 10/29/2020    TRIG 95 10/29/2020    HDL 66 10/29/2020     Lab Results   Component Value Date    TROPONINI <0.01 07/31/2020     Lab Results   Component Value Date    TSH 2.25 05/04/2020               Thank you for allowing me to participate in the care of your patient.    Sincerely,     Kathie Lemons MD     [facility]

## 2022-04-13 NOTE — PROGRESS NOTES
Progress Notes by Kathie Lemons MD at 4/12/2021  3:10 PM     Author: Kathie Lemons MD Service: -- Author Type: Physician    Filed: 4/12/2021  3:51 PM Encounter Date: 4/12/2021 Status: Signed    : Kathie Lemons MD (Physician)           Click to link to [facility] HEART [facility] NOTE       Assessment/Plan:   1.  Coronary artery disease: The patient developed fatigue which could be anginal equivalent.  Her coronary CT angiogram in August 2020 was reported significantly elevated coronary calcium score to 882, no obstructive coronary artery disease at that time.  Her LDL was not well controlled.  Her coronary artery disease could be getting worse leading to her symptoms.  We discussed further evaluation and management.  Stress cardiac MRI is requested for evaluation of myocardial ischemia, heart function and structure.    Her aspirin 81 mg daily was discontinued, advised to restart it again.  Continue rosuvastatin.    3.  Essential hypertension: Her blood pressure is controlled with the hydrochlorothiazide.    4.  Dyslipidemia: Her last LDL was 106.  Increased Crestor to 40 mg at bedtime.  Repeat lipid profile and liver function in 3 months.    Thank you for the opportunity to be involved in the care of Geeta Berry. If you have any questions, please feel free to contact me.  I will see the patient again in follow-up 12 months and as needed.    Much or all of the text in this note was generated through the use of Dragon Dictate voice-to-text software. Errors in spelling or words which seem out of context are unintentional.   Sound alike errors, in particular, may have escaped editing.       History of Present Illness:   It is my pleasure to see Geeta Berry at the [facility] for evaluation of Follow-up. Geeta Berry is a 81 y.o. female with a medical history of coronary artery disease, essential hypertension, dyslipidemia, hypothyroidism, history of DVT, and hiatal  hernia.    The patient states that she developed fatigue over last several months.  She had no obvious chest pain, shortness of breath.  She has very mild dyspnea on exertion.  She denies palpitations, dizziness, orthopnea, PND or leg edema.  Her blood pressure and heart rate are controlled well.  Her LDL was not controlled, last one 106.      Past Medical History:     Patient Active Problem List   Diagnosis     History of DVT (deep vein thrombosis), right LE 12/2016     Acute iritis, h/o in 2012     Common bile duct calculus, ERCP x 6 in past. Cannot tolerate ursodiol     Constipation     GERD (gastroesophageal reflux disease)     HTN (hypertension)     HLD (hyperlipidemia)     Hypothyroidism     Osteoarthritis of lumbar spine     Osteoarthritis; knees, hips, cervical spine      Facet arthropathy, cervical     Dysthymia     Hiatal hernia     Chronic rhinitis     Coronary artery disease involving native coronary artery of native heart without angina pectoris     Chronic kidney disease, stage 3       Past Surgical History:     Past Surgical History:   Procedure Laterality Date     bilateral TIMO      with revision on both hips     CHOLECYSTECTOMY  1980     ERCP      x5 over 8 years. Last 8/2016     ERCP N/A 9/5/2017    Procedure: ENDOSCOPIC RETROGRADE CHOLANGIOPANCREATOGRAPHY, STONE EXTRACTION;  Surgeon: Henry Eller MD;  Location: [facility];  Service:      ERCP N/A 7/26/2019    Procedure: ENDOSCOPIC RETROGRADE CHOLANGIOPANCREATOGRAPHY, REMOVAL OF SLUDGE, DILATION OF STRICTURE;  Surgeon: Ryan Pastrana MD;  Location: [facility];  Service: Gastroenterology     HIP ARTHROSCOPY Left 1983     HIP SURGERY  2012    revision     JOINT REPLACEMENT       DC ESOPHAGOGASTRODUODENOSCOPY TRANSORAL DIAGNOSTIC N/A 9/10/2019    Procedure: ESOPHAGOGASTRODUODENOSCOPY (EGD);  Surgeon: Will Nash DO;  Location: [facility];  Service: General     REPLACEMENT TOTAL KNEE Left 2015     REVISION TOTAL HIP  ARTHROPLASTY Left 5/16/2017    Procedure: REVISION LEFT TOTAL HIP ARTHROPLASTY, BOTH COMPONENTS;  Surgeon: Tyson Peoples MD;  Location: [facility] OR;  Service:      SHOULDER SURGERY Left 2010    left total shoulder replacement     XR ERCP BILIARY AND PANCREAS  7/26/2019       Family History:     Family History   Problem Relation Age of Onset     Colon cancer Father      Diabetes Sister      Heart disease Mother        Social History:    reports that she has never smoked. She has never used smokeless tobacco. She reports that she does not drink alcohol or use drugs.    Review of Systems:   General: WNL  Eyes: WNL  Ears/Nose/Throat: WNL  Lungs: WNL  Heart: Leg Swelling  Stomach: WNL  Bladder: WNL  Muscle/Joints: Joint Pain  Skin: WNL  Nervous System: WNL  Mental Health: WNL     Blood: WNL    Meds:     Current Outpatient Medications:      acetaminophen (TYLENOL) 500 MG tablet, Take 500 mg by mouth 2 (two) times a day., Disp: , Rfl:      amoxicillin (AMOXIL) 500 MG capsule, Take 2,000 mg by mouth once as needed (prior to dental procedures)., Disp: , Rfl:      aspirin 81 mg chewable tablet, Chew 81 mg daily., Disp: , Rfl:      azelastine (ASTELIN) 137 mcg (0.1 %) nasal spray, 1 SPRAY INTO EACH NOSTRIL AT BEDTIME. USE IN EACH NOSTRIL AS DIRECTED, Disp: 30 mL, Rfl: 11     calcium carbonate (OS-ALOK) 600 mg calcium (1,500 mg) tablet, Take 600 mg by mouth daily. , Disp: , Rfl:      citalopram (CELEXA) 10 MG tablet, Take 0.5 tablets (5 mg total) by mouth daily., Disp: 90 tablet, Rfl: 1     fluticasone propionate (FLONASE) 50 mcg/actuation nasal spray, SPRAY 1 SPRAY INTO EACH NOSTRIL EVERY DAY, Disp: 48 g, Rfl: 3     hydroCHLOROthiazide (HYDRODIURIL) 25 MG tablet, Take 1 tablet (25 mg total) by mouth daily., Disp: 90 tablet, Rfl: 3     levothyroxine (SYNTHROID, LEVOTHROID) 50 MCG tablet, Take 1 tablet (50 mcg total) by mouth daily., Disp: 90 tablet, Rfl: 2     multivitamin with minerals (THERA-M) 9 mg iron-400 mcg Tab  "tablet, Take 1 tablet by mouth daily., Disp: , Rfl:      omeprazole (PRILOSEC) 20 MG capsule, Take 20 mg by mouth daily before breakfast., Disp: , Rfl:      polyethylene glycol (MIRALAX) 17 gram packet, Take 17 g by mouth daily.    , Disp: , Rfl:      rosuvastatin (CRESTOR) 20 MG tablet, Take 1 tablet (20 mg total) by mouth at bedtime., Disp: 30 tablet, Rfl: 11     senna-docusate (PERICOLACE) 8.6-50 mg tablet, Take 1 tablet by mouth 2 (two) times a day. , Disp: , Rfl:      diphenhydrAMINE-acetaminophen (TYLENOL PM EXTRA STRENGTH)  mg Tab, , Disp: , Rfl:      famotidine (PEPCID) 40 MG tablet, Take 1 tablet (40 mg total) by mouth every evening., Disp: 90 tablet, Rfl: 1     Allergies:   Atorvastatin, Codeine, Dipyridamole, and Duloxetine    Objective:      Physical Exam  176 lb (79.8 kg)  5' 7.5\" (1.715 m)  Body mass index is 27.16 kg/m .  /50 (Patient Site: Left Arm, Patient Position: Sitting, Cuff Size: Adult Regular)   Pulse (!) 52   Resp 16   Ht 5' 7.5\" (1.715 m)   Wt 176 lb (79.8 kg)   BMI 27.16 kg/m      General Appearance:   Awake, Alert, No acute distress.   HEENT:  Pupil equal, reactive to light. No scleral icterus; the mucous membranes were moist. No oral ulcers or thrush.    Neck: No cervical bruits. No JVD. No thyromegaly. No lymph node enlargement or tenderness.   Chest: The spine was straight. The chest was symmetric.   Lungs:   Respirations unlabored. Lungs are clear to auscultation. No crackles. No wheezing.   Cardiovascular:   RRR, normal first and second heart sounds with no murmurs. No rubs or gallops.    Abdomen:  Soft. No tenderness. Non-distended. Bowels sounds are present   Extremities: Equal posterior tibial pulses. No leg edema.   Skin: No rashes or ulcers. Warm, Dry.   Musculoskeletal: Tenderness to left breast area. No deformity.   Neurologic: Mood and affect are appropriate. No focal deficits.         EKG:  Personally reivewed  Sinus bradycardia   Otherwise normal ECG   When " compared with ECG of 23-JUL-2019 11:41,   No significant change was found     Cardiac Imaging Studies  ECHO on 9-:  1. Normal left ventricular size and systolic performance with a visually estimated ejection fraction of 60-65%.   2. There is trace aortic insufficiency.   3. Normal right ventricular size and systolic performance.      Coronary CT angiogram on 8-:    The total Agatston calcium score is 832. A calcium score in this range places the individual in the 100th percentile when compared to an age and gender matched control group and implies a very high risk of cardiac events in the next ten years.    Short left main with mild stenosis.    Calcified plaques in proximal to mid LAD and first diagonal branch with mild stenosis less than 50% stenosis.    Normal left circumflex coronary artery.  Mild disease in proximal to mid RCA less than 40% stenosis. Right dominant system..       Lab Review   Lab Results   Component Value Date     01/20/2021    K 4.0 01/20/2021     01/20/2021    CO2 31 01/20/2021    BUN 26 01/20/2021    CREATININE 1.00 01/20/2021    CALCIUM 9.2 01/20/2021     Lab Results   Component Value Date    WBC 4.4 01/20/2021    HGB 12.2 01/20/2021    HCT 36.4 01/20/2021    MCV 94 01/20/2021     01/20/2021     Lab Results   Component Value Date    CHOL 156 10/29/2020    TRIG 95 10/29/2020    HDL 66 10/29/2020     Lab Results   Component Value Date    TROPONINI <0.01 07/31/2020     Lab Results   Component Value Date    TSH 2.25 05/04/2020

## 2022-04-13 NOTE — PATIENT INSTRUCTIONS
Geeta Berry,    It is my pleasure to see you today at the [facility].    My recommendations for this visit are:     Increase Rosuvastatin from 20 to 40 mg at bedtime, repeat Lipid profile and ALT in 3 months.  You do not need appointment, just come over for blood drawal in the morning.  Stress cardiac MRI for evaluation of fatigue and heart disease to make sure you heart is doing good.  I will call you for your MRI report.  Will see you again in one year    Kathie Lemons MD, PhD

## 2022-04-26 ENCOUNTER — OFFICE VISIT (OUTPATIENT)
Dept: INTERNAL MEDICINE | Facility: CLINIC | Age: 82
End: 2022-04-26
Payer: COMMERCIAL

## 2022-04-26 VITALS
TEMPERATURE: 97.8 F | BODY MASS INDEX: 28.03 KG/M2 | RESPIRATION RATE: 16 BRPM | DIASTOLIC BLOOD PRESSURE: 63 MMHG | HEART RATE: 64 BPM | WEIGHT: 178.6 LBS | HEIGHT: 67 IN | SYSTOLIC BLOOD PRESSURE: 129 MMHG

## 2022-04-26 DIAGNOSIS — M70.61 GREATER TROCHANTERIC BURSITIS OF BOTH HIPS: Primary | ICD-10-CM

## 2022-04-26 DIAGNOSIS — M70.62 GREATER TROCHANTERIC BURSITIS OF BOTH HIPS: Primary | ICD-10-CM

## 2022-04-26 DIAGNOSIS — R53.83 OTHER FATIGUE: ICD-10-CM

## 2022-04-26 PROCEDURE — 99213 OFFICE O/P EST LOW 20 MIN: CPT | Mod: 25 | Performed by: NURSE PRACTITIONER

## 2022-04-26 PROCEDURE — 20610 DRAIN/INJ JOINT/BURSA W/O US: CPT | Mod: LT | Performed by: NURSE PRACTITIONER

## 2022-04-26 RX ORDER — TRIAMCINOLONE ACETONIDE 40 MG/ML
80 INJECTION, SUSPENSION INTRA-ARTICULAR; INTRAMUSCULAR ONCE
Status: COMPLETED | OUTPATIENT
Start: 2022-04-26 | End: 2022-04-26

## 2022-04-26 RX ADMIN — TRIAMCINOLONE ACETONIDE 80 MG: 40 INJECTION, SUSPENSION INTRA-ARTICULAR; INTRAMUSCULAR at 07:52

## 2022-04-26 ASSESSMENT — PAIN SCALES - GENERAL: PAINLEVEL: MILD PAIN (3)

## 2022-04-26 NOTE — PROGRESS NOTES
Internal Medicine Office Visit  [facility]   Patient Name: Geeta Berry  Patient Age: 81 year old  YOB: 1940  MRN: [id number]    Date of Visit: 4/26/2022  Patient presents with:  Imm/Inj: Joint Injection (left hip)            Assessment / Plan / Medical Decision Making:    Problem List Items Addressed This Visit        Nervous and Auditory    Greater trochanteric bursitis of both hips - Primary    Relevant Medications    triamcinolone (KENALOG-40) injection 80 mg (Completed)    Other Relevant Orders    Large Joint/Bursa injection and/or drainage (Shoulder, Knee) (Completed)      Other Visit Diagnoses     Other fatigue             - Left hip injection as noted below.  She is encouraged to use ice on the lateral hips particular at the end of the day  - Cardiology evaluation pending, could be a possible etiology for fatigue  - We reviewed other recent labs which were normal including TSH, iron/hgb, B12, vitamin D   - Fatigue could be related to caretaker responsibilities, encouraged her to consider respite care a couple of times a week   - Sleep could also be a factor, some nights she gets less than 7 hours. Melatonin could be tried     I am having Geeta Berry maintain her Cholecalciferol (VITAMIN D3 PO), Omega-3 Fatty Acids (OMEGA-3 FISH OIL PO), polyethylene glycol, levothyroxine, acetaminophen, citalopram, docusate sodium, azelastine, hydrochlorothiazide, fluticasone, omeprazole, Multiple Vitamin (MULTIVITAMIN ADULT PO), and rosuvastatin. We administered triamcinolone.          Orders Placed This Encounter   Procedures     Large Joint/Bursa injection and/or drainage (Shoulder, Knee)   Followup: No follow-ups on file. earlier if needed.        Jacy Mishra, NP, CNP        HPI:  Geeta Berry is a 81 year old year old who presents to the office today for left lateral hip pain.  She does have a history of greater trochanteric bursitis of both hips.  She would like to proceed  with a corticosteroid injection today.    She gets very fatigued. She finds that she needs a nap most days. She has an MRI stress test scheduled in the event that fatigue may be an anginal equivalent. She asks if taking iron may help. 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       Health Maintenance Review  Health Maintenance   Topic Date Due     DEPRESSION ACTION PLAN  Never done     MEDICARE ANNUAL WELLNESS VISIT  01/07/2022     MICROALBUMIN  01/20/2022     PHQ-9  09/29/2022     LIPID  11/24/2022     ANNUAL REVIEW OF HM ORDERS  11/24/2022     BMP  03/29/2023     FALL RISK ASSESSMENT  03/29/2023     HEMOGLOBIN  03/29/2023     DTAP/TDAP/TD IMMUNIZATION (3 - Td or Tdap) 11/20/2023     ADVANCE CARE PLANNING  01/07/2026     INFLUENZA VACCINE  Completed     Pneumococcal Vaccine: 65+ Years  Completed     URINALYSIS  Completed     ZOSTER IMMUNIZATION  Completed     COVID-19 Vaccine  Completed     IPV IMMUNIZATION  Aged Out     MENINGITIS IMMUNIZATION  Aged Out     A1C  Discontinued     DEXA  Discontinued     DIABETIC FOOT EXAM  Discontinued     EYE EXAM  Discontinued       Current Scheduled Meds:  Outpatient Encounter Medications as of 4/26/2022   Medication Sig Dispense Refill     acetaminophen (TYLENOL) 500 MG tablet Take 500-1,000 mg by mouth       azelastine (ASTELIN) 0.1 % nasal spray SPRAY 1 SPRAY INTO BOTH NOSTRILS AT BEDTIME 90 mL 1     Cholecalciferol (VITAMIN D3 PO) Take by mouth daily       citalopram (CELEXA) 10 MG tablet TAKE 1/2 TABLET BY MOUTH DAILY       docusate sodium (COLACE) 100 MG tablet Take 100 mg by mouth 2 times daily       fluticasone (FLONASE) 50 MCG/ACT nasal spray Spray 1 spray into both nostrils daily 16 g 3     hydrochlorothiazide (HYDRODIURIL) 25 MG tablet Take 1 tablet (25 mg) by mouth daily 90 tablet 3     levothyroxine (SYNTHROID/LEVOTHROID) 50 MCG tablet Take 50 mcg by mouth       Multiple Vitamin (MULTIVITAMIN ADULT PO)        Omega-3  "Fatty Acids (OMEGA-3 FISH OIL PO) Take 1 g by mouth daily        omeprazole (PRILOSEC) 40 MG DR capsule Take 1 capsule (40 mg) by mouth 2 times daily 60 capsule 3     polyethylene glycol (MIRALAX/GLYCOLAX) packet Take 1 packet by mouth daily       rosuvastatin (CRESTOR) 40 MG tablet Take 1 tablet (40 mg) by mouth At Bedtime 90 tablet 4     Facility-Administered Encounter Medications as of 4/26/2022   Medication Dose Route Frequency Provider Last Rate Last Admin     [COMPLETED] triamcinolone (KENALOG-40) injection 80 mg  80 mg INTRA-ARTICULAR Once Jacy Mishra NP   80 mg at 04/26/22 0752         Objective / Physical Examination:  Vitals:    04/26/22 0712   BP: 129/63   BP Location: Right arm   Patient Position: Sitting   Cuff Size: Adult Regular   Pulse: 64   Resp: 16   Temp: 97.8  F (36.6  C)   TempSrc: Oral   Weight: 81 kg (178 lb 9.6 oz)   Height: 1.689 m (5' 6.5\")     Wt Readings from Last 3 Encounters:   04/26/22 81 kg (178 lb 9.6 oz)   04/13/22 81.2 kg (179 lb)   03/29/22 80.3 kg (177 lb)     Body mass index is 28.39 kg/m .     Constitutional: In no apparent distress      Procedure:  Left trochanteric bursa injection.    Diagnosis:  Trochanteric bursitis.    Description of procedure:  Patient was given informed consent prior to proceeding with injection.  Patient agreed to proceed knowing the risks and benefits.  Patient was placed in a lateral decubitus position with affected hip exposed.  The superior, anterior and posterior aspects of the upper femur were marked.  About 1.5 inches below the superior aspect and midway between the anterior and posterior aspects, an injection spot was found.  Palpation reproduced the tenderness.  Using a 25G needle, a combination of 80 mg of Kenalog and 1 cc of lidocaine with epinephrine was injected slightly away from the bone.  Aftercare instructions were given and there were no immediate complications.      "

## 2022-05-09 DIAGNOSIS — J31.0 CHRONIC RHINITIS: ICD-10-CM

## 2022-05-11 RX ORDER — FLUTICASONE PROPIONATE 50 MCG
SPRAY, SUSPENSION (ML) NASAL
Qty: 48 G | Refills: 3 | Status: SHIPPED | OUTPATIENT
Start: 2022-05-11 | End: 2023-06-01

## 2022-05-11 NOTE — TELEPHONE ENCOUNTER
"Last Written Prescription Date:  12/24/21  Last Fill Quantity: 16 g,  # refills: 3   Last office visit provider:  4/26/22     Requested Prescriptions   Pending Prescriptions Disp Refills     fluticasone (FLONASE) 50 MCG/ACT nasal spray [Pharmacy Med Name: FLUTICASONE PROP 50 MCG SPRAY] 32 mL 1     Sig: INSTILL 1 SPRAY INTO BOTH NOSTRILS DAILY       Nasal Allergy Protocol Passed - 5/11/2022  8:08 AM        Passed - Patient is age 12 or older        Passed - Recent (12 mo) or future (30 days) visit within the authorizing provider's specialty     Patient has had an office visit with the authorizing provider or a provider within the authorizing providers department within the previous 12 mos or has a future within next 30 days. See \"Patient Info\" tab in inbasket, or \"Choose Columns\" in Meds & Orders section of the refill encounter.              Passed - Medication is active on med list             Ryan Duffy RN 05/11/22 8:08 AM    "

## 2022-05-15 ENCOUNTER — APPOINTMENT (OUTPATIENT)
Dept: CT IMAGING | Facility: HOSPITAL | Age: 82
End: 2022-05-15
Attending: STUDENT IN AN ORGANIZED HEALTH CARE EDUCATION/TRAINING PROGRAM
Payer: COMMERCIAL

## 2022-05-15 ENCOUNTER — HOSPITAL ENCOUNTER (EMERGENCY)
Facility: HOSPITAL | Age: 82
Discharge: HOME | End: 2022-05-15
Attending: EMERGENCY MEDICINE | Admitting: EMERGENCY MEDICINE
Payer: COMMERCIAL

## 2022-05-15 VITALS
BODY MASS INDEX: 27.94 KG/M2 | HEART RATE: 49 BPM | DIASTOLIC BLOOD PRESSURE: 77 MMHG | WEIGHT: 178 LBS | OXYGEN SATURATION: 96 % | RESPIRATION RATE: 18 BRPM | TEMPERATURE: 98 F | SYSTOLIC BLOOD PRESSURE: 154 MMHG | HEIGHT: 67 IN

## 2022-05-15 DIAGNOSIS — R74.8 ELEVATED LIVER ENZYMES: ICD-10-CM

## 2022-05-15 DIAGNOSIS — R00.1 SINUS BRADYCARDIA: ICD-10-CM

## 2022-05-15 DIAGNOSIS — R10.10 UPPER ABDOMINAL PAIN: ICD-10-CM

## 2022-05-15 LAB
ALBUMIN SERPL-MCNC: 3.6 G/DL (ref 3.5–5)
ALP SERPL-CCNC: 138 U/L (ref 45–120)
ALT SERPL W P-5'-P-CCNC: 147 U/L (ref 0–45)
ANION GAP SERPL CALCULATED.3IONS-SCNC: 9 MMOL/L (ref 5–18)
AST SERPL W P-5'-P-CCNC: 221 U/L (ref 0–40)
ATRIAL RATE - MUSE: 42 BPM
BASOPHILS # BLD AUTO: 0 10E3/UL (ref 0–0.2)
BASOPHILS NFR BLD AUTO: 0 %
BILIRUB SERPL-MCNC: 0.4 MG/DL (ref 0–1)
BUN SERPL-MCNC: 27 MG/DL (ref 8–28)
CALCIUM SERPL-MCNC: 9 MG/DL (ref 8.5–10.5)
CHLORIDE BLD-SCNC: 103 MMOL/L (ref 98–107)
CO2 SERPL-SCNC: 27 MMOL/L (ref 22–31)
CREAT SERPL-MCNC: 0.93 MG/DL (ref 0.6–1.1)
DIASTOLIC BLOOD PRESSURE - MUSE: NORMAL MMHG
EOSINOPHIL # BLD AUTO: 0 10E3/UL (ref 0–0.7)
EOSINOPHIL NFR BLD AUTO: 1 %
ERYTHROCYTE [DISTWIDTH] IN BLOOD BY AUTOMATED COUNT: 14.5 % (ref 10–15)
GFR SERPL CREATININE-BSD FRML MDRD: 61 ML/MIN/1.73M2
GLUCOSE BLD-MCNC: 130 MG/DL (ref 70–125)
HCT VFR BLD AUTO: 36.7 % (ref 35–47)
HGB BLD-MCNC: 12 G/DL (ref 11.7–15.7)
IMM GRANULOCYTES # BLD: 0 10E3/UL
IMM GRANULOCYTES NFR BLD: 0 %
INTERPRETATION ECG - MUSE: NORMAL
LIPASE SERPL-CCNC: 34 U/L (ref 0–52)
LYMPHOCYTES # BLD AUTO: 1.2 10E3/UL (ref 0.8–5.3)
LYMPHOCYTES NFR BLD AUTO: 29 %
MCH RBC QN AUTO: 31.7 PG (ref 26.5–33)
MCHC RBC AUTO-ENTMCNC: 32.7 G/DL (ref 31.5–36.5)
MCV RBC AUTO: 97 FL (ref 78–100)
MONOCYTES # BLD AUTO: 0.4 10E3/UL (ref 0–1.3)
MONOCYTES NFR BLD AUTO: 10 %
NEUTROPHILS # BLD AUTO: 2.4 10E3/UL (ref 1.6–8.3)
NEUTROPHILS NFR BLD AUTO: 60 %
NRBC # BLD AUTO: 0 10E3/UL
NRBC BLD AUTO-RTO: 0 /100
P AXIS - MUSE: 34 DEGREES
PLATELET # BLD AUTO: 210 10E3/UL (ref 150–450)
POTASSIUM BLD-SCNC: 3.7 MMOL/L (ref 3.5–5)
PR INTERVAL - MUSE: 176 MS
PROT SERPL-MCNC: 6.7 G/DL (ref 6–8)
QRS DURATION - MUSE: 110 MS
QT - MUSE: 462 MS
QTC - MUSE: 385 MS
R AXIS - MUSE: 66 DEGREES
RBC # BLD AUTO: 3.78 10E6/UL (ref 3.8–5.2)
SODIUM SERPL-SCNC: 139 MMOL/L (ref 136–145)
SYSTOLIC BLOOD PRESSURE - MUSE: NORMAL MMHG
T AXIS - MUSE: 76 DEGREES
TROPONIN I SERPL-MCNC: <0.01 NG/ML (ref 0–0.29)
VENTRICULAR RATE- MUSE: 42 BPM
WBC # BLD AUTO: 4 10E3/UL (ref 4–11)

## 2022-05-15 PROCEDURE — 71275 CT ANGIOGRAPHY CHEST: CPT

## 2022-05-15 PROCEDURE — 84484 ASSAY OF TROPONIN QUANT: CPT | Performed by: STUDENT IN AN ORGANIZED HEALTH CARE EDUCATION/TRAINING PROGRAM

## 2022-05-15 PROCEDURE — 85025 COMPLETE CBC W/AUTO DIFF WBC: CPT | Performed by: STUDENT IN AN ORGANIZED HEALTH CARE EDUCATION/TRAINING PROGRAM

## 2022-05-15 PROCEDURE — 83690 ASSAY OF LIPASE: CPT | Performed by: STUDENT IN AN ORGANIZED HEALTH CARE EDUCATION/TRAINING PROGRAM

## 2022-05-15 PROCEDURE — 36415 COLL VENOUS BLD VENIPUNCTURE: CPT | Performed by: STUDENT IN AN ORGANIZED HEALTH CARE EDUCATION/TRAINING PROGRAM

## 2022-05-15 PROCEDURE — 80053 COMPREHEN METABOLIC PANEL: CPT | Performed by: STUDENT IN AN ORGANIZED HEALTH CARE EDUCATION/TRAINING PROGRAM

## 2022-05-15 PROCEDURE — 250N000011 HC RX IP 250 OP 636: Performed by: EMERGENCY MEDICINE

## 2022-05-15 PROCEDURE — 93005 ELECTROCARDIOGRAM TRACING: CPT | Performed by: EMERGENCY MEDICINE

## 2022-05-15 PROCEDURE — 99285 EMERGENCY DEPT VISIT HI MDM: CPT | Mod: 25

## 2022-05-15 PROCEDURE — 74174 CTA ABD&PLVS W/CONTRAST: CPT

## 2022-05-15 RX ORDER — IOPAMIDOL 755 MG/ML
100 INJECTION, SOLUTION INTRAVASCULAR ONCE
Status: COMPLETED | OUTPATIENT
Start: 2022-05-15 | End: 2022-05-15

## 2022-05-15 RX ADMIN — IOPAMIDOL 100 ML: 755 INJECTION, SOLUTION INTRAVENOUS at 17:16

## 2022-05-15 NOTE — DISCHARGE INSTRUCTIONS
Please follow-up with your Primary Care Provider within 3-4 days for a recheck; call to arrange appointment.  Please have your primary provider monitor your liver enzymes, as they were elevated today.    Return to the ER for worsening / recurrent symptoms, if you have chest pain, shortness of breath, if you pass out or feel that you might, persistent nausea / vomiting, fever or other concerns.

## 2022-05-15 NOTE — ED PROVIDER NOTES
Emergency Department Encounter     Evaluation Date & Time:   No admission date for patient encounter.    CHIEF COMPLAINT:  Abdominal Pain      Triage Note:  Pt presents via EMS after having an episode of upper, mid abdominal pain into mid epigastric with belching and radiation of pain into back.  Pt states it started after drinking sparkling water.  Pt belching in triage.  Pt aslo has a heart rate in 40's and is not sure if she runs that low normally      Triage Assessment     Row Name 05/15/22 1523       Triage Assessment (Adult)    Airway WDL WDL       Respiratory WDL    Respiratory WDL WDL       Skin Circulation/Temperature WDL    Skin Circulation/Temperature WDL WDL       Cardiac WDL    Cardiac WDL X  bradycardic       Peripheral/Neurovascular WDL    Peripheral Neurovascular WDL WDL       Cognitive/Neuro/Behavioral WDL    Cognitive/Neuro/Behavioral WDL WDL                    Impression and Plan       FINAL IMPRESSION:    ICD-10-CM    1. Upper abdominal pain  R10.10    2. Elevated liver enzymes  R74.8    3. Sinus bradycardia  R00.1          ED COURSE & MEDICAL DECISION MAKIN year old female, history of CAD, HTN, HLD, DVT RLE (2016), CKD and s/p cholecystectomy, who presents for evaluation of upper abdominal pressure radiating around to her back associated with excessive belching. Patient reports 2 recent, similar episodes within the past few weeks. She denies associated N/V/D, chest pain and SOB.     On exam, she has bradycardic rate with regular rhythm. Abdomen soft and non-tender to palpation.    EKG performed and demonstrated marked sinus bradycardia, U waves present with no ST-T wave elevation consistent with ACS or pericarditis; no significant changes compared to previous EKG. Troponin WNL (<0.01).    Patient placed on cardiac monitor, IV access established and blood sent for labs.    Labs otherwise remarkable for no leukocytosis, anemia, significant electrolyte derangements or renal impairment.   She does have elevated liver enzymes (, ) with normal bilirubin and lipase.    CTA chest / abdomen / pelvis performed and demonstrated:  1.  Mild to moderate atheromatous plaque in the thoracic and abdominal aorta without evidence of dissection, aneurysm, or significant narrowing.  2.  A specific etiology for the patient's pain is not identified.  3.  Mild sigmoid diverticulosis without active inflammation.  4.  Degenerative changes in the thoracic and lumbar spine.    I did offer admission, however patient is feeling significantly improved and prefers discharge to home.  I suspect GI etiology rather than cardiac and patient was advised to follow-up with [facility] and her primary care provider in the meantime.  Return precautions provided.  Patient stable throughout ED course.       At the conclusion of the encounter I discussed the results of all the tests and the disposition. The questions were answered. The patient and family acknowledged understanding and were agreeable with the care plan.      MEDICATIONS GIVEN IN THE EMERGENCY DEPARTMENT:  Medications   iopamidol (ISOVUE-370) solution 100 mL (100 mLs Intravenous Given 5/15/22 1716)       NEW PRESCRIPTIONS STARTED AT TODAY'S ED VISIT:  Discharge Medication List as of 5/15/2022  6:52 PM          HPI     HPI     Geeta Berry is a 81 year old female, history of CAD, HTN, HLD, DVT RLE (2016), CKD and s/p cholecystectomy, who presents to this ED ambulatory for evaluation of abdominal pain.    The pain started ~40 minutes ago and has improved since onset. The pain is located across her upper abdomen with radiation into her back. She had a similar episode of pain earlier this week, which spontaneously resolved. The pain feels like an intense pressure and is associated with excessive belching. Belching does improve the pain. She denies associated N/V/D. No associated chest pain or shortness of breath.     She reports history of constipation with last BM this  morning. She has otherwise been in her usual state of health and denies urinary symptoms, cough, fever or other concerns.     REVIEW OF SYSTEMS:  All other systems reviewed and are negative.      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)      Chronic neck pain      Common bile duct calculus      Coronary artery disease due to calcified coronary lesion 8/6/2020     Depression      DVT (deep venous thrombosis) (H)      GERD (gastroesophageal reflux disease)      History of blood clots 2017     HLD (hyperlipidemia)      HTN (hypertension)      Hyperlipidemia      Hypertension      Hypothyroidism      Infectious viral hepatitis      Thyroid disease        Past Surgical History:   Procedure Laterality Date     ARTHROPLASTY REVISION HIP Left 5/16/2017    Procedure: REVISION LEFT TOTAL HIP ARTHROPLASTY, BOTH COMPONENTS;  Surgeon: Tyson Peoples MD;  Location: [facility];  Service:      ARTHROSCOPY HIP Left 1983     CHOLECYSTECTOMY  1980     ENDOSCOPIC RETROGRADE CHOLANGIOPANCREATOGRAM      x5 over 8 years. Last 8/2016     ENDOSCOPIC RETROGRADE CHOLANGIOPANCREATOGRAM N/A 9/5/2017    Procedure: ENDOSCOPIC RETROGRADE CHOLANGIOPANCREATOGRAPHY, STONE EXTRACTION;  Surgeon: Henry Eller MD;  Location: [facility];  Service:      ENDOSCOPIC RETROGRADE CHOLANGIOPANCREATOGRAM N/A 7/26/2019    Procedure: ENDOSCOPIC RETROGRADE CHOLANGIOPANCREATOGRAPHY, REMOVAL OF SLUDGE, DILATION OF STRICTURE;  Surgeon: Ryan Pastrana MD;  Location: [facility];  Service: Gastroenterology     HIP SURGERY  2012    revision     JOINT REPLACEMENT       ORTHOPEDIC SURGERY       OTHER SURGICAL HISTORY      bilateral THAwith revision on both hips     FL ESOPHAGOGASTRODUODENOSCOPY TRANSORAL DIAGNOSTIC N/A 9/10/2019    Procedure: ESOPHAGOGASTRODUODENOSCOPY (EGD);  Surgeon: Will Nash DO;  Location: [facility];  Service:  "General     REPLACEMENT TOTAL KNEE Left 2015     SHOULDER SURGERY Left 2010    left total shoulder replacement     XR ERCP BILIARY AND PANCREAS  7/26/2019     [facility] TOTAL KNEE ARTHROPLASTY Right 4/29/2021    Procedure: RIGHT TOTAL KNEE ARTHROPLASTY;  Surgeon: Satinder Issa DO;  Location: [facility] Main OR;  Service: Orthopedics       Family History   Problem Relation Age of Onset     Colon Cancer Father      Diabetes Sister      Heart Disease Mother        Social History     Tobacco Use     Smoking status: Never Smoker     Smokeless tobacco: Never Used   Vaping Use     Vaping Use: Never used   Substance Use Topics     Alcohol use: No     Drug use: No       acetaminophen (TYLENOL) 500 MG tablet  azelastine (ASTELIN) 0.1 % nasal spray  Cholecalciferol (VITAMIN D3 PO)  citalopram (CELEXA) 10 MG tablet  docusate sodium (COLACE) 100 MG tablet  fluticasone (FLONASE) 50 MCG/ACT nasal spray  hydrochlorothiazide (HYDRODIURIL) 25 MG tablet  levothyroxine (SYNTHROID/LEVOTHROID) 50 MCG tablet  Multiple Vitamin (MULTIVITAMIN ADULT PO)  Omega-3 Fatty Acids (OMEGA-3 FISH OIL PO)  omeprazole (PRILOSEC) 40 MG DR capsule  polyethylene glycol (MIRALAX/GLYCOLAX) packet  rosuvastatin (CRESTOR) 40 MG tablet        Physical Exam     First Vitals:  Patient Vitals for the past 24 hrs:   BP Temp Temp src Pulse Resp SpO2 Height Weight   05/15/22 1845 (!) 154/77 -- -- (!) 49 18 96 % -- --   05/15/22 1701 -- -- -- 54 -- 97 % -- --   05/15/22 1628 (!) 148/70 -- -- 53 16 98 % -- --   05/15/22 1521 (!) 164/78 98  F (36.7  C) Temporal (!) 44 16 98 % 1.702 m (5' 7\") 80.7 kg (178 lb)       PHYSICAL EXAM:   Physical Exam    GENERAL: Awake, alert.  In mild acute distress; patient frequently belching.   HEENT: Normocephalic, atraumatic. Pupils equal, round and reactive. Conjunctiva normal.   NECK: No stridor.  PULMONARY: Symmetrical breath sounds without distress.  Lungs clear to auscultation bilaterally without wheezes, rhonchi or rales.  CARDIO: " Bradycardic rate with regular rhythm.  No significant murmur, rub or gallop.  Radial pulses strong and symmetrical.  ABDOMINAL: Abdomen soft, non-distended and non-tender to palpation.    EXTREMITIES: No lower extremity swelling or edema.      NEURO: Alert and oriented to person, place and time.  Cranial nerves grossly intact.  No focal motor deficit.  PSYCH: Normal mood and affect.  SKIN: No rashes.     Results     LAB:  All pertinent labs reviewed and interpreted  Labs Ordered and Resulted from Time of ED Arrival to Time of ED Departure   COMPREHENSIVE METABOLIC PANEL - Abnormal       Result Value    Sodium 139      Potassium 3.7      Chloride 103      Carbon Dioxide (CO2) 27      Anion Gap 9      Urea Nitrogen 27      Creatinine 0.93      Calcium 9.0      Glucose 130 (*)     Alkaline Phosphatase 138 (*)      (*)      (*)     Protein Total 6.7      Albumin 3.6      Bilirubin Total 0.4      GFR Estimate 61     CBC WITH PLATELETS AND DIFFERENTIAL - Abnormal    WBC Count 4.0      RBC Count 3.78 (*)     Hemoglobin 12.0      Hematocrit 36.7      MCV 97      MCH 31.7      MCHC 32.7      RDW 14.5      Platelet Count 210      % Neutrophils 60      % Lymphocytes 29      % Monocytes 10      % Eosinophils 1      % Basophils 0      % Immature Granulocytes 0      NRBCs per 100 WBC 0      Absolute Neutrophils 2.4      Absolute Lymphocytes 1.2      Absolute Monocytes 0.4      Absolute Eosinophils 0.0      Absolute Basophils 0.0      Absolute Immature Granulocytes 0.0      Absolute NRBCs 0.0     LIPASE - Normal    Lipase 34     TROPONIN I - Normal    Troponin I <0.01         RADIOLOGY:  CTA Chest Abdomen Pelvis w Contrast   Final Result   IMPRESSION:   1.  Mild to moderate atheromatous plaque in the thoracic and abdominal aorta without evidence of dissection, aneurysm, or significant narrowing.   2.  A specific etiology for the patient's pain is not identified.   3.  Mild sigmoid diverticulosis without active  inflammation.   4.  Degenerative changes in the thoracic and lumbar spine.             EC/15/2022, 15:49; marked sinus bradycardia with rate of 42 bpm; normal intervals; normal conduction; U wave present; no ST-T wave elevation consistent with ACS or pericarditis; compared to previous EKG dated 2021, there are no significant changes    EKG independently reviewed and interpreted by MD Vivian Thorpe MD  Emergency Medicine  [facility] EMERGENCY DEPARTMENT           Vivian Ngo MD  22 [id number]

## 2022-05-15 NOTE — ED NOTES
LUE IV is slightly out, leaking blood around catheter. Patent, flushes well, no pain. Second IV inserted in R AC for CTA.

## 2022-05-16 ENCOUNTER — TELEPHONE (OUTPATIENT)
Dept: CARDIOLOGY | Facility: CLINIC | Age: 82
End: 2022-05-16
Payer: COMMERCIAL

## 2022-05-16 NOTE — TELEPHONE ENCOUNTER
Discussed with pt - informed pt that stress MRI and CTA chest / abd / pelvis are different tests and will tell MD different information.  Pt verbalized understanding and had no other questions.  -mira

## 2022-05-16 NOTE — TELEPHONE ENCOUNTER
[facility] Call Center    Phone Message    May a detailed message be left on voicemail: yes     Reason for Call: Other: pt had cardiac ct scan in hospital and would like to know if Dr Lemons wants her to still have a cardiac MR on 5/17/2022. Please call patietn to discuss     Action Taken: Other: routed [facility]    Travel Screening: Not Applicable

## 2022-05-17 ENCOUNTER — TELEPHONE (OUTPATIENT)
Dept: INTERNAL MEDICINE | Facility: CLINIC | Age: 82
End: 2022-05-17

## 2022-05-17 ENCOUNTER — HOSPITAL ENCOUNTER (OUTPATIENT)
Dept: MRI IMAGING | Facility: HOSPITAL | Age: 82
Discharge: HOME | End: 2022-05-17
Attending: INTERNAL MEDICINE
Payer: COMMERCIAL

## 2022-05-17 VITALS
HEART RATE: 81 BPM | RESPIRATION RATE: 18 BRPM | DIASTOLIC BLOOD PRESSURE: 60 MMHG | SYSTOLIC BLOOD PRESSURE: 122 MMHG | OXYGEN SATURATION: 95 %

## 2022-05-17 DIAGNOSIS — I25.119 CORONARY ARTERY DISEASE INVOLVING NATIVE CORONARY ARTERY OF NATIVE HEART WITH ANGINA PECTORIS (H): ICD-10-CM

## 2022-05-17 LAB
ATRIAL RATE - MUSE: 47 BPM
ATRIAL RATE - MUSE: 73 BPM
DIASTOLIC BLOOD PRESSURE - MUSE: NORMAL MMHG
DIASTOLIC BLOOD PRESSURE - MUSE: NORMAL MMHG
INTERPRETATION ECG - MUSE: NORMAL
INTERPRETATION ECG - MUSE: NORMAL
P AXIS - MUSE: 50 DEGREES
P AXIS - MUSE: 69 DEGREES
PR INTERVAL - MUSE: 178 MS
PR INTERVAL - MUSE: 180 MS
QRS DURATION - MUSE: 100 MS
QRS DURATION - MUSE: 98 MS
QT - MUSE: 386 MS
QT - MUSE: 424 MS
QTC - MUSE: 375 MS
QTC - MUSE: 425 MS
R AXIS - MUSE: 66 DEGREES
R AXIS - MUSE: 66 DEGREES
SYSTOLIC BLOOD PRESSURE - MUSE: NORMAL MMHG
SYSTOLIC BLOOD PRESSURE - MUSE: NORMAL MMHG
T AXIS - MUSE: 66 DEGREES
T AXIS - MUSE: 87 DEGREES
VENTRICULAR RATE- MUSE: 47 BPM
VENTRICULAR RATE- MUSE: 73 BPM

## 2022-05-17 PROCEDURE — A9585 GADOBUTROL INJECTION: HCPCS | Performed by: INTERNAL MEDICINE

## 2022-05-17 PROCEDURE — 75563 CARD MRI W/STRESS IMG & DYE: CPT

## 2022-05-17 PROCEDURE — 93018 CV STRESS TEST I&R ONLY: CPT | Performed by: GENERAL ACUTE CARE HOSPITAL

## 2022-05-17 PROCEDURE — 75563 CARD MRI W/STRESS IMG & DYE: CPT | Mod: 26 | Performed by: GENERAL ACUTE CARE HOSPITAL

## 2022-05-17 PROCEDURE — 255N000002 HC RX 255 OP 636: Performed by: INTERNAL MEDICINE

## 2022-05-17 PROCEDURE — 93010 ELECTROCARDIOGRAM REPORT: CPT | Performed by: INTERNAL MEDICINE

## 2022-05-17 PROCEDURE — 250N000011 HC RX IP 250 OP 636: Performed by: INTERNAL MEDICINE

## 2022-05-17 PROCEDURE — 999N000122 MR MYOCARDIUM  OVERREAD

## 2022-05-17 PROCEDURE — 93018 CV STRESS TEST I&R ONLY: CPT | Performed by: INTERNAL MEDICINE

## 2022-05-17 PROCEDURE — 93016 CV STRESS TEST SUPVJ ONLY: CPT | Performed by: INTERNAL MEDICINE

## 2022-05-17 PROCEDURE — 93005 ELECTROCARDIOGRAM TRACING: CPT

## 2022-05-17 RX ORDER — AMINOPHYLLINE 25 MG/ML
50 INJECTION, SOLUTION INTRAVENOUS
Status: DISCONTINUED | OUTPATIENT
Start: 2022-05-17 | End: 2022-05-17 | Stop reason: HOSPADM

## 2022-05-17 RX ORDER — REGADENOSON 0.08 MG/ML
0.4 INJECTION, SOLUTION INTRAVENOUS ONCE
Status: COMPLETED | OUTPATIENT
Start: 2022-05-17 | End: 2022-05-17

## 2022-05-17 RX ORDER — GADOBUTROL 604.72 MG/ML
20 INJECTION INTRAVENOUS ONCE
Status: COMPLETED | OUTPATIENT
Start: 2022-05-17 | End: 2022-05-17

## 2022-05-17 RX ADMIN — GADOBUTROL 20 ML: 604.72 INJECTION INTRAVENOUS at 10:58

## 2022-05-17 RX ADMIN — REGADENOSON 0.4 MG: 0.08 INJECTION, SOLUTION INTRAVENOUS at 11:40

## 2022-05-17 NOTE — TELEPHONE ENCOUNTER
Patient called yesterday as well so there is another telephone encounter open related to this.    Only availability is next week Thursday 05/26/2022 @ 2:30 PM virtual slot.    Okay to use?

## 2022-05-17 NOTE — TELEPHONE ENCOUNTER
Reason for Call:  Same Day Appointment, Requested Provider:   Jacy Mishra    PCP: Jacy Mishra    Reason for visit: Hospital F/U    Duration of symptoms: Sunday    Have you been treated for this in the past? Yes    Additional comments: Patient is needing an Hospital F/U sometime next week    Can we leave a detailed message on this number? YES    Phone number patient can be reached at: Home number on file [phone] (home)    Best Time: after 2:00    Call taken on 5/17/2022 at 8:58 AM by Ana Andino

## 2022-05-18 ENCOUNTER — VIRTUAL VISIT (OUTPATIENT)
Dept: INTERNAL MEDICINE | Facility: CLINIC | Age: 82
End: 2022-05-18
Payer: COMMERCIAL

## 2022-05-18 DIAGNOSIS — U07.1 INFECTION DUE TO 2019 NOVEL CORONAVIRUS: Primary | ICD-10-CM

## 2022-05-18 PROCEDURE — 99213 OFFICE O/P EST LOW 20 MIN: CPT | Mod: 95 | Performed by: NURSE PRACTITIONER

## 2022-05-18 NOTE — PROGRESS NOTES
"Geeta is a 81 year old who is being evaluated via a billable telephone visit.        Assessment & Plan   Problem List Items Addressed This Visit    None     Visit Diagnoses     Infection due to 2019 novel coronavirus    -  Primary    Relevant Medications    nirmatrelvir and ritonavir (PAXLOVID) therapy pack         - Reviewed treatment options as she is considered high risk for severe disease related to COVID. She is open to taking Paxlovid  - STOP: rosuvastatin while taking Paxlovid. Resume once treatment has been completed   - Seek care for worsening symptoms including SOB, chest pain, oxygen levels <90%. Her  will be going to the hospital as she is unable to care for him and he is very weak       BMI:   Estimated body mass index is 27.88 kg/m  as calculated from the following:    Height as of 5/15/22: 1.702 m (5' 7\").    Weight as of 5/15/22: 80.7 kg (178 lb).     Return in about 3 days (around 5/21/2022), or if symptoms worsen or fail to improve.    Jacy Mishra NP  [facility]    Subjective   Geeta is a 81 year old who presents for the following health issues    HPI     She tested positive for COVID this morning. Symptoms are mild at this time, it feels like she has a sinus infection with sinus drainage. Otherwise, no SOB, cough, chest pain. Her  is also positive for COVID but very weak and tired. He cannot get out of bed.           Objective    Vitals - Patient Reported  SpO2 (Patient Reported): 97      Vitals:  No vitals were obtained today due to virtual visit.    Physical Exam   Alert and no distress  PSYCH: Alert and oriented times 3; coherent speech, normal   rate and volume, able to articulate logical thoughts, able   to abstract reason, no tangential thoughts, no hallucinations   or delusions  Her affect is normal  RESP: No cough, no audible wheezing, able to talk in full sentences  Remainder of exam unable to be completed due to telephone visits          Phone call " duration: 12 minutes

## 2022-05-26 ENCOUNTER — VIRTUAL VISIT (OUTPATIENT)
Dept: INTERNAL MEDICINE | Facility: CLINIC | Age: 82
End: 2022-05-26
Payer: COMMERCIAL

## 2022-05-26 ENCOUNTER — TELEPHONE (OUTPATIENT)
Dept: INTERNAL MEDICINE | Facility: CLINIC | Age: 82
End: 2022-05-26
Payer: COMMERCIAL

## 2022-05-26 DIAGNOSIS — K21.9 GASTROESOPHAGEAL REFLUX DISEASE WITHOUT ESOPHAGITIS: ICD-10-CM

## 2022-05-26 DIAGNOSIS — R10.13 EPIGASTRIC PAIN: Primary | ICD-10-CM

## 2022-05-26 PROCEDURE — 99213 OFFICE O/P EST LOW 20 MIN: CPT | Mod: 95 | Performed by: NURSE PRACTITIONER

## 2022-05-26 NOTE — PROGRESS NOTES
Internal Medicine Virtual Visit  [facility]   Patient Name: Geeta Berry  Patient Age: 81 year old  YOB: 1940  MRN: [id number]    Date of Visit: 5/26/2022  Patient presents with:  Hospital F/U: 05/15 [facility] abdominal pain, daughter states comes and goes and has had some few attacks since then       Video Start Time: 2:51 PM      Assessment / Plan / Medical Decision Making:    Problem List Items Addressed This Visit    None     Visit Diagnoses     Epigastric pain    -  Primary    Relevant Orders    US Abdomen Complete    Adult Gastro Ref - Consult Only    Hepatic panel (Albumin, ALT, AST, Bili, Alk Phos, TP)    Basic metabolic panel         - With her history of retained common bile duct stones and elevation in liver function test, I question whether this is the etiology of the recurrent epigastric pain.  We are going to schedule her for a follow-up hepatic panel to trend liver enzyme test.  Additionally, referral was placed to gastroenterology.  - Continue omeprazole 40 mg BID     I am having Geeta Berry maintain her Cholecalciferol (VITAMIN D3 PO), Omega-3 Fatty Acids (OMEGA-3 FISH OIL PO), polyethylene glycol, levothyroxine, acetaminophen, citalopram, docusate sodium, azelastine, hydrochlorothiazide, omeprazole, Multiple Vitamin (MULTIVITAMIN ADULT PO), rosuvastatin, and fluticasone.          Orders Placed This Encounter   Procedures     US Abdomen Complete     Hepatic panel (Albumin, ALT, AST, Bili, Alk Phos, TP)     Basic metabolic panel     Adult Gastro Ref - Consult Only   Followup: No follow-ups on file. earlier if needed.      Jacy Mishra, DNP, APRN, CNP           HPI:  Geeta Berry is a 81 year old year old who was contacted virtually today for follow-up of several issues.    She was recently seen in the emergency department with abdominal pain as well as complaints of belching.  CT of the abdomen and pelvis did not show any acute etiology.  She does have a  "prior history of hiatal hernia and severe acid reflux.  She has also had repeat ERCP procedures for retained common bile duct stones.  She reports that she has had 2 \"attacks\" of pain since the emergency department visit and is afraid to eat.  She is not certain whether there is an association with the types of foods that she eats.  Her most recent upper endoscopy was completed 12/2021 and showed mild inflammation in the gastric antrum consistent with gastritis and was a source of anemia at the time. Colonoscopy was also completed at this time and showed diverticulosis in the sigmoid and descending colon as well as internal hemorrhoids.    She tested positive for COVID on 5/18/2022.  She was prescribed Paxil been which she tolerated well and has completed.      Health Maintenance Review  Health Maintenance   Topic Date Due     DEPRESSION ACTION PLAN  Never done     MEDICARE ANNUAL WELLNESS VISIT  01/07/2022     MICROALBUMIN  01/20/2022     PHQ-9  09/29/2022     LIPID  11/24/2022     ANNUAL REVIEW OF HM ORDERS  11/24/2022     FALL RISK ASSESSMENT  03/29/2023     BMP  05/15/2023     HEMOGLOBIN  05/15/2023     DTAP/TDAP/TD IMMUNIZATION (3 - Td or Tdap) 11/20/2023     ADVANCE CARE PLANNING  01/07/2026     INFLUENZA VACCINE  Completed     Pneumococcal Vaccine: 65+ Years  Completed     URINALYSIS  Completed     ZOSTER IMMUNIZATION  Completed     COVID-19 Vaccine  Completed     IPV IMMUNIZATION  Aged Out     MENINGITIS IMMUNIZATION  Aged Out     A1C  Discontinued     DEXA  Discontinued     DIABETIC FOOT EXAM  Discontinued     EYE EXAM  Discontinued       Current Scheduled Meds:  Outpatient Encounter Medications as of 5/26/2022   Medication Sig Dispense Refill     acetaminophen (TYLENOL) 500 MG tablet Take 500-1,000 mg by mouth       azelastine (ASTELIN) 0.1 % nasal spray SPRAY 1 SPRAY INTO BOTH NOSTRILS AT BEDTIME 90 mL 1     Cholecalciferol (VITAMIN D3 PO) Take by mouth daily       citalopram (CELEXA) 10 MG tablet TAKE " 1/2 TABLET BY MOUTH DAILY       docusate sodium (COLACE) 100 MG tablet Take 100 mg by mouth 2 times daily       fluticasone (FLONASE) 50 MCG/ACT nasal spray INSTILL 1 SPRAY INTO BOTH NOSTRILS DAILY 48 g 3     hydrochlorothiazide (HYDRODIURIL) 25 MG tablet Take 1 tablet (25 mg) by mouth daily 90 tablet 3     levothyroxine (SYNTHROID/LEVOTHROID) 50 MCG tablet Take 50 mcg by mouth       Multiple Vitamin (MULTIVITAMIN ADULT PO)        Omega-3 Fatty Acids (OMEGA-3 FISH OIL PO) Take 1 g by mouth daily        omeprazole (PRILOSEC) 40 MG DR capsule Take 1 capsule (40 mg) by mouth 2 times daily 60 capsule 3     polyethylene glycol (MIRALAX/GLYCOLAX) packet Take 1 packet by mouth daily       rosuvastatin (CRESTOR) 40 MG tablet Take 1 tablet (40 mg) by mouth At Bedtime 90 tablet 4     No facility-administered encounter medications on file as of 5/26/2022.         Objective / Physical Examination:  There were no vitals filed for this visit.  Wt Readings from Last 3 Encounters:   05/15/22 80.7 kg (178 lb)   04/26/22 81 kg (178 lb 9.6 oz)   04/13/22 81.2 kg (179 lb)     There is no height or weight on file to calculate BMI.     GENERAL: Healthy, alert and no distress  EYES: Eyes grossly normal to inspection.  No discharge or erythema, or obvious scleral/conjunctival abnormalities.  RESP: No audible wheeze, cough, or visible cyanosis.  No visible retractions or increased work of breathing.    PSYCH: Mentation appears normal, affect normal/bright, judgement and insight intact, normal speech and appearance well-groomed.        Video-Visit Details    Type of service:  Video Visit    Video End Time:3:18 PM    Originating Location (pt. Location): Home    Distant Location (provider location):  [facility]     Platform used for Video Visit: [employer]

## 2022-05-31 ENCOUNTER — TRANSFERRED RECORDS (OUTPATIENT)
Dept: HEALTH INFORMATION MANAGEMENT | Facility: CLINIC | Age: 82
End: 2022-05-31
Payer: COMMERCIAL

## 2022-06-02 ENCOUNTER — LAB (OUTPATIENT)
Dept: LAB | Facility: CLINIC | Age: 82
End: 2022-06-02
Payer: COMMERCIAL

## 2022-06-02 DIAGNOSIS — N18.31 STAGE 3A CHRONIC KIDNEY DISEASE (H): Primary | ICD-10-CM

## 2022-06-02 DIAGNOSIS — R10.13 EPIGASTRIC PAIN: ICD-10-CM

## 2022-06-02 LAB
ALBUMIN SERPL-MCNC: 3.5 G/DL (ref 3.5–5)
ALP SERPL-CCNC: 77 U/L (ref 45–120)
ALT SERPL W P-5'-P-CCNC: 62 U/L (ref 0–45)
ANION GAP SERPL CALCULATED.3IONS-SCNC: 11 MMOL/L (ref 5–18)
AST SERPL W P-5'-P-CCNC: 94 U/L (ref 0–40)
BILIRUB DIRECT SERPL-MCNC: 0.1 MG/DL
BILIRUB SERPL-MCNC: 0.3 MG/DL (ref 0–1)
BUN SERPL-MCNC: 16 MG/DL (ref 8–28)
CALCIUM SERPL-MCNC: 9 MG/DL (ref 8.5–10.5)
CHLORIDE BLD-SCNC: 102 MMOL/L (ref 98–107)
CO2 SERPL-SCNC: 26 MMOL/L (ref 22–31)
CREAT SERPL-MCNC: 0.85 MG/DL (ref 0.6–1.1)
CREAT UR-MCNC: 39 MG/DL
GFR SERPL CREATININE-BSD FRML MDRD: 68 ML/MIN/1.73M2
GLUCOSE BLD-MCNC: 93 MG/DL (ref 70–125)
MICROALBUMIN UR-MCNC: 0.53 MG/DL (ref 0–1.99)
MICROALBUMIN/CREAT UR: 13.6 MG/G CR
POTASSIUM BLD-SCNC: 3.6 MMOL/L (ref 3.5–5)
PROT SERPL-MCNC: 6.4 G/DL (ref 6–8)
SODIUM SERPL-SCNC: 139 MMOL/L (ref 136–145)

## 2022-06-02 PROCEDURE — 80053 COMPREHEN METABOLIC PANEL: CPT

## 2022-06-02 PROCEDURE — 82248 BILIRUBIN DIRECT: CPT

## 2022-06-02 PROCEDURE — 82043 UR ALBUMIN QUANTITATIVE: CPT

## 2022-06-02 PROCEDURE — 36415 COLL VENOUS BLD VENIPUNCTURE: CPT

## 2022-06-04 ENCOUNTER — APPOINTMENT (OUTPATIENT)
Dept: CT IMAGING | Facility: HOSPITAL | Age: 82
DRG: 444 | End: 2022-06-04
Payer: COMMERCIAL

## 2022-06-04 ENCOUNTER — HOSPITAL ENCOUNTER (INPATIENT)
Facility: HOSPITAL | Age: 82
LOS: 3 days | Discharge: HOME | DRG: 444 | End: 2022-06-07
Attending: EMERGENCY MEDICINE | Admitting: INTERNAL MEDICINE
Payer: COMMERCIAL

## 2022-06-04 ENCOUNTER — APPOINTMENT (OUTPATIENT)
Dept: ULTRASOUND IMAGING | Facility: HOSPITAL | Age: 82
DRG: 444 | End: 2022-06-04
Payer: COMMERCIAL

## 2022-06-04 DIAGNOSIS — E87.6 HYPOKALEMIA: ICD-10-CM

## 2022-06-04 DIAGNOSIS — K80.50 CHOLEDOCHOLITHIASIS: Primary | ICD-10-CM

## 2022-06-04 DIAGNOSIS — R10.13 EPIGASTRIC PAIN: ICD-10-CM

## 2022-06-04 DIAGNOSIS — R74.8 ELEVATED LIVER ENZYMES: ICD-10-CM

## 2022-06-04 DIAGNOSIS — I25.10 CORONARY ARTERY DISEASE INVOLVING NATIVE CORONARY ARTERY OF NATIVE HEART WITHOUT ANGINA PECTORIS: ICD-10-CM

## 2022-06-04 LAB
ALBUMIN SERPL-MCNC: 3.7 G/DL (ref 3.5–5)
ALP SERPL-CCNC: 158 U/L (ref 45–120)
ALT SERPL W P-5'-P-CCNC: 402 U/L (ref 0–45)
ANION GAP SERPL CALCULATED.3IONS-SCNC: 12 MMOL/L (ref 5–18)
AST SERPL W P-5'-P-CCNC: 483 U/L (ref 0–40)
BILIRUB DIRECT SERPL-MCNC: 0.3 MG/DL
BILIRUB SERPL-MCNC: 0.7 MG/DL (ref 0–1)
BUN SERPL-MCNC: 16 MG/DL (ref 8–28)
CALCIUM SERPL-MCNC: 9 MG/DL (ref 8.5–10.5)
CHLORIDE BLD-SCNC: 103 MMOL/L (ref 98–107)
CO2 SERPL-SCNC: 23 MMOL/L (ref 22–31)
CREAT SERPL-MCNC: 0.96 MG/DL (ref 0.6–1.1)
ERYTHROCYTE [DISTWIDTH] IN BLOOD BY AUTOMATED COUNT: 14.6 % (ref 10–15)
GFR SERPL CREATININE-BSD FRML MDRD: 59 ML/MIN/1.73M2
GLUCOSE BLD-MCNC: 113 MG/DL (ref 70–125)
HCT VFR BLD AUTO: 38.5 % (ref 35–47)
HGB BLD-MCNC: 12.5 G/DL (ref 11.7–15.7)
LIPASE SERPL-CCNC: 17 U/L (ref 0–52)
MAGNESIUM SERPL-MCNC: 1.7 MG/DL (ref 1.8–2.6)
MCH RBC QN AUTO: 31.6 PG (ref 26.5–33)
MCHC RBC AUTO-ENTMCNC: 32.5 G/DL (ref 31.5–36.5)
MCV RBC AUTO: 98 FL (ref 78–100)
PLATELET # BLD AUTO: 229 10E3/UL (ref 150–450)
POTASSIUM BLD-SCNC: 3 MMOL/L (ref 3.5–5)
PROT SERPL-MCNC: 6.8 G/DL (ref 6–8)
RBC # BLD AUTO: 3.95 10E6/UL (ref 3.8–5.2)
SARS-COV-2 RNA RESP QL NAA+PROBE: POSITIVE
SODIUM SERPL-SCNC: 138 MMOL/L (ref 136–145)
TROPONIN I SERPL-MCNC: <0.01 NG/ML (ref 0–0.29)
WBC # BLD AUTO: 7.2 10E3/UL (ref 4–11)

## 2022-06-04 PROCEDURE — 80053 COMPREHEN METABOLIC PANEL: CPT | Performed by: NURSE PRACTITIONER

## 2022-06-04 PROCEDURE — 36415 COLL VENOUS BLD VENIPUNCTURE: CPT | Performed by: NURSE PRACTITIONER

## 2022-06-04 PROCEDURE — 82248 BILIRUBIN DIRECT: CPT | Performed by: NURSE PRACTITIONER

## 2022-06-04 PROCEDURE — 87635 SARS-COV-2 COVID-19 AMP PRB: CPT | Performed by: EMERGENCY MEDICINE

## 2022-06-04 PROCEDURE — 74177 CT ABD & PELVIS W/CONTRAST: CPT

## 2022-06-04 PROCEDURE — 84484 ASSAY OF TROPONIN QUANT: CPT | Performed by: NURSE PRACTITIONER

## 2022-06-04 PROCEDURE — 76700 US EXAM ABDOM COMPLETE: CPT

## 2022-06-04 PROCEDURE — 250N000011 HC RX IP 250 OP 636: Performed by: NURSE PRACTITIONER

## 2022-06-04 PROCEDURE — 96375 TX/PRO/DX INJ NEW DRUG ADDON: CPT

## 2022-06-04 PROCEDURE — 99285 EMERGENCY DEPT VISIT HI MDM: CPT | Mod: 25

## 2022-06-04 PROCEDURE — 120N000001 HC R&B MED SURG/OB

## 2022-06-04 PROCEDURE — 99222 1ST HOSP IP/OBS MODERATE 55: CPT | Performed by: INTERNAL MEDICINE

## 2022-06-04 PROCEDURE — 96366 THER/PROPH/DIAG IV INF ADDON: CPT

## 2022-06-04 PROCEDURE — 83735 ASSAY OF MAGNESIUM: CPT | Performed by: INTERNAL MEDICINE

## 2022-06-04 PROCEDURE — 96365 THER/PROPH/DIAG IV INF INIT: CPT | Mod: 59

## 2022-06-04 PROCEDURE — 250N000011 HC RX IP 250 OP 636: Performed by: INTERNAL MEDICINE

## 2022-06-04 PROCEDURE — 93005 ELECTROCARDIOGRAM TRACING: CPT | Performed by: NURSE PRACTITIONER

## 2022-06-04 PROCEDURE — 83690 ASSAY OF LIPASE: CPT | Performed by: NURSE PRACTITIONER

## 2022-06-04 PROCEDURE — 85027 COMPLETE CBC AUTOMATED: CPT | Performed by: NURSE PRACTITIONER

## 2022-06-04 PROCEDURE — 96368 THER/DIAG CONCURRENT INF: CPT

## 2022-06-04 PROCEDURE — C9803 HOPD COVID-19 SPEC COLLECT: HCPCS

## 2022-06-04 PROCEDURE — 96367 TX/PROPH/DG ADDL SEQ IV INF: CPT

## 2022-06-04 RX ORDER — LIDOCAINE 40 MG/G
CREAM TOPICAL
Status: DISCONTINUED | OUTPATIENT
Start: 2022-06-04 | End: 2022-06-07 | Stop reason: HOSPADM

## 2022-06-04 RX ORDER — FLUTICASONE PROPIONATE 50 MCG
1 SPRAY, SUSPENSION (ML) NASAL DAILY
Status: DISCONTINUED | OUTPATIENT
Start: 2022-06-05 | End: 2022-06-07 | Stop reason: HOSPADM

## 2022-06-04 RX ORDER — IOPAMIDOL 755 MG/ML
75 INJECTION, SOLUTION INTRAVASCULAR ONCE
Status: COMPLETED | OUTPATIENT
Start: 2022-06-04 | End: 2022-06-04

## 2022-06-04 RX ORDER — CALCIUM CARBONATE 500 MG/1
1000 TABLET, CHEWABLE ORAL 4 TIMES DAILY PRN
Status: DISCONTINUED | OUTPATIENT
Start: 2022-06-04 | End: 2022-06-07 | Stop reason: HOSPADM

## 2022-06-04 RX ORDER — DOCUSATE SODIUM 100 MG/1
100 CAPSULE, LIQUID FILLED ORAL 2 TIMES DAILY
Status: DISCONTINUED | OUTPATIENT
Start: 2022-06-04 | End: 2022-06-07 | Stop reason: HOSPADM

## 2022-06-04 RX ORDER — MULTIPLE VITAMINS W/ MINERALS TAB 9MG-400MCG
1 TAB ORAL DAILY
COMMUNITY

## 2022-06-04 RX ORDER — SENNOSIDES 8.6 MG
650 CAPSULE ORAL 3 TIMES DAILY
COMMUNITY
End: 2023-10-20 | Stop reason: DRUGHIGH

## 2022-06-04 RX ORDER — AZELASTINE 1 MG/ML
1 SPRAY, METERED NASAL AT BEDTIME
Status: DISCONTINUED | OUTPATIENT
Start: 2022-06-04 | End: 2022-06-07 | Stop reason: HOSPADM

## 2022-06-04 RX ORDER — PIPERACILLIN SODIUM, TAZOBACTAM SODIUM 3; .375 G/15ML; G/15ML
3.38 INJECTION, POWDER, LYOPHILIZED, FOR SOLUTION INTRAVENOUS ONCE
Status: COMPLETED | OUTPATIENT
Start: 2022-06-04 | End: 2022-06-05

## 2022-06-04 RX ORDER — ONDANSETRON 4 MG/1
4 TABLET, ORALLY DISINTEGRATING ORAL EVERY 6 HOURS PRN
Status: DISCONTINUED | OUTPATIENT
Start: 2022-06-04 | End: 2022-06-07 | Stop reason: HOSPADM

## 2022-06-04 RX ORDER — SIMETHICONE 80 MG
80 TABLET,CHEWABLE ORAL DAILY
Status: DISCONTINUED | OUTPATIENT
Start: 2022-06-05 | End: 2022-06-07 | Stop reason: HOSPADM

## 2022-06-04 RX ORDER — PANTOPRAZOLE SODIUM 40 MG/1
40 TABLET, DELAYED RELEASE ORAL
Status: DISCONTINUED | OUTPATIENT
Start: 2022-06-05 | End: 2022-06-07 | Stop reason: HOSPADM

## 2022-06-04 RX ORDER — PIPERACILLIN SODIUM, TAZOBACTAM SODIUM 3; .375 G/15ML; G/15ML
3.38 INJECTION, POWDER, LYOPHILIZED, FOR SOLUTION INTRAVENOUS EVERY 8 HOURS
Status: DISCONTINUED | OUTPATIENT
Start: 2022-06-05 | End: 2022-06-07

## 2022-06-04 RX ORDER — POTASSIUM CHLORIDE 7.45 MG/ML
10 INJECTION INTRAVENOUS
Status: DISPENSED | OUTPATIENT
Start: 2022-06-04 | End: 2022-06-04

## 2022-06-04 RX ORDER — AMOXICILLIN 500 MG/1
2000 CAPSULE ORAL DAILY PRN
COMMUNITY

## 2022-06-04 RX ORDER — ONDANSETRON 2 MG/ML
4 INJECTION INTRAMUSCULAR; INTRAVENOUS EVERY 6 HOURS PRN
Status: DISCONTINUED | OUTPATIENT
Start: 2022-06-04 | End: 2022-06-07 | Stop reason: HOSPADM

## 2022-06-04 RX ORDER — LEVOTHYROXINE SODIUM 25 UG/1
50 TABLET ORAL
Status: DISCONTINUED | OUTPATIENT
Start: 2022-06-05 | End: 2022-06-07 | Stop reason: HOSPADM

## 2022-06-04 RX ORDER — LORATADINE 10 MG/1
10 TABLET ORAL DAILY
Status: DISCONTINUED | OUTPATIENT
Start: 2022-06-05 | End: 2022-06-07 | Stop reason: HOSPADM

## 2022-06-04 RX ORDER — SIMETHICONE 80 MG
80 TABLET,CHEWABLE ORAL DAILY
COMMUNITY
End: 2023-04-10

## 2022-06-04 RX ORDER — POLYETHYLENE GLYCOL 3350 17 G/17G
17 POWDER, FOR SOLUTION ORAL 2 TIMES DAILY
Status: DISCONTINUED | OUTPATIENT
Start: 2022-06-04 | End: 2022-06-07 | Stop reason: HOSPADM

## 2022-06-04 RX ORDER — SODIUM CHLORIDE 9 MG/ML
INJECTION, SOLUTION INTRAVENOUS CONTINUOUS
Status: DISCONTINUED | OUTPATIENT
Start: 2022-06-04 | End: 2022-06-06

## 2022-06-04 RX ORDER — KETOROLAC TROMETHAMINE 30 MG/ML
15 INJECTION, SOLUTION INTRAMUSCULAR; INTRAVENOUS ONCE
Status: COMPLETED | OUTPATIENT
Start: 2022-06-04 | End: 2022-06-04

## 2022-06-04 RX ORDER — PIPERACILLIN SODIUM, TAZOBACTAM SODIUM 3; .375 G/15ML; G/15ML
3.38 INJECTION, POWDER, LYOPHILIZED, FOR SOLUTION INTRAVENOUS EVERY 8 HOURS
Status: DISCONTINUED | OUTPATIENT
Start: 2022-06-04 | End: 2022-06-04

## 2022-06-04 RX ADMIN — IOPAMIDOL 75 ML: 755 INJECTION, SOLUTION INTRAVENOUS at 16:00

## 2022-06-04 RX ADMIN — POTASSIUM CHLORIDE 10 MEQ: 7.46 INJECTION, SOLUTION INTRAVENOUS at 17:45

## 2022-06-04 RX ADMIN — KETOROLAC TROMETHAMINE 15 MG: 30 INJECTION, SOLUTION INTRAMUSCULAR at 13:04

## 2022-06-04 RX ADMIN — PIPERACILLIN AND TAZOBACTAM 3.38 G: 3; .375 INJECTION, POWDER, LYOPHILIZED, FOR SOLUTION INTRAVENOUS at 22:40

## 2022-06-04 RX ADMIN — POTASSIUM CHLORIDE 10 MEQ: 7.46 INJECTION, SOLUTION INTRAVENOUS at 16:15

## 2022-06-04 ASSESSMENT — ENCOUNTER SYMPTOMS
SHORTNESS OF BREATH: 0
APPETITE CHANGE: 1
FEVER: 0
FREQUENCY: 0
DYSURIA: 0
CONSTIPATION: 1
VOMITING: 0
ABDOMINAL PAIN: 1
BACK PAIN: 1
NAUSEA: 0

## 2022-06-04 ASSESSMENT — ACTIVITIES OF DAILY LIVING (ADL)
ADLS_ACUITY_SCORE: 35

## 2022-06-04 NOTE — ED PROVIDER NOTES
Emergency Department Midlevel Supervisory Note     I personally saw the patient and performed a substantive portion of the visit including all aspects of the medical decision making.    Impression:  1. Epigastric pain    2. Hypokalemia    3. Elevated liver enzymes        MDM / ED Course:  81-year-old female who has been experiencing epigastric abdominal pain melena ongoing for the last 3 weeks.  The patient was seen in the ED for this 2 weeks ago.  She was noted to have slightly elevated LFTs.  The patient was able to follow-up with [facility] as an outpatient.  The patient returns today because of increasing epigastric pain.  The patient was again noted to have elevated AST and ALT which are above the level that was noted 2 weeks ago.  She also noted to have mild hypokalemia which was replaced with IV potassium here in the ED.  Ultrasound revealed a cystic lesion within the pancreatic tail.  No common bile duct dilatation was noted.  At the time of checkout to me the patient's abdominal CT scan was pending.    An EKG that was ordered hours earlier was given to me.  The EKG shows a prolonged QT and new nonspecific T wave changes.  The patient's troponin was checked earlier was normal.      CT scan of the abdomen shows stable pneumobilia and biliary duct dilatation.  There were no acute abnormalities noted to explain the patient's epigastric abdominal pain.    The patient's case was discussed with the on-call provider from [facility].  Admission versus outpatient follow-up were both discussed as possible options.    The patient was reevaluated and informed of the CT scan results and GI consultation recommendations.  Because the episodes of pain have becoming more severe and also because the episode today occurred without eating anything the patient was uncomfortable returning home.      Because of the temp of 100.1 on the patient was started on IV Zosyn.  The patient's case was then discussed with the admitting  hospitalist.        The patient tested positive for COVID.  She was made aware of the results.  Patient states that she tested positive for COVID 3 to 4 weeks ago.  The positive PCR test today is likely residual result of her previous COVID infection.      4:04 PM Tyson Harris CNP staffed patient with me. I agree with their assessment and plan of management, and I will see the patient.    4:52 PM Consulted with Dr. Burns [facility], who says that the patient can be admitted if pain persists, but can go home if her pain is controlled.    6:57 PM Spoke with Dr. Joseph, hospitalist, who accepts the patient for admission.     I met with the patient to introduce myself, gather additional history, perform my exam, and discuss the plan.     PPE: Provider wore gloves, N95 mask, eye protection.    Brief HPI:     Geeta Berry is a 81 year old female with a history of choledocholithiasis, CAD, DVT, GERD, hypertension, hypothyroidism, hyperlipidemia, cholecystectomy who presents for evaluation of epigastric pain. This has been a recurrent problem for the patient. She was seen in the ED on May 15, 2022 for epigastric pain. She had a CT scan of her abdomen/pelvis with IV contrast that did not show any clear etiology for pain. She was noted to have elevations of transaminases and alkaline phosphatase. Patient has required ERCP in the past for choledocholithiasis. Evaluated by [facility] on 5/31/2022, plan for right upper quadrant ultrasound in 2 days while continuing on omeprazole 40 mg twice daily. Patient reports that her pain is worse today, described as pressure, and radiates to her back. Denies fever but temp reported in triage 100.1. No chest pain, dyspnea, dysuria, hematuria, urinary frequency, or other new symptoms.    I, Kaitlynn Arthur, am serving as a scribe to document services personally performed by Dr. Ran Hernandez, based on my observations and the provider's statements to me.   I, Dr. Ran Hernandez, attest  that Kaitlynn Arthur was acting in a scribe capacity, has observed my performance of the services and has documented them in accordance with my direction.      Brief Physical Exam:   Constitutional:  Alert, in no acute distress  GI: Soft, nondistended, nontender, no palpable masses, no rebound, no guarding       Labs and Imaging:  Results for orders placed or performed during the hospital encounter of 06/04/22   US Abdomen Complete    Impression    IMPRESSION:  1.  Cystic-appearing 4.3 cm lesion in the region of the pancreatic tail. Recommend further evaluation with abdominal CT, preferably with contrast.  2.  Cholecystectomy. No biliary ductal dilatation.       CBC (+ platelets, no diff)   Result Value Ref Range    WBC Count 7.2 4.0 - 11.0 10e3/uL    RBC Count 3.95 3.80 - 5.20 10e6/uL    Hemoglobin 12.5 11.7 - 15.7 g/dL    Hematocrit 38.5 35.0 - 47.0 %    MCV 98 78 - 100 fL    MCH 31.6 26.5 - 33.0 pg    MCHC 32.5 31.5 - 36.5 g/dL    RDW 14.6 10.0 - 15.0 %    Platelet Count 229 150 - 450 10e3/uL   Basic metabolic panel   Result Value Ref Range    Sodium 138 136 - 145 mmol/L    Potassium 3.0 (L) 3.5 - 5.0 mmol/L    Chloride 103 98 - 107 mmol/L    Carbon Dioxide (CO2) 23 22 - 31 mmol/L    Anion Gap 12 5 - 18 mmol/L    Urea Nitrogen 16 8 - 28 mg/dL    Creatinine 0.96 0.60 - 1.10 mg/dL    Calcium 9.0 8.5 - 10.5 mg/dL    Glucose 113 70 - 125 mg/dL    GFR Estimate 59 (L) >60 mL/min/1.73m2   Hepatic function panel   Result Value Ref Range    Bilirubin Total 0.7 0.0 - 1.0 mg/dL    Bilirubin Direct 0.3 <=0.5 mg/dL    Protein Total 6.8 6.0 - 8.0 g/dL    Albumin 3.7 3.5 - 5.0 g/dL    Alkaline Phosphatase 158 (H) 45 - 120 U/L     (H) 0 - 40 U/L     (H) 0 - 45 U/L   Result Value Ref Range    Lipase 17 0 - 52 U/L   Troponin I (now)   Result Value Ref Range    Troponin I <0.01 0.00 - 0.29 ng/mL     I have reviewed the relevant laboratory and radiology studies    EKG  normal sinus rhythm.  Rate of 62.  Normal QRS.   Prolonged QT.  Nonspecific T wave changes noted.  Compared to EKG on 5/17/2022 the nonspecific T wave changes appear new and the QT is now prolonged.      Dr. Ran Hernandez  [facility] EMERGENCY DEPARTMENT  [address] [place] [id number] [phone]     Ran Hernandez DO  06/04/22 4701

## 2022-06-04 NOTE — ED PROVIDER NOTES
EMERGENCY DEPARTMENT ENCOUNTER      NAME: Geeta Berry  AGE: 81 year old female  YOB: 1940  MRN: [id number]  EVALUATION DATE & TIME: No admission date for patient encounter.    PCP: Jacy Mishra    ED PROVIDER: DIOGO Hall, CNP      Chief Complaint   Patient presents with     Abdominal Pain     Headache         FINAL IMPRESSION:  1. Epigastric pain    2. Hypokalemia    3. Elevated liver enzymes          ED COURSE & MEDICAL DECISION MAKIN:30 PM I met with the patient, obtained history, performed an initial exam, and discussed options and plan for treatment here in the ED.  3:39 PM updated patient  3:51 PM updated family.  4:00 PM case signed out to Dr Hernandez.    Pertinent Labs & Imaging studies reviewed. (See chart for details)  81 year old female presents to the Emergency Department for evaluation of recurrent epigastric pain.  Has had previous ED and outpatient work-up.  Most recently saw [facility].  Has previous cholecystectomy.  Has had common bile duct stone with previous ERCP.  Does have elevation again of her alkaline phosphatase and transaminases.  Uncertain etiology.  No biliary dilatation noted on today's ultrasound.  There was a cystic lesion at the pancreatic head which is going to be further evaluated with CT today.  At the time of signout, repeat abdominal CT and consultation with gastroenterology pending.  Final disposition will depend on CT findings, clinical course, recommendations from GI, and shared decision making.    At the conclusion of the encounter I discussed the results of all of the tests and the disposition. The questions were answered. The patient or family acknowledged understanding and was agreeable with the care plan.         MEDICATIONS GIVEN IN THE EMERGENCY:  Medications   potassium chloride 10 mEq in 100 mL sterile water intermittent infusion (premix) (has no administration in time range)   ketorolac (TORADOL) injection 15 mg (15 mg Intravenous  Given 6/4/22 9564)       NEW PRESCRIPTIONS STARTED AT TODAY'S ER VISIT  New Prescriptions    No medications on file            =================================================================    HPI    Patient information was obtained from: Patient    Use of Intrepreter: N/A        Geeta Berry is a 81 year old female with a history of choledocholithiasis, CAD, DVT, GERD, hypertension, hypothyroidism, hyperlipidemia, cholecystectomy who presents for evaluation of epigastric pain.  This has been a recurrent problem for the patient.  Was seen in the ED on May 15, 2022 for epigastric pain.  Had severe pain and was brought in by EMS.  Had a CT scan of her abdomen pelvis with IV contrast that did not show any clear etiology for pain.  Was noted to have elevations of transaminases and alkaline phosphatase.  Has required ERCP in the past for choledocholithiasis.  Evaluated by [facility] on 5/31/2022.  Plan for right upper quadrant ultrasound in 2 days.  Continues on omeprazole 40 mg twice daily.  Pain worse today.  Described as pressure.  Radiates to her back.  Notes associated dry mouth.  Denies fever but temp reported in triage 100.1.  No chest pain, dyspnea, dysuria, hematuria, urinary frequency, or other new symptoms.      REVIEW OF SYSTEMS   Review of Systems   Constitutional: Positive for appetite change. Negative for fever.   Respiratory: Negative for shortness of breath.    Cardiovascular: Negative for chest pain.   Gastrointestinal: Positive for abdominal pain and constipation (On MiraLAX twice daily). Negative for nausea and vomiting.   Genitourinary: Negative for dysuria and frequency.   Musculoskeletal: Positive for back pain.   All other systems reviewed and are negative.       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)      Chronic neck pain      Common bile duct calculus      Coronary artery disease  due to calcified coronary lesion 8/6/2020     Depression      DVT (deep venous thrombosis) (H)      GERD (gastroesophageal reflux disease)      History of blood clots 2017    DVT right lower extremity     HLD (hyperlipidemia)      HTN (hypertension)      Hyperlipidemia      Hypertension      Hypothyroidism      Infectious viral hepatitis     hepatitis A in 1960s     Thyroid disease        PAST SURGICAL HISTORY:  Past Surgical History:   Procedure Laterality Date     ARTHROPLASTY REVISION HIP Left 5/16/2017    Procedure: REVISION LEFT TOTAL HIP ARTHROPLASTY, BOTH COMPONENTS;  Surgeon: Tyson Peoples MD;  Location: [facility];  Service:      ARTHROSCOPY HIP Left 1983     CHOLECYSTECTOMY  1980     ENDOSCOPIC RETROGRADE CHOLANGIOPANCREATOGRAM      x5 over 8 years. Last 8/2016     ENDOSCOPIC RETROGRADE CHOLANGIOPANCREATOGRAM N/A 9/5/2017    Procedure: ENDOSCOPIC RETROGRADE CHOLANGIOPANCREATOGRAPHY, STONE EXTRACTION;  Surgeon: Henry Eller MD;  Location: [facility];  Service:      ENDOSCOPIC RETROGRADE CHOLANGIOPANCREATOGRAM N/A 7/26/2019    Procedure: ENDOSCOPIC RETROGRADE CHOLANGIOPANCREATOGRAPHY, REMOVAL OF SLUDGE, DILATION OF STRICTURE;  Surgeon: Ryan Pastrana MD;  Location: [facility];  Service: Gastroenterology     HIP SURGERY  2012    revision     JOINT REPLACEMENT       ORTHOPEDIC SURGERY       OTHER SURGICAL HISTORY      bilateral THAwith revision on both hips     VT ESOPHAGOGASTRODUODENOSCOPY TRANSORAL DIAGNOSTIC N/A 9/10/2019    Procedure: ESOPHAGOGASTRODUODENOSCOPY (EGD);  Surgeon: Will Nash DO;  Location: [facility] OR;  Service: General     REPLACEMENT TOTAL KNEE Left 2015     SHOULDER SURGERY Left 2010    left total shoulder replacement     XR ERCP BILIARY AND PANCREAS  7/26/2019     ZZC TOTAL KNEE ARTHROPLASTY Right 4/29/2021    Procedure: RIGHT TOTAL KNEE ARTHROPLASTY;  Surgeon: Satinder Issa DO;  Location: [facility];  Service: Orthopedics            CURRENT MEDICATIONS:    Prior to Admission Medications   Prescriptions Last Dose Informant Patient Reported? Taking?   Cholecalciferol (VITAMIN D3 PO)   Yes No   Sig: Take by mouth daily   Multiple Vitamin (MULTIVITAMIN ADULT PO)   Yes No   Omega-3 Fatty Acids (OMEGA-3 FISH OIL PO)   Yes No   Sig: Take 1 g by mouth daily    acetaminophen (TYLENOL) 500 MG tablet   Yes No   Sig: Take 500-1,000 mg by mouth   azelastine (ASTELIN) 0.1 % nasal spray   No No   Sig: SPRAY 1 SPRAY INTO BOTH NOSTRILS AT BEDTIME   citalopram (CELEXA) 10 MG tablet   Yes No   Sig: TAKE 1/2 TABLET BY MOUTH DAILY   docusate sodium (COLACE) 100 MG tablet   Yes No   Sig: Take 100 mg by mouth 2 times daily   fluticasone (FLONASE) 50 MCG/ACT nasal spray   No No   Sig: INSTILL 1 SPRAY INTO BOTH NOSTRILS DAILY   hydrochlorothiazide (HYDRODIURIL) 25 MG tablet   No No   Sig: Take 1 tablet (25 mg) by mouth daily   levothyroxine (SYNTHROID/LEVOTHROID) 50 MCG tablet   Yes No   Sig: Take 50 mcg by mouth   omeprazole (PRILOSEC) 40 MG DR capsule   No No   Sig: Take 1 capsule (40 mg) by mouth 2 times daily   polyethylene glycol (MIRALAX/GLYCOLAX) packet   Yes No   Sig: Take 1 packet by mouth daily   rosuvastatin (CRESTOR) 40 MG tablet   No No   Sig: Take 1 tablet (40 mg) by mouth At Bedtime      Facility-Administered Medications: None           ALLERGIES:  Allergies   Allergen Reactions     Atorvastatin      Codeine      Dipyridamole      Duloxetine Headache       FAMILY HISTORY:  Family History   Problem Relation Age of Onset     Colon Cancer Father      Diabetes Sister      Heart Disease Mother        SOCIAL HISTORY:   Social History     Socioeconomic History     Marital status:      Number of children: 2   Tobacco Use     Smoking status: Never Smoker     Smokeless tobacco: Never Used   Vaping Use     Vaping Use: Never used   Substance and Sexual Activity     Alcohol use: No     Drug use: No     Sexual activity: Not Currently     Birth  control/protection: Post-menopausal   Social History Narrative    4/6/17: The patient lives with her  in a condo.         VITALS:  Patient Vitals for the past 24 hrs:   BP Temp Temp src Pulse Resp SpO2 Weight   06/04/22 1032 (!) 152/74 100.1  F (37.8  C) Temporal 93 16 96 % 75 kg (165 lb 5.5 oz)       PHYSICAL EXAM    Constitutional: Alert, no distress  EYES: Conjunctivae clear  HENT:  Atraumatic, normocephalic  Respiratory:  No respiratory distress, normal breath sounds  Cardiovascular:  Normal rate, normal rhythm, no murmurs  GI:  Soft, nondistended, epigastric tenderness, no guarding.  Integument: Warm, Dry  Neurologic:  Alert & oriented x 3              LAB:  All pertinent labs reviewed and interpreted.  Labs Ordered and Resulted from Time of ED Arrival to Time of ED Departure   BASIC METABOLIC PANEL - Abnormal       Result Value    Sodium 138      Potassium 3.0 (*)     Chloride 103      Carbon Dioxide (CO2) 23      Anion Gap 12      Urea Nitrogen 16      Creatinine 0.96      Calcium 9.0      Glucose 113      GFR Estimate 59 (*)    HEPATIC FUNCTION PANEL - Abnormal    Bilirubin Total 0.7      Bilirubin Direct 0.3      Protein Total 6.8      Albumin 3.7      Alkaline Phosphatase 158 (*)      (*)      (*)    CBC WITH PLATELETS - Normal    WBC Count 7.2      RBC Count 3.95      Hemoglobin 12.5      Hematocrit 38.5      MCV 98      MCH 31.6      MCHC 32.5      RDW 14.6      Platelet Count 229     LIPASE - Normal    Lipase 17     TROPONIN I - Normal    Troponin I <0.01           RADIOLOGY:  Reviewed all pertinent imaging. Please see official radiology report.  US Abdomen Complete   Final Result   IMPRESSION:   1.  Cystic-appearing 4.3 cm lesion in the region of the pancreatic tail. Recommend further evaluation with abdominal CT, preferably with contrast.   2.  Cholecystectomy. No biliary ductal dilatation.            CT Abdomen Pelvis w Contrast    (Results Pending)             PROCEDURES:    None      DIOGO Hall, CNP  Emergency Medicine  [facility] EMERGENCY DEPARTMENT  [facility] [facility] [id number] [phone]  Dept: [phone]         Tyson Harris APRN CNP  06/04/22 1608

## 2022-06-04 NOTE — ED TRIAGE NOTES
She comes in with abdominal pain that started three weeks ago. She also started to get a headache today. She also has been belching a great deal.

## 2022-06-05 LAB
ALBUMIN SERPL-MCNC: 3 G/DL (ref 3.5–5)
ALP SERPL-CCNC: 120 U/L (ref 45–120)
ALT SERPL W P-5'-P-CCNC: 297 U/L (ref 0–45)
ANION GAP SERPL CALCULATED.3IONS-SCNC: 9 MMOL/L (ref 5–18)
AST SERPL W P-5'-P-CCNC: 239 U/L (ref 0–40)
ATRIAL RATE - MUSE: 62 BPM
BILIRUB SERPL-MCNC: 0.7 MG/DL (ref 0–1)
BUN SERPL-MCNC: 16 MG/DL (ref 8–28)
CALCIUM SERPL-MCNC: 8.3 MG/DL (ref 8.5–10.5)
CHLORIDE BLD-SCNC: 104 MMOL/L (ref 98–107)
CO2 SERPL-SCNC: 22 MMOL/L (ref 22–31)
CREAT SERPL-MCNC: 0.79 MG/DL (ref 0.6–1.1)
DIASTOLIC BLOOD PRESSURE - MUSE: NORMAL MMHG
ERYTHROCYTE [DISTWIDTH] IN BLOOD BY AUTOMATED COUNT: 15 % (ref 10–15)
GFR SERPL CREATININE-BSD FRML MDRD: 74 ML/MIN/1.73M2
GLUCOSE BLD-MCNC: 99 MG/DL (ref 70–125)
HCT VFR BLD AUTO: 34.1 % (ref 35–47)
HGB BLD-MCNC: 11.1 G/DL (ref 11.7–15.7)
HOLD SPECIMEN: NORMAL
INTERPRETATION ECG - MUSE: NORMAL
MCH RBC QN AUTO: 31.6 PG (ref 26.5–33)
MCHC RBC AUTO-ENTMCNC: 32.6 G/DL (ref 31.5–36.5)
MCV RBC AUTO: 97 FL (ref 78–100)
P AXIS - MUSE: 56 DEGREES
PLATELET # BLD AUTO: 200 10E3/UL (ref 150–450)
POTASSIUM BLD-SCNC: 3.2 MMOL/L (ref 3.5–5)
POTASSIUM BLD-SCNC: 4.3 MMOL/L (ref 3.5–5)
PR INTERVAL - MUSE: 170 MS
PROT SERPL-MCNC: 5.8 G/DL (ref 6–8)
QRS DURATION - MUSE: 108 MS
QT - MUSE: 566 MS
QTC - MUSE: 574 MS
R AXIS - MUSE: 30 DEGREES
RBC # BLD AUTO: 3.51 10E6/UL (ref 3.8–5.2)
SODIUM SERPL-SCNC: 135 MMOL/L (ref 136–145)
SYSTOLIC BLOOD PRESSURE - MUSE: NORMAL MMHG
T AXIS - MUSE: 83 DEGREES
VENTRICULAR RATE- MUSE: 62 BPM
WBC # BLD AUTO: 6.4 10E3/UL (ref 4–11)

## 2022-06-05 PROCEDURE — 250N000013 HC RX MED GY IP 250 OP 250 PS 637: Performed by: INTERNAL MEDICINE

## 2022-06-05 PROCEDURE — 96365 THER/PROPH/DIAG IV INF INIT: CPT

## 2022-06-05 PROCEDURE — 120N000001 HC R&B MED SURG/OB

## 2022-06-05 PROCEDURE — 99232 SBSQ HOSP IP/OBS MODERATE 35: CPT | Performed by: INTERNAL MEDICINE

## 2022-06-05 PROCEDURE — 250N000011 HC RX IP 250 OP 636: Performed by: INTERNAL MEDICINE

## 2022-06-05 PROCEDURE — 93005 ELECTROCARDIOGRAM TRACING: CPT | Performed by: INTERNAL MEDICINE

## 2022-06-05 PROCEDURE — 258N000003 HC RX IP 258 OP 636: Performed by: INTERNAL MEDICINE

## 2022-06-05 PROCEDURE — 85014 HEMATOCRIT: CPT | Performed by: INTERNAL MEDICINE

## 2022-06-05 PROCEDURE — 36415 COLL VENOUS BLD VENIPUNCTURE: CPT | Performed by: INTERNAL MEDICINE

## 2022-06-05 PROCEDURE — 80053 COMPREHEN METABOLIC PANEL: CPT | Performed by: INTERNAL MEDICINE

## 2022-06-05 PROCEDURE — 96366 THER/PROPH/DIAG IV INF ADDON: CPT

## 2022-06-05 PROCEDURE — 84132 ASSAY OF SERUM POTASSIUM: CPT | Performed by: INTERNAL MEDICINE

## 2022-06-05 RX ORDER — POTASSIUM CHLORIDE 7.45 MG/ML
10 INJECTION INTRAVENOUS
Status: COMPLETED | OUTPATIENT
Start: 2022-06-05 | End: 2022-06-05

## 2022-06-05 RX ADMIN — PANTOPRAZOLE SODIUM 40 MG: 40 TABLET, DELAYED RELEASE ORAL at 11:45

## 2022-06-05 RX ADMIN — SODIUM CHLORIDE 75 ML/HR: 9 INJECTION, SOLUTION INTRAVENOUS at 00:24

## 2022-06-05 RX ADMIN — POTASSIUM CHLORIDE 10 MEQ: 7.46 INJECTION, SOLUTION INTRAVENOUS at 16:49

## 2022-06-05 RX ADMIN — POLYETHYLENE GLYCOL 3350 17 G: 17 POWDER, FOR SOLUTION ORAL at 00:23

## 2022-06-05 RX ADMIN — DOCUSATE SODIUM 100 MG: 100 CAPSULE, LIQUID FILLED ORAL at 21:13

## 2022-06-05 RX ADMIN — POTASSIUM CHLORIDE 10 MEQ: 7.46 INJECTION, SOLUTION INTRAVENOUS at 14:19

## 2022-06-05 RX ADMIN — PIPERACILLIN AND TAZOBACTAM 3.38 G: 3; .375 INJECTION, POWDER, LYOPHILIZED, FOR SOLUTION INTRAVENOUS at 04:31

## 2022-06-05 RX ADMIN — FLUTICASONE PROPIONATE 1 SPRAY: 50 SPRAY, METERED NASAL at 11:44

## 2022-06-05 RX ADMIN — POTASSIUM CHLORIDE 10 MEQ: 7.46 INJECTION, SOLUTION INTRAVENOUS at 12:47

## 2022-06-05 RX ADMIN — LEVOTHYROXINE SODIUM 50 MCG: 0.03 TABLET ORAL at 09:40

## 2022-06-05 RX ADMIN — PIPERACILLIN AND TAZOBACTAM 3.38 G: 3; .375 INJECTION, POWDER, LYOPHILIZED, FOR SOLUTION INTRAVENOUS at 21:13

## 2022-06-05 RX ADMIN — POTASSIUM CHLORIDE 10 MEQ: 7.46 INJECTION, SOLUTION INTRAVENOUS at 15:36

## 2022-06-05 RX ADMIN — PIPERACILLIN AND TAZOBACTAM 3.38 G: 3; .375 INJECTION, POWDER, LYOPHILIZED, FOR SOLUTION INTRAVENOUS at 11:39

## 2022-06-05 RX ADMIN — DOCUSATE SODIUM 100 MG: 100 CAPSULE, LIQUID FILLED ORAL at 00:22

## 2022-06-05 RX ADMIN — SODIUM CHLORIDE: 9 INJECTION, SOLUTION INTRAVENOUS at 12:49

## 2022-06-05 RX ADMIN — SIMETHICONE 80 MG: 80 TABLET, CHEWABLE ORAL at 11:46

## 2022-06-05 RX ADMIN — PANTOPRAZOLE SODIUM 40 MG: 40 TABLET, DELAYED RELEASE ORAL at 15:35

## 2022-06-05 RX ADMIN — AZELASTINE HYDROCHLORIDE 1 SPRAY: 137 SPRAY, METERED NASAL at 21:13

## 2022-06-05 RX ADMIN — AZELASTINE HYDROCHLORIDE 1 SPRAY: 137 SPRAY, METERED NASAL at 00:24

## 2022-06-05 ASSESSMENT — ACTIVITIES OF DAILY LIVING (ADL)
WALKING_OR_CLIMBING_STAIRS_DIFFICULTY: NO
CHANGE_IN_FUNCTIONAL_STATUS_SINCE_ONSET_OF_CURRENT_ILLNESS/INJURY: NO
ADLS_ACUITY_SCORE: 24
DEPENDENT_IADLS:: INDEPENDENT
ADLS_ACUITY_SCORE: 35
ADLS_ACUITY_SCORE: 35
FALL_HISTORY_WITHIN_LAST_SIX_MONTHS: NO
ADLS_ACUITY_SCORE: 35
DOING_ERRANDS_INDEPENDENTLY_DIFFICULTY: YES
DIFFICULTY_EATING/SWALLOWING: NO
ADLS_ACUITY_SCORE: 35
CONCENTRATING,_REMEMBERING_OR_MAKING_DECISIONS_DIFFICULTY: NO
ADLS_ACUITY_SCORE: 35
ADLS_ACUITY_SCORE: 35
WEAR_GLASSES_OR_BLIND: YES
ADLS_ACUITY_SCORE: 35
DRESSING/BATHING_DIFFICULTY: NO
TOILETING_ISSUES: NO
ADLS_ACUITY_SCORE: 24
ADLS_ACUITY_SCORE: 35
ADLS_ACUITY_SCORE: 35
ADLS_ACUITY_SCORE: 24

## 2022-06-05 NOTE — PROGRESS NOTES
[facility]    Medicine Progress Note - Hospitalist Service    Date of Admission:  6/4/2022    Assessment & Plan            81 year old old female with past medical history of CAD, DVT, GERD, hypertension, hypothyroidism, hyperlipidemia, cholecystectomy years ago, choledocholithiasis with multiple ERCPs in the past most recent in 2017, COVID-19 diagnosed on 5/18/2022 who presented for evaluation of epigastric pain, found to have abnormal LFTs      6/2 :    Abdominal pain is stable  GI following, recent COVID status  GI to determine final plan in am about ERCP  Continue iv fluids, iv antibiotic, NPO      Not medically ready for discharge at this time.      A/p :      1.  Upper abdominal pain, abnormal LFTs.  Abdominal ultrasound with no biliary dilatation CBD 4 mm.  However given history of choledocholithiasis will consult GI for possible ERCP.  Given fever on admission we will start empiric IV Zosyn.  Hold Tylenol and statin.  IV fluids, n.p.o. after midnight  2.  Hypokalemia.  Replace per protocol  3.  Recent COVID-19 diagnosed on 5/18/2022.  Only mildly symptomatic, no respiratory symptoms.  Completed Paxlovid.  COVID-positive on admission.  4.  GERD on PPI  5.  Chronic constipation.  Continue scheduled bowel medications  6.  Hypothyroidism.   -Continue home levothyroxine replacement             Diet: NPO per Anesthesia Guidelines for Procedure/Surgery Except for: Meds, Ice Chips    DVT Prophylaxis: Pneumatic Compression Devices  Benjamin Catheter: Not present  Central Lines: None  Cardiac Monitoring: None  Code Status: Full Code      Disposition Plan   Expected Discharge: 06/07/2022     Anticipated discharge location:  Awaiting care coordination huddle  Delays:  Awaiting plan for GI       The patient's care was discussed with the Bedside Nurse and Patient.    Manuel Herrera MD  Hospitalist Service  [facility]  Securely message with the Vocera Web Console (learn more  "here)  Text page via [facility] Paging/Directory         Clinically Significant Risk Factors Present on Admission             # Hypoalbuminemia: Albumin = 3.0 g/dL (Ref range: 3.5 - 5.0 g/dL) on admission, will monitor as appropriate      # Overweight: Estimated body mass index is 26.51 kg/m  as calculated from the following:    Height as of this encounter: 1.715 m (5' 7.5\").    Weight as of this encounter: 77.9 kg (171 lb 12.8 oz).      ______________________________________________________________________        Data reviewed today: I reviewed all medications, new labs and imaging results over the last 24 hours. I personally reviewed no images or EKG's today.    Physical Exam   Vital Signs: Temp: 97.9  F (36.6  C) Temp src: Oral BP: (!) 152/70 Pulse: 58   Resp: 20 SpO2: 98 % O2 Device: None (Room air)    Weight: 171 lbs 12.8 oz       GENERAL: The patient is not in any acute distressed. Awake and alert.  HEENT: Nonicteric sclerae, PERRLA, EOMI. Oropharynx clear. Moist mucous membranes. Conjunctivae appear well perfused.  HEART: Regular rate and rhythm without murmurs.  LUNGS: Clear to auscultation bilaterally. No wheezing or crackles.  ABDOMEN: Soft, positive bowel sounds, nontender.  SKIN: No rash, no excessive bruising, petechiae, or purpura.  EXTREMITIES : no rashes, no swelling in legs.  NEUROLOGIC: conscious and oriented, follows commands, no obvious focal deficits.  ROS: All other systems negative       Data   Recent Labs   Lab 06/05/22  0809 06/05/22  0240 06/04/22  1303 06/02/22  1402   WBC 6.4  --  7.2  --    HGB 11.1*  --  12.5  --    MCV 97  --  98  --      --  229  --    NA  --  135* 138 139   POTASSIUM  --  3.2* 3.0* 3.6   CHLORIDE  --  104 103 102   CO2  --  22 23 26   BUN  --  16 16 16   CR  --  0.79 0.96 0.85   ANIONGAP  --  9 12 11   ALOK  --  8.3* 9.0 9.0   GLC  --  99 113 93   ALBUMIN  --  3.0* 3.7 3.5   PROTTOTAL  --  5.8* 6.8 6.4   BILITOTAL  --  0.7 0.7 0.3   ALKPHOS  --  120 158* 77   ALT  " --  297* 402* 62*   AST  --  239* 483* 94*   LIPASE  --   --  17  --

## 2022-06-05 NOTE — ED NOTES
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)      Chronic neck pain      Common bile duct calculus      Coronary artery disease due to calcified coronary lesion 8/6/2020     Depression      DVT (deep venous thrombosis) (H)      GERD (gastroesophageal reflux disease)      History of blood clots 2017    DVT right lower extremity     HLD (hyperlipidemia)      HTN (hypertension)      Hyperlipidemia      Hypertension      Hypothyroidism      Infectious viral hepatitis     hepatitis A in 1960s     Thyroid disease         Code Status:  Full Code     Falls Risk: No Band: Not applicable    Current Living Situation/Residence: lives with a significant other     Elimination Status: Continent: Yes     Activity Level: Independent    Patients Preferred Language:  English     Needed: No    Vital Signs:  BP (!) 154/69   Pulse 58   Temp 98.7  F (37.1  C) (Oral)   Resp 23   Wt 75 kg (165 lb 5.5 oz)   LMP  (LMP Unknown)   SpO2 98%   BMI 25.90 kg/m       Cardiac Rhythm: Sinus sandhya    Pain Score: 3/10    Is the Patient Confused:  No    Last Food or Drink: 6/4/22 at ?    Focused Assessment:  Pt here with upper abd pain. Pt has hx of ERCP. NPO until GI orders, hoping to have ERCP. Potassium protocol currently running. (2nd bag of 4.) Pt is alert et oriented, up independent in room.     Tests Performed: Done: Labs and Imaging    Treatments Provided:  IVFs    Family Dynamics/Concerns: No    Family Updated On Visitor Policy: Yes    Plan of Care Communicated to Family: Yes    Who Was Updated about Plan of Care: daughter    Belongings Checklist  Done and Signed by Patient: Yes    Medications sent with patient: Nose spray X2    Covid: asymptomatic , positive    Additional Information: Pt is COVID recovered, still testing positive.    RN: Joann Enriquez RN   6/5/2022 1:05 PM

## 2022-06-05 NOTE — H&P
Admission History and Physical   Geeta Berry,  1940, MRN [id number]    [facility]  Elevated liver enzymes [R74.8]    PCP: Jacy Mishra, [address]  [facility] [address], [phone]   Code status:  Full code       Extended Emergency Contact Information  Primary Emergency Contact: Gabi Colvin  Home Phone: [phone]  Relation: Daughter  Secondary Emergency Contact: Karen Tapia   [place]  Mobile Phone: [phone]  Relation: Daughter       Assessment and Plan   81 year old old female with past medical history of CAD, DVT, GERD, hypertension, hypothyroidism, hyperlipidemia, cholecystectomy years ago, choledocholithiasis with multiple ERCPs in the past most recent in , COVID-19 diagnosed on 2022 who presented for evaluation of epigastric pain, found to have abnormal LFTs    1.  Upper abdominal pain, abnormal LFTs.  Abdominal ultrasound with no biliary dilatation CBD 4 mm.  However given history of choledocholithiasis will consult GI for possible ERCP.  Given fever on admission we will start empiric IV Zosyn.  Hold Tylenol and statin.  IV fluids, n.p.o. after midnight  2.  Hypokalemia.  Replace per protocol  3.  Recent COVID-19 diagnosed on 2022.  Only mildly symptomatic, no respiratory symptoms.  Completed Paxlovid.  COVID-positive on admission.  4.  GERD on PPI  5.  Chronic constipation.  Continue scheduled bowel medications  6.  Hypothyroidism.   -Continue home levothyroxine replacement        DVT Prophylaxis: SCDs     Chief Complaint:  Abdominal pain     HPI:    Geeta Berry is a 81 year old old female with past medical history of CAD, DVT, GERD, hypertension, hypothyroidism, hyperlipidemia, cholecystectomy years ago, choledocholithiasis with multiple ERCPs in the past most recent in , COVID-19 diagnosed on 2022 who presented for evaluation of epigastric pain  History is provided by patient and medical records  Patient has been having epigastric pain  and belching for some time.  She also struggles with chronic constipation.  Patient was seen for evaluation of upper, mid abdominal pain in the ER on 5/15/2022, it was felt to be GI related, patient discharged home.  Patient tested positive for COVID on 5/18/2022.  She only had mild symptoms including fatigue.  She completed Paxlovid.  Was seen by [facility] on 6/1/2022.  At that time it was planned to do outpatient abdominal ultrasound.  She takes Tylenol up to 4 g daily for sinus headaches, she is also on statin.  This morning at around 4 AM patient woke up with severe pain across upper abdomen with radiation to the back.  No nausea or vomiting but she has been belching a lot.  She had last bowel movement yesterday.  No fevers or chills at home.  On initial evaluation in the ER patient is febrile 100.1, not tachycardic.  Labs show abnormal LFTs, hypokalemia, normal WBC, normal lipase.  Abdominal ultrasound shows cystic 4.3 cm lesion in the pancreatic tail, cholecystectomy.  Subsequent CT of the abdomen and pelvis shows no inflammation, bowel obstruction or hydronephrosis, cystic lesion seen on the ultrasound felt to be prominent bowel.       Medical History  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)      Chronic neck pain      Common bile duct calculus      Coronary artery disease due to calcified coronary lesion 8/6/2020     Depression      DVT (deep venous thrombosis) (H)      GERD (gastroesophageal reflux disease)      History of blood clots 2017    DVT right lower extremity     HLD (hyperlipidemia)      HTN (hypertension)      Hyperlipidemia      Hypertension      Hypothyroidism      Infectious viral hepatitis     hepatitis A in 1960s     Thyroid disease         Surgical History  She  has a past surgical history that includes orthopedic surgery; Arthroscopy hip (Left, 1983); hip surgery (2012); Replacement Total Knee (Left,  2015); shoulder surgery (Left, 2010); Endoscopic retrograde cholangiopancreatogram; Cholecystectomy (1980); Arthroplasty revision hip (Left, 5/16/2017); Endoscopic retrograde cholangiopancreatogram (N/A, 9/5/2017); joint replacement; other surgical history; XR ERCP Biliary and Pancreas (7/26/2019); Endoscopic retrograde cholangiopancreatogram (N/A, 7/26/2019); Pr Esophagogastroduodenoscopy Transoral Diagnostic (N/A, 9/10/2019); and TOTAL KNEE ARTHROPLASTY (Right, 4/29/2021).       Social History  Reviewed, and she  reports that she has never smoked. She has never used smokeless tobacco. She reports that she does not drink alcohol and does not use drugs.   Social History     Tobacco Use     Smoking status: Never Smoker     Smokeless tobacco: Never Used   Substance Use Topics     Alcohol use: No          Allergies  Allergies   Allergen Reactions     Atorvastatin      Can't remember     Codeine      Can't remember     Dipyridamole      Can't remember     Duloxetine Headache    Family History  Reviewed, and   Family History   Problem Relation Age of Onset     Colon Cancer Father      Diabetes Sister      Heart Disease Mother              Prior to Admission Medications   (Not in a hospital admission)         Review of Systems:  A 12 point comprehensive review of systems was negative except as noted. Physical Exam:  Temp:  [100.1  F (37.8  C)] 100.1  F (37.8  C)  Pulse:  [52-93] 52  Resp:  [14-27] 16  BP: (152)/(74) 152/74  SpO2:  [93 %-97 %] 97 %    BP (!) 152/74   Pulse 52   Temp 100.1  F (37.8  C) (Temporal)   Resp 16   Wt 75 kg (165 lb 5.5 oz)   LMP  (LMP Unknown)   SpO2 97%   BMI 25.90 kg/m      General Appearance:   No acute distress   Head:    Normocephalic, without obvious abnormality, atraumatic   Eyes:    PERRL, conjunctiva/corneas clear, EOM's intact,both eyes    Ears:    Normal external ear canals no drainage or erythema bilat.   Nose:   Nares normal by gross inspection,  mucosa normal, no drainage or  sinus tenderness   Throat:   Lips, mucosa, and tongue normal; teeth and gums normal   Neck:   Supple, symmetrical, trachea midline, no adenopathy;        thyroid:  No enlargement/tenderness/nodules   Back:     Symmetric, no curvature, ROM normal, no CVA tenderness   Lungs:    Clear   Chest wall:    No tenderness or deformity   Heart:    Regular rate and rhythm, S1 and S2 normal, no murmur, no rubs, no JVD, no edema   Abdomen:     Soft, non-tender, bowel sounds active all four quadrants,     no masses, no hepatosplenomegaly   Musculoskeletal:   Extremities are warm and non-tender, atraumatic, no joint swelling or tenderness   Pulses:   2+ and symmetric all extremities   Skin:   no rashes or lesions on exposed areas, please see nursing assessment for full skin assessment   Neurologic:  Awake and alert, grossly nonfocal        Pertinent Labs  Lab Results: personally reviewed.   Recent Labs   Lab 06/04/22  1303 06/02/22  1402    139   CO2 23 26   BUN 16 16   ALBUMIN 3.7 3.5   ALKPHOS 158* 77   * 62*   * 94*     Recent Labs   Lab 06/04/22  1303   WBC 7.2   HGB 12.5   HCT 38.5        Recent Labs   Lab 06/04/22  1303   TROPONINI <0.01       MOST RECENT A1c, Iron, TIBC, Coags, TFTs  Lab Results   Component Value Date    INR 1.02 04/29/2021    PTT 27 07/31/2020     Lab Results   Component Value Date    IRON 79 02/15/2022     Lab Results   Component Value Date    TSH 1.85 09/23/2021         Pertinent Radiology  Radiology Results: I personally reviewed.  Results for orders placed or performed during the hospital encounter of 06/04/22   US Abdomen Complete    Impression    IMPRESSION:  1.  Cystic-appearing 4.3 cm lesion in the region of the pancreatic tail. Recommend further evaluation with abdominal CT, preferably with contrast.  2.  Cholecystectomy. No biliary ductal dilatation.       CT Abdomen Pelvis w Contrast    Impression    IMPRESSION:   1.  No findings to explain the patient's symptoms. No  evidence of an inflammatory process, bowel obstruction or hydronephrosis.  2.  No peripancreatic cystic mass. Recent ultrasound findings likely represent an incidental prominent fluid-filled bowel segment.  3.  Stable pneumobilia and biliary ductal dilatation         Advanced Care Planning  Treatment and discharge Planning discussed with patient  Anticipated Length of Stay in midnights (including a midnight in the Emergency Department after triage if applicable): 2 midnights for evaluation and treatment of abnormal LFTs and abdominal pain      Matilde Joseph MD  Internal Medicine Hospitalist  6/4/2022

## 2022-06-05 NOTE — PHARMACY-ADMISSION MEDICATION HISTORY
Pharmacy Note - Admission Medication History    Pertinent Provider Information: Recommend to hold rosuvastatin with elevated LFTs. May consider switching hydrochlorothiazide to alternative BP med considering hypokalemia.     ______________________________________________________________________    Prior To Admission (PTA) med list completed and updated in EMR.       PTA Med List   Medication Sig Last Dose     acetaminophen (TYLENOL) 650 MG CR tablet Take 650 mg by mouth 3 times daily 6/3/2022     amoxicillin (AMOXIL) 500 MG capsule Take 2,000 mg by mouth daily as needed (1 hour before dental appointments) More than a month at PRN     azelastine (ASTELIN) 0.1 % nasal spray SPRAY 1 SPRAY INTO BOTH NOSTRILS AT BEDTIME 6/3/2022 at HS     Cholecalciferol (VITAMIN D3 PO) Take by mouth daily Unknown dosage 6/3/2022 at AM     citalopram (CELEXA) 10 MG tablet Take 5 mg by mouth every morning 6/3/2022 at AM     docusate sodium (COLACE) 100 MG tablet Take 100 mg by mouth 2 times daily 6/3/2022 at AM     fluticasone (FLONASE) 50 MCG/ACT nasal spray INSTILL 1 SPRAY INTO BOTH NOSTRILS DAILY (Patient taking differently: Spray 1 spray into both nostrils daily) 6/3/2022 at AM     hydrochlorothiazide (HYDRODIURIL) 25 MG tablet Take 1 tablet (25 mg) by mouth daily 6/3/2022 at AM     levothyroxine (SYNTHROID/LEVOTHROID) 50 MCG tablet Take 50 mcg by mouth daily before breakfast 6/3/2022 at AM     multivitamin w/minerals (THERA-VIT-M) tablet Take 1 tablet by mouth daily 6/3/2022 at AM     Omega-3 Fatty Acids (OMEGA-3 FISH OIL PO) Take 1 g by mouth daily  6/3/2022 at AM     omeprazole (PRILOSEC) 40 MG DR capsule Take 1 capsule (40 mg) by mouth 2 times daily (Patient taking differently: Take 40 mg by mouth 2 times daily (before meals)) 6/3/2022 at PM     polyethylene glycol (MIRALAX/GLYCOLAX) packet Take 1 packet by mouth 2 times daily 6/3/2022 at PM     rosuvastatin (CRESTOR) 40 MG tablet Take 1 tablet (40 mg) by mouth At Bedtime 6/3/2022  at HS     simethicone (MYLICON) 80 MG chewable tablet Take 80 mg by mouth daily 6/3/2022 at AM       Information source(s): Patient and CareEveryheidy/Laly  Method of interview communication: in-person with N95 mask and jamey    Summary of Changes to PTA Med List  New: Simethicone  Discontinued: none  Changed: Tylenol 500mg to 650mg, Miralax daily to BID    Patient was asked about OTC/herbal products specifically.  PTA med list reflects this.    Allergies were reviewed, assessed, and updated with the patient.      Patient did not bring any medications to the hospital and can't retrieve from home. No multi-dose medications are available for use during hospital stay.     The information provided in this note is only as accurate as the sources available at the time of the update(s).    Thank you for the opportunity to participate in the care of this patient.    Chana Urrutia, [facility]  6/4/2022 7:18 PM

## 2022-06-05 NOTE — CONSULTS
Care Management Initial Consult    General Information  Assessment completed with: Patient,    Type of CM/SW Visit: Initial Assessment    Primary Care Provider verified and updated as needed: Yes   Readmission within the last 30 days:  (ED visit 5/15)         Advance Care Planning: Advance Care Planning Reviewed: present on chart          Communication Assessment  Patient's communication style: spoken language (English or Bilingual)             Cognitive  Cognitive/Neuro/Behavioral:                        Living Environment:   People in home: spouse     Current living Arrangements: condominium      Able to return to prior arrangements: yes       Family/Social Support:  Care provided by: self  Provides care for: no one  Marital Status:   , Children          Description of Support System: Supportive, Involved         Current Resources:   Patient receiving home care services: No     Community Resources: None  Equipment currently used at home: none  Supplies currently used at home: None    Employment/Financial:  Employment Status:          Financial Concerns:             Lifestyle & Psychosocial Needs:  Social Determinants of Health     Tobacco Use: Low Risk      Smoking Tobacco Use: Never Smoker     Smokeless Tobacco Use: Never Used   Alcohol Use: Not on file   Financial Resource Strain: Not on file   Food Insecurity: Not on file   Transportation Needs: Not on file   Physical Activity: Not on file   Stress: Not on file   Social Connections: Not on file   Intimate Partner Violence: Not on file   Depression: Not at risk     PHQ-2 Score: 0   Housing Stability: Not on file       Functional Status:  Prior to admission patient needed assistance:   Dependent ADLs:: Independent  Dependent IADLs:: Independent          Additional Information:    Assessment completed with patient. Patient states she lives in a condo with spouse. She is independent with ADLs and IADLs. She ambulates without devices and drives.  Patient states spouse currently in TCU. She has support from children if needed.  Family will transport at discharge.        Katya Mackenzie RN

## 2022-06-05 NOTE — CONSULTS
[facility] DIGESTIVE HEALTH CONSULTATION    Geeta Berry   [address]   [facility] [address]  81 year old female  Admission Date/Time: 6/4/2022  3:08 PM    Primary Care Provider:  Jacy Mishra    Requesting Physician: Matilde Joseph MD      CHIEF COMPLAINT:   Abd pain    REASON FOR THE CONSULT:  Abd pain abnormal labs    HPI:     This is an 82 yo woman who has significant PMH of CAD, DVT, HTN, Hypothyroidism, Jacqui  She has had ongoing choledocholithiasis with several ERCPs    She was recentlly seen at [facility] for abd pain  She had had colon and EGD done in 2021, essentially normal  Reactive gastropathy, diverticulosis     She was getting US and labs per primary and was placed on PPI and mirialax    Pains persisted and LFts have been noted on recent ER visits to fluctuate    Pt herself feels wonderful now.  No pain, no nausea, no vomiting  No fevers or chills at home    Tests positive for COVID    REVIEW OF SYSTEMS: 13 point review of systems is negative except that noted above.    PAST MEDICAL HISTORY:   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)      Chronic neck pain      Common bile duct calculus      Coronary artery disease due to calcified coronary lesion 8/6/2020     Depression      DVT (deep venous thrombosis) (H)      GERD (gastroesophageal reflux disease)      History of blood clots 2017    DVT right lower extremity     HLD (hyperlipidemia)      HTN (hypertension)      Hyperlipidemia      Hypertension      Hypothyroidism      Infectious viral hepatitis     hepatitis A in 1960s     Thyroid disease        PAST SURGICAL HISTORY:   Past Surgical History:   Procedure Laterality Date     ARTHROPLASTY REVISION HIP Left 5/16/2017    Procedure: REVISION LEFT TOTAL HIP ARTHROPLASTY, BOTH COMPONENTS;  Surgeon: Tyson Peoples MD;  Location: [facility] OR;  Service:      ARTHROSCOPY HIP Left 1983     CHOLECYSTECTOMY   1980     ENDOSCOPIC RETROGRADE CHOLANGIOPANCREATOGRAM      x5 over 8 years. Last 8/2016     ENDOSCOPIC RETROGRADE CHOLANGIOPANCREATOGRAM N/A 9/5/2017    Procedure: ENDOSCOPIC RETROGRADE CHOLANGIOPANCREATOGRAPHY, STONE EXTRACTION;  Surgeon: Henry Eller MD;  Location: [facility];  Service:      ENDOSCOPIC RETROGRADE CHOLANGIOPANCREATOGRAM N/A 7/26/2019    Procedure: ENDOSCOPIC RETROGRADE CHOLANGIOPANCREATOGRAPHY, REMOVAL OF SLUDGE, DILATION OF STRICTURE;  Surgeon: Ryan Pastrana MD;  Location: [facility] OR;  Service: Gastroenterology     HIP SURGERY  2012    revision     JOINT REPLACEMENT       ORTHOPEDIC SURGERY       OTHER SURGICAL HISTORY      bilateral THAwith revision on both hips     VA ESOPHAGOGASTRODUODENOSCOPY TRANSORAL DIAGNOSTIC N/A 9/10/2019    Procedure: ESOPHAGOGASTRODUODENOSCOPY (EGD);  Surgeon: Will Nash DO;  [facility];  Service: General     REPLACEMENT TOTAL KNEE Left 2015     SHOULDER SURGERY Left 2010    left total shoulder replacement     XR ERCP BILIARY AND PANCREAS  7/26/2019     ZZC TOTAL KNEE ARTHROPLASTY Right 4/29/2021    Procedure: RIGHT TOTAL KNEE ARTHROPLASTY;  Surgeon: Satinder Issa DO;  Location: [facility] OR;  Service: Orthopedics       FAMILY HISTORY:   Family History   Problem Relation Age of Onset     Colon Cancer Father      Diabetes Sister      Heart Disease Mother        SOCIAL HISTORY:   Social History     Tobacco Use     Smoking status: Never Smoker     Smokeless tobacco: Never Used   Substance Use Topics     Alcohol use: No        MEDS:  (Not in a hospital admission)      ALLERGIES/SENSITIVITIES: Atorvastatin, Codeine, Dipyridamole, and Duloxetine    MEDICATIONS:  Current Outpatient Medications   Medication Sig Dispense Refill     acetaminophen (TYLENOL) 650 MG CR tablet Take 650 mg by mouth 3 times daily       amoxicillin (AMOXIL) 500 MG capsule Take 2,000 mg by mouth daily as needed (1 hour before dental appointments)        azelastine (ASTELIN) 0.1 % nasal spray SPRAY 1 SPRAY INTO BOTH NOSTRILS AT BEDTIME 90 mL 1     Cholecalciferol (VITAMIN D3 PO) Take by mouth daily Unknown dosage       citalopram (CELEXA) 10 MG tablet Take 5 mg by mouth every morning       docusate sodium (COLACE) 100 MG tablet Take 100 mg by mouth 2 times daily       fluticasone (FLONASE) 50 MCG/ACT nasal spray INSTILL 1 SPRAY INTO BOTH NOSTRILS DAILY (Patient taking differently: Spray 1 spray into both nostrils daily) 48 g 3     hydrochlorothiazide (HYDRODIURIL) 25 MG tablet Take 1 tablet (25 mg) by mouth daily 90 tablet 3     levothyroxine (SYNTHROID/LEVOTHROID) 50 MCG tablet Take 50 mcg by mouth daily before breakfast       multivitamin w/minerals (THERA-VIT-M) tablet Take 1 tablet by mouth daily       Omega-3 Fatty Acids (OMEGA-3 FISH OIL PO) Take 1 g by mouth daily        omeprazole (PRILOSEC) 40 MG DR capsule Take 1 capsule (40 mg) by mouth 2 times daily (Patient taking differently: Take 40 mg by mouth 2 times daily (before meals)) 60 capsule 3     polyethylene glycol (MIRALAX/GLYCOLAX) packet Take 1 packet by mouth 2 times daily       rosuvastatin (CRESTOR) 40 MG tablet Take 1 tablet (40 mg) by mouth At Bedtime 90 tablet 4     simethicone (MYLICON) 80 MG chewable tablet Take 80 mg by mouth daily         PHYSICAL EXAM:  Temp:  [98.3  F (36.8  C)-98.7  F (37.1  C)] 98.7  F (37.1  C)  Pulse:  [44-62] 48  Resp:  [14-27] 22  BP: (125-159)/(61-79) 135/61  SpO2:  [93 %-98 %] 96 %  Body mass index is 25.9 kg/m .  GEN: Well developed, well nourished 81 year old in no acute distress.  HEENT: sclera anicteric, moist mucous membranes.   LYMPH: No cervical lymphadenopathy  PULM: Nonlabored breathing. Breath sounds equal.   CARDIO: Regular rate  GI: Non-distended. Soft.  Non-tender to palpation.  No guarding. No rebound tenderness.  EXT: warm, no lower extremity edema  NEURO: Alert. No focal defects.   PSYCH: Mental status appropriate, mood and affect normal.    SKIN: No  amie  MSK: No joint abnormalities    LABORATORY DATA:  CBC RESULTS:   Recent Labs   Lab Test 06/05/22  0809   WBC 6.4   RBC 3.51*   HGB 11.1*   HCT 34.1*   MCV 97   MCH 31.6   MCHC 32.6   RDW 15.0           CMP Results:   Recent Labs   Lab Test 06/05/22  0240   *   POTASSIUM 3.2*   CHLORIDE 104   CO2 22   ANIONGAP 9   GLC 99   BUN 16   CR 0.79   BILITOTAL 0.7   ALKPHOS 120   *   *        INR Results:   Recent Labs   Lab Test 04/29/21  0921   INR 1.02      LFTs bumped 5/15 and this admission      RELEVANT IMAGING:      Reviewed by me  US No gallbladder, Bile duct 4 mm  Called cystic lesion pancreas tail    CT abd with pneumobilia Stable  CBD 20 MM  No mass Normal Pancreas  ASSESSMENT:     Abd pain  Abnormal LFTs   Previous choledocholithiasis    Certainly suspicious for CBD stone    PLAN:    Clear liquid diet  Antibiotic coverage  NPO at midnight  Will need to discuss covid status with ERCP staff  Lfts WBC in am  Call GI if any signs of decompensation  Will follow    Approximately 45 minutes of total time was spent providing patient care, including patient evaluation, reviewing documentation/test results and .           Lalitha Burns MD  Thank you for the opportunity to participate in the care of this patient.   Please feel free to call me with any questions or concerns.  Phone number [phone].            CC: [facility], Jacy Mishra

## 2022-06-06 ENCOUNTER — APPOINTMENT (OUTPATIENT)
Dept: RADIOLOGY | Facility: HOSPITAL | Age: 82
DRG: 444 | End: 2022-06-06
Attending: INTERNAL MEDICINE
Payer: COMMERCIAL

## 2022-06-06 ENCOUNTER — ANESTHESIA EVENT (OUTPATIENT)
Dept: SURGERY | Facility: HOSPITAL | Age: 82
DRG: 444 | End: 2022-06-06
Payer: COMMERCIAL

## 2022-06-06 ENCOUNTER — ANESTHESIA (OUTPATIENT)
Dept: SURGERY | Facility: HOSPITAL | Age: 82
DRG: 444 | End: 2022-06-06
Payer: COMMERCIAL

## 2022-06-06 LAB
ALBUMIN SERPL-MCNC: 3.1 G/DL (ref 3.5–5)
ALP SERPL-CCNC: 122 U/L (ref 45–120)
ALT SERPL W P-5'-P-CCNC: 191 U/L (ref 0–45)
ANION GAP SERPL CALCULATED.3IONS-SCNC: 11 MMOL/L (ref 5–18)
AST SERPL W P-5'-P-CCNC: 101 U/L (ref 0–40)
BASOPHILS # BLD AUTO: 0 10E3/UL (ref 0–0.2)
BASOPHILS NFR BLD AUTO: 0 %
BILIRUB DIRECT SERPL-MCNC: 0.3 MG/DL
BILIRUB SERPL-MCNC: 0.9 MG/DL (ref 0–1)
BUN SERPL-MCNC: 20 MG/DL (ref 8–28)
CALCIUM SERPL-MCNC: 8.7 MG/DL (ref 8.5–10.5)
CHLORIDE BLD-SCNC: 108 MMOL/L (ref 98–107)
CO2 SERPL-SCNC: 22 MMOL/L (ref 22–31)
CREAT SERPL-MCNC: 0.85 MG/DL (ref 0.6–1.1)
EOSINOPHIL # BLD AUTO: 0 10E3/UL (ref 0–0.7)
EOSINOPHIL NFR BLD AUTO: 1 %
ERCP: NORMAL
ERYTHROCYTE [DISTWIDTH] IN BLOOD BY AUTOMATED COUNT: 14.8 % (ref 10–15)
GFR SERPL CREATININE-BSD FRML MDRD: 68 ML/MIN/1.73M2
GLUCOSE BLD-MCNC: 52 MG/DL (ref 70–125)
GLUCOSE BLDC GLUCOMTR-MCNC: 49 MG/DL (ref 70–99)
GLUCOSE BLDC GLUCOMTR-MCNC: 98 MG/DL (ref 70–99)
HCT VFR BLD AUTO: 35.2 % (ref 35–47)
HGB BLD-MCNC: 11.3 G/DL (ref 11.7–15.7)
IMM GRANULOCYTES # BLD: 0 10E3/UL
IMM GRANULOCYTES NFR BLD: 0 %
LYMPHOCYTES # BLD AUTO: 0.9 10E3/UL (ref 0.8–5.3)
LYMPHOCYTES NFR BLD AUTO: 18 %
MCH RBC QN AUTO: 31.5 PG (ref 26.5–33)
MCHC RBC AUTO-ENTMCNC: 32.1 G/DL (ref 31.5–36.5)
MCV RBC AUTO: 98 FL (ref 78–100)
MONOCYTES # BLD AUTO: 0.4 10E3/UL (ref 0–1.3)
MONOCYTES NFR BLD AUTO: 9 %
NEUTROPHILS # BLD AUTO: 3.5 10E3/UL (ref 1.6–8.3)
NEUTROPHILS NFR BLD AUTO: 72 %
NRBC # BLD AUTO: 0 10E3/UL
NRBC BLD AUTO-RTO: 0 /100
PLATELET # BLD AUTO: 182 10E3/UL (ref 150–450)
POTASSIUM BLD-SCNC: 3.9 MMOL/L (ref 3.5–5)
PROT SERPL-MCNC: 6 G/DL (ref 6–8)
RBC # BLD AUTO: 3.59 10E6/UL (ref 3.8–5.2)
SODIUM SERPL-SCNC: 141 MMOL/L (ref 136–145)
WBC # BLD AUTO: 4.8 10E3/UL (ref 4–11)

## 2022-06-06 PROCEDURE — C1725 CATH, TRANSLUMIN NON-LASER: HCPCS | Performed by: INTERNAL MEDICINE

## 2022-06-06 PROCEDURE — 258N000001 HC RX 258: Performed by: INTERNAL MEDICINE

## 2022-06-06 PROCEDURE — 93005 ELECTROCARDIOGRAM TRACING: CPT

## 2022-06-06 PROCEDURE — 82310 ASSAY OF CALCIUM: CPT | Performed by: INTERNAL MEDICINE

## 2022-06-06 PROCEDURE — 93005 ELECTROCARDIOGRAM TRACING: CPT | Performed by: MASSAGE THERAPIST

## 2022-06-06 PROCEDURE — 258N000003 HC RX IP 258 OP 636: Performed by: INTERNAL MEDICINE

## 2022-06-06 PROCEDURE — 255N000002 HC RX 255 OP 636: Performed by: INTERNAL MEDICINE

## 2022-06-06 PROCEDURE — 272N000001 HC OR GENERAL SUPPLY STERILE: Performed by: INTERNAL MEDICINE

## 2022-06-06 PROCEDURE — 250N000009 HC RX 250: Performed by: NURSE ANESTHETIST, CERTIFIED REGISTERED

## 2022-06-06 PROCEDURE — 85025 COMPLETE CBC W/AUTO DIFF WBC: CPT | Performed by: INTERNAL MEDICINE

## 2022-06-06 PROCEDURE — 250N000011 HC RX IP 250 OP 636: Performed by: INTERNAL MEDICINE

## 2022-06-06 PROCEDURE — 120N000001 HC R&B MED SURG/OB

## 2022-06-06 PROCEDURE — 36415 COLL VENOUS BLD VENIPUNCTURE: CPT | Performed by: INTERNAL MEDICINE

## 2022-06-06 PROCEDURE — 250N000009 HC RX 250: Performed by: INTERNAL MEDICINE

## 2022-06-06 PROCEDURE — 999N000141 HC STATISTIC PRE-PROCEDURE NURSING ASSESSMENT: Performed by: INTERNAL MEDICINE

## 2022-06-06 PROCEDURE — 93010 ELECTROCARDIOGRAM REPORT: CPT | Performed by: INTERNAL MEDICINE

## 2022-06-06 PROCEDURE — 0FC98ZZ EXTIRPATION OF MATTER FROM COMMON BILE DUCT, VIA NATURAL OR ARTIFICIAL OPENING ENDOSCOPIC: ICD-10-PCS | Performed by: INTERNAL MEDICINE

## 2022-06-06 PROCEDURE — C1726 CATH, BAL DIL, NON-VASCULAR: HCPCS | Performed by: INTERNAL MEDICINE

## 2022-06-06 PROCEDURE — C1769 GUIDE WIRE: HCPCS | Performed by: INTERNAL MEDICINE

## 2022-06-06 PROCEDURE — 360N000082 HC SURGERY LEVEL 2 W/ FLUORO, PER MIN: Performed by: INTERNAL MEDICINE

## 2022-06-06 PROCEDURE — 370N000017 HC ANESTHESIA TECHNICAL FEE, PER MIN: Performed by: INTERNAL MEDICINE

## 2022-06-06 PROCEDURE — 99232 SBSQ HOSP IP/OBS MODERATE 35: CPT | Performed by: INTERNAL MEDICINE

## 2022-06-06 PROCEDURE — 250N000013 HC RX MED GY IP 250 OP 250 PS 637: Performed by: INTERNAL MEDICINE

## 2022-06-06 PROCEDURE — 250N000011 HC RX IP 250 OP 636: Performed by: NURSE ANESTHETIST, CERTIFIED REGISTERED

## 2022-06-06 PROCEDURE — 999N000180 XR SURGERY CARM FLUORO LESS THAN 5 MIN

## 2022-06-06 PROCEDURE — 82248 BILIRUBIN DIRECT: CPT | Performed by: INTERNAL MEDICINE

## 2022-06-06 RX ORDER — NALOXONE HYDROCHLORIDE 1 MG/ML
0.4 INJECTION INTRAMUSCULAR; INTRAVENOUS; SUBCUTANEOUS
Status: DISCONTINUED | OUTPATIENT
Start: 2022-06-06 | End: 2022-06-07 | Stop reason: HOSPADM

## 2022-06-06 RX ORDER — PROPOFOL 10 MG/ML
INJECTION, EMULSION INTRAVENOUS CONTINUOUS PRN
Status: DISCONTINUED | OUTPATIENT
Start: 2022-06-06 | End: 2022-06-06

## 2022-06-06 RX ORDER — NALOXONE HYDROCHLORIDE 1 MG/ML
0.2 INJECTION INTRAMUSCULAR; INTRAVENOUS; SUBCUTANEOUS
Status: DISCONTINUED | OUTPATIENT
Start: 2022-06-06 | End: 2022-06-07 | Stop reason: HOSPADM

## 2022-06-06 RX ORDER — LIDOCAINE HYDROCHLORIDE 10 MG/ML
INJECTION, SOLUTION INFILTRATION; PERINEURAL PRN
Status: DISCONTINUED | OUTPATIENT
Start: 2022-06-06 | End: 2022-06-06

## 2022-06-06 RX ORDER — FENTANYL CITRATE 50 UG/ML
25 INJECTION, SOLUTION INTRAMUSCULAR; INTRAVENOUS EVERY 5 MIN PRN
Status: DISCONTINUED | OUTPATIENT
Start: 2022-06-06 | End: 2022-06-06 | Stop reason: HOSPADM

## 2022-06-06 RX ORDER — ONDANSETRON 4 MG/1
4 TABLET, ORALLY DISINTEGRATING ORAL EVERY 6 HOURS PRN
Status: DISCONTINUED | OUTPATIENT
Start: 2022-06-06 | End: 2022-06-07 | Stop reason: HOSPADM

## 2022-06-06 RX ORDER — LIDOCAINE 40 MG/G
CREAM TOPICAL
Status: DISCONTINUED | OUTPATIENT
Start: 2022-06-06 | End: 2022-06-06 | Stop reason: HOSPADM

## 2022-06-06 RX ORDER — FENTANYL CITRATE 50 UG/ML
INJECTION, SOLUTION INTRAMUSCULAR; INTRAVENOUS PRN
Status: DISCONTINUED | OUTPATIENT
Start: 2022-06-06 | End: 2022-06-06

## 2022-06-06 RX ORDER — SODIUM CHLORIDE, SODIUM LACTATE, POTASSIUM CHLORIDE, CALCIUM CHLORIDE 600; 310; 30; 20 MG/100ML; MG/100ML; MG/100ML; MG/100ML
INJECTION, SOLUTION INTRAVENOUS CONTINUOUS
Status: DISCONTINUED | OUTPATIENT
Start: 2022-06-06 | End: 2022-06-06 | Stop reason: HOSPADM

## 2022-06-06 RX ORDER — ONDANSETRON 2 MG/ML
4 INJECTION INTRAMUSCULAR; INTRAVENOUS EVERY 6 HOURS PRN
Status: DISCONTINUED | OUTPATIENT
Start: 2022-06-06 | End: 2022-06-07 | Stop reason: HOSPADM

## 2022-06-06 RX ORDER — NICOTINE POLACRILEX 4 MG
15-30 LOZENGE BUCCAL
Status: DISCONTINUED | OUTPATIENT
Start: 2022-06-06 | End: 2022-06-07 | Stop reason: HOSPADM

## 2022-06-06 RX ORDER — FLUMAZENIL 0.1 MG/ML
0.2 INJECTION, SOLUTION INTRAVENOUS
Status: ACTIVE | OUTPATIENT
Start: 2022-06-06 | End: 2022-06-07

## 2022-06-06 RX ORDER — DEXTROSE MONOHYDRATE 25 G/50ML
25-50 INJECTION, SOLUTION INTRAVENOUS
Status: DISCONTINUED | OUTPATIENT
Start: 2022-06-06 | End: 2022-06-07 | Stop reason: HOSPADM

## 2022-06-06 RX ORDER — DEXAMETHASONE SODIUM PHOSPHATE 4 MG/ML
INJECTION, SOLUTION INTRA-ARTICULAR; INTRALESIONAL; INTRAMUSCULAR; INTRAVENOUS; SOFT TISSUE PRN
Status: DISCONTINUED | OUTPATIENT
Start: 2022-06-06 | End: 2022-06-06

## 2022-06-06 RX ORDER — IOPAMIDOL 612 MG/ML
INJECTION, SOLUTION INTRAVASCULAR PRN
Status: DISCONTINUED | OUTPATIENT
Start: 2022-06-06 | End: 2022-06-06 | Stop reason: HOSPADM

## 2022-06-06 RX ORDER — PROPOFOL 10 MG/ML
INJECTION, EMULSION INTRAVENOUS PRN
Status: DISCONTINUED | OUTPATIENT
Start: 2022-06-06 | End: 2022-06-06

## 2022-06-06 RX ORDER — ONDANSETRON 4 MG/1
4 TABLET, ORALLY DISINTEGRATING ORAL EVERY 30 MIN PRN
Status: DISCONTINUED | OUTPATIENT
Start: 2022-06-06 | End: 2022-06-06 | Stop reason: HOSPADM

## 2022-06-06 RX ORDER — ONDANSETRON 2 MG/ML
4 INJECTION INTRAMUSCULAR; INTRAVENOUS EVERY 30 MIN PRN
Status: DISCONTINUED | OUTPATIENT
Start: 2022-06-06 | End: 2022-06-06 | Stop reason: HOSPADM

## 2022-06-06 RX ADMIN — POLYETHYLENE GLYCOL 3350 17 G: 17 POWDER, FOR SOLUTION ORAL at 20:43

## 2022-06-06 RX ADMIN — PROPOFOL 50 MG: 10 INJECTION, EMULSION INTRAVENOUS at 13:44

## 2022-06-06 RX ADMIN — PROPOFOL 115 MCG/KG/MIN: 10 INJECTION, EMULSION INTRAVENOUS at 13:44

## 2022-06-06 RX ADMIN — PANTOPRAZOLE SODIUM 40 MG: 40 TABLET, DELAYED RELEASE ORAL at 17:16

## 2022-06-06 RX ADMIN — DEXAMETHASONE SODIUM PHOSPHATE 4 MG: 4 INJECTION, SOLUTION INTRA-ARTICULAR; INTRALESIONAL; INTRAMUSCULAR; INTRAVENOUS; SOFT TISSUE at 13:50

## 2022-06-06 RX ADMIN — PIPERACILLIN AND TAZOBACTAM 3.38 G: 3; .375 INJECTION, POWDER, LYOPHILIZED, FOR SOLUTION INTRAVENOUS at 04:04

## 2022-06-06 RX ADMIN — DOCUSATE SODIUM 100 MG: 100 CAPSULE, LIQUID FILLED ORAL at 08:06

## 2022-06-06 RX ADMIN — DEXTROSE AND SODIUM CHLORIDE: 5; 900 INJECTION, SOLUTION INTRAVENOUS at 18:46

## 2022-06-06 RX ADMIN — FENTANYL CITRATE 50 MCG: 50 INJECTION, SOLUTION INTRAMUSCULAR; INTRAVENOUS at 13:47

## 2022-06-06 RX ADMIN — LORATADINE 10 MG: 10 TABLET ORAL at 08:06

## 2022-06-06 RX ADMIN — PANTOPRAZOLE SODIUM 40 MG: 40 TABLET, DELAYED RELEASE ORAL at 08:06

## 2022-06-06 RX ADMIN — DEXTROSE AND SODIUM CHLORIDE: 5; 900 INJECTION, SOLUTION INTRAVENOUS at 07:38

## 2022-06-06 RX ADMIN — FLUTICASONE PROPIONATE 1 SPRAY: 50 SPRAY, METERED NASAL at 08:08

## 2022-06-06 RX ADMIN — DEXTROSE MONOHYDRATE 25 ML: 25 INJECTION, SOLUTION INTRAVENOUS at 07:42

## 2022-06-06 RX ADMIN — DOCUSATE SODIUM 100 MG: 100 CAPSULE, LIQUID FILLED ORAL at 20:43

## 2022-06-06 RX ADMIN — LEVOTHYROXINE SODIUM 50 MCG: 0.03 TABLET ORAL at 08:06

## 2022-06-06 RX ADMIN — SIMETHICONE 80 MG: 80 TABLET, CHEWABLE ORAL at 08:08

## 2022-06-06 RX ADMIN — LIDOCAINE HYDROCHLORIDE 5 ML: 10 INJECTION, SOLUTION INFILTRATION; PERINEURAL at 13:44

## 2022-06-06 RX ADMIN — FENTANYL CITRATE 50 MCG: 50 INJECTION, SOLUTION INTRAMUSCULAR; INTRAVENOUS at 13:53

## 2022-06-06 RX ADMIN — PIPERACILLIN AND TAZOBACTAM 3.38 G: 3; .375 INJECTION, POWDER, LYOPHILIZED, FOR SOLUTION INTRAVENOUS at 20:43

## 2022-06-06 RX ADMIN — AZELASTINE HYDROCHLORIDE 1 SPRAY: 137 SPRAY, METERED NASAL at 21:14

## 2022-06-06 RX ADMIN — PIPERACILLIN AND TAZOBACTAM 3.38 G: 3; .375 INJECTION, POWDER, LYOPHILIZED, FOR SOLUTION INTRAVENOUS at 12:22

## 2022-06-06 ASSESSMENT — ACTIVITIES OF DAILY LIVING (ADL)
ADLS_ACUITY_SCORE: 24
ADLS_ACUITY_SCORE: 22
ADLS_ACUITY_SCORE: 24
ADLS_ACUITY_SCORE: 22
ADLS_ACUITY_SCORE: 22
ADLS_ACUITY_SCORE: 24
ADLS_ACUITY_SCORE: 24
ADLS_ACUITY_SCORE: 22
ADLS_ACUITY_SCORE: 22
ADLS_ACUITY_SCORE: 24

## 2022-06-06 NOTE — ANESTHESIA POSTPROCEDURE EVALUATION
Patient: Geeta Berry    Procedure: Procedure(s):  ENDOSCOPIC RETROGRADE CHOLANGIOPANCREATOGRAPHY, WITH STONE EXTRACTION       Anesthesia Type:  MAC    Note:  Disposition: Inpatient   Postop Pain Control: Uneventful            Sign Out: Well controlled pain   PONV: No   Neuro/Psych: Uneventful            Sign Out: Acceptable/Baseline neuro status   Airway/Respiratory: Uneventful            Sign Out: Acceptable/Baseline resp. status   CV/Hemodynamics: Uneventful            Sign Out: Acceptable CV status; No obvious hypovolemia; No obvious fluid overload   Other NRE:    DID A NON-ROUTINE EVENT OCCUR?            Last vitals:  Vitals:    06/06/22 0709 06/06/22 1219 06/06/22 1449   BP: 138/60 (!) 172/73 (!) 148/68   Pulse: 57 61 (!) 47   Resp: 18 20 17   Temp: 36.6  C (97.9  F) 36.6  C (97.8  F) 36.9  C (98.5  F)   SpO2: 95% 98% 96%       Electronically Signed By: Lidia Rice MD  June 6, 2022  3:04 PM

## 2022-06-06 NOTE — OR NURSING
On hold to go the floor P2 Rm# [id number] starting 1412, no nurse to take report for patient care. Charge nurse and PACU noted.

## 2022-06-06 NOTE — ANESTHESIA PREPROCEDURE EVALUATION
Anesthesia Pre-Procedure Evaluation    Patient: Geeta Berry   MRN: [id number] : 1940        Procedure : Procedure(s):  ENDOSCOPIC RETROGRADE CHOLANGIOPANCREATOGRAPHY  ESOPHAGOGASTRODUODENOSCOPY, WITH ENDOSCOPIC US          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)      Chronic neck pain      Common bile duct calculus      Coronary artery disease due to calcified coronary lesion 2020     Depression      DVT (deep venous thrombosis) (H)      GERD (gastroesophageal reflux disease)      History of blood clots     DVT right lower extremity     HLD (hyperlipidemia)      HTN (hypertension)      Hyperlipidemia      Hypertension      Hypothyroidism      Infectious viral hepatitis     hepatitis A in 1960s     Thyroid disease       Past Surgical History:   Procedure Laterality Date     ARTHROPLASTY REVISION HIP Left 2017    Procedure: REVISION LEFT TOTAL HIP ARTHROPLASTY, BOTH COMPONENTS;  Surgeon: Tyson Peoples MD;  Location: [facility];  Service:      ARTHROSCOPY HIP Left      CHOLECYSTECTOMY       ENDOSCOPIC RETROGRADE CHOLANGIOPANCREATOGRAM      x5 over 8 years. Last 2016     ENDOSCOPIC RETROGRADE CHOLANGIOPANCREATOGRAM N/A 2017    Procedure: ENDOSCOPIC RETROGRADE CHOLANGIOPANCREATOGRAPHY, STONE EXTRACTION;  Surgeon: Henry Eller MD;  Location: [facility] OR;  Service:      ENDOSCOPIC RETROGRADE CHOLANGIOPANCREATOGRAM N/A 2019    Procedure: ENDOSCOPIC RETROGRADE CHOLANGIOPANCREATOGRAPHY, REMOVAL OF SLUDGE, DILATION OF STRICTURE;  Surgeon: Ryan Pastrana MD;  Location: [facility];  Service: Gastroenterology     HIP SURGERY      revision     JOINT REPLACEMENT       ORTHOPEDIC SURGERY       OTHER SURGICAL HISTORY      bilateral THAwith revision on both hips     NM ESOPHAGOGASTRODUODENOSCOPY TRANSORAL DIAGNOSTIC N/A 9/10/2019    Procedure: ESOPHAGOGASTRODUODENOSCOPY (EGD);   Surgeon: Will Nash DO;  Location: [facility] OR;  Service: General     REPLACEMENT TOTAL KNEE Left 2015     SHOULDER SURGERY Left 2010    left total shoulder replacement     XR ERCP BILIARY AND PANCREAS  7/26/2019     ZZC TOTAL KNEE ARTHROPLASTY Right 4/29/2021    Procedure: RIGHT TOTAL KNEE ARTHROPLASTY;  Surgeon: Satinder Issa DO;  Location: [facility];  Service: Orthopedics      Allergies   Allergen Reactions     Atorvastatin      Can't remember     Codeine      Can't remember     Dipyridamole      Can't remember     Duloxetine Headache      Social History     Tobacco Use     Smoking status: Never Smoker     Smokeless tobacco: Never Used   Substance Use Topics     Alcohol use: No      Wt Readings from Last 1 Encounters:   06/05/22 77.9 kg (171 lb 12.8 oz)        Anesthesia Evaluation   Pt has had prior anesthetic.     No history of anesthetic complications       ROS/MED HX  ENT/Pulmonary:  - neg pulmonary ROS     Neurologic:  - neg neurologic ROS     Cardiovascular:     (+) hypertension--CAD ---    METS/Exercise Tolerance:     Hematologic:     (+) History of blood clots,     Musculoskeletal:   (+) arthritis,     GI/Hepatic:     (+) GERD, hiatal hernia,     Renal/Genitourinary:     (+) renal disease, type: CRI, Pt does not require dialysis,     Endo:     (+) thyroid problem,     Psychiatric/Substance Use:     (+) psychiatric history anxiety and depression     Infectious Disease:  - neg infectious disease ROS     Malignancy:  - neg malignancy ROS     Other:  - neg other ROS          Physical Exam    Airway  airway exam normal      Mallampati: II   TM distance: > 3 FB   Neck ROM: full   Mouth opening: > 3 cm    Respiratory Devices and Support         Dental  no notable dental history         Cardiovascular   cardiovascular exam normal       Rhythm and rate: regular and normal     Pulmonary   pulmonary exam normal        breath sounds clear to auscultation           OUTSIDE LABS:  CBC:   Lab  Results   Component Value Date    WBC 4.8 06/06/2022    WBC 6.4 06/05/2022    HGB 11.3 (L) 06/06/2022    HGB 11.1 (L) 06/05/2022    HCT 35.2 06/06/2022    HCT 34.1 (L) 06/05/2022     06/06/2022     06/05/2022     BMP:   Lab Results   Component Value Date     06/06/2022     (L) 06/05/2022    POTASSIUM 3.9 06/06/2022    POTASSIUM 4.3 06/05/2022    CHLORIDE 108 (H) 06/06/2022    CHLORIDE 104 06/05/2022    CO2 22 06/06/2022    CO2 22 06/05/2022    BUN 20 06/06/2022    BUN 16 06/05/2022    CR 0.85 06/06/2022    CR 0.79 06/05/2022    GLC 98 06/06/2022    GLC 49 (LL) 06/06/2022     COAGS:   Lab Results   Component Value Date    PTT 27 07/31/2020    INR 1.02 04/29/2021     POC: No results found for: BGM, HCG, HCGS  HEPATIC:   Lab Results   Component Value Date    ALBUMIN 3.1 (L) 06/06/2022    PROTTOTAL 6.0 06/06/2022     (H) 06/06/2022     (H) 06/06/2022    ALKPHOS 122 (H) 06/06/2022    BILITOTAL 0.9 06/06/2022     OTHER:   Lab Results   Component Value Date    LACT 0.8 07/17/2016    A1C 5.9 (H) 09/23/2021    ALOK 8.7 06/06/2022    MAG 1.7 (L) 06/04/2022    LIPASE 17 06/04/2022    TSH 1.85 09/23/2021    CRP 0.3 07/09/2019       Anesthesia Plan    ASA Status:  2   NPO Status:  NPO Appropriate    Anesthesia Type: MAC.     - Reason for MAC: straight local not clinically adequate              Consents    Anesthesia Plan(s) and associated risks, benefits, and realistic alternatives discussed. Questions answered and patient/representative(s) expressed understanding.    - Discussed:     - Discussed with:  Patient         Postoperative Care    Pain management: IV analgesics, Oral pain medications, Multi-modal analgesia.   PONV prophylaxis: Ondansetron (or other 5HT-3), Dexamethasone or Solumedrol, Droperidol or Haldol     Comments:    Other Comments: Patient positive for COVID on 5/17/22            DANIEL SERRA MD

## 2022-06-06 NOTE — ANESTHESIA CARE TRANSFER NOTE
Patient: Geeta Berry    Procedure: Procedure(s):  ENDOSCOPIC RETROGRADE CHOLANGIOPANCREATOGRAPHY, WITH STONE EXTRACTION       Diagnosis: Epigastric pain [R10.13]  Elevated liver enzymes [R74.8]  Choledocholithiasis [K80.50]  129/61Diagnosis Additional Information: No value filed.    Anesthesia Type:   MAC     Note:    Oropharynx: oropharynx clear of all foreign objects  Level of Consciousness: awake  Oxygen Supplementation: room air    Independent Airway: airway patency satisfactory and stable  Dentition: dentition unchanged  Vital Signs Stable: post-procedure vital signs reviewed and stable  Report to RN Given: handoff report given  Patient transferred to: Medical/Surgical Unit    Handoff Report: Identifed the Patient, Identified the Reponsible Provider, Reviewed the pertinent medical history, Discussed the surgical course, Reviewed Intra-OP anesthesia mangement and issues during anesthesia, Set expectations for post-procedure period and Allowed opportunity for questions and acknowledgement of understanding      Vitals:  Vitals Value Taken Time   /61 1432   Temp     Pulse 60 1432   Resp 16 1432   SpO2 94% 1432       Electronically Signed By: DIOGO Anthony CRNA  June 6, 2022  2:32 PM

## 2022-06-06 NOTE — PROGRESS NOTES
[facility]    Medicine Progress Note - Hospitalist Service    Date of Admission:  6/4/2022    Assessment & Plan            81 year old old female with past medical history of CAD, DVT, GERD, hypertension, hypothyroidism, hyperlipidemia, cholecystectomy years ago, choledocholithiasis with multiple ERCPs in the past most recent in 2017, COVID-19 diagnosed on 5/18/2022 who presented for evaluation of epigastric pain, found to have abnormal LFTs      6/6 :    S/p ERCP : Choledocholithiasis with an obstruction was found.                          Complete removal was accomplished via an existing                          biliary sphincterotomy, sphincter dilation and balloon                          extraction.   Abdominal pain is stable  LFT's improving  GI following, recent COVID status  Continue iv fluids, iv antibiotic, liquid diet      Likely discharge in am      A/p :      1.  Upper abdominal pain, abnormal LFTs.  Abdominal ultrasound with no biliary dilatation CBD 4 mm.  However given history of choledocholithiasis will consult GI for possible ERCP.  Given fever on admission we will start empiric IV Zosyn.  Hold Tylenol and statin.  IV fluids, n.p.o. after midnight  2.  Hypokalemia.  Replace per protocol  3.  Recent COVID-19 diagnosed on 5/18/2022.  Only mildly symptomatic, no respiratory symptoms.  Completed Paxlovid.  COVID-positive on admission.  4.  GERD on PPI  5.  Chronic constipation.  Continue scheduled bowel medications  6.  Hypothyroidism.   -Continue home levothyroxine replacement             Diet: Clear Liquid Diet    DVT Prophylaxis: Pneumatic Compression Devices  Benjamin Catheter: Not present  Central Lines: None  Cardiac Monitoring: None  Code Status: Full Code      Disposition Plan   Expected Discharge: 06/08/2022     Anticipated discharge location: home    Delays:  Awaiting plan for GI       The patient's care was discussed with the Bedside Nurse and Patient.    Manuel Herrera  "MD  Hospitalist Service  [facility]  Securely message with the [employer] Web Console (learn more here)  Text page via [employer] Paging/Directory         Clinically Significant Risk Factors Present on Admission              # Overweight: Estimated body mass index is 26.51 kg/m  as calculated from the following:    Height as of this encounter: 1.715 m (5' 7.5\").    Weight as of this encounter: 77.9 kg (171 lb 12.8 oz).      ______________________________________________________________________        Data reviewed today: I reviewed all medications, new labs and imaging results over the last 24 hours. I personally reviewed no images or EKG's today.    Physical Exam   Vital Signs: Temp: 97.7  F (36.5  C) Temp src: Oral BP: (!) 158/69 Pulse: (!) 41   Resp: 16 SpO2: 97 % O2 Device: None (Room air)    Weight: 171 lbs 12.8 oz       GENERAL: The patient is not in any acute distressed. Awake and alert.  HEENT: Nonicteric sclerae, PERRLA, EOMI. Oropharynx clear. Moist mucous membranes. Conjunctivae appear well perfused.  HEART: Regular rate and rhythm without murmurs.  LUNGS: Clear to auscultation bilaterally. No wheezing or crackles.  ABDOMEN: Soft, positive bowel sounds, nontender.  SKIN: No rash, no excessive bruising, petechiae, or purpura.  EXTREMITIES : no rashes, no swelling in legs.  NEUROLOGIC: conscious and oriented, follows commands, no obvious focal deficits.  ROS: All other systems negative       Data   Recent Labs   Lab 06/06/22  0803 06/06/22  0727 06/06/22  0622 06/05/22  1901 06/05/22  0809 06/05/22  0240 06/04/22  1303   WBC  --   --  4.8  --  6.4  --  7.2   HGB  --   --  11.3*  --  11.1*  --  12.5   MCV  --   --  98  --  97  --  98   PLT  --   --  182  --  200  --  229   NA  --   --  141  --   --  135* 138   POTASSIUM  --   --  3.9 4.3  --  3.2* 3.0*   CHLORIDE  --   --  108*  --   --  104 103   CO2  --   --  22  --   --  22 23   BUN  --   --  20  --   --  16 16   CR  --   --  0.85  --   -- "  0.79 0.96   ANIONGAP  --   --  11  --   --  9 12   ALOK  --   --  8.7  --   --  8.3* 9.0   GLC 98 49* 52*  --   --  99 113   ALBUMIN  --   --  3.1*  --   --  3.0* 3.7   PROTTOTAL  --   --  6.0  --   --  5.8* 6.8   BILITOTAL  --   --  0.9  --   --  0.7 0.7   ALKPHOS  --   --  122*  --   --  120 158*   ALT  --   --  191*  --   --  297* 402*   AST  --   --  101*  --   --  239* 483*   LIPASE  --   --   --   --   --   --  17

## 2022-06-06 NOTE — PROGRESS NOTES
"CLINICAL NUTRITION SERVICES - ASSESSMENT NOTE     Nutrition Prescription    RECOMMENDATIONS FOR MDs/PROVIDERS TO ORDER:  None at this time    Malnutrition Status:    Unable to determine at this time    Recommendations already ordered by Registered Dietitian (RD):  None    Future/Additional Recommendations:  Follow for diet adv/ po intake/need for supplements     REASON FOR ASSESSMENT  Geeta Berry is a/an 81 year old female assessed by the dietitian for Admission Nutrition Risk Screen for positive weight loss and poor appetite PTA    Pt with past medical history of CAD, DVT, GERD, hypertension, hypothyroidism hyperlipidemia, cholecystectomy years ago. Choledocholithiasis with multiple ERCPs in the past most recent in 2017, COVID-19 diagnosed on 5/18/2022 who presented for evaluation of epigastric pain and found to have abnormal LFT's    NUTRITION HISTORY  Unable to obtain at this time-pt fast asleep    CURRENT NUTRITION ORDERS  Diet: Clear liquid  Intake/Tolerance: po intake not documented yet    LABS  Labs reviewed: Na 141, K 3.9, Alb 3.1, alk phos 122, , , Glu 52, 49, 98    MEDICATIONS  Medications reviewed: colace, levothyroxine, pantoprazole, zosyn, miralax, simethicone     ANTHROPOMETRICS  Height: 171.5 cm (5' 7.5\")  Most Recent Weight: 77.9 kg (171 lb 12.8 oz)    IBW: 62.5 kg (137.5 lb)  BMI: Overweight BMI 25-29.9  Weight History:   Wt Readings from Last 10 Encounters:   06/05/22 77.9 kg (171 lb 12.8 oz)   05/15/22 80.7 kg (178 lb)   04/26/22 81 kg (178 lb 9.6 oz)   04/13/22 81.2 kg (179 lb)   03/29/22 80.3 kg (177 lb)   01/18/22 81.9 kg (180 lb 9.6 oz)   11/24/21 81.6 kg (179 lb 14.4 oz)   11/04/21 77.6 kg (171 lb)   11/01/21 77.7 kg (171 lb 4.8 oz)   10/13/21 81.6 kg (180 lb)   Weight down 7 lb in < 1 month (3.9%)    Dosing Weight: 66.1 kg/ adjusted weight    ASSESSED NUTRITION NEEDS  Estimated Energy Needs: 4338-9015 kcals/day (25 - 30 kcals/kg)  Justification: Maintenance and " Overweight  Estimated Protein Needs: 66+ grams protein/day (1+)  Justification: Maintenance  Estimated Fluid Needs: 1988-9997 mL/day (25 - 30 mL/kg)   Justification: Maintenance    PHYSICAL FINDINGS  See malnutrition section below.  Skin: intact  Demarco score=19, nutrition noted as probably inadequate  GI: WDL per nurse  Last BM 6/5/2022    MALNUTRITION:  % Weight Loss:  Weight loss does not meet criteria for malnutrition   % Intake:  unknown  Subcutaneous Fat Loss:  unable  Muscle Loss:  unable  Fluid Retention:  None noted    Malnutrition Diagnosis: Unable to determine due to need nutrition hx and NFPE    NUTRITION DIAGNOSIS  Inadequate oral intake related to abdominal pain as evidenced by Clear liquid diet      INTERVENTIONS  Implementation  Follow for diet advance and possible need for nutritional supplements    Goals  Diet advancement vs nutrition support within 2-3 days.  Patient to consume % of nutritionally adequate meals three times per day, or the equivalent with supplements/snacks.     Monitoring/Evaluation  Progress toward goals will be monitored and evaluated per protocol.

## 2022-06-06 NOTE — PROGRESS NOTES
"[facility] Progress Note     IMPRESSION:  82 yo woman who has significant PMH of CAD, DVT, HTN, Hypothyroidism, s/p Jacqui admitted on 22 for abdominal pains and elevated LFT's    1. Abdominal pains  - Elevated LFT's. CT abd/pelvis with stable pneumobilia and duct dilatation (stable), cbd measuring 20mm. Liver normal. She is s/p cholecystectomy and has had ERCP's in the past for choledocholithiasis (last in ). ? Recurrent stones or papillary stenosis.   - LFT's are improving today  - She also has a history of constipation which may also be contributing to her abdominal pains. Recently started on MIRALAX BID.     2. Recent COVID infection  - Diagnosed in mid May 2022. She has no respiratory symptoms.      RECOMMENDATIONS:  - Diet: NPO  - Plan for ERCP today to evaluate further  - Further recommendations to follow     Approximately 15 minutes of total time was spent providing patient care, including patient evaluation, reviewing documentation/test results, and     Andreas Branch PA-C  [facility]  [phone]  ________________________________________________________________________      SUBJECTIVE:    Feeling better today. No complaints. No abdominal pains or nausea. Had a small BM this morning.      OBJECTIVE:  /60 (BP Location: Left arm)   Pulse 57   Temp 97.9  F (36.6  C) (Oral)   Resp 18   Ht 1.715 m (5' 7.5\")   Wt 77.9 kg (171 lb 12.8 oz)   LMP  (LMP Unknown)   SpO2 95%   BMI 26.51 kg/m    Temp (24hrs), Av.3  F (36.8  C), Min:97.9  F (36.6  C), Max:98.7  F (37.1  C)    Patient Vitals for the past 72 hrs:   Weight   22 1609 77.9 kg (171 lb 12.8 oz)   22 1032 75 kg (165 lb 5.5 oz)       Intake/Output Summary (Last 24 hours) at 2022 0906  Last data filed at 2022 0745  Gross per 24 hour   Intake 2200 ml   Output 1300 ml   Net 900 ml        PHYSICAL EXAM  GEN: Alert, oriented x3, communicative and in NAD.    LYMPH: No LAD noted.  HRT: " RRR  LUNGS: CTA  ABD:  ND, +BS, no guarding or pain to palpation, no rebound, no HSM.  SKIN: No rash, jaundice or spider angiomata      LABS:  I have reviewed the patient's new clinical lab results:     Recent Labs   Lab Test 06/06/22  0622 06/05/22  0809 06/04/22  1303 04/30/21  0557 04/29/21  0921 09/21/20  1525 07/31/20  0940   WBC 4.8 6.4 7.2   < >  --    < > 4.9   HGB 11.3* 11.1* 12.5   < >  --    < > 12.2   MCV 98 97 98   < >  --    < > 96    200 229   < >  --    < > 243   INR  --   --   --   --  1.02  --  0.98    < > = values in this interval not displayed.     Recent Labs   Lab Test 06/06/22 0622 06/05/22  1901 06/05/22  0240 06/04/22  1303   POTASSIUM 3.9 4.3 3.2* 3.0*   CHLORIDE 108*  --  104 103   CO2 22 -- 22 23   BUN 20  --  16 16   CR 0.85  --  0.79 0.96   ANIONGAP 11  --  9 12     Recent Labs   Lab Test 06/06/22 0622 06/05/22  0240 06/04/22  1303 06/02/22  1402 05/15/22  1532 05/04/20  0917 01/20/20  1500 01/20/20  1401 08/03/19  1211 07/17/16  1753 07/17/16  1720   ALBUMIN 3.1* 3.0* 3.7   < > 3.6   < >  --  4.0  --    < >  --    BILITOTAL 0.9 0.7 0.7   < > 0.4   < >  --  0.5  --    < >  --    * 297* 402*   < > 147*   < >  --  17  --    < >  --    * 239* 483*   < > 221*   < >  --  27  --    < >  --    PROTEIN  --   --   --   --   --   --  Trace*  --  Negative  --  Negative   LIPASE  --   --  17  --  34  --   --  16  --    < >  --     < > = values in this interval not displayed.         IMAGING:  reviewed

## 2022-06-06 NOTE — PLAN OF CARE
Problem: Electrolyte Imbalance  Goal: Electrolyte Balance  Outcome: Ongoing, Progressing   Goal Outcome Evaluation:  Patient arrived on the unit at 1600, on a cart . Alert and oriented X4.  Ambulated from the hallway to the bathroom.  VSS. Requested to ambulated on the hallways to stretch.   Last Potassium bump infused. Recheck K at 1900 per orders.  Will continue to monitor.

## 2022-06-07 VITALS
RESPIRATION RATE: 16 BRPM | SYSTOLIC BLOOD PRESSURE: 124 MMHG | OXYGEN SATURATION: 96 % | HEIGHT: 68 IN | BODY MASS INDEX: 26.04 KG/M2 | TEMPERATURE: 97.5 F | WEIGHT: 171.8 LBS | DIASTOLIC BLOOD PRESSURE: 59 MMHG | HEART RATE: 89 BPM

## 2022-06-07 LAB
ALBUMIN SERPL-MCNC: 2.6 G/DL (ref 3.5–5)
ALP SERPL-CCNC: 99 U/L (ref 45–120)
ALT SERPL W P-5'-P-CCNC: 140 U/L (ref 0–45)
ANION GAP SERPL CALCULATED.3IONS-SCNC: 4 MMOL/L (ref 5–18)
AST SERPL W P-5'-P-CCNC: 65 U/L (ref 0–40)
ATRIAL RATE - MUSE: 43 BPM
ATRIAL RATE - MUSE: 47 BPM
BILIRUB DIRECT SERPL-MCNC: 0.2 MG/DL
BILIRUB SERPL-MCNC: 0.4 MG/DL (ref 0–1)
BUN SERPL-MCNC: 11 MG/DL (ref 8–28)
CALCIUM SERPL-MCNC: 8.2 MG/DL (ref 8.5–10.5)
CHLORIDE BLD-SCNC: 112 MMOL/L (ref 98–107)
CO2 SERPL-SCNC: 25 MMOL/L (ref 22–31)
CREAT SERPL-MCNC: 0.78 MG/DL (ref 0.6–1.1)
DIASTOLIC BLOOD PRESSURE - MUSE: NORMAL MMHG
DIASTOLIC BLOOD PRESSURE - MUSE: NORMAL MMHG
GFR SERPL CREATININE-BSD FRML MDRD: 75 ML/MIN/1.73M2
GLUCOSE BLD-MCNC: 171 MG/DL (ref 70–125)
HOLD SPECIMEN: NORMAL
INTERPRETATION ECG - MUSE: NORMAL
INTERPRETATION ECG - MUSE: NORMAL
P AXIS - MUSE: 39 DEGREES
P AXIS - MUSE: 48 DEGREES
POTASSIUM BLD-SCNC: 3.6 MMOL/L (ref 3.5–5)
PR INTERVAL - MUSE: 170 MS
PR INTERVAL - MUSE: 176 MS
PROT SERPL-MCNC: 5 G/DL (ref 6–8)
QRS DURATION - MUSE: 104 MS
QRS DURATION - MUSE: 94 MS
QT - MUSE: 420 MS
QT - MUSE: 448 MS
QTC - MUSE: 354 MS
QTC - MUSE: 396 MS
R AXIS - MUSE: 3 DEGREES
R AXIS - MUSE: 51 DEGREES
SODIUM SERPL-SCNC: 141 MMOL/L (ref 136–145)
SYSTOLIC BLOOD PRESSURE - MUSE: NORMAL MMHG
SYSTOLIC BLOOD PRESSURE - MUSE: NORMAL MMHG
T AXIS - MUSE: 71 DEGREES
T AXIS - MUSE: 77 DEGREES
VENTRICULAR RATE- MUSE: 43 BPM
VENTRICULAR RATE- MUSE: 47 BPM

## 2022-06-07 PROCEDURE — 250N000013 HC RX MED GY IP 250 OP 250 PS 637: Performed by: INTERNAL MEDICINE

## 2022-06-07 PROCEDURE — 250N000011 HC RX IP 250 OP 636: Performed by: INTERNAL MEDICINE

## 2022-06-07 PROCEDURE — 258N000003 HC RX IP 258 OP 636: Performed by: INTERNAL MEDICINE

## 2022-06-07 PROCEDURE — 82310 ASSAY OF CALCIUM: CPT | Performed by: INTERNAL MEDICINE

## 2022-06-07 PROCEDURE — 36415 COLL VENOUS BLD VENIPUNCTURE: CPT | Performed by: INTERNAL MEDICINE

## 2022-06-07 PROCEDURE — 99239 HOSP IP/OBS DSCHRG MGMT >30: CPT | Performed by: INTERNAL MEDICINE

## 2022-06-07 PROCEDURE — 82248 BILIRUBIN DIRECT: CPT | Performed by: INTERNAL MEDICINE

## 2022-06-07 RX ORDER — POTASSIUM CHLORIDE 1500 MG/1
20 TABLET, EXTENDED RELEASE ORAL ONCE
Status: COMPLETED | OUTPATIENT
Start: 2022-06-07 | End: 2022-06-07

## 2022-06-07 RX ORDER — POTASSIUM CHLORIDE 1500 MG/1
20 TABLET, EXTENDED RELEASE ORAL ONCE
Status: DISCONTINUED | OUTPATIENT
Start: 2022-06-07 | End: 2022-06-07

## 2022-06-07 RX ADMIN — SIMETHICONE 80 MG: 80 TABLET, CHEWABLE ORAL at 09:24

## 2022-06-07 RX ADMIN — DEXTROSE AND SODIUM CHLORIDE: 5; 900 INJECTION, SOLUTION INTRAVENOUS at 07:47

## 2022-06-07 RX ADMIN — LEVOTHYROXINE SODIUM 50 MCG: 0.03 TABLET ORAL at 09:23

## 2022-06-07 RX ADMIN — LORATADINE 10 MG: 10 TABLET ORAL at 09:23

## 2022-06-07 RX ADMIN — PIPERACILLIN AND TAZOBACTAM 3.38 G: 3; .375 INJECTION, POWDER, LYOPHILIZED, FOR SOLUTION INTRAVENOUS at 03:50

## 2022-06-07 RX ADMIN — POTASSIUM CHLORIDE 20 MEQ: 1500 TABLET, EXTENDED RELEASE ORAL at 09:23

## 2022-06-07 RX ADMIN — FLUTICASONE PROPIONATE 1 SPRAY: 50 SPRAY, METERED NASAL at 09:22

## 2022-06-07 RX ADMIN — PANTOPRAZOLE SODIUM 40 MG: 40 TABLET, DELAYED RELEASE ORAL at 09:23

## 2022-06-07 ASSESSMENT — ACTIVITIES OF DAILY LIVING (ADL)
ADLS_ACUITY_SCORE: 22

## 2022-06-07 NOTE — PLAN OF CARE
Goal Outcome Evaluation:      Problem: Plan of Care - These are the overarching goals to be used throughout the patient stay.    Goal: Optimal Comfort and Wellbeing  Outcome: Ongoing, Progressing     Problem: Plan of Care - These are the overarching goals to be used throughout the patient stay.    Goal: Readiness for Transition of Care  Outcome: Ongoing, Progressing     Problem: Electrolyte Imbalance  Goal: Electrolyte Balance  Outcome: Ongoing, Progressing     Patient has remained pain free for entirety of shift. At beginning of shift, patient's blood sugar was 49, 25mL of D50 administered and D5W 0.9 at 75ml/hr started. No further blood glucose issues for rest of day. Patient went for ERCP this afternoon and came back bradycardic as low as 39 bpm but averaging 41 to 45 bpm. Has remained asymptomatic, house officer notified and EKG ordered.

## 2022-06-07 NOTE — PLAN OF CARE
On K+ protocol, previous lab stable, next recheck lab is this am.    Problem: Electrolyte Imbalance  Goal: Electrolyte Balance  Outcome: Ongoing, Progressing  Intervention: Monitor and Manage Electrolyte Imbalance  Fluid/Electrolyte Management:    intravenous fluid replacement initiated    fluids provided   Goal Outcome Evaluation:

## 2022-06-07 NOTE — PROGRESS NOTES
Care Management Discharge Note    Discharge Date: 06/07/2022       Discharge Disposition: Home    Discharge Services:      Discharge DME:      Discharge Transportation:      Private pay costs discussed: Not applicable    PAS Confirmation Code:    Patient/family educated on Medicare website which has current facility and service quality ratings:      Education Provided on the Discharge Plan:    Persons Notified of Discharge Plans: patient   Patient/Family in Agreement with the Plan:      Handoff Referral Completed: Yes    Additional Information:  Discharge home today. No care management needs identified         Eugenie Herzog RN

## 2022-06-07 NOTE — PROGRESS NOTES
"[facility] Progress Note     IMPRESSION:  80 yo woman who has significant PMH of CAD, DVT, HTN, Hypothyroidism, s/p Jacqui admitted on 22 for abdominal pains and elevated LFT's    1. Abdominal pains  - Elevated LFT's. CT abd/pelvis with stable pneumobilia and duct dilatation (stable), cbd measuring 20mm. Liver normal. She is s/p cholecystectomy and has had ERCP's in the past for choledocholithiasis (last in ).   - ERCP 22 - noted choledocholithiasis with complete removal, previous sphincterotomy seen, sphincter was dilated.  - LFT's trending down. Symptoms improved without pains, nausea, or vomiting.     2. Recent COVID infection  - Diagnosed in mid May 2022. She has no respiratory symptoms.      RECOMMENDATIONS:  - Diet: ADAT  - Likely can be discharged today  - Follow up with [facility] in 6 weeks (as previously planned)     Approximately 15 minutes of total time was spent providing patient care, including patient evaluation, reviewing documentation/test results, and     Andreas Branch PA-C  [facility]  [phone]  ________________________________________________________________________      SUBJECTIVE:    No complaints. No abdominal pains or nausea.      OBJECTIVE:  /59 (BP Location: Right arm)   Pulse 89   Temp 97.5  F (36.4  C) (Oral)   Resp 16   Ht 1.715 m (5' 7.5\")   Wt 77.9 kg (171 lb 12.8 oz)   LMP  (LMP Unknown)   SpO2 96%   BMI 26.51 kg/m    Temp (24hrs), Av.3  F (36.8  C), Min:97.9  F (36.6  C), Max:98.7  F (37.1  C)    Patient Vitals for the past 72 hrs:   Weight   22 1609 77.9 kg (171 lb 12.8 oz)   22 1032 75 kg (165 lb 5.5 oz)       Intake/Output Summary (Last 24 hours) at 2022 0906  Last data filed at 2022 0745  Gross per 24 hour   Intake 2200 ml   Output 1300 ml   Net 900 ml        PHYSICAL EXAM  GEN: Alert, oriented x3, communicative and in NAD.    LYMPH: No LAD noted.  HRT: RRR  LUNGS: CTA  ABD:  ND, +BS, no guarding " or pain to palpation, no rebound, no HSM.  SKIN: No rash, jaundice or spider angiomata      LABS:  I have reviewed the patient's new clinical lab results:     Recent Labs   Lab Test 06/06/22  0622 06/05/22  0809 06/04/22  1303 04/30/21  0557 04/29/21  0921 09/21/20  1525 07/31/20  0940   WBC 4.8 6.4 7.2   < >  --    < > 4.9   HGB 11.3* 11.1* 12.5   < >  --    < > 12.2   MCV 98 97 98   < >  --    < > 96    200 229   < >  --    < > 243   INR  --   --   --   --  1.02  --  0.98    < > = values in this interval not displayed.     Recent Labs   Lab Test 06/07/22  0541 06/06/22  0622 06/05/22  1901 06/05/22  0240   POTASSIUM 3.6 3.9 4.3 3.2*   CHLORIDE 112* 108*  --  104   CO2 25 22  --  22   BUN 11 20  --  16   CR 0.78 0.85  --  0.79   ANIONGAP 4* 11  --  9     Recent Labs   Lab Test 06/07/22  0541 06/06/22  0622 06/05/22  0240 06/04/22  1303 06/02/22  1402 05/15/22  1532 05/04/20  0917 01/20/20  1500 01/20/20  1401 08/03/19  1211 07/17/16  1753 07/17/16  1720   ALBUMIN 2.6* 3.1* 3.0* 3.7   < > 3.6   < >  --  4.0  --    < >  --    BILITOTAL 0.4 0.9 0.7 0.7   < > 0.4   < >  --  0.5  --    < >  --    * 191* 297* 402*   < > 147*   < >  --  17  --    < >  --    AST 65* 101* 239* 483*   < > 221*   < >  --  27  --    < >  --    PROTEIN  --   --   --   --   --   --   --  Trace*  --  Negative  --  Negative   LIPASE  --   --   --  17  --  34  --   --  16  --    < >  --     < > = values in this interval not displayed.         IMAGING:  reviewed

## 2022-06-07 NOTE — PLAN OF CARE
Problem: Plan of Care - These are the overarching goals to be used throughout the patient stay.    Goal: Plan of Care Review/Shift Note  Description: The Plan of Care Review/Shift note should be completed every shift.  The Outcome Evaluation is a brief statement about your assessment that the patient is improving, declining, or no change.  This information will be displayed automatically on your shift note.  Outcome: Met   Goal Outcome Evaluation:  Pt continues to deny pain that brought her in.  She tolerated a clear, full, and then regular diet.  Up ambulating in hallway independently.      Reviewed discharge orders and pt verbalized understanding.  Pt to follow up with primary MD and then with [facility] in 6 weeks.  Pt left ambulatory with NA and daughter.

## 2022-06-08 ENCOUNTER — TELEPHONE (OUTPATIENT)
Dept: INTERNAL MEDICINE | Facility: CLINIC | Age: 82
End: 2022-06-08
Payer: COMMERCIAL

## 2022-06-08 NOTE — TELEPHONE ENCOUNTER
Patient is trying to scheduled follow up hospital discharge appointment with Dr. Mishra but there are no openings in the 7-10 days time frame with Dr. Mishra or with any other providers at the clinic. Please call patient at [phone] to help schedule. Patient was at [facility] for GI issues and was discharge on 6/7.  Tuyet Whiting   [facility]  Central Scheduler

## 2022-06-08 NOTE — TELEPHONE ENCOUNTER
Called pt to schedule ED F/U. Pt is scheduled on 06/21/22 at 1130am (appt time). Arrival time given (11:15am)    Aron Soria Jr., [facility] on 6/8/2022 at 11:28 AM

## 2022-06-13 NOTE — DISCHARGE SUMMARY
[facility]  Hospitalist Discharge Summary      Date of Admission:  6/4/2022  Date of Discharge:  6/7/2022  3:05 PM  Discharging Provider: Manuel Herrera MD  Discharge Service: Hospitalist Service    Discharge Diagnoses         81 year old old female with past medical history of CAD, DVT, GERD, hypertension, hypothyroidism, hyperlipidemia, cholecystectomy years ago, choledocholithiasis with multiple ERCPs in the past most recent in 2017, COVID-19 diagnosed on 5/18/2022 who presented for evaluation of epigastric pain, found to have abnormal LFTs                   A/p :      1.  Upper abdominal pain, abnormal LFTs.  Abdominal ultrasound with no biliary dilatation CBD 4 mm.  However given history of choledocholithiasis will consult GI for possible ERCP.  Given fever on admission we will start empiric IV Zosyn.  Hold Tylenol and statin.  IV fluids, n.p.o. after midnight    6/6 :     S/p ERCP : Choledocholithiasis with an obstruction was found.                          Complete removal was accomplished via an existing                          biliary sphincterotomy, sphincter dilation and balloon                          extraction.   Abdominal pain improved  LFT's improving  GI following, recent COVID status  Plan to f/u with GI outpatient      2.  Hypokalemia.  Replace per protocol    3.  Recent COVID-19 diagnosed on 5/18/2022.  Only mildly symptomatic, no respiratory symptoms.  Completed Paxlovid.  COVID-positive on admission.    4.  GERD on PPI    5.  Chronic constipation.  Continue scheduled bowel medications    6.  Hypothyroidism.     -Continue home levothyroxine replacement         Follow-ups Needed After Discharge   Follow-up Appointments     Follow-up and recommended labs and tests       Follow up with primary care provider, Jacy Mishra, within 7 days for   hospital follow- up.  No follow up labs or test are needed.  Follow up with [facility] in 6 weeks (as previously planned)          {Additional follow-up instructions/to-do's for PCP    : LFt's    Unresulted Labs Ordered in the Past 30 Days of this Admission     No orders found from 5/5/2022 to 6/5/2022.      These results will be followed up by PCP    Discharge Disposition   Discharged to home  Condition at discharge: Good        Consultations This Hospital Stay   GASTROENTEROLOGY IP CONSULT  CARE MANAGEMENT / SOCIAL WORK IP CONSULT    Code Status   Prior    Time Spent on this Encounter   I, Manuel Herrera MD, personally saw the patient today and spent greater than 30 minutes discharging this patient.       Manuel Herrera MD  [address] [address]  Phone: [phone]  Fax: [phone]  ______________________________________________________________________    Physical Exam   Vital Signs:                    Weight: 171 lbs 12.8 oz       GENERAL: The patient is not in any acute distressed. Awake and alert.  HEENT: Nonicteric sclerae, PERRLA, EOMI. Oropharynx clear. Moist mucous membranes. Conjunctivae appear well perfused.  HEART: Regular rate and rhythm without murmurs.  LUNGS: Clear to auscultation bilaterally. No wheezing or crackles.  ABDOMEN: Soft, positive bowel sounds, nontender.  SKIN: No rash, no excessive bruising, petechiae, or purpura.  EXTREMITIES : no rashes, no swelling in legs.  NEUROLOGIC: conscious and oriented, follows commands, no obvious focal deficits.  ROS: All other systems negative          Primary Care Physician   Jacy Mishra    Discharge Orders      Hepatic panel (Albumin, ALT, AST, Bili, Alk Phos, TP)     Reason for your hospital stay    Abdominal pain     Follow-up and recommended labs and tests     Follow up with primary care provider, Jacy Mishra, within 7 days for hospital follow- up.  No follow up labs or test are needed.  Follow up with [facility] in 6 weeks (as previously planned)     Activity    Your activity upon discharge: activity as tolerated     Diet     Follow this diet upon discharge: Orders Placed This Encounter      Regular Diet Adult       Significant Results and Procedures   Most Recent 3 CBC's:Recent Labs   Lab Test 06/06/22  0622 06/05/22  0809 06/04/22  1303   WBC 4.8 6.4 7.2   HGB 11.3* 11.1* 12.5   MCV 98 97 98    200 229     Most Recent 3 BMP's:Recent Labs   Lab Test 06/07/22  0541 06/06/22  0803 06/06/22  0727 06/06/22  0622 06/05/22  1901 06/05/22  0240     --   --  141  --  135*   POTASSIUM 3.6  --   --  3.9 4.3 3.2*   CHLORIDE 112*  --   --  108*  --  104   CO2 25  --   --  22  --  22   BUN 11  --   --  20  --  16   CR 0.78  --   --  0.85  --  0.79   ANIONGAP 4*  --   --  11  --  9   ALOK 8.2*  --   --  8.7  --  8.3*   * 98 49* 52*  --  99     Most Recent 2 LFT's:Recent Labs   Lab Test 06/07/22  0541 06/06/22  0622   AST 65* 101*   * 191*   ALKPHOS 99 122*   BILITOTAL 0.4 0.9       Discharge Medications   Discharge Medication List as of 6/7/2022  2:17 PM      CONTINUE these medications which have NOT CHANGED    Details   acetaminophen (TYLENOL) 650 MG CR tablet Take 650 mg by mouth 3 times daily, Historical      amoxicillin (AMOXIL) 500 MG capsule Take 2,000 mg by mouth daily as needed (1 hour before dental appointments), Historical      azelastine (ASTELIN) 0.1 % nasal spray SPRAY 1 SPRAY INTO BOTH NOSTRILS AT BEDTIME, Disp-90 mL, R-1, E-Prescribe      Cholecalciferol (VITAMIN D3 PO) Take by mouth daily Unknown dosage, Historical      citalopram (CELEXA) 10 MG tablet Take 5 mg by mouth every morning, Historical      docusate sodium (COLACE) 100 MG tablet Take 100 mg by mouth 2 times daily, Historical      fluticasone (FLONASE) 50 MCG/ACT nasal spray INSTILL 1 SPRAY INTO BOTH NOSTRILS DAILY, Disp-48 g, R-3, E-Prescribe      hydrochlorothiazide (HYDRODIURIL) 25 MG tablet Take 1 tablet (25 mg) by mouth daily, Disp-90 tablet, R-3, E-Prescribe      levothyroxine (SYNTHROID/LEVOTHROID) 50 MCG tablet Take 50 mcg by mouth daily  before breakfast, Historical      multivitamin w/minerals (THERA-VIT-M) tablet Take 1 tablet by mouth daily, Historical      Omega-3 Fatty Acids (OMEGA-3 FISH OIL PO) Take 1 g by mouth daily , Historical      omeprazole (PRILOSEC) 40 MG DR capsule Take 1 capsule (40 mg) by mouth 2 times daily, Disp-60 capsule, R-3, E-Prescribe      polyethylene glycol (MIRALAX/GLYCOLAX) packet Take 1 packet by mouth 2 times daily, Historical      rosuvastatin (CRESTOR) 40 MG tablet Take 1 tablet (40 mg) by mouth At Bedtime, Disp-90 tablet, R-4, E-Prescribe      simethicone (MYLICON) 80 MG chewable tablet Take 80 mg by mouth daily, Historical           Allergies   Allergies   Allergen Reactions     Atorvastatin      Can't remember     Codeine      Can't remember     Dipyridamole      Can't remember     Duloxetine Headache

## 2022-06-14 DIAGNOSIS — E03.9 HYPOTHYROIDISM, UNSPECIFIED: ICD-10-CM

## 2022-06-15 RX ORDER — LEVOTHYROXINE SODIUM 50 UG/1
TABLET ORAL
Qty: 90 TABLET | Refills: 3 | Status: SHIPPED | OUTPATIENT
Start: 2022-06-15 | End: 2023-05-30

## 2022-06-15 NOTE — TELEPHONE ENCOUNTER
"Routing refill request to provider for review/approval because:  Medication is reported/pt    Due to medication information not transferring due to [facility] please review the medication information prior to signing to ensure accuracy.     Disp Refills Start End JEVON   levothyroxine (SYNTHROID, LEVOTHROID) 50 MCG tablet 90 tablet 3 6/24/2021  No   Sig - Route: Take 1 tablet (50 mcg total) by mouth daily. - Oral   Sent to pharmacy as: levothyroxine 50 mcg tablet (SYNTHROID, LEVOTHROID)   E-Prescribing Status: Receipt confirmed by pharmacy (6/24/2021  9:19 AM CDT)         Last Written Prescription Date:    Last Fill Quantity: ,  # refills:    Last office visit provider:  5/26/22     Requested Prescriptions   Pending Prescriptions Disp Refills     levothyroxine (SYNTHROID/LEVOTHROID) 50 MCG tablet [Pharmacy Med Name: LEVOTHYROXINE 50 MCG TABLET] 90 tablet 3     Sig: TAKE 1 TABLET BY MOUTH EVERY DAY       Thyroid Protocol Passed - 6/14/2022 12:19 AM        Passed - Patient is 12 years or older        Passed - Recent (12 mo) or future (30 days) visit within the authorizing provider's specialty     Patient has had an office visit with the authorizing provider or a provider within the authorizing providers department within the previous 12 mos or has a future within next 30 days. See \"Patient Info\" tab in inbasket, or \"Choose Columns\" in Meds & Orders section of the refill encounter.              Passed - Medication is active on med list        Passed - Normal TSH on file in past 12 months     Recent Labs   Lab Test 09/23/21  1408   TSH 1.85              Passed - No active pregnancy on record     If patient is pregnant or has had a positive pregnancy test, please check TSH.          Passed - No positive pregnancy test in past 12 months     If patient is pregnant or has had a positive pregnancy test, please check TSH.               Candie Zelaya RN 06/14/22 8:17 PM  "

## 2022-06-19 ENCOUNTER — HOSPITAL ENCOUNTER (EMERGENCY)
Facility: HOSPITAL | Age: 82
Discharge: HOME | End: 2022-06-19
Attending: EMERGENCY MEDICINE | Admitting: EMERGENCY MEDICINE
Payer: COMMERCIAL

## 2022-06-19 ENCOUNTER — APPOINTMENT (OUTPATIENT)
Dept: CT IMAGING | Facility: HOSPITAL | Age: 82
End: 2022-06-19
Attending: EMERGENCY MEDICINE
Payer: COMMERCIAL

## 2022-06-19 ENCOUNTER — NURSE TRIAGE (OUTPATIENT)
Dept: NURSING | Facility: CLINIC | Age: 82
End: 2022-06-19
Payer: COMMERCIAL

## 2022-06-19 VITALS
HEART RATE: 70 BPM | OXYGEN SATURATION: 100 % | TEMPERATURE: 97.9 F | WEIGHT: 163 LBS | BODY MASS INDEX: 25.15 KG/M2 | RESPIRATION RATE: 18 BRPM | DIASTOLIC BLOOD PRESSURE: 68 MMHG | SYSTOLIC BLOOD PRESSURE: 149 MMHG

## 2022-06-19 DIAGNOSIS — R19.7 DIARRHEA, UNSPECIFIED TYPE: ICD-10-CM

## 2022-06-19 DIAGNOSIS — M62.81 GENERALIZED MUSCLE WEAKNESS: ICD-10-CM

## 2022-06-19 LAB
ALBUMIN SERPL-MCNC: 3.6 G/DL (ref 3.5–5)
ALP SERPL-CCNC: 63 U/L (ref 45–120)
ALT SERPL W P-5'-P-CCNC: 21 U/L (ref 0–45)
ANION GAP SERPL CALCULATED.3IONS-SCNC: 8 MMOL/L (ref 5–18)
AST SERPL W P-5'-P-CCNC: 21 U/L (ref 0–40)
BASOPHILS # BLD AUTO: 0 10E3/UL (ref 0–0.2)
BASOPHILS NFR BLD AUTO: 0 %
BILIRUB SERPL-MCNC: 0.5 MG/DL (ref 0–1)
BUN SERPL-MCNC: 18 MG/DL (ref 8–28)
CALCIUM SERPL-MCNC: 9.3 MG/DL (ref 8.5–10.5)
CHLORIDE BLD-SCNC: 102 MMOL/L (ref 98–107)
CO2 SERPL-SCNC: 28 MMOL/L (ref 22–31)
CREAT SERPL-MCNC: 0.87 MG/DL (ref 0.6–1.1)
EOSINOPHIL # BLD AUTO: 0 10E3/UL (ref 0–0.7)
EOSINOPHIL NFR BLD AUTO: 1 %
ERYTHROCYTE [DISTWIDTH] IN BLOOD BY AUTOMATED COUNT: 15.6 % (ref 10–15)
GFR SERPL CREATININE-BSD FRML MDRD: 66 ML/MIN/1.73M2
GLUCOSE BLD-MCNC: 94 MG/DL (ref 70–125)
HCT VFR BLD AUTO: 34.8 % (ref 35–47)
HGB BLD-MCNC: 11.3 G/DL (ref 11.7–15.7)
IMM GRANULOCYTES # BLD: 0 10E3/UL
IMM GRANULOCYTES NFR BLD: 0 %
LIPASE SERPL-CCNC: 27 U/L (ref 0–52)
LYMPHOCYTES # BLD AUTO: 1.3 10E3/UL (ref 0.8–5.3)
LYMPHOCYTES NFR BLD AUTO: 23 %
MAGNESIUM SERPL-MCNC: 1.8 MG/DL (ref 1.8–2.6)
MCH RBC QN AUTO: 31.7 PG (ref 26.5–33)
MCHC RBC AUTO-ENTMCNC: 32.5 G/DL (ref 31.5–36.5)
MCV RBC AUTO: 98 FL (ref 78–100)
MONOCYTES # BLD AUTO: 0.6 10E3/UL (ref 0–1.3)
MONOCYTES NFR BLD AUTO: 10 %
NEUTROPHILS # BLD AUTO: 3.7 10E3/UL (ref 1.6–8.3)
NEUTROPHILS NFR BLD AUTO: 66 %
NRBC # BLD AUTO: 0 10E3/UL
NRBC BLD AUTO-RTO: 0 /100
PLATELET # BLD AUTO: 255 10E3/UL (ref 150–450)
POTASSIUM BLD-SCNC: 4 MMOL/L (ref 3.5–5)
PROT SERPL-MCNC: 6.3 G/DL (ref 6–8)
RBC # BLD AUTO: 3.57 10E6/UL (ref 3.8–5.2)
SARS-COV-2 RNA RESP QL NAA+PROBE: NEGATIVE
SODIUM SERPL-SCNC: 138 MMOL/L (ref 136–145)
TROPONIN I SERPL-MCNC: <0.01 NG/ML (ref 0–0.29)
WBC # BLD AUTO: 5.7 10E3/UL (ref 4–11)

## 2022-06-19 PROCEDURE — 99285 EMERGENCY DEPT VISIT HI MDM: CPT | Mod: CS,25

## 2022-06-19 PROCEDURE — 96360 HYDRATION IV INFUSION INIT: CPT | Mod: 59

## 2022-06-19 PROCEDURE — 82040 ASSAY OF SERUM ALBUMIN: CPT | Performed by: EMERGENCY MEDICINE

## 2022-06-19 PROCEDURE — 83735 ASSAY OF MAGNESIUM: CPT | Performed by: EMERGENCY MEDICINE

## 2022-06-19 PROCEDURE — 74177 CT ABD & PELVIS W/CONTRAST: CPT

## 2022-06-19 PROCEDURE — 85018 HEMOGLOBIN: CPT | Performed by: EMERGENCY MEDICINE

## 2022-06-19 PROCEDURE — 83690 ASSAY OF LIPASE: CPT | Performed by: EMERGENCY MEDICINE

## 2022-06-19 PROCEDURE — 93005 ELECTROCARDIOGRAM TRACING: CPT | Performed by: EMERGENCY MEDICINE

## 2022-06-19 PROCEDURE — 258N000003 HC RX IP 258 OP 636: Performed by: EMERGENCY MEDICINE

## 2022-06-19 PROCEDURE — 250N000011 HC RX IP 250 OP 636: Performed by: EMERGENCY MEDICINE

## 2022-06-19 PROCEDURE — 36415 COLL VENOUS BLD VENIPUNCTURE: CPT | Performed by: EMERGENCY MEDICINE

## 2022-06-19 PROCEDURE — 84484 ASSAY OF TROPONIN QUANT: CPT | Performed by: EMERGENCY MEDICINE

## 2022-06-19 PROCEDURE — 80053 COMPREHEN METABOLIC PANEL: CPT | Performed by: EMERGENCY MEDICINE

## 2022-06-19 PROCEDURE — 87635 SARS-COV-2 COVID-19 AMP PRB: CPT | Performed by: EMERGENCY MEDICINE

## 2022-06-19 PROCEDURE — C9803 HOPD COVID-19 SPEC COLLECT: HCPCS

## 2022-06-19 RX ORDER — IOPAMIDOL 755 MG/ML
100 INJECTION, SOLUTION INTRAVASCULAR ONCE
Status: COMPLETED | OUTPATIENT
Start: 2022-06-19 | End: 2022-06-19

## 2022-06-19 RX ORDER — METOCLOPRAMIDE 5 MG/1
5 TABLET ORAL 3 TIMES DAILY PRN
Qty: 15 TABLET | Refills: 0 | Status: SHIPPED | OUTPATIENT
Start: 2022-06-19 | End: 2022-09-22

## 2022-06-19 RX ADMIN — SODIUM CHLORIDE 1000 ML: 9 INJECTION, SOLUTION INTRAVENOUS at 14:01

## 2022-06-19 RX ADMIN — IOPAMIDOL 100 ML: 755 INJECTION, SOLUTION INTRAVENOUS at 15:20

## 2022-06-19 NOTE — ED PROVIDER NOTES
EMERGENCY DEPARTMENT ENCOUNTER      NAME: Geeta Berry  AGE: 81 year old female  YOB: 1940  MRN: [id number]  EVALUATION DATE & TIME: 6/19/2022 12:57 PM    PCP: Jacy Mishra    ED PROVIDER: Kate Rizvi M.D.    Chief Complaint   Patient presents with     Extremity Weakness     Diarrhea     FINAL IMPRESSION:  1. Diarrhea, unspecified type    2. Generalized muscle weakness      ED COURSE & MEDICAL DECISION MAKING:    Pertinent Labs & Imaging studies reviewed. (See chart for details)  ED Course as of 06/19/22 1503   Sun Jun 19, 2022   1308 Patient is an 81-year-old female who comes in today for evaluation of diarrhea.  She also reports some generalized body aches and a little generalized weakness.  She had an ERCP by GI recently.  She does not have a gallbladder but still makes stones.  She has had some decreased oral intake she is she just does not feel great.  She denies any urinary symptoms.  She denies any fevers or chills.  She denies any chest pain or shortness of breath.  She has had some increased belching ever since.  She has no abdominal tenderness on my exam.  She is hemodynamically stable on arrival.  Her belly is soft and nondistended.  We will check some basic blood work on her.  We will get a COVID swab.  We will plan to get CT imaging of her abdomen and pelvis and give her a little bit of IV fluid.  I am worried that she could have some electrolyte abnormality or some renal failure contributing to this.  ACS is certainly on the differential given the frequent belching but she also just had a GI procedure so GI is a likely cause as well.  I discussed all this with the patient she is comfortable with the plan.   1332 Patient white count is normal.  Hemoglobin is stable compared to previous.   1348 COVID is negative and the labs really look quite good.  Troponin is still pending.  I will juana the patient ready for her CT scan.   1449 I went back and checked on the patient.  She feels  about the same.  She is able to get up and ambulate and get her self to the bathroom.  We discussed that her labs look good.  I told her that we would wait and see what her CT looks like and then go from there.  She is comfortable with that plan.   6018 Patient was signed out to Dr. Key at change of shift pending CT scan and final disposition.       At the conclusion of the encounter I discussed  the results of all of the tests and the disposition with patient.   All questions were answered.  The patient acknowledged understanding and was involved in the decision making regarding the overall care plan.     MEDICATIONS GIVEN IN THE EMERGENCY:  Medications   0.9% sodium chloride BOLUS (0 mLs Intravenous Stopped 6/19/22 0681)       NEW PRESCRIPTIONS STARTED AT TODAY'S ER VISIT  New Prescriptions    No medications on file     =================================================================    HPI    Triage Note:   Pt was discharged Monday after having ERCP and removal of gallstones, pt does not have a gallbladder.  Pt states she has felt weak since discharge, continuous belching, nausea and today had a large diarrhea.  Pt states she has only been able to tolerate liquids since discharge     Triage Assessment     Row Name 06/19/22 1574       Triage Assessment (Adult)    Airway WDL WDL       Respiratory WDL    Respiratory WDL WDL       Skin Circulation/Temperature WDL    Skin Circulation/Temperature WDL WDL       Cardiac WDL    Cardiac WDL WDL       Peripheral/Neurovascular WDL    Peripheral Neurovascular WDL WDL       Cognitive/Neuro/Behavioral WDL    Cognitive/Neuro/Behavioral WDL WDL       Meek Coma Scale    Best Eye Response 4-->(E4) spontaneous    Best Motor Response 6-->(M6) obeys commands    Best Verbal Response 5-->(V5) oriented    Tehama Coma Scale Score 15    Assessment Qualifiers patient not sedated/intubated                  Patient information was obtained from: Patient    Use of : N/A  "        Geeta Berry is a 81 year old female with a past medical history of CAD, DVT, GERD, hypertension, hypothyroidism, hyperlipidemia, cholecystectomy years ago, choledocholithiasis, who presents to the ED for evaluation of weakness and diarrhea.     Patient reports she had a very large bowel movement this morning that was dark and greasy. States her input and output his been \"miserable\" since she left the hospital 2 weeks ago. Endorses tiredness, diarrhea, and weakness. States she has been burping frequently. Also endorses back aches and leg aches. Denies chest pain, shortness of breath, fever, nausea, vomiting, urinary symptoms or any other medical complaint at this time.     Per chart review,  Patient was admitted to [facility] 6/4-6/7/22 for evaluation of epigastric pain. S/p ERCP: choledocholithiasis with an obstruction was found. Complete removal was accomplished via biliary sphincterotomy. Discharged with plan to follow up with GI.         REVIEW OF SYSTEMS   Except as stated in the HPI all other systems reviewed and are negative.    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)      Chronic neck pain      Common bile duct calculus      Coronary artery disease due to calcified coronary lesion 8/6/2020     Depression      DVT (deep venous thrombosis) (H)      GERD (gastroesophageal reflux disease)      History of blood clots 2017    DVT right lower extremity     HLD (hyperlipidemia)      HTN (hypertension)      Hyperlipidemia      Hypertension      Hypothyroidism      Infectious viral hepatitis     hepatitis A in 1960s     Thyroid disease        PAST SURGICAL HISTORY:  Past Surgical History:   Procedure Laterality Date     ARTHROPLASTY REVISION HIP Left 5/16/2017    Procedure: REVISION LEFT TOTAL HIP ARTHROPLASTY, BOTH COMPONENTS;  Surgeon: Tyson Peoples MD;  Location: [facility];  Service:  "     ARTHROSCOPY HIP Left 1983     CHOLECYSTECTOMY  1980     ENDOSCOPIC RETROGRADE CHOLANGIOPANCREATOGRAM      x5 over 8 years. Last 8/2016     ENDOSCOPIC RETROGRADE CHOLANGIOPANCREATOGRAM N/A 9/5/2017    Procedure: ENDOSCOPIC RETROGRADE CHOLANGIOPANCREATOGRAPHY, STONE EXTRACTION;  Surgeon: Henry Eller MD;  Location: [facility];  Service:      ENDOSCOPIC RETROGRADE CHOLANGIOPANCREATOGRAM N/A 7/26/2019    Procedure: ENDOSCOPIC RETROGRADE CHOLANGIOPANCREATOGRAPHY, REMOVAL OF SLUDGE, DILATION OF STRICTURE;  Surgeon: Ryan Pastrana MD;  Location: [facility] OR;  Service: Gastroenterology     ENDOSCOPIC RETROGRADE CHOLANGIOPANCREATOGRAM N/A 6/6/2022    Procedure: ENDOSCOPIC RETROGRADE CHOLANGIOPANCREATOGRAPHY, WITH STONE EXTRACTION;  Surgeon: Hima Perry MD;  Location: [facility]     ENDOSCOPIC RETROGRADE CHOLANGIOPANCREATOGRAM  6/6/2022    Procedure: ;  Surgeon: Hima Perry MD;  Location: [facility]     HIP SURGERY  2012    revision     JOINT REPLACEMENT       ORTHOPEDIC SURGERY       OTHER SURGICAL HISTORY      bilateral THAwith revision on both hips     AL ESOPHAGOGASTRODUODENOSCOPY TRANSORAL DIAGNOSTIC N/A 9/10/2019    Procedure: ESOPHAGOGASTRODUODENOSCOPY (EGD);  Surgeon: Will Nash DO;  Location: [facility] OR;  Service: General     REPLACEMENT TOTAL KNEE Left 2015     SHOULDER SURGERY Left 2010    left total shoulder replacement     XR ERCP BILIARY AND PANCREAS  7/26/2019     [facility] TOTAL KNEE ARTHROPLASTY Right 4/29/2021    Procedure: RIGHT TOTAL KNEE ARTHROPLASTY;  Surgeon: Satinder Issa DO;  Location: [facility];  Service: Orthopedics       CURRENT MEDICATIONS:    No current facility-administered medications for this encounter.    Current Outpatient Medications:      acetaminophen (TYLENOL) 650 MG CR tablet, Take 650 mg by mouth 3 times daily, Disp: , Rfl:      amoxicillin (AMOXIL) 500 MG capsule, Take 2,000 mg by mouth daily as needed (1 hour  before dental appointments), Disp: , Rfl:      azelastine (ASTELIN) 0.1 % nasal spray, SPRAY 1 SPRAY INTO BOTH NOSTRILS AT BEDTIME, Disp: 90 mL, Rfl: 1     Cholecalciferol (VITAMIN D3 PO), Take by mouth daily Unknown dosage, Disp: , Rfl:      citalopram (CELEXA) 10 MG tablet, Take 5 mg by mouth every morning, Disp: , Rfl:      docusate sodium (COLACE) 100 MG tablet, Take 100 mg by mouth 2 times daily, Disp: , Rfl:      fluticasone (FLONASE) 50 MCG/ACT nasal spray, INSTILL 1 SPRAY INTO BOTH NOSTRILS DAILY (Patient taking differently: Spray 1 spray into both nostrils daily), Disp: 48 g, Rfl: 3     hydrochlorothiazide (HYDRODIURIL) 25 MG tablet, Take 1 tablet (25 mg) by mouth daily, Disp: 90 tablet, Rfl: 3     levothyroxine (SYNTHROID/LEVOTHROID) 50 MCG tablet, TAKE 1 TABLET BY MOUTH EVERY DAY, Disp: 90 tablet, Rfl: 3     multivitamin w/minerals (THERA-VIT-M) tablet, Take 1 tablet by mouth daily, Disp: , Rfl:      Omega-3 Fatty Acids (OMEGA-3 FISH OIL PO), Take 1 g by mouth daily , Disp: , Rfl:      omeprazole (PRILOSEC) 40 MG DR capsule, Take 1 capsule (40 mg) by mouth 2 times daily (Patient taking differently: Take 40 mg by mouth 2 times daily (before meals)), Disp: 60 capsule, Rfl: 3     polyethylene glycol (MIRALAX/GLYCOLAX) packet, Take 1 packet by mouth 2 times daily, Disp: , Rfl:      rosuvastatin (CRESTOR) 40 MG tablet, Take 1 tablet (40 mg) by mouth At Bedtime, Disp: 90 tablet, Rfl: 4     simethicone (MYLICON) 80 MG chewable tablet, Take 80 mg by mouth daily, Disp: , Rfl:     ALLERGIES:  Allergies   Allergen Reactions     Atorvastatin      Can't remember     Codeine      Can't remember     Dipyridamole      Can't remember     Duloxetine Headache       FAMILY HISTORY:  Family History   Problem Relation Age of Onset     Colon Cancer Father      Diabetes Sister      Heart Disease Mother        SOCIAL HISTORY:   Social History     Socioeconomic History     Marital status:      Number of children: 2    Tobacco Use     Smoking status: Never Smoker     Smokeless tobacco: Never Used   Vaping Use     Vaping Use: Never used   Substance and Sexual Activity     Alcohol use: No     Drug use: No     Sexual activity: Not Currently     Birth control/protection: Post-menopausal   Social History Narrative    4/6/17: The patient lives with her  in a condo.       PHYSICAL EXAM    VITAL SIGNS: BP (!) 158/74   Pulse 50   Temp 97.9  F (36.6  C) (Temporal)   Resp 18   Wt 73.9 kg (163 lb)   LMP  (LMP Unknown)   SpO2 100%   BMI 25.15 kg/m     GENERAL: Awake, Alert, answering questions, No acute distress, Well nourished  HEENT: Normal cephalic, Atraumatic, bilateral external ears normal, No scleral icterus, mask in place  NECK: No obvious swelling or abnormality, No stridor  PULMONARY:Normal and symmetric breath sounds, No respiratory distress, Lungs clear to auscultation bilaterally. No wheezing  CARDIOVASCULAR: Regular rate and rhythm, Distal pulses present and normal.  ABDOMINAL: Soft, Nondistended, Nontender, No flank tenderness, No palpable masses  BACK: No bruising or tenderness.  EXTREMITIES: Moves all extremities spontaneously, warm, no edema, No major deformities  NEURO: No facial droop, normal motor function, Normal speech   PSYCH: Normal mood and affect  SKIN: No rashes on visualized skin, dry, warm     LAB:  All pertinent labs reviewed and interpreted.  Results for orders placed or performed during the hospital encounter of 06/19/22   Comprehensive metabolic panel   Result Value Ref Range    Sodium 138 136 - 145 mmol/L    Potassium 4.0 3.5 - 5.0 mmol/L    Chloride 102 98 - 107 mmol/L    Carbon Dioxide (CO2) 28 22 - 31 mmol/L    Anion Gap 8 5 - 18 mmol/L    Urea Nitrogen 18 8 - 28 mg/dL    Creatinine 0.87 0.60 - 1.10 mg/dL    Calcium 9.3 8.5 - 10.5 mg/dL    Glucose 94 70 - 125 mg/dL    Alkaline Phosphatase 63 45 - 120 U/L    AST 21 0 - 40 U/L    ALT 21 0 - 45 U/L    Protein Total 6.3 6.0 - 8.0 g/dL    Albumin  3.6 3.5 - 5.0 g/dL    Bilirubin Total 0.5 0.0 - 1.0 mg/dL    GFR Estimate 66 >60 mL/min/1.73m2   Result Value Ref Range    Lipase 27 0 - 52 U/L   Result Value Ref Range    Troponin I <0.01 0.00 - 0.29 ng/mL   Result Value Ref Range    Magnesium 1.8 1.8 - 2.6 mg/dL   Symptomatic; Unknown COVID-19 Virus (Coronavirus) by PCR Nasopharyngeal    Specimen: Nasopharyngeal; Swab   Result Value Ref Range    SARS CoV2 PCR Negative Negative   CBC with platelets and differential   Result Value Ref Range    WBC Count 5.7 4.0 - 11.0 10e3/uL    RBC Count 3.57 (L) 3.80 - 5.20 10e6/uL    Hemoglobin 11.3 (L) 11.7 - 15.7 g/dL    Hematocrit 34.8 (L) 35.0 - 47.0 %    MCV 98 78 - 100 fL    MCH 31.7 26.5 - 33.0 pg    MCHC 32.5 31.5 - 36.5 g/dL    RDW 15.6 (H) 10.0 - 15.0 %    Platelet Count 255 150 - 450 10e3/uL    % Neutrophils 66 %    % Lymphocytes 23 %    % Monocytes 10 %    % Eosinophils 1 %    % Basophils 0 %    % Immature Granulocytes 0 %    NRBCs per 100 WBC 0 <1 /100    Absolute Neutrophils 3.7 1.6 - 8.3 10e3/uL    Absolute Lymphocytes 1.3 0.8 - 5.3 10e3/uL    Absolute Monocytes 0.6 0.0 - 1.3 10e3/uL    Absolute Eosinophils 0.0 0.0 - 0.7 10e3/uL    Absolute Basophils 0.0 0.0 - 0.2 10e3/uL    Absolute Immature Granulocytes 0.0 <=0.4 10e3/uL    Absolute NRBCs 0.0 10e3/uL       RADIOLOGY:  CT Abdomen Pelvis w Contrast    (Results Pending)       CT Abdomen Pelvis w Contrast    (Results Pending)       EKG:    Date and time: 2/19/2022 at 1317  Rate: 50 bpm  Rhythm: Sinus bradycardia  DC interval: 178 ms  QRS interval: 100 ms  QT/QTc: 432/393 ms  ST changes or T wave changes: No acute ST or T wave abnormalities  Change from prior ECG: No significant change from prior  I have independently reviewed and interpreted this EKG.     Dudley CHAMBERS, am serving as a scribe to document services personally performed by Dr. Rizvi based on my observation and the provider's statements to me. I, Kate Rizvi MD attest that Dudley Dye is acting  in a scribe capacity, has observed my performance of the services and has documented them in accordance with my direction.    Kate Rizvi M.D.  Emergency Medicine  [facility] EMERGENCY DEPARTMENT  [address] [place] [id number] [phone]  Dept: [phone]       Kate Rizvi MD  06/19/22 0776

## 2022-06-19 NOTE — ED TRIAGE NOTES
Pt was discharged Monday after having ERCP and removal of gallstones, pt does not have a gallbladder.  Pt states she has felt weak since discharge, continuous belching, nausea and today had a large diarrhea.  Pt states she has only been able to tolerate liquids since discharge     Triage Assessment     Row Name 06/19/22 1254       Triage Assessment (Adult)    Airway WDL WDL       Respiratory WDL    Respiratory WDL WDL       Skin Circulation/Temperature WDL    Skin Circulation/Temperature WDL WDL       Cardiac WDL    Cardiac WDL WDL       Peripheral/Neurovascular WDL    Peripheral Neurovascular WDL WDL       Cognitive/Neuro/Behavioral WDL    Cognitive/Neuro/Behavioral WDL WDL       Starr Coma Scale    Best Eye Response 4-->(E4) spontaneous    Best Motor Response 6-->(M6) obeys commands    Best Verbal Response 5-->(V5) oriented    Starr Coma Scale Score 15    Assessment Qualifiers patient not sedated/intubated

## 2022-06-19 NOTE — ED NOTES
Pt report that she is having 1/10 pain in her URQ. Palpation of the left quadrants revealed tenderness. She reports minor sob with exertion. She also reports nausea. She reports that she vomited 2 hours ago. No blood in the vomit.

## 2022-06-19 NOTE — ED NOTES
EMERGENCY DEPARTMENT SIGN OUT NOTE        ED COURSE AND MEDICAL DECISION MAKING  Patient was signed out to me by Dr Kate Rizvi at 3:06 PM    In brief, Geeta Berry is a 81 year old female who initially presented weakness and diarrhea. Please refer to Dr. Rizvi's note for more detailed history and physical.    At time of sign out, disposition was pending CT imaging.    3:53 PM CT abd/pelvis neg for acute pathology.  Will discharge per plan.  4:07 PM Pt remains well here on re-evaluation, abdomen benign.  She has follow up Tuesday with primary and will keep this.  Discussed otc simethicone prn, rx for reglan for nausea and to potentially help with stomach emptying. Pt understands return precautions.    FINAL IMPRESSION    1. Diarrhea, unspecified type    2. Generalized muscle weakness        ED MEDS  Medications   0.9% sodium chloride BOLUS (0 mLs Intravenous Stopped 6/19/22 1449)   iopamidol (ISOVUE-370) solution 100 mL (100 mLs Intravenous Given 6/19/22 1520)       LAB  Labs Ordered and Resulted from Time of ED Arrival to Time of ED Departure   CBC WITH PLATELETS AND DIFFERENTIAL - Abnormal       Result Value    WBC Count 5.7      RBC Count 3.57 (*)     Hemoglobin 11.3 (*)     Hematocrit 34.8 (*)     MCV 98      MCH 31.7      MCHC 32.5      RDW 15.6 (*)     Platelet Count 255      % Neutrophils 66      % Lymphocytes 23      % Monocytes 10      % Eosinophils 1      % Basophils 0      % Immature Granulocytes 0      NRBCs per 100 WBC 0      Absolute Neutrophils 3.7      Absolute Lymphocytes 1.3      Absolute Monocytes 0.6      Absolute Eosinophils 0.0      Absolute Basophils 0.0      Absolute Immature Granulocytes 0.0      Absolute NRBCs 0.0     COMPREHENSIVE METABOLIC PANEL - Normal    Sodium 138      Potassium 4.0      Chloride 102      Carbon Dioxide (CO2) 28      Anion Gap 8      Urea Nitrogen 18      Creatinine 0.87      Calcium 9.3      Glucose 94      Alkaline Phosphatase 63      AST 21      ALT 21       Protein Total 6.3      Albumin 3.6      Bilirubin Total 0.5      GFR Estimate 66     LIPASE - Normal    Lipase 27     TROPONIN I - Normal    Troponin I <0.01     MAGNESIUM - Normal    Magnesium 1.8     COVID-19 VIRUS (CORONAVIRUS) BY PCR - Normal    SARS CoV2 PCR Negative         RADIOLOGY    CT Abdomen Pelvis w Contrast   Final Result   IMPRESSION:    1.  Allowing for streak artifact in the pelvis related bilateral THAs, there is no evidence for inflammatory change to the bowel.      2.  Colonic diverticulosis without diverticulitis. No bowel obstruction.      3.  Stable pneumobilia with cholecystectomy.      4.  Hepatic steatosis.          DISCHARGE MEDS  New Prescriptions    METOCLOPRAMIDE (REGLAN) 5 MG TABLET    Take 1 tablet (5 mg) by mouth 3 times daily as needed (nausea)       Tyson Key MD  [facility] EMERGENCY DEPARTMENT  [facility] [facility] [id number] [phone]     Tyson Key MD  06/19/22 7859

## 2022-06-19 NOTE — TELEPHONE ENCOUNTER
Patient calling. Patient in the ED on 6/4 and had an ERCP on 6/5/2022 and found some stones in her bile duct and was released on 6/7/2022. Patient feels like she has been going down hill and is very weak and does not feel like eating.   Patient reports that she has diarrhea and they are dark brown to black. Before the diarrhea they were dark and greasy looking. Patient has been trying to drink a lot of water, Gatorade, and has also started drinking ensure. Patient reports that she has lost about 15 lbs.   Protocol recommends ED now.   Care advice given. Patient reports daughter is on her way and will take her to [facility].   Princess Simmons RN   06/19/22 10:59 AM  [facility] Nurse Advisor    COVID 19 Nurse Triage Plan/Patient Instructions    Please be aware that novel coronavirus (COVID-19) may be circulating in the community. If you develop symptoms such as fever, cough, or SOB or if you have concerns about the presence of another infection including coronavirus (COVID-19), please contact your health care provider or visit [email or web address].     Disposition/Instructions    In-Person Visit with provider recommended. Reference Visit Selection Guide.    Thank you for taking steps to prevent the spread of this virus.  o Limit your contact with others.  o Wear a simple mask to cover your cough.  o Wash your hands well and often.    Resources    [facility]: About COVID-19: [email or web address]    CDC: What to Do If You're Sick: www.cdc.gov/coronavirus/2019-ncov/about/steps-when-sick.html    CDC: Ending Home Isolation: www.cdc.gov/coronavirus/2019-ncov/hcp/disposition-in-home-patients.html     CDC: Caring for Someone: www.cdc.gov/coronavirus/2019-ncov/if-you-are-sick/care-for-someone.html     [facility]: Interim Guidance for Hospital Discharge to Home: www.health.[facility].mn.us/diseases/coronavirus/hcp/hospdischarge.pdf    [facility] clinical trials (COVID-19 research studies):  "[email or web address]-clinical-trials     Below are the COVID-19 hotlines at the [place] Department of [facility] ([facility]). Interpreters are available.   o For health questions: Call 114-008-6841 or 1-542.723.9592 (7 a.m. to 7 p.m.)  o For questions about schools and childcare: Call 835-647-4229 or 1-207.945.1197 (7 a.m. to 7 p.m.)     Reason for Disposition    Black or tarry bowel movements    Additional Information    Negative: Severe difficulty breathing (e.g., struggling for each breath, speaks in single words)    Negative: Shock suspected (e.g., cold/pale/clammy skin, too weak to stand, low BP, rapid pulse)    Negative: Difficult to awaken or acting confused (e.g., disoriented, slurred speech)    Negative: [1] Fainted > 15 minutes ago AND [2] still feels too weak or dizzy to stand    Negative: [1] SEVERE weakness (i.e., unable to walk or barely able to walk, requires support) AND [2] new onset or worsening    Negative: Sounds like a life-threatening emergency to the triager    Negative: Weakness of the face, arm or leg on one side of the body    Negative: [1] Has diabetes (diabetes mellitus) AND [2] weakness from low blood sugar (i.e., < 60 mg/dl or 3.5 mmol/l)    Negative: Heat exhaustion suspected (i.e., dehydration from heat exposure)    Negative: Chest pain    Negative: Vomiting is main symptom    Negative: Diarrhea is main symptom    Negative: Difficulty breathing    Negative: Heart beating < 50 beats per minute OR > 140 beats per minute    Negative: Extra heart beats OR irregular heart beating   (i.e., \"palpitations\")    Negative: Follows bleeding (e.g., from vomiting, rectum, vagina; Exception: small brief weakness from sight of a small amount blood)    Protocols used: WEAKNESS (GENERALIZED) AND FATIGUE-A-AH      "

## 2022-06-19 NOTE — ED NOTES
Pt resting in bed. A&Ox4. Frequent beleching (every 30-60 seconds). Reports that she has been belching like this for 3 days.

## 2022-06-20 LAB
ATRIAL RATE - MUSE: 50 BPM
DIASTOLIC BLOOD PRESSURE - MUSE: NORMAL MMHG
INTERPRETATION ECG - MUSE: NORMAL
P AXIS - MUSE: 29 DEGREES
PR INTERVAL - MUSE: 178 MS
QRS DURATION - MUSE: 100 MS
QT - MUSE: 432 MS
QTC - MUSE: 393 MS
R AXIS - MUSE: 51 DEGREES
SYSTOLIC BLOOD PRESSURE - MUSE: NORMAL MMHG
T AXIS - MUSE: 30 DEGREES
VENTRICULAR RATE- MUSE: 50 BPM

## 2022-06-21 ENCOUNTER — OFFICE VISIT (OUTPATIENT)
Dept: INTERNAL MEDICINE | Facility: CLINIC | Age: 82
End: 2022-06-21
Payer: COMMERCIAL

## 2022-06-21 VITALS
DIASTOLIC BLOOD PRESSURE: 70 MMHG | HEART RATE: 66 BPM | BODY MASS INDEX: 25.4 KG/M2 | OXYGEN SATURATION: 98 % | HEIGHT: 68 IN | WEIGHT: 167.6 LBS | RESPIRATION RATE: 20 BRPM | TEMPERATURE: 98.2 F | SYSTOLIC BLOOD PRESSURE: 114 MMHG

## 2022-06-21 DIAGNOSIS — K80.50 CHOLEDOCHOLITHIASIS: Primary | ICD-10-CM

## 2022-06-21 DIAGNOSIS — R19.5 LOOSE STOOLS: ICD-10-CM

## 2022-06-21 LAB
ALBUMIN SERPL-MCNC: 3.8 G/DL (ref 3.5–5)
ALP SERPL-CCNC: 70 U/L (ref 45–120)
ALT SERPL W P-5'-P-CCNC: 20 U/L (ref 0–45)
ANION GAP SERPL CALCULATED.3IONS-SCNC: 7 MMOL/L (ref 5–18)
AST SERPL W P-5'-P-CCNC: 21 U/L (ref 0–40)
BILIRUB DIRECT SERPL-MCNC: 0.2 MG/DL
BILIRUB SERPL-MCNC: 0.5 MG/DL (ref 0–1)
BUN SERPL-MCNC: 15 MG/DL (ref 8–28)
CALCIUM SERPL-MCNC: 9.6 MG/DL (ref 8.5–10.5)
CHLORIDE BLD-SCNC: 102 MMOL/L (ref 98–107)
CO2 SERPL-SCNC: 31 MMOL/L (ref 22–31)
CREAT SERPL-MCNC: 0.84 MG/DL (ref 0.6–1.1)
ERYTHROCYTE [DISTWIDTH] IN BLOOD BY AUTOMATED COUNT: 15.5 % (ref 10–15)
GFR SERPL CREATININE-BSD FRML MDRD: 69 ML/MIN/1.73M2
GLUCOSE BLD-MCNC: 95 MG/DL (ref 70–125)
HCT VFR BLD AUTO: 35.5 % (ref 35–47)
HGB BLD-MCNC: 11.5 G/DL (ref 11.7–15.7)
MCH RBC QN AUTO: 31.8 PG (ref 26.5–33)
MCHC RBC AUTO-ENTMCNC: 32.4 G/DL (ref 31.5–36.5)
MCV RBC AUTO: 98 FL (ref 78–100)
PLATELET # BLD AUTO: 261 10E3/UL (ref 150–450)
POTASSIUM BLD-SCNC: 4.4 MMOL/L (ref 3.5–5)
PROT SERPL-MCNC: 6.5 G/DL (ref 6–8)
RBC # BLD AUTO: 3.62 10E6/UL (ref 3.8–5.2)
SODIUM SERPL-SCNC: 140 MMOL/L (ref 136–145)
VIT B12 SERPL-MCNC: 465 PG/ML (ref 213–816)
WBC # BLD AUTO: 5.3 10E3/UL (ref 4–11)

## 2022-06-21 PROCEDURE — 85027 COMPLETE CBC AUTOMATED: CPT | Performed by: NURSE PRACTITIONER

## 2022-06-21 PROCEDURE — 82248 BILIRUBIN DIRECT: CPT | Performed by: NURSE PRACTITIONER

## 2022-06-21 PROCEDURE — 99214 OFFICE O/P EST MOD 30 MIN: CPT | Performed by: NURSE PRACTITIONER

## 2022-06-21 PROCEDURE — 82607 VITAMIN B-12: CPT | Performed by: NURSE PRACTITIONER

## 2022-06-21 PROCEDURE — 80053 COMPREHEN METABOLIC PANEL: CPT | Performed by: NURSE PRACTITIONER

## 2022-06-21 PROCEDURE — 36415 COLL VENOUS BLD VENIPUNCTURE: CPT | Performed by: NURSE PRACTITIONER

## 2022-06-21 ASSESSMENT — PAIN SCALES - GENERAL: PAINLEVEL: NO PAIN (0)

## 2022-06-21 NOTE — PROGRESS NOTES
Internal Medicine Office Visit  [facility]   Patient Name: Geeta Berry  Patient Age: 81 year old  YOB: 1940  MRN: [id number]    Date of Visit: 6/21/2022  Patient presents with:  Hospital F/U: [place]: 05/15 abdominal pain, 06/04-06/07 ERCP, 06/19 diarrhea - pt states still belching, can't swallow as well, feels very tired and weak, wants to discuss new medication they prescribed.. scared to take it so have not taken any yet           Assessment / Plan / Medical Decision Making:    Problem List Items Addressed This Visit        Digestive    Choledocholithiasis - Primary    Relevant Orders    Hepatic panel (Albumin, ALT, AST, Bili, Alk Phos, TP)    Basic metabolic panel    CBC with platelets (Completed)      Other Visit Diagnoses     Loose stools        Relevant Orders    Elastase Fecal    Vitamin B12         - She continues to have ongoing symptoms of anorexia, frequent belching/eructation, occasional loose stools, dark stools  - consider SIBO workup through gastroenterology due to bloating, eructation, occasional loose stools   - Fecal elastase test ordered for exocrine pancrease evaluation due to reported steatorrhea, weight loss, and abdominal bloating. Celiac testing has previously been negative. Could also consider evaluation for idiopathic gastroparesis if all other testing is negative   - Advised use of metoclopramide 5 mg 3 times daily as needed.  She is advised that use of this medication for less than 1 month is considered to be low risk for tardive dyskinesia possible side effect.  - Follow up with gastroenterology as scheduled (end of July)        I am having Geeta Berry maintain her Cholecalciferol (VITAMIN D3 PO), Omega-3 Fatty Acids (OMEGA-3 FISH OIL PO), polyethylene glycol, citalopram, docusate sodium, azelastine, hydrochlorothiazide, omeprazole, rosuvastatin, fluticasone, acetaminophen, multivitamin w/minerals, simethicone, amoxicillin, levothyroxine,  and metoclopramide.          Orders Placed This Encounter   Procedures     Elastase Fecal     Hepatic panel (Albumin, ALT, AST, Bili, Alk Phos, TP)     Basic metabolic panel     CBC with platelets     Vitamin B12   Followup: Return in about 4 weeks (around 7/19/2022) for Follow up. earlier if needed.    40 minutes spent on the date of the encounter doing chart review, interpretation of tests, patient visit and documentation     Jacy Mishra NP, CNP        HPI:  Geeta Berry is a 81 year old year old who presents to the office today for follow up of a recent hospitalization and ER visit.    She seen in the emergency department with a complaint of abdominal pain and belching on 5/15/2022.  CT of the abdomen pelvis did not show any acute etiology.  She has a prior history of hiatal hernia and severe reflux as well as several ERCP procedures for choledocholithiasis and common bile duct.  An abdominal ultrasound and follow-up with [facility] was ordered, however in the meantime she developed recurrent epigastric abdominal pain and melena and was seen in the emergency department on 6/4/2022 and was admitted.  She was found to have an elevated AST and ALT above the level of her prior emergency department visit.  An ultrasound of the abdomen revealed a cystic lesion within the pancreatic tail but there was no common bile duct dilatation noted.  She had a recent colonoscopy and upper endoscopy done in 2021 which was essentially normal save for reactive gastropathy and diverticulosis.  She underwent ERCP during her hospitalization, choledocholithiasis with an obstruction was found and complete removal was accomplished with biliary sphincterotomy, sphincter dilation, and balloon extraction.  Thereafter, LFTs trended down and symptoms improved without pain, nausea, or vomiting.  She was able to discharge to home on 6/7/2022 with follow-up with [facility] in approximately 6 weeks.    She spoke with an RN related to her hospital discharge on  "6/19/2022 and reported that she felt like she was going downhill with symptoms of weakness and poor appetite.  She was seen in the emergency department on the same day and underwent CT scan which was without acute findings.  A comprehensive metabolic panel showed that liver function tests had normalized.  Troponin was negative.  Lipase was normal.  She is advised to take simethicone for symptoms. Celiac testing was negative in the past.  She was sent home with a prescription for metoclopramide but she has not taken the medication as she was concerned about the possible side effects listed.    She is still belching. No appetite. Anything she eats feels like it gets stuck in the esophagus, she feels it in the chest. Her stools have looked narrow, black, and \"almost greasy looking\".  She is belching frequently.      Health Maintenance Review  Health Maintenance   Topic Date Due     DEPRESSION ACTION PLAN  Never done     MEDICARE ANNUAL WELLNESS VISIT  01/07/2022     PHQ-9  09/29/2022     LIPID  11/24/2022     ANNUAL REVIEW OF HM ORDERS  11/24/2022     FALL RISK ASSESSMENT  03/29/2023     MICROALBUMIN  06/02/2023     BMP  06/19/2023     HEMOGLOBIN  06/21/2023     DTAP/TDAP/TD IMMUNIZATION (3 - Td or Tdap) 11/20/2023     ADVANCE CARE PLANNING  01/07/2026     INFLUENZA VACCINE  Completed     Pneumococcal Vaccine: 65+ Years  Completed     URINALYSIS  Completed     ZOSTER IMMUNIZATION  Completed     COVID-19 Vaccine  Completed     IPV IMMUNIZATION  Aged Out     MENINGITIS IMMUNIZATION  Aged Out     A1C  Discontinued     DEXA  Discontinued     DIABETIC FOOT EXAM  Discontinued     EYE EXAM  Discontinued       Current Scheduled Meds:  Outpatient Encounter Medications as of 6/21/2022   Medication Sig Dispense Refill     acetaminophen (TYLENOL) 650 MG CR tablet Take 650 mg by mouth 3 times daily       azelastine (ASTELIN) 0.1 % nasal spray SPRAY 1 SPRAY INTO BOTH NOSTRILS AT BEDTIME 90 mL 1     Cholecalciferol (VITAMIN D3 PO) " Take by mouth daily Unknown dosage       citalopram (CELEXA) 10 MG tablet Take 5 mg by mouth every morning       docusate sodium (COLACE) 100 MG tablet Take 100 mg by mouth 2 times daily       fluticasone (FLONASE) 50 MCG/ACT nasal spray INSTILL 1 SPRAY INTO BOTH NOSTRILS DAILY (Patient taking differently: Spray 1 spray into both nostrils daily) 48 g 3     hydrochlorothiazide (HYDRODIURIL) 25 MG tablet Take 1 tablet (25 mg) by mouth daily 90 tablet 3     levothyroxine (SYNTHROID/LEVOTHROID) 50 MCG tablet TAKE 1 TABLET BY MOUTH EVERY DAY 90 tablet 3     multivitamin w/minerals (THERA-VIT-M) tablet Take 1 tablet by mouth daily       Omega-3 Fatty Acids (OMEGA-3 FISH OIL PO) Take 1 g by mouth daily        omeprazole (PRILOSEC) 40 MG DR capsule Take 1 capsule (40 mg) by mouth 2 times daily (Patient taking differently: Take 40 mg by mouth 2 times daily (before meals)) 60 capsule 3     polyethylene glycol (MIRALAX/GLYCOLAX) packet Take 1 packet by mouth 2 times daily       rosuvastatin (CRESTOR) 40 MG tablet Take 1 tablet (40 mg) by mouth At Bedtime 90 tablet 4     simethicone (MYLICON) 80 MG chewable tablet Take 80 mg by mouth daily       amoxicillin (AMOXIL) 500 MG capsule Take 2,000 mg by mouth daily as needed (1 hour before dental appointments)       metoclopramide (REGLAN) 5 MG tablet Take 1 tablet (5 mg) by mouth 3 times daily as needed (nausea) (Patient not taking: Reported on 6/21/2022) 15 tablet 0     [DISCONTINUED] levothyroxine (SYNTHROID/LEVOTHROID) 50 MCG tablet Take 50 mcg by mouth daily before breakfast       No facility-administered encounter medications on file as of 6/21/2022.     Post Discharge Medication Reconciliation Status: discharge medications reconciled, continue medications without change.       Objective / Physical Examination:  Vitals:    06/21/22 1116   BP: 114/70   BP Location: Right arm   Patient Position: Sitting   Cuff Size: Adult Regular   Pulse: 66   Resp: 20   Temp: 98.2  F (36.8  C)  "  TempSrc: Oral   SpO2: 98%   Weight: 76 kg (167 lb 9.6 oz)   Height: 1.715 m (5' 7.5\")     Wt Readings from Last 3 Encounters:   06/21/22 76 kg (167 lb 9.6 oz)   06/19/22 73.9 kg (163 lb)   06/05/22 77.9 kg (171 lb 12.8 oz)     Wt Readings from Last 5 Encounters:   06/21/22 76 kg (167 lb 9.6 oz)   06/19/22 73.9 kg (163 lb)   06/05/22 77.9 kg (171 lb 12.8 oz)   05/15/22 80.7 kg (178 lb)   04/26/22 81 kg (178 lb 9.6 oz)       Body mass index is 25.86 kg/m .     General/constitional: Alert and oriented x3. Appears fatigued. Not anxious or depressed     "

## 2022-06-22 ENCOUNTER — LAB (OUTPATIENT)
Dept: LAB | Facility: CLINIC | Age: 82
End: 2022-06-22
Payer: COMMERCIAL

## 2022-06-22 DIAGNOSIS — R19.5 LOOSE STOOLS: ICD-10-CM

## 2022-06-22 PROCEDURE — 82653 EL-1 FECAL QUANTITATIVE: CPT

## 2022-06-24 LAB — ELASTASE PANC STL-MCNT: >800 UG/G

## 2022-06-29 DIAGNOSIS — F34.1 DYSTHYMIC DISORDER: ICD-10-CM

## 2022-06-30 RX ORDER — CITALOPRAM HYDROBROMIDE 10 MG/1
TABLET ORAL
Qty: 45 TABLET | Refills: 7 | Status: SHIPPED | OUTPATIENT
Start: 2022-06-30 | End: 2023-04-04

## 2022-06-30 NOTE — TELEPHONE ENCOUNTER
"Routing refill request to provider for review/approval because:  Medication is reported/historical   Disp Refills Start End JEVON   citalopram (CELEXA) 10 MG tablet 90 tablet 3 5/11/2021  No   Sig - Route: Take 0.5 tablets (5 mg total) by mouth daily. - Oral   Sent to pharmacy as: citalopram 10 mg tablet (celeXA)   E-Prescribing Status: Receipt confirmed by pharmacy (5/11/2021  4:41 PM CDT)       citalopram (CELEXA) 10 MG tablet [[id number]]    Electronically signed by: Jacy Mishra FNP on 05/11/21 1641 Status: Active   Ordering user: Jacy Mishra FNP 05/11/21 1641 Authorized by: Jacy Mishra FNP   Frequency: DAILY 05/11/21 - Until Discontinued Released by: Jacy Mishra FNP 05/11/21 1641   Diagnoses  Dysthymia [F34.1]       Order Transmission Method      Last Written Prescription Date:    Last Fill Quantity: ,  # refills:    Last office visit provider:  6/21/22     Requested Prescriptions   Pending Prescriptions Disp Refills     citalopram (CELEXA) 10 MG tablet [Pharmacy Med Name: CITALOPRAM HBR 10 MG TABLET] 45 tablet 7     Sig: TAKE 1/2 TABLET BY MOUTH DAILY       SSRIs Protocol Passed - 6/29/2022  2:00 PM        Passed - PHQ-9 score less than 5 in past 6 months     Please review last PHQ-9 score.           Passed - Medication is active on med list        Passed - Patient is age 18 or older        Passed - No active pregnancy on record        Passed - No positive pregnancy test in last 12 months        Passed - Recent (6 mo) or future (30 days) visit within the authorizing provider's specialty     Patient had office visit in the last 6 months or has a visit in the next 30 days with authorizing provider or within the authorizing provider's specialty.  See \"Patient Info\" tab in inbasket, or \"Choose Columns\" in Meds & Orders section of the refill encounter.                 Candie Zelaya RN 06/30/22 1:30 PM  "

## 2022-07-05 ENCOUNTER — OFFICE VISIT (OUTPATIENT)
Dept: INTERNAL MEDICINE | Facility: CLINIC | Age: 82
End: 2022-07-05
Payer: COMMERCIAL

## 2022-07-05 ENCOUNTER — TRANSCRIBE ORDERS (OUTPATIENT)
Dept: OTHER | Age: 82
End: 2022-07-05

## 2022-07-05 DIAGNOSIS — R11.0 NAUSEA: ICD-10-CM

## 2022-07-05 DIAGNOSIS — R14.2 FUNCTIONAL BURPING DISORDER: Primary | ICD-10-CM

## 2022-07-05 DIAGNOSIS — R13.10 DYSPHAGIA, UNSPECIFIED TYPE: ICD-10-CM

## 2022-07-05 DIAGNOSIS — H61.23 EXCESSIVE CERUMEN IN BOTH EAR CANALS: Primary | ICD-10-CM

## 2022-07-05 PROCEDURE — 69209 REMOVE IMPACTED EAR WAX UNI: CPT | Performed by: NURSE PRACTITIONER

## 2022-07-06 ENCOUNTER — DOCUMENTATION ONLY (OUTPATIENT)
Dept: INTERNAL MEDICINE | Facility: CLINIC | Age: 82
End: 2022-07-06

## 2022-07-06 NOTE — PROGRESS NOTES
Post Discharge Medication Reconciliation Status: discharge medications reconciled, continue medications without change.      Completed with Jacy Mishra at visit on 6/21/2022-   Post Discharge Medication Reconciliation Status: discharge medications reconciled, continue medications without change.

## 2022-07-06 NOTE — PROGRESS NOTES
Internal Medicine Office Visit  [facility]   Patient Name: Geeta Berry  Patient Age: 81 year old  YOB: 1940  MRN: [id number]    Date of Visit: 7/5/2022  Patient presents with:  ear flush           Assessment / Plan / Medical Decision Making:    Problem List Items Addressed This Visit    None     Visit Diagnoses     Excessive cerumen in both ear canals    -  Primary    Relevant Orders    REMOVE IMPACTED CERUMEN (Completed)         Ear lavage per procedure note below.  Follow-up as needed.    I am having Geeta Berry maintain her Cholecalciferol (VITAMIN D3 PO), Omega-3 Fatty Acids (OMEGA-3 FISH OIL PO), polyethylene glycol, docusate sodium, azelastine, hydrochlorothiazide, omeprazole, rosuvastatin, fluticasone, acetaminophen, multivitamin w/minerals, simethicone, amoxicillin, levothyroxine, metoclopramide, rosuvastatin, and citalopram.          Orders Placed This Encounter   Procedures     REMOVE IMPACTED CERUMEN   Followup: Return in about 6 months (around 1/5/2023) for Follow up. earlier if needed.        Jacy Mishra, NP, CNP        HPI:  Geeta Berry is a 81 year old year old who presents to the office today for left ear fullness.  She thinks maybe she has wax.  She is try to use over-the-counter drops and water flushes without success.        Health Maintenance Review  Health Maintenance   Topic Date Due     DEPRESSION ACTION PLAN  Never done     MEDICARE ANNUAL WELLNESS VISIT  01/07/2022     INFLUENZA VACCINE (1) 09/01/2022     PHQ-9  09/29/2022     LIPID  11/24/2022     ANNUAL REVIEW OF  ORDERS  11/24/2022     FALL RISK ASSESSMENT  03/29/2023     MICROALBUMIN  06/02/2023     BMP  06/21/2023     HEMOGLOBIN  06/21/2023     DTAP/TDAP/TD IMMUNIZATION (3 - Td or Tdap) 11/20/2023     ADVANCE CARE PLANNING  01/07/2026     Pneumococcal Vaccine: 65+ Years  Completed     URINALYSIS  Completed     ZOSTER IMMUNIZATION  Completed     COVID-19 Vaccine  Completed     IPV  IMMUNIZATION  Aged Out     MENINGITIS IMMUNIZATION  Aged Out     A1C  Discontinued     DEXA  Discontinued     DIABETIC FOOT EXAM  Discontinued     EYE EXAM  Discontinued       Current Scheduled Meds:  Outpatient Encounter Medications as of 7/5/2022   Medication Sig Dispense Refill     acetaminophen (TYLENOL) 650 MG CR tablet Take 650 mg by mouth 3 times daily       amoxicillin (AMOXIL) 500 MG capsule Take 2,000 mg by mouth daily as needed (1 hour before dental appointments)       azelastine (ASTELIN) 0.1 % nasal spray SPRAY 1 SPRAY INTO BOTH NOSTRILS AT BEDTIME 90 mL 1     Cholecalciferol (VITAMIN D3 PO) Take by mouth daily Unknown dosage       citalopram (CELEXA) 10 MG tablet TAKE 1/2 TABLET BY MOUTH DAILY 45 tablet 7     docusate sodium (COLACE) 100 MG tablet Take 100 mg by mouth 2 times daily       fluticasone (FLONASE) 50 MCG/ACT nasal spray INSTILL 1 SPRAY INTO BOTH NOSTRILS DAILY (Patient taking differently: Spray 1 spray into both nostrils daily) 48 g 3     hydrochlorothiazide (HYDRODIURIL) 25 MG tablet Take 1 tablet (25 mg) by mouth daily 90 tablet 3     levothyroxine (SYNTHROID/LEVOTHROID) 50 MCG tablet TAKE 1 TABLET BY MOUTH EVERY DAY 90 tablet 3     metoclopramide (REGLAN) 5 MG tablet Take 1 tablet (5 mg) by mouth 3 times daily as needed (nausea) (Patient not taking: Reported on 6/21/2022) 15 tablet 0     multivitamin w/minerals (THERA-VIT-M) tablet Take 1 tablet by mouth daily       Omega-3 Fatty Acids (OMEGA-3 FISH OIL PO) Take 1 g by mouth daily        omeprazole (PRILOSEC) 40 MG DR capsule Take 1 capsule (40 mg) by mouth 2 times daily (Patient taking differently: Take 40 mg by mouth 2 times daily (before meals)) 60 capsule 3     polyethylene glycol (MIRALAX/GLYCOLAX) packet Take 1 packet by mouth 2 times daily       rosuvastatin (CRESTOR) 20 MG tablet TAKE 1 TABLET BY MOUTH EVERYDAY AT BEDTIME 90 tablet 1     rosuvastatin (CRESTOR) 40 MG tablet Take 1 tablet (40 mg) by mouth At Bedtime 90 tablet 4      simethicone (MYLICON) 80 MG chewable tablet Take 80 mg by mouth daily       No facility-administered encounter medications on file as of 7/5/2022.           Objective / Physical Examination:  There were no vitals filed for this visit.  Wt Readings from Last 3 Encounters:   06/21/22 76 kg (167 lb 9.6 oz)   06/19/22 73.9 kg (163 lb)   06/05/22 77.9 kg (171 lb 12.8 oz)     There is no height or weight on file to calculate BMI.     Constitutional: In no apparent distress  ENT: Cerumen obstructs ear canals bilaterally    Procedure: bilateral ear lavage by Quan Branch MA. Ear canal obstruction is alleviated

## 2022-07-18 DIAGNOSIS — J31.0 CHRONIC RHINITIS: ICD-10-CM

## 2022-07-18 RX ORDER — FLUTICASONE PROPIONATE 50 MCG
SPRAY, SUSPENSION (ML) NASAL
Qty: 48 ML | Refills: 3 | OUTPATIENT
Start: 2022-07-18

## 2022-07-21 ENCOUNTER — LAB (OUTPATIENT)
Dept: LAB | Facility: HOSPITAL | Age: 82
End: 2022-07-21
Attending: PHYSICIAN ASSISTANT
Payer: COMMERCIAL

## 2022-07-21 ENCOUNTER — HOSPITAL ENCOUNTER (OUTPATIENT)
Dept: RADIOLOGY | Facility: HOSPITAL | Age: 82
Discharge: HOME | End: 2022-07-21
Attending: PHYSICIAN ASSISTANT
Payer: COMMERCIAL

## 2022-07-21 ENCOUNTER — HOSPITAL ENCOUNTER (OUTPATIENT)
Dept: SPEECH THERAPY | Facility: HOSPITAL | Age: 82
Discharge: HOME | End: 2022-07-21
Attending: PHYSICIAN ASSISTANT
Payer: COMMERCIAL

## 2022-07-21 DIAGNOSIS — R14.2 FUNCTIONAL BURPING DISORDER: ICD-10-CM

## 2022-07-21 DIAGNOSIS — R11.0 NAUSEA: ICD-10-CM

## 2022-07-21 DIAGNOSIS — R13.10 DYSPHAGIA, UNSPECIFIED TYPE: ICD-10-CM

## 2022-07-21 DIAGNOSIS — E78.5 DYSLIPIDEMIA, GOAL LDL BELOW 70: ICD-10-CM

## 2022-07-21 LAB
ALT SERPL W P-5'-P-CCNC: 20 U/L (ref 0–45)
CHOLEST SERPL-MCNC: 135 MG/DL
FASTING STATUS PATIENT QL REPORTED: NO
HDLC SERPL-MCNC: 59 MG/DL
LDLC SERPL CALC-MCNC: 58 MG/DL
TRIGL SERPL-MCNC: 90 MG/DL

## 2022-07-21 PROCEDURE — 92611 MOTION FLUOROSCOPY/SWALLOW: CPT | Mod: GN

## 2022-07-21 PROCEDURE — 36415 COLL VENOUS BLD VENIPUNCTURE: CPT

## 2022-07-21 PROCEDURE — 84460 ALANINE AMINO (ALT) (SGPT): CPT

## 2022-07-21 PROCEDURE — 74230 X-RAY XM SWLNG FUNCJ C+: CPT

## 2022-07-21 PROCEDURE — 80061 LIPID PANEL: CPT

## 2022-07-21 NOTE — PROGRESS NOTES
Speech-Language Pathology: Video Swallow Study     07/21/22 1300   General Information   Type Of Visit Initial   Start Of Care Date 07/21/22   Referring Physician Harriett Sarah PA-C ([facility])   Orders Evaluate And Treat   Onset Of Illness/injury Or Date Of Surgery 07/21/22   Pertinent History of Current Problem/OT: Additional Occupational Profile Info belching   VFSS Eval: Radiology   Radiologist Dr. Park   Views Taken left lateral   Physical Location of Procedure [facility]   VFSS Eval: Thin Liquid Texture Trial   Mode of Presentation, Thin Liquid cup;self-fed   Order of Presentation 1   Preparatory Phase WFL   Oral Phase, Thin Liquid WFL   Pharyngeal Phase, Thin Liquid WFL   Rosenbek's Penetration Aspiration Scale: Thin Liquid Trial Results 1 - no aspiration, contrast does not enter airway   VFSS Eval: Regular Texture Trial (Solid)   Mode of Presentation self-fed   Order of Presentation 2, barium coated cracker   Preparatory Phase WFL   Oral Phase WFL   Pharyngeal Phase WFL   Rosenbek's Penetration Aspiration Scale 1 - no aspiration, contrast does not enter airway   Swallow Eval: Clinical Impressions   Skilled Criteria for Therapy Intervention No problems identified which require skilled intervention   Diet texture recommendations Thin liquids (level 0);Regular diet   Clinical Impression Comments Video Swallow Study completed. Patient had no aspiration or penetration. Oropharyngeal swallow function is WNL. Tongue base retraction is WNL. Pharyngeal constriction, hyolaryngeal elevation and excursion were all WNL. Epiglottic inversion is complete. Swallow response is timely. Mastication is safe and complete.  Recommend Regular diet and Thin liquids.   Total Session Time   SLP Eval: VideoFluoroscopic Swallow function Minutes ([id number]) 15   Total Evaluation Time 15

## 2022-07-22 ENCOUNTER — TRANSFERRED RECORDS (OUTPATIENT)
Dept: HEALTH INFORMATION MANAGEMENT | Facility: CLINIC | Age: 82
End: 2022-07-22

## 2022-08-19 ENCOUNTER — TRANSFERRED RECORDS (OUTPATIENT)
Dept: HEALTH INFORMATION MANAGEMENT | Facility: CLINIC | Age: 82
End: 2022-08-19

## 2022-08-30 ENCOUNTER — ANCILLARY PROCEDURE (OUTPATIENT)
Dept: MAMMOGRAPHY | Facility: CLINIC | Age: 82
End: 2022-08-30
Attending: NURSE PRACTITIONER
Payer: COMMERCIAL

## 2022-08-30 DIAGNOSIS — Z12.31 VISIT FOR SCREENING MAMMOGRAM: ICD-10-CM

## 2022-08-30 PROCEDURE — 77067 SCR MAMMO BI INCL CAD: CPT

## 2022-09-22 ENCOUNTER — OFFICE VISIT (OUTPATIENT)
Dept: INTERNAL MEDICINE | Facility: CLINIC | Age: 82
End: 2022-09-22
Payer: COMMERCIAL

## 2022-09-22 VITALS
RESPIRATION RATE: 18 BRPM | TEMPERATURE: 98.1 F | DIASTOLIC BLOOD PRESSURE: 70 MMHG | HEART RATE: 61 BPM | WEIGHT: 171.6 LBS | SYSTOLIC BLOOD PRESSURE: 110 MMHG | HEIGHT: 68 IN | OXYGEN SATURATION: 98 % | BODY MASS INDEX: 26.01 KG/M2

## 2022-09-22 DIAGNOSIS — F34.1 DYSTHYMIA: ICD-10-CM

## 2022-09-22 DIAGNOSIS — E03.9 ACQUIRED HYPOTHYROIDISM: ICD-10-CM

## 2022-09-22 DIAGNOSIS — N18.31 STAGE 3A CHRONIC KIDNEY DISEASE (H): ICD-10-CM

## 2022-09-22 DIAGNOSIS — M19.91 PRIMARY OSTEOARTHRITIS, UNSPECIFIED SITE: ICD-10-CM

## 2022-09-22 DIAGNOSIS — Z00.00 ENCOUNTER FOR MEDICARE ANNUAL WELLNESS EXAM: Primary | ICD-10-CM

## 2022-09-22 DIAGNOSIS — E78.2 MIXED HYPERLIPIDEMIA: ICD-10-CM

## 2022-09-22 DIAGNOSIS — I10 ESSENTIAL HYPERTENSION: ICD-10-CM

## 2022-09-22 DIAGNOSIS — K21.9 GASTROESOPHAGEAL REFLUX DISEASE WITHOUT ESOPHAGITIS: ICD-10-CM

## 2022-09-22 PROCEDURE — 91312 COVID-19,PF,PFIZER BOOSTER BIVALENT: CPT | Performed by: NURSE PRACTITIONER

## 2022-09-22 PROCEDURE — 99214 OFFICE O/P EST MOD 30 MIN: CPT | Mod: 25 | Performed by: NURSE PRACTITIONER

## 2022-09-22 PROCEDURE — 90662 IIV NO PRSV INCREASED AG IM: CPT | Performed by: NURSE PRACTITIONER

## 2022-09-22 PROCEDURE — G0439 PPPS, SUBSEQ VISIT: HCPCS | Performed by: NURSE PRACTITIONER

## 2022-09-22 PROCEDURE — 0124A COVID-19,PF,PFIZER BOOSTER BIVALENT: CPT | Performed by: NURSE PRACTITIONER

## 2022-09-22 PROCEDURE — G0008 ADMIN INFLUENZA VIRUS VAC: HCPCS | Mod: 59 | Performed by: NURSE PRACTITIONER

## 2022-09-22 RX ORDER — MULTIVIT WITH MINERALS/LUTEIN
1000 TABLET ORAL DAILY
COMMUNITY

## 2022-09-22 RX ORDER — NICOTINE POLACRILEX 4 MG/1
GUM, CHEWING ORAL
Qty: 270 TABLET | Refills: 1 | Status: SHIPPED | OUTPATIENT
Start: 2022-09-22 | End: 2022-09-29

## 2022-09-22 ASSESSMENT — PATIENT HEALTH QUESTIONNAIRE - PHQ9
10. IF YOU CHECKED OFF ANY PROBLEMS, HOW DIFFICULT HAVE THESE PROBLEMS MADE IT FOR YOU TO DO YOUR WORK, TAKE CARE OF THINGS AT HOME, OR GET ALONG WITH OTHER PEOPLE: SOMEWHAT DIFFICULT
SUM OF ALL RESPONSES TO PHQ QUESTIONS 1-9: 3
SUM OF ALL RESPONSES TO PHQ QUESTIONS 1-9: 3

## 2022-09-22 ASSESSMENT — ENCOUNTER SYMPTOMS
HEMATURIA: 0
CONSTIPATION: 0
DYSURIA: 0
DIARRHEA: 0
NAUSEA: 0
FEVER: 0
COUGH: 0
CHILLS: 0
FREQUENCY: 1
PARESTHESIAS: 0
WEAKNESS: 1
DIZZINESS: 0
HEMATOCHEZIA: 0
SHORTNESS OF BREATH: 0
SORE THROAT: 0
ARTHRALGIAS: 1
JOINT SWELLING: 1
PALPITATIONS: 0
MYALGIAS: 0
HEARTBURN: 0
NERVOUS/ANXIOUS: 0
EYE PAIN: 0
ABDOMINAL PAIN: 0
BREAST MASS: 0
HEADACHES: 1

## 2022-09-22 ASSESSMENT — PAIN SCALES - GENERAL: PAINLEVEL: MILD PAIN (2)

## 2022-09-22 ASSESSMENT — ACTIVITIES OF DAILY LIVING (ADL): CURRENT_FUNCTION: NO ASSISTANCE NEEDED

## 2022-09-22 NOTE — ASSESSMENT & PLAN NOTE
Difficulty swallowing omeprazole capsules.  We will transition to tablets to see if this resolves the issue of pills feeling like they get stuck.  We also discussed gradually decreasing her dose of omeprazole as perhaps she does not need to take such and aggressively high dose.  We will lower the dose to 40 mg in the morning and 20 mg at night.  She will continue this for 2 weeks and if symptoms are well controlled she can lower the dose once again to 20 mg twice daily thereafter.

## 2022-09-22 NOTE — PATIENT INSTRUCTIONS
Patient Education   Personalized Prevention Plan  You are due for the preventive services outlined below.  Your care team is available to assist you in scheduling these services.  If you have already completed any of these items, please share that information with your care team to update in your medical record.  Health Maintenance Due   Topic Date Due     Depression Action Plan  Never done     COVID-19 Vaccine (5 - Booster for Pfizer series) 06/15/2022     Flu Vaccine (1) 09/01/2022

## 2022-09-22 NOTE — PROGRESS NOTES
"SUBJECTIVE:   Geeta is a 82 year old who presents for Preventive Visit.    She struggles with swallowing pills where it feels like it gets stuck in the throat, in particular the omeprazole which is the only capsule medication that she takes. SLP evaluation with a video swallow was negative for aspiration.    She had a positive breath test through [facility] and was treated with metronidazole. She still has difficulty swallowing and has regular belching.     Generalized arthritic pains are noted.     She notes feeling fatigued. She does not feel depressed but gets irritated. 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     She rides her bike daily for exercise.     Are you in the first 12 months of your Medicare coverage?  No    Healthy Habits:     In general, how would you rate your overall health?  Good    Frequency of exercise:  6-7 days/week    Duration of exercise:  45-60 minutes    Do you usually eat at least 4 servings of fruit and vegetables a day, include whole grains    & fiber and avoid regularly eating high fat or \"junk\" foods?  Yes    Taking medications regularly:  Yes    Medication side effects:  Other    Ability to successfully perform activities of daily living:  No assistance needed    Home Safety:  No safety concerns identified    Hearing Impairment:  No hearing concerns    In the past 6 months, have you been bothered by leaking of urine?  No    In general, how would you rate your overall mental or emotional health?  Good      PHQ-2 Total Score: 0    Additional concerns today:  Yes    Answers for HPI/ROS submitted by the patient on 9/22/2022  If you checked off any problems, how difficult have these problems made it for you to do your work, take care of things at home, or get along with other people?: Somewhat difficult  PHQ9 TOTAL SCORE: 3    Do you feel safe in your environment? Yes    Have you ever done Advance Care Planning? (For example, a Health Directive, POLST, " or a discussion with a medical provider or your loved ones about your wishes): Yes, advance care planning is on file.       Fall risk  Fallen 2 or more times in the past year?: No  Any fall with injury in the past year?: No    Cognitive Screening   1) Repeat 3 items (Leader, Season, Table)    2) Clock draw: NORMAL  3) 3 item recall: Recalls 3 objects  Results: 3 items recalled: COGNITIVE IMPAIRMENT LESS LIKELY    Mini-CogTM Copyright MOISES Palacio. Licensed by the author for use in [facility]; reprinted with permission (cuba@[facility]). All rights reserved.          Reviewed and updated as needed this visit by clinical staff   Tobacco  Allergies  Meds  Problems  Med Hx  Surg Hx  Fam Hx            Reviewed and updated as needed this visit by Provider   Tobacco  Allergies  Meds  Problems  Med Hx  Surg Hx  Fam Hx           Social History     Tobacco Use     Smoking status: Never Smoker     Smokeless tobacco: Never Used   Substance Use Topics     Alcohol use: No         Alcohol Use 9/22/2022   Prescreen: >3 drinks/day or >7 drinks/week? No       Current providers sharing in care for this patient include:   Patient Care Team:  Jacy Mishra NP as PCP - General  Jacy Mishra NP as Assigned PCP  Kathie Lemons MD as Assigned Heart and Vascular Provider    The following health maintenance items are reviewed in Epic and correct as of today:  Health Maintenance   Topic Date Due     DEPRESSION ACTION PLAN  Never done     ANNUAL REVIEW OF HM ORDERS  11/24/2022     PHQ-9  03/22/2023     MICROALBUMIN  06/02/2023     BMP  06/21/2023     HEMOGLOBIN  06/21/2023     LIPID  07/21/2023     MEDICARE ANNUAL WELLNESS VISIT  09/22/2023     FALL RISK ASSESSMENT  09/22/2023     DTAP/TDAP/TD IMMUNIZATION (3 - Td or Tdap) 11/20/2023     ADVANCE CARE PLANNING  09/22/2027     INFLUENZA VACCINE  Completed     Pneumococcal Vaccine: 65+ Years  Completed     URINALYSIS  Completed     ZOSTER IMMUNIZATION  Completed     COVID-19  "Vaccine  Completed     IPV IMMUNIZATION  Aged Out     MENINGITIS IMMUNIZATION  Aged Out     A1C  Discontinued     DEXA  Discontinued     DIABETIC FOOT EXAM  Discontinued     EYE EXAM  Discontinued         Review of Systems   Constitutional: Negative for chills and fever.   HENT: Negative for congestion, ear pain, hearing loss and sore throat.    Eyes: Negative for pain and visual disturbance.   Respiratory: Negative for cough and shortness of breath.    Cardiovascular: Positive for peripheral edema. Negative for chest pain and palpitations.   Gastrointestinal: Negative for abdominal pain, constipation, diarrhea, heartburn, hematochezia and nausea.   Breasts:  Negative for tenderness, breast mass and discharge.   Genitourinary: Positive for frequency. Negative for dysuria, genital sores, hematuria, pelvic pain, urgency, vaginal bleeding and vaginal discharge.   Musculoskeletal: Positive for arthralgias and joint swelling. Negative for myalgias.   Skin: Negative for rash.   Neurological: Positive for weakness and headaches. Negative for dizziness and paresthesias.   Psychiatric/Behavioral: Negative for mood changes. The patient is not nervous/anxious.        OBJECTIVE:   /70 (BP Location: Right arm, Patient Position: Sitting, Cuff Size: Adult Regular)   Pulse 61   Temp 98.1  F (36.7  C) (Oral)   Resp 18   Ht 1.715 m (5' 7.5\")   Wt 77.8 kg (171 lb 9.6 oz)   LMP  (LMP Unknown)   SpO2 98%   BMI 26.48 kg/m   Estimated body mass index is 26.48 kg/m  as calculated from the following:    Height as of this encounter: 1.715 m (5' 7.5\").    Weight as of this encounter: 77.8 kg (171 lb 9.6 oz).     Wt Readings from Last 5 Encounters:   09/22/22 77.8 kg (171 lb 9.6 oz)   06/21/22 76 kg (167 lb 9.6 oz)   06/19/22 73.9 kg (163 lb)   06/05/22 77.9 kg (171 lb 12.8 oz)   05/15/22 80.7 kg (178 lb)       Physical Exam  GENERAL: healthy, alert and no distress  EYES: Eyes grossly normal to inspection, conjunctivae and " sclerae normal  HENT: ear canals and TM's normal, nose and mouth without ulcers or lesions  NECK: no adenopathy, no asymmetry, masses, or scars and thyroid normal to palpation  RESP: lungs clear to auscultation - no rales, rhonchi or wheezes  CV: regular rate and rhythm, normal S1 S2  ABDOMEN: soft, nontender, no hepatosplenomegaly, no masses and bowel sounds normal  MS: no gross musculoskeletal defects noted, no edema  NEURO: Normal strength and tone, mentation intact and speech normal  PSYCH: mentation appears normal, affect normal/bright        ASSESSMENT / PLAN:     Problem List Items Addressed This Visit        Digestive    Gastroesophageal reflux disease     Difficulty swallowing omeprazole capsules.  We will transition to tablets to see if this resolves the issue of pills feeling like they get stuck.  We also discussed gradually decreasing her dose of omeprazole as perhaps she does not need to take such and aggressively high dose.  We will lower the dose to 40 mg in the morning and 20 mg at night.  She will continue this for 2 weeks and if symptoms are well controlled she can lower the dose once again to 20 mg twice daily thereafter.           Relevant Medications    omeprazole 20 MG tablet       Endocrine    Hyperlipidemia     Stable with rosuvastatin.  Dose was recently increased by cardiology.           Hypothyroidism     Euthyroid on levothyroxine.  We will plan to check a TSH with her next lab draw and an order is placed today.           Relevant Orders    TSH with free T4 reflex       Circulatory    Essential hypertension     Stable with current medications              Musculoskeletal and Integumentary    Osteoarthritis; knees, hips, cervical spine      Continue with acetaminophen as needed.              Urinary    Stage 3a chronic kidney disease (H)     Creatinine is noted to be stable with most recent lab test.  Continue good hydration and avoidance of NSAID medications              Behavioral     "Dysthymia     Stable with citalopram             Other Visit Diagnoses     Encounter for Medicare annual wellness exam    -  Primary            COUNSELING:  Reviewed preventive health counseling, as reflected in patient instructions    Estimated body mass index is 26.48 kg/m  as calculated from the following:    Height as of this encounter: 1.715 m (5' 7.5\").    Weight as of this encounter: 77.8 kg (171 lb 9.6 oz).        She reports that she has never smoked. She has never used smokeless tobacco.      Appropriate preventive services were discussed with this patient, including applicable screening as appropriate for cardiovascular disease, diabetes, osteopenia/osteoporosis, and glaucoma.  As appropriate for age/gender, discussed screening for colorectal cancer, prostate cancer, breast cancer, and cervical cancer. Checklist reviewing preventive services available has been given to the patient.    Reviewed patients plan of care and provided an AVS. The Basic Care Plan (routine screening as documented in Health Maintenance) for Geeta meets the Care Plan requirement. This Care Plan has been established and reviewed with the Patient.    Counseling Resources:  ATP IV Guidelines  Pooled Cohorts Equation Calculator  Breast Cancer Risk Calculator  Breast Cancer: Medication to Reduce Risk  FRAX Risk Assessment  ICSI Preventive Guidelines  Dietary Guidelines for Americans, 2010  [employer]'s MyPlate  ASA Prophylaxis  Lung CA Screening    Jacy Mishra NP  [facility]    Identified Health Risks:  "

## 2022-09-25 ENCOUNTER — MYC MEDICAL ADVICE (OUTPATIENT)
Dept: INTERNAL MEDICINE | Facility: CLINIC | Age: 82
End: 2022-09-25

## 2022-09-25 DIAGNOSIS — M70.61 GREATER TROCHANTERIC BURSITIS OF BOTH HIPS: Primary | ICD-10-CM

## 2022-09-25 DIAGNOSIS — K21.9 GASTROESOPHAGEAL REFLUX DISEASE WITHOUT ESOPHAGITIS: ICD-10-CM

## 2022-09-25 DIAGNOSIS — M70.62 GREATER TROCHANTERIC BURSITIS OF BOTH HIPS: Primary | ICD-10-CM

## 2022-09-26 PROBLEM — E87.6 HYPOKALEMIA: Status: RESOLVED | Noted: 2022-06-04 | Resolved: 2022-09-26

## 2022-09-26 NOTE — ASSESSMENT & PLAN NOTE
Creatinine is noted to be stable with most recent lab test.  Continue good hydration and avoidance of NSAID medications

## 2022-09-26 NOTE — ASSESSMENT & PLAN NOTE
Euthyroid on levothyroxine.  We will plan to check a TSH with her next lab draw and an order is placed today.

## 2022-09-27 NOTE — TELEPHONE ENCOUNTER
"Contacted pt to clarify that she did not  the 20 mg pills.     She has been taking the \"bigger\" 40 mg only. One at breakfast and one at bedtime. She can swallow them, she has been taking with hot water.    She would like a refill for the omeprazole 40 mg, as she only has two pills left.   "

## 2022-09-29 RX ORDER — OMEPRAZOLE 20 MG/1
20 TABLET, DELAYED RELEASE ORAL EVERY EVENING
Qty: 90 TABLET | Refills: 1 | Status: SHIPPED | OUTPATIENT
Start: 2022-09-29 | End: 2023-03-09

## 2022-09-29 RX ORDER — OMEPRAZOLE 40 MG/1
40 CAPSULE, DELAYED RELEASE ORAL EVERY MORNING
Qty: 90 CAPSULE | Refills: 1 | Status: SHIPPED | OUTPATIENT
Start: 2022-09-29 | End: 2023-04-04

## 2023-02-10 ENCOUNTER — OFFICE VISIT (OUTPATIENT)
Dept: FAMILY MEDICINE | Facility: CLINIC | Age: 83
End: 2023-02-10
Payer: COMMERCIAL

## 2023-02-10 ENCOUNTER — NURSE TRIAGE (OUTPATIENT)
Dept: NURSING | Facility: CLINIC | Age: 83
End: 2023-02-10
Payer: COMMERCIAL

## 2023-02-10 VITALS
DIASTOLIC BLOOD PRESSURE: 81 MMHG | OXYGEN SATURATION: 97 % | SYSTOLIC BLOOD PRESSURE: 135 MMHG | TEMPERATURE: 98 F | HEART RATE: 61 BPM

## 2023-02-10 DIAGNOSIS — R10.84 ABDOMINAL PAIN, GENERALIZED: ICD-10-CM

## 2023-02-10 DIAGNOSIS — A08.4 VIRAL GASTROENTERITIS: Primary | ICD-10-CM

## 2023-02-10 LAB
ALBUMIN SERPL BCG-MCNC: 4 G/DL (ref 3.5–5.2)
ALP SERPL-CCNC: 61 U/L (ref 35–104)
ALT SERPL W P-5'-P-CCNC: 18 U/L (ref 10–35)
ANION GAP SERPL CALCULATED.3IONS-SCNC: 7 MMOL/L (ref 7–15)
AST SERPL W P-5'-P-CCNC: 33 U/L (ref 10–35)
BASOPHILS # BLD AUTO: 0 10E3/UL (ref 0–0.2)
BASOPHILS NFR BLD AUTO: 1 %
BILIRUB SERPL-MCNC: 0.3 MG/DL
BUN SERPL-MCNC: 17.4 MG/DL (ref 8–23)
CALCIUM SERPL-MCNC: 9.4 MG/DL (ref 8.8–10.2)
CHLORIDE SERPL-SCNC: 101 MMOL/L (ref 98–107)
CREAT SERPL-MCNC: 0.94 MG/DL (ref 0.51–0.95)
DEPRECATED HCO3 PLAS-SCNC: 33 MMOL/L (ref 22–29)
EOSINOPHIL # BLD AUTO: 0.1 10E3/UL (ref 0–0.7)
EOSINOPHIL NFR BLD AUTO: 1 %
ERYTHROCYTE [DISTWIDTH] IN BLOOD BY AUTOMATED COUNT: 14.4 % (ref 10–15)
GFR SERPL CREATININE-BSD FRML MDRD: 60 ML/MIN/1.73M2
GLUCOSE SERPL-MCNC: 94 MG/DL (ref 70–99)
HCT VFR BLD AUTO: 34.5 % (ref 35–47)
HGB BLD-MCNC: 11.4 G/DL (ref 11.7–15.7)
IMM GRANULOCYTES # BLD: 0 10E3/UL
IMM GRANULOCYTES NFR BLD: 0 %
LIPASE SERPL-CCNC: 18 U/L (ref 13–60)
LYMPHOCYTES # BLD AUTO: 1.3 10E3/UL (ref 0.8–5.3)
LYMPHOCYTES NFR BLD AUTO: 33 %
MCH RBC QN AUTO: 31.8 PG (ref 26.5–33)
MCHC RBC AUTO-ENTMCNC: 33 G/DL (ref 31.5–36.5)
MCV RBC AUTO: 96 FL (ref 78–100)
MONOCYTES # BLD AUTO: 0.6 10E3/UL (ref 0–1.3)
MONOCYTES NFR BLD AUTO: 15 %
NEUTROPHILS # BLD AUTO: 2 10E3/UL (ref 1.6–8.3)
NEUTROPHILS NFR BLD AUTO: 51 %
PLATELET # BLD AUTO: 223 10E3/UL (ref 150–450)
POTASSIUM SERPL-SCNC: 4 MMOL/L (ref 3.4–5.3)
PROT SERPL-MCNC: 6.6 G/DL (ref 6.4–8.3)
RBC # BLD AUTO: 3.58 10E6/UL (ref 3.8–5.2)
SODIUM SERPL-SCNC: 141 MMOL/L (ref 136–145)
WBC # BLD AUTO: 4 10E3/UL (ref 4–11)

## 2023-02-10 PROCEDURE — 83690 ASSAY OF LIPASE: CPT | Performed by: NURSE PRACTITIONER

## 2023-02-10 PROCEDURE — 36415 COLL VENOUS BLD VENIPUNCTURE: CPT | Performed by: NURSE PRACTITIONER

## 2023-02-10 PROCEDURE — 85025 COMPLETE CBC W/AUTO DIFF WBC: CPT | Performed by: NURSE PRACTITIONER

## 2023-02-10 PROCEDURE — 80053 COMPREHEN METABOLIC PANEL: CPT | Performed by: NURSE PRACTITIONER

## 2023-02-10 PROCEDURE — 99214 OFFICE O/P EST MOD 30 MIN: CPT | Performed by: NURSE PRACTITIONER

## 2023-02-10 RX ORDER — ALUMINA, MAGNESIA, AND SIMETHICONE 2400; 2400; 240 MG/30ML; MG/30ML; MG/30ML
15 SUSPENSION ORAL ONCE
Status: COMPLETED | OUTPATIENT
Start: 2023-02-10 | End: 2023-02-10

## 2023-02-10 RX ADMIN — ALUMINA, MAGNESIA, AND SIMETHICONE 15 ML: 2400; 2400; 240 SUSPENSION ORAL at 14:31

## 2023-02-10 ASSESSMENT — ENCOUNTER SYMPTOMS
NAUSEA: 0
ROS GI COMMENTS: BELCHING.
DIARRHEA: 1
HEMATOCHEZIA: 0
ABDOMINAL PAIN: 1
APPETITE CHANGE: 1

## 2023-02-10 NOTE — PATIENT INSTRUCTIONS
This is probably a stomach virus.  I have checked you for evidence of problems with your liver (or pancreas) similar to what was happening last time you were in the hospital.  We will call you if these labs are concerning.  Otherwise, I will send them to your MyChart later.    Your lab checking for anemia or evidence of infection was normal.    Hold MiraLAX for now.     See handout about diet and what to look out for.  If this is a stomach virus, it is very contagious, so be sure to wash your hands and ideally wear gloves if helping your .      If you are not much improved by Monday, come back or if you feel like you are starting to get dehydrated or diarrhea becomes very frequent, becomes bloody, or you develop a fever, come back.

## 2023-02-10 NOTE — TELEPHONE ENCOUNTER
Squamous cell cancer removed from her left arm Monday on 02/06/23    Feeling weak, tired, and aching all over, with one episode of diarrhea    Denies taken a Covid -19 test    The patient says she is urinating at least every 12 hours    Triage guidelines recommend to go to office now    Caller verbalized and understands directives    Reason for Disposition    MODERATE weakness (i.e., interferes with work, school, normal activities) and cause unknown  (Exceptions: Weakness with acute minor illness, or weakness from poor fluid intake.)    Additional Information    Negative: SEVERE difficulty breathing (e.g., struggling for each breath, speaks in single words)    Negative: Shock suspected (e.g., cold/pale/clammy skin, too weak to stand, low BP, rapid pulse)    Negative: Difficult to awaken or acting confused (e.g., disoriented, slurred speech)    Negative: Fainted > 15 minutes ago and still feels too weak or dizzy to stand    Negative: SEVERE weakness (i.e., unable to walk or barely able to walk, requires support) and new-onset or worsening    Negative: Sounds like a life-threatening emergency to the triager    Negative: Weakness of the face, arm or leg on one side of the body    Negative: Has diabetes mellitus and weakness from low blood sugar (i.e., < 60 mg/dL or 3.5 mmol/L)    Negative: Recent heat exposure, suspected cause of weakness    Negative: Vomiting is main symptom    Negative: Diarrhea is main symptom    Negative: Difficulty breathing    Negative: Heart beating < 50 beats per minute OR > 140 beats per minute    Negative: Extra heartbeats OR irregular heart beating (i.e., 'palpitations')    Negative: Follows bleeding (e.g., from vomiting, rectum, vagina)  (Exception: Small transient weakness from sight of a small amount blood.)    Negative: Bloody, black, or tarry bowel movements  (Exception: Chronic-unchanged black-grey bowel movements and is taking iron pills or Pepto-Bismol.)    Negative: MODERATE  weakness from poor fluid intake with no improvement after 2 hours of rest and fluids    Negative: Drinking very little and dehydration suspected (e.g., no urine > 12 hours, very dry mouth, very lightheaded)    Negative: Patient sounds very sick or weak to the triager    Protocols used: WEAKNESS (GENERALIZED) AND FATIGUE-A-OH

## 2023-02-10 NOTE — PROGRESS NOTES
Assessment & Plan     Abdominal pain, generalized    - CBC with platelets and differential  - Comprehensive metabolic panel (BMP + Alb, Alk Phos, ALT, AST, Total. Bili, TP)  - Lipase  - alum & mag hydroxide-simethicone (MAALOX  ES) suspension 15 mL  - CBC with platelets and differential  - Comprehensive metabolic panel (BMP + Alb, Alk Phos, ALT, AST, Total. Bili, TP)  - Lipase    Viral gastroenteritis       Patient with 2 days of excessive belching, malaise, mild epigastric tenderness, resolved vomiting, and 2 days of few loose stools.  Nonbloody.  No fevers.    History includes recent hospitalization for choledocholithiasis.  History also includes severe GERD and hiatal hernia.    Abdominal exam is benign without guarding or focal tenderness.  Patient has been tolerating light diet and fluids.    CBC is stable from previous.    Most likely viral gastroenteritis.    She did actually improve quite a bit with 1 dose of Maalox with simethicone.  Belching and epigastric pain went away.  She is on Prilosec    Can continue Maalox with simethicone OTC for excessive belching or epigastric pain as needed.    Discharged with abdominal labs pending.  These did come back normal later.     Recommended continuing diet and advancing as tolerated.  Diet handout related to gastroenteritis discussed.  Push fluids.      Avoid Imodium unless greater than 5 stools.    Hold MiraLAX until improved.    Recheck in 3 to 4 days if persistent mild symptoms or ASAP with worsening abdominal pain, weakness/dizziness, vomiting or severe diarrhea.   30 minutes spent on the date of the encounter doing chart review, review of test results, patient visit and documentation         Return in about 3 days (around 2/13/2023) for If no better.    Sheila Degroot, [facility] MOON Connor is a 82 year old female who presents to clinic today for the following health issues:  Chief Complaint   Patient presents with      "Vomiting     One episode of vomiting 2/8/23      Diarrhea     Diarrhea since 2/8/23     Fatigue     X2 days, had a derm procedure on 2/6/23     HPI    Vomiting 2 days ago x 1. Temp was 99 at onset.       Diarrhea x 3 2 days ago, similar yesterday and 1 this morning.      Abd pain constant generalized.  Excessive belching since this started.    Has seen [facility] in the past.  Last June was having severe upper abd pain with belching and found to have choledocholithiasis.  She underwent ERCP during her hospitalization, choledocholithiasis with an obstruction was found and complete removal was accomplished with biliary sphincterotomy, sphincter dilation, and balloon extraction. This feels different because the pain is less severe.      History also includes severe reflux and hiatal hernia.    Fatigue and achiness.  Tired.        is in hospice.  Is at home with hospice services.     Had squamous cell carcinoma removed 5 days ago on left forearm.     Has had Jello and pudding, margot crackers, chicken noodle soup.      Takes miralax daily.     Took Imodium prior to arrival.        Review of Systems   Constitutional: Positive for appetite change.   Gastrointestinal: Positive for abdominal pain and diarrhea (\"Black\". A little more solids today.  ). Negative for hematochezia and nausea.        \"Belching.\"            Objective    /81 (BP Location: Right arm, Patient Position: Sitting, Cuff Size: Adult Regular)   Pulse 61   Temp 98  F (36.7  C) (Oral)   LMP  (LMP Unknown)   SpO2 97%   Physical Exam  Constitutional:       General: She is not in acute distress.     Appearance: She is well-developed.   Eyes:      General:         Right eye: No discharge.         Left eye: No discharge.      Conjunctiva/sclera: Conjunctivae normal.   Pulmonary:      Effort: Pulmonary effort is normal.   Abdominal:      General: Bowel sounds are normal. There is no distension.      Palpations: Abdomen is soft.      Tenderness: There is " abdominal tenderness (Mild mid epigastrium). There is no guarding.   Musculoskeletal:         General: Normal range of motion.   Skin:     General: Skin is warm and dry.      Capillary Refill: Capillary refill takes less than 2 seconds.   Neurological:      Mental Status: She is alert and oriented to person, place, and time.   Psychiatric:         Mood and Affect: Mood normal.         Behavior: Behavior normal.         Thought Content: Thought content normal.         Judgment: Judgment normal.        Results for orders placed or performed in visit on 02/10/23   Comprehensive metabolic panel (BMP + Alb, Alk Phos, ALT, AST, Total. Bili, TP)     Status: Abnormal   Result Value Ref Range    Sodium 141 136 - 145 mmol/L    Potassium 4.0 3.4 - 5.3 mmol/L    Chloride 101 98 - 107 mmol/L    Carbon Dioxide (CO2) 33 (H) 22 - 29 mmol/L    Anion Gap 7 7 - 15 mmol/L    Urea Nitrogen 17.4 8.0 - 23.0 mg/dL    Creatinine 0.94 0.51 - 0.95 mg/dL    Calcium 9.4 8.8 - 10.2 mg/dL    Glucose 94 70 - 99 mg/dL    Alkaline Phosphatase 61 35 - 104 U/L    AST 33 10 - 35 U/L    ALT 18 10 - 35 U/L    Protein Total 6.6 6.4 - 8.3 g/dL    Albumin 4.0 3.5 - 5.2 g/dL    Bilirubin Total 0.3 <=1.2 mg/dL    GFR Estimate 60 (L) >60 mL/min/1.73m2   Lipase     Status: Normal   Result Value Ref Range    Lipase 18 13 - 60 U/L   CBC with platelets and differential     Status: Abnormal   Result Value Ref Range    WBC Count 4.0 4.0 - 11.0 10e3/uL    RBC Count 3.58 (L) 3.80 - 5.20 10e6/uL    Hemoglobin 11.4 (L) 11.7 - 15.7 g/dL    Hematocrit 34.5 (L) 35.0 - 47.0 %    MCV 96 78 - 100 fL    MCH 31.8 26.5 - 33.0 pg    MCHC 33.0 31.5 - 36.5 g/dL    RDW 14.4 10.0 - 15.0 %    Platelet Count 223 150 - 450 10e3/uL    % Neutrophils 51 %    % Lymphocytes 33 %    % Monocytes 15 %    % Eosinophils 1 %    % Basophils 1 %    % Immature Granulocytes 0 %    Absolute Neutrophils 2.0 1.6 - 8.3 10e3/uL    Absolute Lymphocytes 1.3 0.8 - 5.3 10e3/uL    Absolute Monocytes 0.6 0.0 -  1.3 10e3/uL    Absolute Eosinophils 0.1 0.0 - 0.7 10e3/uL    Absolute Basophils 0.0 0.0 - 0.2 10e3/uL    Absolute Immature Granulocytes 0.0 <=0.4 10e3/uL   CBC with platelets and differential     Status: Abnormal    Narrative    The following orders were created for panel order CBC with platelets and differential.  Procedure                               Abnormality         Status                     ---------                               -----------         ------                     CBC with platelets and d...[[id number]]  Abnormal            Final result                 Please view results for these tests on the individual orders.

## 2023-02-24 ENCOUNTER — TRANSFERRED RECORDS (OUTPATIENT)
Dept: HEALTH INFORMATION MANAGEMENT | Facility: CLINIC | Age: 83
End: 2023-02-24

## 2023-02-27 ENCOUNTER — TRANSFERRED RECORDS (OUTPATIENT)
Dept: HEALTH INFORMATION MANAGEMENT | Facility: CLINIC | Age: 83
End: 2023-02-27

## 2023-03-09 ENCOUNTER — TRANSFERRED RECORDS (OUTPATIENT)
Dept: HEALTH INFORMATION MANAGEMENT | Facility: CLINIC | Age: 83
End: 2023-03-09

## 2023-03-09 DIAGNOSIS — K21.9 GASTROESOPHAGEAL REFLUX DISEASE WITHOUT ESOPHAGITIS: ICD-10-CM

## 2023-03-20 ENCOUNTER — TRANSFERRED RECORDS (OUTPATIENT)
Dept: HEALTH INFORMATION MANAGEMENT | Facility: CLINIC | Age: 83
End: 2023-03-20

## 2023-04-03 ENCOUNTER — TRANSFERRED RECORDS (OUTPATIENT)
Dept: HEALTH INFORMATION MANAGEMENT | Facility: CLINIC | Age: 83
End: 2023-04-03
Payer: COMMERCIAL

## 2023-04-04 ENCOUNTER — OFFICE VISIT (OUTPATIENT)
Dept: INTERNAL MEDICINE | Facility: CLINIC | Age: 83
End: 2023-04-04
Payer: COMMERCIAL

## 2023-04-04 VITALS
SYSTOLIC BLOOD PRESSURE: 112 MMHG | HEIGHT: 67 IN | DIASTOLIC BLOOD PRESSURE: 70 MMHG | WEIGHT: 172.7 LBS | TEMPERATURE: 98.2 F | OXYGEN SATURATION: 97 % | HEART RATE: 60 BPM | BODY MASS INDEX: 27.11 KG/M2 | RESPIRATION RATE: 22 BRPM

## 2023-04-04 DIAGNOSIS — G62.9 NEUROPATHY: ICD-10-CM

## 2023-04-04 DIAGNOSIS — E03.9 ACQUIRED HYPOTHYROIDISM: ICD-10-CM

## 2023-04-04 DIAGNOSIS — I25.10 CORONARY ARTERY DISEASE INVOLVING NATIVE CORONARY ARTERY OF NATIVE HEART WITHOUT ANGINA PECTORIS: ICD-10-CM

## 2023-04-04 DIAGNOSIS — K21.9 GASTROESOPHAGEAL REFLUX DISEASE WITHOUT ESOPHAGITIS: ICD-10-CM

## 2023-04-04 DIAGNOSIS — F34.1 DYSTHYMIC DISORDER: ICD-10-CM

## 2023-04-04 DIAGNOSIS — M19.91 PRIMARY OSTEOARTHRITIS, UNSPECIFIED SITE: ICD-10-CM

## 2023-04-04 DIAGNOSIS — N18.31 STAGE 3A CHRONIC KIDNEY DISEASE (H): ICD-10-CM

## 2023-04-04 LAB
ALBUMIN SERPL BCG-MCNC: 4.1 G/DL (ref 3.5–5.2)
ALP SERPL-CCNC: 60 U/L (ref 35–104)
ALT SERPL W P-5'-P-CCNC: 17 U/L (ref 10–35)
AST SERPL W P-5'-P-CCNC: 28 U/L (ref 10–35)
BILIRUB DIRECT SERPL-MCNC: <0.2 MG/DL (ref 0–0.3)
BILIRUB SERPL-MCNC: 0.2 MG/DL
PROT SERPL-MCNC: 6.6 G/DL (ref 6.4–8.3)
TSH SERPL DL<=0.005 MIU/L-ACNC: 1.76 UIU/ML (ref 0.3–4.2)

## 2023-04-04 PROCEDURE — 36415 COLL VENOUS BLD VENIPUNCTURE: CPT | Performed by: NURSE PRACTITIONER

## 2023-04-04 PROCEDURE — 80076 HEPATIC FUNCTION PANEL: CPT | Performed by: NURSE PRACTITIONER

## 2023-04-04 PROCEDURE — 99214 OFFICE O/P EST MOD 30 MIN: CPT | Performed by: NURSE PRACTITIONER

## 2023-04-04 PROCEDURE — 84443 ASSAY THYROID STIM HORMONE: CPT | Performed by: NURSE PRACTITIONER

## 2023-04-04 RX ORDER — CITALOPRAM HYDROBROMIDE 10 MG/1
10 TABLET ORAL DAILY
Qty: 90 TABLET | Refills: 1 | Status: SHIPPED | OUTPATIENT
Start: 2023-04-04 | End: 2023-05-18

## 2023-04-04 RX ORDER — LIDOCAINE 4 G/G
PATCH TOPICAL EVERY 24 HOURS
COMMUNITY
End: 2023-11-16

## 2023-04-04 ASSESSMENT — PATIENT HEALTH QUESTIONNAIRE - PHQ9
SUM OF ALL RESPONSES TO PHQ QUESTIONS 1-9: 1
SUM OF ALL RESPONSES TO PHQ QUESTIONS 1-9: 1
10. IF YOU CHECKED OFF ANY PROBLEMS, HOW DIFFICULT HAVE THESE PROBLEMS MADE IT FOR YOU TO DO YOUR WORK, TAKE CARE OF THINGS AT HOME, OR GET ALONG WITH OTHER PEOPLE: NOT DIFFICULT AT ALL

## 2023-04-04 ASSESSMENT — PAIN SCALES - GENERAL: PAINLEVEL: MILD PAIN (3)

## 2023-04-04 NOTE — LETTER
My Depression Action Plan  Name: Geeta Berry   Date of Birth 1940  Date: 3/20/2023    My doctor: Jacy Mishra   My clinic: [address] [address]  [phone]          GREEN    ZONE   Good Control    What it looks like:     Things are going generally well. You have normal ups and downs. You may even feel depressed from time to time, but bad moods usually last less than a day.   What you need to do:  1. Continue to care for yourself (see self care plan)  2. Check your depression survival kit and update it as needed  3. Follow your physician s recommendations including any medication.  4. Do not stop taking medication unless you consult with your physician first.           YELLOW         ZONE Getting Worse    What it looks like:     Depression is starting to interfere with your life.     It may be hard to get out of bed; you may be starting to isolate yourself from others.    Symptoms of depression are starting to last most all day and this has happened for several days.     You may have suicidal thoughts but they are not constant.   What you need to do:     1. Call your care team. Your response to treatment will improve if you keep your care team informed of your progress. Yellow periods are signs an adjustment may need to be made.     2. Continue your self-care.  Just get dressed and ready for the day.  Don't give yourself time to talk yourself out of it.    3. Talk to someone in your support network.    4. Open up your Depression Self-Care Plan/Wellness Kit.           RED    ZONE Medical Alert - Get Help    What it looks like:     Depression is seriously interfering with your life.     You may experience these or other symptoms: You can t get out of bed most days, can t work or engage in other necessary activities, you have trouble taking care of basic hygiene, or basic responsibilities, thoughts of suicide or death that will  not go away, self-injurious behavior.     What you need to do:  1. Call your care team and request a same-day appointment. If they are not available (weekends or after hours) call your local crisis line, emergency room or 911.          Depression Self-Care Plan / Wellness Kit    Many people find that medication and therapy are helpful treatments for managing depression. In addition, making small changes to your everyday life can help to boost your mood and improve your wellbeing. Below are some tips for you to consider. Be sure to talk with your medical provider and/or behavioral health consultant if your symptoms are worsening or not improving.     Sleep   Sleep hygiene  means all of the habits that support good, restful sleep. It includes maintaining a consistent bedtime and wake time, using your bedroom only for sleeping or sex, and keeping the bedroom dark and free of distractions like a computer, smartphone, or television.     Develop a Healthy Routine  Maintain good hygiene. Get out of bed in the morning, make your bed, brush your teeth, take a shower, and get dressed. Don t spend too much time viewing media that makes you feel stressed. Find time to relax each day.    Exercise  Get some form of exercise every day. This will help reduce pain and release endorphins, the  feel good  chemicals in your brain. It can be as simple as just going for a walk or doing some gardening, anything that will get you moving.      Diet  Strive to eat healthy foods, including fruits and vegetables. Drink plenty of water. Avoid excessive sugar, caffeine, alcohol, and other mood-altering substances.     Stay Connected with Others  Stay in touch with friends and family members.    Manage Your Mood  Try deep breathing, massage therapy, biofeedback, or meditation. Take part in fun activities when you can. Try to find something to smile about each day.     Psychotherapy  Be open to working with a therapist if your provider recommends  it.     Medication  Be sure to take your medication as prescribed. Most anti-depressants need to be taken every day. It usually takes several weeks for medications to work. Not all medicines work for all people. It is important to follow-up with your provider to make sure you have a treatment plan that is working for you. Do not stop your medication abruptly without first discussing it with your provider.    Crisis Resources   These hotlines are for both adults and children. They and are open 24 hours a day, 7 days a week unless noted otherwise.      National Suicide Prevention Lifeline   988 or 8-352-958-JXWG (2475)      Crisis Text Line    www.crisistextline.org  Text HOME to 138621 from anywhere in the [place], anytime, about any type of crisis. A live, trained crisis counselor will receive the text and respond quickly.      [facility] Lifeline for LGBTQ Youth  A national crisis intervention and suicide lifeline for LGBTQ youth under 25. Provides a safe place to talk without judgement. Call 1-458.726.1808; text START to 370676 or visit www.thetrevorproject.org to talk to a trained counselor.      For [facility] crisis numbers, visit the [facility] website at:  https://[place].gov/dhs/people-we-serve/adults/health-care/mental-health/resources/crisis-contacts.jsp

## 2023-04-04 NOTE — PROGRESS NOTES
"  Assessment & Plan   Problem List Items Addressed This Visit        Nervous and Auditory    Neuropathy    Relevant Medications    citalopram (CELEXA) 10 MG tablet       Digestive    Gastroesophageal reflux disease     At her last visit we lowered her dose of omeprazole from 40 mg to 20 mg.  She is been tolerating this well so we will continue with a lower dose of omeprazole.            Endocrine    Hypothyroidism     Euthyroid based on blood work today            Circulatory    Coronary artery disease involving native coronary artery of native heart without angina pectoris     Due to atherosclerosis noted on coronary calcium score CT she continues high potency statin.  Continue rosuvastatin.  No chest pain or dyspnea.            Musculoskeletal and Integumentary    Osteoarthritis; knees, hips, cervical spine      She has generalized osteoarthritis, worsened by a fall that occurred 1 week ago.  She recently had a corticosteroid injection given through [facility] in her cervical spine.  She has a follow-up appointment and may consider a lumbar injection as well.  She may continue acetaminophen as needed.  I considered duloxetine, however, she developed a headache with this medication in the past.  Continue with topical products such as Salonpas patches or diclofenac.            Urinary    Stage 3a chronic kidney disease (H)     Creatinine is stable as of 2/2023             Behavioral    Dysthymic disorder     Her PHQ-9 score is 1 today, however, I suspect that this underestimates some of the depression symptoms that she experiences.  We discussed increasing her dose of citalopram to 1 tablet daily (she is currently splitting this in half) so that she takes 10 mg daily.  She is open to this change.         Relevant Medications    citalopram (CELEXA) 10 MG tablet             BMI:   Estimated body mass index is 27.46 kg/m  as calculated from the following:    Height as of this encounter: 1.689 m (5' 6.5\").    " "Weight as of this encounter: 78.3 kg (172 lb 11.2 oz).       Jacy Mishra NP  [facility] MOON Connor is a 82 year old, presenting for the following health issues:  Follow Up (Stomach issues) and Fall (Last week )        4/4/2023     3:10 PM   Additional Questions   Roomed by NATALIE Chang   Accompanied by n/a     Fall    History of Present Illness       Back Pain:  She presents for follow up of back pain. Patient's back pain is a new problem.    Original cause of back pain: a fall  First noticed back pain: in the last week  Patient feels back pain: dailyLocation of back pain:  Right lower back, left lower back and right middle of back  Description of back pain: other  Back pain spreads: right buttocks and right shoulder    Since patient first noticed back pain, pain is: always present, but gets better and worse  Does back pain interfere with her job:  Yes  On a scale of 1-10 (10 being the worst), patient describes pain as:  6  What makes back pain worse: bending and sitting  Acupuncture: not tried  Acetaminophen: helpful  Activity or exercise: not tried  Chiropractor:  Not tried  Cold: helpful  Heat: helpful  Massage: not tried  Muscle relaxants: not tried  NSAIDS: not tried  Opioids: not tried  Physical Therapy: not tried  Rest: helpful  Steroid Injection: helpful  Stretching: helpful  Surgery: not tried  TENS unit: not tried  Topical pain relievers: not tried  Other healthcare providers patient is seeing for back pain: Pain specialist    Headaches:   Since the patient's last clinic visit, headaches are: no change  The patient is getting headaches:  Daily  She is able to do normal daily activities when she has a migraine.  The patient is taking the following rescue/relief medications:  Tylenol   Patient states \"I get only a small amount of relief\" from the rescue/relief medications.   The patient is taking the following medications to prevent migraines:  No medications to prevent " migraines  In the past 4 weeks, the patient has gone to an Urgent Care or Emergency Room 0 times times due to headaches.    Reason for visit:  Update on my health, pain in ribs after a fall, arthritis (head to toe) ,neck injection, medication update, toe pain,She consumes 0 sweetened beverage(s) daily.She exercises with enough effort to increase her heart rate 30 to 60 minutes per day.  She exercises with enough effort to increase her heart rate 6 days per week.   She is taking medications regularly.    Today's PHQ-9         PHQ-9 Total Score: 1    PHQ-9 Q9 Thoughts of better off dead/self-harm past 2 weeks :   Not at all    How difficult have these problems made it for you to do your work, take care of things at home, or get along with other people: Not difficult at all       Pain History:  When did you first notice your pain? 3/28/2023  Have you seen anyone else for your pain? No  How has your pain affected your ability to work? no  Where in your body do you have pain? Back Pain due to fall in the kitchen, hit her back on the stove  Onset/Duration:   Description:   Location of pain: low back bilateral and middle of back right  Character of pain: stabbing pain with movement/ dull pain at rest, hard to bend  Pain radiation: none  New numbness or weakness in legs, not attributed to pain: no   Intensity: Currently 5/10, moderate  Progression of Symptoms: improving due to a brace she is wearing    1 week ago she tripped and fell onto her back hitting her stove.  She did not lose consciousness.  No headaches or vision changes.  She has been applying lidocaine patches to her low back. She found an old back brace and this has been helpful. Yesterday it was pretty acutely tender but feeling better today. She is sleeping okay. She is also taking acetaminophen.    She has generalized arthralgias which have been worse when the temperature drop in to the 40s. She is taking acetaminophen for this regularly.     Cervicalgia- she  "just had a cortisone injection for this through [facility]. She may consider a low back corticosteroid injection as well.     She was having some constipation recently and thought about contacting [facility]. She is doing a fiber cocktail in the morning and miralax in the evening. Now she has had a bowel movement and feels better. Stools are now regular and her abdomen feels better.    GERD- we lowered the dose of omeprazole at her last visit. She is doing well with the lower dose.     Her spouse is on hospice.         Objective    /70 (BP Location: Right arm, Patient Position: Sitting, Cuff Size: Adult Regular)   Pulse 60   Temp 98.2  F (36.8  C) (Oral)   Resp 22   Ht 1.689 m (5' 6.5\")   Wt 78.3 kg (172 lb 11.2 oz)   LMP  (LMP Unknown)   SpO2 97%   BMI 27.46 kg/m    Body mass index is 27.46 kg/m .  Physical Exam   GENERAL: healthy, alert and no distress  RESP: lungs clear to auscultation - no rales, rhonchi or wheezes  CV: regular rate and rhythm, normal S1 S2  MS: No spinous process tenderness.  No low back ecchymosis.                  "

## 2023-04-06 ENCOUNTER — TRANSFERRED RECORDS (OUTPATIENT)
Dept: HEALTH INFORMATION MANAGEMENT | Facility: CLINIC | Age: 83
End: 2023-04-06
Payer: COMMERCIAL

## 2023-04-10 PROBLEM — F34.1 DYSTHYMIC DISORDER: Status: ACTIVE | Noted: 2017-06-01

## 2023-04-10 PROBLEM — G62.9 NEUROPATHY: Status: ACTIVE | Noted: 2021-09-23

## 2023-04-10 NOTE — ASSESSMENT & PLAN NOTE
Due to atherosclerosis noted on coronary calcium score CT she continues high potency statin.  Continue rosuvastatin.  No chest pain or dyspnea.

## 2023-04-10 NOTE — ASSESSMENT & PLAN NOTE
At her last visit we lowered her dose of omeprazole from 40 mg to 20 mg.  She is been tolerating this well so we will continue with a lower dose of omeprazole.

## 2023-04-10 NOTE — ASSESSMENT & PLAN NOTE
Her PHQ-9 score is 1 today, however, I suspect that this underestimates some of the depression symptoms that she experiences.  We discussed increasing her dose of citalopram to 1 tablet daily (she is currently splitting this in half) so that she takes 10 mg daily.  She is open to this change.

## 2023-04-10 NOTE — ASSESSMENT & PLAN NOTE
She has generalized osteoarthritis, worsened by a fall that occurred 1 week ago.  She recently had a corticosteroid injection given through [facility] in her cervical spine.  She has a follow-up appointment and may consider a lumbar injection as well.  She may continue acetaminophen as needed.  I considered duloxetine, however, she developed a headache with this medication in the past.  Continue with topical products such as Salonpas patches or diclofenac.

## 2023-04-19 ENCOUNTER — APPOINTMENT (OUTPATIENT)
Dept: CT IMAGING | Facility: CLINIC | Age: 83
End: 2023-04-19
Attending: EMERGENCY MEDICINE
Payer: COMMERCIAL

## 2023-04-19 ENCOUNTER — HOSPITAL ENCOUNTER (EMERGENCY)
Facility: CLINIC | Age: 83
Discharge: HOME | End: 2023-04-19
Attending: EMERGENCY MEDICINE | Admitting: EMERGENCY MEDICINE
Payer: COMMERCIAL

## 2023-04-19 VITALS
WEIGHT: 172 LBS | SYSTOLIC BLOOD PRESSURE: 147 MMHG | OXYGEN SATURATION: 94 % | RESPIRATION RATE: 14 BRPM | HEART RATE: 92 BPM | BODY MASS INDEX: 27.35 KG/M2 | TEMPERATURE: 97.2 F | DIASTOLIC BLOOD PRESSURE: 72 MMHG

## 2023-04-19 DIAGNOSIS — R10.84 ABDOMINAL PAIN, GENERALIZED: ICD-10-CM

## 2023-04-19 DIAGNOSIS — N39.8 UROTHELIAL LESION: ICD-10-CM

## 2023-04-19 DIAGNOSIS — K59.00 CONSTIPATION, UNSPECIFIED CONSTIPATION TYPE: ICD-10-CM

## 2023-04-19 LAB
ANION GAP SERPL CALCULATED.3IONS-SCNC: 13 MMOL/L (ref 7–15)
BASOPHILS # BLD AUTO: 0 10E3/UL (ref 0–0.2)
BASOPHILS NFR BLD AUTO: 0 %
BUN SERPL-MCNC: 20.4 MG/DL (ref 8–23)
CALCIUM SERPL-MCNC: 9.4 MG/DL (ref 8.8–10.2)
CHLORIDE SERPL-SCNC: 101 MMOL/L (ref 98–107)
CREAT SERPL-MCNC: 0.89 MG/DL (ref 0.51–0.95)
DEPRECATED HCO3 PLAS-SCNC: 25 MMOL/L (ref 22–29)
EOSINOPHIL # BLD AUTO: 0 10E3/UL (ref 0–0.7)
EOSINOPHIL NFR BLD AUTO: 0 %
ERYTHROCYTE [DISTWIDTH] IN BLOOD BY AUTOMATED COUNT: 15.4 % (ref 10–15)
GFR SERPL CREATININE-BSD FRML MDRD: 64 ML/MIN/1.73M2
GLUCOSE SERPL-MCNC: 132 MG/DL (ref 70–99)
HCT VFR BLD AUTO: 40.4 % (ref 35–47)
HGB BLD-MCNC: 13 G/DL (ref 11.7–15.7)
HOLD SPECIMEN: NORMAL
IMM GRANULOCYTES # BLD: 0 10E3/UL
IMM GRANULOCYTES NFR BLD: 0 %
LYMPHOCYTES # BLD AUTO: 0.9 10E3/UL (ref 0.8–5.3)
LYMPHOCYTES NFR BLD AUTO: 10 %
MCH RBC QN AUTO: 31.8 PG (ref 26.5–33)
MCHC RBC AUTO-ENTMCNC: 32.2 G/DL (ref 31.5–36.5)
MCV RBC AUTO: 99 FL (ref 78–100)
MONOCYTES # BLD AUTO: 0.5 10E3/UL (ref 0–1.3)
MONOCYTES NFR BLD AUTO: 5 %
NEUTROPHILS # BLD AUTO: 7.6 10E3/UL (ref 1.6–8.3)
NEUTROPHILS NFR BLD AUTO: 85 %
NRBC # BLD AUTO: 0 10E3/UL
NRBC BLD AUTO-RTO: 0 /100
PLATELET # BLD AUTO: 252 10E3/UL (ref 150–450)
POTASSIUM SERPL-SCNC: 3.6 MMOL/L (ref 3.4–5.3)
RADIOLOGIST FLAGS: NORMAL
RBC # BLD AUTO: 4.09 10E6/UL (ref 3.8–5.2)
SODIUM SERPL-SCNC: 139 MMOL/L (ref 136–145)
WBC # BLD AUTO: 9 10E3/UL (ref 4–11)

## 2023-04-19 PROCEDURE — 85025 COMPLETE CBC W/AUTO DIFF WBC: CPT | Performed by: EMERGENCY MEDICINE

## 2023-04-19 PROCEDURE — 96374 THER/PROPH/DIAG INJ IV PUSH: CPT | Mod: 59 | Performed by: EMERGENCY MEDICINE

## 2023-04-19 PROCEDURE — 250N000011 HC RX IP 250 OP 636: Performed by: EMERGENCY MEDICINE

## 2023-04-19 PROCEDURE — 99285 EMERGENCY DEPT VISIT HI MDM: CPT | Mod: 25 | Performed by: EMERGENCY MEDICINE

## 2023-04-19 PROCEDURE — 82310 ASSAY OF CALCIUM: CPT | Performed by: EMERGENCY MEDICINE

## 2023-04-19 PROCEDURE — 250N000009 HC RX 250: Performed by: EMERGENCY MEDICINE

## 2023-04-19 PROCEDURE — 74177 CT ABD & PELVIS W/CONTRAST: CPT

## 2023-04-19 PROCEDURE — 36415 COLL VENOUS BLD VENIPUNCTURE: CPT | Performed by: EMERGENCY MEDICINE

## 2023-04-19 PROCEDURE — 99284 EMERGENCY DEPT VISIT MOD MDM: CPT | Performed by: EMERGENCY MEDICINE

## 2023-04-19 RX ORDER — IOPAMIDOL 755 MG/ML
75 INJECTION, SOLUTION INTRAVASCULAR ONCE
Status: COMPLETED | OUTPATIENT
Start: 2023-04-19 | End: 2023-04-19

## 2023-04-19 RX ORDER — ONDANSETRON 2 MG/ML
4 INJECTION INTRAMUSCULAR; INTRAVENOUS EVERY 30 MIN PRN
Status: DISCONTINUED | OUTPATIENT
Start: 2023-04-19 | End: 2023-04-19 | Stop reason: HOSPADM

## 2023-04-19 RX ORDER — ONDANSETRON 2 MG/ML
4 INJECTION INTRAMUSCULAR; INTRAVENOUS ONCE
Status: COMPLETED | OUTPATIENT
Start: 2023-04-19 | End: 2023-04-19

## 2023-04-19 RX ADMIN — ONDANSETRON 4 MG: 2 INJECTION INTRAMUSCULAR; INTRAVENOUS at 16:59

## 2023-04-19 RX ADMIN — IOPAMIDOL 75 ML: 755 INJECTION, SOLUTION INTRAVENOUS at 18:08

## 2023-04-19 RX ADMIN — SODIUM CHLORIDE 61 ML: 9 INJECTION, SOLUTION INTRAVENOUS at 18:08

## 2023-04-19 ASSESSMENT — ACTIVITIES OF DAILY LIVING (ADL)
ADLS_ACUITY_SCORE: 35
ADLS_ACUITY_SCORE: 35

## 2023-04-19 NOTE — ED NOTES
Pt constipated, smears of stool today.  Small BM yesterday.  Today nausea with vomiting.  BS hyperactive.  Pt states she has pain in rectum, says she felt stool when inserting suppository.  Hx of gallstones with several removed in the past.  Decreased appetite.

## 2023-04-19 NOTE — ED PROVIDER NOTES
"  History     Chief Complaint   Patient presents with     Constipation     Last BM yesterday, taking OTC supplements no results, emesis today     HPI  Geeta Berry is a 82 year old female who presents to the emergency department with concerns regarding abdominal pain, with change in bowel caliber.  Patient states that she has had a bowel movement yesterday, however states that she has had \"small wieners\" of stool that have occurred over the past few days.  She has had nausea, and did vomit today upon ED arrival.  Patient has only had smear of stool during the day today.  She did try suppository however this did not help with her symptoms.  Patient has had decreased appetite.  No other ill contacts.  Denies any chest pain.  Some slight shortness of breath.  No cough.  Has had some urinary hesitancy.  No dysuria.  Has had prior gallbladder surgeries, and gallbladder issues.    Medical record is reviewed.  Patient had clinic visit April 4.  Patient had GERD and had tolerated lower dose of omeprazole at that time.    Allergies:  Allergies   Allergen Reactions     Atorvastatin      Can't remember     Codeine      Can't remember     Dipyridamole      Can't remember     Duloxetine Headache       Problem List:    Patient Active Problem List    Diagnosis Date Noted     Epigastric pain 06/04/2022     Priority: Medium     Elevated liver enzymes 06/04/2022     Priority: Medium     Greater trochanteric bursitis of both hips 03/30/2022     Priority: Medium     Left greater trochanteric bursa injection completed 4/26/2022       Lumbar radiculopathy 09/24/2021     Priority: Medium     Iron deficiency anemia due to chronic blood loss 09/24/2021     Priority: Medium     Noted after right knee replacement 4/2021 when on DOAC for VTE prevention. +FOBT. Started on PPI and DOAC discontinued. She underwent colonoscopy on 12/17/2021.  She had findings of internal hemorrhoids, diverticulosis in the sigmoid colon and descending colon.  No " source of anemia was found on colonoscopy.  She also underwent upper endoscopy.  The esophagus appeared normal.  She had findings of gastritis which was biopsied and considered to be the likely source of anemia.  Duodenum was normal.  She was advised to increase omeprazole to 40 mg twice daily and avoid NSAIDs.       Neuropathy 09/23/2021     Priority: Medium     Gabapentin caused cognitive side effects.       Status post knee surgery 04/29/2021     Priority: Medium     Stage 3a chronic kidney disease (H) 10/29/2020     Priority: Medium     Coronary artery disease involving native coronary artery of native heart without angina pectoris 08/06/2020     Priority: Medium     Noted on coronary calcium score. Pt has mild disease, considered high risk for future cardiac event        Chronic rhinitis 12/03/2019     Priority: Medium     Hiatal hernia 08/21/2019     Priority: Medium     Dysthymic disorder 06/01/2017     Priority: Medium     H/o headache with duloxetine          Facet arthropathy, cervical 04/19/2017     Priority: Medium     History of DVT (deep vein thrombosis), right LE 12/2016 02/07/2017     Priority: Medium     Choledocholithiasis 07/22/2016     Priority: Medium     Constipation 07/22/2016     Priority: Medium     Osteoarthritis of lumbar spine 05/09/2012     Priority: Medium     Osteoarthritis; knees, hips, cervical spine  05/09/2012     Priority: Medium     Gabapentin- cognitive side effects, did not like how she felt while taking the medication  Duloxetine- headache SE       Acute iritis, h/o in 2012 04/13/2012     Priority: Medium     Hypothyroidism 02/26/2011     Priority: Medium     Essential hypertension 01/25/2010     Priority: Medium     Formatting of this note might be different from the original.  Hypertension       Gastroesophageal reflux disease 07/07/2009     Priority: Medium     Gastroesophageal Reflux Disease    most recent upper endoscopy was completed 12/2021 and showed mild inflammation  in the gastric antrum consistent with gastritis and was a source of anemia at the time. Colonoscopy was also completed at this time and showed diverticulosis in the sigmoid and descending colon as well as internal hemorrhoids.       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)      Chronic neck pain      Common bile duct calculus      Coronary artery disease due to calcified coronary lesion 8/6/2020     Depression      DVT (deep venous thrombosis) (H)      GERD (gastroesophageal reflux disease)      History of blood clots 2017     HLD (hyperlipidemia)      HTN (hypertension)      Hyperlipidemia      Hypertension      Hypothyroidism      Infectious viral hepatitis      Thyroid disease        Past Surgical History:    Past Surgical History:   Procedure Laterality Date     ARTHROPLASTY REVISION HIP Left 5/16/2017    Procedure: REVISION LEFT TOTAL HIP ARTHROPLASTY, BOTH COMPONENTS;  Surgeon: Tyson Peoples MD;  Location: [facility];  Service:      ARTHROSCOPY HIP Left 1983     CHOLECYSTECTOMY  1980     ENDOSCOPIC RETROGRADE CHOLANGIOPANCREATOGRAM      x5 over 8 years. Last 8/2016     ENDOSCOPIC RETROGRADE CHOLANGIOPANCREATOGRAM N/A 9/5/2017    Procedure: ENDOSCOPIC RETROGRADE CHOLANGIOPANCREATOGRAPHY, STONE EXTRACTION;  Surgeon: Henry Eller MD;  Location: [facility] [place];  Service:      ENDOSCOPIC RETROGRADE CHOLANGIOPANCREATOGRAM N/A 7/26/2019    Procedure: ENDOSCOPIC RETROGRADE CHOLANGIOPANCREATOGRAPHY, REMOVAL OF SLUDGE, DILATION OF STRICTURE;  Surgeon: Ryan Pastrana MD;  Location: [facility] [place];  Service: Gastroenterology     ENDOSCOPIC RETROGRADE CHOLANGIOPANCREATOGRAM N/A 6/6/2022    Procedure: ENDOSCOPIC RETROGRADE CHOLANGIOPANCREATOGRAPHY, WITH STONE EXTRACTION;  Surgeon: Hima Perry MD;  Location: [facility]     ENDOSCOPIC  RETROGRADE CHOLANGIOPANCREATOGRAM  6/6/2022    Procedure: ;  Surgeon: Hima Perry MD;  Location: [facility] [place]     HIP SURGERY  2012    revision     JOINT REPLACEMENT       ORTHOPEDIC SURGERY       OTHER SURGICAL HISTORY      bilateral THAwith revision on both hips     MN ESOPHAGOGASTRODUODENOSCOPY TRANSORAL DIAGNOSTIC N/A 9/10/2019    Procedure: ESOPHAGOGASTRODUODENOSCOPY (EGD);  Surgeon: Will Nash DO;  Location: [place] [facility];  Service: General     REPLACEMENT TOTAL KNEE Left 2015     SHOULDER SURGERY Left 2010    left total shoulder replacement     XR ERCP BILIARY AND PANCREAS  7/26/2019     ZZC TOTAL KNEE ARTHROPLASTY Right 4/29/2021    Procedure: RIGHT TOTAL KNEE ARTHROPLASTY;  Surgeon: Satinder Issa DO;  Location: [facility];  Service: Orthopedics       Family History:    Family History   Problem Relation Age of Onset     Colon Cancer Father      Diabetes Sister      Heart Disease Mother        Social History:  Marital Status:   [2]  Social History     Tobacco Use     Smoking status: Never     Smokeless tobacco: Never   Vaping Use     Vaping status: Never Used   Substance Use Topics     Alcohol use: No     Drug use: No        Medications:    acetaminophen (TYLENOL) 650 MG CR tablet  amoxicillin (AMOXIL) 500 MG capsule  azelastine (ASTELIN) 0.1 % nasal spray  Cholecalciferol (VITAMIN D3 PO)  citalopram (CELEXA) 10 MG tablet  docusate sodium (COLACE) 100 MG tablet  fluticasone (FLONASE) 50 MCG/ACT nasal spray  hydrochlorothiazide (HYDRODIURIL) 25 MG tablet  levothyroxine (SYNTHROID/LEVOTHROID) 50 MCG tablet  Lidocaine (LIDOCARE) 4 % Patch  multivitamin w/minerals (THERA-VIT-M) tablet  Omega-3 Fatty Acids (OMEGA-3 FISH OIL PO)  omeprazole (PRILOSEC) 20 MG DR capsule  polyethylene glycol (MIRALAX/GLYCOLAX) packet  rosuvastatin (CRESTOR) 40 MG tablet  vitamin C (ASCORBIC ACID) 1000 MG TABS          Review of Systems  See HPI  Physical Exam   BP: 124/76  Pulse: 85  Temp:  97.2  F (36.2  C)  Resp: 14  Weight: 78 kg (172 lb)  SpO2: 98 %      Physical Exam  BP (!) 147/72   Pulse 92   Temp 97.2  F (36.2  C) (Oral)   Resp 14   Wt 78 kg (172 lb)   LMP  (LMP Unknown)   SpO2 94%   BMI 27.35 kg/m    General: alert and shaking appearance, appearing ill, and severely distressed  Head: atraumatic, normocephalic  Abd: nondistended  Musculoskel/Extremities: normal extremities, no apparent edema, and full AROM of major joints  Skin: no rashes, no diaphoresis and skin color normal  Neuro: Patient awake, alert, oriented, speech is fluent, gait is normal  Psychiatric: affect/mood normal, cooperative, normal judgement/insight and memory intact      ED Course                 Procedures              Critical Care time:  none               Results for orders placed or performed during the hospital encounter of 04/19/23 (from the past 24 hour(s))   [place] Draw    Narrative    The following orders were created for panel order [place] Draw.  Procedure                               Abnormality         Status                     ---------                               -----------         ------                     Extra Blue Top Tube[[id number]]                              Final result               Extra Red Top Tube[[id number]]                               Final result               Extra Green Top (Lithium...[[id number]]                      Final result               Extra Purple Top Tube[[id number]]                            Final result                 Please view results for these tests on the individual orders.   Extra Blue Top Tube   Result Value Ref Range    Hold Specimen JIC    Extra Red Top Tube   Result Value Ref Range    Hold Specimen JIC    Extra Green Top (Lithium Heparin) Tube   Result Value Ref Range    Hold Specimen JIC    Extra Purple Top Tube   Result Value Ref Range    Hold Specimen JIC    CBC with platelets differential    Narrative    The following orders were created for panel  order CBC with platelets differential.  Procedure                               Abnormality         Status                     ---------                               -----------         ------                     CBC with platelets and d...[[id number]]  Abnormal            Final result                 Please view results for these tests on the individual orders.   Basic metabolic panel   Result Value Ref Range    Sodium 139 136 - 145 mmol/L    Potassium 3.6 3.4 - 5.3 mmol/L    Chloride 101 98 - 107 mmol/L    Carbon Dioxide (CO2) 25 22 - 29 mmol/L    Anion Gap 13 7 - 15 mmol/L    Urea Nitrogen 20.4 8.0 - 23.0 mg/dL    Creatinine 0.89 0.51 - 0.95 mg/dL    Calcium 9.4 8.8 - 10.2 mg/dL    Glucose 132 (H) 70 - 99 mg/dL    GFR Estimate 64 >60 mL/min/1.73m2   CBC with platelets and differential   Result Value Ref Range    WBC Count 9.0 4.0 - 11.0 10e3/uL    RBC Count 4.09 3.80 - 5.20 10e6/uL    Hemoglobin 13.0 11.7 - 15.7 g/dL    Hematocrit 40.4 35.0 - 47.0 %    MCV 99 78 - 100 fL    MCH 31.8 26.5 - 33.0 pg    MCHC 32.2 31.5 - 36.5 g/dL    RDW 15.4 (H) 10.0 - 15.0 %    Platelet Count 252 150 - 450 10e3/uL    % Neutrophils 85 %    % Lymphocytes 10 %    % Monocytes 5 %    % Eosinophils 0 %    % Basophils 0 %    % Immature Granulocytes 0 %    NRBCs per 100 WBC 0 <1 /100    Absolute Neutrophils 7.6 1.6 - 8.3 10e3/uL    Absolute Lymphocytes 0.9 0.8 - 5.3 10e3/uL    Absolute Monocytes 0.5 0.0 - 1.3 10e3/uL    Absolute Eosinophils 0.0 0.0 - 0.7 10e3/uL    Absolute Basophils 0.0 0.0 - 0.2 10e3/uL    Absolute Immature Granulocytes 0.0 <=0.4 10e3/uL    Absolute NRBCs 0.0 10e3/uL   CT Abdomen Pelvis w Contrast   Result Value Ref Range    Radiologist flags Possible urothelial lesion     Narrative    EXAM: CT ABDOMEN PELVIS W CONTRAST  LOCATION: [facility]  DATE/TIME: 4/19/2023 6:15 PM CDT    INDICATION: Left lower quadrant abdominal pain. Nausea and vomiting.  COMPARISON: CT abdomen pelvis  06/19/2022.  TECHNIQUE: CT scan of the abdomen and pelvis was performed following injection of IV contrast. Multiplanar reformats were obtained. Dose reduction techniques were used.  CONTRAST: 75 ml Isovue 370    FINDINGS:   LOWER CHEST: Severe coronary arterial calcifications. Small hiatal hernia.    HEPATOBILIARY: Status post cholecystectomy. Mild central biliary ductal dilation is unchanged and likely related to postcholecystectomy reservoir effect. Pneumobilia is also unchanged and presumably related to a previous biliary intervention. No focal   liver lesions.    PANCREAS: Normal.    SPLEEN: Small splenic cyst is unchanged. No splenomegaly.    ADRENAL GLANDS: Normal.    KIDNEYS/BLADDER: Multiple small parapelvic and cortical renal cysts, unchanged and do not require follow-up. Symmetric renal enhancement. Questionable focus of urothelial thickening within the right renal pelvis (5/#81, 3/#57), without hydronephrosis.   Distal ureters and bladder are poorly evaluated due to streak artifact from hip arthroplasty hardware.    BOWEL: Large 7.0 x 7.2 x 11.6 cm rectal stool ball, with adjacent presacral edema. Moderate to large amount of stool throughout the distal colon extending from the mid transverse colon through the rectum. Scattered noninflamed sigmoid colonic   diverticuli. Normal appendix. No evidence of bowel obstruction or inflammation.    LYMPH NODES: No enlarged lymph nodes.    VASCULATURE: Patent portal, splenic, and superior mesenteric veins. Nonaneurysmal abdominal aorta with mild to moderate atherosclerosis.    PELVIC ORGANS: Not well evaluated due to streak artifact.    MUSCULOSKELETAL: Bilateral hip arthroplasty hardware. Demineralized bones. Mild lumbar scoliosis with multilevel degenerative changes of the spine. Small fat-containing periumbilical hernia.      Impression    IMPRESSION:     1.  Large, possibly impacted rectal stool ball, with a moderate to large amount of stool throughout the  distal colon, suggesting constipation. No evidence of associated bowel obstruction.    2.  Questionable focus of urothelial thickening within the right renal pelvis, without hydronephrosis. Recommend further evaluation with a CT urogram on a nonemergent basis to exclude the possibility of an upper tract urothelial neoplasm. Correlation   with urine cytology may also be of benefit.    [Recommend Follow Up: Possible urothelial lesion]    This report will be copied to the [facility] to ensure a provider acknowledges the finding.          Medications   ondansetron (ZOFRAN) injection 4 mg (has no administration in time range)   ondansetron (ZOFRAN) injection 4 mg (4 mg Intravenous $Given 4/19/23 9704)   iopamidol (ISOVUE-370) solution 75 mL (75 mLs Intravenous $Given 4/19/23 0697)   sodium chloride 0.9 % bag 500mL for CT scan flush use (61 mLs As instructed $Given 4/19/23 5879)       Assessments & Plan (with Medical Decision Making)  82 year old female with history of multiple chronic medical issues including chronic kidney disease, GERD, coronary artery disease, and history of prior gallbladder related issues and abdominal pains, now presenting to the emergency department with acute, severe exacerbation of abdominal pain.  Differential broad, with consideration for diverticulitis, bowel obstruction, viral illness, UTI, pyelonephritis.    Laboratory tests are ordered, in addition to CT imaging.  CT imaging showing large rectal stool ball with moderate to large amounts of stool throughout the rest of the colon.  No evidence of bowel obstruction.  Notably, there is other area of questionable right-sided  urothelial thickening.  Outpatient CT urogram will be ordered.  Patient and family member were updated.    Tapwater enema has been ordered.  Patient had received Zofran previously.  No further episodes of vomiting.  Declined analgesic medications.        Patient with good results after enema given.  Felt much  improved.  Recheck of abdominal exam is benign.    Patient will be discharged home, and follow-up in clinic as needed.  Instructed on adequate fluid, daily MiraLAX, and outpatient urogram has been ordered.     I have reviewed the nursing notes.    I have reviewed the findings, diagnosis, plan and need for follow up with the patient.           Medical Decision Making  The patient's presentation was of high complexity (a chronic illness severe exacerbation, progression, or side effect of treatment).    The patient's evaluation involved:  review of external note(s) from 3+ sources (see separate area of note for details)  ordering and/or review of 3+ test(s) in this encounter (see separate area of note for details)  independent interpretation of testing performed by another health professional (see separate area of note for details)    The patient's management necessitated high risk (a decision regarding hospitalization).        Discharge Medication List as of 4/19/2023  8:15 PM          Final diagnoses:   Constipation, unspecified constipation type   Abdominal pain, generalized   Urothelial lesion       4/19/2023   [facility] EMERGENCY DEPT     Aron Mead MD  04/19/23 7642

## 2023-04-19 NOTE — ED NOTES
Patient up to the bathroom and had small amount of loose stool on the way. When asked patient if she has had diarrhea she said yes I have today.

## 2023-04-19 NOTE — ED TRIAGE NOTES
Last BM yesterday, taking OTC supplements no results, emesis today, rectal pain     Triage Assessment     Row Name 04/19/23 160       Triage Assessment (Adult)    Airway WDL WDL       Respiratory WDL    Respiratory WDL WDL       Cognitive/Neuro/Behavioral WDL    Cognitive/Neuro/Behavioral WDL WDL

## 2023-04-20 NOTE — ED NOTES
Tap water enema of 1000mL was administered per rectum and a very large, semi-hard formed bowel movement was released.

## 2023-04-20 NOTE — DISCHARGE INSTRUCTIONS
Continue MiraLAX daily.    Drink plenty of fluids.    Follow-up CT has been ordered for an outpatient basis.  Follow-up with your primary care provider with regards to CT results.  This is to evaluate the ureter/kidney area on the right, with potential abnormal finding on CT today.

## 2023-05-12 ENCOUNTER — HOSPITAL ENCOUNTER (OUTPATIENT)
Dept: CT IMAGING | Facility: HOSPITAL | Age: 83
Discharge: HOME | End: 2023-05-12
Attending: EMERGENCY MEDICINE | Admitting: EMERGENCY MEDICINE
Payer: COMMERCIAL

## 2023-05-12 DIAGNOSIS — N39.8 UROTHELIAL LESION: ICD-10-CM

## 2023-05-12 PROCEDURE — 74178 CT ABD&PLV WO CNTR FLWD CNTR: CPT

## 2023-05-12 PROCEDURE — 250N000011 HC RX IP 250 OP 636: Performed by: EMERGENCY MEDICINE

## 2023-05-12 RX ORDER — IOPAMIDOL 755 MG/ML
90 INJECTION, SOLUTION INTRAVASCULAR ONCE
Status: COMPLETED | OUTPATIENT
Start: 2023-05-12 | End: 2023-05-12

## 2023-05-12 RX ADMIN — IOPAMIDOL 90 ML: 755 INJECTION, SOLUTION INTRAVENOUS at 12:01

## 2023-05-18 ENCOUNTER — OFFICE VISIT (OUTPATIENT)
Dept: INTERNAL MEDICINE | Facility: CLINIC | Age: 83
End: 2023-05-18
Payer: COMMERCIAL

## 2023-05-18 VITALS
SYSTOLIC BLOOD PRESSURE: 120 MMHG | WEIGHT: 172.1 LBS | BODY MASS INDEX: 27.01 KG/M2 | RESPIRATION RATE: 16 BRPM | DIASTOLIC BLOOD PRESSURE: 70 MMHG | HEIGHT: 67 IN | HEART RATE: 55 BPM | OXYGEN SATURATION: 98 % | TEMPERATURE: 97.9 F

## 2023-05-18 DIAGNOSIS — I25.119 CORONARY ARTERY DISEASE INVOLVING NATIVE CORONARY ARTERY OF NATIVE HEART WITH ANGINA PECTORIS (H): ICD-10-CM

## 2023-05-18 DIAGNOSIS — I25.10 CORONARY ARTERY DISEASE INVOLVING NATIVE CORONARY ARTERY OF NATIVE HEART WITHOUT ANGINA PECTORIS: ICD-10-CM

## 2023-05-18 DIAGNOSIS — N18.31 STAGE 3A CHRONIC KIDNEY DISEASE (H): Primary | ICD-10-CM

## 2023-05-18 DIAGNOSIS — K59.09 OTHER CONSTIPATION: ICD-10-CM

## 2023-05-18 DIAGNOSIS — F34.1 DYSTHYMIC DISORDER: ICD-10-CM

## 2023-05-18 DIAGNOSIS — M54.16 LUMBAR RADICULOPATHY: ICD-10-CM

## 2023-05-18 PROCEDURE — 99214 OFFICE O/P EST MOD 30 MIN: CPT | Performed by: NURSE PRACTITIONER

## 2023-05-18 RX ORDER — CITALOPRAM HYDROBROMIDE 10 MG/1
5 TABLET ORAL DAILY
Qty: 90 TABLET | Refills: 1
Start: 2023-05-18 | End: 2023-09-10

## 2023-05-18 ASSESSMENT — PAIN SCALES - GENERAL: PAINLEVEL: MILD PAIN (2)

## 2023-05-18 NOTE — PROGRESS NOTES
"  Assessment & Plan   Problem List Items Addressed This Visit        Nervous and Auditory    Lumbar radiculopathy     Referral back to the spine clinic. She has seen Krista Kaplan there before.          Relevant Medications    citalopram (CELEXA) 10 MG tablet    Other Relevant Orders    Spine  Referral       Digestive    Constipation     Continue miralax for constipation as well as giving herself time in the bathroom since when she rushes this tends to lead to more issues with constipation             Circulatory    Coronary artery disease involving native coronary artery of native heart without angina pectoris     No angina             Urinary    Stage 3a chronic kidney disease (H) - Primary     Stable creatinine on recent lab work             Behavioral    Dysthymic disorder     Dizziness associated with citalopram 10 mg daily so she will resume 1/2 tablet daily which she previously tolerated well         Relevant Medications    citalopram (CELEXA) 10 MG tablet   Other Visit Diagnoses     Coronary artery disease involving native coronary artery of native heart with angina pectoris (H)                 Post Medication Reconciliation Status:  Patient was not discharged from an inpatient facility or TCU       BMI:   Estimated body mass index is 27.36 kg/m  as calculated from the following:    Height as of this encounter: 1.689 m (5' 6.5\").    Weight as of this encounter: 78.1 kg (172 lb 1.6 oz).       Jacy Mishra NP  [facility] MOON Connor is a 82 year old, presenting for the following health issues:  ER F/U, Back Pain (Pt states that she has been experiencing back pain ), Musculoskeletal Problem (Pt states that her left foot has been experiencing some pain. She went to her foot doctor for answers. ), and Dry Mouth  (Pt states she has dry mouth at night )        4/4/2023     3:10 PM   Additional Questions   Roomed by NATALIE Chang   Accompanied by n/a     History of Present " Illness       Reason for visit:  Follow up on meds.  & results of CTScan  Symptom onset:  More than a month  Symptoms include:  Bowel problems  Symptom intensity:  Severe  Symptom progression:  Improving  Had these symptoms before:  No  What makes it worse:  ?  What makes it better:  ?    She eats 2-3 servings of fruits and vegetables daily.She consumes 0 sweetened beverage(s) daily.She exercises with enough effort to increase her heart rate 60 or more minutes per day.  She exercises with enough effort to increase her heart rate 7 days per week.   She is taking medications regularly.     ED/UC Followup:    Facility:  [facility] Emergency Dept  Date of visit: 04/19/2023  Reason for visit: Constipation   Current Status: Pt states that her constipation has gotten better.     She was seen in the emergency department on 4/19 with a complaint of constipation.  A CT showed a large, possibly impacted stool suggesting constipation.  A tapwater enema was done and good results.  She was instructed to use daily MiraLAX. She has found that she has to give herself the time in the bathroom to have a BM. She is also using some added high fiber foods.    She also had a questionable focus of urothelial thickening in the right renal pelvis.  A CT urogram was ordered and results were negative for any renal or uroepithelial abnormalities.    Her  is on hospice. They recently hired someone that assists her in the morning and evening to assist with getting him up in the morning and ready for bed in the evening. It is still difficult for her to assist him throughout the day because bending hurts her back. Recently it is also taking a psychological toll on her as well. She and her family are looking into additional home health.     Her back and legs give her so much pain. She takes acetaminophen for pain and lidocaine patches.     Depression- She felt dizzy taking citalopram 10 mg daily.     She has left great toe  "pain.    Dry mouth symptoms were reviewed.         Objective    /70 (BP Location: Right arm, Patient Position: Sitting, Cuff Size: Adult Large)   Pulse 55   Temp 97.9  F (36.6  C) (Oral)   Resp 16   Ht 1.689 m (5' 6.5\")   Wt 78.1 kg (172 lb 1.6 oz)   LMP  (LMP Unknown)   SpO2 98%   BMI 27.36 kg/m    Body mass index is 27.36 kg/m .     Wt Readings from Last 5 Encounters:   05/18/23 78.1 kg (172 lb 1.6 oz)   04/19/23 78 kg (172 lb)   04/04/23 78.3 kg (172 lb 11.2 oz)   09/22/22 77.8 kg (171 lb 9.6 oz)   06/21/22 76 kg (167 lb 9.6 oz)         Physical Exam   GENERAL: healthy, alert and no distress  PSYCH: mentation appears normal, affect normal/bright              "

## 2023-05-21 PROBLEM — R74.8 ELEVATED LIVER ENZYMES: Status: RESOLVED | Noted: 2022-06-04 | Resolved: 2023-05-21

## 2023-05-21 NOTE — ASSESSMENT & PLAN NOTE
Dizziness associated with citalopram 10 mg daily so she will resume 1/2 tablet daily which she previously tolerated well

## 2023-05-21 NOTE — ASSESSMENT & PLAN NOTE
Continue miralax for constipation as well as giving herself time in the bathroom since when she rushes this tends to lead to more issues with constipation

## 2023-05-22 ENCOUNTER — OFFICE VISIT (OUTPATIENT)
Dept: PHYSICAL MEDICINE AND REHAB | Facility: CLINIC | Age: 83
End: 2023-05-22
Payer: COMMERCIAL

## 2023-05-22 VITALS — SYSTOLIC BLOOD PRESSURE: 113 MMHG | OXYGEN SATURATION: 98 % | DIASTOLIC BLOOD PRESSURE: 55 MMHG | HEART RATE: 59 BPM

## 2023-05-22 DIAGNOSIS — M54.16 LUMBAR RADICULOPATHY: ICD-10-CM

## 2023-05-22 DIAGNOSIS — M43.16 SPONDYLOLISTHESIS OF LUMBAR REGION: Primary | ICD-10-CM

## 2023-05-22 DIAGNOSIS — M48.062 SPINAL STENOSIS OF LUMBAR REGION WITH NEUROGENIC CLAUDICATION: ICD-10-CM

## 2023-05-22 PROCEDURE — 99214 OFFICE O/P EST MOD 30 MIN: CPT | Performed by: PHYSICIAN ASSISTANT

## 2023-05-22 ASSESSMENT — PAIN SCALES - GENERAL: PAINLEVEL: MODERATE PAIN (5)

## 2023-05-22 NOTE — LETTER
5/22/2023         RE: Geeta Berry  [address]  [facility] [address]        Dear Colleague,    Thank you for referring your patient, Geeta Berry, to the [facility]. Please see a copy of my visit note below.    Assessment:   Geeta Berry is a 82 year old y.o. female with past medical history significant for neuropathy, GERD, hyperlipidemia, hypothyroidism, hypertension, coronary artery disease, chronic kidney disease stage III, iron deficiency anemia, dysthymia who presents today for follow-up regarding chronic and worsening bilateral low back pain with radiation into the bilateral lower extremities with limited walking and standing tolerance.  My review of an MRI lumbar spine from 2021 shows grade 1 degenerative spondylolisthesis L4-5 with severe spinal stenosis.  Symptoms have been getting progressively worse over the past several months.  She has been doing home hospice care for her  for the past 3 months.     Plan:     A shared decision making plan was used.  The patient's values and choices were respected.  The following represents what was discussed and decided upon by the physician assistant and the patient.      1.  DIAGNOSTIC TESTS: I reviewed the MRI lumbar spine from October 2021.  No further diagnostic tests were ordered.    2.  PHYSICAL THERAPY: No additional physical therapy was ordered.  Encourage patient to continue doing home exercises.      3.  MEDICATIONS: No changes are made to the patient's medications.  - Patient uses Tylenol as needed.  - Patient uses lidocaine patches or roll-on.  -Patient did not tolerate gabapentin.    4.  INTERVENTIONS:    -I offer the patient bilateral L4-5 transforaminal epidural steroid injections.  Patient indicated she would like to proceed and an order was placed.  - If this does not help I would recommend an L5-S1 interlaminar epidural steroid injections.  - If axial low back pain persist I would recommend  bilateral L3, L4 needle branch blocks as a work-up toward radiofrequency ablation.    5.  PATIENT EDUCATION:  -If symptoms persist would recommend a referral to neurosurgery.    6.  FOLLOW-UP: Patient will follow-up with me 2 weeks after her bilateral L4-5 transforaminal epidural steroid injections.  If she has questions or concerns in the meantime, she should not hesitate to call.    Subjective:     Geeta Berry is a 82 year old female who presents today for follow-up regarding chronic and worsening bilateral low back pain with radiation into the bilateral lower extremities.  I last saw this patient November 4, 2021.  At that time she reported 60% improvement in her symptoms following a left L4-5 transforaminal epidural steroid injection.  Patient reports that her symptoms became worse about 3 months ago.  Her  went into home hospice at that time.  She states the repetitive bending and lifting has aggravated her back pain.  She also felt that the change in weather in March made her pain worse.  She also had a slip and fall around that time.    Patient complains of bilateral low back pain.  Pain spans across low back in a broadband distribution at the belt line.  Pain radiates into the buttock, down the posterior thighs, into the posterior calves.  She states her legs feel weak.  She states they get tired and achy with standing and walking.  She can stand for 5 to 10 minutes.  She can walk for about half a mile.  She is able to stand and walk longer if she is leaning forward, such as on a shopping cart.  Symptoms are alleviated with sitting down and applying a heating pad.  Bending also makes her pain worse.  She denies numbness or tingling.  Denies loss of bowel or bladder control.  Denies fevers.  She rates her pain today as a 4-7 out of 10.  At its worst it is a 7 out of 10.  At its best it is a 3 out of 10.    Treatment to date:  - Patient participated in physical therapy in November 2017.  - Patient had  6 sessions of physical therapy ending December 2021  - She also has had 4 sessions of physical therapy for hip pain/weakness of hips ending March 2022  - Left L4-5 transforaminal epidural steroid injection October 21, 2021 with 60% relief of pain  - Left C2-3, C3-4, C4-5 facet joint injections June 27, 2018  - Left C2, C3, C4, C5 MBB June 7, 2018  - Left C1-C2 facet joint injection July 7, 2017  - Left C1-C2 facet joint injection April 6, 2017  -Did not tolerate gabapentin    Review of Systems:  Positive for weakness, headache.  Negative for numbness/tingling, loss of bowel/bladder control, inability to urinate, pain much worse at night, trip/stumble/falls, difficulty swallowing, difficulty with hand skills, fevers, unintentional weight loss.     Objective:   CONSTITUTIONAL:  Vital signs as above.  No acute distress.  The patient is well nourished and well groomed.   Patient appears tired.  PSYCHIATRIC:  The patient is awake, alert, oriented to person, place and time.  The patient is answering questions appropriately with clear speech.  Normal affect.   HEENT: Normocephalic, atraumatic.  Sclera clear.    SKIN:  Skin over the face, posterior torso, bilateral upper and lower extremities is clean, dry, intact without rashes.  VASCULAR: Mild right greater than left lower extremity edema.  MUSCULOSKELETAL: Patient ambulates with a flexed forward posture at the hips.  She transitions from seated to standing slowly.  The patient is able to rise onto toes and heels bilaterally with support.  Mild tenderness palpation left greater than right lower lumbar paraspinous muscles.  The patient has 5/5 strength for the bilateral hip flexors, knee flexors/extensors, ankle dorsiflexors/plantar flexors, ankle evertors/invertors.    NEUROLOGICAL:  No ankle clonus bilaterally.  Sensation to light touch intact bilateral lower extremities throughout.     RESULTS: I reviewed the MRI lumbar spine from [facility] dated October 11, 2021.   This shows S-shaped curvature of lumbar spine.  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  There is degenerative spondylolisthesis L4-5 grade 1.  At L4-5 there is severe spinal stenosis with mild to moderate bilateral foraminal stenosis.  At L3-4 there is moderate spinal canal stenosis with moderate bilateral foraminal stenosis.  At L2-3 there is mild to moderate spinal canal stenosis with severe left and moderate to severe right foraminal stenosis.  Please see report for further details.          Again, thank you for allowing me to participate in the care of your patient.        Sincerely,        Krista Kaplan PA-C

## 2023-05-22 NOTE — PATIENT INSTRUCTIONS
Bilateral L4/5 epidural steroid injections have been ordered today.      Please note that this injection uses cortisone.  The cortisone may somewhat weaken the immune system.  It is unknown how much the immune system is weakened.  It is unknown if it is weakened to the point that you may be more likely to get the COVID-19 virus, or if you do get the COVID-19 virus, if you would be sicker than you would have been if you had not had the cortisone injection.  If you do not wish to proceed with the injection, please let the nurse/physician know and do NOT schedule the injection.    Please note that since your immune system is weakened from the cortisone, having any vaccine/shot may be less effective if you have this vaccine within 2 weeks from your cortisone injection.  It is advised to wait 2 weeks after your cortisone injection to have any vaccine (or if you have a vaccine first, wait 2 weeks before you have the cortisone injection).    Please schedule this injection at least 1 week  from now to allow time for insurance prior authorization.  On the day of your injection, you cannot be sick or taking antibiotics.  If you become sick and are prescribed, please call the clinic so your injection can be rescheduled for once you have completed your antibiotics.  You will need to bring a  with you for your injection.   If you have any questions or concerns prior to your injection, please do not hesitate to call the nurse navigation line at [phone] or contact Krista Kaplan through [employer].

## 2023-05-22 NOTE — PROGRESS NOTES
Assessment:   Geeta Berry is a 82 year old y.o. female with past medical history significant for neuropathy, GERD, hyperlipidemia, hypothyroidism, hypertension, coronary artery disease, chronic kidney disease stage III, iron deficiency anemia, dysthymia who presents today for follow-up regarding chronic and worsening bilateral low back pain with radiation into the bilateral lower extremities with limited walking and standing tolerance.  My review of an MRI lumbar spine from 2021 shows grade 1 degenerative spondylolisthesis L4-5 with severe spinal stenosis.  Symptoms have been getting progressively worse over the past several months.  She has been doing home hospice care for her  for the past 3 months.     Plan:     A shared decision making plan was used.  The patient's values and choices were respected.  The following represents what was discussed and decided upon by the physician assistant and the patient.      1.  DIAGNOSTIC TESTS: I reviewed the MRI lumbar spine from October 2021.  No further diagnostic tests were ordered.    2.  PHYSICAL THERAPY: No additional physical therapy was ordered.  Encourage patient to continue doing home exercises.      3.  MEDICATIONS: No changes are made to the patient's medications.  - Patient uses Tylenol as needed.  - Patient uses lidocaine patches or roll-on.  -Patient did not tolerate gabapentin.    4.  INTERVENTIONS:    -I offer the patient bilateral L4-5 transforaminal epidural steroid injections.  Patient indicated she would like to proceed and an order was placed.  - If this does not help I would recommend an L5-S1 interlaminar epidural steroid injections.  - If axial low back pain persist I would recommend bilateral L3, L4 needle branch blocks as a work-up toward radiofrequency ablation.    5.  PATIENT EDUCATION:  -If symptoms persist would recommend a referral to neurosurgery.    6.  FOLLOW-UP: Patient will follow-up with me 2 weeks after her bilateral L4-5  transforaminal epidural steroid injections.  If she has questions or concerns in the meantime, she should not hesitate to call.    Subjective:     Geeta Berry is a 82 year old female who presents today for follow-up regarding chronic and worsening bilateral low back pain with radiation into the bilateral lower extremities.  I last saw this patient November 4, 2021.  At that time she reported 60% improvement in her symptoms following a left L4-5 transforaminal epidural steroid injection.  Patient reports that her symptoms became worse about 3 months ago.  Her  went into home hospice at that time.  She states the repetitive bending and lifting has aggravated her back pain.  She also felt that the change in weather in March made her pain worse.  She also had a slip and fall around that time.    Patient complains of bilateral low back pain.  Pain spans across low back in a broadband distribution at the belt line.  Pain radiates into the buttock, down the posterior thighs, into the posterior calves.  She states her legs feel weak.  She states they get tired and achy with standing and walking.  She can stand for 5 to 10 minutes.  She can walk for about half a mile.  She is able to stand and walk longer if she is leaning forward, such as on a shopping cart.  Symptoms are alleviated with sitting down and applying a heating pad.  Bending also makes her pain worse.  She denies numbness or tingling.  Denies loss of bowel or bladder control.  Denies fevers.  She rates her pain today as a 4-7 out of 10.  At its worst it is a 7 out of 10.  At its best it is a 3 out of 10.    Treatment to date:  - Patient participated in physical therapy in November 2017.  - Patient had 6 sessions of physical therapy ending December 2021  - She also has had 4 sessions of physical therapy for hip pain/weakness of hips ending March 2022  - Left L4-5 transforaminal epidural steroid injection October 21, 2021 with 60% relief of pain  - Left  C2-3, C3-4, C4-5 facet joint injections June 27, 2018  - Left C2, C3, C4, C5 MBB June 7, 2018  - Left C1-C2 facet joint injection July 7, 2017  - Left C1-C2 facet joint injection April 6, 2017  -Did not tolerate gabapentin    Review of Systems:  Positive for weakness, headache.  Negative for numbness/tingling, loss of bowel/bladder control, inability to urinate, pain much worse at night, trip/stumble/falls, difficulty swallowing, difficulty with hand skills, fevers, unintentional weight loss.     Objective:   CONSTITUTIONAL:  Vital signs as above.  No acute distress.  The patient is well nourished and well groomed.   Patient appears tired.  PSYCHIATRIC:  The patient is awake, alert, oriented to person, place and time.  The patient is answering questions appropriately with clear speech.  Normal affect.   HEENT: Normocephalic, atraumatic.  Sclera clear.    SKIN:  Skin over the face, posterior torso, bilateral upper and lower extremities is clean, dry, intact without rashes.  VASCULAR: Mild right greater than left lower extremity edema.  MUSCULOSKELETAL: Patient ambulates with a flexed forward posture at the hips.  She transitions from seated to standing slowly.  The patient is able to rise onto toes and heels bilaterally with support.  Mild tenderness palpation left greater than right lower lumbar paraspinous muscles.  The patient has 5/5 strength for the bilateral hip flexors, knee flexors/extensors, ankle dorsiflexors/plantar flexors, ankle evertors/invertors.    NEUROLOGICAL:  No ankle clonus bilaterally.  Sensation to light touch intact bilateral lower extremities throughout.     RESULTS: I reviewed the MRI lumbar spine from [facility] dated October 11, 2021.  This shows S-shaped curvature of lumbar spine.  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  There is degenerative spondylolisthesis L4-5 grade 1.  At L4-5 there is severe spinal stenosis with mild to moderate bilateral foraminal stenosis.  At L3-4  there is moderate spinal canal stenosis with moderate bilateral foraminal stenosis.  At L2-3 there is mild to moderate spinal canal stenosis with severe left and moderate to severe right foraminal stenosis.  Please see report for further details.

## 2023-05-30 DIAGNOSIS — E03.9 HYPOTHYROIDISM, UNSPECIFIED: ICD-10-CM

## 2023-05-30 RX ORDER — LEVOTHYROXINE SODIUM 50 UG/1
TABLET ORAL
Qty: 90 TABLET | Refills: 2 | Status: SHIPPED | OUTPATIENT
Start: 2023-05-30 | End: 2024-02-15

## 2023-05-31 DIAGNOSIS — J31.0 CHRONIC RHINITIS: ICD-10-CM

## 2023-05-31 NOTE — TELEPHONE ENCOUNTER
"Last Written Prescription Date:  6/15/22  Last Fill Quantity: 90,  # refills: 3   Last office visit provider:  5/18/23     Requested Prescriptions   Pending Prescriptions Disp Refills     levothyroxine (SYNTHROID/LEVOTHROID) 50 MCG tablet [Pharmacy Med Name: LEVOTHYROXINE 50 MCG TABLET] 90 tablet 3     Sig: TAKE 1 TABLET BY MOUTH EVERY DAY       Thyroid Protocol Passed - 5/30/2023  8:53 AM        Passed - Patient is 12 years or older        Passed - Recent (12 mo) or future (30 days) visit within the authorizing provider's specialty     Patient has had an office visit with the authorizing provider or a provider within the authorizing providers department within the previous 12 mos or has a future within next 30 days. See \"Patient Info\" tab in inbasket, or \"Choose Columns\" in Meds & Orders section of the refill encounter.              Passed - Medication is active on med list        Passed - Normal TSH on file in past 12 months     Recent Labs   Lab Test 04/04/23  1627   TSH 1.76              Passed - No active pregnancy on record     If patient is pregnant or has had a positive pregnancy test, please check TSH.          Passed - No positive pregnancy test in past 12 months     If patient is pregnant or has had a positive pregnancy test, please check TSH.               Candie Zelaya RN 05/30/23 8:11 PM  "

## 2023-06-01 RX ORDER — FLUTICASONE PROPIONATE 50 MCG
SPRAY, SUSPENSION (ML) NASAL
Qty: 48 G | Refills: 3 | Status: SHIPPED | OUTPATIENT
Start: 2023-06-01 | End: 2024-07-15

## 2023-06-01 NOTE — TELEPHONE ENCOUNTER
"Last Written Prescription Date:  5/11/2022  Last Fill Quantity: 48 g,  # refills: 3   Last office visit provider:  05/18/2023     Requested Prescriptions   Pending Prescriptions Disp Refills     fluticasone (FLONASE) 50 MCG/ACT nasal spray [Pharmacy Med Name: FLUTICASONE PROP 50 MCG SPRAY] 48 mL 3     Sig: INSTILL 1 SPRAY INTO BOTH NOSTRILS DAILY       Nasal Allergy Protocol Passed - 6/1/2023  2:54 PM        Passed - Patient is age 12 or older        Passed - Recent (12 mo) or future (30 days) visit within the authorizing provider's specialty     Patient has had an office visit with the authorizing provider or a provider within the authorizing providers department within the previous 12 mos or has a future within next 30 days. See \"Patient Info\" tab in inbasket, or \"Choose Columns\" in Meds & Orders section of the refill encounter.              Passed - Medication is active on med list             Larissa Ortiz RN 06/01/23 2:56 PM  "

## 2023-06-12 ENCOUNTER — RADIOLOGY INJECTION OFFICE VISIT (OUTPATIENT)
Dept: PHYSICAL MEDICINE AND REHAB | Facility: CLINIC | Age: 83
End: 2023-06-12
Attending: PHYSICIAN ASSISTANT
Payer: COMMERCIAL

## 2023-06-12 VITALS
DIASTOLIC BLOOD PRESSURE: 58 MMHG | HEART RATE: 52 BPM | OXYGEN SATURATION: 96 % | TEMPERATURE: 98 F | RESPIRATION RATE: 16 BRPM | SYSTOLIC BLOOD PRESSURE: 130 MMHG

## 2023-06-12 DIAGNOSIS — M48.062 SPINAL STENOSIS OF LUMBAR REGION WITH NEUROGENIC CLAUDICATION: ICD-10-CM

## 2023-06-12 PROCEDURE — 64483 NJX AA&/STRD TFRM EPI L/S 1: CPT | Mod: 50 | Performed by: PAIN MEDICINE

## 2023-06-12 RX ORDER — LIDOCAINE HYDROCHLORIDE 10 MG/ML
INJECTION, SOLUTION EPIDURAL; INFILTRATION; INTRACAUDAL; PERINEURAL
Status: COMPLETED | OUTPATIENT
Start: 2023-06-12 | End: 2023-06-12

## 2023-06-12 RX ORDER — DEXAMETHASONE SODIUM PHOSPHATE 10 MG/ML
INJECTION, SOLUTION INTRAMUSCULAR; INTRAVENOUS
Status: COMPLETED | OUTPATIENT
Start: 2023-06-12 | End: 2023-06-12

## 2023-06-12 RX ADMIN — DEXAMETHASONE SODIUM PHOSPHATE 20 MG: 10 INJECTION, SOLUTION INTRAMUSCULAR; INTRAVENOUS at 10:05

## 2023-06-12 RX ADMIN — LIDOCAINE HYDROCHLORIDE 4 ML: 10 INJECTION, SOLUTION EPIDURAL; INFILTRATION; INTRACAUDAL; PERINEURAL at 10:05

## 2023-06-12 ASSESSMENT — PAIN SCALES - GENERAL
PAINLEVEL: MILD PAIN (3)
PAINLEVEL: NO PAIN (1)

## 2023-06-12 NOTE — PATIENT INSTRUCTIONS
DISCHARGE INSTRUCTIONS    During office hours (8:00 a.m.- 4:00 p.m.) questions or concerns may be answered  by calling [facility] Navigation Nurses at  [phone].  Messages received after hours will be returned the following business day.      In the case of an emergency, please dial 911 or seek assistance at the nearest Emergency Room/Urgent Care facility.     All Patients:    You may experience an increase in your symptoms for the first 2 days (It may take anywhere between 2 days- 2 weeks for the steroid to have maximum effect).    You may use ice on the injection site, as frequently as 20 minutes each hour if needed.    You may take your pain medicine.    You may continue taking your regular medication after your injection. If you have had a Medial Branch Block you may resume pain medication once your pain diary is completed.    You may shower. No swimming, tub bath or hot tub for 48 hours.  You may remove your bandaid/bandage as soon as you are home.    You may resume light activities, as tolerated.    Resume your usual diet as tolerated.    It is strongly advised that you do not drive for 1-3 hours post injection.    If you have had oral sedation:  Do not drive for 8 hours post injection.      If you have had IV sedation:  Do not drive for 24 hours post injection.  Do not operate hazardous machinery or make important personal/business decisions for 24 hours.      POSSIBLE STEROID SIDE EFFECTS (If steroid/cortisone was used for your procedure)    -If you experience these symptoms, it should only last for a short period    Swelling of the legs              Skin redness (flushing)     Mouth (oral) irritation   Blood sugar (glucose) levels            Sweats                    Mood changes  Headache  Sleeplessness  Weakened immune system for up to 14 days, which could increase the risk of carol ann the COVID-19 virus and/or experiencing more severe symptoms of the disease, if exposed.  Decreased  effectiveness of the flu vaccine if given within 2 weeks of the steroid.         POSSIBLE PROCEDURE SIDE EFFECTS  -Call the Spine Center if you are concerned  Increased Pain           Increased numbness/tingling      Nausea/Vomiting          Bruising/bleeding at site      Redness or swelling                                              Difficulty walking      Weakness           Fever greater than 100.5    *In the event of a severe headache after an epidural steroid injection that is relieved by lying down, please call the [facility] to speak with a clinical staff member*

## 2023-06-27 NOTE — PROGRESS NOTES
Assessment:   Geeta Berry is a 82 year old y.o. female with past medical history significant for neuropathy, GERD, hyperlipidemia, hypothyroidism, hypertension, coronary artery disease, chronic kidney disease stage III, iron deficiency anemia, dysthymia who presents today for follow-up regarding chronic and worsening bilateral low back pain with radiation into the bilateral lower extremities with limited walking and standing tolerance.  My review of an MRI lumbar spine from 2021 shows grade 1 degenerative spondylolisthesis L4-5 with severe spinal stenosis.  Patient is status post bilateral L4-5 transforaminal epidural steroid injections June 12, 2023 which provided 50% relief of pain.  Patient is disappointed that she did not have more relief.  She still has significant limitation with walking.  I am concerned that her spinal stenosis has progressed.    Plan:     A shared decision making plan was used.  The patient's values and choices were respected.  The following represents what was discussed and decided upon by the physician assistant and the patient.      1.  DIAGNOSTIC TESTS: I reviewed the MRI lumbar spine from October 2021.  I ordered an updated MRI lumbar spine.  Patient is disappointed that this injection did not provide more relief.  I would like to see if there may be new or progressive pathology contributing to her worsening pain.  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    2.  PHYSICAL THERAPY: No additional physical therapy was ordered.  Encourage patient to continue doing home exercises.      3.  MEDICATIONS: No changes are made to the patient's medications.  - Patient uses Tylenol as needed.  - Patient uses lidocaine patches or roll-on.  -Patient did not tolerate gabapentin.    4.  INTERVENTIONS:    -No interventions were ordered today.  We are going to check an updated MRI lumbar spine first.  - I would likely recommend L5-S1 interlaminar  epidural steroid injection next.  - If axial low back pain persist I would recommend bilateral L3, L4 needle branch blocks as a work-up toward radiofrequency ablation.    5.  PATIENT EDUCATION:  -If symptoms persist would recommend a referral to neurosurgery.    6.  FOLLOW-UP: I will send the patient a [facility] message with her MRI results.  At that time I may recommend an L5-S1 interlaminar epidural steroid injection versus a follow-up with me to review the results.  If she has questions or concerns in the meantime, she should not hesitate to call.    Subjective:     Geeta Berry is a 82 year old female who presents today for follow-up regarding chronic and worsening bilateral low back pain with radiation into the bilateral lower extremities.  Patient is following up after bilateral L4-5 transforaminal epidural steroid injections June 12, 2023.  Patient reports 50% relief of pain.  She is disappointed that she did not get more relief.  She had 60% relief of her pain after the same injections in October 2021.  She still has significant limitation with walking.    Patient complains of bilateral low back pain.  Pain is worse on the right than the left.  Pain radiates into the buttocks, down the anterior and posterior aspect of the thighs and into the posterior calves.  Recently she also had 1 episode of left occipital pain that has resolved.  This was an issue for her previously.  She rates her pain today as a 4 or 5 out of 10.  At its worst it is a 7 out of 10.  At its best it is a 2 out of 10.  Pain is aggravated with walking.  Her walking is limited because of the pain.  Changes in weather also make the pain worse.  Pain is alleviated with applying heat and lying down.  Denies new symptoms.    Treatment to date:  - Patient participated in physical therapy in November 2017.  - Patient had 6 sessions of physical therapy ending December 2021  - She also has had 4 sessions of physical therapy for hip pain/weakness of  hips ending March 2022  - Bilateral L4-5 transforaminal epidural steroid injection June 12,, 2023 with 50% relief  - Left L4-5 transforaminal epidural steroid injection October 21, 2021 with 60% relief of pain  - Left C2-3, C3-4, C4-5 facet joint injections June 27, 2018  - Left C2, C3, C4, C5 MBB June 7, 2018  - Left C1-C2 facet joint injection July 7, 2017  - Left C1-C2 facet joint injection April 6, 2017  -Did not tolerate gabapentin    Review of Systems:  Positive for weakness, headache.  Negative for numbness/tingling, loss of bowel/bladder control, inability to urinate, pain much worse at night, trip/stumble/falls, difficulty swallowing, difficulty with hand skills, fevers, unintentional weight loss.     Objective:   CONSTITUTIONAL:  Vital signs as above.  No acute distress.  The patient is well nourished and well groomed.   Patient appears tired.  PSYCHIATRIC:  The patient is awake, alert, oriented to person, place and time.  The patient is answering questions appropriately with clear speech.  Normal affect.   HEENT: Normocephalic, atraumatic.  Sclera clear.    SKIN:  Skin over the face, posterior torso, bilateral upper and lower extremities is clean, dry, intact without rashes.  VASCULAR: Mild right greater than left lower extremity edema.  MUSCULOSKELETAL: Patient ambulates with a flexed forward posture at the hips.  She transitions from seated to standing slowly.  The patient is able to rise onto toes and heels bilaterally with support.  Mild tenderness palpation left greater than right lower lumbar paraspinous muscles.  The patient has 5/5 strength for the bilateral hip flexors, knee flexors/extensors, ankle dorsiflexors/plantar flexors.  NEUROLOGICAL:   Sensation to light touch intact bilateral lower extremities throughout.     RESULTS: I reviewed the MRI lumbar spine from [facility] dated October 11, 2021.  This shows S-shaped curvature of lumbar spine.  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   There is degenerative spondylolisthesis L4-5 grade 1.  At L4-5 there is severe spinal stenosis with mild to moderate bilateral foraminal stenosis.  At L3-4 there is moderate spinal canal stenosis with moderate bilateral foraminal stenosis.  At L2-3 there is mild to moderate spinal canal stenosis with severe left and moderate to severe right foraminal stenosis.  Please see report for further details.
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
## 2023-07-03 ENCOUNTER — OFFICE VISIT (OUTPATIENT)
Dept: PHYSICAL MEDICINE AND REHAB | Facility: CLINIC | Age: 83
End: 2023-07-03
Payer: COMMERCIAL

## 2023-07-03 VITALS
BODY MASS INDEX: 27 KG/M2 | SYSTOLIC BLOOD PRESSURE: 109 MMHG | HEIGHT: 67 IN | HEART RATE: 59 BPM | DIASTOLIC BLOOD PRESSURE: 52 MMHG | WEIGHT: 172 LBS

## 2023-07-03 DIAGNOSIS — M48.062 SPINAL STENOSIS OF LUMBAR REGION WITH NEUROGENIC CLAUDICATION: Primary | ICD-10-CM

## 2023-07-03 PROCEDURE — 99214 OFFICE O/P EST MOD 30 MIN: CPT | Performed by: PHYSICIAN ASSISTANT

## 2023-07-03 ASSESSMENT — PAIN SCALES - GENERAL: PAINLEVEL: MODERATE PAIN (5)

## 2023-07-03 NOTE — LETTER
7/3/2023         RE: Geeta Berry  [address]  [facility] [address]        Dear Colleague,    Thank you for referring your patient, Geeta Berry, to the [facility]. Please see a copy of my visit note below.    Assessment:   Geeta Berry is a 82 year old y.o. female with past medical history significant for neuropathy, GERD, hyperlipidemia, hypothyroidism, hypertension, coronary artery disease, chronic kidney disease stage III, iron deficiency anemia, dysthymia who presents today for follow-up regarding chronic and worsening bilateral low back pain with radiation into the bilateral lower extremities with limited walking and standing tolerance.  My review of an MRI lumbar spine from 2021 shows grade 1 degenerative spondylolisthesis L4-5 with severe spinal stenosis.  Patient is status post bilateral L4-5 transforaminal epidural steroid injections June 12, 2023 which provided 50% relief of pain.  Patient is disappointed that she did not have more relief.  She still has significant limitation with walking.  I am concerned that her spinal stenosis has progressed.    Plan:     A shared decision making plan was used.  The patient's values and choices were respected.  The following represents what was discussed and decided upon by the physician assistant and the patient.      1.  DIAGNOSTIC TESTS: I reviewed the MRI lumbar spine from October 2021.  I ordered an updated MRI lumbar spine.  Patient is disappointed that this injection did not provide more relief.  I would like to see if there may be new or progressive pathology contributing to her worsening pain.  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    2.  PHYSICAL THERAPY: No additional physical therapy was ordered.  Encourage patient to continue doing home exercises.      3.  MEDICATIONS: No changes are made to the patient's medications.  - Patient uses Tylenol as  needed.  - Patient uses lidocaine patches or roll-on.  -Patient did not tolerate gabapentin.    4.  INTERVENTIONS:    -No interventions were ordered today.  We are going to check an updated MRI lumbar spine first.  - I would likely recommend L5-S1 interlaminar epidural steroid injection next.  - If axial low back pain persist I would recommend bilateral L3, L4 needle branch blocks as a work-up toward radiofrequency ablation.    5.  PATIENT EDUCATION:  -If symptoms persist would recommend a referral to neurosurgery.    6.  FOLLOW-UP: I will send the patient a [employer] message with her MRI results.  At that time I may recommend an L5-S1 interlaminar epidural steroid injection versus a follow-up with me to review the results.  If she has questions or concerns in the meantime, she should not hesitate to call.    Subjective:     Geeta Berry is a 82 year old female who presents today for follow-up regarding chronic and worsening bilateral low back pain with radiation into the bilateral lower extremities.  Patient is following up after bilateral L4-5 transforaminal epidural steroid injections June 12, 2023.  Patient reports 50% relief of pain.  She is disappointed that she did not get more relief.  She had 60% relief of her pain after the same injections in October 2021.  She still has significant limitation with walking.    Patient complains of bilateral low back pain.  Pain is worse on the right than the left.  Pain radiates into the buttocks, down the anterior and posterior aspect of the thighs and into the posterior calves.  Recently she also had 1 episode of left occipital pain that has resolved.  This was an issue for her previously.  She rates her pain today as a 4 or 5 out of 10.  At its worst it is a 7 out of 10.  At its best it is a 2 out of 10.  Pain is aggravated with walking.  Her walking is limited because of the pain.  Changes in weather also make the pain worse.  Pain is alleviated with applying heat and  lying down.  Denies new symptoms.    Treatment to date:  - Patient participated in physical therapy in November 2017.  - Patient had 6 sessions of physical therapy ending December 2021  - She also has had 4 sessions of physical therapy for hip pain/weakness of hips ending March 2022  - Bilateral L4-5 transforaminal epidural steroid injection June 12,, 2023 with 50% relief  - Left L4-5 transforaminal epidural steroid injection October 21, 2021 with 60% relief of pain  - Left C2-3, C3-4, C4-5 facet joint injections June 27, 2018  - Left C2, C3, C4, C5 MBB June 7, 2018  - Left C1-C2 facet joint injection July 7, 2017  - Left C1-C2 facet joint injection April 6, 2017  -Did not tolerate gabapentin    Review of Systems:  Positive for weakness, headache.  Negative for numbness/tingling, loss of bowel/bladder control, inability to urinate, pain much worse at night, trip/stumble/falls, difficulty swallowing, difficulty with hand skills, fevers, unintentional weight loss.     Objective:   CONSTITUTIONAL:  Vital signs as above.  No acute distress.  The patient is well nourished and well groomed.   Patient appears tired.  PSYCHIATRIC:  The patient is awake, alert, oriented to person, place and time.  The patient is answering questions appropriately with clear speech.  Normal affect.   HEENT: Normocephalic, atraumatic.  Sclera clear.    SKIN:  Skin over the face, posterior torso, bilateral upper and lower extremities is clean, dry, intact without rashes.  VASCULAR: Mild right greater than left lower extremity edema.  MUSCULOSKELETAL: Patient ambulates with a flexed forward posture at the hips.  She transitions from seated to standing slowly.  The patient is able to rise onto toes and heels bilaterally with support.  Mild tenderness palpation left greater than right lower lumbar paraspinous muscles.  The patient has 5/5 strength for the bilateral hip flexors, knee flexors/extensors, ankle dorsiflexors/plantar  flexors.  NEUROLOGICAL:   Sensation to light touch intact bilateral lower extremities throughout.     RESULTS: I reviewed the MRI lumbar spine from [facility] dated October 11, 2021.  This shows S-shaped curvature of lumbar spine.  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  There is degenerative spondylolisthesis L4-5 grade 1.  At L4-5 there is severe spinal stenosis with mild to moderate bilateral foraminal stenosis.  At L3-4 there is moderate spinal canal stenosis with moderate bilateral foraminal stenosis.  At L2-3 there is mild to moderate spinal canal stenosis with severe left and moderate to severe right foraminal stenosis.  Please see report for further details.          Again, thank you for allowing me to participate in the care of your patient.        Sincerely,        Krista Kaplan PA-C

## 2023-07-07 ENCOUNTER — HOSPITAL ENCOUNTER (OUTPATIENT)
Dept: MRI IMAGING | Facility: CLINIC | Age: 83
Discharge: HOME | End: 2023-07-07
Attending: PHYSICIAN ASSISTANT | Admitting: PHYSICIAN ASSISTANT
Payer: COMMERCIAL

## 2023-07-07 DIAGNOSIS — M48.062 SPINAL STENOSIS OF LUMBAR REGION WITH NEUROGENIC CLAUDICATION: ICD-10-CM

## 2023-07-07 PROCEDURE — 72148 MRI LUMBAR SPINE W/O DYE: CPT

## 2023-08-01 DIAGNOSIS — J31.0 CHRONIC RHINITIS: ICD-10-CM

## 2023-08-01 NOTE — TELEPHONE ENCOUNTER
Received fax from pharmacy requesting refill for azelastine    Last refill: 11/24/2021  Patient last seen: 05/18/2023

## 2023-08-01 NOTE — TELEPHONE ENCOUNTER
"Routing refill request to provider for review/approval because:  Pt is not in protocol age range  Break in medication     Last Written Prescription Date:  11/24/21  Last Fill Quantity: 90 ml,  # refills: 1   Last office visit provider:  5/18/23     Requested Prescriptions   Pending Prescriptions Disp Refills    azelastine (ASTELIN) 0.1 % nasal spray 90 mL 1     Sig: Spray 1 spray into both nostrils At Bedtime       Antihistamines Protocol Failed - 8/1/2023  9:11 AM        Failed - Patient is 3-64 years of age     Apply weight-based dosing for peds patients age 3 - 12 years of age.    Forward request to provider for patients under the age of 3 or over the age of 64.          Passed - Recent (12 mo) or future (30 days) visit within the authorizing provider's specialty     Patient has had an office visit with the authorizing provider or a provider within the authorizing providers department within the previous 12 mos or has a future within next 30 days. See \"Patient Info\" tab in inbasket, or \"Choose Columns\" in Meds & Orders section of the refill encounter.              Passed - Medication is active on med list       Nasal Allergy Protocol Passed - 8/1/2023  9:11 AM        Passed - Patient is age 12 or older        Passed - Recent (12 mo) or future (30 days) visit within the authorizing provider's specialty     Patient has had an office visit with the authorizing provider or a provider within the authorizing providers department within the previous 12 mos or has a future within next 30 days. See \"Patient Info\" tab in inbasket, or \"Choose Columns\" in Meds & Orders section of the refill encounter.              Passed - Medication is active on med list             Sonia Glez RN 08/01/23 4:41 PM  "

## 2023-08-02 RX ORDER — AZELASTINE 1 MG/ML
1 SPRAY, METERED NASAL AT BEDTIME
Qty: 90 ML | Refills: 1 | Status: SHIPPED | OUTPATIENT
Start: 2023-08-02 | End: 2024-08-20

## 2023-08-08 ENCOUNTER — RADIOLOGY INJECTION OFFICE VISIT (OUTPATIENT)
Dept: PHYSICAL MEDICINE AND REHAB | Facility: CLINIC | Age: 83
End: 2023-08-08
Attending: PHYSICIAN ASSISTANT
Payer: COMMERCIAL

## 2023-08-08 VITALS
SYSTOLIC BLOOD PRESSURE: 120 MMHG | HEART RATE: 54 BPM | TEMPERATURE: 97.7 F | OXYGEN SATURATION: 95 % | RESPIRATION RATE: 16 BRPM | DIASTOLIC BLOOD PRESSURE: 58 MMHG

## 2023-08-08 DIAGNOSIS — M54.16 LUMBAR RADICULOPATHY: ICD-10-CM

## 2023-08-08 PROCEDURE — 62323 NJX INTERLAMINAR LMBR/SAC: CPT | Performed by: PAIN MEDICINE

## 2023-08-08 RX ORDER — DEXAMETHASONE SODIUM PHOSPHATE 10 MG/ML
INJECTION, SOLUTION INTRAMUSCULAR; INTRAVENOUS
Status: COMPLETED | OUTPATIENT
Start: 2023-08-08 | End: 2023-08-08

## 2023-08-08 RX ORDER — LIDOCAINE HYDROCHLORIDE 10 MG/ML
INJECTION, SOLUTION EPIDURAL; INFILTRATION; INTRACAUDAL; PERINEURAL
Status: COMPLETED | OUTPATIENT
Start: 2023-08-08 | End: 2023-08-08

## 2023-08-08 RX ADMIN — DEXAMETHASONE SODIUM PHOSPHATE 10 MG: 10 INJECTION, SOLUTION INTRAMUSCULAR; INTRAVENOUS at 15:50

## 2023-08-08 RX ADMIN — LIDOCAINE HYDROCHLORIDE 2 ML: 10 INJECTION, SOLUTION EPIDURAL; INFILTRATION; INTRACAUDAL; PERINEURAL at 15:49

## 2023-08-08 ASSESSMENT — PAIN SCALES - GENERAL
PAINLEVEL: NO PAIN (0)
PAINLEVEL: NO PAIN (1)

## 2023-08-08 NOTE — PATIENT INSTRUCTIONS
DISCHARGE INSTRUCTIONS    During office hours (8:00 a.m.- 4:00 p.m.) questions or concerns may be answered  by calling [facility] Navigation Nurses at  [phone].  Messages received after hours will be returned the following business day.      In the case of an emergency, please dial 911 or seek assistance at the nearest Emergency Room/Urgent Care facility.     All Patients:    You may experience an increase in your symptoms for the first 2 days (It may take anywhere between 2 days- 2 weeks for the steroid to have maximum effect).    You may use ice on the injection site, as frequently as 20 minutes each hour if needed.    You may take your pain medicine.    You may continue taking your regular medication after your injection. If you have had a Medial Branch Block you may resume pain medication once your pain diary is completed.    You may shower. No swimming, tub bath or hot tub for 48 hours.  You may remove your bandaid/bandage as soon as you are home.    You may resume light activities, as tolerated.    Resume your usual diet as tolerated.    It is strongly advised that you do not drive for 1-3 hours post injection.    If you have had oral sedation:  Do not drive for 8 hours post injection.      If you have had IV sedation:  Do not drive for 24 hours post injection.  Do not operate hazardous machinery or make important personal/business decisions for 24 hours.      POSSIBLE STEROID SIDE EFFECTS (If steroid/cortisone was used for your procedure)    -If you experience these symptoms, it should only last for a short period    Swelling of the legs              Skin redness (flushing)     Mouth (oral) irritation   Blood sugar (glucose) levels            Sweats                    Mood changes  Headache  Sleeplessness  Weakened immune system for up to 14 days, which could increase the risk of carol ann the COVID-19 virus and/or experiencing more severe symptoms of the disease, if exposed.  Decreased  effectiveness of the flu vaccine if given within 2 weeks of the steroid.         POSSIBLE PROCEDURE SIDE EFFECTS  -Call the Spine Center if you are concerned  Increased Pain           Increased numbness/tingling      Nausea/Vomiting          Bruising/bleeding at site      Redness or swelling                                              Difficulty walking      Weakness           Fever greater than 100.5    *In the event of a severe headache after an epidural steroid injection that is relieved by lying down, please call the [facility] to speak with a clinical staff member*

## 2023-08-15 ENCOUNTER — ANCILLARY ORDERS (OUTPATIENT)
Dept: INTERNAL MEDICINE | Facility: CLINIC | Age: 83
End: 2023-08-15

## 2023-08-15 DIAGNOSIS — Z12.31 VISIT FOR SCREENING MAMMOGRAM: Primary | ICD-10-CM

## 2023-08-22 DIAGNOSIS — F34.1 DYSTHYMIC DISORDER: ICD-10-CM

## 2023-08-22 DIAGNOSIS — K21.9 GASTROESOPHAGEAL REFLUX DISEASE WITHOUT ESOPHAGITIS: ICD-10-CM

## 2023-08-22 DIAGNOSIS — E78.5 DYSLIPIDEMIA, GOAL LDL BELOW 70: ICD-10-CM

## 2023-08-22 NOTE — PROGRESS NOTES
Assessment:   Geeta Berry is a 82 year old y.o. female with past medical history significant for neuropathy, GERD, hyperlipidemia, hypothyroidism, hypertension, coronary artery disease, chronic kidney disease stage III, iron deficiency anemia, dysthymia who presents today for follow-up regarding several concerns.  1.  Chronic bilateral low back pain with radiation into the bilateral lower extremities with limited walking and standing tolerance.  My review of an MRI lumbar spine shows grade 1 degenerative spondylolisthesis L4-5 with moderate to severe spinal stenosis.  Patient is following up after an L5-S1 interlaminar epidural steroid injection August 8, 2023 which provided greater than 50% relief of pain.  She is not having any leg pain currently.  She has been able to walk further.  She continues to have axial low back pain likely related to lumbar facet arthropathy.  2.  Intermittent left lateral hip pain.  Suspect trochanteric bursitis.  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  Bilateral feet paresthesias, suspect peripheral neuropathy.  She saw podiatry this morning.    Plan:     A shared decision making plan was used.  The patient's values and choices were respected.  The following represents what was discussed and decided upon by the physician assistant and the patient.      1.  DIAGNOSTIC TESTS: I reviewed the MRI lumbar spine.  No further diagnostic test were ordered.    2.  PHYSICAL THERAPY: No additional physical therapy was ordered.  Encourage patient to continue doing home exercises.  I did print out instructions for 2 exercises specifically for bursitis.      3.  MEDICATIONS: No changes are made to the patient's medications.  - Patient uses Tylenol as needed.  - Patient uses lidocaine patches or roll-on.  -Patient did not tolerate gabapentin.    4.  INTERVENTIONS: No additional interventions were ordered.  - If leg pain returns we could repeat the L5-S1 interlaminar epidural steroid  injection.  This was more effective than the bilateral L4-5 transforaminal epidural steroid injections.  - If axial low back pain worsens we could try bilateral L3, L4 medial branch blocks as a work-up toward radiofrequency ablation.  Current pain is not severe enough to warrant this.  - If left lateral hip pain persist we can try left trochanter bursa injection.    5.  PATIENT EDUCATION: Patient is in agreement the above plan.  All questions were answered.    6.  FOLLOW-UP: Patient is going to follow-up with me as needed.  If she has questions or concerns in the future she should not hesitate to call.  Subjective:     Geeta Berry is a 82 year old female who presents today for follow-up regarding chronic  bilateral low back pain with radiation into the bilateral lower extremities.  Patient is following up after an L5-S1 interlaminar epidural steroid injection August 8, 2023.  Patient reports greater than 50% relief of pain.  She reports resolution of leg pain.  She is able to walk further.    Patient complains of bilateral low back pain.  Pain spans across low back in a broadband distribution at the belt line.    Patient also complains of intermittent left lateral hip pain.  This is been a problem on and off for years.  She states the left lateral hip feels tender.    Patient also complains of bilateral foot paresthesias.  She states sometimes it feels like she is walking on paper.  She saw podiatry this morning.  They are doing x-rays.    Patient rates her pain today as a 2 out of 10.  At its worst it is a 3 out of 10.  At its best it is a 0 out of 10.  Pain is aggravated walking and standing.  Pain is alleviated with lying down.    Treatment to date:  - Patient participated in physical therapy in November 2017.  - Patient had 6 sessions of physical therapy ending December 2021  - She also has had 4 sessions of physical therapy for hip pain/weakness of hips ending March 2022  - L5-S1 interlaminar epidural steroid  injection August 8, 2023 with greater than 50% relief of pain  - Bilateral L4-5 transforaminal epidural steroid injection June 12,, 2023 with 50% relief  - Left L4-5 transforaminal epidural steroid injection October 21, 2021 with 60% relief of pain  - Left C2-3, C3-4, C4-5 facet joint injections June 27, 2018  - Left C2, C3, C4, C5 MBB June 7, 2018  - Left C1-C2 facet joint injection July 7, 2017  - Left C1-C2 facet joint injection April 6, 2017  -Did not tolerate gabapentin    Review of Systems:  Positive for pain much worse at night.  Negative for numbness/tingling, weakness, loss of bowel/bladder control, inability to urinate, headache, trip/stumble/falls, difficulty swallowing, difficulty with hand skills, fevers, unintentional weight loss.     Objective:   CONSTITUTIONAL:  Vital signs as above.  No acute distress.  The patient is well nourished and well groomed.   Patient appears tired.  PSYCHIATRIC:  The patient is awake, alert, oriented to person, place and time.  The patient is answering questions appropriately with clear speech.  Normal affect.   HEENT: Normocephalic, atraumatic.  Sclera clear.    SKIN:  Skin over the face, posterior torso, bilateral upper and lower extremities is clean, dry, intact without rashes.  VASCULAR: Mild right greater than left lower extremity edema.  MUSCULOSKELETAL: Patient ambulates with a flexed forward posture at the hips.  The patient is able to rise onto toes and heels bilaterally with support.  Mild tenderness palpation left greater than right lower lumbar paraspinous muscles.  The patient has 5/5 strength for the bilateral hip flexors, knee flexors/extensors, ankle dorsiflexors/plantar flexors.  Tender to palpation left lateral hip.  NEUROLOGICAL:   Sensation to light touch intact bilateral lower extremities throughout.     RESULTS: I reviewed the MRI lumbar spine from [place] dated July 7, 2023.  This shows multilevel lumbar spondylosis unchanged compared with an MRI  lumbar spine from 2021.  At L2-3 there is mild to moderate spinal canal stenosis with moderate left and mild to moderate right foraminal stenosis.  At L3-4 there is mild spinal canal stenosis with mild to moderate right and mild left foraminal stenosis.  At L4-5 there is moderate to severe spinal canal stenosis with mild to moderate bilateral foraminal stenosis.

## 2023-08-23 RX ORDER — CITALOPRAM HYDROBROMIDE 10 MG/1
5 TABLET ORAL DAILY
Qty: 45 TABLET | Refills: 7 | OUTPATIENT
Start: 2023-08-23

## 2023-08-23 NOTE — TELEPHONE ENCOUNTER
"Should have refills on file.     Last Written Prescription Date:  5/18/2023  Last Fill Quantity: 90,  # refills: 1   Last office visit: 5/18/2023   Future Office Visit:   Next 5 appointments (look out 90 days)      Oct 04, 2023 11:00 AM  (Arrive by 10:40 AM)  Annual Wellness Visit with Jacy Mishra NP  [facility] [address] [phone]                   Requested Prescriptions   Pending Prescriptions Disp Refills    citalopram (CELEXA) 10 MG tablet [Pharmacy Med Name: CITALOPRAM HBR 10 MG TABLET] 45 tablet 7     Sig: TAKE 1/2 TABLET BY MOUTH EVERY DAY       SSRIs Protocol Passed - 8/22/2023  1:49 PM        Passed - PHQ-9 score less than 5 in past 6 months     Please review last PHQ-9 score.           Passed - Medication is active on med list        Passed - Patient is age 18 or older        Passed - No active pregnancy on record        Passed - No positive pregnancy test in last 12 months        Passed - Recent (6 mo) or future (30 days) visit within the authorizing provider's specialty     Patient had office visit in the last 6 months or has a visit in the next 30 days with authorizing provider or within the authorizing provider's specialty.  See \"Patient Info\" tab in inbasket, or \"Choose Columns\" in Meds & Orders section of the refill encounter.                 Marla Steiner RN 08/23/23 5:23 AM  "

## 2023-08-23 NOTE — TELEPHONE ENCOUNTER
"Last Written Prescription Date:  3/9/2023  Last Fill Quantity: 90,  # refills: 1   Last office visit provider:  5/18/2023     Requested Prescriptions   Pending Prescriptions Disp Refills    omeprazole (PRILOSEC) 20 MG DR capsule [Pharmacy Med Name: OMEPRAZOLE DR 20 MG CAPSULE] 90 capsule 1     Sig: TAKE 1 CAPSULE BY MOUTH EVERY EVENING       PPI Protocol Passed - 8/22/2023 12:35 PM        Passed - Not on Clopidogrel (unless Pantoprazole ordered)        Passed - No diagnosis of osteoporosis on record        Passed - Recent (12 mo) or future (30 days) visit within the authorizing provider's specialty     Patient has had an office visit with the authorizing provider or a provider within the authorizing providers department within the previous 12 mos or has a future within next 30 days. See \"Patient Info\" tab in inbasket, or \"Choose Columns\" in Meds & Orders section of the refill encounter.              Passed - Medication is active on med list        Passed - Patient is age 18 or older        Passed - No active pregnacy on record        Passed - No positive pregnancy test in past 12 months             Daniela Blanco RN 08/22/23 11:52 PM  "

## 2023-08-24 ENCOUNTER — OFFICE VISIT (OUTPATIENT)
Dept: PHYSICAL MEDICINE AND REHAB | Facility: CLINIC | Age: 83
End: 2023-08-24
Payer: COMMERCIAL

## 2023-08-24 VITALS
WEIGHT: 172 LBS | DIASTOLIC BLOOD PRESSURE: 62 MMHG | HEART RATE: 58 BPM | SYSTOLIC BLOOD PRESSURE: 113 MMHG | HEIGHT: 67 IN | BODY MASS INDEX: 27 KG/M2

## 2023-08-24 DIAGNOSIS — R20.2 PARESTHESIA: ICD-10-CM

## 2023-08-24 DIAGNOSIS — M47.816 LUMBAR FACET ARTHROPATHY: ICD-10-CM

## 2023-08-24 DIAGNOSIS — M48.062 SPINAL STENOSIS OF LUMBAR REGION WITH NEUROGENIC CLAUDICATION: ICD-10-CM

## 2023-08-24 DIAGNOSIS — M70.62 TROCHANTERIC BURSITIS OF LEFT HIP: ICD-10-CM

## 2023-08-24 DIAGNOSIS — M43.16 SPONDYLOLISTHESIS OF LUMBAR REGION: Primary | ICD-10-CM

## 2023-08-24 PROCEDURE — 99213 OFFICE O/P EST LOW 20 MIN: CPT | Performed by: PHYSICIAN ASSISTANT

## 2023-08-24 RX ORDER — ROSUVASTATIN CALCIUM 40 MG/1
40 TABLET, COATED ORAL AT BEDTIME
Qty: 90 TABLET | Refills: 0 | Status: SHIPPED | OUTPATIENT
Start: 2023-08-24 | End: 2023-12-21

## 2023-08-24 ASSESSMENT — PAIN SCALES - GENERAL: PAINLEVEL: MILD PAIN (2)

## 2023-08-24 NOTE — LETTER
8/24/2023         RE: Geeta Berry  [address]  [facility] [address]        Dear Colleague,    Thank you for referring your patient, Geeta Berry, to the [facility]ERY. Please see a copy of my visit note below.    Assessment:   Geeta Berry is a 82 year old y.o. female with past medical history significant for neuropathy, GERD, hyperlipidemia, hypothyroidism, hypertension, coronary artery disease, chronic kidney disease stage III, iron deficiency anemia, dysthymia who presents today for follow-up regarding several concerns.  1.  Chronic bilateral low back pain with radiation into the bilateral lower extremities with limited walking and standing tolerance.  My review of an MRI lumbar spine shows grade 1 degenerative spondylolisthesis L4-5 with moderate to severe spinal stenosis.  Patient is following up after an L5-S1 interlaminar epidural steroid injection August 8, 2023 which provided greater than 50% relief of pain.  She is not having any leg pain currently.  She has been able to walk further.  She continues to have axial low back pain likely related to lumbar facet arthropathy.  2.  Intermittent left lateral hip pain.  Suspect trochanteric bursitis.  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  Bilateral feet paresthesias, suspect peripheral neuropathy.  She saw podiatry this morning.    Plan:     A shared decision making plan was used.  The patient's values and choices were respected.  The following represents what was discussed and decided upon by the physician assistant and the patient.      1.  DIAGNOSTIC TESTS: I reviewed the MRI lumbar spine.  No further diagnostic test were ordered.    2.  PHYSICAL THERAPY: No additional physical therapy was ordered.  Encourage patient to continue doing home exercises.  I did print out instructions for 2 exercises specifically for bursitis.      3.  MEDICATIONS: No changes are made to the patient's medications.  -  Patient uses Tylenol as needed.  - Patient uses lidocaine patches or roll-on.  -Patient did not tolerate gabapentin.    4.  INTERVENTIONS: No additional interventions were ordered.  - If leg pain returns we could repeat the L5-S1 interlaminar epidural steroid injection.  This was more effective than the bilateral L4-5 transforaminal epidural steroid injections.  - If axial low back pain worsens we could try bilateral L3, L4 medial branch blocks as a work-up toward radiofrequency ablation.  Current pain is not severe enough to warrant this.  - If left lateral hip pain persist we can try left trochanter bursa injection.    5.  PATIENT EDUCATION: Patient is in agreement the above plan.  All questions were answered.    6.  FOLLOW-UP: Patient is going to follow-up with me as needed.  If she has questions or concerns in the future she should not hesitate to call.  Subjective:     Geeta Berry is a 82 year old female who presents today for follow-up regarding chronic  bilateral low back pain with radiation into the bilateral lower extremities.  Patient is following up after an L5-S1 interlaminar epidural steroid injection August 8, 2023.  Patient reports greater than 50% relief of pain.  She reports resolution of leg pain.  She is able to walk further.    Patient complains of bilateral low back pain.  Pain spans across low back in a broadband distribution at the belt line.    Patient also complains of intermittent left lateral hip pain.  This is been a problem on and off for years.  She states the left lateral hip feels tender.    Patient also complains of bilateral foot paresthesias.  She states sometimes it feels like she is walking on paper.  She saw podiatry this morning.  They are doing x-rays.    Patient rates her pain today as a 2 out of 10.  At its worst it is a 3 out of 10.  At its best it is a 0 out of 10.  Pain is aggravated walking and standing.  Pain is alleviated with lying down.    Treatment to date:  -  Patient participated in physical therapy in November 2017.  - Patient had 6 sessions of physical therapy ending December 2021  - She also has had 4 sessions of physical therapy for hip pain/weakness of hips ending March 2022  - L5-S1 interlaminar epidural steroid injection August 8, 2023 with greater than 50% relief of pain  - Bilateral L4-5 transforaminal epidural steroid injection June 12,, 2023 with 50% relief  - Left L4-5 transforaminal epidural steroid injection October 21, 2021 with 60% relief of pain  - Left C2-3, C3-4, C4-5 facet joint injections June 27, 2018  - Left C2, C3, C4, C5 MBB June 7, 2018  - Left C1-C2 facet joint injection July 7, 2017  - Left C1-C2 facet joint injection April 6, 2017  -Did not tolerate gabapentin    Review of Systems:  Positive for pain much worse at night.  Negative for numbness/tingling, weakness, loss of bowel/bladder control, inability to urinate, headache, trip/stumble/falls, difficulty swallowing, difficulty with hand skills, fevers, unintentional weight loss.     Objective:   CONSTITUTIONAL:  Vital signs as above.  No acute distress.  The patient is well nourished and well groomed.   Patient appears tired.  PSYCHIATRIC:  The patient is awake, alert, oriented to person, place and time.  The patient is answering questions appropriately with clear speech.  Normal affect.   HEENT: Normocephalic, atraumatic.  Sclera clear.    SKIN:  Skin over the face, posterior torso, bilateral upper and lower extremities is clean, dry, intact without rashes.  VASCULAR: Mild right greater than left lower extremity edema.  MUSCULOSKELETAL: Patient ambulates with a flexed forward posture at the hips.  The patient is able to rise onto toes and heels bilaterally with support.  Mild tenderness palpation left greater than right lower lumbar paraspinous muscles.  The patient has 5/5 strength for the bilateral hip flexors, knee flexors/extensors, ankle dorsiflexors/plantar flexors.  Tender to  palpation left lateral hip.  NEUROLOGICAL:   Sensation to light touch intact bilateral lower extremities throughout.     RESULTS: I reviewed the MRI lumbar spine from [place] dated July 7, 2023.  This shows multilevel lumbar spondylosis unchanged compared with an MRI lumbar spine from 2021.  At L2-3 there is mild to moderate spinal canal stenosis with moderate left and mild to moderate right foraminal stenosis.  At L3-4 there is mild spinal canal stenosis with mild to moderate right and mild left foraminal stenosis.  At L4-5 there is moderate to severe spinal canal stenosis with mild to moderate bilateral foraminal stenosis.      Again, thank you for allowing me to participate in the care of your patient.        Sincerely,        Krista Kaplan PA-C

## 2023-08-24 NOTE — PATIENT INSTRUCTIONS
I am so happy that you are feeling better!  If your pain returns we can repeat the injection(s).  You can have injections up to 4 times per year.    If your pain returns or if you have any other questions or concerns please send me a [employer] message or call our nurse line at [phone].     You can try diclofenac gel on your hip.    You can also apply ice up to 20 min/hr to the hip      Please perform the following strengthening exercises:  LEG LIFTS:  Lay on your left side.  Keep your toes pointed in front of you (do not have your toes pointed up towards the ceiling) and then slowly lift your right leg as high as you can.  Hold the leg at the highest point for 1 second and then slowly lower the leg down.  Perform 10-15 repetitions.  Perform 3 sets of this exercise.  Perform this exercises laying on your right side and lifting your left leg.  Eventually increase to 3 sets of 50 repetitions.    CLAM SHELLS:  Lay on your left side.  Bend your knees and hips to approximately 90 degrees.  Keep your ankles together.  Lift your right knee up as high as you can, keeping your right ankle touching your left ankle.  Hold your knee up for 1 second and then slowly lower it back down.  Perform 10-15 repetitions.  Perform 3 sets of this exercise.  Perform this exercises laying on your left side and lifting your right leg.  Eventually increase to 3 sets of 50 repetitions.    If you keep your hip muscles strong, you can prevent your bursitis from returning.

## 2023-09-07 ENCOUNTER — TRANSFERRED RECORDS (OUTPATIENT)
Dept: HEALTH INFORMATION MANAGEMENT | Facility: CLINIC | Age: 83
End: 2023-09-07

## 2023-09-09 DIAGNOSIS — F34.1 DYSTHYMIC DISORDER: ICD-10-CM

## 2023-09-09 NOTE — TELEPHONE ENCOUNTER
"Routing refill request to provider for review/approval because:  Early refill request      Last Written Prescription Date:  5/18/2023  Last Fill Quantity: 90,  # refills: 1   Last office visit provider:  5/18/2023     Requested Prescriptions   Pending Prescriptions Disp Refills    citalopram (CELEXA) 10 MG tablet [Pharmacy Med Name: CITALOPRAM HBR 10 MG TABLET] 45 tablet 7     Sig: TAKE 1/2 TABLET BY MOUTH EVERY DAY       SSRIs Protocol Passed - 9/9/2023  3:15 PM        Passed - PHQ-9 score less than 5 in past 6 months     Please review last PHQ-9 score.           Passed - Medication is active on med list        Passed - Patient is age 18 or older        Passed - No active pregnancy on record        Passed - No positive pregnancy test in last 12 months        Passed - Recent (6 mo) or future (30 days) visit within the authorizing provider's specialty     Patient had office visit in the last 6 months or has a visit in the next 30 days with authorizing provider or within the authorizing provider's specialty.  See \"Patient Info\" tab in inbasket, or \"Choose Columns\" in Meds & Orders section of the refill encounter.                 Margarita Givens RN 09/09/23 3:16 PM  "

## 2023-09-10 RX ORDER — CITALOPRAM HYDROBROMIDE 10 MG/1
5 TABLET ORAL DAILY
Qty: 45 TABLET | Refills: 1 | Status: SHIPPED | OUTPATIENT
Start: 2023-09-10 | End: 2024-02-15

## 2023-09-13 ENCOUNTER — ANCILLARY PROCEDURE (OUTPATIENT)
Dept: MAMMOGRAPHY | Facility: CLINIC | Age: 83
End: 2023-09-13
Attending: NURSE PRACTITIONER
Payer: COMMERCIAL

## 2023-09-13 DIAGNOSIS — Z12.31 VISIT FOR SCREENING MAMMOGRAM: ICD-10-CM

## 2023-09-13 PROCEDURE — 77067 SCR MAMMO BI INCL CAD: CPT

## 2023-09-18 ASSESSMENT — PATIENT HEALTH QUESTIONNAIRE - PHQ9
SUM OF ALL RESPONSES TO PHQ QUESTIONS 1-9: 3
SUM OF ALL RESPONSES TO PHQ QUESTIONS 1-9: 3
10. IF YOU CHECKED OFF ANY PROBLEMS, HOW DIFFICULT HAVE THESE PROBLEMS MADE IT FOR YOU TO DO YOUR WORK, TAKE CARE OF THINGS AT HOME, OR GET ALONG WITH OTHER PEOPLE: SOMEWHAT DIFFICULT

## 2023-09-19 ENCOUNTER — OFFICE VISIT (OUTPATIENT)
Dept: INTERNAL MEDICINE | Facility: CLINIC | Age: 83
End: 2023-09-19
Payer: COMMERCIAL

## 2023-09-19 VITALS
DIASTOLIC BLOOD PRESSURE: 74 MMHG | RESPIRATION RATE: 16 BRPM | BODY MASS INDEX: 27.29 KG/M2 | WEIGHT: 173.9 LBS | SYSTOLIC BLOOD PRESSURE: 132 MMHG | HEIGHT: 67 IN | HEART RATE: 50 BPM | OXYGEN SATURATION: 98 % | TEMPERATURE: 97.8 F

## 2023-09-19 DIAGNOSIS — I10 ESSENTIAL HYPERTENSION: ICD-10-CM

## 2023-09-19 DIAGNOSIS — G62.9 NEUROPATHY: ICD-10-CM

## 2023-09-19 DIAGNOSIS — E55.9 VITAMIN D DEFICIENCY: ICD-10-CM

## 2023-09-19 DIAGNOSIS — K21.9 GASTROESOPHAGEAL REFLUX DISEASE WITHOUT ESOPHAGITIS: ICD-10-CM

## 2023-09-19 DIAGNOSIS — M70.61 GREATER TROCHANTERIC BURSITIS OF BOTH HIPS: ICD-10-CM

## 2023-09-19 DIAGNOSIS — E78.2 MIXED HYPERLIPIDEMIA: ICD-10-CM

## 2023-09-19 DIAGNOSIS — N18.31 STAGE 3A CHRONIC KIDNEY DISEASE (H): Primary | ICD-10-CM

## 2023-09-19 DIAGNOSIS — M70.62 GREATER TROCHANTERIC BURSITIS OF BOTH HIPS: ICD-10-CM

## 2023-09-19 DIAGNOSIS — I25.10 CORONARY ARTERY DISEASE INVOLVING NATIVE CORONARY ARTERY OF NATIVE HEART WITHOUT ANGINA PECTORIS: ICD-10-CM

## 2023-09-19 DIAGNOSIS — E03.9 ACQUIRED HYPOTHYROIDISM: ICD-10-CM

## 2023-09-19 LAB
ALBUMIN SERPL BCG-MCNC: 4.3 G/DL (ref 3.5–5.2)
ALP SERPL-CCNC: 59 U/L (ref 35–104)
ALT SERPL W P-5'-P-CCNC: 20 U/L (ref 0–50)
ANION GAP SERPL CALCULATED.3IONS-SCNC: 9 MMOL/L (ref 7–15)
AST SERPL W P-5'-P-CCNC: 28 U/L (ref 0–45)
BILIRUB SERPL-MCNC: 0.3 MG/DL
BUN SERPL-MCNC: 24 MG/DL (ref 8–23)
CALCIUM SERPL-MCNC: 9.3 MG/DL (ref 8.8–10.2)
CHLORIDE SERPL-SCNC: 102 MMOL/L (ref 98–107)
CHOLEST SERPL-MCNC: 132 MG/DL
CREAT SERPL-MCNC: 0.95 MG/DL (ref 0.51–0.95)
CREAT UR-MCNC: 32.3 MG/DL
DEPRECATED HCO3 PLAS-SCNC: 29 MMOL/L (ref 22–29)
EGFRCR SERPLBLD CKD-EPI 2021: 59 ML/MIN/1.73M2
ERYTHROCYTE [DISTWIDTH] IN BLOOD BY AUTOMATED COUNT: 14.9 % (ref 10–15)
FERRITIN SERPL-MCNC: 61 NG/ML (ref 11–328)
GLUCOSE SERPL-MCNC: 95 MG/DL (ref 70–99)
HCT VFR BLD AUTO: 34.5 % (ref 35–47)
HDLC SERPL-MCNC: 61 MG/DL
HGB BLD-MCNC: 11.4 G/DL (ref 11.7–15.7)
IRON BINDING CAPACITY (ROCHE): 299 UG/DL (ref 240–430)
IRON SATN MFR SERPL: 19 % (ref 15–46)
IRON SERPL-MCNC: 56 UG/DL (ref 37–145)
LDLC SERPL CALC-MCNC: 48 MG/DL
MCH RBC QN AUTO: 32.5 PG (ref 26.5–33)
MCHC RBC AUTO-ENTMCNC: 33 G/DL (ref 31.5–36.5)
MCV RBC AUTO: 98 FL (ref 78–100)
MICROALBUMIN UR-MCNC: <12 MG/L
MICROALBUMIN/CREAT UR: NORMAL MG/G{CREAT}
NONHDLC SERPL-MCNC: 71 MG/DL
PLATELET # BLD AUTO: 220 10E3/UL (ref 150–450)
POTASSIUM SERPL-SCNC: 3.8 MMOL/L (ref 3.4–5.3)
PROT SERPL-MCNC: 6.8 G/DL (ref 6.4–8.3)
RBC # BLD AUTO: 3.51 10E6/UL (ref 3.8–5.2)
SODIUM SERPL-SCNC: 140 MMOL/L (ref 136–145)
TOTAL PROTEIN SERUM FOR ELP: 6.3 G/DL (ref 6.4–8.3)
TRIGL SERPL-MCNC: 114 MG/DL
TSH SERPL DL<=0.005 MIU/L-ACNC: 2.46 UIU/ML (ref 0.3–4.2)
WBC # BLD AUTO: 4.1 10E3/UL (ref 4–11)

## 2023-09-19 PROCEDURE — 80053 COMPREHEN METABOLIC PANEL: CPT | Performed by: NURSE PRACTITIONER

## 2023-09-19 PROCEDURE — 84443 ASSAY THYROID STIM HORMONE: CPT | Performed by: NURSE PRACTITIONER

## 2023-09-19 PROCEDURE — 82570 ASSAY OF URINE CREATININE: CPT | Performed by: NURSE PRACTITIONER

## 2023-09-19 PROCEDURE — 99214 OFFICE O/P EST MOD 30 MIN: CPT | Performed by: NURSE PRACTITIONER

## 2023-09-19 PROCEDURE — 82043 UR ALBUMIN QUANTITATIVE: CPT | Performed by: NURSE PRACTITIONER

## 2023-09-19 PROCEDURE — 84165 PROTEIN E-PHORESIS SERUM: CPT

## 2023-09-19 PROCEDURE — 80061 LIPID PANEL: CPT | Performed by: NURSE PRACTITIONER

## 2023-09-19 PROCEDURE — 82728 ASSAY OF FERRITIN: CPT | Performed by: NURSE PRACTITIONER

## 2023-09-19 PROCEDURE — 83550 IRON BINDING TEST: CPT | Performed by: NURSE PRACTITIONER

## 2023-09-19 PROCEDURE — 36415 COLL VENOUS BLD VENIPUNCTURE: CPT | Performed by: NURSE PRACTITIONER

## 2023-09-19 PROCEDURE — 84155 ASSAY OF PROTEIN SERUM: CPT | Mod: 59 | Performed by: NURSE PRACTITIONER

## 2023-09-19 PROCEDURE — 84166 PROTEIN E-PHORESIS/URINE/CSF: CPT

## 2023-09-19 PROCEDURE — 85027 COMPLETE CBC AUTOMATED: CPT | Performed by: NURSE PRACTITIONER

## 2023-09-19 PROCEDURE — 83921 ORGANIC ACID SINGLE QUANT: CPT | Performed by: NURSE PRACTITIONER

## 2023-09-19 PROCEDURE — 83540 ASSAY OF IRON: CPT | Performed by: NURSE PRACTITIONER

## 2023-09-19 PROCEDURE — 86334 IMMUNOFIX E-PHORESIS SERUM: CPT

## 2023-09-19 PROCEDURE — 82306 VITAMIN D 25 HYDROXY: CPT | Performed by: NURSE PRACTITIONER

## 2023-09-19 ASSESSMENT — PAIN SCALES - GENERAL: PAINLEVEL: NO PAIN (0)

## 2023-09-19 NOTE — PATIENT INSTRUCTIONS
- Try some lidocaine gel on  your feet as needed for tingling/burning/pain    - Hold the rosuvastatin (Crestor) to see if this helps with the achiness

## 2023-09-19 NOTE — PROGRESS NOTES
"  Assessment & Plan   Problem List Items Addressed This Visit          Nervous and Auditory    Neuropathy     Worsening neuropathy. Lab evaluation and EMG ordered. She was unable to tolerate gabapentin due to cognitive side effects. She will try lidocaine gel first.          Relevant Orders    EMG    CBC with platelets (Completed)    Iron and iron binding capacity (Completed)    Ferritin (Completed)    Methylmalonic Acid (Completed)    Protein electrophoresis random urine (Completed)    Protein electrophoresis (Completed)    Greater trochanteric bursitis of both hips     Schedule left greater trochanteric bursa injection. Last was done 4/2022             Digestive    Gastroesophageal reflux disease     Continue omeprazole             Endocrine    Hyperlipidemia     Hold the statin (rosuvastatin) to see if this is the cause of the myalgias x2-3 weeks.          Hypothyroidism     Euthyroid          Relevant Orders    TSH with free T4 reflex (Completed)       Circulatory    Essential hypertension     Stable with current medications         Coronary artery disease involving native coronary artery of native heart without angina pectoris    Relevant Orders    Lipid panel reflex to direct LDL Non-fasting (Completed)       Urinary    Stage 3a chronic kidney disease (H) - Primary    Relevant Orders    Albumin Random Urine Quantitative with Creat Ratio (Completed)    Comprehensive metabolic panel (Completed)     Other Visit Diagnoses       Vitamin D deficiency        Relevant Orders    Vitamin D Deficiency                  BMI:   Estimated body mass index is 26.88 kg/m  as calculated from the following:    Height as of this encounter: 1.713 m (5' 7.44\").    Weight as of this encounter: 78.9 kg (173 lb 14.4 oz).       Jacy Mishra NP  [facility]    Bala Connor is a 83 year old, presenting for the following health issues:  Not Feeling Well (Morning is worse. Pt stated feeling; weak, tired, achy, " feet can hurt. Hips, legs will be painful too. ), Musculoskeletal Problem (Podiatry will be doing an Xray on the feet; swelling and both feet can hurt), and Eyes (Pt is wondering why she has sacs)        9/19/2023    12:39 PM   Additional Questions   Roomed by ROGER Elizabeth   Accompanied by Loraine Ashley       History of Present Illness       Back Pain:  She presents for follow up of back pain. Patient's back pain is a chronic problem.  Location of back pain:  Right middle of back, left middle of back, left side of neck, right hip and left hip  Description of back pain: dull ache  Back pain spreads: left thigh    Since patient first noticed back pain, pain is: always present, but gets better and worse  Does back pain interfere with her job:  Not applicable       Reason for visit:  Weak, tired, achey, pain in legs, feet, memory  Symptom onset:  3-4 weeks ago  Symptoms include:  Weak tired, aching legs and feet  Symptom intensity:  Moderate  Symptom progression:  Worsening  Had these symptoms before:  No  What makes it worse:  Loss of energy  What makes it better:  Rest    She eats 2-3 servings of fruits and vegetables daily.She consumes 0 sweetened beverage(s) daily.She exercises with enough effort to increase her heart rate 30 to 60 minutes per day.  She exercises with enough effort to increase her heart rate 6 days per week.   She is taking medications regularly.     She aches all over, the left leg and low back are painful in particular. Sometimes in the afternoon/evening she starts to feel better.    She feels weak and tired. Feels a marked difference in the last 2 months. No chest pains or dyspnea. No heart palpitations.     Bottoms of the feet hurt. She had a vascular evaluation at [facility] which she was told was normal. She has an xray of the foot and will follow up with podiatry.     Bowel movements have been more regular and complete.     Memory concerns- has some word searching. She walks into a room and  "forgets what she came for.         Objective    /74 (BP Location: Right arm, Patient Position: Sitting, Cuff Size: Adult Regular)   Pulse 50   Temp 97.8  F (36.6  C) (Oral)   Resp 16   Ht 1.713 m (5' 7.44\")   Wt 78.9 kg (173 lb 14.4 oz)   LMP  (LMP Unknown)   SpO2 98%   BMI 26.88 kg/m    Body mass index is 26.88 kg/m .    Wt Readings from Last 5 Encounters:   09/19/23 78.9 kg (173 lb 14.4 oz)   08/24/23 78 kg (172 lb)   07/03/23 78 kg (172 lb)   05/18/23 78.1 kg (172 lb 1.6 oz)   04/19/23 78 kg (172 lb)       Physical Exam   GENERAL: Alert and no distress                    "

## 2023-09-21 LAB
ALBUMIN SERPL ELPH-MCNC: 3.9 G/DL (ref 3.7–5.1)
ALPHA1 GLOB SERPL ELPH-MCNC: 0.3 G/DL (ref 0.2–0.4)
ALPHA2 GLOB SERPL ELPH-MCNC: 0.5 G/DL (ref 0.5–0.9)
B-GLOBULIN SERPL ELPH-MCNC: 1 G/DL (ref 0.6–1)
GAMMA GLOB SERPL ELPH-MCNC: 0.6 G/DL (ref 0.7–1.6)
M PROTEIN SERPL ELPH-MCNC: 0.3 G/DL
METHYLMALONATE SERPL-SCNC: 0.27 UMOL/L (ref 0–0.4)
PROT PATTERN SERPL ELPH-IMP: ABNORMAL
PROT PATTERN UR ELPH-IMP: NORMAL

## 2023-09-21 NOTE — ASSESSMENT & PLAN NOTE
Worsening neuropathy. Lab evaluation and EMG ordered. She was unable to tolerate gabapentin due to cognitive side effects. She will try lidocaine gel first.

## 2023-09-22 DIAGNOSIS — G62.9 NEUROPATHY: Primary | ICD-10-CM

## 2023-09-22 LAB — DEPRECATED CALCIDIOL+CALCIFEROL SERPL-MC: 32 UG/L (ref 20–75)

## 2023-09-26 DIAGNOSIS — D47.2 IGA MONOCLONAL GAMMOPATHY: Primary | ICD-10-CM

## 2023-09-26 DIAGNOSIS — G62.9 NEUROPATHY: ICD-10-CM

## 2023-09-26 LAB — PROT PATTERN SERPL IFE-IMP: NORMAL

## 2023-09-27 ENCOUNTER — PATIENT OUTREACH (OUTPATIENT)
Dept: ONCOLOGY | Facility: CLINIC | Age: 83
End: 2023-09-27
Payer: COMMERCIAL

## 2023-09-27 NOTE — PROGRESS NOTES
New Patient Oncology Nurse Navigator Note     Referring provider: Jacy Mishra NP      Referring Clinic/Organization: 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 Evaluate for clonal plasma cell disorder.      Clinical History (per Nurse review of records provided):     Patient was seen on 9/19/23 for worsening neuropathy. Labs were drawn showing:         Latest Reference Range & Units 09/19/23 13:54 09/19/23 14:08   Sodium 136 - 145 mmol/L 140    Potassium 3.4 - 5.3 mmol/L 3.8    Chloride 98 - 107 mmol/L 102    Carbon Dioxide (CO2) 22 - 29 mmol/L 29    Urea Nitrogen 8.0 - 23.0 mg/dL 24.0 (H)    Creatinine 0.51 - 0.95 mg/dL 0.95    GFR Estimate >60 mL/min/1.73m2 59 (L)    Calcium 8.8 - 10.2 mg/dL 9.3    Anion Gap 7 - 15 mmol/L 9    Albumin 3.5 - 5.2 g/dL 4.3    Protein Total 6.4 - 8.3 g/dL 6.8    Alkaline Phosphatase 35 - 104 U/L 59    ALT 0 - 50 U/L 20    AST 0 - 45 U/L 28    Albumin Urine mg/g Cr  See Comment    Albumin Urine mg/L mg/L <12.0    Bilirubin Total <=1.2 mg/dL 0.3    Cholesterol <200 mg/dL 132    Creatinine Urine mg/dL 32.3    Ferritin 11 - 328 ng/mL 61    Glucose 70 - 99 mg/dL 95    HDL Cholesterol >=50 mg/dL 61    Iron 37 - 145 ug/dL 56    Iron Binding Capacity 240 - 430 ug/dL 299    Iron Sat Index 15 - 46 % 19    LDL Cholesterol Calculated <=100 mg/dL 48    Methylmalonic Acid 0.00 - 0.40 umol/L 0.27    Non HDL Cholesterol <130 mg/dL 71    Triglycerides <150 mg/dL 114    TSH 0.30 - 4.20 uIU/mL 2.46    Vitamin D Deficiency screening 20 - 75 ug/L 32    WBC 4.0 - 11.0 10e3/uL 4.1    Hemoglobin 11.7 - 15.7 g/dL 11.4 (L)    Hematocrit 35.0 -  47.0 % 34.5 (L)    Platelet Count 150 - 450 10e3/uL 220    RBC Count 3.80 - 5.20 10e6/uL 3.51 (L)    MCV 78 - 100 fL 98    MCH 26.5 - 33.0 pg 32.5    MCHC 31.5 - 36.5 g/dL 33.0    RDW 10.0 - 15.0 % 14.9    Albumin Fraction 3.7 - 5.1 g/dL 3.9    Alpha 1 Fraction 0.2 - 0.4 g/dL 0.3    Alpha 2 Fraction 0.5 - 0.9 g/dL 0.5    Beta Fraction 0.6 - 1.0 g/dL 1.0    ELP Interpretation:  Small monoclonal protein (0.3 g/dL) seen in the beta fraction co-migrating with C3 complement, not previously characterized in our laboratory. Recommend serum and urine immunofixation for confirmation and further characterization if not previously performed elsewhere. Slight hypogammaglobulinemia. Pathologic significance requires clinical correlation. Travis White MD    ELP Interpretation Urine   Trace albumin and globulins seen. No monoclonal protein seen. We recommend a first morning voided urine to detect clinically significant proteinuria. A random specimen is not optimal for detecting all proteins. The specific gravity of this specimen was only 1.010. Pathologic significance requires clinical correlation. Travis White MD   Gamma Fraction 0.7 - 1.6 g/dL 0.6 (L)    Immunofixation ELP  Monoclonal IgA immunoglobulin of kappa light chain type. Pathologic significance requires clinical correlation. Travis White MD    Monoclonal Peak <=0.0 g/dL 0.3 (H)    Total Protein Serum for ELP 6.4 - 8.3 g/dL 6.3 (L)    (H): Data is abnormally high  (L): Data is abnormally low     Records Location: See Bookmarked material     Records Needed: NA     Additional testing needed prior to consult: NA    Payor: [facility] / Plan: [facility] MEDICARE / Product Type: [facility] /     September 27, 2023    Called patient to introduced myself and role as nurse navigator with [facility] Hematology/Oncology department and to inform them that we have received the referral for a diagnosis of IgA Monoclonal Gammopathy from Aldo Mishra NP.   Patient did not answer the phone so a  detailed message was left for them including NPS number. Requested callback to speak to a  to set the consult date/time/location. Explained to patient in the message that they will receive a call from our new patient scheduling team (NPS number below, hours are Monday - Friday 8am - 4:30 pm) in the next 1-2 business days to schedule the consultation. Encouraged them to call back with any questions.     September 29, 2023  Nurse Navigator received a message from NPS team that patient had some questions about referral. Called patient this morning and reviewed with her the [employer] Message from Jacy Mishra NP on 9/26/23. Patient verbalized understanding and was grateful for the call back and explanation. She is scheduled next month on 10/20/23 to see the hematologist. Encouraged patient to reach to Jacy with any further questions or concerns.     Chen Lainez, RN, BSN  [facility] Hematology/Oncology Nurse Navigator  [phone]

## 2023-09-30 ASSESSMENT — ENCOUNTER SYMPTOMS
CONSTIPATION: 1
DYSURIA: 0
HEMATURIA: 0
ABDOMINAL PAIN: 0
DIARRHEA: 0
NAUSEA: 0
SORE THROAT: 0
NERVOUS/ANXIOUS: 1
BREAST MASS: 0
HEMATOCHEZIA: 0
FREQUENCY: 0
CHILLS: 0
PALPITATIONS: 0
MYALGIAS: 1
JOINT SWELLING: 1
WEAKNESS: 1
HEADACHES: 1
COUGH: 0
HEARTBURN: 1
FEVER: 0
ARTHRALGIAS: 1
PARESTHESIAS: 1
EYE PAIN: 0
DIZZINESS: 0
SHORTNESS OF BREATH: 0

## 2023-09-30 ASSESSMENT — ACTIVITIES OF DAILY LIVING (ADL): CURRENT_FUNCTION: MONEY MANAGEMENT REQUIRES ASSISTANCE

## 2023-10-02 ENCOUNTER — TRANSFERRED RECORDS (OUTPATIENT)
Dept: HEALTH INFORMATION MANAGEMENT | Facility: CLINIC | Age: 83
End: 2023-10-02
Payer: COMMERCIAL

## 2023-10-03 ASSESSMENT — PATIENT HEALTH QUESTIONNAIRE - PHQ9
SUM OF ALL RESPONSES TO PHQ QUESTIONS 1-9: 3
10. IF YOU CHECKED OFF ANY PROBLEMS, HOW DIFFICULT HAVE THESE PROBLEMS MADE IT FOR YOU TO DO YOUR WORK, TAKE CARE OF THINGS AT HOME, OR GET ALONG WITH OTHER PEOPLE: NOT DIFFICULT AT ALL
SUM OF ALL RESPONSES TO PHQ QUESTIONS 1-9: 3

## 2023-10-04 ENCOUNTER — OFFICE VISIT (OUTPATIENT)
Dept: INTERNAL MEDICINE | Facility: CLINIC | Age: 83
End: 2023-10-04
Payer: COMMERCIAL

## 2023-10-04 VITALS
DIASTOLIC BLOOD PRESSURE: 64 MMHG | RESPIRATION RATE: 17 BRPM | SYSTOLIC BLOOD PRESSURE: 122 MMHG | BODY MASS INDEX: 27.08 KG/M2 | OXYGEN SATURATION: 97 % | HEART RATE: 70 BPM | WEIGHT: 175.2 LBS

## 2023-10-04 DIAGNOSIS — Z00.00 ENCOUNTER FOR MEDICARE ANNUAL WELLNESS EXAM: Primary | ICD-10-CM

## 2023-10-04 DIAGNOSIS — E03.9 ACQUIRED HYPOTHYROIDISM: ICD-10-CM

## 2023-10-04 DIAGNOSIS — R73.01 IMPAIRED FASTING GLUCOSE: ICD-10-CM

## 2023-10-04 DIAGNOSIS — M19.91 PRIMARY OSTEOARTHRITIS, UNSPECIFIED SITE: ICD-10-CM

## 2023-10-04 DIAGNOSIS — E78.2 MIXED HYPERLIPIDEMIA: ICD-10-CM

## 2023-10-04 DIAGNOSIS — K21.9 GASTROESOPHAGEAL REFLUX DISEASE WITHOUT ESOPHAGITIS: ICD-10-CM

## 2023-10-04 DIAGNOSIS — I25.10 CORONARY ARTERY DISEASE INVOLVING NATIVE CORONARY ARTERY OF NATIVE HEART WITHOUT ANGINA PECTORIS: ICD-10-CM

## 2023-10-04 DIAGNOSIS — F34.1 DYSTHYMIC DISORDER: ICD-10-CM

## 2023-10-04 DIAGNOSIS — E55.9 VITAMIN D DEFICIENCY: ICD-10-CM

## 2023-10-04 DIAGNOSIS — G62.9 NEUROPATHY: ICD-10-CM

## 2023-10-04 DIAGNOSIS — I10 ESSENTIAL HYPERTENSION: ICD-10-CM

## 2023-10-04 DIAGNOSIS — N18.31 STAGE 3A CHRONIC KIDNEY DISEASE (H): ICD-10-CM

## 2023-10-04 DIAGNOSIS — M70.62 GREATER TROCHANTERIC BURSITIS OF BOTH HIPS: ICD-10-CM

## 2023-10-04 DIAGNOSIS — M70.61 GREATER TROCHANTERIC BURSITIS OF BOTH HIPS: ICD-10-CM

## 2023-10-04 PROCEDURE — 20610 DRAIN/INJ JOINT/BURSA W/O US: CPT | Mod: LT | Performed by: NURSE PRACTITIONER

## 2023-10-04 PROCEDURE — 99214 OFFICE O/P EST MOD 30 MIN: CPT | Mod: 25 | Performed by: NURSE PRACTITIONER

## 2023-10-04 PROCEDURE — G0439 PPPS, SUBSEQ VISIT: HCPCS | Performed by: NURSE PRACTITIONER

## 2023-10-04 RX ORDER — TRIAMCINOLONE ACETONIDE 40 MG/ML
80 INJECTION, SUSPENSION INTRA-ARTICULAR; INTRAMUSCULAR ONCE
Status: COMPLETED | OUTPATIENT
Start: 2023-10-04 | End: 2023-10-04

## 2023-10-04 RX ORDER — OXYCODONE HYDROCHLORIDE 5 MG/1
5 TABLET ORAL EVERY 6 HOURS PRN
Qty: 10 TABLET | Refills: 0 | Status: SHIPPED | OUTPATIENT
Start: 2023-10-04 | End: 2023-10-07

## 2023-10-04 RX ADMIN — TRIAMCINOLONE ACETONIDE 80 MG: 40 INJECTION, SUSPENSION INTRA-ARTICULAR; INTRAMUSCULAR at 11:49

## 2023-10-04 ASSESSMENT — ENCOUNTER SYMPTOMS
ARTHRALGIAS: 1
NAUSEA: 0
SHORTNESS OF BREATH: 0
FREQUENCY: 0
HEARTBURN: 1
MYALGIAS: 1
HEMATURIA: 0
WEAKNESS: 1
ABDOMINAL PAIN: 0
DYSURIA: 0
DIZZINESS: 0
CONSTIPATION: 1
HEADACHES: 1
CHILLS: 0
DIARRHEA: 0
COUGH: 0
BREAST MASS: 0
EYE PAIN: 0
PALPITATIONS: 0
PARESTHESIAS: 1
FEVER: 0
HEMATOCHEZIA: 0
NERVOUS/ANXIOUS: 1
JOINT SWELLING: 1
SORE THROAT: 0

## 2023-10-04 ASSESSMENT — ACTIVITIES OF DAILY LIVING (ADL): CURRENT_FUNCTION: MONEY MANAGEMENT REQUIRES ASSISTANCE

## 2023-10-04 NOTE — PATIENT INSTRUCTIONS
Patient Education     Wait 2 weeks from to day before you get a cortisone injection in the foot     Add vitamin D 1000 international unit(s) daily       Personalized Prevention Plan  You are due for the preventive services outlined below.  Your care team is available to assist you in scheduling these services.  If you have already completed any of these items, please share that information with your care team to update in your medical record.  Health Maintenance Due   Topic Date Due    COVID-19 Vaccine (6 - 2023-24 season) 09/01/2023    Annual Wellness Visit  09/22/2023     Your Health Risk Assessment indicates you feel you are not in good health    A healthy lifestyle helps keep the body fit and the mind alert. It helps protect you from disease, helps you fight disease, and helps prevent chronic disease (disease that doesn't go away) from getting worse. This is important as you get older and begin to notice twinges in muscles and joints and a decline in the strength and stamina you once took for granted. A healthy lifestyle includes good healthcare, good nutrition, weight control, recreation, and regular exercise. Avoid harmful substances and do what you can to keep safe. Another part of a healthy lifestyle is stay mentally active and socially involved.    Good healthcare   Have a wellness visit every year.   If you have new symptoms, let us know right away. Don't wait until the next checkup.   Take medicines exactly as prescribed and keep your medicines in a safe place. Tell us if your medicine causes problems.   Healthy diet and weight control   Eat 3 or 4 small, nutritious, low-fat, high-fiber meals a day. Include a variety of fruits, vegetables, and whole-grain foods.   Make sure you get enough calcium in your diet. Calcium, vitamin D, and exercise help prevent osteoporosis (bone thinning).   If you live alone, try eating with others when you can. That way you get a good meal and have company while you eat it.    Try to keep a healthy weight. If you eat more calories than your body uses for energy, it will be stored as fat and you will gain weight.     Recreation   Recreation is not limited to sports and team events. It includes any activity that provides relaxation, interest, enjoyment, and exercise. Recreation provides an outlet for physical, mental, and social energy. It can give a sense of worth and achievement. It can help you stay healthy.    Mental Exercise and Social Involvement  Mental and emotional health is as important as physical health. Keep in touch with friends and family. Stay as active as possible. Continue to learn and challenge yourself.   Things you can do to stay mentally active are:  Learn something new, like a foreign language or musical instrument.   Play SCRABBLE or do crossword puzzles. If you cannot find people to play these games with you at home, you can play them with others on your computer through the Internet.   Join a games club--anything from card games to chess or checkers or lawn bowling.   Start a new hobby.   Go back to school.   Volunteer.   Read.   Keep up with world events.  Activities of Daily Living    Your Health Risk Assessment indicates you have difficulties with activities of daily living such as housework, bathing, preparing meals, taking medication, etc. Please make a follow up appointment for us to address this issue in more detail.

## 2023-10-04 NOTE — PROGRESS NOTES
"SUBJECTIVE:   Geeta is a 83 year old who presents for Preventive Visit.      10/4/2023    10:54 AM   Additional Questions   Roomed by Aron MCMULLEN CMA     Are you in the first 12 months of your Medicare coverage?  No    Healthy Habits:     In general, how would you rate your overall health?  Fair    Frequency of exercise:  6-7 days/week    Duration of exercise:  45-60 minutes    Do you usually eat at least 4 servings of fruit and vegetables a day, include whole grains    & fiber and avoid regularly eating high fat or \"junk\" foods?  Yes    Taking medications regularly:  Yes    Ability to successfully perform activities of daily living:  Money management requires assistance    Home Safety:  No safety concerns identified    Hearing Impairment:  No hearing concerns    In the past 6 months, have you been bothered by leaking of urine?  No    In general, how would you rate your overall mental or emotional health?  Good    Additional concerns today:  Yes        Today's PHQ-9 Score:       10/3/2023     6:59 PM   PHQ-9 SCORE   PHQ-9 Total Score MyChart 3 (Minimal depression)   PHQ-9 Total Score 3       Have you ever done Advance Care Planning? (For example, a Health Directive, POLST, or a discussion with a medical provider or your loved ones about your wishes): Yes, advance care planning is on file.       Fall risk  Fallen 2 or more times in the past year?: No  Any fall with injury in the past year?: No    Cognitive Screening   1) Repeat 3 items (Leader, Season, Table)    2) Clock draw: ABNORMAL    3) 3 item recall: Recalls 3 objects  Results: 3 items recalled: COGNITIVE IMPAIRMENT LESS LIKELY    Mini-CogTM Copyright MOISES Palacio. Licensed by the author for use in [facility]; reprinted with permission (cuba@.[facility]). All rights reserved.        Reviewed and updated as needed this visit by clinical staff   Tobacco  Allergies  Meds  Problems  Med Hx  Surg Hx  Fam Hx          Reviewed and updated as needed this " visit by Provider   Tobacco  Allergies  Meds  Problems  Med Hx  Surg Hx  Fam Hx         Social History     Tobacco Use    Smoking status: Never    Smokeless tobacco: Never   Substance Use Topics    Alcohol use: No           9/30/2023     8:16 PM   Alcohol Use   Prescreen: >3 drinks/day or >7 drinks/week? Not Applicable     Do you have a current opioid prescription? No  Do you use any other controlled substances or medications that are not prescribed by a provider? None      Current providers sharing in care for this patient include:   Patient Care Team:  Jacy Mishra NP as PCP - General  Jacy Mishra NP as Assigned PCP  Kathie Lemons MD as Assigned Heart and Vascular Provider  Ben Perez MD as MD (Medical Oncology)  Jacy Mishra NP as Nurse Practitioner    The following health maintenance items are reviewed in Epic and correct as of today:  Health Maintenance   Topic Date Due    COVID-19 Vaccine (6 - 2023-24 season) 09/01/2023    DTAP/TDAP/TD IMMUNIZATION (2 - Td or Tdap) 11/20/2023    PHQ-9  04/04/2024    BMP  09/19/2024    LIPID  09/19/2024    MICROALBUMIN  09/19/2024    TSH W/FREE T4 REFLEX  09/19/2024    ANNUAL REVIEW OF HM ORDERS  09/19/2024    HEMOGLOBIN  09/19/2024    MEDICARE ANNUAL WELLNESS VISIT  10/04/2024    FALL RISK ASSESSMENT  10/04/2024    ADVANCE CARE PLANNING  10/04/2028    DEPRESSION ACTION PLAN  Completed    INFLUENZA VACCINE  Completed    Pneumococcal Vaccine: 65+ Years  Completed    URINALYSIS  Completed    ZOSTER IMMUNIZATION  Completed    IPV IMMUNIZATION  Aged Out    HPV IMMUNIZATION  Aged Out    MENINGITIS IMMUNIZATION  Aged Out    DEXA  Discontinued     Pertinent mammograms are reviewed under the imaging tab.    Review of Systems   Constitutional:  Negative for chills and fever.   HENT:  Negative for congestion, ear pain, hearing loss and sore throat.    Eyes:  Negative for pain and visual disturbance.   Respiratory:  Negative for cough and shortness of breath.   "  Cardiovascular:  Positive for peripheral edema. Negative for chest pain and palpitations.   Gastrointestinal:  Positive for constipation and heartburn. Negative for abdominal pain, diarrhea, hematochezia and nausea.   Breasts:  Negative for tenderness, breast mass and discharge.   Genitourinary:  Positive for pelvic pain. Negative for dysuria, frequency, genital sores, hematuria, urgency, vaginal bleeding and vaginal discharge.   Musculoskeletal:  Positive for arthralgias, joint swelling and myalgias.   Skin:  Negative for rash.   Neurological:  Positive for weakness, headaches and paresthesias. Negative for dizziness.   Psychiatric/Behavioral:  Negative for mood changes. The patient is nervous/anxious.        OBJECTIVE:   /64 (BP Location: Right arm, Patient Position: Sitting, Cuff Size: Adult Regular)   Pulse 70   Resp 17   Wt 79.5 kg (175 lb 3.2 oz)   LMP  (LMP Unknown)   SpO2 97%   Breastfeeding No   BMI 27.08 kg/m   Estimated body mass index is 27.08 kg/m  as calculated from the following:    Height as of 9/19/23: 1.713 m (5' 7.44\").    Weight as of this encounter: 79.5 kg (175 lb 3.2 oz).    Wt Readings from Last 5 Encounters:   10/04/23 79.5 kg (175 lb 3.2 oz)   09/19/23 78.9 kg (173 lb 14.4 oz)   08/24/23 78 kg (172 lb)   07/03/23 78 kg (172 lb)   05/18/23 78.1 kg (172 lb 1.6 oz)       Physical Exam  GENERAL: healthy, alert and no distress  RESP: lungs clear to auscultation - no rales, rhonchi or wheezes  CV: regular rate and rhythm, normal S1 S2, no S3 or S4      ASSESSMENT / PLAN:     Problem List Items Addressed This Visit          Nervous and Auditory    Neuropathy     Worsening neuropathy.   Possible monoconal gammopathy noted during evaluation for neuropathy. She is scheduled to see hematology.   If hem evaluation determines the above is not contributing to neuropathy she is also scheduled to see neurology regarding neuropathy.     She was unable to tolerate gabapentin due to cognitive " side effects. Had dizziness with duloxetine. Continue lidocaine gel. Also prescribed a small number of oxycodone 5 mg to take as needed at bedtime for pain interfering with sleep.          Greater trochanteric bursitis of both hips     Corticosteroid injection today          Relevant Medications    oxyCODONE (ROXICODONE) 5 MG tablet    Other Relevant Orders    Large Joint/Bursa injection and/or drainage (Shoulder, Knee) (Completed)       Digestive    Gastroesophageal reflux disease     Continue omeprazole          Vitamin D deficiency     Add vitamin D 1000 international unit(s) daily             Endocrine    Hyperlipidemia     No change in myalgia since medication has been held  Resume rosuvastatin for primary prevention          Hypothyroidism     Euthyroid             Circulatory    Essential hypertension     Normotensive          Coronary artery disease involving native coronary artery of native heart without angina pectoris     No angina   Continue statin due to finding of mild CAD on Coronary CT calcium score test             Musculoskeletal and Integumentary    Osteoarthritis; knees, hips, cervical spine      Wait 2 weeks after this greater trochanteric cortisone injection to get a foot injection through podiatry          Relevant Medications    oxyCODONE (ROXICODONE) 5 MG tablet       Urinary    Stage 3a chronic kidney disease (H)     Stable creatinine on recent lab work             Behavioral    Dysthymic disorder     Continue citalopram           Other Visit Diagnoses       Encounter for Medicare annual wellness exam    -  Primary    Impaired fasting glucose        Relevant Orders    Hemoglobin A1c    Protein immunofixation urine              Procedure:  Left trochanteric bursa injection.    Diagnosis:  Trochanteric bursitis.    Description of procedure:  Patient was given informed consent prior to proceeding with injection.  Patient agreed to proceed knowing the risks and benefits.  Patient was placed in  "a lateral decubitus position with affected hip exposed.  The superior, anterior and posterior aspects of the upper femur were marked.  About 1.5 inches below the superior aspect and midway between the anterior and posterior aspects, an injection spot was found.  Palpation reproduced the tenderness.  Using a 25G needle, a combination of 80 mg of Kenalog and 1 cc of lidocaine with epinephrine was injected slightly away from the bone.  Aftercare instructions were given and there were no immediate complications.        COUNSELING:  Reviewed preventive health counseling, as reflected in patient instructions      BMI:   Estimated body mass index is 27.08 kg/m  as calculated from the following:    Height as of 9/19/23: 1.713 m (5' 7.44\").    Weight as of this encounter: 79.5 kg (175 lb 3.2 oz).         She reports that she has never smoked. She has never used smokeless tobacco.      Appropriate preventive services were discussed with this patient, including applicable screening as appropriate for fall prevention, nutrition, physical activity, Tobacco-use cessation, weight loss and cognition.  Checklist reviewing preventive services available has been given to the patient.    Reviewed patients plan of care and provided an AVS. The Complex Care Plan (for patients with higher acuity and needing more deliberate coordination of services) for Geeta meets the Care Plan requirement. This Care Plan has been established and reviewed with the Patient and daughter.      Jacy Mishra NP  [facility]    Identified Health Risks:  I have reviewed Opioid Use Disorder and Substance Use Disorder risk factors and made any needed referrals.   The patient was provided with suggestions to help her develop a healthy physical lifestyle.  The patient reports that she has difficulty with activities of daily living. She does not need further resources at this time   "

## 2023-10-06 NOTE — ASSESSMENT & PLAN NOTE
Wait 2 weeks after this greater trochanteric cortisone injection to get a foot injection through podiatry

## 2023-10-06 NOTE — ASSESSMENT & PLAN NOTE
Worsening neuropathy.   Possible monoconal gammopathy noted during evaluation for neuropathy. She is scheduled to see hematology.   If hem evaluation determines the above is not contributing to neuropathy she is also scheduled to see neurology regarding neuropathy.     She was unable to tolerate gabapentin due to cognitive side effects. Had dizziness with duloxetine. Continue lidocaine gel. Also prescribed a small number of oxycodone 5 mg to take as needed at bedtime for pain interfering with sleep.

## 2023-10-16 NOTE — TELEPHONE ENCOUNTER
RECORDS STATUS - ALL OTHER DIAGNOSIS      RECORDS RECEIVED FROM: Epic   DATE RECEIVED:    NOTES STATUS DETAILS   OFFICE NOTE from referring provider Epic 10/4/23: Jacy Mishra NP   MEDICATION LIST [facility]    LABS     ANYTHING RELATED TO DIAGNOSIS Epic 9/19/23

## 2023-10-20 ENCOUNTER — LAB (OUTPATIENT)
Dept: INFUSION THERAPY | Facility: HOSPITAL | Age: 83
End: 2023-10-20
Attending: NURSE PRACTITIONER
Payer: COMMERCIAL

## 2023-10-20 ENCOUNTER — PRE VISIT (OUTPATIENT)
Dept: ONCOLOGY | Facility: HOSPITAL | Age: 83
End: 2023-10-20
Payer: COMMERCIAL

## 2023-10-20 ENCOUNTER — ONCOLOGY VISIT (OUTPATIENT)
Dept: ONCOLOGY | Facility: HOSPITAL | Age: 83
End: 2023-10-20
Attending: NURSE PRACTITIONER
Payer: COMMERCIAL

## 2023-10-20 VITALS
BODY MASS INDEX: 26.84 KG/M2 | TEMPERATURE: 97.6 F | SYSTOLIC BLOOD PRESSURE: 137 MMHG | HEIGHT: 67 IN | RESPIRATION RATE: 18 BRPM | HEART RATE: 57 BPM | DIASTOLIC BLOOD PRESSURE: 63 MMHG | OXYGEN SATURATION: 97 % | WEIGHT: 171 LBS

## 2023-10-20 DIAGNOSIS — D47.2 IGA MONOCLONAL GAMMOPATHY: ICD-10-CM

## 2023-10-20 DIAGNOSIS — G62.9 NEUROPATHY: ICD-10-CM

## 2023-10-20 LAB
BASO+EOS+MONOS # BLD AUTO: ABNORMAL 10*3/UL
BASO+EOS+MONOS NFR BLD AUTO: ABNORMAL %
BASOPHILS # BLD AUTO: 0 10E3/UL (ref 0–0.2)
BASOPHILS NFR BLD AUTO: 0 %
EOSINOPHIL # BLD AUTO: 0 10E3/UL (ref 0–0.7)
EOSINOPHIL NFR BLD AUTO: 1 %
ERYTHROCYTE [DISTWIDTH] IN BLOOD BY AUTOMATED COUNT: 15.4 % (ref 10–15)
HCT VFR BLD AUTO: 37.6 % (ref 35–47)
HGB BLD-MCNC: 12 G/DL (ref 11.7–15.7)
IMM GRANULOCYTES # BLD: 0 10E3/UL
IMM GRANULOCYTES NFR BLD: 1 %
LYMPHOCYTES # BLD AUTO: 1.3 10E3/UL (ref 0.8–5.3)
LYMPHOCYTES NFR BLD AUTO: 20 %
MCH RBC QN AUTO: 31.5 PG (ref 26.5–33)
MCHC RBC AUTO-ENTMCNC: 31.9 G/DL (ref 31.5–36.5)
MCV RBC AUTO: 99 FL (ref 78–100)
MONOCYTES # BLD AUTO: 0.8 10E3/UL (ref 0–1.3)
MONOCYTES NFR BLD AUTO: 12 %
NEUTROPHILS # BLD AUTO: 4.1 10E3/UL (ref 1.6–8.3)
NEUTROPHILS NFR BLD AUTO: 66 %
NRBC # BLD AUTO: 0 10E3/UL
NRBC BLD AUTO-RTO: 0 /100
PLATELET # BLD AUTO: 261 10E3/UL (ref 150–450)
RBC # BLD AUTO: 3.81 10E6/UL (ref 3.8–5.2)
WBC # BLD AUTO: 6.2 10E3/UL (ref 4–11)

## 2023-10-20 PROCEDURE — 82784 ASSAY IGA/IGD/IGG/IGM EACH: CPT

## 2023-10-20 PROCEDURE — 83521 IG LIGHT CHAINS FREE EACH: CPT

## 2023-10-20 PROCEDURE — 99203 OFFICE O/P NEW LOW 30 MIN: CPT | Performed by: INTERNAL MEDICINE

## 2023-10-20 PROCEDURE — 36415 COLL VENOUS BLD VENIPUNCTURE: CPT

## 2023-10-20 PROCEDURE — 85025 COMPLETE CBC W/AUTO DIFF WBC: CPT

## 2023-10-20 PROCEDURE — G0463 HOSPITAL OUTPT CLINIC VISIT: HCPCS | Performed by: INTERNAL MEDICINE

## 2023-10-20 RX ORDER — VITAMIN B COMPLEX
1 TABLET ORAL DAILY
COMMUNITY

## 2023-10-20 RX ORDER — ACETAMINOPHEN 500 MG
500 TABLET ORAL 4 TIMES DAILY
COMMUNITY

## 2023-10-20 RX ORDER — LIDOCAINE AND MENTHOL 2; .5 G/50ML; G/50ML
1 LIQUID TOPICAL PRN
COMMUNITY

## 2023-10-20 ASSESSMENT — PAIN SCALES - GENERAL: PAINLEVEL: SEVERE PAIN (6)

## 2023-10-20 NOTE — PROGRESS NOTES
[facility] Hematology and Oncology Consult Note    Patient: Geeta Berry  MRN: [id number]  Date of Service: 10/20/2023      Reason for Visit    No chief complaint on file.        Assessment/Plan    Problem List Items Addressed This Visit          Nervous and Auditory    Neuropathy     Other Visit Diagnoses       IgA monoclonal gammopathy        Relevant Orders    CBC with platelets and differential    Kappa and lambda light chain    IgA          IgA monoclonal gammopathy  I reviewed her labs and chart in detail today.  This was discovered during evaluation for neuropathy.  Monoclonal peak at 0.3 g/dL.  Light chains were not done.  She does have mild anemia with hemoglobin of 11.4.  Normal calcium, normal creatinine and no other clinical evidence of myeloma.  She has had multiple scans in the recent past including lumbar spine MRI, CT abdomen pelvis and none of them have shown any obvious bone lesions.    I reviewed the diagnosis of monoclonal myopathy in detail today.  I reviewed its natural course, prognosis, chances of progression to myeloma and treatment options.  Right now she can be classified as medical gammopathy of unknown significance.  Monoclonal peak is less than 0.5 g/dL without any obvious clinical evidence of multiple myeloma.  Although she has mild anemia, it is not low enough to fit the clinical criteria.  No evidence of any bone lesions on the recent scans.  I will obtain kappa lambda free light chains today along with repeat CBC and a peripheral smear.  I will also obtain IgA levels.  No need for any bone marrow biopsy at this point in time.  We will continue to monitor her monoclonal proteins.  I explained to her that non IgG MGUS has a slightly higher risk for progression to myeloma and the risk depend upon whether she has any abnormal free light chain ratio in the serum.  I would estimate her risk of progression to myeloma in 20 years is about 37%.    With regards to correlation with her  neuropathy, currently there is no data to suggest that IgA monoclonal protein is associated with neuropathy at least directly.    ECOG Performance    2 - Ambulatory and independent in all ADLs; cannot work; up > 50% of the time    Problem List    Patient Active Problem List   Diagnosis    History of DVT (deep vein thrombosis), right LE 12/2016    Acute iritis, h/o in 2012    Choledocholithiasis    Constipation    Gastroesophageal reflux disease    Essential hypertension    Hyperlipidemia    Hypothyroidism    Osteoarthritis of lumbar spine    Osteoarthritis; knees, hips, cervical spine     Facet arthropathy, cervical    Dysthymic disorder    Hiatal hernia    Chronic rhinitis    Coronary artery disease involving native coronary artery of native heart without angina pectoris    Stage 3a chronic kidney disease (H)    Status post knee surgery    Neuropathy    Lumbar radiculopathy    Iron deficiency anemia due to chronic blood loss    Greater trochanteric bursitis of both hips    Epigastric pain    Vitamin D deficiency     ______________________________________________________________________________    Staging History    Cancer Staging   No matching staging information was found for the patient.        History of presenting illness:  Geeta Berry is an 83-year-old female who has been referred by her primary care provider for further evaluation management of monoclonal gammopathy.    Recently she was seen by her primary care provider at which time she had complained of worsening peripheral neuropathy.  As a part of evaluation SPEP was ordered which came back showing a monoclonal protein, IgA kappa at a concentration of 0.3 g/dL.  Light chains were not done.  CBC showed mild anemia with hemoglobin 11.4.  Normal platelet and white counts.  Serum chemistry otherwise within normal limits.  Iron studies were normal.  B12 was normal.    In the past she has had mildly elevated IgA levels.  Denies any new bone pain.  No fever  chills or night sweats.  No weight loss.    Has a history of DVT of the right lower extremity.  Not on anticoagulation currently.      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)     Chronic neck pain     Common bile duct calculus     Coronary artery disease due to calcified coronary lesion 8/6/2020    Depression     DVT (deep venous thrombosis) (H)     GERD (gastroesophageal reflux disease)     History of blood clots 2017    DVT right lower extremity    HLD (hyperlipidemia)     HTN (hypertension)     Hyperlipidemia     Hypertension     Hypothyroidism     Infectious viral hepatitis     hepatitis A in 1960s    Thyroid disease     Family History   Problem Relation Age of Onset    Colon Cancer Father     Diabetes Sister     Heart Disease Mother       Past Surgical History:   Procedure Laterality Date    ARTHROPLASTY REVISION HIP Left 5/16/2017    Procedure: REVISION LEFT TOTAL HIP ARTHROPLASTY, BOTH COMPONENTS;  Surgeon: Tyson Peoples MD;  Location: [facility];  Service:     ARTHROSCOPY HIP Left 1983    CHOLECYSTECTOMY  1980    ENDOSCOPIC RETROGRADE CHOLANGIOPANCREATOGRAM      x5 over 8 years. Last 8/2016    ENDOSCOPIC RETROGRADE CHOLANGIOPANCREATOGRAM N/A 9/5/2017    Procedure: ENDOSCOPIC RETROGRADE CHOLANGIOPANCREATOGRAPHY, STONE EXTRACTION;  Surgeon: Henry Eller MD;  Location[facility];  Service:     ENDOSCOPIC RETROGRADE CHOLANGIOPANCREATOGRAM N/A 7/26/2019    Procedure: ENDOSCOPIC RETROGRADE CHOLANGIOPANCREATOGRAPHY, REMOVAL OF SLUDGE, DILATION OF STRICTURE;  Surgeon: Ryan Pastrana MD;  Location: [facility] OR;  Service: Gastroenterology    ENDOSCOPIC RETROGRADE CHOLANGIOPANCREATOGRAM N/A 6/6/2022    Procedure: ENDOSCOPIC RETROGRADE CHOLANGIOPANCREATOGRAPHY, WITH STONE EXTRACTION;  Surgeon: Hima Perry MD;  Location: [facility] [place] [place]    ENDOSCOPIC RETROGRADE  CHOLANGIOPANCREATOGRAM  6/6/2022    Procedure: ;  Surgeon: Hima Perry MD;  Location: [facility] OR    HIP SURGERY  2012    revision    JOINT REPLACEMENT      ORTHOPEDIC SURGERY      OTHER SURGICAL HISTORY      bilateral THAwith revision on both hips    MN ESOPHAGOGASTRODUODENOSCOPY TRANSORAL DIAGNOSTIC N/A 9/10/2019    Procedure: ESOPHAGOGASTRODUODENOSCOPY (EGD);  Surgeon: Will Nash DO;  Location: [facility] OR;  Service: General    REPLACEMENT TOTAL KNEE Left 2015    SHOULDER SURGERY Left 2010    left total shoulder replacement    XR ERCP BILIARY AND PANCREAS  7/26/2019    ZZC TOTAL KNEE ARTHROPLASTY Right 4/29/2021    Procedure: RIGHT TOTAL KNEE ARTHROPLASTY;  Surgeon: Satinder Issa DO;  Location: [facility] OR;  Service: Orthopedics    Social History     Socioeconomic History    Marital status:      Spouse name: Not on file    Number of children: 2    Years of education: Not on file    Highest education level: Not on file   Occupational History    Not on file   Tobacco Use    Smoking status: Never    Smokeless tobacco: Never   Vaping Use    Vaping Use: Never used   Substance and Sexual Activity    Alcohol use: No    Drug use: No    Sexual activity: Not Currently     Birth control/protection: Post-menopausal   Other Topics Concern    Not on file   Social History Narrative    4/6/17: The patient lives with her  in a condo.     Social Determinants of Health     Financial Resource Strain: Low Risk  (9/30/2023)    Financial Resource Strain     Within the past 12 months, have you or your family members you live with been unable to get utilities (heat, electricity) when it was really needed?: No   Food Insecurity: Low Risk  (9/30/2023)    Food Insecurity     Within the past 12 months, did you worry that your food would run out before you got money to buy more?: No     Within the past 12 months, did the food you bought just not last and you didn t have money to get more?: No    Transportation Needs: Low Risk  (9/30/2023)    Transportation Needs     Within the past 12 months, has lack of transportation kept you from medical appointments, getting your medicines, non-medical meetings or appointments, work, or from getting things that you need?: No   Physical Activity: Not on file   Stress: Not on file   Social Connections: Not on file   Interpersonal Safety: Not on file   Housing Stability: Low Risk  (9/30/2023)    Housing Stability     Do you have housing? : Yes     Are you worried about losing your housing?: No        Allergies    Allergies   Allergen Reactions    Atorvastatin      Can't remember    Codeine      Can't remember    Dipyridamole      Can't remember    Duloxetine Headache       Review of Systems    Pertinent items are noted in HPI.      Physical Exam        10/4/2023    11:00 AM   Oncology Vitals   Weight 79.47 kg   BSA (m2) 1.94 m2   /64   Pulse 70   SpO2 97 %       General: alert and cooperative  HEENT: Head: Normal, normocephalic, atraumatic.  Eye: Normal external eye, conjunctiva, lids cornea, ALETHA.  Chest: Clear to auscultation bilaterally  Cardiac: S1, S2 normal, regular rate and rhythm  Abdomen: abdomen is soft without significant tenderness, masses, organomegaly or guarding  Extremities: atraumatic, no peripheral edema  Skin:   CNS: Alert and oriented x3, neurologic exam grossly normal.  Lymphatics: No bilateral cervical, axillary, supraclavicular or inguinal adenopathy noted      Lab Results    No results found for this or any previous visit (from the past 168 hour(s)).    Imaging Results    No results found.    A total of 40 minutes was spent today on this visit including face to face conversation with the patient, EMR review (labs, imaging studies, pathology reports and outside records), counseling and care co-ordination and documentation.    Signed by: Ben Perez MD

## 2023-10-20 NOTE — Clinical Note
10/20/2023         RE: Geeta Berry  [address]  [facility] [address]        Dear Colleague,    Thank you for referring your patient, Geeta Berry, to the [facility] [place]. Please see a copy of my visit note below.    [facility] Hematology and Oncology Consult Note    Patient: Geeta Berry  MRN: [id number]  Date of Service: 10/20/2023      Reason for Visit    No chief complaint on file.        Assessment/Plan    Problem List Items Addressed This Visit          Nervous and Auditory    Neuropathy     Other Visit Diagnoses       IgA monoclonal gammopathy        Relevant Orders    CBC with platelets and differential    Kappa and lambda light chain    IgA          IgA monoclonal gammopathy  I reviewed her labs and chart in detail today.  This was discovered during evaluation for neuropathy.  Monoclonal peak at 0.3 g/dL.  Light chains were not done.  She does have mild anemia with hemoglobin of 11.4.  Normal calcium, normal creatinine and no other clinical evidence of myeloma.  She has had multiple scans in the recent past including lumbar spine MRI, CT abdomen pelvis and none of them have shown any obvious bone lesions.    I reviewed the diagnosis of monoclonal myopathy in detail today.  I reviewed its natural course, prognosis, chances of progression to myeloma and treatment options.  Right now she can be classified as medical gammopathy of unknown significance.  Monoclonal peak is less than 0.5 g/dL without any obvious clinical evidence of multiple myeloma.  Although she has mild anemia, it is not low enough to fit the clinical criteria.  No evidence of any bone lesions on the recent scans.  I will obtain kappa lambda free light chains today along with repeat CBC and a peripheral smear.  I will also obtain IgA levels.  No need for any bone marrow biopsy at this point in time.  We will continue to monitor her monoclonal proteins.  I explained to her that non IgG MGUS  has a slightly higher risk for progression to myeloma and the risk depend upon whether she has any abnormal free light chain ratio in the serum.  I would estimate her risk of progression to myeloma in 20 years is about 37%.    With regards to correlation with her neuropathy, currently there is no data to suggest that IgA monoclonal protein is associated with neuropathy at least directly.    ECOG Performance    {ECOG SCALE:623483}    Problem List    Patient Active Problem List   Diagnosis     History of DVT (deep vein thrombosis), right LE 12/2016     Acute iritis, h/o in 2012     Choledocholithiasis     Constipation     Gastroesophageal reflux disease     Essential hypertension     Hyperlipidemia     Hypothyroidism     Osteoarthritis of lumbar spine     Osteoarthritis; knees, hips, cervical spine      Facet arthropathy, cervical     Dysthymic disorder     Hiatal hernia     Chronic rhinitis     Coronary artery disease involving native coronary artery of native heart without angina pectoris     Stage 3a chronic kidney disease (H)     Status post knee surgery     Neuropathy     Lumbar radiculopathy     Iron deficiency anemia due to chronic blood loss     Greater trochanteric bursitis of both hips     Epigastric pain     Vitamin D deficiency     ______________________________________________________________________________    Staging History    Cancer Staging   No matching staging information was found for the patient.        History of presenting illness:  Geeta Berry is an 83-year-old female who has been referred by her primary care provider for further evaluation management of monoclonal gammopathy.    Recently she was seen by her primary care provider at which time she had complained of worsening peripheral neuropathy.  As a part of evaluation SPEP was ordered which came back showing a monoclonal protein, IgA kappa at a concentration of 0.3 g/dL.  Light chains were not done.  CBC showed mild anemia with hemoglobin  11.4.  Normal platelet and white counts.  Serum chemistry otherwise within normal limits.  Iron studies were normal.  B12 was normal.    In the past she has had mildly elevated IgA levels.  Denies any new bone pain.  No fever chills or night sweats.  No weight loss.    Has a history of DVT of the right lower extremity.  Not on anticoagulation currently.      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)      Chronic neck pain      Common bile duct calculus      Coronary artery disease due to calcified coronary lesion 8/6/2020     Depression      DVT (deep venous thrombosis) (H)      GERD (gastroesophageal reflux disease)      History of blood clots 2017    DVT right lower extremity     HLD (hyperlipidemia)      HTN (hypertension)      Hyperlipidemia      Hypertension      Hypothyroidism      Infectious viral hepatitis     hepatitis A in 1960s     Thyroid disease     Family History   Problem Relation Age of Onset     Colon Cancer Father      Diabetes Sister      Heart Disease Mother       Past Surgical History:   Procedure Laterality Date     ARTHROPLASTY REVISION HIP Left 5/16/2017    Procedure: REVISION LEFT TOTAL HIP ARTHROPLASTY, BOTH COMPONENTS;  Surgeon: Tyson Peoples MD;  Location: [facility] OR;  Service:      ARTHROSCOPY HIP Left 1983     CHOLECYSTECTOMY  1980     ENDOSCOPIC RETROGRADE CHOLANGIOPANCREATOGRAM      x5 over 8 years. Last 8/2016     ENDOSCOPIC RETROGRADE CHOLANGIOPANCREATOGRAM N/A 9/5/2017    Procedure: ENDOSCOPIC RETROGRADE CHOLANGIOPANCREATOGRAPHY, STONE EXTRACTION;  Surgeon: Henry Eller MD;  Location: [facility] OR;  Service:      ENDOSCOPIC RETROGRADE CHOLANGIOPANCREATOGRAM N/A 7/26/2019    Procedure: ENDOSCOPIC RETROGRADE CHOLANGIOPANCREATOGRAPHY, REMOVAL OF SLUDGE, DILATION OF STRICTURE;  Surgeon: Ryan Pastrana MD;  Location: [facility] OR;  Service: Gastroenterology      ENDOSCOPIC RETROGRADE CHOLANGIOPANCREATOGRAM N/A 6/6/2022    Procedure: ENDOSCOPIC RETROGRADE CHOLANGIOPANCREATOGRAPHY, WITH STONE EXTRACTION;  Surgeon: Hima Perry MD;  Location: [facility]     ENDOSCOPIC RETROGRADE CHOLANGIOPANCREATOGRAM  6/6/2022    Procedure: ;  Surgeon: Hima Perry MD;  Location: [facility] OR     HIP SURGERY  2012    revision     JOINT REPLACEMENT       ORTHOPEDIC SURGERY       OTHER SURGICAL HISTORY      bilateral THAwith revision on both hips     AZ ESOPHAGOGASTRODUODENOSCOPY TRANSORAL DIAGNOSTIC N/A 9/10/2019    Procedure: ESOPHAGOGASTRODUODENOSCOPY (EGD);  Surgeon: Will Nash DO;  Location: [facility] OR;  Service: General     REPLACEMENT TOTAL KNEE Left 2015     SHOULDER SURGERY Left 2010    left total shoulder replacement     XR ERCP BILIARY AND PANCREAS  7/26/2019     ZZC TOTAL KNEE ARTHROPLASTY Right 4/29/2021    Procedure: RIGHT TOTAL KNEE ARTHROPLASTY;  Surgeon: Satinder Issa DO;  Location: [facility];  Service: Orthopedics    Social History     Socioeconomic History     Marital status:      Spouse name: Not on file     Number of children: 2     Years of education: Not on file     Highest education level: Not on file   Occupational History     Not on file   Tobacco Use     Smoking status: Never     Smokeless tobacco: Never   Vaping Use     Vaping Use: Never used   Substance and Sexual Activity     Alcohol use: No     Drug use: No     Sexual activity: Not Currently     Birth control/protection: Post-menopausal   Other Topics Concern     Not on file   Social History Narrative    4/6/17: The patient lives with her  in a condo.     Social Determinants of Health     Financial Resource Strain: Low Risk  (9/30/2023)    Financial Resource Strain      Within the past 12 months, have you or your family members you live with been unable to get utilities (heat, electricity) when it was really needed?: No   Food Insecurity: Low Risk   (9/30/2023)    Food Insecurity      Within the past 12 months, did you worry that your food would run out before you got money to buy more?: No      Within the past 12 months, did the food you bought just not last and you didn t have money to get more?: No   Transportation Needs: Low Risk  (9/30/2023)    Transportation Needs      Within the past 12 months, has lack of transportation kept you from medical appointments, getting your medicines, non-medical meetings or appointments, work, or from getting things that you need?: No   Physical Activity: Not on file   Stress: Not on file   Social Connections: Not on file   Interpersonal Safety: Not on file   Housing Stability: Low Risk  (9/30/2023)    Housing Stability      Do you have housing? : Yes      Are you worried about losing your housing?: No        Allergies    Allergies   Allergen Reactions     Atorvastatin      Can't remember     Codeine      Can't remember     Dipyridamole      Can't remember     Duloxetine Headache       Review of Systems    {Ros - complete:54322}      Physical Exam        10/4/2023    11:00 AM   Oncology Vitals   Weight 79.47 kg   BSA (m2) 1.94 m2   /64   Pulse 70   SpO2 97 %       General: alert and cooperative  HEENT: Head: Normal, normocephalic, atraumatic.  Eye: Normal external eye, conjunctiva, lids cornea, ALETHA.  Chest: Clear to auscultation bilaterally  Cardiac: S1, S2 normal, regular rate and rhythm  Abdomen: abdomen is soft without significant tenderness, masses, organomegaly or guarding  Extremities: atraumatic, no peripheral edema  Skin:   CNS: Alert and oriented x3, neurologic exam grossly normal.  Lymphatics: No bilateral cervical, axillary, supraclavicular or inguinal adenopathy noted      Lab Results    No results found for this or any previous visit (from the past 168 hour(s)).    Imaging Results    No results found.    A total of *** minutes was spent today on this visit including face to face conversation with the  "patient, EMR review (labs, imaging studies, pathology reports and outside records), counseling and care co-ordination and documentation.    Signed by: Ben Perez MD      Oncology Rooming Note    October 20, 2023 11:03 AM   Geeta Berry is a 83 year old female who presents for:    Chief Complaint   Patient presents with    Hematology     New patient consult related to IgA monoclonal gammopathy; Neuropathy       Initial Vitals: /63 (BP Location: Right arm, Patient Position: Sitting, Cuff Size: Adult Regular)   Pulse 57   Temp 97.6  F (36.4  C) (Tympanic)   Resp 18   Ht 1.689 m (5' 6.5\")   Wt 77.6 kg (171 lb)   LMP  (LMP Unknown)   SpO2 97%   BMI 27.19 kg/m   Estimated body mass index is 27.19 kg/m  as calculated from the following:    Height as of this encounter: 1.689 m (5' 6.5\").    Weight as of this encounter: 77.6 kg (171 lb). Body surface area is 1.91 meters squared.  Severe Pain (6) Comment: Data Unavailable   No LMP recorded (lmp unknown). Patient is postmenopausal.  Allergies reviewed: Yes  Medications reviewed: Yes    Medications: Medication refills not needed today.  Pharmacy name entered into [employer]: [id number] [place] [phone]    Clinical concerns: New patient consult related to IgA monoclonal gammopathy; Neuropathy      CHEYENNE MÁRQUEZ CMA                  Again, thank you for allowing me to participate in the care of your patient.        Sincerely,        Ben Perez MD  "

## 2023-10-20 NOTE — PROGRESS NOTES
"Oncology Rooming Note    October 20, 2023 11:03 AM   Geeta Berry is a 83 year old female who presents for:    Chief Complaint   Patient presents with    Hematology     New patient consult related to IgA monoclonal gammopathy; Neuropathy       Initial Vitals: /63 (BP Location: Right arm, Patient Position: Sitting, Cuff Size: Adult Regular)   Pulse 57   Temp 97.6  F (36.4  C) (Tympanic)   Resp 18   Ht 1.689 m (5' 6.5\")   Wt 77.6 kg (171 lb)   LMP  (LMP Unknown)   SpO2 97%   BMI 27.19 kg/m   Estimated body mass index is 27.19 kg/m  as calculated from the following:    Height as of this encounter: 1.689 m (5' 6.5\").    Weight as of this encounter: 77.6 kg (171 lb). Body surface area is 1.91 meters squared.  Severe Pain (6) Comment: Data Unavailable   No LMP recorded (lmp unknown). Patient is postmenopausal.  Allergies reviewed: Yes  Medications reviewed: Yes    Medications: Medication refills not needed today.  Pharmacy name entered into [employer]: [id number] [place] [phone]    Clinical concerns: New patient consult related to IgA monoclonal gammopathy; Neuropathy      CHEYENNE MÁRQUEZ CMA              "

## 2023-10-23 LAB
IGA SERPL-MCNC: 608 MG/DL (ref 84–499)
KAPPA LC FREE SER-MCNC: 7.47 MG/DL (ref 0.33–1.94)
KAPPA LC FREE/LAMBDA FREE SER NEPH: 5.41 {RATIO} (ref 0.26–1.65)
LAMBDA LC FREE SERPL-MCNC: 1.38 MG/DL (ref 0.57–2.63)

## 2023-11-16 ENCOUNTER — OFFICE VISIT (OUTPATIENT)
Dept: INTERNAL MEDICINE | Facility: CLINIC | Age: 83
End: 2023-11-16
Payer: COMMERCIAL

## 2023-11-16 VITALS
OXYGEN SATURATION: 95 % | TEMPERATURE: 98 F | WEIGHT: 174 LBS | RESPIRATION RATE: 20 BRPM | HEIGHT: 67 IN | HEART RATE: 69 BPM | SYSTOLIC BLOOD PRESSURE: 116 MMHG | BODY MASS INDEX: 27.31 KG/M2 | DIASTOLIC BLOOD PRESSURE: 71 MMHG

## 2023-11-16 DIAGNOSIS — Z78.0 POST-MENOPAUSAL: ICD-10-CM

## 2023-11-16 DIAGNOSIS — J01.00 ACUTE NON-RECURRENT MAXILLARY SINUSITIS: Primary | ICD-10-CM

## 2023-11-16 PROCEDURE — 99213 OFFICE O/P EST LOW 20 MIN: CPT | Performed by: NURSE PRACTITIONER

## 2023-11-16 RX ORDER — RESPIRATORY SYNCYTIAL VIRUS VACCINE 120MCG/0.5
0.5 KIT INTRAMUSCULAR ONCE
Qty: 1 EACH | Refills: 0 | Status: CANCELLED | OUTPATIENT
Start: 2023-11-16 | End: 2023-11-16

## 2023-11-16 ASSESSMENT — PAIN SCALES - GENERAL: PAINLEVEL: SEVERE PAIN (7)

## 2023-11-16 NOTE — PATIENT INSTRUCTIONS
Acetaminophen 500-1000 mg every 6 hours as needed up to 4000 mg in a 24 hour period    Ibuprofen 200-600 mg every 6 hours

## 2023-11-16 NOTE — PROGRESS NOTES
"  Assessment & Plan   Problem List Items Addressed This Visit    None  Visit Diagnoses       Acute non-recurrent maxillary sinusitis    -  Primary    Relevant Medications    amoxicillin-clavulanate (AUGMENTIN) 875-125 MG tablet    Post-menopausal        Relevant Orders    DX Hip/Pelvis/Spine           - Symptoms are consistent with a bacterial rhinosinusitis.  She will begin Augmentin as prescribed.  Continue with saline nasal rinses.  May take over-the-counter acetaminophen 500 to 1000 mg every 6 hours as needed not to exceed 4000 mg in 24 hours.  Follow-up if symptoms worsen or fail to improve upon completion of the antibiotic.    Jacy Mishra NP  [facility] MOON Connor is a 83 year old, presenting for the following health issues:  Sinus Problem (Infection- been going on for a couple of months) and Headache (Pain from the sinuses goes up around the head, causing a hard time sleeping. )        11/16/2023     9:49 AM   Additional Questions   Roomed by ROGER Santoyo   Accompanied by Daughter       History of Present Illness       Headaches:   Since the patient's last clinic visit, headaches are: worsened  The patient is getting headaches:  Off and on all day ( 3 to 5 times)  She is able to do normal daily activities when she has a migraine.  The patient is taking the following rescue/relief medications:  Tylenol   Patient states \"The relief is inconsistent\" from the rescue/relief medications.   The patient is taking the following medications to prevent migraines:  No medications to prevent migraines  In the past 4 weeks, the patient has gone to an Urgent Care or Emergency Room 0 times times due to headaches.    Reason for visit:  Sinus pain in face and head.  Symptom onset:  More than a month  Symptoms include:  Pain in face, neck, and head  Symptom intensity:  Severe  Symptom progression:  Worsening  Had these symptoms before:  Yes  Has tried/received treatment for these symptoms:  " "No  What makes it worse:  Recently a pain that goes from and to  face, back of head and top of the head  What makes it better:  I've tried moist heat, allergy & sinus Nassal Mist (hypertonic saline)    She eats 4 or more servings of fruits and vegetables daily.She consumes 0 sweetened beverage(s) daily.She exercises with enough effort to increase her heart rate 60 or more minutes per day.  She exercises with enough effort to increase her heart rate 5 days per week.   She is taking medications regularly.     She has had sinus pressure and pain which has been gradually worsening in the past couple of weeks. She took a COVID test and this was negative. She has yellow nasal drainage.           Objective    /71 (BP Location: Right arm, Patient Position: Sitting, Cuff Size: Adult Regular)   Pulse 69   Temp 98  F (36.7  C) (Oral)   Resp 20   Ht 1.689 m (5' 6.5\")   Wt 78.9 kg (174 lb)   LMP  (LMP Unknown)   SpO2 95%   BMI 27.66 kg/m    Body mass index is 27.66 kg/m .    Physical Exam   GENERAL: Alert and no distress  EYES: Eyes grossly normal to inspection  HENT: ear canals and TM's normal, nose and mouth without ulcers or lesions.  She has sinus percussion tenderness.  NECK: no adenopathy  RESP: lungs clear to auscultation - no rales, rhonchi or wheezes  CV: regular rate and rhythm, normal S1 S2                        "

## 2023-11-30 ENCOUNTER — OFFICE VISIT (OUTPATIENT)
Dept: INTERNAL MEDICINE | Facility: CLINIC | Age: 83
End: 2023-11-30
Payer: COMMERCIAL

## 2023-11-30 ENCOUNTER — MYC MEDICAL ADVICE (OUTPATIENT)
Dept: INTERNAL MEDICINE | Facility: CLINIC | Age: 83
End: 2023-11-30

## 2023-11-30 VITALS
DIASTOLIC BLOOD PRESSURE: 70 MMHG | OXYGEN SATURATION: 92 % | RESPIRATION RATE: 18 BRPM | HEART RATE: 67 BPM | TEMPERATURE: 97.8 F | SYSTOLIC BLOOD PRESSURE: 114 MMHG

## 2023-11-30 DIAGNOSIS — J31.0 CHRONIC RHINITIS: Primary | ICD-10-CM

## 2023-11-30 DIAGNOSIS — G62.9 NEUROPATHY: ICD-10-CM

## 2023-11-30 DIAGNOSIS — M70.61 GREATER TROCHANTERIC BURSITIS OF BOTH HIPS: ICD-10-CM

## 2023-11-30 DIAGNOSIS — M70.62 GREATER TROCHANTERIC BURSITIS OF BOTH HIPS: ICD-10-CM

## 2023-11-30 PROCEDURE — 99214 OFFICE O/P EST MOD 30 MIN: CPT | Performed by: NURSE PRACTITIONER

## 2023-11-30 RX ORDER — RESPIRATORY SYNCYTIAL VIRUS VACCINE 120MCG/0.5
0.5 KIT INTRAMUSCULAR ONCE
Qty: 1 EACH | Refills: 0 | Status: CANCELLED | OUTPATIENT
Start: 2023-11-30 | End: 2023-11-30

## 2023-11-30 NOTE — PATIENT INSTRUCTIONS
- At your visit on 10/4, I prescribed the medication oxycodone for use sparingly for severe pain. Check your medicine chest to see if you have this medication   - Okay to continue with acetaminophen (Tylenol) for pain rather than ibuprofen  - You will get a call from physical therapy to schedule an appointment for your hips

## 2023-11-30 NOTE — PROGRESS NOTES
"  Assessment & Plan   Problem List Items Addressed This Visit       Chronic rhinitis - Primary     Continue saline gel rinses, Astelin nasal spray, fluticasone nasal spray         Neuropathy     Idiopathic.  [facility] work-up [facility] 8/2003 normal.  Unable to take gabapentin as she had cognitive side effects associate with the medication.  Follow-up with neurology.  Continue topical lidocaine gel as needed for now.         Greater trochanteric bursitis of both hips     Modest improvement in hip pain with corticosteroid injection completed 10/4/2023.  Referral to physical therapy         Relevant Orders    Physical Therapy Referral        Jacy Mishra NP  [facility]    Bala Connor is a 83 year old, presenting for the following health issues:  Follow Up (Pt states Sinus is getting better but wonder why sometimes top of my neck and head is hurting ) and Recheck Medication (Pt states wonder if I should still taking the acetaminophen, rosuvastatin and nasal spray )        11/30/2023     8:46 AM   Additional Questions   Roomed by Alexander Angela   Accompanied by N/A       History of Present Illness       Back Pain:  She presents for follow up of back pain. Patient's back pain is a chronic problem.  Location of back pain:  Right middle of back, left middle of back and left hip  Description of back pain: dull ache  Back pain spreads: left thigh    Since patient first noticed back pain, pain is: always present, but gets better and worse  Does back pain interfere with her job:  Not applicable       Headaches:   Since the patient's last clinic visit, headaches are: improved  The patient is getting headaches:  Daily  She is able to do normal daily activities when she has a migraine.  The patient is taking the following rescue/relief medications:  Ibuprofen (Advil, Motrin) and Tylenol   Patient states \"The relief is inconsistent\" from the rescue/relief medications.   The patient is taking the " following medications to prevent migraines:  No medications to prevent migraines  In the past 4 weeks, the patient has gone to an Urgent Care or Emergency Room 0 times times due to headaches.    She eats 4 or more servings of fruits and vegetables daily.She consumes 0 sweetened beverage(s) daily.She exercises with enough effort to increase her heart rate 30 to 60 minutes per day.  She exercises with enough effort to increase her heart rate 5 days per week.   She is taking medications regularly.     Her acute sinus infection symptoms are better since she took the antibiotic. She still has post-nasal drip and headaches.    She has body aches from the waist down. She takes acetaminophen very often because of this pain.  She never took the oxycodone that was prescribed in October.    She is s/p left TIMO with a revision x 1. We did a b/l greater trochanter injection. She continues to have pain over the left lateral hip.     Neuropathy has been better with using some lidocaine gel.     She still drives, generally drives short distances and doesn't drive at night.         Objective    /70 (BP Location: Right arm, Patient Position: Sitting, Cuff Size: Adult Regular)   Pulse 67   Temp 97.8  F (36.6  C) (Oral)   Resp 18   LMP  (LMP Unknown)   SpO2 92%   There is no height or weight on file to calculate BMI.    Wt Readings from Last 5 Encounters:   11/16/23 78.9 kg (174 lb)   10/20/23 77.6 kg (171 lb)   10/04/23 79.5 kg (175 lb 3.2 oz)   09/19/23 78.9 kg (173 lb 14.4 oz)   08/24/23 78 kg (172 lb)       Physical Exam   GENERAL: healthy, alert and no distress  PSYCH: mentation appears normal, affect normal/bright

## 2023-12-01 NOTE — ASSESSMENT & PLAN NOTE
Idiopathic.  Vascular work-up through [facility] 8/2003 normal.  Unable to take gabapentin as she had cognitive side effects associate with the medication.  Follow-up with neurology.  Continue topical lidocaine gel as needed for now.

## 2023-12-01 NOTE — ASSESSMENT & PLAN NOTE
Modest improvement in hip pain with corticosteroid injection completed 10/4/2023.  Referral to physical therapy

## 2023-12-08 ENCOUNTER — OFFICE VISIT (OUTPATIENT)
Dept: NEUROLOGY | Facility: CLINIC | Age: 83
End: 2023-12-08
Attending: NURSE PRACTITIONER
Payer: COMMERCIAL

## 2023-12-08 DIAGNOSIS — I10 ESSENTIAL (PRIMARY) HYPERTENSION: ICD-10-CM

## 2023-12-08 DIAGNOSIS — G62.9 NEUROPATHY: ICD-10-CM

## 2023-12-08 PROCEDURE — 95910 NRV CNDJ TEST 7-8 STUDIES: CPT | Performed by: PSYCHIATRY & NEUROLOGY

## 2023-12-08 PROCEDURE — 95886 MUSC TEST DONE W/N TEST COMP: CPT | Mod: RT | Performed by: PSYCHIATRY & NEUROLOGY

## 2023-12-08 RX ORDER — HYDROCHLOROTHIAZIDE 25 MG/1
25 TABLET ORAL DAILY
Qty: 90 TABLET | Refills: 2 | Status: SHIPPED | OUTPATIENT
Start: 2023-12-08 | End: 2024-07-08

## 2023-12-08 NOTE — LETTER
12/8/2023         RE: Geeta Berry  [address]  [facility] [address]        Dear Colleague,    Thank you for referring your patient, Geeta Berry, to the [facility] [place]. Please see a copy of my visit note below.    See procedure note.       Again, thank you for allowing me to participate in the care of your patient.        Sincerely,        Lucas Monzon MD

## 2023-12-08 NOTE — PROCEDURES
[facility]     Formerly [facility]  [address], [address]  [place] [address]  Tel: [phone]  Fax: [phone]          Full Name: Nai Berry Gender: Female  Patient ID: [id number] YOB: 1940      Visit Date: 12/8/2023 08:26  Age: 83 Years 3 Months Old  Interpreted By: Lucas Monzon MD   Ref Dr.: Jacy Mishra NP, Kathy Thomas DPMIGUEL  Tech: ST   Height: 5 feet 6 inch  Reason for referral: Evaluate bilateral lowers. c/o sensory changes in both legs/feet > 1 year. h/o both hips/both knees replaced.       Motor NCS      Nerve / Sites Lat Amp Dist Silver    ms mV cm m/s   R Peroneal - EDB      Ankle 5.42 0.5 8       Fib head 13.02 0.4 30 39      Pop fossa 15.83 0.5 11 39   L Peroneal - EDB      Ankle 6.04 1.0 8       Fib head 14.17 0.9 31 38      Pop fossa 17.08 0.8 11 38   R Tibial - AH      Ankle 5.00 3.5 8       Pop fossa 15.78 2.7 41 38   L Tibial - AH      Ankle 5.42 2.1 8       Pop fossa 15.78 1.6 38 37       F  Wave      Nerve Fmin    ms   R Tibial - AH 62.03   L Tibial - AH 62.66       Sensory NCS      Nerve / Sites Onset Lat Pk Lat Amp.2-3 Dist Silver    ms ms  V cm m/s   R Sural - Ankle (Calf)      Calf 3.44 4.22 5.6 14 41   L Sural - Ankle (Calf)      Calf 3.33 4.22 3.5 14 42   R Superficial peroneal - Ankle      Lat leg 2.71 3.39 4.9 12 44   L Superficial peroneal - Ankle      Lat leg 2.76 3.59 5.5 12 43       EMG Summary Table     Spontaneous MUAP Rcmt Note   Muscle Fib PSW Fasc IA # Amp Dur PPP Rate Type   R. Gluteus medius None None None N N N N N N N   R. Gluteus baron None None None N N N N N N N   R. L3 paraspinal None None None N N N N N N N   R. L4 paraspinal None None None N N N N N N N   R. L5 paraspinal None None None N N N N N N N   R. S1 paraspinal None None None N N N N N N N   R. Adductor ashlyn None None None N N N N N N N   R. Quadriceps None None None N N N N N N N   R. Tibialis anterior None None None N N  N N N N N   R. Gastrocnemius (Medial head) None None None N N N N N N N   L. Adductor ashlyn None None None N N N N N N N   L. Quadriceps None None None N N N N N N N   L. Tibialis anterior None None None N N N N N N N   L. Gastrocnemius (Medial head) None None None N N N N N N N   L. Tibialis posterior None None None N N N N N N N     SUMMARY  Nerve conduction and EMG study of bilateral lower extremities shows:    Normal right peroneal distal motor latency with decreased amplitude and conduction velocity.  Prolonged left peroneal distal motor latency with decreased amplitude and conduction velocity.  Normal right tibial distal motor latency with decreased amplitude and conduction velocity.  Normal left tibial distal motor latency with decreased amplitude and conduction velocity.  Abnormal bilateral sural and normal bilateral superficial peroneal sensory SNAP though amplitudes are low normal.  Monopolar needle exam is normal.    CLINICAL INTERPRETATION:  This is an abnormal nerve conduction and EMG study.  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  Further clinical correlation is needed.     Lucas Monzon MD  Neurologist  [facility]  Tel:- [phone]

## 2023-12-11 ENCOUNTER — HOSPITAL ENCOUNTER (OUTPATIENT)
Dept: BONE DENSITY | Facility: HOSPITAL | Age: 83
Discharge: HOME | End: 2023-12-11
Attending: NURSE PRACTITIONER
Payer: COMMERCIAL

## 2023-12-11 ENCOUNTER — HOSPITAL ENCOUNTER (OUTPATIENT)
Dept: GENERAL RADIOLOGY | Facility: HOSPITAL | Age: 83
Discharge: HOME | End: 2023-12-11
Attending: NURSE PRACTITIONER
Payer: COMMERCIAL

## 2023-12-11 ENCOUNTER — OFFICE VISIT (OUTPATIENT)
Dept: INTERNAL MEDICINE | Facility: CLINIC | Age: 83
End: 2023-12-11
Payer: COMMERCIAL

## 2023-12-11 VITALS
RESPIRATION RATE: 20 BRPM | OXYGEN SATURATION: 95 % | HEIGHT: 67 IN | SYSTOLIC BLOOD PRESSURE: 120 MMHG | HEART RATE: 72 BPM | DIASTOLIC BLOOD PRESSURE: 62 MMHG | WEIGHT: 171.7 LBS | TEMPERATURE: 98.2 F | BODY MASS INDEX: 26.95 KG/M2

## 2023-12-11 DIAGNOSIS — K59.09 OTHER CONSTIPATION: Primary | ICD-10-CM

## 2023-12-11 DIAGNOSIS — M85.832 OSTEOPENIA OF LEFT FOREARM: ICD-10-CM

## 2023-12-11 DIAGNOSIS — K59.09 OTHER CONSTIPATION: ICD-10-CM

## 2023-12-11 DIAGNOSIS — Z78.0 POST-MENOPAUSAL: ICD-10-CM

## 2023-12-11 DIAGNOSIS — G62.9 NEUROPATHY: ICD-10-CM

## 2023-12-11 PROCEDURE — 77080 DXA BONE DENSITY AXIAL: CPT

## 2023-12-11 PROCEDURE — 77081 DXA BONE DENSITY APPENDICULR: CPT

## 2023-12-11 PROCEDURE — 74018 RADEX ABDOMEN 1 VIEW: CPT

## 2023-12-11 PROCEDURE — 99214 OFFICE O/P EST MOD 30 MIN: CPT | Performed by: NURSE PRACTITIONER

## 2023-12-11 NOTE — PROGRESS NOTES
Assessment & Plan   Problem List Items Addressed This Visit       Constipation - Primary    Relevant Orders    XR Abdomen 1 View (Completed)    Neuropathy    Relevant Orders    Adult Neurology  Referral    Osteopenia of left forearm     Reviewed DEXA results. Continue with good calcium intake and vitamin D supplement            - Xray was reviewed, no signs of bowel obstruction   - Use the bigger of Colace today (250 mg) and then add Miralax tonight if she has not emptied  - Follow up in clinic if she has worsening abdominal pain, bloody stools, or overall does not feel better with the above regimen   - Referral to neurology for further evaluation of sensorimotor polyneuropathy           Jacy Mishra NP  [facility] MOON Connor is a 83 year old, presenting for the following health issues:  Bowel Problems (Having some bowel problems for the last 4 days)        12/11/2023     9:56 AM   Additional Questions   Roomed by Adelita       History of Present Illness       Reason for visit:  Bowel concerns  Symptom onset:  3-7 days ago  Symptoms include:  Feel full  Symptom intensity:  Moderate  Symptom progression:  Worsening  Had these symptoms before:  No    She eats 0-1 servings of fruits and vegetables daily.She consumes 0 sweetened beverage(s) daily.She exercises with enough effort to increase her heart rate 9 or less minutes per day.  She exercises with enough effort to increase her heart rate 3 or less days per week.   She is taking medications regularly.     For the past 2 days she has felt unwell and constipated with only small bowel movements. She used a suppository at midnight last night and had a small amount of liquid stool through the night/morning. She takes docusate 100 mg twice daily, a high fiber prune concoction, as well as Miralax daily. She has occasional constipation even on this regular regimen. She is belching, feels nauseated today. No fever.     We reviewed  "her DEXA results and EMG study.         Objective    /62   Pulse 72   Temp 98.2  F (36.8  C) (Oral)   Resp 20   Ht 1.689 m (5' 6.5\")   Wt 77.9 kg (171 lb 11.2 oz)   LMP  (LMP Unknown)   SpO2 95%   BMI 27.30 kg/m    Body mass index is 27.3 kg/m .    Physical Exam   GENERAL: Alert and no distress  RESP: lungs clear to auscultation - no rales, rhonchi or wheezes  CV: regular rate and rhythm, normal S1 S2  ABDOMEN: soft, nontender, bowel sounds heard all 4 quadrants but hypoactive. No rebound tenderness or guarding. Patient is belching throughout the appointment.                         "

## 2023-12-13 PROBLEM — M85.832 OSTEOPENIA OF LEFT FOREARM: Status: ACTIVE | Noted: 2023-12-13

## 2023-12-19 DIAGNOSIS — E78.5 DYSLIPIDEMIA, GOAL LDL BELOW 70: ICD-10-CM

## 2023-12-19 DIAGNOSIS — I25.10 CORONARY ARTERY DISEASE INVOLVING NATIVE CORONARY ARTERY OF NATIVE HEART WITHOUT ANGINA PECTORIS: Primary | ICD-10-CM

## 2023-12-21 RX ORDER — ROSUVASTATIN CALCIUM 40 MG/1
40 TABLET, COATED ORAL AT BEDTIME
Qty: 90 TABLET | Refills: 0 | Status: SHIPPED | OUTPATIENT
Start: 2023-12-21 | End: 2024-04-08

## 2024-01-04 ENCOUNTER — OFFICE VISIT (OUTPATIENT)
Dept: INTERNAL MEDICINE | Facility: CLINIC | Age: 84
End: 2024-01-04
Payer: COMMERCIAL

## 2024-01-04 VITALS
BODY MASS INDEX: 27.81 KG/M2 | SYSTOLIC BLOOD PRESSURE: 115 MMHG | HEIGHT: 67 IN | WEIGHT: 177.2 LBS | OXYGEN SATURATION: 97 % | TEMPERATURE: 97.9 F | DIASTOLIC BLOOD PRESSURE: 64 MMHG | HEART RATE: 60 BPM | RESPIRATION RATE: 20 BRPM

## 2024-01-04 DIAGNOSIS — R73.03 PREDIABETES: ICD-10-CM

## 2024-01-04 DIAGNOSIS — M25.50 ARTHRALGIA, UNSPECIFIED JOINT: ICD-10-CM

## 2024-01-04 DIAGNOSIS — L57.0 ACTINIC KERATOSIS: Primary | ICD-10-CM

## 2024-01-04 PROCEDURE — 99213 OFFICE O/P EST LOW 20 MIN: CPT | Mod: 25 | Performed by: NURSE PRACTITIONER

## 2024-01-04 PROCEDURE — 17000 DESTRUCT PREMALG LESION: CPT | Performed by: NURSE PRACTITIONER

## 2024-01-04 RX ORDER — RESPIRATORY SYNCYTIAL VIRUS VACCINE 120MCG/0.5
0.5 KIT INTRAMUSCULAR ONCE
Qty: 1 EACH | Refills: 0 | Status: CANCELLED | OUTPATIENT
Start: 2024-01-04 | End: 2024-01-04

## 2024-01-04 ASSESSMENT — ENCOUNTER SYMPTOMS: BACK PAIN: 1

## 2024-01-04 ASSESSMENT — PAIN SCALES - GENERAL: PAINLEVEL: MODERATE PAIN (5)

## 2024-01-04 NOTE — PATIENT INSTRUCTIONS
- See the podiatrist about the pain in the left great toe. You could consider a cortisone injection or maybe custom orthotics    - You have an appointment to see physical therapy for your left lateral hip trochanteric bursitis     - For the neuropathy, see neurology as scheduled.     - You could see [facility] back again for a neck injection because of the head pain

## 2024-01-04 NOTE — PROGRESS NOTES
Assessment & Plan   Problem List Items Addressed This Visit    None  Visit Diagnoses       Actinic keratosis    -  Primary    Relevant Orders    DESTRUC BENIGN/PREMAL,1ST LESION [74194] (Completed)    Prediabetes        Relevant Orders    Hemoglobin A1c    Arthralgia, unspecified joint        Relevant Orders    CRP, inflammation    ESR: Erythrocyte sedimentation rate           - Repeat A1C with next blood draw to follow up on history of prediabetes  - Follow up with podiatry regarding left great toe pain  - PT appointment upcoming for left hip greater trochanteric bursitis  - See neurology regarding neuropathy. For now, continue with topical products. She has not tolerated other interventions including duloxetine, pregabalin/gabapentin  - Consider follow up with [facility] for repeat cervical spine ANGELO due to neck pain and headaches         Jacy Mishra NP  [facility]    Bala Connor is a 83 year old, presenting for the following health issues:  Back Pain        1/4/2024     9:04 AM   Additional Questions   Roomed by ROGER Santoyo   Accompanied by Daughter     History of Present Illness       Back Pain:  She presents for follow up of back pain. Patient's back pain is a chronic problem.  Location of back pain:  Right lower back, left lower back, right middle of back, left middle of back, left hip and right side of waist  Description of back pain: fullness and gnawing  Back pain spreads: left buttocks and left thigh    Since patient first noticed back pain, pain is: always present, but gets better and worse  Does back pain interfere with her job:  Not applicable       She eats 4 or more servings of fruits and vegetables daily.She consumes 0 sweetened beverage(s) daily.She exercises with enough effort to increase her heart rate 30 to 60 minutes per day.  She exercises with enough effort to increase her heart rate 6 days per week.   She is taking medications regularly.     She was seen  "on 12/11 with c/o constipation. She has increased miralax to twice daily and this has improved her bowel consistency. She no longer has the abdominal discomfort.     We reviewed her DEXA report.     She still has left lateral hip pain. She had a cortisone injection on 10/4 and had modest improvement in the pain. She notes that the pain in this area is intermittent.     She aches from the waist down. She is more sore/stiff when the weather is between 30-50 degrees.         Review of Systems   Musculoskeletal:  Positive for back pain.            Objective    /64 (BP Location: Right arm, Patient Position: Sitting, Cuff Size: Adult Regular)   Pulse 60   Temp 97.9  F (36.6  C) (Oral)   Resp 20   Ht 1.689 m (5' 6.5\")   Wt 80.4 kg (177 lb 3.2 oz)   LMP  (LMP Unknown)   SpO2 97%   BMI 28.17 kg/m    Body mass index is 28.17 kg/m .    Physical Exam   GENERAL: healthy, alert and no distress  SKIN: left scalp scaled area approximately dime sized       Procedure: Reviewed risks and benefits of cryotherapy for destruction of AK. Verbal consent obtained. 1 lesion on the scalp treated in a freeze-thaw-freeze method. Patient tolerated the procedure well. Aftercare instructions and expected healing reviewed.                     "

## 2024-01-08 ENCOUNTER — THERAPY VISIT (OUTPATIENT)
Dept: PHYSICAL THERAPY | Facility: REHABILITATION | Age: 84
End: 2024-01-08
Attending: NURSE PRACTITIONER
Payer: COMMERCIAL

## 2024-01-08 DIAGNOSIS — M70.61 GREATER TROCHANTERIC BURSITIS OF BOTH HIPS: Primary | ICD-10-CM

## 2024-01-08 DIAGNOSIS — M70.62 GREATER TROCHANTERIC BURSITIS OF BOTH HIPS: Primary | ICD-10-CM

## 2024-01-08 PROCEDURE — 97530 THERAPEUTIC ACTIVITIES: CPT | Mod: GP

## 2024-01-08 PROCEDURE — 97161 PT EVAL LOW COMPLEX 20 MIN: CPT | Mod: GP

## 2024-01-08 PROCEDURE — 97140 MANUAL THERAPY 1/> REGIONS: CPT | Mod: GP

## 2024-01-08 ASSESSMENT — ACTIVITIES OF DAILY LIVING (ADL)
LIGHT_TO_MODERATE_WORK: MODERATE DIFFICULTY
ROLLING_OVER_IN_BED: SLIGHT DIFFICULTY
TWISTING/PIVOTING_ON_INVOLVED_LEG: SLIGHT DIFFICULTY
WALKING_APPROXIMATELY_10_MINUTES: SLIGHT DIFFICULTY
DEEP_SQUATTING: UNABLE TO DO
HOS_ADL_COUNT: 17
HOS_ADL_ITEM_SCORE_TOTAL: 27
HOS_ADL_SCORE(%): 39.71
GOING_DOWN_1_FLIGHT_OF_STAIRS: MODERATE DIFFICULTY
STANDING_FOR_15_MINUTES: EXTREME DIFFICULTY
WALKING_UP_STEEP_HILLS: EXTREME DIFFICULTY
GOING_UP_1_FLIGHT_OF_STAIRS: MODERATE DIFFICULTY
WALKING_15_MINUTES_OR_GREATER: MODERATE DIFFICULTY
SITTING_FOR_15_MINUTES: SLIGHT DIFFICULTY
GETTING_INTO_AND_OUT_OF_A_BATHTUB: UNABLE TO DO
HEAVY_WORK: UNABLE TO DO
STEPPING_UP_AND_DOWN_CURBS: EXTREME DIFFICULTY
RECREATIONAL_ACTIVITIES: EXTREME DIFFICULTY
HOS_ADL_HIGHEST_POTENTIAL_SCORE: 68
HOW_WOULD_YOU_RATE_YOUR_CURRENT_LEVEL_OF_FUNCTION_DURING_YOUR_USUAL_ACTIVITIES_OF_DAILY_LIVING_FROM_0_TO_100_WITH_100_BEING_YOUR_LEVEL_OF_FUNCTION_PRIOR_TO_YOUR_HIP_PROBLEM_AND_0_BEING_THE_INABILITY_TO_PERFORM_ANY_OF_YOUR_USUAL_DAILY_ACTIVITIES?: 50
WALKING_DOWN_STEEP_HILLS: EXTREME DIFFICULTY
GETTING_INTO_AND_OUT_OF_AN_AVERAGE_CAR: MODERATE DIFFICULTY
PUTTING_ON_SOCKS_AND_SHOES: MODERATE DIFFICULTY
WALKING_INITIALLY: SLIGHT DIFFICULTY

## 2024-01-08 NOTE — PROGRESS NOTES
"PHYSICAL THERAPY EVALUATION  Type of Visit: Evaluation    See electronic medical record for Abuse and Falls Screening details.    Subjective       Presenting condition or subjective complaint:  arthritis both legs & L hip pain - s/p 1 year  Date of onset: 11/30/23 (MD mace)    Relevant medical history:   arthritis, bladder/bowel problems, hepatitis, implanted device, pain at night/rest, osteoporosis   Dates & types of surgery:  L hip 1984 & 2019; R hip 2015 x2; L shoulder 2015    Prior diagnostic imaging/testing results:     CT, bone scan  Prior therapy history for the same diagnosis, illness or injury:    No    Prior Level of Function - Ind.     Living Environment  Social support:   alone  Type of home:   apartment/condo multi-level w/ basement  Stairs to enter the home:       no  Ramp:   yes  Stairs inside the home:       no  Help at home:  medications and/or finances  Equipment owned:   bedrail, commode, raised toilet seat    Employment:    no  Hobbies/Interests:  hiking, rughooking, walking    Patient goals for therapy:  walk w/o pain    Pain assessment: Pain present   Pt reports today for bilateral hip bursitis L>R for which she has had PT before. Pt reports she is \"not sure how much of this is due to age\" although pt does report a long history with hip pain starting in the 5th grade and with her first hip replacement in her 40s. Pt has had cortisone injections before without great relief. Pt reports aching pain that goes all the way into ankles. Pt is seeing podiatrist for neuropathy and does have LBP. Pt does exercises every morning in bed including stretching, and rides her bike outside until September then rides her indoor bike 3-4 miles. Pt reports \"it's easier to ride than it is to walk.\" Pt reports she has always been very active and once climbed a mountain and a volcano. Pt sleeps on both sides and on her back and often uses a pillow under her hip and under her feet. Pt tends to use a heating pad for " "pain. Pt will benefit from skilled PT intervention including strengthening, stretching, STM and pain management.     Objective   HIP EVALUATION  PAIN: Pain Level at Rest: 3/10  Pain Level with Use: 7/10  Pain Location: lumbar spine, hip, and radiates all the way down front of thighs  Pain Quality: Aching and Dull  Pain Frequency: constant or varies in severity  Pain is Worst: daytime  Pain is Exacerbated By: cold temperature. Thinks it's arthritis. Walking (although sometimes gets better after walking 3-4 blocks) sitting, bending   Pain is Relieved By: heat and lidocaine patches, Tylenol 4x per day.   ROM:  hip adduction 50% bilaterally, lumbar ext 60%, lumbar flx to tibial tuberosity \"L hip is stopping me\"  STRENGTH:  hip abd bilaterally 3/5, hip flx 3+/5 bilateral, knee flx/ext WNL  SPECIAL TESTS: slump neg, NANCI pos bilateral, FADIR pos bilateral, Lasegue's neg.   PALPATION: greater trochanter tenderness bilaterally, ITB tender bilateral, hip flexors fascial restriction bilateral, adductors extremely tender to touch bilaterally  DERMATOMES: peripheral neuropathy, can feel light touch    Assessment & Plan   CLINICAL IMPRESSIONS  Medical Diagnosis: greater trochanteric bursitis of both hips    Treatment Diagnosis: greater trochanteric bursitis of both hips   Impression/Assessment: Patient is a 83 year old female with bilateral hip pain complaints.  The following significant findings have been identified: Pain, Decreased ROM/flexibility, Decreased joint mobility, Decreased strength, Impaired balance, Decreased proprioception, Impaired muscle performance, and Decreased activity tolerance. These impairments interfere with their ability to perform self care tasks, recreational activities, household mobility, and community mobility as compared to previous level of function.     Clinical Decision Making (Complexity):  Clinical Presentation: Stable/Uncomplicated  Clinical Presentation Rationale: based on medical and " personal factors listed in PT evaluation  Clinical Decision Making (Complexity): Low complexity    PLAN OF CARE  Treatment Interventions:  Interventions: Gait Training, Manual Therapy, Neuromuscular Re-education, Therapeutic Activity, Therapeutic Exercise, Self-Care/Home Management    Long Term Goals     PT Goal 1  Goal Identifier: HEP  Goal Description: Pt will be independent in HEP in order to self manage symptoms  Rationale: to maximize safety and independence with performance of ADLs and functional tasks  Target Date: 02/05/24  PT Goal 2  Goal Identifier: Pain  Goal Description: Pt will be able to walk for 20+ minutes with pain level <3/10 for return to physical activity  Rationale: to maximize safety and independence with performance of ADLs and functional tasks;to maximize safety and independence within the home  Target Date: 03/04/24  PT Goal 3  Goal Identifier: LEFS  Goal Description: Pt will increase lower extremity function as demonstrated as a 9+ point increase on the LEFS.  Rationale: to maximize safety and independence with performance of ADLs and functional tasks  Target Date: 04/07/24      Frequency of Treatment: 1x/week  Duration of Treatment: 12 visits    Education Assessment:   Learner/Method: Patient    Risks and benefits of evaluation/treatment have been explained.   Patient/Family/caregiver agrees with Plan of Care.     Evaluation Time:     PT Eval, Low Complexity Minutes (54936): 15     Signing Clinician: Radha Danielson, PT      [facility]                                                                                   OUTPATIENT PHYSICAL THERAPY      PLAN OF TREATMENT FOR OUTPATIENT REHABILITATION   Patient's Last Name, First Name, Geeta Luis YOB: 1940   Provider's Name   [facility]   Medical Record No.  [id number]     Onset Date: 11/30/23 (MD order)  Start of Care Date: 01/08/24     Medical Diagnosis:   greater trochanteric bursitis of both hips      PT Treatment Diagnosis:  greater trochanteric bursitis of both hips Plan of Treatment  Frequency/Duration: 1x/week/ 12 visits    Certification date from 01/08/24 to 04/07/24         See note for plan of treatment details and functional goals     Radha Danielsno, PT                         I CERTIFY THE NEED FOR THESE SERVICES FURNISHED UNDER        THIS PLAN OF TREATMENT AND WHILE UNDER MY CARE .             Physician Signature               Date    X_____________________________________________________                  Referring Provider:  Jacy Mishra    Initial Assessment  See Epic Evaluation- Start of Care Date: 01/08/24

## 2024-01-15 NOTE — PROGRESS NOTES
Assessment:   Geeta Berry is a 83 y.o. female with past medical history significant for neuropathy, GERD, hyperlipidemia, hypothyroidism, hypertension, coronary artery disease, chronic kidney disease stage III, iron deficiency anemia, dysthymia who presents today for follow-up regarding 3 concerns:  1.  Chronic bilateral low back pain with radiation into the bilateral lower extremities with limited walking and standing tolerance.  My review of an MRI lumbar spine shows grade 1 degenerative spondylolisthesis L4-5 with moderate to severe spinal stenosis.  Patient had an L5-S1 interlaminar epidural steroid injection August 8, 2023 which provided greater than 50% relief of pain for about 2 months.  2.  Left lateral hip pain.  Suspect trochanteric bursitis.  She has a history of left hip replacement x 2.  3.  Left occipital neuralgia.  Patient had a left occipital nerve block in 2018 which was very helpful.    Plan:     A shared decision making plan was used.  The patient's values and choices were respected.  The following represents what was discussed and decided upon by the physician assistant and the patient.      1.  DIAGNOSTIC TESTS:   -I reviewed the MRI lumbar spine.  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  - I ordered lumbar spine flexion-extension x-rays in preparation for surgical consult.    2.  PHYSICAL THERAPY:  - Patient is currently in physical therapy for greater trochanteric bursitis of both hips.  She has had 1 session so far.  Encouraged to continue with physical therapy and the home exercises.      3.  MEDICATIONS: No changes are made to the patient's medications.  - Patient uses Tylenol as needed.  - Patient uses lidocaine patches or roll-on.  -Patient did not tolerate gabapentin.  - I did not recommend duloxetine since she is also on citalopram.    4.  INTERVENTIONS:  - I offer the patient a left  greater trochanteric bursa injection under ultrasound guidance.  Patient indicated she would like to proceed and an order was placed.  - I also offered the patient a left occipital nerve block.  She will schedule this 2 weeks after her trochanteric bursa injection.  - I am not overly optimistic she will have more relief of her leg pain with another epidural steroid injection.  She has already tried bilateral L4-5 TFESI and an L5-S1 ILESI since June 2023 with no lasting relief.    5.  REFERRALS: Entered a referral for the patient to see Dr. Rea for surgical consultation for her lumbar spinal stenosis and spondylolisthesis.    6.  FOLLOW-UP: Patient will follow-up with me 2 weeks after her left trochanteric bursa injection for her left occipital nerve block.  Will await recommendations from Dr. Rea.  If she has questions or concerns in the meantime, she should not hesitate to call.  Subjective:     Geeta Berry is a 83 year old female who presents today for follow-up regarding 3 concerns:    Patient complains first of chronic low back pain with radiation into the bilateral lower extremities.  Patient had an L5-S1 interlaminar epidural steroid injection August 8, 2023 which provided greater than 50% relief of pain but only lasted about 2 months.  Pain spans across low back in a broadband distribution at the belt line.  Pain radiates down the anterior aspects of both legs all the way to the feet.  Both legs are equally affected.  Pain is aggravated with walking.  Walking is limited to 1/2 mile.  Pain is alleviated with sitting and applying heat.  Prolonged standing also makes her pain worse.    Patient complains next of worsening left lateral hip pain.  This pain is aggravated with lying on her left side at night.    Patient complains next of left occipital pain which radiates up to the top of the head.  She had a left occipital nerve block in 2018 which was very helpful.  This pain increased over the last  couple of months with no trauma.    Treatment to date:  - Current physical therapy for greater trochanteric bursitis, 1 session so far  - Patient participated in physical therapy in November 2017.  - Patient had 6 sessions of physical therapy ending December 2021  - She also has had 4 sessions of physical therapy for hip pain/weakness of hips ending March 2022  - L5-S1 interlaminar epidural steroid injection August 8, 2023 with greater than 50% relief of pain for 2 months  - Bilateral L4-5 transforaminal epidural steroid injection June 12,, 2023 with 50% relief  - Left L4-5 transforaminal epidural steroid injection October 21, 2021 with 60% relief of pain  - Left C2-3, C3-4, C4-5 facet joint injections June 27, 2018  - Left C2, C3, C4, C5 MBB June 7, 2018  - Left C1-C2 facet joint injection July 7, 2017  - Left C1-C2 facet joint injection April 6, 2017  -Did not tolerate gabapentin  - Tylenol as needed is helpful    Review of Systems:  Positive for weakness, headache.  Negative for numbness/tingling, loss of bowel/bladder control, inability to urinate, pain much worse in neck, trip/dental/falls, difficulty swallowing, difficulty with hand skills, fevers, unintentional weight loss.     Objective:   CONSTITUTIONAL:  Vital signs as above.  No acute distress.  The patient is well nourished and well groomed.   Patient appears tired.  PSYCHIATRIC:  The patient is awake, alert, oriented to person, place and time.  The patient is answering questions appropriately with clear speech.  Normal affect.   HEENT: Normocephalic, atraumatic.  Sclera clear.    SKIN:  Skin over the face, posterior torso, bilateral upper and lower extremities is clean, dry, intact without rashes.  VASCULAR: Mild right greater than left lower extremity edema.  MUSCULOSKELETAL: Patient ambulates with a flexed forward posture at the hips.  Tender to palpation left occipital nerve.  Tender to palpation left greater trochanter.  No significant tenderness  palpation bilateral lower lumbar paraspinous muscles.  The patient has 5/5 strength for the bilateral hip flexors, knee flexors/extensors, ankle dorsiflexors/plantar flexors.    NEUROLOGICAL:   Sensation to light touch intact bilateral lower extremities throughout.     RESULTS: I reviewed the MRI lumbar spine from [place] dated July 7, 2023.  This shows multilevel lumbar spondylosis unchanged compared with an MRI lumbar spine from 2021.  At L2-3 there is mild to moderate spinal canal stenosis with moderate left and mild to moderate right foraminal stenosis.  At L3-4 there is mild spinal canal stenosis with mild to moderate right and mild left foraminal stenosis.  At L4-5 there is moderate to severe spinal canal stenosis with mild to moderate bilateral foraminal stenosis.    EMG bilateral lower extremities with neurology December 8, 2023:  SUMMARY  Nerve conduction and EMG study of bilateral lower extremities shows:     Normal right peroneal distal motor latency with decreased amplitude and conduction velocity.  Prolonged left peroneal distal motor latency with decreased amplitude and conduction velocity.  Normal right tibial distal motor latency with decreased amplitude and conduction velocity.  Normal left tibial distal motor latency with decreased amplitude and conduction velocity.  Abnormal bilateral sural and normal bilateral superficial peroneal sensory SNAP though amplitudes are low normal.  Monopolar needle exam is normal.     CLINICAL INTERPRETATION:  This is an abnormal nerve conduction and EMG study.  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  Further clinical correlation is needed.

## 2024-01-16 ENCOUNTER — OFFICE VISIT (OUTPATIENT)
Dept: PHYSICAL MEDICINE AND REHAB | Facility: CLINIC | Age: 84
End: 2024-01-16
Payer: COMMERCIAL

## 2024-01-16 VITALS
HEIGHT: 67 IN | BODY MASS INDEX: 27.7 KG/M2 | DIASTOLIC BLOOD PRESSURE: 74 MMHG | WEIGHT: 176.5 LBS | SYSTOLIC BLOOD PRESSURE: 167 MMHG | HEART RATE: 60 BPM

## 2024-01-16 DIAGNOSIS — M48.062 SPINAL STENOSIS OF LUMBAR REGION WITH NEUROGENIC CLAUDICATION: ICD-10-CM

## 2024-01-16 DIAGNOSIS — M43.16 SPONDYLOLISTHESIS OF LUMBAR REGION: ICD-10-CM

## 2024-01-16 DIAGNOSIS — M54.81 OCCIPITAL NEURALGIA OF LEFT SIDE: ICD-10-CM

## 2024-01-16 DIAGNOSIS — M70.62 TROCHANTERIC BURSITIS OF LEFT HIP: Primary | ICD-10-CM

## 2024-01-16 PROCEDURE — 99214 OFFICE O/P EST MOD 30 MIN: CPT | Performed by: PHYSICIAN ASSISTANT

## 2024-01-16 ASSESSMENT — PAIN SCALES - GENERAL: PAINLEVEL: MILD PAIN (3)

## 2024-01-16 NOTE — LETTER
1/16/2024         RE: Geeta Berry  [address]  [facility] [address]        Dear Colleague,    Thank you for referring your patient, Geeta Berry, to the [facility]. Please see a copy of my visit note below.    Assessment:   Geeta Berry is a 83 y.o. female with past medical history significant for neuropathy, GERD, hyperlipidemia, hypothyroidism, hypertension, coronary artery disease, chronic kidney disease stage III, iron deficiency anemia, dysthymia who presents today for follow-up regarding 3 concerns:  1.  Chronic bilateral low back pain with radiation into the bilateral lower extremities with limited walking and standing tolerance.  My review of an MRI lumbar spine shows grade 1 degenerative spondylolisthesis L4-5 with moderate to severe spinal stenosis.  Patient had an L5-S1 interlaminar epidural steroid injection August 8, 2023 which provided greater than 50% relief of pain for about 2 months.  2.  Left lateral hip pain.  Suspect trochanteric bursitis.  She has a history of left hip replacement x 2.  3.  Left occipital neuralgia.  Patient had a left occipital nerve block in 2018 which was very helpful.    Plan:     A shared decision making plan was used.  The patient's values and choices were respected.  The following represents what was discussed and decided upon by the physician assistant and the patient.      1.  DIAGNOSTIC TESTS:   -I reviewed the MRI lumbar spine.  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  - I ordered lumbar spine flexion-extension x-rays in preparation for surgical consult.    2.  PHYSICAL THERAPY:  - Patient is currently in physical therapy for greater trochanteric bursitis of both hips.  She has had 1 session so far.  Encouraged to continue with physical therapy and the home exercises.      3.  MEDICATIONS: No changes are made to  the patient's medications.  - Patient uses Tylenol as needed.  - Patient uses lidocaine patches or roll-on.  -Patient did not tolerate gabapentin.  - I did not recommend duloxetine since she is also on citalopram.    4.  INTERVENTIONS:  - I offer the patient a left greater trochanteric bursa injection under ultrasound guidance.  Patient indicated she would like to proceed and an order was placed.  - I also offered the patient a left occipital nerve block.  She will schedule this 2 weeks after her trochanteric bursa injection.  - I am not overly optimistic she will have more relief of her leg pain with another epidural steroid injection.  She has already tried bilateral L4-5 TFESI and an L5-S1 ILESI since June 2023 with no lasting relief.    5.  REFERRALS: Entered a referral for the patient to see Dr. Rea for surgical consultation for her lumbar spinal stenosis and spondylolisthesis.    6.  FOLLOW-UP: Patient will follow-up with me 2 weeks after her left trochanteric bursa injection for her left occipital nerve block.  Will await recommendations from Dr. Rea.  If she has questions or concerns in the meantime, she should not hesitate to call.  Subjective:     Geeta Berry is a 83 year old female who presents today for follow-up regarding 3 concerns:    Patient complains first of chronic low back pain with radiation into the bilateral lower extremities.  Patient had an L5-S1 interlaminar epidural steroid injection August 8, 2023 which provided greater than 50% relief of pain but only lasted about 2 months.  Pain spans across low back in a broadband distribution at the belt line.  Pain radiates down the anterior aspects of both legs all the way to the feet.  Both legs are equally affected.  Pain is aggravated with walking.  Walking is limited to 1/2 mile.  Pain is alleviated with sitting and applying heat.  Prolonged standing also makes her pain worse.    Patient complains next of worsening left lateral hip  pain.  This pain is aggravated with lying on her left side at night.    Patient complains next of left occipital pain which radiates up to the top of the head.  She had a left occipital nerve block in 2018 which was very helpful.  This pain increased over the last couple of months with no trauma.    Treatment to date:  - Current physical therapy for greater trochanteric bursitis, 1 session so far  - Patient participated in physical therapy in November 2017.  - Patient had 6 sessions of physical therapy ending December 2021  - She also has had 4 sessions of physical therapy for hip pain/weakness of hips ending March 2022  - L5-S1 interlaminar epidural steroid injection August 8, 2023 with greater than 50% relief of pain for 2 months  - Bilateral L4-5 transforaminal epidural steroid injection June 12,, 2023 with 50% relief  - Left L4-5 transforaminal epidural steroid injection October 21, 2021 with 60% relief of pain  - Left C2-3, C3-4, C4-5 facet joint injections June 27, 2018  - Left C2, C3, C4, C5 MBB June 7, 2018  - Left C1-C2 facet joint injection July 7, 2017  - Left C1-C2 facet joint injection April 6, 2017  -Did not tolerate gabapentin  - Tylenol as needed is helpful    Review of Systems:  Positive for weakness, headache.  Negative for numbness/tingling, loss of bowel/bladder control, inability to urinate, pain much worse in neck, trip/dental/falls, difficulty swallowing, difficulty with hand skills, fevers, unintentional weight loss.     Objective:   CONSTITUTIONAL:  Vital signs as above.  No acute distress.  The patient is well nourished and well groomed.   Patient appears tired.  PSYCHIATRIC:  The patient is awake, alert, oriented to person, place and time.  The patient is answering questions appropriately with clear speech.  Normal affect.   HEENT: Normocephalic, atraumatic.  Sclera clear.    SKIN:  Skin over the face, posterior torso, bilateral upper and lower extremities is clean, dry, intact without  rashes.  VASCULAR: Mild right greater than left lower extremity edema.  MUSCULOSKELETAL: Patient ambulates with a flexed forward posture at the hips.  Tender to palpation left occipital nerve.  Tender to palpation left greater trochanter.  No significant tenderness palpation bilateral lower lumbar paraspinous muscles.  The patient has 5/5 strength for the bilateral hip flexors, knee flexors/extensors, ankle dorsiflexors/plantar flexors.    NEUROLOGICAL:   Sensation to light touch intact bilateral lower extremities throughout.     RESULTS: I reviewed the MRI lumbar spine from [place] dated July 7, 2023.  This shows multilevel lumbar spondylosis unchanged compared with an MRI lumbar spine from 2021.  At L2-3 there is mild to moderate spinal canal stenosis with moderate left and mild to moderate right foraminal stenosis.  At L3-4 there is mild spinal canal stenosis with mild to moderate right and mild left foraminal stenosis.  At L4-5 there is moderate to severe spinal canal stenosis with mild to moderate bilateral foraminal stenosis.    EMG bilateral lower extremities with neurology December 8, 2023:  SUMMARY  Nerve conduction and EMG study of bilateral lower extremities shows:     Normal right peroneal distal motor latency with decreased amplitude and conduction velocity.  Prolonged left peroneal distal motor latency with decreased amplitude and conduction velocity.  Normal right tibial distal motor latency with decreased amplitude and conduction velocity.  Normal left tibial distal motor latency with decreased amplitude and conduction velocity.  Abnormal bilateral sural and normal bilateral superficial peroneal sensory SNAP though amplitudes are low normal.  Monopolar needle exam is normal.     CLINICAL INTERPRETATION:  This is an abnormal nerve conduction and EMG study.  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  Further clinical correlation is needed.       Again, thank you for  allowing me to participate in the care of your patient.        Sincerely,        Krista Kaplan PA-C

## 2024-01-16 NOTE — PATIENT INSTRUCTIONS
A left trochanteric bursa injection has been ordered today.      Please note that this injection uses cortisone.  The cortisone may somewhat weaken the immune system.  It is unknown how much the immune system is weakened.  It is unknown if it is weakened to the point that you may be more likely to get the COVID-19 virus, or if you do get the COVID-19 virus, if you would be sicker than you would have been if you had not had the cortisone injection.  If you do not wish to proceed with the injection, please let the nurse/physician know and do NOT schedule the injection.    Please note that since your immune system is weakened from the cortisone, having any vaccine/shot may be less effective if you have this vaccine within 2 weeks from your cortisone injection.  It is advised to wait 2 weeks after your cortisone injection to have any vaccine (or if you have a vaccine first, wait 2 weeks before you have the cortisone injection).    Please schedule this injection at least 1 week  from now to allow time for insurance prior authorization.  On the day of your injection, you cannot be sick or taking antibiotics.  If you become sick and are prescribed, please call the clinic so your injection can be rescheduled for once you have completed your antibiotics.  You will need to bring a  with you for your injection.   If you have any questions or concerns prior to your injection, please do not hesitate to call the nurse navigation line at [phone] or contact Krista Kaplan through [employer].    A referral was entered to see [facility]surgery at the [facility].  They will call you to schedule an appointment.  Ifyou do not hear from them within 72 hrs, please call [phone] to schedule an appointment.     [facility] Scheduling    Please call [phone] to schedule your image(s) (select option#1). There are 2 different locations, see below. You can do walk-in visits for xray only images if you want.     [facility]  [place] [id number] [facility] [address]    [address] [address]

## 2024-01-17 NOTE — TELEPHONE ENCOUNTER
SPINE PATIENTS - NEW PROTOCOL PREVISIT    RECORDS RECEIVED FROM: Referred by Krista Kaplan PA-C     REASON FOR VISIT: lumbar spinal stenosis   Date of Appt: 02/13/2024   NOTES (FOR ALL VISITS) STATUS DETAILS   OFFICE NOTE from referring provider Internal Referral and notes in chart   OFFICE NOTE from other specialist Internal Dexa scan: 12/11/2023  Physical therapy: of hip, nothing of lumbar.    Injections: 08/08/2023, and 06/12/2023, in chart.      DISCHARGE SUMMARY from hospital N/A    DISCHARGE REPORT from ER N/A    OPERATIVE REPORT N/A    EMG REPORT N/A    MEDICATION LIST N/A    IMAGING  (FOR ALL VISITS)     MRI (HEAD, NECK, SPINE) Internal MRI Lumbar 07/07/2023    XRAY (SPINE) *NEUROSURGERY* N/A NO XR OR ORDER   CT (HEAD, NECK, SPINE) N/A

## 2024-01-18 ENCOUNTER — HOSPITAL ENCOUNTER (OUTPATIENT)
Dept: GENERAL RADIOLOGY | Facility: HOSPITAL | Age: 84
Discharge: HOME | End: 2024-01-18
Attending: PHYSICIAN ASSISTANT | Admitting: PHYSICIAN ASSISTANT
Payer: COMMERCIAL

## 2024-01-18 DIAGNOSIS — M43.16 SPONDYLOLISTHESIS OF LUMBAR REGION: ICD-10-CM

## 2024-01-18 PROCEDURE — 72120 X-RAY BEND ONLY L-S SPINE: CPT

## 2024-01-19 NOTE — TELEPHONE ENCOUNTER
"Per [facility] Krista Kaplan PA-C: \"Please call this patient and let her know that her x-ray shows slight shift in alignment between flexion and extension.  Dr. Rea can review at her consultation.\"    Phone call to patient to review results and provider's recommendations. Results given and explained. Discussed that the surgeon will review these x-rays as well and discuss them with her at the consult on 2/13/24. Stated understanding and appreciation for call.     Reviewed all upcoming appointments (dates, times, and reason for visit) with patient. Patient very appreciative. Encouraged pt to call nurse navigation line if questions or concerns arise.   "

## 2024-01-23 ENCOUNTER — TRANSFERRED RECORDS (OUTPATIENT)
Dept: HEALTH INFORMATION MANAGEMENT | Facility: CLINIC | Age: 84
End: 2024-01-23
Payer: COMMERCIAL

## 2024-01-23 LAB
ALT SERPL-CCNC: 18 IU/L (ref 0–32)
AST SERPL-CCNC: 24 IU/L (ref 0–40)

## 2024-01-30 ENCOUNTER — RADIOLOGY INJECTION OFFICE VISIT (OUTPATIENT)
Dept: PHYSICAL MEDICINE AND REHAB | Facility: CLINIC | Age: 84
End: 2024-01-30
Attending: PHYSICIAN ASSISTANT
Payer: COMMERCIAL

## 2024-01-30 VITALS
TEMPERATURE: 97.7 F | SYSTOLIC BLOOD PRESSURE: 132 MMHG | BODY MASS INDEX: 26.37 KG/M2 | DIASTOLIC BLOOD PRESSURE: 66 MMHG | HEIGHT: 67 IN | HEART RATE: 61 BPM | OXYGEN SATURATION: 95 % | WEIGHT: 168 LBS

## 2024-01-30 DIAGNOSIS — M70.62 TROCHANTERIC BURSITIS OF LEFT HIP: ICD-10-CM

## 2024-01-30 PROCEDURE — 20611 DRAIN/INJ JOINT/BURSA W/US: CPT | Performed by: STUDENT IN AN ORGANIZED HEALTH CARE EDUCATION/TRAINING PROGRAM

## 2024-01-30 RX ORDER — METHYLPREDNISOLONE ACETATE 40 MG/ML
INJECTION, SUSPENSION INTRA-ARTICULAR; INTRALESIONAL; INTRAMUSCULAR; SOFT TISSUE
Status: COMPLETED | OUTPATIENT
Start: 2024-01-30 | End: 2024-01-30

## 2024-01-30 RX ADMIN — METHYLPREDNISOLONE ACETATE 20 MG: 40 INJECTION, SUSPENSION INTRA-ARTICULAR; INTRALESIONAL; INTRAMUSCULAR; SOFT TISSUE at 15:35

## 2024-01-30 ASSESSMENT — PAIN SCALES - GENERAL: PAINLEVEL: MILD PAIN (3)

## 2024-01-30 NOTE — PATIENT INSTRUCTIONS
Follow-up visit with ARIE Jones in 2 weeks to discuss injection outcome and determine care plan going forward.       DISCHARGE INSTRUCTIONS    During office hours (8:00 a.m.- 4:00 p.m.) questions or concerns may be answered  by calling [facility] Navigation Nurses at  [phone].  Messages received after hours will be returned the following business day.      In the case of an emergency, please dial 911 or seek assistance at the nearest Emergency Room/Urgent Care facility.     All Patients:    You may experience an increase in your symptoms for the first 2 days (It may take anywhere between 2 days- 2 weeks for the steroid to have maximum effect).    You may use ice on the injection site, as frequently as 20 minutes each hour if needed.    You may take your pain medicine.    You may continue taking your regular medication after your injection. If you have had a Medial Branch Block you may resume pain medication once your pain diary is completed.    You may shower. No swimming, tub bath or hot tub for 48 hours.  You may remove your bandaid/bandage as soon as you are home.    You may resume light activities, as tolerated.    Resume your usual diet as tolerated.    It is strongly advised that you do not drive for 1-3 hours post injection.    If you have had oral sedation:  Do not drive for 8 hours post injection.      If you have had IV sedation:  Do not drive for 24 hours post injection.  Do not operate hazardous machinery or make important personal/business decisions for 24 hours.      POSSIBLE STEROID SIDE EFFECTS (If steroid/cortisone was used for your procedure)    -If you experience these symptoms, it should only last for a short period    Swelling of the legs              Skin redness (flushing)     Mouth (oral) irritation   Blood sugar (glucose) levels            Sweats                    Mood changes  Headache  Sleeplessness  Weakened immune system for up to 14 days, which could increase the risk of  carol ann the COVID-19 virus and/or experiencing more severe symptoms of the disease, if exposed.  Decreased effectiveness of the flu vaccine if given within 2 weeks of the steroid.         POSSIBLE PROCEDURE SIDE EFFECTS  -Call the [facility] if you are concerned  Increased Pain           Increased numbness/tingling      Nausea/Vomiting          Bruising/bleeding at site      Redness or swelling                                              Difficulty walking      Weakness           Fever greater than 100.5    *In the event of a severe headache after an epidural steroid injection that is relieved by lying down, please call the [facility] to speak with a clinical staff member*

## 2024-01-31 ENCOUNTER — TELEPHONE (OUTPATIENT)
Dept: NEUROSURGERY | Facility: CLINIC | Age: 84
End: 2024-01-31

## 2024-01-31 DIAGNOSIS — M54.16 LUMBAR RADICULOPATHY: Primary | ICD-10-CM

## 2024-02-01 ENCOUNTER — OFFICE VISIT (OUTPATIENT)
Dept: FAMILY MEDICINE | Facility: CLINIC | Age: 84
End: 2024-02-01
Payer: COMMERCIAL

## 2024-02-01 VITALS
WEIGHT: 175.5 LBS | HEART RATE: 60 BPM | TEMPERATURE: 97.9 F | DIASTOLIC BLOOD PRESSURE: 62 MMHG | SYSTOLIC BLOOD PRESSURE: 124 MMHG | BODY MASS INDEX: 27.55 KG/M2 | HEIGHT: 67 IN | OXYGEN SATURATION: 98 % | RESPIRATION RATE: 18 BRPM

## 2024-02-01 DIAGNOSIS — R01.1 HEART MURMUR: ICD-10-CM

## 2024-02-01 DIAGNOSIS — R14.1 FLATULENCE, ERUCTATION AND GAS PAIN: ICD-10-CM

## 2024-02-01 DIAGNOSIS — N18.31 STAGE 3A CHRONIC KIDNEY DISEASE (H): ICD-10-CM

## 2024-02-01 DIAGNOSIS — R14.2 FLATULENCE, ERUCTATION AND GAS PAIN: ICD-10-CM

## 2024-02-01 DIAGNOSIS — R14.3 FLATULENCE, ERUCTATION AND GAS PAIN: ICD-10-CM

## 2024-02-01 DIAGNOSIS — R19.8 ABDOMINAL FULLNESS: ICD-10-CM

## 2024-02-01 DIAGNOSIS — K59.09 OTHER CONSTIPATION: ICD-10-CM

## 2024-02-01 DIAGNOSIS — Z01.818 PREOP GENERAL PHYSICAL EXAM: Primary | ICD-10-CM

## 2024-02-01 LAB
ATRIAL RATE - MUSE: 56 BPM
DIASTOLIC BLOOD PRESSURE - MUSE: NORMAL MMHG
INTERPRETATION ECG - MUSE: NORMAL
P AXIS - MUSE: 40 DEGREES
PR INTERVAL - MUSE: 178 MS
QRS DURATION - MUSE: 104 MS
QT - MUSE: 442 MS
QTC - MUSE: 426 MS
R AXIS - MUSE: 17 DEGREES
SYSTOLIC BLOOD PRESSURE - MUSE: NORMAL MMHG
T AXIS - MUSE: 50 DEGREES
VENTRICULAR RATE- MUSE: 56 BPM

## 2024-02-01 PROCEDURE — 99213 OFFICE O/P EST LOW 20 MIN: CPT | Mod: 25

## 2024-02-01 PROCEDURE — 93005 ELECTROCARDIOGRAM TRACING: CPT

## 2024-02-01 PROCEDURE — 93010 ELECTROCARDIOGRAM REPORT: CPT | Performed by: GENERAL ACUTE CARE HOSPITAL

## 2024-02-01 NOTE — PATIENT INSTRUCTIONS
Preparing for Your Surgery  Getting started  A nurse will call you to review your health history and instructions. They will give you an arrival time based on your scheduled surgery time. Please be ready to share:  Your doctor's clinic name and phone number  Your medical, surgical, and anesthesia history  A list of allergies and sensitivities  A list of medicines, including herbal treatments and over-the-counter drugs  Whether the patient has a legal guardian (ask how to send us the papers in advance)  Please tell us if you're pregnant--or if there's any chance you might be pregnant. Some surgeries may injure a fetus (unborn baby), so they require a pregnancy test. Surgeries that are safe for a fetus don't always need a test, and you can choose whether to have one.   If you have a child who's having surgery, please ask for a copy of Preparing for Your Child's Surgery.    Preparing for surgery  Within 10 to 30 days of surgery: Have a pre-op exam (sometimes called an H&P, or History and Physical). This can be done at a clinic or pre-operative center.  If you're having a , you may not need this exam. Talk to your care team.  At your pre-op exam, talk to your care team about all medicines you take. If you need to stop any medicines before surgery, ask when to start taking them again.  We do this for your safety. Many medicines can make you bleed too much during surgery. Some change how well surgery (anesthesia) drugs work.  Call your insurance company to let them know you're having surgery. (If you don't have insurance, call [phone].)  Call your clinic if there's any change in your health. This includes signs of a cold or flu (sore throat, runny nose, cough, rash, fever). It also includes a scrape or scratch near the surgery site.  If you have questions on the day of surgery, call your hospital or surgery center.  Eating and drinking guidelines  For your safety: Unless your surgeon tells you otherwise,  follow the guidelines below.  Eat and drink as usual until 8 hours before you arrive for surgery. After that, no food or milk.  Drink clear liquids until 2 hours before you arrive. These are liquids you can see through, like water, Gatorade, and Propel Water. They also include plain black coffee and tea (no cream or milk), candy, and breath mints. You can spit out gum when you arrive.  If you drink alcohol: Stop drinking it the night before surgery.  If your care team tells you to take medicine on the morning of surgery, it's okay to take it with a sip of water.  Preventing infection  Shower or bathe the night before and morning of your surgery. Follow the instructions your clinic gave you. (If no instructions, use regular soap.)  Don't shave or clip hair near your surgery site. We'll remove the hair if needed.  Don't smoke or vape the morning of surgery. You may chew nicotine gum up to 2 hours before surgery. A nicotine patch is okay.  Note: Some surgeries require you to completely quit smoking and nicotine. Check with your surgeon.  Your care team will make every effort to keep you safe from infection. We will:  Clean our hands often with soap and water (or an alcohol-based hand rub).  Clean the skin at your surgery site with a special soap that kills germs.  Give you a special gown to keep you warm. (Cold raises the risk of infection.)  Wear special hair covers, masks, gowns and gloves during surgery.  Give antibiotic medicine, if prescribed. Not all surgeries need antibiotics.  What to bring on the day of surgery  Photo ID and insurance card  Copy of your health care directive, if you have one  Glasses and hearing aids (bring cases)  You can't wear contacts during surgery  Inhaler and eye drops, if you use them (tell us about these when you arrive)  CPAP machine or breathing device, if you use them  A few personal items, if spending the night  If you have . . .  A pacemaker, ICD (cardiac defibrillator) or other  implant: Bring the ID card.  An implanted stimulator: Bring the remote control.  A legal guardian: Bring a copy of the certified (court-stamped) guardianship papers.  Please remove any jewelry, including body piercings. Leave jewelry and other valuables at home.  If you're going home the day of surgery  You must have a responsible adult drive you home. They should stay with you overnight as well.  If you don't have someone to stay with you, and you aren't safe to go home alone, we may keep you overnight. Insurance often won't pay for this.  After surgery  If it's hard to control your pain or you need more pain medicine, please call your surgeon's office.  Questions?   If you have any questions for your care team, list them here: _________________________________________________________________________________________________________________________________________________________________________ ____________________________________ ____________________________________ ____________________________________  For informational purposes only. Not to replace the advice of your health care provider. Copyright   2003, 2019 [employer]. All rights reserved. Clinically reviewed by Nika Echols MD. [employer] 648950 - REV 12/22.    How to Take Your Medication Before Surgery  Take all your medications following the procedure.

## 2024-02-01 NOTE — PROGRESS NOTES
Preoperative Evaluation  [address] [place] [id number]  Phone: [phone]  Fax: [phone]  Primary Provider: Jacy Mishra  Pre-op Performing Provider: SHERRIE FARFAN  Feb 1, 2024       Geeta is a 83 year old, presenting for the following:  Pre-Op Exam (Upper Endoscopy on 2.2.2024 at [place] with Dr. Figueroa Fax# [phone]) and Med Check  (Questions about miralax/ senna - omeprazole dosing- magnesium (from hosp) )        2/1/2024     1:52 PM   Additional Questions   Roomed by HALLIE Haynes     Surgical Information  Surgery/Procedure: Upper Endoscopy   Surgery Location: [facility]   Surgeon: Dr. Figueroa   Surgery Date: 2.2.24  Time of Surgery: 6am   Where patient plans to recover: At home with family  Fax number for surgical facility: [phone]    Assessment & Plan     The proposed surgical procedure is considered LOW risk.    Preop general physical exam  On exam, healthy 83 year old patient. Physical exam is without acute concern.  Vital signs at goal. New murmur appreciated, see below. Patient has tolerated anesthesia in the past. She has a  for tomorrow. Knows NPO guidelines. Plan is to take medications after procedure since it is early in the AM. Agree with plan. Labs reviewed from ER visit, at baseline. No concerns.   - EKG 12-lead, tracing only    Other constipation  Flatulence, eructation and gas pain  Abdominal fullness  EGD tomorrow for further assessment. She does feel constipated today. BID miralax has helped some. Is going to start on senna. Discussed that when symptomatic she can try QID dosing of miralax and max of 4 tablets of senna per day, but should follow-up if no benefit after a few days. She will reach out following the procedure if constipation persists. Abdomen is soft today. Bowel sounds normoactive.    Heart murmur  2/6 systolic murmur appreciated on exam. No chest pain, palpitations, peripheral edema, SOB or  MUNOZ. Asymptomatic. Do not need to hold off on procedure due to this finding. Can follow-up after procedure. EKG completed and reveals sinus bradycardia.     Chronic Kidney Disease Stage 3A  Chronic. Labs reveal stable creatinine at 0.92 and GFR at 62. Patient is making urine. No concerns.      - No identified additional risk factors other than previously addressed    Antiplatelet or Anticoagulation Medication Instructions   - Patient is on no antiplatelet or anticoagulation medications.    Additional Medication Instructions  You can take medications after the procedure.     Recommendation  APPROVAL GIVEN to proceed with proposed procedure, without further diagnostic evaluation.    Subjective     HPI related to upcoming procedure: Some abdominal pain and trouble swallowing and constipation. She went to [facility] and they recommended ER, she went there and they said EGD. Had one about a year ago, and they noted she had a stone and they dilated the area. Things improved. She does note she had this same thing 2 years ago as well. She is belching a lot and feeling a lot of fullness in the chest.         2/1/2024     1:52 PM   Preop Questions   1. Have you ever had a heart attack or stroke? No   2. Have you ever had surgery on your heart or blood vessels, such as a stent placement, a coronary artery bypass, or surgery on an artery in your head, neck, heart, or legs? No   3. Do you have chest pain with activity? No   4. Do you have a history of  heart failure? No   5. Do you currently have a cold, bronchitis or symptoms of other infection? No   6. Do you have a cough, shortness of breath, or wheezing? No   7. Do you or anyone in your family have previous history of blood clots? No   8. Do you or does anyone in your family have a serious bleeding problem such as prolonged bleeding following surgeries or cuts? No   9. Have you ever had problems with anemia or been told to take iron pills? No   10. Have you had any abnormal blood  loss such as black, tarry or bloody stools, or abnormal vaginal bleeding? No   11. Have you ever had a blood transfusion? No   12. Are you willing to have a blood transfusion if it is medically needed before, during, or after your surgery? Yes   13. Have you or any of your relatives ever had problems with anesthesia? No   14. Do you have sleep apnea, excessive snoring or daytime drowsiness? No   15. Do you have any artifical heart valves or other implanted medical devices like a pacemaker, defibrillator, or continuous glucose monitor? No   16. Do you have artificial joints? YES - Two hips, both knees, and left shoulder   17. Are you allergic to latex? No       Health Care Directive  Patient has a Health Care Directive on file      Preoperative Review of    reviewed - no record of controlled substances prescribed.      Status of Chronic Conditions:  DEPRESSION - Patient has a long history of Depression of moderate severity requiring medication for control with recent symptoms being stable..Current symptoms of depression include none.     HYPERLIPIDEMIA - Patient has a long history of significant Hyperlipidemia requiring medication for treatment with recent good control. Patient reports no problems or side effects with the medication.     HYPERTENSION - Patient has longstanding history of HTN , currently denies any symptoms referable to elevated blood pressure. Specifically denies chest pain, palpitations, dyspnea, orthopnea, PND or peripheral edema. Blood pressure readings have been in normal range. Current medication regimen is as listed below. Patient denies any side effects of medication.     HYPOTHYROIDISM - Patient has a longstanding history of chronic Hypothyroidism. Patient has been doing well, noting no tremor, insomnia, hair loss or changes in skin texture. Continues to take medications as directed, without adverse reactions or side effects. Last TSH   Lab Results   Component Value Date    TSH 2.46  09/19/2023   .      RENAL INSUFFICIENCY - Patient has a longstanding history of moderate-severe chronic renal insufficiency. Last Cr 0.92.     Patient Active Problem List    Diagnosis Date Noted    Osteopenia of left forearm 12/13/2023     Priority: Medium    Vitamin D deficiency 10/04/2023     Priority: Medium    Epigastric pain 06/04/2022     Priority: Medium    Greater trochanteric bursitis of both hips 03/30/2022     Priority: Medium     Left greater trochanteric bursa injection completed 4/26/2022, 10/4/2023       Lumbar radiculopathy 09/24/2021     Priority: Medium    Iron deficiency anemia due to chronic blood loss 09/24/2021     Priority: Medium     Noted after right knee replacement 4/2021 when on DOAC for VTE prevention. +FOBT. Started on PPI and DOAC discontinued. She underwent colonoscopy on 12/17/2021.  She had findings of internal hemorrhoids, diverticulosis in the sigmoid colon and descending colon.  No source of anemia was found on colonoscopy.  She also underwent upper endoscopy.  The esophagus appeared normal.  She had findings of gastritis which was biopsied and considered to be the likely source of anemia.  Duodenum was normal.  She was advised to increase omeprazole to 40 mg twice daily and avoid NSAIDs.      Neuropathy 09/23/2021     Priority: Medium     Gabapentin caused cognitive side effects.  Vascular work up through [facility] 8/2023 reportedly normal, report not available.   12/2023: EMG, abnormal. Results consistent with sensorimotor polyneuropathy       Status post knee surgery 04/29/2021     Priority: Medium    Stage 3a chronic kidney disease (H) 10/29/2020     Priority: Medium    Coronary artery disease involving native coronary artery of native heart without angina pectoris 08/06/2020     Priority: Medium     Noted on coronary calcium score. Pt has mild disease, considered high risk for future cardiac event       Chronic rhinitis 12/03/2019     Priority: Medium     Treated with AYR,  astelin nasal spray, fluticasone nasal spray       Hiatal hernia 08/21/2019     Priority: Medium    Dysthymic disorder 06/01/2017     Priority: Medium     H/o headache with duloxetine     Citalopram 10 mg- dizziness         Facet arthropathy, cervical 04/19/2017     Priority: Medium    History of DVT (deep vein thrombosis), right LE 12/2016 02/07/2017     Priority: Medium    Choledocholithiasis 07/22/2016     Priority: Medium    Constipation 07/22/2016     Priority: Medium    Osteoarthritis of lumbar spine 05/09/2012     Priority: Medium    Osteoarthritis; knees, hips, cervical spine  05/09/2012     Priority: Medium     Gabapentin- cognitive side effects, did not like how she felt while taking the medication  Duloxetine- headache SE    4/2023- corticosteroid injection given through [facility] in her cervical spine. She has a follow-up appointment and may consider a lumbar injection as well. She may continue acetaminophen as needed. I considered duloxetine, however, she developed a headache with this medication in the past. Continue with topical products such as Salonpas patches or diclofenac.       Acute iritis, h/o in 2012 04/13/2012     Priority: Medium    Hypothyroidism 02/26/2011     Priority: Medium    Essential hypertension 01/25/2010     Priority: Medium     Formatting of this note might be different from the original.  Hypertension      Gastroesophageal reflux disease 07/07/2009     Priority: Medium     Gastroesophageal Reflux Disease    most recent upper endoscopy was completed 12/2021 and showed mild inflammation in the gastric antrum consistent with gastritis and was a source of anemia at the time. Colonoscopy was also completed at this time and showed diverticulosis in the sigmoid and descending colon as well as internal hemorrhoids.      Hyperlipidemia 07/07/2009     Priority: Medium     9/2023- trial hold of rosuvastatin for 2-3 weeks. 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 coma (H)     Chronic neck pain     Common bile duct calculus     Coronary artery disease due to calcified coronary lesion 8/6/2020    Depression     DVT (deep venous thrombosis) (H)     GERD (gastroesophageal reflux disease)     History of blood clots 2017    DVT right lower extremity    HLD (hyperlipidemia)     HTN (hypertension)     Hyperlipidemia     Hypertension     Hypothyroidism     Infectious viral hepatitis     hepatitis A in 1960s    Thyroid disease      Past Surgical History:   Procedure Laterality Date    ARTHROPLASTY REVISION HIP Left 5/16/2017    Procedure: REVISION LEFT TOTAL HIP ARTHROPLASTY, BOTH COMPONENTS;  Surgeon: Tyson Peoples MD;  Location: [facility];  Service:     ARTHROSCOPY HIP Left 1983    CHOLECYSTECTOMY  1980    ENDOSCOPIC RETROGRADE CHOLANGIOPANCREATOGRAM      x5 over 8 years. Last 8/2016    ENDOSCOPIC RETROGRADE CHOLANGIOPANCREATOGRAM N/A 9/5/2017    Procedure: ENDOSCOPIC RETROGRADE CHOLANGIOPANCREATOGRAPHY, STONE EXTRACTION;  Surgeon: Henry Eller MD;  Location: [facility];  Service:     ENDOSCOPIC RETROGRADE CHOLANGIOPANCREATOGRAM N/A 7/26/2019    Procedure: ENDOSCOPIC RETROGRADE CHOLANGIOPANCREATOGRAPHY, REMOVAL OF SLUDGE, DILATION OF STRICTURE;  Surgeon: Ryan Pastrana MD;  Location: [facility] OR;  Service: Gastroenterology    ENDOSCOPIC RETROGRADE CHOLANGIOPANCREATOGRAM N/A 6/6/2022    Procedure: ENDOSCOPIC RETROGRADE CHOLANGIOPANCREATOGRAPHY, WITH STONE EXTRACTION;  Surgeon: Hima Perry MD;  Location: [facility]    ENDOSCOPIC RETROGRADE CHOLANGIOPANCREATOGRAM  6/6/2022    Procedure: ;  Surgeon: Hima Perry MD;  Location: [facility]    HIP SURGERY  2012    revision    JOINT REPLACEMENT      ORTHOPEDIC SURGERY      OTHER SURGICAL HISTORY      bilateral THAwith revision on both hips    MI ESOPHAGOGASTRODUODENOSCOPY  TRANSORAL DIAGNOSTIC N/A 9/10/2019    Procedure: ESOPHAGOGASTRODUODENOSCOPY (EGD);  Surgeon: Will Nash DO;  Location: [facility];  Service: General    REPLACEMENT TOTAL KNEE Left 2015    SHOULDER SURGERY Left 2010    left total shoulder replacement    XR ERCP BILIARY AND PANCREAS  7/26/2019    ZC TOTAL KNEE ARTHROPLASTY Right 4/29/2021    Procedure: RIGHT TOTAL KNEE ARTHROPLASTY;  Surgeon: Satinder Issa DO;  Location: [facility];  Service: Orthopedics     Current Outpatient Medications   Medication Sig Dispense Refill    acetaminophen (TYLENOL) 500 MG tablet Take 500 mg by mouth 4 times daily      amoxicillin (AMOXIL) 500 MG capsule Take 2,000 mg by mouth daily as needed (1 hour before dental appointments)      azelastine (ASTELIN) 0.1 % nasal spray Spray 1 spray into both nostrils At Bedtime 90 mL 1    calcium citrate-vitamin D (CITRACAL) 200-6.25 MG-MCG TABS per tablet Take 1 tablet by mouth daily      citalopram (CELEXA) 10 MG tablet TAKE 1/2 TABLET BY MOUTH EVERY DAY 45 tablet 1    docusate sodium (COLACE) 100 MG tablet Take 100 mg by mouth 2 times daily      fluticasone (FLONASE) 50 MCG/ACT nasal spray INSTILL 1 SPRAY INTO BOTH NOSTRILS DAILY 48 g 3    hydrochlorothiazide (HYDRODIURIL) 25 MG tablet TAKE 1 TABLET BY MOUTH EVERY DAY 90 tablet 2    levothyroxine (SYNTHROID/LEVOTHROID) 50 MCG tablet TAKE 1 TABLET BY MOUTH EVERY DAY 90 tablet 2    Lidocaine-Menthol (LIDOCAINE-MENTHOL ROLL-ON) 4-1 % LIQD Externally apply 1 Application topically as needed      multivitamin w/minerals (THERA-VIT-M) tablet Take 1 tablet by mouth daily      omeprazole (PRILOSEC) 20 MG DR capsule TAKE 1 CAPSULE BY MOUTH EVERY EVENING 90 capsule 2    polyethylene glycol (MIRALAX/GLYCOLAX) packet Take 1 packet by mouth daily      rosuvastatin (CRESTOR) 40 MG tablet TAKE 1 TABLET BY MOUTH EVERYDAY AT BEDTIME 90 tablet 0    vitamin C (ASCORBIC ACID) 1000 MG TABS Take 1,000 mg by mouth daily      Vitamin D3 (VITAMIN D,  "CHOLECALCIFEROL,) 25 mcg (1000 units) tablet Take 1 tablet by mouth daily       Allergies   Allergen Reactions    Atorvastatin      Can't remember    Codeine      Can't remember    Dipyridamole      Can't remember    Duloxetine Headache      Social History     Tobacco Use    Smoking status: Never     Passive exposure: Never    Smokeless tobacco: Never   Substance Use Topics    Alcohol use: No     Family History   Problem Relation Age of Onset    Colon Cancer Father     Diabetes Sister     Heart Disease Mother      History   Drug Use No         Review of Systems    Review of Systems  CONSTITUTIONAL: NEGATIVE for fever, chills, change in weight  INTEGUMENTARY/SKIN: NEGATIVE for worrisome rashes, moles or lesions  EYES: NEGATIVE for vision changes or irritation  ENT/MOUTH: NEGATIVE for ear, mouth and throat problems  RESP: NEGATIVE for significant cough or SOB  CV: NEGATIVE for chest pain, palpitations or peripheral edema  GI: Constipation, abdominal pain, burping, abdominal fullness  : NEGATIVE for frequency, dysuria, or hematuria  MUSCULOSKELETAL: Foot pain, foot swelling, neck pain, back pain, leg pain  NEURO: NEGATIVE for weakness, dizziness or paresthesias  ENDOCRINE: NEGATIVE for temperature intolerance, skin/hair changes  HEME: NEGATIVE for bleeding problems  PSYCHIATRIC: NEGATIVE for changes in mood or affect    Objective    /62 (BP Location: Right arm, Patient Position: Sitting, Cuff Size: Adult Regular)   Pulse 60   Temp 97.9  F (36.6  C) (Oral)   Resp 18   Ht 1.689 m (5' 6.5\")   Wt 79.6 kg (175 lb 8 oz)   LMP  (LMP Unknown)   SpO2 98%   BMI 27.90 kg/m     Estimated body mass index is 27.9 kg/m  as calculated from the following:    Height as of this encounter: 1.689 m (5' 6.5\").    Weight as of this encounter: 79.6 kg (175 lb 8 oz).    Physical Exam  GENERAL: alert and no distress  EYES: Eyes grossly normal to inspection, PERRL and conjunctivae and sclerae normal  HENT: ear canals and TM's " normal, nose and mouth without ulcers or lesions  NECK: no adenopathy, no asymmetry, masses, or scars, no carotid bruits.  RESP: lungs clear to auscultation - no rales, rhonchi or wheezes  CV: regular rate and rhythm, normal S1 S2, no S3 or S4, click or rub, no peripheral edema. Grade 2 systolic murmur appreciated.   ABDOMEN: soft, nontender, no hepatosplenomegaly, no masses and bowel sounds normal  MS: no gross musculoskeletal defects noted, no edema  SKIN: no suspicious lesions or rashes  NEURO: Normal strength and tone, mentation intact and speech normal  PSYCH: mentation appears normal, affect normal/bright  LYMPH: no cervical, supraclavicular, axillary, or inguinal adenopathy    Diagnostics  Recent Results (from the past 240 hour(s))   EKG 12-lead, tracing only    Collection Time: 02/01/24  3:51 PM   Result Value Ref Range    Systolic Blood Pressure  mmHg    Diastolic Blood Pressure  mmHg    Ventricular Rate 56 BPM    Atrial Rate 56 BPM    AK Interval 178 ms    QRS Duration 104 ms     ms    QTc 426 ms    P Axis 40 degrees    R AXIS 17 degrees    T Axis 50 degrees    Interpretation ECG       Sinus bradycardia  Otherwise normal ECG  When compared with ECG of 19-JUN-2022 13:17,  No significant change was found        Labs under care everywhere tab, dated 01/24/2024.     Revised Cardiac Risk Index (RCRI)  The patient has the following serious cardiovascular risks for perioperative complications:   - No serious cardiac risks = 0 points     RCRI Interpretation: 0 points: Class I (very low risk - 0.4% complication rate)    Signed Electronically by: DIOGO Alcantara CNP  Copy of this evaluation report is provided to requesting physician.    At the end of the visit, I confirmed understanding of what was discussed. Geeta has no further questions or concerns that were brought up at this time.      Ermias Mathew DNP, APRN, FNP-C

## 2024-02-08 NOTE — PROGRESS NOTES
Assessment:   Geeta Berry is a 83 y.o. female with past medical history significant for neuropathy, GERD, hyperlipidemia, hypothyroidism, hypertension, coronary artery disease, chronic kidney disease stage III, iron deficiency anemia, dysthymia who presents today for follow-up regarding 3 concerns:  1.  Chronic bilateral low back pain.  My review of an MRI lumbar spine shows grade 1 degenerative spondylolisthesis L4-5 with moderate to severe spinal stenosis.  Patient saw Dr. Rea earlier today.  Dr. Rea recommended conservative treatment.  2.  Lateral hip pain.  Previously this was only left-sided but within the past 1 week she has also had right-sided pain.  Patient is status post a left trochanteric bursa injection January 30, 2024 which has provided 100% relief of pain.  3.  Left occipital neuralgia.      Plan:     A shared decision making plan was used.  The patient's values and choices were respected.  The following represents what was discussed and decided upon by the physician assistant and the patient.      1.  DIAGNOSTIC TESTS:   -I reviewed the MRI lumbar spine.  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  - I reviewed the lumbar spine flexion-extension x-rays which were negative for dynamic instability.  - No additional diagnostic tests were ordered.    2.  PHYSICAL THERAPY:  - Patient is currently in physical therapy for greater trochanteric bursitis of both hips.  She has had 1 session so far.  She had to put a hold on physical therapy because she was dealing with constipation.  She now feels ready to return.  Encouraged to continue with physical therapy and the home exercises.      3.  MEDICATIONS: No changes are made to the patient's medications.  - Patient uses Tylenol as needed.  - Patient uses lidocaine patches or roll-on.  -Patient did not tolerate gabapentin.  - I did not recommend  duloxetine since she is also on citalopram.    4.  INTERVENTIONS:  - Left occipital nerve block was performed today.  Please see procedure note for details.  - Dr. Rea ordered a right trochanteric bursa injection.  Patient has had 4 steroid injections since June 2023.  I explained the risk of osteoporosis with more than 4 steroid injections within 1 year.  Patient is going to try to hold off on doing a right trochanteric bursa injection until at least June 2024.  - I am not overly optimistic she will have more relief of her leg pain with another epidural steroid injection.  She has already tried bilateral L4-5 TFESI and an L5-S1 ILESI since June 2023 with no lasting relief.    5.  PATIENT EDUCATION: Patient is in agreement with the above plan.  All questions were answered.    6.  FOLLOW-UP: My nurse is going to call the patient in 2 weeks to see how she has responded to the left occipital nerve block.  If she has questions or concerns in the meantime, she should not hesitate to call.  Subjective:     Geeta Berry is a 83 year old female who presents today for follow-up regarding 3 concerns:    Patient complains first of chronic low back pain with radiation into the bilateral lower extremities.  Patient is at Dr. Rea earlier today.  Dr. Rea recommended continuing with conservative treatment.    Patient complains next of  lateral hip pain.  Patient is following up after a left trochanteric bursa injection under ultrasound guidance January 30, 2024.  This injection has provided 100% relief of her left-sided pain.  Within the past 1 week she has developed similar pain on the right side.  Dr. Rea recommended a right trochanteric bursa injection and physical therapy.    Patient complains next of left occipital pain which radiates up to the top of the head.  She also has pain in the left neck.  This pain is worse with lying on her back.  This pain is alleviated with lying on her side.    Overall, patient  rates her pain as a 5-8 out of 10.      Treatment to date:  - Current physical therapy for greater trochanteric bursitis, 1 session so far  - Patient participated in physical therapy in November 2017.  - Patient had 6 sessions of physical therapy ending December 2021  - She also has had 4 sessions of physical therapy for hip pain/weakness of hips ending March 2022  - Left trochanteric bursa injection January 30, 2024 with 100% relief  - L5-S1 interlaminar epidural steroid injection August 8, 2023 with greater than 50% relief of pain for 2 months  - Bilateral L4-5 transforaminal epidural steroid injection June 12,, 2023 with 50% relief  - Left L4-5 transforaminal epidural steroid injection October 21, 2021 with 60% relief of pain  - Left occipital nerve block July 11, 2018 with 80% relief of pain  - Left C2-3, C3-4, C4-5 facet joint injections June 27, 2018  - Left C2, C3, C4, C5 MBB June 7, 2018  - Left C1-C2 facet joint injection July 7, 2017  - Left C1-C2 facet joint injection April 6, 2017  -Did not tolerate gabapentin  - Tylenol as needed is helpful    Review of Systems:  Positive for headache, pain much worse at night.  Negative for numbness/tingling, weakness, loss of bowel/bladder control, inability to urinate, trip/stumble/falls, difficulty swallowing, difficulty with hand skills, fevers, unintentional weight loss.     Objective:   CONSTITUTIONAL:  Vital signs as above.  No acute distress.  The patient is well nourished and well groomed.   Patient appears tired.  PSYCHIATRIC:  The patient is awake, alert, oriented to person, place and time.  The patient is answering questions appropriately with clear speech.  Normal affect.   HEENT: Normocephalic, atraumatic.  Sclera clear.    SKIN: Exposed skin is clean, dry, intact without rashes.  MUSCULOSKELETAL: Patient ambulates with a flexed forward posture at the hips.  Tender to palpation left occipital nerve.  Moving all extremities equally.  NEUROLOGICAL:    Cranial nerves II through XII grossly intact.     RESULTS: I reviewed the MRI lumbar spine from [place] dated July 7, 2023.  This shows multilevel lumbar spondylosis unchanged compared with an MRI lumbar spine from 2021.  At L2-3 there is mild to moderate spinal canal stenosis with moderate left and mild to moderate right foraminal stenosis.  At L3-4 there is mild spinal canal stenosis with mild to moderate right and mild left foraminal stenosis.  At L4-5 there is moderate to severe spinal canal stenosis with mild to moderate bilateral foraminal stenosis.    EMG bilateral lower extremities with neurology December 8, 2023:  SUMMARY  Nerve conduction and EMG study of bilateral lower extremities shows:     Normal right peroneal distal motor latency with decreased amplitude and conduction velocity.  Prolonged left peroneal distal motor latency with decreased amplitude and conduction velocity.  Normal right tibial distal motor latency with decreased amplitude and conduction velocity.  Normal left tibial distal motor latency with decreased amplitude and conduction velocity.  Abnormal bilateral sural and normal bilateral superficial peroneal sensory SNAP though amplitudes are low normal.  Monopolar needle exam is normal.     CLINICAL INTERPRETATION:  This is an abnormal nerve conduction and EMG study.  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  Further clinical correlation is needed.    Reviewed the lumbar spine flexion-extension x-rays from [facility] dated February 13, 2024.  This shows 6 mm anterolisthesis L4-5 unchanged on flexion and extension.  There is 3 mm anterolisthesis L5-S1 unchanged on flexion and extension.

## 2024-02-08 NOTE — PROGRESS NOTES
Pre-procedure diagnosis:  ***  Occipital neuralgia  Post-procedure diagnosis:  Same  PROCEDURE: *** Occipital nerve block.    The risks of the procedure were discussed with the patient.  These include infection, bleeding, increased pain, no change in pain were discussed with the patient.  The patient gave written and verbal consent to proceed with the procedure.    The most tender area of the *** occipital nerve at the nuchal ridge was palpated.  This area was then cleansed with a betadine swab x 3.  A solution of 1 mL of *** mg of Depomedrol mixed with *** was drawn up.  A 27 guage 1.25 inch needle was inserted down to os.  After aspiration was negative, *** mL of the solution was injected.  Without withdrawing the needle from the skin, the needle was redirected.  After aspiration was negative, *** mL of the solution was injected.  Without withdrawing the needle from the skin, the needle was once again redirected.  After aspiration was negative, *** mL of the remaining solution was injected.      The patient tolerated the procedure well.  The patient was monitored for a short period of time and then discharged under *** own power.

## 2024-02-13 ENCOUNTER — PRE VISIT (OUTPATIENT)
Dept: NEUROSURGERY | Facility: CLINIC | Age: 84
End: 2024-02-13

## 2024-02-13 ENCOUNTER — OFFICE VISIT (OUTPATIENT)
Dept: PHYSICAL MEDICINE AND REHAB | Facility: CLINIC | Age: 84
End: 2024-02-13
Payer: COMMERCIAL

## 2024-02-13 ENCOUNTER — HOSPITAL ENCOUNTER (OUTPATIENT)
Dept: RADIOLOGY | Facility: HOSPITAL | Age: 84
Discharge: HOME | End: 2024-02-13
Attending: SURGERY | Admitting: SURGERY
Payer: COMMERCIAL

## 2024-02-13 ENCOUNTER — OFFICE VISIT (OUTPATIENT)
Dept: NEUROSURGERY | Facility: CLINIC | Age: 84
End: 2024-02-13
Attending: PHYSICIAN ASSISTANT
Payer: COMMERCIAL

## 2024-02-13 VITALS — HEART RATE: 58 BPM | DIASTOLIC BLOOD PRESSURE: 68 MMHG | SYSTOLIC BLOOD PRESSURE: 153 MMHG | OXYGEN SATURATION: 98 %

## 2024-02-13 DIAGNOSIS — M70.62 TROCHANTERIC BURSITIS OF LEFT HIP: ICD-10-CM

## 2024-02-13 DIAGNOSIS — M43.16 SPONDYLOLISTHESIS OF LUMBAR REGION: Primary | ICD-10-CM

## 2024-02-13 DIAGNOSIS — M43.16 SPONDYLOLISTHESIS OF LUMBAR REGION: ICD-10-CM

## 2024-02-13 DIAGNOSIS — M48.062 SPINAL STENOSIS OF LUMBAR REGION WITH NEUROGENIC CLAUDICATION: ICD-10-CM

## 2024-02-13 DIAGNOSIS — M54.81 OCCIPITAL NEURALGIA OF LEFT SIDE: Primary | ICD-10-CM

## 2024-02-13 DIAGNOSIS — M54.16 LUMBAR RADICULOPATHY: ICD-10-CM

## 2024-02-13 PROCEDURE — 72110 X-RAY EXAM L-2 SPINE 4/>VWS: CPT

## 2024-02-13 PROCEDURE — 99203 OFFICE O/P NEW LOW 30 MIN: CPT | Performed by: SURGERY

## 2024-02-13 PROCEDURE — 99214 OFFICE O/P EST MOD 30 MIN: CPT | Mod: 25 | Performed by: PHYSICIAN ASSISTANT

## 2024-02-13 PROCEDURE — 64405 NJX AA&/STRD GR OCPL NRV: CPT | Mod: LT | Performed by: PHYSICIAN ASSISTANT

## 2024-02-13 RX ORDER — METHYLPREDNISOLONE ACETATE 40 MG/ML
40 INJECTION, SUSPENSION INTRA-ARTICULAR; INTRALESIONAL; INTRAMUSCULAR; SOFT TISSUE ONCE
Status: COMPLETED | OUTPATIENT
Start: 2024-02-13 | End: 2024-02-13

## 2024-02-13 RX ADMIN — METHYLPREDNISOLONE ACETATE 40 MG: 40 INJECTION, SUSPENSION INTRA-ARTICULAR; INTRALESIONAL; INTRAMUSCULAR; SOFT TISSUE at 15:46

## 2024-02-13 ASSESSMENT — PAIN SCALES - GENERAL: PAINLEVEL: MODERATE PAIN (4)

## 2024-02-13 NOTE — LETTER
2/13/2024         RE: Geeta Berry  [address]  [facility] [address]        Dear Colleague,    Thank you for referring your patient, Geeta Berry, to the [facility]. Please see a copy of my visit note below.    NEUROSURGERY CONSULTATION NOTE      Neurosurgery was asked to see this patient by Krista Kaplan PA-C for evaluation of lumbar stenosis.       CONSULTATION ASSESSMENT AND PLAN:    84 yo female who presents with low back and leg pain.  Prior left leg pain has improved with a large injection of her left hip.  Currently is located only over the right lateral leg to her knee.  Her pain is not claudicating in nature.  Lumbar x-ray shows anterolisthesis lumbar 4-5 and lumbar 5-sacral 1 without significant instability.  MRI of her lumbar spine shows ankylosis across L5-S1.  At the lumbar 4-5 level she has moderate to severe spinal canal stenosis with mild to moderate bilateral neural foraminal narrowing.  Has less significant stenosis L1-4.  Patient's left hip injection improved her left proximal leg pain and hip pain.  It is possible her right proximal leg pain and hip pain are also due to hip pathology.  Numerous past hip revisions. We discussed proceeding with physical therapy and also undergoing a right hip injection. She will return to clinic in 6-8 weeks to follow up on symptom improvement.     Antonietta Rea MD      HISTORY OF PRESENTING ILLNESS:    84 yo female who presents with low back and leg pain. Recently underwent a hip injection which did improve her left hip pain. Very sore in right lateral leg similar to her prior left leg pain which is now improved. This pain improves with walking. Pain is worsened with sitting to standing. Tenderness over her hips. Denies leg numbness or tingling. Weakness with getting up or down stairs or steps or from a chair. No bowel or bladder loss. Constipation. Will have back pain with sitting and laying but not  standing and walking.     Did have an injection 2 weeks ago which helped her pain the most. This was a large joint injection of left hip.   Can't necessarily remember what relief she had from her TFESI.   Physical therapy she just began but then stopped due to stomach issues.     Has been past multiple knee and hip replacement with revisions.     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)      Chronic neck pain      Common bile duct calculus      Coronary artery disease due to calcified coronary lesion 8/6/2020     Depression      DVT (deep venous thrombosis) (H)      GERD (gastroesophageal reflux disease)      History of blood clots 2017    DVT right lower extremity     HLD (hyperlipidemia)      HTN (hypertension)      Hyperlipidemia      Hypertension      Hypothyroidism      Infectious viral hepatitis     hepatitis A in 1960s     Thyroid disease        Past Surgical History:   Procedure Laterality Date     ARTHROPLASTY REVISION HIP Left 5/16/2017    Procedure: REVISION LEFT TOTAL HIP ARTHROPLASTY, BOTH COMPONENTS;  Surgeon: Tyson Peoples MD;  Location: [facility];  Service:      ARTHROSCOPY HIP Left 1983     CHOLECYSTECTOMY  1980     ENDOSCOPIC RETROGRADE CHOLANGIOPANCREATOGRAM      x5 over 8 years. Last 8/2016     ENDOSCOPIC RETROGRADE CHOLANGIOPANCREATOGRAM N/A 9/5/2017    Procedure: ENDOSCOPIC RETROGRADE CHOLANGIOPANCREATOGRAPHY, STONE EXTRACTION;  Surgeon: Henry Eller MD;  Location: [facility] OR;  Service:      ENDOSCOPIC RETROGRADE CHOLANGIOPANCREATOGRAM N/A 7/26/2019    Procedure: ENDOSCOPIC RETROGRADE CHOLANGIOPANCREATOGRAPHY, REMOVAL OF SLUDGE, DILATION OF STRICTURE;  Surgeon: Ryan Pastrana MD;  Location: [facility];  Service: Gastroenterology     ENDOSCOPIC RETROGRADE CHOLANGIOPANCREATOGRAM N/A 6/6/2022    Procedure: ENDOSCOPIC RETROGRADE CHOLANGIOPANCREATOGRAPHY, WITH STONE EXTRACTION;   Surgeon: Hima Perry MD;  Location: [facility]     ENDOSCOPIC RETROGRADE CHOLANGIOPANCREATOGRAM  6/6/2022    Procedure: ;  Surgeon: Hima Perry MD;  Location: [facility] OR     HIP SURGERY  2012    revision     JOINT REPLACEMENT       ORTHOPEDIC SURGERY       OTHER SURGICAL HISTORY      bilateral THAwith revision on both hips     RI ESOPHAGOGASTRODUODENOSCOPY TRANSORAL DIAGNOSTIC N/A 9/10/2019    Procedure: ESOPHAGOGASTRODUODENOSCOPY (EGD);  Surgeon: Will Nash DO;  Location: [facility] OR;  Service: General     REPLACEMENT TOTAL KNEE Left 2015     SHOULDER SURGERY Left 2010    left total shoulder replacement     XR ERCP BILIARY AND PANCREAS  7/26/2019     ZZC TOTAL KNEE ARTHROPLASTY Right 4/29/2021    Procedure: RIGHT TOTAL KNEE ARTHROPLASTY;  Surgeon: Satinder Issa DO;  Location: [facility];  Service: Orthopedics       REVIEW OF SYSTEMS:  See ROS form under media     MEDICATIONS:    Current Outpatient Medications   Medication Sig Dispense Refill     acetaminophen (TYLENOL) 500 MG tablet Take 500 mg by mouth 4 times daily       amoxicillin (AMOXIL) 500 MG capsule Take 2,000 mg by mouth daily as needed (1 hour before dental appointments)       azelastine (ASTELIN) 0.1 % nasal spray Spray 1 spray into both nostrils At Bedtime 90 mL 1     calcium citrate-vitamin D (CITRACAL) 200-6.25 MG-MCG TABS per tablet Take 1 tablet by mouth daily       citalopram (CELEXA) 10 MG tablet TAKE 1/2 TABLET BY MOUTH EVERY DAY 45 tablet 1     docusate sodium (COLACE) 100 MG tablet Take 100 mg by mouth 2 times daily       fluticasone (FLONASE) 50 MCG/ACT nasal spray INSTILL 1 SPRAY INTO BOTH NOSTRILS DAILY 48 g 3     hydrochlorothiazide (HYDRODIURIL) 25 MG tablet TAKE 1 TABLET BY MOUTH EVERY DAY 90 tablet 2     levothyroxine (SYNTHROID/LEVOTHROID) 50 MCG tablet TAKE 1 TABLET BY MOUTH EVERY DAY 90 tablet 2     Lidocaine-Menthol (LIDOCAINE-MENTHOL ROLL-ON) 4-1 % LIQD Externally apply 1  Application topically as needed       multivitamin w/minerals (THERA-VIT-M) tablet Take 1 tablet by mouth daily       omeprazole (PRILOSEC) 20 MG DR capsule TAKE 1 CAPSULE BY MOUTH EVERY EVENING 90 capsule 2     polyethylene glycol (MIRALAX/GLYCOLAX) packet Take 1 packet by mouth daily       rosuvastatin (CRESTOR) 40 MG tablet TAKE 1 TABLET BY MOUTH EVERYDAY AT BEDTIME 90 tablet 0     vitamin C (ASCORBIC ACID) 1000 MG TABS Take 1,000 mg by mouth daily       Vitamin D3 (VITAMIN D, CHOLECALCIFEROL,) 25 mcg (1000 units) tablet Take 1 tablet by mouth daily           ALLERGIES/SENSITIVITIES:     Allergies   Allergen Reactions     Atorvastatin      Can't remember     Codeine      Can't remember     Dipyridamole      Can't remember     Duloxetine Headache       PERTINENT SOCIAL HISTORY:   Social History     Socioeconomic History     Marital status:      Number of children: 2   Tobacco Use     Smoking status: Never     Passive exposure: Never     Smokeless tobacco: Never   Vaping Use     Vaping Use: Never used   Substance and Sexual Activity     Alcohol use: No     Drug use: No     Sexual activity: Not Currently     Birth control/protection: Post-menopausal   Social History Narrative    4/6/17: The patient lives with her  in a condo.     Social Determinants of Health     Financial Resource Strain: Low Risk  (1/2/2024)    Financial Resource Strain      Within the past 12 months, have you or your family members you live with been unable to get utilities (heat, electricity) when it was really needed?: No   Food Insecurity: Low Risk  (1/2/2024)    Food Insecurity      Within the past 12 months, did you worry that your food would run out before you got money to buy more?: No      Within the past 12 months, did the food you bought just not last and you didn t have money to get more?: No   Transportation Needs: Low Risk  (1/2/2024)    Transportation Needs      Within the past 12 months, has lack of transportation  kept you from medical appointments, getting your medicines, non-medical meetings or appointments, work, or from getting things that you need?: No   Interpersonal Safety: Low Risk  (2/1/2024)    Interpersonal Safety      Do you feel physically and emotionally safe where you currently live?: Yes      Within the past 12 months, have you been hit, slapped, kicked or otherwise physically hurt by someone?: No      Within the past 12 months, have you been humiliated or emotionally abused in other ways by your partner or ex-partner?: No   Housing Stability: Low Risk  (1/2/2024)    Housing Stability      Do you have housing? : Yes      Are you worried about losing your housing?: No         FAMILY HISTORY:  Family History   Problem Relation Age of Onset     Colon Cancer Father      Diabetes Sister      Heart Disease Mother         PHYSICAL EXAM:   Constitutional: BP (!) 153/68   Pulse 58   LMP  (LMP Unknown)   SpO2 98%      Mental Status: A & O in no acute distress.  Affect is appropriate.  Speech is fluent.  Recent and remote memory are intact.  Attention span and concentration are normal.     Motor:  Normal bulk and tone all muscle groups of upper and lower extremities.    Strength: 5/5 in LE except HF 4/5 on right     Sensory: Sensation intact bilaterally to light touch diminished  in LE distally only      Coordination; slightly antalgic wide based gait      Reflexes; knee/ ankle jerk 1+    IMAGING:  I personally reviewed all radiographic images         Cc:   Jacy Mishra             Again, thank you for allowing me to participate in the care of your patient.        Sincerely,        Antonietta Rea MD

## 2024-02-13 NOTE — LETTER
2/13/2024         RE: Geeta Berry  [address]  [facility] [address]        Dear Colleague,    Thank you for referring your patient, Geeta Berry, to the [facility]. Please see a copy of my visit note below.    Assessment:   Geeta Berry is a 83 y.o. female with past medical history significant for neuropathy, GERD, hyperlipidemia, hypothyroidism, hypertension, coronary artery disease, chronic kidney disease stage III, iron deficiency anemia, dysthymia who presents today for follow-up regarding 3 concerns:  1.  Chronic bilateral low back pain.  My review of an MRI lumbar spine shows grade 1 degenerative spondylolisthesis L4-5 with moderate to severe spinal stenosis.  Patient saw Dr. Rea earlier today.  Dr. Rea recommended conservative treatment.  2.  Lateral hip pain.  Previously this was only left-sided but within the past 1 week she has also had right-sided pain.  Patient is status post a left trochanteric bursa injection January 30, 2024 which has provided 100% relief of pain.  3.  Left occipital neuralgia.      Plan:     A shared decision making plan was used.  The patient's values and choices were respected.  The following represents what was discussed and decided upon by the physician assistant and the patient.      1.  DIAGNOSTIC TESTS:   -I reviewed the MRI lumbar spine.  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  - I reviewed the lumbar spine flexion-extension x-rays which were negative for dynamic instability.  - No additional diagnostic tests were ordered.    2.  PHYSICAL THERAPY:  - Patient is currently in physical therapy for greater trochanteric bursitis of both hips.  She has had 1 session so far.  She had to put a hold on physical therapy because she was dealing with constipation.  She now feels ready to return.  Encouraged to continue  with physical therapy and the home exercises.      3.  MEDICATIONS: No changes are made to the patient's medications.  - Patient uses Tylenol as needed.  - Patient uses lidocaine patches or roll-on.  -Patient did not tolerate gabapentin.  - I did not recommend duloxetine since she is also on citalopram.    4.  INTERVENTIONS:  - Left occipital nerve block was performed today.  Please see procedure note for details.  - Dr. Rea ordered a right trochanteric bursa injection.  Patient has had 4 steroid injections since June 2023.  I explained the risk of osteoporosis with more than 4 steroid injections within 1 year.  Patient is going to try to hold off on doing a right trochanteric bursa injection until at least June 2024.  - I am not overly optimistic she will have more relief of her leg pain with another epidural steroid injection.  She has already tried bilateral L4-5 TFESI and an L5-S1 ILESI since June 2023 with no lasting relief.    5.  PATIENT EDUCATION: Patient is in agreement with the above plan.  All questions were answered.    6.  FOLLOW-UP: My nurse is going to call the patient in 2 weeks to see how she has responded to the left occipital nerve block.  If she has questions or concerns in the meantime, she should not hesitate to call.  Subjective:     Geeta Berry is a 83 year old female who presents today for follow-up regarding 3 concerns:    Patient complains first of chronic low back pain with radiation into the bilateral lower extremities.  Patient is at Dr. Rea earlier today.  Dr. Rea recommended continuing with conservative treatment.    Patient complains next of  lateral hip pain.  Patient is following up after a left trochanteric bursa injection under ultrasound guidance January 30, 2024.  This injection has provided 100% relief of her left-sided pain.  Within the past 1 week she has developed similar pain on the right side.  Dr. Rea recommended a right trochanteric bursa injection and  physical therapy.    Patient complains next of left occipital pain which radiates up to the top of the head.  She also has pain in the left neck.  This pain is worse with lying on her back.  This pain is alleviated with lying on her side.    Overall, patient rates her pain as a 5-8 out of 10.      Treatment to date:  - Current physical therapy for greater trochanteric bursitis, 1 session so far  - Patient participated in physical therapy in November 2017.  - Patient had 6 sessions of physical therapy ending December 2021  - She also has had 4 sessions of physical therapy for hip pain/weakness of hips ending March 2022  - Left trochanteric bursa injection January 30, 2024 with 100% relief  - L5-S1 interlaminar epidural steroid injection August 8, 2023 with greater than 50% relief of pain for 2 months  - Bilateral L4-5 transforaminal epidural steroid injection June 12,, 2023 with 50% relief  - Left L4-5 transforaminal epidural steroid injection October 21, 2021 with 60% relief of pain  - Left occipital nerve block July 11, 2018 with 80% relief of pain  - Left C2-3, C3-4, C4-5 facet joint injections June 27, 2018  - Left C2, C3, C4, C5 MBB June 7, 2018  - Left C1-C2 facet joint injection July 7, 2017  - Left C1-C2 facet joint injection April 6, 2017  -Did not tolerate gabapentin  - Tylenol as needed is helpful    Review of Systems:  Positive for headache, pain much worse at night.  Negative for numbness/tingling, weakness, loss of bowel/bladder control, inability to urinate, trip/stumble/falls, difficulty swallowing, difficulty with hand skills, fevers, unintentional weight loss.     Objective:   CONSTITUTIONAL:  Vital signs as above.  No acute distress.  The patient is well nourished and well groomed.   Patient appears tired.  PSYCHIATRIC:  The patient is awake, alert, oriented to person, place and time.  The patient is answering questions appropriately with clear speech.  Normal affect.   HEENT: Normocephalic,  atraumatic.  Sclera clear.    SKIN: Exposed skin is clean, dry, intact without rashes.  MUSCULOSKELETAL: Patient ambulates with a flexed forward posture at the hips.  Tender to palpation left occipital nerve.  Moving all extremities equally.  NEUROLOGICAL:   Cranial nerves II through XII grossly intact.     RESULTS: I reviewed the MRI lumbar spine from [place] dated July 7, 2023.  This shows multilevel lumbar spondylosis unchanged compared with an MRI lumbar spine from 2021.  At L2-3 there is mild to moderate spinal canal stenosis with moderate left and mild to moderate right foraminal stenosis.  At L3-4 there is mild spinal canal stenosis with mild to moderate right and mild left foraminal stenosis.  At L4-5 there is moderate to severe spinal canal stenosis with mild to moderate bilateral foraminal stenosis.    EMG bilateral lower extremities with neurology December 8, 2023:  SUMMARY  Nerve conduction and EMG study of bilateral lower extremities shows:     Normal right peroneal distal motor latency with decreased amplitude and conduction velocity.  Prolonged left peroneal distal motor latency with decreased amplitude and conduction velocity.  Normal right tibial distal motor latency with decreased amplitude and conduction velocity.  Normal left tibial distal motor latency with decreased amplitude and conduction velocity.  Abnormal bilateral sural and normal bilateral superficial peroneal sensory SNAP though amplitudes are low normal.  Monopolar needle exam is normal.     CLINICAL INTERPRETATION:  This is an abnormal nerve conduction and EMG study.  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  Further clinical correlation is needed.    Reviewed the lumbar spine flexion-extension x-rays from [facility] dated February 13, 2024.  This shows 6 mm anterolisthesis L4-5 unchanged on flexion and extension.  There is 3 mm anterolisthesis L5-S1 unchanged on flexion and extension.      Again, thank  you for allowing me to participate in the care of your patient.        Sincerely,        Krista Kaplan PA-C

## 2024-02-13 NOTE — PROCEDURES
Pre-procedure diagnosis: Left occipital neuralgia  Post-procedure diagnosis:  Same  PROCEDURE: Left occipital nerve block.    The risks of the procedure were discussed with the patient.  These include infection, bleeding, increased pain, no change in pain were discussed with the patient.  The patient gave written and verbal consent to proceed with the procedure.    The most tender area of the left occipital nerve at the nuchal ridge was palpated.  This area was then cleansed with a betadine swab x 3.  A solution of 1 mL of 40 mg of Depomedrol mixed with 2 mL 1% lidocaine was drawn up.  A 25 guage 1 inch needle was inserted down to os.  After aspiration was negative, 1 mL of the solution was injected.  Without withdrawing the needle from the skin, the needle was redirected.  After aspiration was negative, 1 mL of the solution was injected.  Without withdrawing the needle from the skin, the needle was once again redirected.  After aspiration was negative, 1 mL of the remaining solution was injected.      The patient tolerated the procedure well.  The patient was monitored for a short period of time and then discharged under her own power.

## 2024-02-13 NOTE — PATIENT INSTRUCTIONS
You had a left occipital nerve block today.  It typically takes 3-5 days for the injection to start working and up to 2 to 3 weeks to reach the maximum benefit.    Do not submerge your head underwater for the next 48 hours.  You can shower and wash your hair.    My nurse will call you in 2 weeks to see how you have responded to the injection.    I encourage you to get back to physical therapy for your new right hip pain.    Since you have had 4 steroid injections since June 2023 I recommend waiting until June 2024 to have your next steroid injection.  If you do not feel like you can wait that long please call our clinic and we will discuss risks and benefits of doing 1/5 injection within 12 months.    If you have any other questions or concerns, please do not hesitate to send me a [employer] message or call my nurse at [phone].

## 2024-02-13 NOTE — PATIENT INSTRUCTIONS
Injection for right hip ordered.  Physical therapy referral ordered.    Complete injection and at least 4 weeks of physical therapy and then follow-up in clinic with Dr. Rea.

## 2024-02-15 ENCOUNTER — MYC REFILL (OUTPATIENT)
Dept: INTERNAL MEDICINE | Facility: CLINIC | Age: 84
End: 2024-02-15
Payer: COMMERCIAL

## 2024-02-15 DIAGNOSIS — E03.9 HYPOTHYROIDISM, UNSPECIFIED: ICD-10-CM

## 2024-02-15 DIAGNOSIS — F34.1 DYSTHYMIC DISORDER: ICD-10-CM

## 2024-02-15 RX ORDER — LEVOTHYROXINE SODIUM 50 UG/1
50 TABLET ORAL DAILY
Qty: 90 TABLET | Refills: 0 | Status: SHIPPED | OUTPATIENT
Start: 2024-02-15 | End: 2024-05-15

## 2024-02-15 RX ORDER — CITALOPRAM HYDROBROMIDE 10 MG/1
5 TABLET ORAL DAILY
Qty: 45 TABLET | Refills: 0 | Status: SHIPPED | OUTPATIENT
Start: 2024-02-15 | End: 2024-05-20

## 2024-02-27 ENCOUNTER — TELEPHONE (OUTPATIENT)
Dept: PHYSICAL MEDICINE AND REHAB | Facility: CLINIC | Age: 84
End: 2024-02-27
Payer: COMMERCIAL

## 2024-02-27 NOTE — TELEPHONE ENCOUNTER
Call placed to pt. Pt reports she is getting significant relief from her injection. She will follow-up with Krista as needed.     In regards to her foot, pt states she has podiatry appt on 3/5 prior to 3/8 foot injection. She will discuss with her podiatrist at her appointment and they can decide if she should move forward.

## 2024-02-27 NOTE — TELEPHONE ENCOUNTER
"[facility] Call Center    Phone Message    May a detailed message be left on voicemail: yes     Reason for Call: Other: patient calling as she was to report after 2 weeks after her injection. She says she is doing \"fine\" much better.  She is scheduled for an injection on March 8 at [facility]. She doesn't know if she should have this injection. She says this one is for her foot.     Action Taken: Other: Te     Travel Screening: Not Applicable                                                                   "

## 2024-03-14 DIAGNOSIS — F34.1 DYSTHYMIC DISORDER: ICD-10-CM

## 2024-03-15 RX ORDER — CITALOPRAM HYDROBROMIDE 10 MG/1
5 TABLET ORAL DAILY
Qty: 45 TABLET | Refills: 0 | OUTPATIENT
Start: 2024-03-15

## 2024-03-18 ENCOUNTER — THERAPY VISIT (OUTPATIENT)
Dept: PHYSICAL THERAPY | Facility: REHABILITATION | Age: 84
End: 2024-03-18
Payer: COMMERCIAL

## 2024-03-18 DIAGNOSIS — M70.61 GREATER TROCHANTERIC BURSITIS OF BOTH HIPS: Primary | ICD-10-CM

## 2024-03-18 DIAGNOSIS — M70.62 GREATER TROCHANTERIC BURSITIS OF BOTH HIPS: Primary | ICD-10-CM

## 2024-03-18 PROCEDURE — 97140 MANUAL THERAPY 1/> REGIONS: CPT | Mod: GP

## 2024-03-18 PROCEDURE — 97110 THERAPEUTIC EXERCISES: CPT | Mod: GP

## 2024-04-04 ENCOUNTER — THERAPY VISIT (OUTPATIENT)
Dept: PHYSICAL THERAPY | Facility: REHABILITATION | Age: 84
End: 2024-04-04
Payer: COMMERCIAL

## 2024-04-04 DIAGNOSIS — M70.61 GREATER TROCHANTERIC BURSITIS OF BOTH HIPS: Primary | ICD-10-CM

## 2024-04-04 DIAGNOSIS — M70.62 GREATER TROCHANTERIC BURSITIS OF BOTH HIPS: Primary | ICD-10-CM

## 2024-04-04 PROCEDURE — 97110 THERAPEUTIC EXERCISES: CPT | Mod: GP

## 2024-04-05 NOTE — PROGRESS NOTES
"    [facility]                                                                                   OUTPATIENT PHYSICAL THERAPY    PLAN OF TREATMENT FOR OUTPATIENT REHABILITATION   Patient's Last Name, First Name, Geeta Luis YOB: 1940   Provider's Name   [facility]   Medical Record No.  [id number]     Onset Date: 11/30/23 (MD order)  Start of Care Date: 01/08/24     Medical Diagnosis:  greater trochanteric bursitis of both hips      PT Treatment Diagnosis:  greater trochanteric bursitis of both hips Plan of Treatment  Frequency/Duration: 1x/week/ 12 visits    Certification date from (P) 04/05/24 to (P) 07/05/24         See note for plan of treatment details and functional goals     Radha Danielson PT                         I CERTIFY THE NEED FOR THESE SERVICES FURNISHED UNDER        THIS PLAN OF TREATMENT AND WHILE UNDER MY CARE .             Physician Signature               Date    X_____________________________________________________                  Referring Provider:  Jacy Mishra    Initial Assessment  See Epic Evaluation- Start of Care Date: 01/08/24 04/04/24 0500   Appointment Info   Signing clinician's name / credentials Radha Danielson DPT   Total/Authorized Visits [employer]   Visits Used 3   Medical Diagnosis greater trochanteric bursitis of both hips   PT Tx Diagnosis greater trochanteric bursitis of both hips   Precautions/Limitations Does not tolerate sidelying d/t cervical pain and pressure on hips (particularly L side)   Other pertinent information Pt reports today for bilateral hip bursitis L>R for which she has had PT before. Pt reports she is \"not sure how much of this is due to age\" although pt does report a long history with hip pain starting in the 5th grade and with her first hip replacement in her 40s. Pt has had cortisone injections before without great relief. Pt reports aching pain that goes all " "the way into ankles. Pt is seeing podiatrist for neuropathy and does have LBP. Pt does exercises every morning in bed including stretching, and rides her bike outside until September then rides her indoor bike 3-4 miles. Pt reports \"it's easier to ride than it is to walk.\" Pt reports she has always been very active and once climbed a mountain and a volcano. Pt sleeps on both sides and on her back and often uses a pillow under her hip and under her feet. Pt tends to use a heating pad for pain. Pt will benefit from skilled PT intervention including strengthening, stretching, STM and pain management.   Quick Adds Certification   Progress Note/Certification   Start of Care Date 01/08/24   Onset of illness/injury or Date of Surgery 11/30/23  (MD mace)   Therapy Frequency 1x/week   Predicted Duration 12 visits   Certification date from 04/05/24   Certification date to 07/05/24   GOALS   PT Goals 2;3   PT Goal 1   Goal Identifier HEP   Goal Description Pt will be independent in HEP in order to self manage symptoms   Rationale to maximize safety and independence with performance of ADLs and functional tasks   Target Date 02/05/24   PT Goal 2   Goal Identifier Pain   Goal Description Pt will be able to walk for 20+ minutes with pain level <3/10 for return to physical activity   Rationale to maximize safety and independence with performance of ADLs and functional tasks;to maximize safety and independence within the home   Target Date 03/04/24   PT Goal 3   Goal Identifier LEFS   Goal Description Pt will increase lower extremity function as demonstrated as a 9+ point increase on the LEFS.   Rationale to maximize safety and independence with performance of ADLs and functional tasks   Target Date 04/07/24   Subjective Report   Subjective Report Pt reports that she's been walking every day and/or riding her bike but is experiencing a lot of pain from her waist all the way into toes (having foot problems too). Pt figured out " "doing the step exercise was easy to do because her home set-up has steps into the garage so she works it frequently into her routine. The wall sits have been going okay. Pt has been using a heating pad on her back.   Objective Measures   Objective Measures Objective Measure 1   Objective Measure 1   Objective Measure Gastroc stretch: feeling much more tension on R side   Treatment Interventions (PT)   Interventions Therapeutic Procedure/Exercise;Therapeutic Activity;Manual Therapy   Therapeutic Procedure/Exercise   Therapeutic Procedures: strength, endurance, ROM, flexibility minutes (02196) 35   Ther Proc 1 NuStep 5 min level 2   Ther Proc 1 - Details reporting L hip pain after   PTRx Ther Proc 1 Supine Butterfly   PTRx Ther Proc 1 - Details x30s   PTRx Ther Proc 2 Wall Sit   PTRx Ther Proc 2 - Details reviewed -- stand upright with back and buttocks touching wall place feet 12\" apart and about 6\" from wall. slowly lower your hips by bending the knees and slide down the wall until the knees are flexed about 45 degrees. pause 5 seconds then slowly slide back up. 5 SECOND HOLDS 10-15 REPETITIONS 3 SETS. give yourself a break between sets. keep your core engaged and keep back against the wall.   PTRx Ther Proc 3 Stepdown Forward   PTRx Ther Proc 3 - Details reviewed with cues to bring heel down slightly lower without letting knee fall in; x15 each side   Skilled Intervention Review of HEP, review of exercises pt does on her own (cues for technique on keeping toes pointed forward with standing hip abd), trialed exercises with progression of LE strengthening   Patient Response/Progress Pt demonstrates appropriately  (removed clamshells d/t pressure on hips, cervical pain; trialed TB roll-outs, pt not able to don TB independently; trialed marches #3B, pt having difficulty with coordination; trialed sidesteps off phone book \"too easy\")   Ther Proc 2 Ankle Pumps in Long Sitting   Ther Proc 2 - Details x10 -- instructed to " "perform throughout the day for blood flow/LE swelling   Ther Proc 3 Standing Fire Hydrant   Ther Proc 3 - Details x10 each direction not adding in abduction yet (painful)   Ther Proc 4 Quadratus Lumborum Stretch   Ther Proc 4 - Details x30s each side   Therapeutic Procedures Ther Proc 2;Ther Proc 3;Ther Proc 4   Therapeutic Activity   Ther Act 1 Educated on anatomy of hip joint and surrounding musculature. Advised pt to ice instead of heat for hip joint, 15-20 min at a time. Advised pt to perform hip stretches 10 min every day, hip strengthening 4-5 days per week. Showed pt seat cushion should she choose for self purchase. Discussed [employer] membership to pt, encouraged to look into a gym that has access to a pool as pt used to perform exercises in the pool.   PTRx Ther Act 1 Education Sheet General   PTRx Ther Act 1 - Details No Notes   Manual Therapy   Manual Therapy: Mobilization, MFR, MLD, friction massage minutes (04643) 5   Manual Therapy 1 not today; MET for sacral mobility utilizing hip flexors and extensors bilaterally w/ 5s holds, hip abd/add resistance w/ pt in hooklying. STM to bilatearl adductors   Manual Therapy 1 - Details General distraction utilizing mobilization belt, pt in supine -- \"this feels really good.\" bilateral L>R   Skilled Intervention Performed interventions to improve mobility and reduce pain in the involved body regions to facilitate improved tolerance and performance with exercise and activities of daily living.   Patient Response/Progress Trialed mobilization belt proximal femur; pt reporting tender hip adductors   Education   Learner/Method Patient   Plan   Home program PTRX - printouts   Plan for next session progress LE strength in standing or supine (does not tolerate sidelying), pilates hip opener, piriformis stretch, ITB stretch, lateral step down   Comments   Comments Pt reports today for 3rd appt since January's initial evaluation regarding bilateral hip bursitis L>R " for which she has had PT before. Pt reports she is doing about the same. Today's session focused on review of HEP and progression of hip strengthening in standing which is most tolerable to pt. Pt will continue to benefit from skilled PT intervention including strengthening, stretching, STM and pain management.   Total Session Time   Timed Code Treatment Minutes 40   Total Treatment Time (sum of timed and untimed services) 40

## 2024-04-07 ASSESSMENT — PATIENT HEALTH QUESTIONNAIRE - PHQ9
SUM OF ALL RESPONSES TO PHQ QUESTIONS 1-9: 2
10. IF YOU CHECKED OFF ANY PROBLEMS, HOW DIFFICULT HAVE THESE PROBLEMS MADE IT FOR YOU TO DO YOUR WORK, TAKE CARE OF THINGS AT HOME, OR GET ALONG WITH OTHER PEOPLE: SOMEWHAT DIFFICULT
SUM OF ALL RESPONSES TO PHQ QUESTIONS 1-9: 2

## 2024-04-08 ENCOUNTER — OFFICE VISIT (OUTPATIENT)
Dept: INTERNAL MEDICINE | Facility: CLINIC | Age: 84
End: 2024-04-08
Payer: COMMERCIAL

## 2024-04-08 VITALS
HEIGHT: 67 IN | RESPIRATION RATE: 18 BRPM | DIASTOLIC BLOOD PRESSURE: 70 MMHG | BODY MASS INDEX: 27 KG/M2 | WEIGHT: 172 LBS | HEART RATE: 62 BPM | OXYGEN SATURATION: 97 % | SYSTOLIC BLOOD PRESSURE: 114 MMHG

## 2024-04-08 DIAGNOSIS — H61.23 BILATERAL IMPACTED CERUMEN: ICD-10-CM

## 2024-04-08 DIAGNOSIS — K21.9 GASTROESOPHAGEAL REFLUX DISEASE WITHOUT ESOPHAGITIS: ICD-10-CM

## 2024-04-08 DIAGNOSIS — I87.2 VENOUS (PERIPHERAL) INSUFFICIENCY: ICD-10-CM

## 2024-04-08 DIAGNOSIS — E78.5 DYSLIPIDEMIA, GOAL LDL BELOW 70: Primary | ICD-10-CM

## 2024-04-08 DIAGNOSIS — G62.9 NEUROPATHY: ICD-10-CM

## 2024-04-08 DIAGNOSIS — L21.9 SEBORRHEIC DERMATITIS: ICD-10-CM

## 2024-04-08 PROCEDURE — 69209 REMOVE IMPACTED EAR WAX UNI: CPT | Mod: 50 | Performed by: NURSE PRACTITIONER

## 2024-04-08 PROCEDURE — 99214 OFFICE O/P EST MOD 30 MIN: CPT | Mod: 25 | Performed by: NURSE PRACTITIONER

## 2024-04-08 RX ORDER — KETOCONAZOLE 20 MG/ML
SHAMPOO TOPICAL
Qty: 120 ML | Refills: 2 | Status: SHIPPED | OUTPATIENT
Start: 2024-04-08

## 2024-04-08 RX ORDER — OMEPRAZOLE 40 MG/1
40 CAPSULE, DELAYED RELEASE ORAL 2 TIMES DAILY
Qty: 180 CAPSULE | Refills: 2 | Status: SHIPPED | OUTPATIENT
Start: 2024-04-08 | End: 2024-07-08

## 2024-04-08 RX ORDER — RESPIRATORY SYNCYTIAL VIRUS VACCINE 120MCG/0.5
0.5 KIT INTRAMUSCULAR ONCE
Qty: 1 EACH | Refills: 0 | Status: CANCELLED | OUTPATIENT
Start: 2024-04-08 | End: 2024-04-08

## 2024-04-08 RX ORDER — ROSUVASTATIN CALCIUM 40 MG/1
40 TABLET, COATED ORAL AT BEDTIME
Qty: 90 TABLET | Refills: 3 | Status: SHIPPED | OUTPATIENT
Start: 2024-04-08 | End: 2024-07-24 | Stop reason: SINTOL

## 2024-04-08 ASSESSMENT — PAIN SCALES - GENERAL: PAINLEVEL: MODERATE PAIN (4)

## 2024-04-08 ASSESSMENT — ENCOUNTER SYMPTOMS: BACK PAIN: 1

## 2024-04-08 NOTE — PATIENT INSTRUCTIONS
- Increase omeprazole to 40 mg twice daily for belching/GERD   - Use GasX (simethicone) as needed for gas (belching/flatulence)     - You are scheduled to see Dr. Monzon who is a neurologist. He is going to evaluate further the neuropathy in your feet. Neuropathy means nerve damage and altered sensation in your feet.     - Buy some compression stockings to wear on your lower legs for venous insufficiency (leaky veins). Put these on first thing in the morning and take them off in the evening     - Schedule a dermatology visit for the left thigh lesion

## 2024-04-08 NOTE — PROGRESS NOTES
"  Assessment & Plan   Problem List Items Addressed This Visit       Gastroesophageal reflux disease     INCREASE omeprazole 40 mg BID   GasX as needed for gas/belching          Relevant Medications    omeprazole (PRILOSEC) 40 MG DR capsule    Dyslipidemia, goal LDL below 70 - Primary     Continue rosuvastatin         Relevant Medications    rosuvastatin (CRESTOR) 40 MG tablet    Neuropathy     Idiopathic, EMG results consistent with sensorimotor polyneuropathy.  Vascular workup with her radius 8/2023 was normal.  She is unable to take gabapentin due to cognitive side effects associate with medication.  She is continuing with topical lidocaine gel for pain relief.  She also has an appointment scheduled with neurology for further evaluation.  She can return to primary care if there is no need for ongoing neurology follow-up.         Seborrheic dermatitis     On the scalp.  Ketoconazole shampoo ordered.         Relevant Medications    ketoconazole (NIZORAL) 2 % external shampoo     Other Visit Diagnoses       Venous (peripheral) insufficiency        Relevant Orders    Compression Sleeve/Stocking Order for DME - ONLY FOR DME    Bilateral impacted cerumen               - Increase omeprazole to 40 mg twice daily for belching/GERD  - Use GasX (simethicone) as needed for gas (belching/flatulence)         Return in about 3 months (around 7/8/2024).       BMI  Estimated body mass index is 27.35 kg/m  as calculated from the following:    Height as of this encounter: 1.689 m (5' 6.5\").    Weight as of this encounter: 78 kg (172 lb).       Bala Connor is a 83 year old, presenting for the following health issues:  Follow Up (3month F/U ), Back Pain (Pt states my back pain is slowly falling apart. I think is the arthritis ), and Musculoskeletal Problem (Pt states the right foot is starting to swollen )        4/8/2024     9:10 AM   Additional Questions   Roomed by Alexander Angela   Accompanied by N/A     History of Present Illness " "      Back Pain:  She presents for follow up of back pain. Patient's back pain is a chronic problem.  Location of back pain:  Left lower back, left middle of back, left side of neck, right hip and left hip  Description of back pain: gnawing  Back pain spreads: left buttocks, left thigh, right knee, left knee and left side of neck    Since patient first noticed back pain, pain is: always present, but gets better and worse  Does back pain interfere with her job:  Not applicable       Reason for visit:  Follow up on many issues    She eats 4 or more servings of fruits and vegetables daily.She consumes 0 sweetened beverage(s) daily.She exercises with enough effort to increase her heart rate 30 to 60 minutes per day.  She exercises with enough effort to increase her heart rate 7 days per week.   She is taking medications regularly.     She had an EGD procedure in February due to belching and abdominal discomfort. No choledocholithiasis. Omeprazole was continued.     Constipation has been generally well controlled by taking Miralax BID. She takes Senna PRN. She occasionally has a \"blowout\" if she has to take Senna.     Low back was reviewed. She is still relying on a heating pack and topical lidocaine for intermittent pain. She thinks that physical therapy has been helping her back.     The occipital nerve block that she had in February were really effective for occipital pain.     She has swelling in her lower extremities.     She has been riding her bike regularly.         Objective    /70 (BP Location: Right arm, Patient Position: Sitting, Cuff Size: Adult Regular)   Pulse 62   Resp 18   Ht 1.689 m (5' 6.5\")   Wt 78 kg (172 lb)   LMP  (LMP Unknown)   SpO2 97%   BMI 27.35 kg/m    Body mass index is 27.35 kg/m .    Physical Exam   GENERAL: alert and no distress  ENT: b/l ceruminosis  RESP: lungs clear to auscultation - no rales, rhonchi or wheezes  CV: regular rate and rhythm, normal S1 S2  EXTREMITIES: " varicosities b/l lower extremities and feet. 1-2+ pedal edema.   SKIN: no ulcerations on the lower extremities or feet.  Mild redness and flaking occipital area of the scalp.  NEURO: Normal strength and tone, mentation intact and speech normal  PSYCH: mentation appears normal, affect normal/bright      Bilateral ear lavage completed today by medical assistant          The longitudinal plan of care for the diagnosis(es)/condition(s) as documented were addressed during this visit. Due to the added complexity in care, I will continue to support Geeta in the subsequent management and with ongoing continuity of care.  Signed Electronically by: Jacy Mishra NP

## 2024-04-09 ENCOUNTER — TRANSFERRED RECORDS (OUTPATIENT)
Dept: HEALTH INFORMATION MANAGEMENT | Facility: CLINIC | Age: 84
End: 2024-04-09
Payer: COMMERCIAL

## 2024-04-10 ENCOUNTER — LAB (OUTPATIENT)
Dept: LAB | Facility: HOSPITAL | Age: 84
End: 2024-04-10
Payer: COMMERCIAL

## 2024-04-10 ENCOUNTER — TELEPHONE (OUTPATIENT)
Dept: PHYSICAL MEDICINE AND REHAB | Facility: CLINIC | Age: 84
End: 2024-04-10

## 2024-04-10 ENCOUNTER — OFFICE VISIT (OUTPATIENT)
Dept: NEUROLOGY | Facility: CLINIC | Age: 84
End: 2024-04-10
Attending: NURSE PRACTITIONER
Payer: COMMERCIAL

## 2024-04-10 VITALS
HEART RATE: 56 BPM | RESPIRATION RATE: 16 BRPM | BODY MASS INDEX: 27.35 KG/M2 | SYSTOLIC BLOOD PRESSURE: 142 MMHG | WEIGHT: 172 LBS | DIASTOLIC BLOOD PRESSURE: 68 MMHG

## 2024-04-10 DIAGNOSIS — M79.89 SWELLING OF RIGHT FOOT: Primary | ICD-10-CM

## 2024-04-10 DIAGNOSIS — G62.9 NEUROPATHY: ICD-10-CM

## 2024-04-10 DIAGNOSIS — R77.8 ABNORMAL SPEP: ICD-10-CM

## 2024-04-10 DIAGNOSIS — M19.90 ARTHRITIS: ICD-10-CM

## 2024-04-10 DIAGNOSIS — M79.671 RIGHT FOOT PAIN: ICD-10-CM

## 2024-04-10 DIAGNOSIS — R73.9 HYPERGLYCEMIA: ICD-10-CM

## 2024-04-10 LAB
HBA1C MFR BLD: 5.9 %
VIT B12 SERPL-MCNC: 544 PG/ML (ref 232–1245)

## 2024-04-10 PROCEDURE — G2211 COMPLEX E/M VISIT ADD ON: HCPCS | Performed by: PSYCHIATRY & NEUROLOGY

## 2024-04-10 PROCEDURE — 99215 OFFICE O/P EST HI 40 MIN: CPT | Performed by: PSYCHIATRY & NEUROLOGY

## 2024-04-10 PROCEDURE — 36415 COLL VENOUS BLD VENIPUNCTURE: CPT

## 2024-04-10 PROCEDURE — 82607 VITAMIN B-12: CPT

## 2024-04-10 PROCEDURE — 83036 HEMOGLOBIN GLYCOSYLATED A1C: CPT

## 2024-04-10 PROCEDURE — 84207 ASSAY OF VITAMIN B-6: CPT

## 2024-04-10 PROCEDURE — 84425 ASSAY OF VITAMIN B-1: CPT

## 2024-04-10 NOTE — LETTER
4/10/2024         RE: Geeta Berry  [address]  [facility] [address]        Dear Colleague,    Thank you for referring your patient, Geeta Berry, to the [facility] [place]. Please see a copy of my visit note below.    NEUROLOGY OUTPATIENT CONSULT NOTE   Apr 10, 2024     CHIEF COMPLAINT/REASON FOR VISIT/REASON FOR CONSULT  Patient presents with:  Pain: Right foot     REASON FOR CONSULTATION-evaluation of right foot pain    REFERRAL SOURCE  Dr. Jacy Mishra  CC Dr. Jacy Mishra    HISTORY OF PRESENT ILLNESS  Geeta Berry is a 83 year old female seen today for evaluation of right foot pain.  He reports that his symptoms have been going on for about a year.  Does complain of swelling in the right foot and now she feels that she is getting in the left foot as well.  Does have some pain in her joints.  She is unable to describe the pain and does not really call it into the needles pain.  Feels more for pelvic pain.  She does have some numbness in the bottom of her feet.  Does report morning stiffness.  Does report some difficulty when she feels the carpet feels like she is walking on pavement.  No balance issues.  Has been put on gabapentin which caused side effects.  Has been using lidocaine cream and Oral Tylenol which has been helpful.  She has done physical therapy.    Previous history is reviewed and this is unchanged.    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)      Chronic neck pain      Common bile duct calculus      Coronary artery disease due to calcified coronary lesion 8/6/2020     Depression      DVT (deep venous thrombosis) (H)      GERD (gastroesophageal reflux disease)      History of blood clots 2017    DVT right lower extremity     HLD (hyperlipidemia)      HTN (hypertension)      Hyperlipidemia      Hypertension      Hypothyroidism       Infectious viral hepatitis     hepatitis A in 1960s     Thyroid disease      Patient Active Problem List   Diagnosis     History of DVT (deep vein thrombosis), right LE 12/2016     Acute iritis, h/o in 2012     Choledocholithiasis     Constipation     Gastroesophageal reflux disease     Essential hypertension     Dyslipidemia, goal LDL below 70     Hypothyroidism     Osteoarthritis of lumbar spine     Osteoarthritis; knees, hips, cervical spine      Facet arthropathy, cervical     Dysthymic disorder     Hiatal hernia     Chronic rhinitis     Coronary artery disease involving native coronary artery of native heart without angina pectoris     Stage 3a chronic kidney disease (H)     Status post knee surgery     Neuropathy     Lumbar radiculopathy     Iron deficiency anemia due to chronic blood loss     Greater trochanteric bursitis of both hips     Epigastric pain     Vitamin D deficiency     Osteopenia of left forearm     Seborrheic dermatitis   Significant for arthritis    FAMILY HISTORY  Family History   Problem Relation Age of Onset     Colon Cancer Father      Diabetes Sister      Heart Disease Mother    Negative for neuropathy.    SOCIAL HISTORY  Social History     Tobacco Use     Smoking status: Never     Passive exposure: Never     Smokeless tobacco: Never   Vaping Use     Vaping status: Never Used   Substance Use Topics     Alcohol use: No     Drug use: No       SYSTEMS REVIEW  Twelve-system ROS was done and other than the HPI this was negative/positive for back pain, arm and leg pain, joint pain, headaches, memory loss, bowel problems.    MEDICATIONS  Current Outpatient Medications   Medication Sig Dispense Refill     acetaminophen (TYLENOL) 500 MG tablet Take 500 mg by mouth 4 times daily       amoxicillin (AMOXIL) 500 MG capsule Take 2,000 mg by mouth daily as needed (1 hour before dental appointments)       azelastine (ASTELIN) 0.1 % nasal spray Spray 1 spray into both nostrils At Bedtime 90 mL 1      calcium citrate-vitamin D (CITRACAL) 200-6.25 MG-MCG TABS per tablet Take 1 tablet by mouth daily       citalopram (CELEXA) 10 MG tablet Take 0.5 tablets (5 mg) by mouth daily 45 tablet 0     docusate sodium (COLACE) 100 MG tablet Take 100 mg by mouth 2 times daily       fluticasone (FLONASE) 50 MCG/ACT nasal spray INSTILL 1 SPRAY INTO BOTH NOSTRILS DAILY 48 g 3     gabapentin 8%-lidocaine 2.5% in PLO cream Apply to painful feet 3 times a day as needed. 120 g 0     hydrochlorothiazide (HYDRODIURIL) 25 MG tablet TAKE 1 TABLET BY MOUTH EVERY DAY 90 tablet 2     ketoconazole (NIZORAL) 2 % external shampoo Apply to the scalp. Leave on for 5 minutes then rinse off. May use 2-3 times per week 120 mL 2     levothyroxine (SYNTHROID/LEVOTHROID) 50 MCG tablet Take 1 tablet (50 mcg) by mouth daily 90 tablet 0     Lidocaine-Menthol (LIDOCAINE-MENTHOL ROLL-ON) 4-1 % LIQD Externally apply 1 Application topically as needed       multivitamin w/minerals (THERA-VIT-M) tablet Take 1 tablet by mouth daily       omeprazole (PRILOSEC) 40 MG DR capsule Take 1 capsule (40 mg) by mouth 2 times daily 180 capsule 2     polyethylene glycol (MIRALAX/GLYCOLAX) packet Take 1 packet by mouth daily       rosuvastatin (CRESTOR) 40 MG tablet Take 1 tablet (40 mg) by mouth at bedtime 90 tablet 3     vitamin C (ASCORBIC ACID) 1000 MG TABS Take 1,000 mg by mouth daily       Vitamin D3 (VITAMIN D, CHOLECALCIFEROL,) 25 mcg (1000 units) tablet Take 1 tablet by mouth daily       No current facility-administered medications for this visit.        PHYSICAL EXAMINATION  VITALS: BP (!) 142/68   Pulse 56   Resp 16   Wt 78 kg (172 lb)   LMP  (LMP Unknown)   BMI 27.35 kg/m    GENERAL: Healthy appearing, alert, no acute distress, normal habitus.  CARDIOVASCULAR: Extremities warm and well perfused. Pulses present.   NEUROLOGICAL:  Patient is awake and oriented to self, place and time.  Attention span is normal.  Memory is grossly intact.  Language is fluent  and follows commands appropriately.  Appropriate fund of knowledge. Cranial nerves 2-12 are intact. There is no pronator drift.  Motor exam shows 5/5 strength in all extremities.  Tone is symmetric bilaterally in upper and lower extremities.  Reflexes are symmetric and absent in upper extremities and lower extremities. Sensory exam is grossly intact to light touch, pin prick and vibration decreased vibration in the feet.  Finger to nose and heel to shin is without dysmetria.  Romberg is negative.  Gait is wide-based and slightly unsteady.  Difficulty with tandem walking.    DIAGNOSTICS  MRI L spine  IMPRESSION:  1. Overall, no significant change in multilevel degenerative findings  of the lumbar spine.  2. Moderate to severe central spinal canal stenosis at L4-L5. Lesser  degrees of spinal canal and lateral recess narrowing elsewhere.  3. Mild and moderate degrees of multilevel degenerative neural  foraminal stenosis.  4. Please see the body of the report for details.      EMG  CLINICAL INTERPRETATION:  This is an abnormal nerve conduction and EMG study.  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  Further clinical correlation is needed.     XR L spine  IMPRESSION:   5 lumbar-type vertebral bodies. Anterolisthesis of L4 and L5 measuring 6 mm, unchanged on flexion and extension. Anterolisthesis of L5 on S1 measuring 3 mm, unchanged on flexion and extension. No dynamic instability. The vertebral bodies of the cervical   spine otherwise have normal stature and alignment. Multilevel degenerative facet arthropathy, most pronounced at L4/L5 and L5/S1. Multilevel degenerative disc disease of the cervical spine with disc height loss, most pronounced at L1/L2, L2/L3 and L3/L4.   Soft tissues unremarkable.    RELEVANT LABS  Component      Latest Ref Rng 9/19/2023  1:54 PM   Sodium      136 - 145 mmol/L 140    Potassium      3.4 - 5.3 mmol/L 3.8    Chloride      98 - 107 mmol/L 102    Carbon Dioxide  (CO2)      22 - 29 mmol/L 29    Anion Gap      7 - 15 mmol/L 9    Urea Nitrogen      8.0 - 23.0 mg/dL 24.0 (H)    Creatinine      0.51 - 0.95 mg/dL 0.95    Calcium      8.8 - 10.2 mg/dL 9.3    Glucose      70 - 99 mg/dL 95    Alkaline Phosphatase      35 - 104 U/L 59    AST      0 - 45 U/L 28    ALT      0 - 50 U/L 20    Protein Total      6.4 - 8.3 g/dL 6.8    Albumin      3.5 - 5.2 g/dL 4.3    Bilirubin Total      <=1.2 mg/dL 0.3    GFR Estimate      >60 mL/min/1.73m2 59 (L)    Albumin Fraction      3.7 - 5.1 g/dL 3.9    Alpha 1 Fraction      0.2 - 0.4 g/dL 0.3    Alpha 2 Fraction      0.5 - 0.9 g/dL 0.5    Beta Fraction      0.6 - 1.0 g/dL 1.0    Gamma Fraction      0.7 - 1.6 g/dL 0.6 (L)    Monoclonal Peak      <=0.0 g/dL 0.3 (H)    ELP Interpretation: Small monoclonal protein (0.3 g/dL) seen in the beta fraction co-migrating with C3 complement, not previously characterized in our laboratory. Recommend serum and urine immunofixation for confirmation and further characterization if not previously performed elsewhere. Slight hypogammaglobulinemia. Pathologic significance requires clinical correlation. Travis White MD    Ferritin      11 - 328 ng/mL 61    TSH      0.30 - 4.20 uIU/mL 2.46    Methylmalonic Acid      0.00 - 0.40 umol/L 0.27    ELP Interpretation Urine    Total Protein Serum for ELP      6.4 - 8.3 g/dL 6.3 (L)    Immunofixation ELP Monoclonal IgA immunoglobulin of kappa light chain type. Pathologic significance requires clinical correlation. Travis White MD      Component      Latest Ref Rng 9/19/2023  2:08 PM   Sodium      136 - 145 mmol/L    Potassium      3.4 - 5.3 mmol/L    Chloride      98 - 107 mmol/L    Carbon Dioxide (CO2)      22 - 29 mmol/L    Anion Gap      7 - 15 mmol/L    Urea Nitrogen      8.0 - 23.0 mg/dL    Creatinine      0.51 - 0.95 mg/dL    Calcium      8.8 - 10.2 mg/dL    Glucose      70 - 99 mg/dL    Alkaline Phosphatase      35 - 104 U/L    AST      0 - 45 U/L    ALT      0 -  50 U/L    Protein Total      6.4 - 8.3 g/dL    Albumin      3.5 - 5.2 g/dL    Bilirubin Total      <=1.2 mg/dL    GFR Estimate      >60 mL/min/1.73m2    Albumin Fraction      3.7 - 5.1 g/dL    Alpha 1 Fraction      0.2 - 0.4 g/dL    Alpha 2 Fraction      0.5 - 0.9 g/dL    Beta Fraction      0.6 - 1.0 g/dL    Gamma Fraction      0.7 - 1.6 g/dL    Monoclonal Peak      <=0.0 g/dL    ELP Interpretation:    Ferritin      11 - 328 ng/mL    TSH      0.30 - 4.20 uIU/mL    Methylmalonic Acid      0.00 - 0.40 umol/L    ELP Interpretation Urine Trace albumin and globulins seen. No monoclonal protein seen. We recommend a first morning voided urine to detect clinically significant proteinuria. A random specimen is not optimal for detecting all proteins. The specific gravity of this specimen was only 1.010. Pathologic significance requires clinical correlation. Travis White MD    Total Protein Serum for ELP      6.4 - 8.3 g/dL    Immunofixation ELP       Legend:  (H) High  (L) Low    OUTSIDE RECORDS  Outside referral notes and chart notes were reviewed and pertinent information has been summarized (in addition to the HPI):-    NSG        IMPRESSION/REPORT/PLAN  Lumbar spinal stenosis  Swelling of right foot  Neuropathy  Arthritis  Right foot pain  Abnormal SPEP    This is a 83 year old female with right foot pain, numbness in both feet and swelling of the right foot.  Her exam is suggestive of a peripheral neuropathy.  EMG further shows evidence of neuropathy.  She has abnormal serum protein electrophoresis for which she follows up with hematology/oncology.  Will recommend continuing follow-up with them.  Will check blood work look for other causes of neuropathy.  She does have history of lumbar spinal stenosis though I do not suspect that that is causing her foot pain.    The swelling in the feet would not be related to the neuropathy.  Should work with primary care to evaluate for vascular etiology.    She further has  arthritis in the feet which might be contributing to some of her pain.    Her pain is not suggestive of neuropathic pain based on description.  Currently Tylenol and lidocaine cream is helping.  Will try gabapentin cream instead of see if it works better.  She previously had side effects to gabapentin.    I can see her back in 4 to 5 months after testing.    -     gabapentin 8%-lidocaine 2.5% in PLO cream; Apply to painful feet 3 times a day as needed.  -     Vitamin B6; Future  -     Vitamin B12; Future  -     Vitamin B1 whole blood; Future  -     Hemoglobin A1c; Future    Return in about 5 months (around 9/10/2024) for In-Clinic Visit (must), After testing.    Over 60 minutes were spent coordinating the care for the patient on the day of the encounter.  This includes previsit, during visit and post visit activities as documented above.  Counseling patient.  Reviewing chart.  Multiple test reviewed/ordered.  Prescription management.  Multiple problems addressed.  (Activities include but not inclusive of reviewing chart, reviewing outside records, reviewing labs and imaging study results as well as the images, patient visit time including getting history and exam,  use if applicable, review of test results with the patient and coming up with a plan in a shared model, counseling patient and family, education and answering patient questions, EMR , EMR diagnosis entry and problem list management, medication reconciliation and prescription management if applicable, paperwork if applicable, printing documents and documentation of the visit activities.)        Lucas Monzon MD  Neurologist  [facility]  Tel:- [phone]    This note was dictated using voice recognition software.  Any grammatical or context distortions are unintentional and inherent to the software.      Again, thank you for allowing me to participate in the care of your patient.         Sincerely,        Lucas Monzon MD

## 2024-04-10 NOTE — ASSESSMENT & PLAN NOTE
Idiopathic, EMG results consistent with sensorimotor polyneuropathy.  Vascular workup with her radius 8/2023 was normal.  She is unable to take gabapentin due to cognitive side effects associate with medication.  She is continuing with topical lidocaine gel for pain relief.  She also has an appointment scheduled with neurology for further evaluation.  She can return to primary care if there is no need for ongoing neurology follow-up.

## 2024-04-10 NOTE — TELEPHONE ENCOUNTER
Other: Patient's doctor is recommending an injection in her for her foot. She is wondering if there is a certain amount of time she should wait for the foot injection since her injection w/Krista on 02/13     Could we send this information to you in [place] or would you prefer to receive a phone call?:   Patient would prefer a phone call   Okay to leave a detailed message?: No at Home number on file [phone] (home)

## 2024-04-10 NOTE — PROGRESS NOTES
NEUROLOGY OUTPATIENT CONSULT NOTE   Apr 10, 2024     CHIEF COMPLAINT/REASON FOR VISIT/REASON FOR CONSULT  Patient presents with:  Pain: Right foot     REASON FOR CONSULTATION-evaluation of right foot pain    REFERRAL SOURCE  Dr. Jacy Mishra  CC Dr. Jacy Mishra    HISTORY OF PRESENT ILLNESS  Geeta Berry is a 83 year old female seen today for evaluation of right foot pain.  He reports that his symptoms have been going on for about a year.  Does complain of swelling in the right foot and now she feels that she is getting in the left foot as well.  Does have some pain in her joints.  She is unable to describe the pain and does not really call it into the needles pain.  Feels more for pelvic pain.  She does have some numbness in the bottom of her feet.  Does report morning stiffness.  Does report some difficulty when she feels the carpet feels like she is walking on pavement.  No balance issues.  Has been put on gabapentin which caused side effects.  Has been using lidocaine cream and Oral Tylenol which has been helpful.  She has done physical therapy.    Previous history is reviewed and this is unchanged.    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)     Chronic neck pain     Common bile duct calculus     Coronary artery disease due to calcified coronary lesion 8/6/2020    Depression     DVT (deep venous thrombosis) (H)     GERD (gastroesophageal reflux disease)     History of blood clots 2017    DVT right lower extremity    HLD (hyperlipidemia)     HTN (hypertension)     Hyperlipidemia     Hypertension     Hypothyroidism     Infectious viral hepatitis     hepatitis A in 1960s    Thyroid disease      Patient Active Problem List   Diagnosis    History of DVT (deep vein thrombosis), right LE 12/2016    Acute iritis, h/o in 2012    Choledocholithiasis    Constipation    Gastroesophageal reflux disease     Essential hypertension    Dyslipidemia, goal LDL below 70    Hypothyroidism    Osteoarthritis of lumbar spine    Osteoarthritis; knees, hips, cervical spine     Facet arthropathy, cervical    Dysthymic disorder    Hiatal hernia    Chronic rhinitis    Coronary artery disease involving native coronary artery of native heart without angina pectoris    Stage 3a chronic kidney disease (H)    Status post knee surgery    Neuropathy    Lumbar radiculopathy    Iron deficiency anemia due to chronic blood loss    Greater trochanteric bursitis of both hips    Epigastric pain    Vitamin D deficiency    Osteopenia of left forearm    Seborrheic dermatitis   Significant for arthritis    FAMILY HISTORY  Family History   Problem Relation Age of Onset    Colon Cancer Father     Diabetes Sister     Heart Disease Mother    Negative for neuropathy.    SOCIAL HISTORY  Social History     Tobacco Use    Smoking status: Never     Passive exposure: Never    Smokeless tobacco: Never   Vaping Use    Vaping status: Never Used   Substance Use Topics    Alcohol use: No    Drug use: No       SYSTEMS REVIEW  Twelve-system ROS was done and other than the HPI this was negative/positive for back pain, arm and leg pain, joint pain, headaches, memory loss, bowel problems.    MEDICATIONS  Current Outpatient Medications   Medication Sig Dispense Refill    acetaminophen (TYLENOL) 500 MG tablet Take 500 mg by mouth 4 times daily      amoxicillin (AMOXIL) 500 MG capsule Take 2,000 mg by mouth daily as needed (1 hour before dental appointments)      azelastine (ASTELIN) 0.1 % nasal spray Spray 1 spray into both nostrils At Bedtime 90 mL 1    calcium citrate-vitamin D (CITRACAL) 200-6.25 MG-MCG TABS per tablet Take 1 tablet by mouth daily      citalopram (CELEXA) 10 MG tablet Take 0.5 tablets (5 mg) by mouth daily 45 tablet 0    docusate sodium (COLACE) 100 MG tablet Take 100 mg by mouth 2 times daily      fluticasone (FLONASE) 50 MCG/ACT nasal spray INSTILL 1  SPRAY INTO BOTH NOSTRILS DAILY 48 g 3    gabapentin 8%-lidocaine 2.5% in PLO cream Apply to painful feet 3 times a day as needed. 120 g 0    hydrochlorothiazide (HYDRODIURIL) 25 MG tablet TAKE 1 TABLET BY MOUTH EVERY DAY 90 tablet 2    ketoconazole (NIZORAL) 2 % external shampoo Apply to the scalp. Leave on for 5 minutes then rinse off. May use 2-3 times per week 120 mL 2    levothyroxine (SYNTHROID/LEVOTHROID) 50 MCG tablet Take 1 tablet (50 mcg) by mouth daily 90 tablet 0    Lidocaine-Menthol (LIDOCAINE-MENTHOL ROLL-ON) 4-1 % LIQD Externally apply 1 Application topically as needed      multivitamin w/minerals (THERA-VIT-M) tablet Take 1 tablet by mouth daily      omeprazole (PRILOSEC) 40 MG DR capsule Take 1 capsule (40 mg) by mouth 2 times daily 180 capsule 2    polyethylene glycol (MIRALAX/GLYCOLAX) packet Take 1 packet by mouth daily      rosuvastatin (CRESTOR) 40 MG tablet Take 1 tablet (40 mg) by mouth at bedtime 90 tablet 3    vitamin C (ASCORBIC ACID) 1000 MG TABS Take 1,000 mg by mouth daily      Vitamin D3 (VITAMIN D, CHOLECALCIFEROL,) 25 mcg (1000 units) tablet Take 1 tablet by mouth daily       No current facility-administered medications for this visit.        PHYSICAL EXAMINATION  VITALS: BP (!) 142/68   Pulse 56   Resp 16   Wt 78 kg (172 lb)   LMP  (LMP Unknown)   BMI 27.35 kg/m    GENERAL: Healthy appearing, alert, no acute distress, normal habitus.  CARDIOVASCULAR: Extremities warm and well perfused. Pulses present.   NEUROLOGICAL:  Patient is awake and oriented to self, place and time.  Attention span is normal.  Memory is grossly intact.  Language is fluent and follows commands appropriately.  Appropriate fund of knowledge. Cranial nerves 2-12 are intact. There is no pronator drift.  Motor exam shows 5/5 strength in all extremities.  Tone is symmetric bilaterally in upper and lower extremities.  Reflexes are symmetric and absent in upper extremities and lower extremities. Sensory exam is  grossly intact to light touch, pin prick and vibration decreased vibration in the feet.  Finger to nose and heel to shin is without dysmetria.  Romberg is negative.  Gait is wide-based and slightly unsteady.  Difficulty with tandem walking.    DIAGNOSTICS  MRI L spine  IMPRESSION:  1. Overall, no significant change in multilevel degenerative findings  of the lumbar spine.  2. Moderate to severe central spinal canal stenosis at L4-L5. Lesser  degrees of spinal canal and lateral recess narrowing elsewhere.  3. Mild and moderate degrees of multilevel degenerative neural  foraminal stenosis.  4. Please see the body of the report for details.      EMG  CLINICAL INTERPRETATION:  This is an abnormal nerve conduction and EMG study.  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  Further clinical correlation is needed.     XR L spine  IMPRESSION:   5 lumbar-type vertebral bodies. Anterolisthesis of L4 and L5 measuring 6 mm, unchanged on flexion and extension. Anterolisthesis of L5 on S1 measuring 3 mm, unchanged on flexion and extension. No dynamic instability. The vertebral bodies of the cervical   spine otherwise have normal stature and alignment. Multilevel degenerative facet arthropathy, most pronounced at L4/L5 and L5/S1. Multilevel degenerative disc disease of the cervical spine with disc height loss, most pronounced at L1/L2, L2/L3 and L3/L4.   Soft tissues unremarkable.    RELEVANT LABS  Component      Latest Ref Rng 9/19/2023  1:54 PM   Sodium      136 - 145 mmol/L 140    Potassium      3.4 - 5.3 mmol/L 3.8    Chloride      98 - 107 mmol/L 102    Carbon Dioxide (CO2)      22 - 29 mmol/L 29    Anion Gap      7 - 15 mmol/L 9    Urea Nitrogen      8.0 - 23.0 mg/dL 24.0 (H)    Creatinine      0.51 - 0.95 mg/dL 0.95    Calcium      8.8 - 10.2 mg/dL 9.3    Glucose      70 - 99 mg/dL 95    Alkaline Phosphatase      35 - 104 U/L 59    AST      0 - 45 U/L 28    ALT      0 - 50 U/L 20    Protein  Total      6.4 - 8.3 g/dL 6.8    Albumin      3.5 - 5.2 g/dL 4.3    Bilirubin Total      <=1.2 mg/dL 0.3    GFR Estimate      >60 mL/min/1.73m2 59 (L)    Albumin Fraction      3.7 - 5.1 g/dL 3.9    Alpha 1 Fraction      0.2 - 0.4 g/dL 0.3    Alpha 2 Fraction      0.5 - 0.9 g/dL 0.5    Beta Fraction      0.6 - 1.0 g/dL 1.0    Gamma Fraction      0.7 - 1.6 g/dL 0.6 (L)    Monoclonal Peak      <=0.0 g/dL 0.3 (H)    ELP Interpretation: Small monoclonal protein (0.3 g/dL) seen in the beta fraction co-migrating with C3 complement, not previously characterized in our laboratory. Recommend serum and urine immunofixation for confirmation and further characterization if not previously performed elsewhere. Slight hypogammaglobulinemia. Pathologic significance requires clinical correlation. Travis White MD    Ferritin      11 - 328 ng/mL 61    TSH      0.30 - 4.20 uIU/mL 2.46    Methylmalonic Acid      0.00 - 0.40 umol/L 0.27    ELP Interpretation Urine    Total Protein Serum for ELP      6.4 - 8.3 g/dL 6.3 (L)    Immunofixation ELP Monoclonal IgA immunoglobulin of kappa light chain type. Pathologic significance requires clinical correlation. Travis White MD      Component      Latest Ref Rng 9/19/2023  2:08 PM   Sodium      136 - 145 mmol/L    Potassium      3.4 - 5.3 mmol/L    Chloride      98 - 107 mmol/L    Carbon Dioxide (CO2)      22 - 29 mmol/L    Anion Gap      7 - 15 mmol/L    Urea Nitrogen      8.0 - 23.0 mg/dL    Creatinine      0.51 - 0.95 mg/dL    Calcium      8.8 - 10.2 mg/dL    Glucose      70 - 99 mg/dL    Alkaline Phosphatase      35 - 104 U/L    AST      0 - 45 U/L    ALT      0 - 50 U/L    Protein Total      6.4 - 8.3 g/dL    Albumin      3.5 - 5.2 g/dL    Bilirubin Total      <=1.2 mg/dL    GFR Estimate      >60 mL/min/1.73m2    Albumin Fraction      3.7 - 5.1 g/dL    Alpha 1 Fraction      0.2 - 0.4 g/dL    Alpha 2 Fraction      0.5 - 0.9 g/dL    Beta Fraction      0.6 - 1.0 g/dL    Gamma Fraction       0.7 - 1.6 g/dL    Monoclonal Peak      <=0.0 g/dL    ELP Interpretation:    Ferritin      11 - 328 ng/mL    TSH      0.30 - 4.20 uIU/mL    Methylmalonic Acid      0.00 - 0.40 umol/L    ELP Interpretation Urine Trace albumin and globulins seen. No monoclonal protein seen. We recommend a first morning voided urine to detect clinically significant proteinuria. A random specimen is not optimal for detecting all proteins. The specific gravity of this specimen was only 1.010. Pathologic significance requires clinical correlation. Travis White MD    Total Protein Serum for ELP      6.4 - 8.3 g/dL    Immunofixation ELP       Legend:  (H) High  (L) Low    OUTSIDE RECORDS  Outside referral notes and chart notes were reviewed and pertinent information has been summarized (in addition to the HPI):-    NSG        IMPRESSION/REPORT/PLAN  Lumbar spinal stenosis  Swelling of right foot  Neuropathy  Arthritis  Right foot pain  Abnormal SPEP    This is a 83 year old female with right foot pain, numbness in both feet and swelling of the right foot.  Her exam is suggestive of a peripheral neuropathy.  EMG further shows evidence of neuropathy.  She has abnormal serum protein electrophoresis for which she follows up with hematology/oncology.  Will recommend continuing follow-up with them.  Will check blood work look for other causes of neuropathy.  She does have history of lumbar spinal stenosis though I do not suspect that that is causing her foot pain.    The swelling in the feet would not be related to the neuropathy.  Should work with primary care to evaluate for vascular etiology.    She further has arthritis in the feet which might be contributing to some of her pain.    Her pain is not suggestive of neuropathic pain based on description.  Currently Tylenol and lidocaine cream is helping.  Will try gabapentin cream instead of see if it works better.  She previously had side effects to gabapentin.    I can see her back in 4 to 5  months after testing.    -     gabapentin 8%-lidocaine 2.5% in PLO cream; Apply to painful feet 3 times a day as needed.  -     Vitamin B6; Future  -     Vitamin B12; Future  -     Vitamin B1 whole blood; Future  -     Hemoglobin A1c; Future    Return in about 5 months (around 9/10/2024) for In-Clinic Visit (must), After testing.    Over 60 minutes were spent coordinating the care for the patient on the day of the encounter.  This includes previsit, during visit and post visit activities as documented above.  Counseling patient.  Reviewing chart.  Multiple test reviewed/ordered.  Prescription management.  Multiple problems addressed.  (Activities include but not inclusive of reviewing chart, reviewing outside records, reviewing labs and imaging study results as well as the images, patient visit time including getting history and exam,  use if applicable, review of test results with the patient and coming up with a plan in a shared model, counseling patient and family, education and answering patient questions, EMR , EMR diagnosis entry and problem list management, medication reconciliation and prescription management if applicable, paperwork if applicable, printing documents and documentation of the visit activities.)        Lucas Monzon MD  Neurologist  [facility]  Tel:- [phone]    This note was dictated using voice recognition software.  Any grammatical or context distortions are unintentional and inherent to the software.

## 2024-04-12 LAB — PYRIDOXAL PHOS SERPL-SCNC: 136.6 NMOL/L

## 2024-04-13 LAB — VIT B1 PYROPHOSHATE BLD-SCNC: 139 NMOL/L

## 2024-04-17 ENCOUNTER — THERAPY VISIT (OUTPATIENT)
Dept: PHYSICAL THERAPY | Facility: REHABILITATION | Age: 84
End: 2024-04-17
Payer: COMMERCIAL

## 2024-04-17 DIAGNOSIS — M70.62 GREATER TROCHANTERIC BURSITIS OF BOTH HIPS: Primary | ICD-10-CM

## 2024-04-17 DIAGNOSIS — M62.81 GENERALIZED MUSCLE WEAKNESS: ICD-10-CM

## 2024-04-17 DIAGNOSIS — M70.61 GREATER TROCHANTERIC BURSITIS OF BOTH HIPS: Primary | ICD-10-CM

## 2024-04-17 PROCEDURE — 97110 THERAPEUTIC EXERCISES: CPT | Mod: CQ | Performed by: PHYSICAL THERAPY ASSISTANT

## 2024-04-18 NOTE — TELEPHONE ENCOUNTER
Patient returned call. Did explain that any steroid injection should be done 2 weeks after each other.    Reports she is still having good relief from the ONBs and hip injection from later January and early February. She will follow up as needed. Contact information provided.

## 2024-04-19 ENCOUNTER — LAB (OUTPATIENT)
Dept: INFUSION THERAPY | Facility: HOSPITAL | Age: 84
End: 2024-04-19
Attending: INTERNAL MEDICINE
Payer: COMMERCIAL

## 2024-04-19 DIAGNOSIS — D47.2 IGA MONOCLONAL GAMMOPATHY: ICD-10-CM

## 2024-04-19 LAB
ANION GAP SERPL CALCULATED.3IONS-SCNC: 11 MMOL/L (ref 7–15)
BASOPHILS # BLD AUTO: 0 10E3/UL (ref 0–0.2)
BASOPHILS NFR BLD AUTO: 1 %
BUN SERPL-MCNC: 27.5 MG/DL (ref 8–23)
CALCIUM SERPL-MCNC: 9.1 MG/DL (ref 8.8–10.2)
CHLORIDE SERPL-SCNC: 103 MMOL/L (ref 98–107)
CREAT SERPL-MCNC: 0.97 MG/DL (ref 0.51–0.95)
DEPRECATED HCO3 PLAS-SCNC: 29 MMOL/L (ref 22–29)
EGFRCR SERPLBLD CKD-EPI 2021: 57 ML/MIN/1.73M2
EOSINOPHIL # BLD AUTO: 0.1 10E3/UL (ref 0–0.7)
EOSINOPHIL NFR BLD AUTO: 1 %
ERYTHROCYTE [DISTWIDTH] IN BLOOD BY AUTOMATED COUNT: 15.2 % (ref 10–15)
GLUCOSE SERPL-MCNC: 82 MG/DL (ref 70–99)
HCT VFR BLD AUTO: 36 % (ref 35–47)
HGB BLD-MCNC: 11.4 G/DL (ref 11.7–15.7)
IMM GRANULOCYTES # BLD: 0 10E3/UL
IMM GRANULOCYTES NFR BLD: 0 %
LYMPHOCYTES # BLD AUTO: 1 10E3/UL (ref 0.8–5.3)
LYMPHOCYTES NFR BLD AUTO: 28 %
MCH RBC QN AUTO: 31.5 PG (ref 26.5–33)
MCHC RBC AUTO-ENTMCNC: 31.7 G/DL (ref 31.5–36.5)
MCV RBC AUTO: 99 FL (ref 78–100)
MONOCYTES # BLD AUTO: 0.4 10E3/UL (ref 0–1.3)
MONOCYTES NFR BLD AUTO: 10 %
NEUTROPHILS # BLD AUTO: 2.2 10E3/UL (ref 1.6–8.3)
NEUTROPHILS NFR BLD AUTO: 60 %
NRBC # BLD AUTO: 0 10E3/UL
NRBC BLD AUTO-RTO: 0 /100
PLATELET # BLD AUTO: 235 10E3/UL (ref 150–450)
POTASSIUM SERPL-SCNC: 3.9 MMOL/L (ref 3.4–5.3)
RBC # BLD AUTO: 3.62 10E6/UL (ref 3.8–5.2)
SODIUM SERPL-SCNC: 143 MMOL/L (ref 135–145)
WBC # BLD AUTO: 3.7 10E3/UL (ref 4–11)

## 2024-04-19 PROCEDURE — 83521 IG LIGHT CHAINS FREE EACH: CPT | Mod: 59

## 2024-04-19 PROCEDURE — 84155 ASSAY OF PROTEIN SERUM: CPT

## 2024-04-19 PROCEDURE — 82374 ASSAY BLOOD CARBON DIOXIDE: CPT

## 2024-04-19 PROCEDURE — 82565 ASSAY OF CREATININE: CPT

## 2024-04-19 PROCEDURE — 82784 ASSAY IGA/IGD/IGG/IGM EACH: CPT

## 2024-04-19 PROCEDURE — 84165 PROTEIN E-PHORESIS SERUM: CPT | Mod: TC | Performed by: PATHOLOGY

## 2024-04-19 PROCEDURE — 85041 AUTOMATED RBC COUNT: CPT

## 2024-04-19 PROCEDURE — 36415 COLL VENOUS BLD VENIPUNCTURE: CPT

## 2024-04-19 PROCEDURE — 86334 IMMUNOFIX E-PHORESIS SERUM: CPT | Performed by: PATHOLOGY

## 2024-04-20 LAB — TOTAL PROTEIN SERUM FOR ELP: 6.3 G/DL (ref 6.4–8.3)

## 2024-04-22 LAB
ALBUMIN SERPL ELPH-MCNC: 3.9 G/DL (ref 3.7–5.1)
ALPHA1 GLOB SERPL ELPH-MCNC: 0.3 G/DL (ref 0.2–0.4)
ALPHA2 GLOB SERPL ELPH-MCNC: 0.5 G/DL (ref 0.5–0.9)
B-GLOBULIN SERPL ELPH-MCNC: 1.1 G/DL (ref 0.6–1)
GAMMA GLOB SERPL ELPH-MCNC: 0.5 G/DL (ref 0.7–1.6)
IGA SERPL-MCNC: 643 MG/DL (ref 84–499)
KAPPA LC FREE SER-MCNC: 6.56 MG/DL (ref 0.33–1.94)
KAPPA LC FREE/LAMBDA FREE SER NEPH: 4.59 {RATIO} (ref 0.26–1.65)
LAMBDA LC FREE SERPL-MCNC: 1.43 MG/DL (ref 0.57–2.63)
M PROTEIN SERPL ELPH-MCNC: 0.3 G/DL
PROT PATTERN SERPL ELPH-IMP: ABNORMAL
PROT PATTERN SERPL IFE-IMP: NORMAL

## 2024-04-22 PROCEDURE — 86334 IMMUNOFIX E-PHORESIS SERUM: CPT | Mod: 26 | Performed by: PATHOLOGY

## 2024-04-22 PROCEDURE — 84165 PROTEIN E-PHORESIS SERUM: CPT | Mod: 26 | Performed by: PATHOLOGY

## 2024-04-24 ENCOUNTER — LAB (OUTPATIENT)
Dept: LAB | Facility: HOSPITAL | Age: 84
End: 2024-04-24
Payer: COMMERCIAL

## 2024-04-24 DIAGNOSIS — G62.9 NEUROPATHY: ICD-10-CM

## 2024-04-24 PROCEDURE — 36415 COLL VENOUS BLD VENIPUNCTURE: CPT

## 2024-04-24 PROCEDURE — 84207 ASSAY OF VITAMIN B-6: CPT

## 2024-04-26 ENCOUNTER — TRANSFERRED RECORDS (OUTPATIENT)
Dept: HEALTH INFORMATION MANAGEMENT | Facility: CLINIC | Age: 84
End: 2024-04-26
Payer: COMMERCIAL

## 2024-04-26 LAB — PYRIDOXAL PHOS SERPL-SCNC: 128.7 NMOL/L

## 2024-05-02 ENCOUNTER — THERAPY VISIT (OUTPATIENT)
Dept: PHYSICAL THERAPY | Facility: REHABILITATION | Age: 84
End: 2024-05-02
Payer: COMMERCIAL

## 2024-05-02 DIAGNOSIS — M70.62 GREATER TROCHANTERIC BURSITIS OF BOTH HIPS: Primary | ICD-10-CM

## 2024-05-02 DIAGNOSIS — M62.81 GENERALIZED MUSCLE WEAKNESS: ICD-10-CM

## 2024-05-02 DIAGNOSIS — M70.61 GREATER TROCHANTERIC BURSITIS OF BOTH HIPS: Primary | ICD-10-CM

## 2024-05-02 PROCEDURE — 97110 THERAPEUTIC EXERCISES: CPT | Mod: GP | Performed by: PHYSICAL THERAPY ASSISTANT

## 2024-05-02 PROCEDURE — 97140 MANUAL THERAPY 1/> REGIONS: CPT | Mod: GP | Performed by: PHYSICAL THERAPY ASSISTANT

## 2024-05-04 ENCOUNTER — MYC MEDICAL ADVICE (OUTPATIENT)
Dept: NEUROLOGY | Facility: CLINIC | Age: 84
End: 2024-05-04
Payer: COMMERCIAL

## 2024-05-04 DIAGNOSIS — E13.49 OTHER DIABETIC NEUROLOGICAL COMPLICATION ASSOCIATED WITH OTHER SPECIFIED DIABETES MELLITUS (H): ICD-10-CM

## 2024-05-04 DIAGNOSIS — E67.2 HYPERVITAMINOSIS B6: Primary | ICD-10-CM

## 2024-05-07 ENCOUNTER — TELEPHONE (OUTPATIENT)
Dept: PHYSICAL MEDICINE AND REHAB | Facility: CLINIC | Age: 84
End: 2024-05-07
Payer: COMMERCIAL

## 2024-05-07 NOTE — TELEPHONE ENCOUNTER
We could set you up with a nutritionist to see if they can find if you are taking the B6 supplement through your diet.  Otherwise the B6 level will eventually come down.  There is no treatment for the elevated B6 level.

## 2024-05-07 NOTE — TELEPHONE ENCOUNTER
Other: Geeta called she wants to have another occipital nerve block done due to bilateral hip pain. Does she need to be seen by Krista again or can she just place the order for the injection. Please contact patient to discuss and schedule. Thank you     Could we send this information to you in [employer] or would you prefer to receive a phone call?:   Patient would prefer a phone call   Okay to leave a detailed message?: Yes at Home number on file [phone] (home)

## 2024-05-08 DIAGNOSIS — G62.9 NEUROPATHY: ICD-10-CM

## 2024-05-08 DIAGNOSIS — M79.671 RIGHT FOOT PAIN: ICD-10-CM

## 2024-05-08 NOTE — TELEPHONE ENCOUNTER
I have put in the referral for you.  Insurance might not cover it and you might have to pay out-of-pocket.  Please look into this before you schedule the appointment.

## 2024-05-08 NOTE — TELEPHONE ENCOUNTER
Phone call to patient to discuss. Patient states she has been having bilateral hip pain the last several days, today left is worse than the right.     Last had a left greater trochanteric bursa injection on 1/30/24. Also has had lumbar ESIS x 2 since 6/12/24. She reports she recently had a steroid injection to her right foot per [facility] on 4/30. Did get relief with that one. Also had a steroid injection to her left foot on 4/17 per her podiatrist Kathy Thomas, without relief.     Explained [facility]SP will be notified of the update and request. Patient will be called with response. Stated understanding. Per patient, detailed message ok upon call back.

## 2024-05-09 NOTE — TELEPHONE ENCOUNTER
Phone call to patient to relay response from Krista LAI PA-C. Information given. Patient stated understanding and is appreciative of her input. Encouraged pt to call nurse navigation line directly if questions or concerns arise. Contact information provided.

## 2024-05-09 NOTE — TELEPHONE ENCOUNTER
In addition to those corticosteroid injections I also performed an occipital nerve block on 2/13/24.  I would recommend avoiding any additional steroid injections at this time.  Hopefully this pain will improve since it just began a few days ago.  She can use OTC topical pain relievers and she should continue with her exercises from PT.  I would recommend trying to wait a few months for any more steroid injections due to risk of osteoporosis.

## 2024-05-10 ENCOUNTER — PATIENT OUTREACH (OUTPATIENT)
Dept: ONCOLOGY | Facility: HOSPITAL | Age: 84
End: 2024-05-10
Payer: COMMERCIAL

## 2024-05-10 NOTE — PATIENT INSTRUCTIONS
Perhaps this may be useful to read:    Patient education: Monoclonal gammopathy of undetermined significance (The Basics) - UpToDate

## 2024-05-10 NOTE — PROGRESS NOTES
[facility]: Cancer Care                                                                                      RN Cancer Care Coordinator reviewed recent labs with Dr. Perez. He said they are all stable and pt can have another lab appt and RTC in 6 mo.     Writer called patient to relay this news. She didn't remember the reason for this hematology consult, and didn't understand what the blood tests were. Her daughter, Gabi, got on speaker phone and writer re-explained the reason for the call. Read through (summarizing) Dr. Perez's consult note - that there is some risk of progressing to cancer, but that she does not have cancer, and her labs are stable right now. Explained that they should be able to find his note in [employer] if they want to read it. This is simply monitoring the situation. Explained in detail that Dr. Perez sees many patients for blood disorders that are not cancer.    Writer explained that it was Jacy Mishra CNP, who had made the referral, and suggested they can certainly ask her more about it when they see her again.     They chose to make the follow up appts today, and we chose times for lab and 2 days later - RTC. Writer will have  make these. Let pt and her daughter know these will show up in [employer] after a while today.     Signature:  Sanjuana Del Rio RN

## 2024-05-15 DIAGNOSIS — E03.9 HYPOTHYROIDISM, UNSPECIFIED: ICD-10-CM

## 2024-05-15 RX ORDER — LEVOTHYROXINE SODIUM 50 UG/1
50 TABLET ORAL DAILY
Qty: 90 TABLET | Refills: 2 | Status: SHIPPED | OUTPATIENT
Start: 2024-05-15

## 2024-05-16 ENCOUNTER — THERAPY VISIT (OUTPATIENT)
Dept: PHYSICAL THERAPY | Facility: REHABILITATION | Age: 84
End: 2024-05-16
Payer: COMMERCIAL

## 2024-05-16 ENCOUNTER — TRANSFERRED RECORDS (OUTPATIENT)
Dept: HEALTH INFORMATION MANAGEMENT | Facility: CLINIC | Age: 84
End: 2024-05-16

## 2024-05-16 DIAGNOSIS — M70.61 GREATER TROCHANTERIC BURSITIS OF BOTH HIPS: Primary | ICD-10-CM

## 2024-05-16 DIAGNOSIS — M70.62 GREATER TROCHANTERIC BURSITIS OF BOTH HIPS: Primary | ICD-10-CM

## 2024-05-16 PROCEDURE — 97110 THERAPEUTIC EXERCISES: CPT | Mod: GP

## 2024-05-16 ASSESSMENT — ACTIVITIES OF DAILY LIVING (ADL)
TWISTING/PIVOTING_ON_INVOLVED_LEG: MODERATE DIFFICULTY
PUTTING_ON_SOCKS_AND_SHOES: SLIGHT DIFFICULTY
GOING_DOWN_1_FLIGHT_OF_STAIRS: SLIGHT DIFFICULTY
WALKING_INITIALLY: NO DIFFICULTY AT ALL
WALKING_APPROXIMATELY_10_MINUTES: NO DIFFICULTY AT ALL
HOS_ADL_ITEM_SCORE_TOTAL: 40
SITTING_FOR_15_MINUTES: NO DIFFICULTY AT ALL
WALKING_15_MINUTES_OR_GREATER: SLIGHT DIFFICULTY
WALKING_UP_STEEP_HILLS: SLIGHT DIFFICULTY
HOS_ADL_COUNT: 15
GETTING_INTO_AND_OUT_OF_AN_AVERAGE_CAR: SLIGHT DIFFICULTY
LIGHT_TO_MODERATE_WORK: SLIGHT DIFFICULTY
STEPPING_UP_AND_DOWN_CURBS: SLIGHT DIFFICULTY
DEEP_SQUATTING: UNABLE TO DO
HEAVY_WORK: EXTREME DIFFICULTY
HOS_ADL_HIGHEST_POTENTIAL_SCORE: 60
HOW_WOULD_YOU_RATE_YOUR_CURRENT_LEVEL_OF_FUNCTION_DURING_YOUR_USUAL_ACTIVITIES_OF_DAILY_LIVING_FROM_0_TO_100_WITH_100_BEING_YOUR_LEVEL_OF_FUNCTION_PRIOR_TO_YOUR_HIP_PROBLEM_AND_0_BEING_THE_INABILITY_TO_PERFORM_ANY_OF_YOUR_USUAL_DAILY_ACTIVITIES?: 40
ROLLING_OVER_IN_BED: SLIGHT DIFFICULTY
HOS_ADL_SCORE(%): 66.67
STANDING_FOR_15_MINUTES: MODERATE DIFFICULTY
RECREATIONAL_ACTIVITIES: SLIGHT DIFFICULTY
GOING_UP_1_FLIGHT_OF_STAIRS: SLIGHT DIFFICULTY

## 2024-05-19 DIAGNOSIS — F34.1 DYSTHYMIC DISORDER: ICD-10-CM

## 2024-05-20 RX ORDER — CITALOPRAM HYDROBROMIDE 10 MG/1
5 TABLET ORAL DAILY
Qty: 45 TABLET | Refills: 0 | Status: SHIPPED | OUTPATIENT
Start: 2024-05-20 | End: 2024-07-08

## 2024-06-03 ENCOUNTER — TRANSFERRED RECORDS (OUTPATIENT)
Dept: HEALTH INFORMATION MANAGEMENT | Facility: CLINIC | Age: 84
End: 2024-06-03
Payer: COMMERCIAL

## 2024-06-13 ENCOUNTER — MYC MEDICAL ADVICE (OUTPATIENT)
Dept: NEUROLOGY | Facility: CLINIC | Age: 84
End: 2024-06-13
Payer: COMMERCIAL

## 2024-06-13 DIAGNOSIS — M79.671 RIGHT FOOT PAIN: ICD-10-CM

## 2024-06-13 DIAGNOSIS — G62.9 NEUROPATHY: ICD-10-CM

## 2024-06-13 NOTE — TELEPHONE ENCOUNTER
Refill request for: Gabapentin cream     LOV: 4/10/24  NOV: 9/18/24    90 day supply with 0 refills Medication T'd for review and signature

## 2024-07-08 ENCOUNTER — OFFICE VISIT (OUTPATIENT)
Dept: INTERNAL MEDICINE | Facility: CLINIC | Age: 84
End: 2024-07-08
Payer: COMMERCIAL

## 2024-07-08 VITALS
TEMPERATURE: 98.2 F | BODY MASS INDEX: 26.04 KG/M2 | HEIGHT: 68 IN | OXYGEN SATURATION: 97 % | SYSTOLIC BLOOD PRESSURE: 132 MMHG | HEART RATE: 58 BPM | WEIGHT: 171.8 LBS | RESPIRATION RATE: 16 BRPM | DIASTOLIC BLOOD PRESSURE: 64 MMHG

## 2024-07-08 DIAGNOSIS — I10 ESSENTIAL (PRIMARY) HYPERTENSION: ICD-10-CM

## 2024-07-08 DIAGNOSIS — F34.1 DYSTHYMIC DISORDER: ICD-10-CM

## 2024-07-08 DIAGNOSIS — K21.9 GASTROESOPHAGEAL REFLUX DISEASE WITHOUT ESOPHAGITIS: ICD-10-CM

## 2024-07-08 DIAGNOSIS — I25.10 CORONARY ARTERY DISEASE INVOLVING NATIVE CORONARY ARTERY OF NATIVE HEART WITHOUT ANGINA PECTORIS: ICD-10-CM

## 2024-07-08 DIAGNOSIS — R82.998 DARK URINE: ICD-10-CM

## 2024-07-08 DIAGNOSIS — G62.9 NEUROPATHY: Primary | ICD-10-CM

## 2024-07-08 DIAGNOSIS — E78.5 DYSLIPIDEMIA, GOAL LDL BELOW 70: ICD-10-CM

## 2024-07-08 DIAGNOSIS — E03.9 ACQUIRED HYPOTHYROIDISM: ICD-10-CM

## 2024-07-08 LAB
ALBUMIN UR-MCNC: NEGATIVE MG/DL
APPEARANCE UR: CLEAR
BILIRUB UR QL STRIP: NEGATIVE
COLOR UR AUTO: YELLOW
GLUCOSE UR STRIP-MCNC: NEGATIVE MG/DL
HGB UR QL STRIP: NEGATIVE
KETONES UR STRIP-MCNC: NEGATIVE MG/DL
LEUKOCYTE ESTERASE UR QL STRIP: NEGATIVE
NITRATE UR QL: NEGATIVE
PH UR STRIP: 6 [PH] (ref 5–8)
SP GR UR STRIP: 1.01 (ref 1–1.03)
UROBILINOGEN UR STRIP-ACNC: 0.2 E.U./DL

## 2024-07-08 PROCEDURE — G2211 COMPLEX E/M VISIT ADD ON: HCPCS | Performed by: NURSE PRACTITIONER

## 2024-07-08 PROCEDURE — 99214 OFFICE O/P EST MOD 30 MIN: CPT | Performed by: NURSE PRACTITIONER

## 2024-07-08 PROCEDURE — 81003 URINALYSIS AUTO W/O SCOPE: CPT | Performed by: NURSE PRACTITIONER

## 2024-07-08 RX ORDER — RESPIRATORY SYNCYTIAL VIRUS VACCINE 120MCG/0.5
0.5 KIT INTRAMUSCULAR ONCE
Qty: 1 EACH | Refills: 0 | Status: CANCELLED | OUTPATIENT
Start: 2024-07-08 | End: 2024-07-08

## 2024-07-08 RX ORDER — OMEPRAZOLE 40 MG/1
40 CAPSULE, DELAYED RELEASE ORAL DAILY
Status: SHIPPED
Start: 2024-07-08

## 2024-07-08 RX ORDER — CITALOPRAM HYDROBROMIDE 10 MG/1
5 TABLET ORAL DAILY
Qty: 45 TABLET | Refills: 2 | Status: SHIPPED | OUTPATIENT
Start: 2024-07-08

## 2024-07-08 RX ORDER — HYDROCHLOROTHIAZIDE 25 MG/1
25 TABLET ORAL DAILY
Qty: 90 TABLET | Refills: 2 | Status: SHIPPED | OUTPATIENT
Start: 2024-07-08

## 2024-07-08 NOTE — ASSESSMENT & PLAN NOTE
Mild CAD on Coronary CT calcium score test. Due to reported myalgias, we are going to trial a discontinuation of rosuvastatin

## 2024-07-08 NOTE — ASSESSMENT & PLAN NOTE
Hold rosuvastatin for 2 weeks then will check in with pt through MyChart to see if this helps with myalgias

## 2024-07-08 NOTE — PROGRESS NOTES
"  Assessment & Plan   Problem List Items Addressed This Visit       Gastroesophageal reflux disease    Relevant Medications    omeprazole (PRILOSEC) 40 MG DR capsule    Dyslipidemia, goal LDL below 70     Hold rosuvastatin for 2 weeks then will check in with pt through MyChart to see if this helps with myalgias          Hypothyroidism    Dysthymic disorder     Continue low dose citalopram          Relevant Medications    citalopram (CELEXA) 10 MG tablet    Coronary artery disease involving native coronary artery of native heart without angina pectoris     Mild CAD on Coronary CT calcium score test. Due to reported myalgias, we are going to trial a discontinuation of rosuvastatin         Neuropathy - Primary     Other Visit Diagnoses       Dark urine        UA today. No dysuria, urinary frequency or urgency. Encouraged good hydration    Relevant Orders    UA Macroscopic with reflex to Microscopic and Culture - Lab Collect    Essential (primary) hypertension        Relevant Medications    hydrochlorothiazide (HYDRODIURIL) 25 MG tablet        - Advised tdap and COVID vaccines through the pharmacy  - Could consider an RSV vaccine in the fall      BMI  Estimated body mass index is 26.51 kg/m  as calculated from the following:    Height as of this encounter: 1.715 m (5' 7.5\").    Weight as of this encounter: 77.9 kg (171 lb 12.8 oz).     Return in about 6 months (around 1/8/2025) for AWV and labs at next office visit .      Bala Connor is a 83 year old, presenting for the following health issues:  Follow Up (4/8/24 )        7/8/2024     9:08 AM   Additional Questions   Roomed by Adelita   Accompanied by daughter Karen     History of Present Illness       Back Pain:  She presents for follow up of back pain. Patient's back pain is a chronic problem.  Location of back pain:  Right lower back, right middle of back, left side of neck, right hip and left hip  Description of back pain: fullness and gnawing  Back pain " "spreads: left buttocks, right thigh and right side of neck    Since patient first noticed back pain, pain is: always present, but gets better and worse  Does back pain interfere with her job:  Not applicable       Reason for visit:  Follw up visit    She eats 4 or more servings of fruits and vegetables daily.She consumes 0 sweetened beverage(s) daily.She exercises with enough effort to increase her heart rate 60 or more minutes per day.    She is taking medications regularly.     She is here with her daughter, Karen, today.     GI symptoms including belching, GERD, epigastric pain, constipation. Hx cholecystectomy, choledocholithiasis requiring several ERCPs most recent January 2024 which showed previous sphincter myotomy was open, CBD by dilated, biliary tree was swept but nothing was found.She has a swallow study tomorrow through [facility].     She has been evaluated by neurology for peripheral neuropathy.  She has also completed a vascular workup through [facility], no PAD.  She was started on a topical gabapentin and lidocaine cream as she has had side effects associated with oral gabapentin. She finds that the cream has been effective.     She still has days where she will ache all over. It tends to be worse when weather changes.           Objective    /64   Pulse 58   Temp 98.2  F (36.8  C) (Oral)   Resp 16   Ht 1.715 m (5' 7.5\")   Wt 77.9 kg (171 lb 12.8 oz)   LMP  (LMP Unknown)   SpO2 97%   BMI 26.51 kg/m    Body mass index is 26.51 kg/m .  Physical Exam   GENERAL: alert and no distress          The longitudinal plan of care for the diagnosis(es)/condition(s) as documented were addressed during this visit. Due to the added complexity in care, I will continue to support Geeta in the subsequent management and with ongoing continuity of care.    Signed Electronically by: Jacy Mishra NP    "

## 2024-07-09 ENCOUNTER — HOSPITAL ENCOUNTER (OUTPATIENT)
Dept: RADIOLOGY | Facility: HOSPITAL | Age: 84
Discharge: HOME | End: 2024-07-09
Attending: PHYSICIAN ASSISTANT
Payer: COMMERCIAL

## 2024-07-09 ENCOUNTER — HOSPITAL ENCOUNTER (OUTPATIENT)
Dept: SPEECH THERAPY | Facility: HOSPITAL | Age: 84
Discharge: HOME | End: 2024-07-09
Attending: PHYSICIAN ASSISTANT
Payer: COMMERCIAL

## 2024-07-09 DIAGNOSIS — R13.19 OTHER DYSPHAGIA: ICD-10-CM

## 2024-07-09 PROCEDURE — 74230 X-RAY XM SWLNG FUNCJ C+: CPT

## 2024-07-09 PROCEDURE — 92611 MOTION FLUOROSCOPY/SWALLOW: CPT | Mod: GN

## 2024-07-09 PROCEDURE — 92610 EVALUATE SWALLOWING FUNCTION: CPT | Mod: GN,59

## 2024-07-09 NOTE — PROGRESS NOTES
SPEECH LANGUAGE PATHOLOGY EVALUATION  Subjective   Presenting condition or subjective complaint: 83 yr old female who reports frequent daily belching after eating, mostly occurs in the morning and lessens by evening. Hx of GERD, epigastric pain, constipation. Hx cholecystectomy, choledocholithiasis requiring several ERCPs. Gastritis in Feb 2024. Recent manometry was considered normal. Previous video swallow studies x2 in 2022 both unremarkable for any oral pharyngeal dysphagia.      Objective     SWALLOW EVALUTION  Dysphagia history: no oral pharyngeal dysphagia    CLINICAL SWALLOW EVALUATION  Oral Motor Function: generally intact  Dentition: natural dentition, adequate dentition  Secretion management: WFL  Mucosal quality: good  Mandibular function: intact  Oral labial function: WFL  Lingual function: WFL  Buccal function: intact  Laryngeal function: voicing WFL   She takes single sips and consecutive swallows of water by cup without overt difficulty. She does have baseline belching observed prior to the study.    VIDEOFLUOROSCOPIC SWALLOW STUDY  Radiologist: Dr. Thompson  Views Taken: left lateral   Physical location of procedure: [facility]    Patient sitting upright on chair/stool     VFSS textures trialed:   VFSS Eval: Thin Liquids  Mode of Presentation: cup   Order of Presentation: 1, 2, 6  Preparatory Phase: WFL  Oral Phase: premature pharyngeal entry  Bolus Location When Swallow Initiated: valleculae  Pharyngeal Phase: WFL  Rosenbeck's Penetration Aspiration Scale: 1 - no aspiration, contrast does not enter airway  Response to Aspiration:  NA  Strategies and Compensations: not applicable  Diagnostic Statement: WFL    VFSS Eval: Purees  Mode of Presentation: spoon   Order of Presentation: 3  Preparatory Phase: WFL  Oral Phase: WFL  Bolus Location When Swallow Initiated: posterior angle of ramus  Pharyngeal Phase: WFL  Rosenbeck s Penetration Aspiration Scale: 1 - no aspiration, contrast does  not enter airway  Response to Aspiration:  NA  Strategies and Compensations: not applicable  Diagnostic Statement: WFL    VFSS Eval: Solids  Mode of Presentation: spoon   Order of Presentation: 4, 5  Preparatory Phase: WFL  Oral Phase: WFL, premature pharyngeal entry  Bolus Location When Swallow Initiated: valleculae  Pharyngeal Phase: WFL   Rosenbeck s Penetration Aspiration Scale: 1 - no aspiration, contrast does not enter airway  Response to Aspiration:  na  Strategies and Compensations: not applicable  Diagnostic Statement: WFL     ESOPHAGEAL PHASE OF SWALLOW  patient reports symptoms of esophageal dysphagia  patient presents with symptoms of esophageal dysphagia     SWALLOW ASSESSMENT CLINICAL IMPRESSIONS AND RATIONALE  Assessment & Plan   CLINICAL IMPRESSIONS   Impression/Assessment:  Patient demonstrates no significant oral pharyngeal dysphagia, swallow function is WNL for patient's age. Oral phase is normal. There is occasional premature spill to the valleculae with solids and liquids, otherwise timely swallow response. She has good tongue back retraction, hyolaryngeal movement and epiglottic inversion. Epiglottis maintains inversion and airway protection during consecutive swallows. There is no laryngeal penetration or aspiration. No significant pharyngeal stasis. Cricopharyngeus is minimally prominent resulting in a slightly narrower luminal opening during swallows. There is trace amounts of regurgitation at the end of the swallow and pooling above the UES. This could create the sensation of foods going down slowly or sticking, however, boluses transition unimpeded and likely not the cause of her belching and epigastric discomfort.     PLAN OF CARE  Recommended Referrals to Other Professionals:  recommend esophagram to further assess esophageal function and bolus transition during swallowing.  Education Assessment:   Learner/Method: Patient;Listening;Pictures/Video  Education Comments: Utilized discussion  and imaging playback to educate patient on results of study    Evaluation Time:    SLP Eval: oral/pharyngeal swallow function, clinical minutes (92748): 8  SLP Eval: VideoFluoroscopic Swallow function Minutes (99501): 16    Signing Clinician: Adalberto Tillman SLP

## 2024-07-10 ENCOUNTER — TRANSFERRED RECORDS (OUTPATIENT)
Dept: HEALTH INFORMATION MANAGEMENT | Facility: CLINIC | Age: 84
End: 2024-07-10
Payer: COMMERCIAL

## 2024-07-15 DIAGNOSIS — J31.0 CHRONIC RHINITIS: ICD-10-CM

## 2024-07-15 RX ORDER — FLUTICASONE PROPIONATE 50 MCG
SPRAY, SUSPENSION (ML) NASAL
Qty: 16 ML | Refills: 10 | Status: SHIPPED | OUTPATIENT
Start: 2024-07-15

## 2024-07-24 DIAGNOSIS — E78.5 DYSLIPIDEMIA, GOAL LDL BELOW 70: Primary | ICD-10-CM

## 2024-08-12 ENCOUNTER — TRANSFERRED RECORDS (OUTPATIENT)
Dept: HEALTH INFORMATION MANAGEMENT | Facility: CLINIC | Age: 84
End: 2024-08-12
Payer: COMMERCIAL

## 2024-08-15 NOTE — TELEPHONE ENCOUNTER
Spoke with patient. Relayed information verbalized understanding. Stated she is feeling a little stronger today, but something still is not quite right. appt scheduled for tomorrow     [de-identified] : when breathed in sounded funny today [FreeTextEntry6] : two times when breathed in sounded different no fever mom concerned about breathing

## 2024-08-20 DIAGNOSIS — J31.0 CHRONIC RHINITIS: ICD-10-CM

## 2024-08-20 DIAGNOSIS — M25.559 HIP PAIN: Primary | ICD-10-CM

## 2024-08-20 RX ORDER — AZELASTINE 1 MG/ML
1 SPRAY, METERED NASAL AT BEDTIME
Qty: 90 ML | Refills: 0 | Status: SHIPPED | OUTPATIENT
Start: 2024-08-20

## 2024-09-09 ENCOUNTER — RADIOLOGY INJECTION OFFICE VISIT (OUTPATIENT)
Dept: PHYSICAL MEDICINE AND REHAB | Facility: CLINIC | Age: 84
End: 2024-09-09
Attending: SURGERY
Payer: COMMERCIAL

## 2024-09-09 VITALS
HEIGHT: 67 IN | OXYGEN SATURATION: 95 % | SYSTOLIC BLOOD PRESSURE: 142 MMHG | BODY MASS INDEX: 26.37 KG/M2 | TEMPERATURE: 97.8 F | DIASTOLIC BLOOD PRESSURE: 64 MMHG | HEART RATE: 62 BPM | WEIGHT: 168 LBS

## 2024-09-09 DIAGNOSIS — M25.559 HIP PAIN: ICD-10-CM

## 2024-09-09 PROCEDURE — 99207 PR NO CHARGE LOS: CPT | Performed by: STUDENT IN AN ORGANIZED HEALTH CARE EDUCATION/TRAINING PROGRAM

## 2024-09-09 RX ORDER — FAMOTIDINE 20 MG/1
TABLET, FILM COATED ORAL
COMMUNITY
Start: 2024-08-12

## 2024-09-09 RX ORDER — LOPERAMIDE HYDROCHLORIDE 2 MG/1
TABLET ORAL
COMMUNITY
Start: 2024-08-12

## 2024-09-09 ASSESSMENT — PAIN SCALES - GENERAL: PAINLEVEL: MODERATE PAIN (5)

## 2024-09-09 NOTE — PROGRESS NOTES
Geeta Berry is here today for a right hip steroid injection with Dr. Rodriguez.  The patient is not able to have the injection today because she is not currently having hip pain and is more interested in pursuing treatment for her ongoing back pain.  The patient was encouraged to schedule a follow-up appointment [facility] to discuss care plan recommendations for her back.    The patient verbalized understanding of the plan and was encouraged to contact the [facility] Care Navigation Team if she had any questions or concerns prior to her next appointment.      No charge for today s visit.

## 2024-09-18 ENCOUNTER — OFFICE VISIT (OUTPATIENT)
Dept: NEUROLOGY | Facility: CLINIC | Age: 84
End: 2024-09-18
Payer: COMMERCIAL

## 2024-09-18 VITALS
HEIGHT: 68 IN | SYSTOLIC BLOOD PRESSURE: 120 MMHG | WEIGHT: 173 LBS | BODY MASS INDEX: 26.22 KG/M2 | DIASTOLIC BLOOD PRESSURE: 66 MMHG | HEART RATE: 50 BPM

## 2024-09-18 DIAGNOSIS — G62.9 NEUROPATHY: ICD-10-CM

## 2024-09-18 DIAGNOSIS — M79.2 NEUROPATHIC PAIN: Primary | ICD-10-CM

## 2024-09-18 DIAGNOSIS — M79.671 RIGHT FOOT PAIN: ICD-10-CM

## 2024-09-18 DIAGNOSIS — E67.2 HYPERVITAMINOSIS B6: ICD-10-CM

## 2024-09-18 DIAGNOSIS — R73.03 PRE-DIABETES: ICD-10-CM

## 2024-09-18 PROCEDURE — 99214 OFFICE O/P EST MOD 30 MIN: CPT | Performed by: PSYCHIATRY & NEUROLOGY

## 2024-09-18 PROCEDURE — G2211 COMPLEX E/M VISIT ADD ON: HCPCS | Performed by: PSYCHIATRY & NEUROLOGY

## 2024-09-18 NOTE — PROGRESS NOTES
"NEUROLOGY OUTPATIENT PROGRESS NOTE   Sep 18, 2024     CHIEF COMPLAINT/REASON FOR VISIT/REASON FOR CONSULT  Patient presents with:  NEUROPATHY: Neuropathy, right foot swelling. Patient states she is managing pretty well, occasionally she can't sleep and will have to walk the halls. Right foot swelling is the same as the last visit. Left great toe pain, thinking it is arthritis - \"if you touch it, it's like a wire that is alive.\"    REASON FOR CONSULTATION-evaluation of right foot pain    REFERRAL SOURCE  Dr. Jacy Mishra  CC Dr. Jacy Mishra    HISTORY OF PRESENT ILLNESS  Geeta Berry is a 84 year old female seen today for evaluation of right foot pain.  He reports that his symptoms have been going on for about a year.  Does complain of swelling in the right foot and now she feels that she is getting in the left foot as well.  Does have some pain in her joints.  She is unable to describe the pain and does not really call it into the needles pain.  Feels more for pelvic pain.  She does have some numbness in the bottom of her feet.  Does report morning stiffness.  Does report some difficulty when she feels the carpet feels like she is walking on pavement.  No balance issues.  Has been put on gabapentin which caused side effects.  Has been using lidocaine cream and Oral Tylenol which has been helpful.  She has done physical therapy.    9/18/24  Patient returns today  1.  Neuropathy symptoms are stable.  No worsening of numbness and balance  2.  She is staying active.  Does exercise every day with the bike or going for walks.  3.  Gabapentin cream is helping for neuropathic pain.  Uses it twice a day.  No side effects.  Occasionally at nighttime she has difficulty with sleep and needs to walk around.  Encouraged her to rub her legs to see if that helps  4.  Vitamin B6 is elevated.  Has worked with nutritionist and has not really found a cause for her elevated vitamin B6.  Does not take any supplements or energy drinks.  Her " multivitamin does not have vitamin B6.  No other new concerns.    Previous history is reviewed and this is unchanged.    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)     Chronic neck pain     Common bile duct calculus     Coronary artery disease due to calcified coronary lesion 8/6/2020    Depression     DVT (deep venous thrombosis) (H)     GERD (gastroesophageal reflux disease)     History of blood clots 2017    DVT right lower extremity    HLD (hyperlipidemia)     HTN (hypertension)     Hyperlipidemia     Hypertension     Hypothyroidism     Infectious viral hepatitis     hepatitis A in 1960s    Thyroid disease      Patient Active Problem List   Diagnosis    History of DVT (deep vein thrombosis), right LE 12/2016    Acute iritis, h/o in 2012    Choledocholithiasis    Constipation    Gastroesophageal reflux disease    Essential hypertension    Dyslipidemia, goal LDL below 70    Hypothyroidism    Osteoarthritis of lumbar spine    Osteoarthritis; knees, hips, cervical spine     Facet arthropathy, cervical    Dysthymic disorder    Hiatal hernia    Chronic rhinitis    Coronary artery disease involving native coronary artery of native heart without angina pectoris    Stage 3a chronic kidney disease (H)    Status post knee surgery    Neuropathy    Lumbar radiculopathy    Iron deficiency anemia due to chronic blood loss    Greater trochanteric bursitis of both hips    Epigastric pain    Vitamin D deficiency    Osteopenia of left forearm    Seborrheic dermatitis   Significant for arthritis    FAMILY HISTORY  Family History   Problem Relation Age of Onset    Colon Cancer Father     Diabetes Sister     Heart Disease Mother    Negative for neuropathy.    SOCIAL HISTORY  Social History     Tobacco Use    Smoking status: Never     Passive exposure: Never    Smokeless tobacco: Never   Vaping Use    Vaping status: Never  Used   Substance Use Topics    Alcohol use: No    Drug use: No       SYSTEMS REVIEW  Twelve-system ROS was done and other than the HPI this was negative/positive for back pain, arm and leg pain, joint pain, headaches, memory loss, bowel problems.    MEDICATIONS  Current Outpatient Medications   Medication Sig Dispense Refill    acetaminophen (TYLENOL) 500 MG tablet Take 500 mg by mouth 4 times daily      azelastine (ASTELIN) 0.1 % nasal spray Spray 1 spray into both nostrils at bedtime 90 mL 0    citalopram (CELEXA) 10 MG tablet Take 0.5 tablets (5 mg) by mouth daily 45 tablet 2    docusate sodium (COLACE) 100 MG tablet Take 100 mg by mouth 2 times daily      fluticasone (FLONASE) 50 MCG/ACT nasal spray SPRAY 1 SPRAY INTO EACH NOSTRIL DAILY 16 mL 10    gabapentin 8%-lidocaine 2.5% in PLO cream Apply to painful feet 3 times a day as needed. 120 g 3    hydrochlorothiazide (HYDRODIURIL) 25 MG tablet Take 1 tablet (25 mg) by mouth daily 90 tablet 2    ketoconazole (NIZORAL) 2 % external shampoo Apply to the scalp. Leave on for 5 minutes then rinse off. May use 2-3 times per week 120 mL 2    levothyroxine (SYNTHROID/LEVOTHROID) 50 MCG tablet TAKE 1 TABLET BY MOUTH EVERY DAY 90 tablet 2    Lidocaine-Menthol (LIDOCAINE-MENTHOL ROLL-ON) 4-1 % LIQD Externally apply 1 Application topically as needed      multivitamin w/minerals (THERA-VIT-M) tablet Take 1 tablet by mouth daily      omeprazole (PRILOSEC) 40 MG DR capsule Take 1 capsule (40 mg) by mouth daily      polyethylene glycol (MIRALAX/GLYCOLAX) packet Take 1 packet by mouth daily      vitamin C (ASCORBIC ACID) 1000 MG TABS Take 1,000 mg by mouth daily      Vitamin D3 (VITAMIN D, CHOLECALCIFEROL,) 25 mcg (1000 units) tablet Take 1 tablet by mouth daily      amoxicillin (AMOXIL) 500 MG capsule Take 2,000 mg by mouth daily as needed (1 hour before dental appointments) (Patient not taking: Reported on 9/18/2024)      calcium citrate-vitamin D (CITRACAL) 200-6.25 MG-MCG TABS  "per tablet Take 1 tablet by mouth daily (Patient not taking: Reported on 9/18/2024)      famotidine (PEPCID) 20 MG tablet Take by mouth. (Patient not taking: Reported on 9/18/2024)      loperamide (DIAMODE) 2 MG tablet Take by mouth. (Patient not taking: Reported on 9/18/2024)       No current facility-administered medications for this visit.        PHYSICAL EXAMINATION  VITALS: /66 (BP Location: Right arm, Patient Position: Sitting)   Pulse 50   Ht 1.715 m (5' 7.5\")   Wt 78.5 kg (173 lb)   LMP  (LMP Unknown)   BMI 26.70 kg/m    GENERAL: Healthy appearing, alert, no acute distress, normal habitus.  CARDIOVASCULAR: Extremities warm and well perfused. Pulses present.   NEUROLOGICAL:  Patient is awake and oriented to self, place and time.  Attention span is normal.  Memory is grossly intact.  Language is fluent and follows commands appropriately.  Appropriate fund of knowledge. Cranial nerves 2-12 are intact. There is no pronator drift.  Motor exam shows 5/5 strength in all extremities.  Tone is symmetric bilaterally in upper and lower extremities.  Reflexes are symmetric and absent in upper extremities and lower extremities. Sensory exam is grossly intact to light touch, pin prick and vibration decreased vibration in the feet.  Finger to nose and heel to shin is without dysmetria.  Romberg is negative.  Gait is wide-based and slightly unsteady.  Difficulty with tandem walking.  Exam stable.    DIAGNOSTICS  MRI L spine  IMPRESSION:  1. Overall, no significant change in multilevel degenerative findings  of the lumbar spine.  2. Moderate to severe central spinal canal stenosis at L4-L5. Lesser  degrees of spinal canal and lateral recess narrowing elsewhere.  3. Mild and moderate degrees of multilevel degenerative neural  foraminal stenosis.  4. Please see the body of the report for details.      EMG  CLINICAL INTERPRETATION:  This is an abnormal nerve conduction and EMG study.  The study is suggestive of a " sensorimotor polyneuropathy in both legs.  Further clinical correlation is needed.     XR L spine  IMPRESSION:   5 lumbar-type vertebral bodies. Anterolisthesis of L4 and L5 measuring 6 mm, unchanged on flexion and extension. Anterolisthesis of L5 on S1 measuring 3 mm, unchanged on flexion and extension. No dynamic instability. The vertebral bodies of the cervical   spine otherwise have normal stature and alignment. Multilevel degenerative facet arthropathy, most pronounced at L4/L5 and L5/S1. Multilevel degenerative disc disease of the cervical spine with disc height loss, most pronounced at L1/L2, L2/L3 and L3/L4.   Soft tissues unremarkable.    RELEVANT LABS  Component      Latest Ref Rng 9/19/2023  1:54 PM   Sodium      136 - 145 mmol/L 140    Potassium      3.4 - 5.3 mmol/L 3.8    Chloride      98 - 107 mmol/L 102    Carbon Dioxide (CO2)      22 - 29 mmol/L 29    Anion Gap      7 - 15 mmol/L 9    Urea Nitrogen      8.0 - 23.0 mg/dL 24.0 (H)    Creatinine      0.51 - 0.95 mg/dL 0.95    Calcium      8.8 - 10.2 mg/dL 9.3    Glucose      70 - 99 mg/dL 95    Alkaline Phosphatase      35 - 104 U/L 59    AST      0 - 45 U/L 28    ALT      0 - 50 U/L 20    Protein Total      6.4 - 8.3 g/dL 6.8    Albumin      3.5 - 5.2 g/dL 4.3    Bilirubin Total      <=1.2 mg/dL 0.3    GFR Estimate      >60 mL/min/1.73m2 59 (L)    Albumin Fraction      3.7 - 5.1 g/dL 3.9    Alpha 1 Fraction      0.2 - 0.4 g/dL 0.3    Alpha 2 Fraction      0.5 - 0.9 g/dL 0.5    Beta Fraction      0.6 - 1.0 g/dL 1.0    Gamma Fraction      0.7 - 1.6 g/dL 0.6 (L)    Monoclonal Peak      <=0.0 g/dL 0.3 (H)    ELP Interpretation: Small monoclonal protein (0.3 g/dL) seen in the beta fraction co-migrating with C3 complement, not previously characterized in our laboratory. Recommend serum and urine immunofixation for confirmation and further characterization if not previously performed elsewhere. Slight hypogammaglobulinemia. Pathologic significance requires  clinical correlation. Travis White MD    Ferritin      11 - 328 ng/mL 61    TSH      0.30 - 4.20 uIU/mL 2.46    Methylmalonic Acid      0.00 - 0.40 umol/L 0.27    ELP Interpretation Urine    Total Protein Serum for ELP      6.4 - 8.3 g/dL 6.3 (L)    Immunofixation ELP Monoclonal IgA immunoglobulin of kappa light chain type. Pathologic significance requires clinical correlation. Travis White MD      Component      Latest Ref Rng 9/19/2023  2:08 PM   Sodium      136 - 145 mmol/L    Potassium      3.4 - 5.3 mmol/L    Chloride      98 - 107 mmol/L    Carbon Dioxide (CO2)      22 - 29 mmol/L    Anion Gap      7 - 15 mmol/L    Urea Nitrogen      8.0 - 23.0 mg/dL    Creatinine      0.51 - 0.95 mg/dL    Calcium      8.8 - 10.2 mg/dL    Glucose      70 - 99 mg/dL    Alkaline Phosphatase      35 - 104 U/L    AST      0 - 45 U/L    ALT      0 - 50 U/L    Protein Total      6.4 - 8.3 g/dL    Albumin      3.5 - 5.2 g/dL    Bilirubin Total      <=1.2 mg/dL    GFR Estimate      >60 mL/min/1.73m2    Albumin Fraction      3.7 - 5.1 g/dL    Alpha 1 Fraction      0.2 - 0.4 g/dL    Alpha 2 Fraction      0.5 - 0.9 g/dL    Beta Fraction      0.6 - 1.0 g/dL    Gamma Fraction      0.7 - 1.6 g/dL    Monoclonal Peak      <=0.0 g/dL    ELP Interpretation:    Ferritin      11 - 328 ng/mL    TSH      0.30 - 4.20 uIU/mL    Methylmalonic Acid      0.00 - 0.40 umol/L    ELP Interpretation Urine Trace albumin and globulins seen. No monoclonal protein seen. We recommend a first morning voided urine to detect clinically significant proteinuria. A random specimen is not optimal for detecting all proteins. The specific gravity of this specimen was only 1.010. Pathologic significance requires clinical correlation. Travis White MD    Total Protein Serum for ELP      6.4 - 8.3 g/dL    Immunofixation ELP       Legend:  (H) High  (L) Low    OUTSIDE RECORDS  Outside referral notes and chart notes were reviewed and pertinent information has been  summarized (in addition to the HPI):-    NSG    Component      Latest Ref Rng 4/10/2024  9:20 AM 4/24/2024  8:51 AM   Vitamin B6      20.0 - 125.0 nmol/L 136.6 (H)  128.7 (H)    Vitamin B12      232 - 1,245 pg/mL 544     Vitamin B1 Whole Blood Level      70 - 180 nmol/L 139     Hemoglobin A1C      <5.7 % 5.9 (H)        Legend:  (H) High    Component      Latest Ref Rng 4/19/2024  10:59 AM   Albumin Fraction      3.7 - 5.1 g/dL 3.9    Alpha 1 Fraction      0.2 - 0.4 g/dL 0.3    Alpha 2 Fraction      0.5 - 0.9 g/dL 0.5    Beta Fraction      0.6 - 1.0 g/dL 1.1 (H)    Gamma Fraction      0.7 - 1.6 g/dL 0.5 (L)    Monoclonal Peak      <=0.0 g/dL 0.3 (H)    ELP Interpretation: Small monoclonal protein (0.3 g/dL) seen in the beta fraction co-migrating with C3 complement. See immunofixation report on same specimen. Hypogammaglobulinemia. Pathologic significance requires clinical correlation. Macie Piedra MD    Immunofixation ELP Monoclonal IgA immunoglobulin of kappa light chain type. Pathologic significance requires clinical correlation. Macie Piedra MD    Total Protein Serum for ELP      6.4 - 8.3 g/dL 6.3 (L)       Legend:  (H) High  (L) Low      IMPRESSION/REPORT/PLAN  Lumbar spinal stenosis  Swelling of right foot  Neuropathy  Arthritis  Right foot pain  Abnormal SPEP  Elevated vitamin B6  Prediabetes    This is a 84 year old female with right foot pain, numbness in both feet and swelling of the right foot.  Her exam is suggestive of a peripheral neuropathy.  EMG further shows evidence of neuropathy.      Blood work has shown prediabetes, elevated vitamin B6 and abnormal serum protein electrophoresis.  She is following up with hematology for further evaluation of abnormal serum electrophoresis.  Neuropathy could be idiopathic also.  Recommend exercise and healthy lifestyle.  Discussed prognosis.    She does have history of lumbar spinal stenosis though I do not suspect that that is causing her foot  pain.    Her leg swelling, knee pain, left great big toe pain are not related to the neuropathy.    Her pain is not suggestive of neuropathic pain based on description.  Currently Tylenol and lidocaine cream is helping.  Gabapentin cream is helping and will continue.  She previously had side effects to oral gabapentin.    I can see her back in 9 months.    -     gabapentin 8%-lidocaine 2.5% in PLO cream; Apply to painful feet 3 times a day as needed.      Return in about 9 months (around 6/18/2025) for In-Clinic Visit (must), After testing.    Over 30 minutes were spent coordinating the care for the patient on the day of the encounter.  This includes previsit, during visit and post visit activities as documented above.  Counseling patient.  Reviewing testing.  Prescription management.  (Activities include but not inclusive of reviewing chart, reviewing outside records, reviewing labs and imaging study results as well as the images, patient visit time including getting history and exam,  use if applicable, review of test results with the patient and coming up with a plan in a shared model, counseling patient and family, education and answering patient questions, EMR , EMR diagnosis entry and problem list management, medication reconciliation and prescription management if applicable, paperwork if applicable, printing documents and documentation of the visit activities.)        Lucas Monzon MD  Neurologist  [facility]  Tel:- [phone]    This note was dictated using voice recognition software.  Any grammatical or context distortions are unintentional and inherent to the software.

## 2024-09-18 NOTE — LETTER
"9/18/2024      Geeta Berry  [address]  [facility] [address]      Dear Colleague,    Thank you for referring your patient, Geeta Berry, to the [facility] [place]. Please see a copy of my visit note below.    NEUROLOGY OUTPATIENT PROGRESS NOTE   Sep 18, 2024     CHIEF COMPLAINT/REASON FOR VISIT/REASON FOR CONSULT  Patient presents with:  NEUROPATHY: Neuropathy, right foot swelling. Patient states she is managing pretty well, occasionally she can't sleep and will have to walk the halls. Right foot swelling is the same as the last visit. Left great toe pain, thinking it is arthritis - \"if you touch it, it's like a wire that is alive.\"    REASON FOR CONSULTATION-evaluation of right foot pain    REFERRAL SOURCE  Dr. Jacy Mishra  CC Dr. Jacy Mishra    HISTORY OF PRESENT ILLNESS  Geeta Berry is a 84 year old female seen today for evaluation of right foot pain.  He reports that his symptoms have been going on for about a year.  Does complain of swelling in the right foot and now she feels that she is getting in the left foot as well.  Does have some pain in her joints.  She is unable to describe the pain and does not really call it into the needles pain.  Feels more for pelvic pain.  She does have some numbness in the bottom of her feet.  Does report morning stiffness.  Does report some difficulty when she feels the carpet feels like she is walking on pavement.  No balance issues.  Has been put on gabapentin which caused side effects.  Has been using lidocaine cream and Oral Tylenol which has been helpful.  She has done physical therapy.    9/18/24  Patient returns today  1.  Neuropathy symptoms are stable.  No worsening of numbness and balance  2.  She is staying active.  Does exercise every day with the bike or going for walks.  3.  Gabapentin cream is helping for neuropathic pain.  Uses it twice a day.  No side effects.  Occasionally at nighttime she has difficulty with " sleep and needs to walk around.  Encouraged her to rub her legs to see if that helps  4.  Vitamin B6 is elevated.  Has worked with nutritionist and has not really found a cause for her elevated vitamin B6.  Does not take any supplements or energy drinks.  Her multivitamin does not have vitamin B6.  No other new concerns.    Previous history is reviewed and this is unchanged.    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)      Chronic neck pain      Common bile duct calculus      Coronary artery disease due to calcified coronary lesion 8/6/2020     Depression      DVT (deep venous thrombosis) (H)      GERD (gastroesophageal reflux disease)      History of blood clots 2017    DVT right lower extremity     HLD (hyperlipidemia)      HTN (hypertension)      Hyperlipidemia      Hypertension      Hypothyroidism      Infectious viral hepatitis     hepatitis A in 1960s     Thyroid disease      Patient Active Problem List   Diagnosis     History of DVT (deep vein thrombosis), right LE 12/2016     Acute iritis, h/o in 2012     Choledocholithiasis     Constipation     Gastroesophageal reflux disease     Essential hypertension     Dyslipidemia, goal LDL below 70     Hypothyroidism     Osteoarthritis of lumbar spine     Osteoarthritis; knees, hips, cervical spine      Facet arthropathy, cervical     Dysthymic disorder     Hiatal hernia     Chronic rhinitis     Coronary artery disease involving native coronary artery of native heart without angina pectoris     Stage 3a chronic kidney disease (H)     Status post knee surgery     Neuropathy     Lumbar radiculopathy     Iron deficiency anemia due to chronic blood loss     Greater trochanteric bursitis of both hips     Epigastric pain     Vitamin D deficiency     Osteopenia of left forearm     Seborrheic dermatitis   Significant for arthritis    FAMILY HISTORY  Family  History   Problem Relation Age of Onset     Colon Cancer Father      Diabetes Sister      Heart Disease Mother    Negative for neuropathy.    SOCIAL HISTORY  Social History     Tobacco Use     Smoking status: Never     Passive exposure: Never     Smokeless tobacco: Never   Vaping Use     Vaping status: Never Used   Substance Use Topics     Alcohol use: No     Drug use: No       SYSTEMS REVIEW  Twelve-system ROS was done and other than the HPI this was negative/positive for back pain, arm and leg pain, joint pain, headaches, memory loss, bowel problems.    MEDICATIONS  Current Outpatient Medications   Medication Sig Dispense Refill     acetaminophen (TYLENOL) 500 MG tablet Take 500 mg by mouth 4 times daily       azelastine (ASTELIN) 0.1 % nasal spray Spray 1 spray into both nostrils at bedtime 90 mL 0     citalopram (CELEXA) 10 MG tablet Take 0.5 tablets (5 mg) by mouth daily 45 tablet 2     docusate sodium (COLACE) 100 MG tablet Take 100 mg by mouth 2 times daily       fluticasone (FLONASE) 50 MCG/ACT nasal spray SPRAY 1 SPRAY INTO EACH NOSTRIL DAILY 16 mL 10     gabapentin 8%-lidocaine 2.5% in PLO cream Apply to painful feet 3 times a day as needed. 120 g 3     hydrochlorothiazide (HYDRODIURIL) 25 MG tablet Take 1 tablet (25 mg) by mouth daily 90 tablet 2     ketoconazole (NIZORAL) 2 % external shampoo Apply to the scalp. Leave on for 5 minutes then rinse off. May use 2-3 times per week 120 mL 2     levothyroxine (SYNTHROID/LEVOTHROID) 50 MCG tablet TAKE 1 TABLET BY MOUTH EVERY DAY 90 tablet 2     Lidocaine-Menthol (LIDOCAINE-MENTHOL ROLL-ON) 4-1 % LIQD Externally apply 1 Application topically as needed       multivitamin w/minerals (THERA-VIT-M) tablet Take 1 tablet by mouth daily       omeprazole (PRILOSEC) 40 MG DR capsule Take 1 capsule (40 mg) by mouth daily       polyethylene glycol (MIRALAX/GLYCOLAX) packet Take 1 packet by mouth daily       vitamin C (ASCORBIC ACID) 1000 MG TABS Take 1,000 mg by mouth  "daily       Vitamin D3 (VITAMIN D, CHOLECALCIFEROL,) 25 mcg (1000 units) tablet Take 1 tablet by mouth daily       amoxicillin (AMOXIL) 500 MG capsule Take 2,000 mg by mouth daily as needed (1 hour before dental appointments) (Patient not taking: Reported on 9/18/2024)       calcium citrate-vitamin D (CITRACAL) 200-6.25 MG-MCG TABS per tablet Take 1 tablet by mouth daily (Patient not taking: Reported on 9/18/2024)       famotidine (PEPCID) 20 MG tablet Take by mouth. (Patient not taking: Reported on 9/18/2024)       loperamide (DIAMODE) 2 MG tablet Take by mouth. (Patient not taking: Reported on 9/18/2024)       No current facility-administered medications for this visit.        PHYSICAL EXAMINATION  VITALS: /66 (BP Location: Right arm, Patient Position: Sitting)   Pulse 50   Ht 1.715 m (5' 7.5\")   Wt 78.5 kg (173 lb)   LMP  (LMP Unknown)   BMI 26.70 kg/m    GENERAL: Healthy appearing, alert, no acute distress, normal habitus.  CARDIOVASCULAR: Extremities warm and well perfused. Pulses present.   NEUROLOGICAL:  Patient is awake and oriented to self, place and time.  Attention span is normal.  Memory is grossly intact.  Language is fluent and follows commands appropriately.  Appropriate fund of knowledge. Cranial nerves 2-12 are intact. There is no pronator drift.  Motor exam shows 5/5 strength in all extremities.  Tone is symmetric bilaterally in upper and lower extremities.  Reflexes are symmetric and absent in upper extremities and lower extremities. Sensory exam is grossly intact to light touch, pin prick and vibration decreased vibration in the feet.  Finger to nose and heel to shin is without dysmetria.  Romberg is negative.  Gait is wide-based and slightly unsteady.  Difficulty with tandem walking.  Exam stable.    DIAGNOSTICS  MRI L spine  IMPRESSION:  1. Overall, no significant change in multilevel degenerative findings  of the lumbar spine.  2. Moderate to severe central spinal canal stenosis at " L4-L5. Lesser  degrees of spinal canal and lateral recess narrowing elsewhere.  3. Mild and moderate degrees of multilevel degenerative neural  foraminal stenosis.  4. Please see the body of the report for details.      EMG  CLINICAL INTERPRETATION:  This is an abnormal nerve conduction and EMG study.  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  Further clinical correlation is needed.     XR L spine  IMPRESSION:   5 lumbar-type vertebral bodies. Anterolisthesis of L4 and L5 measuring 6 mm, unchanged on flexion and extension. Anterolisthesis of L5 on S1 measuring 3 mm, unchanged on flexion and extension. No dynamic instability. The vertebral bodies of the cervical   spine otherwise have normal stature and alignment. Multilevel degenerative facet arthropathy, most pronounced at L4/L5 and L5/S1. Multilevel degenerative disc disease of the cervical spine with disc height loss, most pronounced at L1/L2, L2/L3 and L3/L4.   Soft tissues unremarkable.    RELEVANT LABS  Component      Latest Ref Rng 9/19/2023  1:54 PM   Sodium      136 - 145 mmol/L 140    Potassium      3.4 - 5.3 mmol/L 3.8    Chloride      98 - 107 mmol/L 102    Carbon Dioxide (CO2)      22 - 29 mmol/L 29    Anion Gap      7 - 15 mmol/L 9    Urea Nitrogen      8.0 - 23.0 mg/dL 24.0 (H)    Creatinine      0.51 - 0.95 mg/dL 0.95    Calcium      8.8 - 10.2 mg/dL 9.3    Glucose      70 - 99 mg/dL 95    Alkaline Phosphatase      35 - 104 U/L 59    AST      0 - 45 U/L 28    ALT      0 - 50 U/L 20    Protein Total      6.4 - 8.3 g/dL 6.8    Albumin      3.5 - 5.2 g/dL 4.3    Bilirubin Total      <=1.2 mg/dL 0.3    GFR Estimate      >60 mL/min/1.73m2 59 (L)    Albumin Fraction      3.7 - 5.1 g/dL 3.9    Alpha 1 Fraction      0.2 - 0.4 g/dL 0.3    Alpha 2 Fraction      0.5 - 0.9 g/dL 0.5    Beta Fraction      0.6 - 1.0 g/dL 1.0    Gamma Fraction      0.7 - 1.6 g/dL 0.6 (L)    Monoclonal Peak      <=0.0 g/dL 0.3 (H)    ELP Interpretation:  Small monoclonal protein (0.3 g/dL) seen in the beta fraction co-migrating with C3 complement, not previously characterized in our laboratory. Recommend serum and urine immunofixation for confirmation and further characterization if not previously performed elsewhere. Slight hypogammaglobulinemia. Pathologic significance requires clinical correlation. Travis White MD    Ferritin      11 - 328 ng/mL 61    TSH      0.30 - 4.20 uIU/mL 2.46    Methylmalonic Acid      0.00 - 0.40 umol/L 0.27    ELP Interpretation Urine    Total Protein Serum for ELP      6.4 - 8.3 g/dL 6.3 (L)    Immunofixation ELP Monoclonal IgA immunoglobulin of kappa light chain type. Pathologic significance requires clinical correlation. Travis White MD      Component      Latest Ref Rng 9/19/2023  2:08 PM   Sodium      136 - 145 mmol/L    Potassium      3.4 - 5.3 mmol/L    Chloride      98 - 107 mmol/L    Carbon Dioxide (CO2)      22 - 29 mmol/L    Anion Gap      7 - 15 mmol/L    Urea Nitrogen      8.0 - 23.0 mg/dL    Creatinine      0.51 - 0.95 mg/dL    Calcium      8.8 - 10.2 mg/dL    Glucose      70 - 99 mg/dL    Alkaline Phosphatase      35 - 104 U/L    AST      0 - 45 U/L    ALT      0 - 50 U/L    Protein Total      6.4 - 8.3 g/dL    Albumin      3.5 - 5.2 g/dL    Bilirubin Total      <=1.2 mg/dL    GFR Estimate      >60 mL/min/1.73m2    Albumin Fraction      3.7 - 5.1 g/dL    Alpha 1 Fraction      0.2 - 0.4 g/dL    Alpha 2 Fraction      0.5 - 0.9 g/dL    Beta Fraction      0.6 - 1.0 g/dL    Gamma Fraction      0.7 - 1.6 g/dL    Monoclonal Peak      <=0.0 g/dL    ELP Interpretation:    Ferritin      11 - 328 ng/mL    TSH      0.30 - 4.20 uIU/mL    Methylmalonic Acid      0.00 - 0.40 umol/L    ELP Interpretation Urine Trace albumin and globulins seen. No monoclonal protein seen. We recommend a first morning voided urine to detect clinically significant proteinuria. A random specimen is not optimal for detecting all proteins. The specific  gravity of this specimen was only 1.010. Pathologic significance requires clinical correlation. Travis White MD    Total Protein Serum for ELP      6.4 - 8.3 g/dL    Immunofixation ELP       Legend:  (H) High  (L) Low    OUTSIDE RECORDS  Outside referral notes and chart notes were reviewed and pertinent information has been summarized (in addition to the HPI):-    NSG    Component      Latest Ref Rng 4/10/2024  9:20 AM 4/24/2024  8:51 AM   Vitamin B6      20.0 - 125.0 nmol/L 136.6 (H)  128.7 (H)    Vitamin B12      232 - 1,245 pg/mL 544     Vitamin B1 Whole Blood Level      70 - 180 nmol/L 139     Hemoglobin A1C      <5.7 % 5.9 (H)        Legend:  (H) High    Component      Latest Ref Rng 4/19/2024  10:59 AM   Albumin Fraction      3.7 - 5.1 g/dL 3.9    Alpha 1 Fraction      0.2 - 0.4 g/dL 0.3    Alpha 2 Fraction      0.5 - 0.9 g/dL 0.5    Beta Fraction      0.6 - 1.0 g/dL 1.1 (H)    Gamma Fraction      0.7 - 1.6 g/dL 0.5 (L)    Monoclonal Peak      <=0.0 g/dL 0.3 (H)    ELP Interpretation: Small monoclonal protein (0.3 g/dL) seen in the beta fraction co-migrating with C3 complement. See immunofixation report on same specimen. Hypogammaglobulinemia. Pathologic significance requires clinical correlation. Macie Piedra MD    Immunofixation ELP Monoclonal IgA immunoglobulin of kappa light chain type. Pathologic significance requires clinical correlation. Macie Piedra MD    Total Protein Serum for ELP      6.4 - 8.3 g/dL 6.3 (L)       Legend:  (H) High  (L) Low      IMPRESSION/REPORT/PLAN  Lumbar spinal stenosis  Swelling of right foot  Neuropathy  Arthritis  Right foot pain  Abnormal SPEP  Elevated vitamin B6  Prediabetes    This is a 84 year old female with right foot pain, numbness in both feet and swelling of the right foot.  Her exam is suggestive of a peripheral neuropathy.  EMG further shows evidence of neuropathy.      Blood work has shown prediabetes, elevated vitamin B6 and abnormal serum protein  electrophoresis.  She is following up with hematology for further evaluation of abnormal serum electrophoresis.  Neuropathy could be idiopathic also.  Recommend exercise and healthy lifestyle.  Discussed prognosis.    She does have history of lumbar spinal stenosis though I do not suspect that that is causing her foot pain.    Her leg swelling, knee pain, left great big toe pain are not related to the neuropathy.    Her pain is not suggestive of neuropathic pain based on description.  Currently Tylenol and lidocaine cream is helping.  Gabapentin cream is helping and will continue.  She previously had side effects to oral gabapentin.    I can see her back in 9 months.    -     gabapentin 8%-lidocaine 2.5% in PLO cream; Apply to painful feet 3 times a day as needed.      Return in about 9 months (around 6/18/2025) for In-Clinic Visit (must), After testing.    Over 30 minutes were spent coordinating the care for the patient on the day of the encounter.  This includes previsit, during visit and post visit activities as documented above.  Counseling patient.  Reviewing testing.  Prescription management.  (Activities include but not inclusive of reviewing chart, reviewing outside records, reviewing labs and imaging study results as well as the images, patient visit time including getting history and exam,  use if applicable, review of test results with the patient and coming up with a plan in a shared model, counseling patient and family, education and answering patient questions, EMR , EMR diagnosis entry and problem list management, medication reconciliation and prescription management if applicable, paperwork if applicable, printing documents and documentation of the visit activities.)        Lucas Monzon MD  Neurologist  [facility]  Tel:- [phone]    This note was dictated using voice recognition software.  Any grammatical or context distortions are  unintentional and inherent to the software.      Again, thank you for allowing me to participate in the care of your patient.        Sincerely,        Lucas Monzon MD

## 2024-09-18 NOTE — NURSING NOTE
"Chief Complaint   Patient presents with    NEUROPATHY     Neuropathy, right foot swelling. Patient states she is managing pretty well, occasionally she can't sleep and will have to walk the halls. Right foot swelling is the same as the last visit. Left great toe pain, thinking it is arthritis - \"if you touch it, it's like a wire that is alive.\"     Susan Uriostegui MA    "

## 2024-09-23 ENCOUNTER — ANCILLARY PROCEDURE (OUTPATIENT)
Dept: MAMMOGRAPHY | Facility: CLINIC | Age: 84
End: 2024-09-23
Attending: NURSE PRACTITIONER
Payer: COMMERCIAL

## 2024-09-23 DIAGNOSIS — Z12.31 VISIT FOR SCREENING MAMMOGRAM: ICD-10-CM

## 2024-09-23 PROCEDURE — 77063 BREAST TOMOSYNTHESIS BI: CPT

## 2024-09-26 NOTE — PROGRESS NOTES
Assessment:   Geeta Berry is a 84 y.o. female with past medical history significant for neuropathy, GERD, hyperlipidemia, hypothyroidism, hypertension, coronary artery disease, chronic kidney disease stage III, iron deficiency anemia, dysthymia who presents today for follow-up regarding Chronic low back pain, currently on the right.  My review of an MRI lumbar spine shows grade 1 degenerative spondylolisthesis L4-5 with moderate to severe spinal stenosis.  Pain is most consistent with lumbar facet arthropathy.  She had reproduction pain with lumbar facet loading maneuvers on the right.  - Patient saw Dr. Rea February 13, 2024 earlier today.  Dr. Rea recommended conservative treatment.  -I also see this patient for left occipital neuralgia.  Patient is status post a left occipital nerve block February 13, 2024 which provided greater than 50% relief of pain.  She is able to manage this pain by avoiding applying pressure to the back of her head.    Plan:     A shared decision making plan was used.  The patient's values and choices were respected.  The following represents what was discussed and decided upon by the physician assistant and the patient.      1.  DIAGNOSTIC TESTS:   -I reviewed the MRI lumbar spine.  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  - I reviewed the lumbar spine flexion-extension x-rays which were negative for dynamic instability.  - No additional diagnostic tests were ordered.    2.  PHYSICAL THERAPY:  - Patient completed 6 sessions of physical therapy for trochanteric bursitis May 16, 2024.  No additional physical therapy was ordered.  She will continue with home exercises.      3.  MEDICATIONS: No changes are made to the patient's medications.  - Patient uses Tylenol as needed.  - Patient uses lidocaine patches or roll-on.  -Patient did not tolerate gabapentin.  - I did not recommend  duloxetine since she is also on citalopram.    4.  INTERVENTIONS: I offer the patient right L3, L4, L5 medial branch blocks as a workup for radiofrequency ablation.  Patient indicated she would like to proceed and an order was placed.      5.  PATIENT EDUCATION: Patient is in agreement with the above plan.  All questions were answered.    6.  FOLLOW-UP: Patient will return to the clinic for her right L3, L4, L5 medial branch blocks.  If she has questions or concerns in the meantime, she should not hesitate to call.  Subjective:     Geeta Berry is a 84 year old female who presents today for follow-up regarding chronic low back pain, currently on the right.  Patient reports that pain has been getting gradually worse.  It is interfering with her ability to stand.    Patient complains right-sided low back pain.  Pain is located in the right lower lumbar region.  She denies any pain down the leg.  Denies numbness or tingling down the legs.  She feels generally weak.  She rates her pain today as a 3 out of 10.  At its worst it is a 7 out of 10.  As best it is a 2 out of 10.  Pain is aggravated moving, walking, sitting, standing, bending.  She has difficulty standing long enough to prepare a meal in the kitchen.  She has difficulty standing and talking with friends because of her pain.  Pain is alleviated with  lying down, and applying a heating pad on her back.  Denies loss of bowel or bladder control.  Denies recent fevers.    Treatment to date:  - Completed 6 sessions of physical therapy for trochanteric bursitis May 16, 2024.  - Patient participated in physical therapy in November 2017.  - Patient had 6 sessions of physical therapy ending December 2021  - She also has had 4 sessions of physical therapy for hip pain/weakness of hips ending March 2022  - Left occipital nerve block February 13, 2024 with greater than 50% relief  - Left trochanteric bursa injection January 30, 2024 with 100% relief  - L5-S1 interlaminar  epidural steroid injection August 8, 2023 with greater than 50% relief of pain for 2 months  - Bilateral L4-5 transforaminal epidural steroid injection June 12,, 2023 with 50% relief  - Left L4-5 transforaminal epidural steroid injection October 21, 2021 with 60% relief of pain  - Left occipital nerve block July 11, 2018 with 80% relief of pain  - Left C2-3, C3-4, C4-5 facet joint injections June 27, 2018  - Left C2, C3, C4, C5 MBB June 7, 2018  - Left C1-C2 facet joint injection July 7, 2017  - Left C1-C2 facet joint injection April 6, 2017  -Did not tolerate gabapentin  - Tylenol as needed is helpful    Review of Systems:  Positive for weakness, pain much worse at night.  Negative for numbness/tingling, headache, loss of bowel/bladder control, inability to urinate, trip/stumble/falls, difficulty swallowing, difficulty with hand skills, fevers, unintentional weight loss.     Objective:   CONSTITUTIONAL:  Vital signs as above.  No acute distress.  The patient is well nourished and well groomed.   Patient appears tired.  PSYCHIATRIC:  The patient is awake, alert, oriented to person, place and time.  The patient is answering questions appropriately with clear speech.  Normal affect.   HEENT: Normocephalic, atraumatic.  Sclera clear.    SKIN: Exposed skin is clean, dry, intact without rashes.  MUSCULOSKELETAL: Patient ambulates with a mildly flexed forward posture at the hips.  Able to rise onto toes and heels bilaterally with support.  Tender to palpation right lower lumbar paraspinous muscles L4-5 and L5-S1.  Additional tenderness to palpation bilateral sacroiliac joints.  Lumbar facet loading maneuvers reproduce low back pain on the right.  5/5 strength bilateral hip flexors, knee flexors/extensors, ankle dorsi/plantar flexors.  NEUROLOGICAL:   Cranial nerves II through XII grossly intact.  Sensation light touch intact bilateral lower extremities throughout.     RESULTS: I reviewed the MRI lumbar spine from [place]  dated July 7, 2023.  This shows multilevel lumbar spondylosis unchanged compared with an MRI lumbar spine from 2021.  At L2-3 there is mild to moderate spinal canal stenosis with moderate left and mild to moderate right foraminal stenosis.  At L3-4 there is mild spinal canal stenosis with mild to moderate right and mild left foraminal stenosis.  At L4-5 there is moderate to severe spinal canal stenosis with mild to moderate bilateral foraminal stenosis.    EMG bilateral lower extremities with neurology December 8, 2023:  SUMMARY  Nerve conduction and EMG study of bilateral lower extremities shows:     Normal right peroneal distal motor latency with decreased amplitude and conduction velocity.  Prolonged left peroneal distal motor latency with decreased amplitude and conduction velocity.  Normal right tibial distal motor latency with decreased amplitude and conduction velocity.  Normal left tibial distal motor latency with decreased amplitude and conduction velocity.  Abnormal bilateral sural and normal bilateral superficial peroneal sensory SNAP though amplitudes are low normal.  Monopolar needle exam is normal.     CLINICAL INTERPRETATION:  This is an abnormal nerve conduction and EMG study.  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  Further clinical correlation is needed.    Reviewed the lumbar spine flexion-extension x-rays from [facility] dated February 13, 2024.  This shows 6 mm anterolisthesis L4-5 unchanged on flexion and extension.  There is 3 mm anterolisthesis L5-S1 unchanged on flexion and extension.

## 2024-09-30 ENCOUNTER — OFFICE VISIT (OUTPATIENT)
Dept: PHYSICAL MEDICINE AND REHAB | Facility: CLINIC | Age: 84
End: 2024-09-30
Payer: COMMERCIAL

## 2024-09-30 ENCOUNTER — TELEPHONE (OUTPATIENT)
Dept: NEUROLOGY | Facility: CLINIC | Age: 84
End: 2024-09-30

## 2024-09-30 VITALS
HEART RATE: 57 BPM | OXYGEN SATURATION: 96 % | BODY MASS INDEX: 25.76 KG/M2 | SYSTOLIC BLOOD PRESSURE: 136 MMHG | HEIGHT: 68 IN | DIASTOLIC BLOOD PRESSURE: 62 MMHG | WEIGHT: 170 LBS

## 2024-09-30 DIAGNOSIS — M43.16 SPONDYLOLISTHESIS OF LUMBAR REGION: ICD-10-CM

## 2024-09-30 DIAGNOSIS — M79.671 RIGHT FOOT PAIN: ICD-10-CM

## 2024-09-30 DIAGNOSIS — M48.062 SPINAL STENOSIS OF LUMBAR REGION WITH NEUROGENIC CLAUDICATION: ICD-10-CM

## 2024-09-30 DIAGNOSIS — M47.816 LUMBAR FACET ARTHROPATHY: Primary | ICD-10-CM

## 2024-09-30 DIAGNOSIS — G62.9 NEUROPATHY: ICD-10-CM

## 2024-09-30 DIAGNOSIS — M54.81 OCCIPITAL NEURALGIA OF LEFT SIDE: ICD-10-CM

## 2024-09-30 ASSESSMENT — PAIN SCALES - GENERAL: PAINLEVEL: MILD PAIN (3)

## 2024-09-30 NOTE — LETTER
9/30/2024      Geeta Berry  [address]  [facility] [address]      Dear Colleague,    Thank you for referring your patient, Geeta Berry, to the [facility]. Please see a copy of my visit note below.    Assessment:   Geeta Berry is a 84 y.o. female with past medical history significant for neuropathy, GERD, hyperlipidemia, hypothyroidism, hypertension, coronary artery disease, chronic kidney disease stage III, iron deficiency anemia, dysthymia who presents today for follow-up regarding Chronic low back pain, currently on the right.  My review of an MRI lumbar spine shows grade 1 degenerative spondylolisthesis L4-5 with moderate to severe spinal stenosis.  Pain is most consistent with lumbar facet arthropathy.  She had reproduction pain with lumbar facet loading maneuvers on the right.  - Patient saw Dr. Rea February 13, 2024 earlier today.  Dr. Rea recommended conservative treatment.  -I also see this patient for left occipital neuralgia.  Patient is status post a left occipital nerve block February 13, 2024 which provided greater than 50% relief of pain.  She is able to manage this pain by avoiding applying pressure to the back of her head.    Plan:     A shared decision making plan was used.  The patient's values and choices were respected.  The following represents what was discussed and decided upon by the physician assistant and the patient.      1.  DIAGNOSTIC TESTS:   -I reviewed the MRI lumbar spine.  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  - I reviewed the lumbar spine flexion-extension x-rays which were negative for dynamic instability.  - No additional diagnostic tests were ordered.    2.  PHYSICAL THERAPY:  - Patient completed 6 sessions of physical therapy for trochanteric bursitis May 16, 2024.  No additional physical therapy was ordered.   She will continue with home exercises.      3.  MEDICATIONS: No changes are made to the patient's medications.  - Patient uses Tylenol as needed.  - Patient uses lidocaine patches or roll-on.  -Patient did not tolerate gabapentin.  - I did not recommend duloxetine since she is also on citalopram.    4.  INTERVENTIONS: I offer the patient right L3, L4, L5 medial branch blocks as a workup for radiofrequency ablation.  Patient indicated she would like to proceed and an order was placed.      5.  PATIENT EDUCATION: Patient is in agreement with the above plan.  All questions were answered.    6.  FOLLOW-UP: Patient will return to the clinic for her right L3, L4, L5 medial branch blocks.  If she has questions or concerns in the meantime, she should not hesitate to call.  Subjective:     Geeta Berry is a 84 year old female who presents today for follow-up regarding chronic low back pain, currently on the right.  Patient reports that pain has been getting gradually worse.  It is interfering with her ability to stand.    Patient complains right-sided low back pain.  Pain is located in the right lower lumbar region.  She denies any pain down the leg.  Denies numbness or tingling down the legs.  She feels generally weak.  She rates her pain today as a 3 out of 10.  At its worst it is a 7 out of 10.  As best it is a 2 out of 10.  Pain is aggravated moving, walking, sitting, standing, bending.  She has difficulty standing long enough to prepare a meal in the kitchen.  She has difficulty standing and talking with friends because of her pain.  Pain is alleviated with  lying down, and applying a heating pad on her back.  Denies loss of bowel or bladder control.  Denies recent fevers.    Treatment to date:  - Completed 6 sessions of physical therapy for trochanteric bursitis May 16, 2024.  - Patient participated in physical therapy in November 2017.  - Patient had 6 sessions of physical therapy ending December 2021  - She also has  had 4 sessions of physical therapy for hip pain/weakness of hips ending March 2022  - Left occipital nerve block February 13, 2024 with greater than 50% relief  - Left trochanteric bursa injection January 30, 2024 with 100% relief  - L5-S1 interlaminar epidural steroid injection August 8, 2023 with greater than 50% relief of pain for 2 months  - Bilateral L4-5 transforaminal epidural steroid injection June 12,, 2023 with 50% relief  - Left L4-5 transforaminal epidural steroid injection October 21, 2021 with 60% relief of pain  - Left occipital nerve block July 11, 2018 with 80% relief of pain  - Left C2-3, C3-4, C4-5 facet joint injections June 27, 2018  - Left C2, C3, C4, C5 MBB June 7, 2018  - Left C1-C2 facet joint injection July 7, 2017  - Left C1-C2 facet joint injection April 6, 2017  -Did not tolerate gabapentin  - Tylenol as needed is helpful    Review of Systems:  Positive for weakness, pain much worse at night.  Negative for numbness/tingling, headache, loss of bowel/bladder control, inability to urinate, trip/stumble/falls, difficulty swallowing, difficulty with hand skills, fevers, unintentional weight loss.     Objective:   CONSTITUTIONAL:  Vital signs as above.  No acute distress.  The patient is well nourished and well groomed.   Patient appears tired.  PSYCHIATRIC:  The patient is awake, alert, oriented to person, place and time.  The patient is answering questions appropriately with clear speech.  Normal affect.   HEENT: Normocephalic, atraumatic.  Sclera clear.    SKIN: Exposed skin is clean, dry, intact without rashes.  MUSCULOSKELETAL: Patient ambulates with a mildly flexed forward posture at the hips.  Able to rise onto toes and heels bilaterally with support.  Tender to palpation right lower lumbar paraspinous muscles L4-5 and L5-S1.  Additional tenderness to palpation bilateral sacroiliac joints.  Lumbar facet loading maneuvers reproduce low back pain on the right.  5/5 strength bilateral hip  flexors, knee flexors/extensors, ankle dorsi/plantar flexors.  NEUROLOGICAL:   Cranial nerves II through XII grossly intact.  Sensation light touch intact bilateral lower extremities throughout.     RESULTS: I reviewed the MRI lumbar spine from [place] dated July 7, 2023.  This shows multilevel lumbar spondylosis unchanged compared with an MRI lumbar spine from 2021.  At L2-3 there is mild to moderate spinal canal stenosis with moderate left and mild to moderate right foraminal stenosis.  At L3-4 there is mild spinal canal stenosis with mild to moderate right and mild left foraminal stenosis.  At L4-5 there is moderate to severe spinal canal stenosis with mild to moderate bilateral foraminal stenosis.    EMG bilateral lower extremities with neurology December 8, 2023:  SUMMARY  Nerve conduction and EMG study of bilateral lower extremities shows:     Normal right peroneal distal motor latency with decreased amplitude and conduction velocity.  Prolonged left peroneal distal motor latency with decreased amplitude and conduction velocity.  Normal right tibial distal motor latency with decreased amplitude and conduction velocity.  Normal left tibial distal motor latency with decreased amplitude and conduction velocity.  Abnormal bilateral sural and normal bilateral superficial peroneal sensory SNAP though amplitudes are low normal.  Monopolar needle exam is normal.     CLINICAL INTERPRETATION:  This is an abnormal nerve conduction and EMG study.  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  Further clinical correlation is needed.    Reviewed the lumbar spine flexion-extension x-rays from [facility] dated February 13, 2024.  This shows 6 mm anterolisthesis L4-5 unchanged on flexion and extension.  There is 3 mm anterolisthesis L5-S1 unchanged on flexion and extension.      Again, thank you for allowing me to participate in the care of your patient.        Sincerely,        Krista Kaplan PA-C

## 2024-09-30 NOTE — PATIENT INSTRUCTIONS
Medial Branch Block (MBB) TEST    This is a TEST injection to find out what is causing your pain.  Specifically, this test is trying to identify whichjoint in your back or neck is causing pain.   This injection will NOT provide any long term pain relief because it is just a TEST.  Steroid is NOT used for this injection.   Steroid is what gives longterm pain relief.  Since there is no steroid used, there is no long term pain relief.    You need a  for the procedure.    Because we want to determine what is causing your pain, we need you to do a few things on the day of your Medial Branch Block :     Do not take any pain medications on the day of your Medial Branch Block/Test.  Try to do activities that make our pain increase.  We want you tohave a high level of pain on the day of the test.  This makes it easier to know the results of the test.  Other information:    You may eatand drink on the day of the test.  You should take your other medications (just not pain medications) at the times you usually take them  You will be asked to fill out a pain diary for 6 hours after thetest.    AFTER THE TEST:    Please do not take any pain medications for 6 hours after the TEST.  After the pain diary is filled out, youmay resume taking your pain medications.  Please return the pain diary to the Spine Center the next day or as soon as you are able.  You will be contacted with what will happen next after the physicianreviews your pain diary.  You may be asked to come in for a follow-up appointment to discuss other treatment options  It may be recommended that you have an injection to help treatyour pain.  You may need to come in for a second set of Medial Branch Blocks (TESTS).        Please call the spine center at [phone] if you have any questions.

## 2024-10-15 ENCOUNTER — RADIOLOGY INJECTION OFFICE VISIT (OUTPATIENT)
Dept: PHYSICAL MEDICINE AND REHAB | Facility: CLINIC | Age: 84
End: 2024-10-15
Attending: PHYSICIAN ASSISTANT
Payer: COMMERCIAL

## 2024-10-15 VITALS
HEART RATE: 63 BPM | TEMPERATURE: 97.6 F | BODY MASS INDEX: 26.68 KG/M2 | OXYGEN SATURATION: 96 % | SYSTOLIC BLOOD PRESSURE: 110 MMHG | HEIGHT: 67 IN | WEIGHT: 170 LBS | DIASTOLIC BLOOD PRESSURE: 60 MMHG | RESPIRATION RATE: 16 BRPM

## 2024-10-15 DIAGNOSIS — M47.816 LUMBAR FACET ARTHROPATHY: ICD-10-CM

## 2024-10-15 DIAGNOSIS — D47.2 IGA MONOCLONAL GAMMOPATHY: Primary | ICD-10-CM

## 2024-10-15 PROCEDURE — 64493 INJ PARAVERT F JNT L/S 1 LEV: CPT | Mod: RT | Performed by: STUDENT IN AN ORGANIZED HEALTH CARE EDUCATION/TRAINING PROGRAM

## 2024-10-15 PROCEDURE — 64494 INJ PARAVERT F JNT L/S 2 LEV: CPT | Mod: RT | Performed by: STUDENT IN AN ORGANIZED HEALTH CARE EDUCATION/TRAINING PROGRAM

## 2024-10-15 RX ORDER — BUPIVACAINE HYDROCHLORIDE 5 MG/ML
INJECTION, SOLUTION EPIDURAL; INTRACAUDAL
Status: COMPLETED | OUTPATIENT
Start: 2024-10-15 | End: 2024-10-15

## 2024-10-15 RX ORDER — LIDOCAINE HYDROCHLORIDE 10 MG/ML
INJECTION, SOLUTION EPIDURAL; INFILTRATION; INTRACAUDAL; PERINEURAL
Status: COMPLETED | OUTPATIENT
Start: 2024-10-15 | End: 2024-10-15

## 2024-10-15 RX ADMIN — LIDOCAINE HYDROCHLORIDE 3 ML: 10 INJECTION, SOLUTION EPIDURAL; INFILTRATION; INTRACAUDAL; PERINEURAL at 08:27

## 2024-10-15 RX ADMIN — BUPIVACAINE HYDROCHLORIDE 1.5 ML: 5 INJECTION, SOLUTION EPIDURAL; INTRACAUDAL at 08:28

## 2024-10-15 ASSESSMENT — PAIN SCALES - GENERAL
PAINLEVEL: NO PAIN (1)
PAINLEVEL: SEVERE PAIN (7)

## 2024-10-15 NOTE — PATIENT INSTRUCTIONS

## 2024-10-21 ENCOUNTER — LAB (OUTPATIENT)
Dept: INFUSION THERAPY | Facility: HOSPITAL | Age: 84
End: 2024-10-21
Attending: INTERNAL MEDICINE
Payer: COMMERCIAL

## 2024-10-21 DIAGNOSIS — D47.2 IGA MONOCLONAL GAMMOPATHY: ICD-10-CM

## 2024-10-21 LAB
ANION GAP SERPL CALCULATED.3IONS-SCNC: 9 MMOL/L (ref 7–15)
BASOPHILS # BLD AUTO: 0 10E3/UL (ref 0–0.2)
BASOPHILS NFR BLD AUTO: 0 %
BUN SERPL-MCNC: 22.6 MG/DL (ref 8–23)
CALCIUM SERPL-MCNC: 9.3 MG/DL (ref 8.8–10.4)
CHLORIDE SERPL-SCNC: 104 MMOL/L (ref 98–107)
CREAT SERPL-MCNC: 1.1 MG/DL (ref 0.51–0.95)
EGFRCR SERPLBLD CKD-EPI 2021: 49 ML/MIN/1.73M2
EOSINOPHIL # BLD AUTO: 0.1 10E3/UL (ref 0–0.7)
EOSINOPHIL NFR BLD AUTO: 1 %
ERYTHROCYTE [DISTWIDTH] IN BLOOD BY AUTOMATED COUNT: 15.5 % (ref 10–15)
GLUCOSE SERPL-MCNC: 105 MG/DL (ref 70–99)
HCO3 SERPL-SCNC: 29 MMOL/L (ref 22–29)
HCT VFR BLD AUTO: 35.3 % (ref 35–47)
HGB BLD-MCNC: 11.2 G/DL (ref 11.7–15.7)
IGA SERPL-MCNC: 738 MG/DL (ref 84–499)
IMM GRANULOCYTES # BLD: 0 10E3/UL
IMM GRANULOCYTES NFR BLD: 0 %
KAPPA LC FREE SER-MCNC: 8.08 MG/DL (ref 0.33–1.94)
KAPPA LC FREE/LAMBDA FREE SER NEPH: 5.94 {RATIO} (ref 0.26–1.65)
LAMBDA LC FREE SERPL-MCNC: 1.36 MG/DL (ref 0.57–2.63)
LYMPHOCYTES # BLD AUTO: 1.1 10E3/UL (ref 0.8–5.3)
LYMPHOCYTES NFR BLD AUTO: 22 %
MCH RBC QN AUTO: 31.5 PG (ref 26.5–33)
MCHC RBC AUTO-ENTMCNC: 31.7 G/DL (ref 31.5–36.5)
MCV RBC AUTO: 99 FL (ref 78–100)
MONOCYTES # BLD AUTO: 0.6 10E3/UL (ref 0–1.3)
MONOCYTES NFR BLD AUTO: 11 %
NEUTROPHILS # BLD AUTO: 3.3 10E3/UL (ref 1.6–8.3)
NEUTROPHILS NFR BLD AUTO: 65 %
NRBC # BLD AUTO: 0 10E3/UL
NRBC BLD AUTO-RTO: 0 /100
PLATELET # BLD AUTO: 255 10E3/UL (ref 150–450)
POTASSIUM SERPL-SCNC: 3.9 MMOL/L (ref 3.4–5.3)
RBC # BLD AUTO: 3.56 10E6/UL (ref 3.8–5.2)
SODIUM SERPL-SCNC: 142 MMOL/L (ref 135–145)
TOTAL PROTEIN SERUM FOR ELP: 6.4 G/DL (ref 6.4–8.3)
WBC # BLD AUTO: 5.1 10E3/UL (ref 4–11)

## 2024-10-21 PROCEDURE — 86334 IMMUNOFIX E-PHORESIS SERUM: CPT | Performed by: PATHOLOGY

## 2024-10-21 PROCEDURE — 84165 PROTEIN E-PHORESIS SERUM: CPT | Mod: TC | Performed by: PATHOLOGY

## 2024-10-21 PROCEDURE — 84155 ASSAY OF PROTEIN SERUM: CPT

## 2024-10-21 PROCEDURE — 82784 ASSAY IGA/IGD/IGG/IGM EACH: CPT

## 2024-10-21 PROCEDURE — 85004 AUTOMATED DIFF WBC COUNT: CPT

## 2024-10-21 PROCEDURE — 80048 BASIC METABOLIC PNL TOTAL CA: CPT

## 2024-10-21 PROCEDURE — 83521 IG LIGHT CHAINS FREE EACH: CPT

## 2024-10-21 PROCEDURE — 36415 COLL VENOUS BLD VENIPUNCTURE: CPT

## 2024-10-22 LAB
ALBUMIN SERPL ELPH-MCNC: 4 G/DL (ref 3.7–5.1)
ALPHA1 GLOB SERPL ELPH-MCNC: 0.3 G/DL (ref 0.2–0.4)
ALPHA2 GLOB SERPL ELPH-MCNC: 0.5 G/DL (ref 0.5–0.9)
B-GLOBULIN SERPL ELPH-MCNC: 1.1 G/DL (ref 0.6–1)
GAMMA GLOB SERPL ELPH-MCNC: 0.5 G/DL (ref 0.7–1.6)
M PROTEIN SERPL ELPH-MCNC: 0.3 G/DL
PROT PATTERN SERPL ELPH-IMP: ABNORMAL
PROT PATTERN SERPL IFE-IMP: NORMAL

## 2024-10-22 PROCEDURE — 86334 IMMUNOFIX E-PHORESIS SERUM: CPT | Mod: 26 | Performed by: PATHOLOGY

## 2024-10-22 PROCEDURE — 84165 PROTEIN E-PHORESIS SERUM: CPT | Mod: 26 | Performed by: PATHOLOGY

## 2024-10-23 ENCOUNTER — TELEPHONE (OUTPATIENT)
Dept: PHYSICAL MEDICINE AND REHAB | Facility: CLINIC | Age: 84
End: 2024-10-23
Payer: COMMERCIAL

## 2024-10-23 DIAGNOSIS — M47.816 LUMBAR FACET ARTHROPATHY: Primary | ICD-10-CM

## 2024-10-23 NOTE — TELEPHONE ENCOUNTER
----- Message from Krista Kaplan sent at 10/22/2024  9:25 AM CDT -----  Regarding: RE: MBB/RFA Process  Positive response.  Recommend confirmatory blocks.  ----- Message -----  From: Breyer, Nathan J, RN  Sent: 10/22/2024   9:20 AM CDT  To: Krista Kaplan PA-C; #  Subject: FW: MBB/RFA Process                              Pain diary received and scanned into chart.  Please review and forward recommendation to Procedure Support Pool in-basket.  Thanks!  ----- Message -----  From: Maylin Duran RN  Sent: 10/15/2024   8:20 AM CDT  To: [email or web address] Procedure Support Pool  Subject: MBB/RFA Process                                  Pt was in today, 10-15, for right L3, L4, L5 MBBs as ordered by Krista and performed by Dr. Rodriguez. Awaiting return of pain diary at this time.

## 2024-10-23 NOTE — TELEPHONE ENCOUNTER
Phone call to patient to inform. Informed that PSP had reviewed pain diary. Recommending moving forward with 2nd set of MBBs. Order placed in Epic. Message sent to schedulers to reach out and get her scheduled.

## 2024-10-30 ENCOUNTER — RADIOLOGY INJECTION OFFICE VISIT (OUTPATIENT)
Dept: PHYSICAL MEDICINE AND REHAB | Facility: CLINIC | Age: 84
End: 2024-10-30
Attending: PHYSICIAN ASSISTANT
Payer: COMMERCIAL

## 2024-10-30 VITALS
SYSTOLIC BLOOD PRESSURE: 114 MMHG | OXYGEN SATURATION: 98 % | DIASTOLIC BLOOD PRESSURE: 60 MMHG | RESPIRATION RATE: 18 BRPM | HEIGHT: 67 IN | BODY MASS INDEX: 26.37 KG/M2 | TEMPERATURE: 98 F | WEIGHT: 168 LBS | HEART RATE: 54 BPM

## 2024-10-30 DIAGNOSIS — M47.816 LUMBAR FACET ARTHROPATHY: ICD-10-CM

## 2024-10-30 RX ORDER — LIDOCAINE HYDROCHLORIDE 10 MG/ML
INJECTION, SOLUTION EPIDURAL; INFILTRATION; INTRACAUDAL; PERINEURAL
Status: COMPLETED | OUTPATIENT
Start: 2024-10-30 | End: 2024-10-30

## 2024-10-30 RX ADMIN — LIDOCAINE HYDROCHLORIDE 3 ML: 10 INJECTION, SOLUTION EPIDURAL; INFILTRATION; INTRACAUDAL; PERINEURAL at 15:20

## 2024-10-30 ASSESSMENT — PAIN SCALES - GENERAL
PAINLEVEL_OUTOF10: NO PAIN (0)
PAINLEVEL_OUTOF10: SEVERE PAIN (7)

## 2024-10-30 NOTE — PATIENT INSTRUCTIONS
DISCHARGE INSTRUCTIONS    During office hours (8:00 a.m.- 4:00 p.m.) questions or concerns may be answered  by calling Spine Center Navigation Nurses at  [phone].  Messages received after hours will be returned the following business day.      In the case of an emergency, please dial 911 or seek assistance at the nearest Emergency Room/Urgent Care facility.     All Patients:  You may experience an increase in your symptoms for the first 2 days, once the numbing medication wears off.    You may resume your regular medication, no pain medication until you have completed your diary    You may shower. No swimming, tub bath or hot tub for 24 hours    Continue your activities that can cause you pain to test the blocks.                         You should not drive for the next 1 to 3 hours (have someone drive you)           POSSIBLE PROCEDURE SIDE EFFECTS  -Call Spine Center if concerned-    Increased Pain  Increased numbness/tingling     Nausea/Vomiting  Bruising/bleeding at site (hematoma)             Swelling at site (edema) Headache  Difficulty walking  Infection        Fever greater than 100.5      Please complete your pain diary and return the diary to the Spine Center at your earliest convenience.  The Spine Center will contact you once your pain diary has been received and reviewed by your provider.  Thank you.

## 2024-11-05 ENCOUNTER — TELEPHONE (OUTPATIENT)
Dept: PHYSICAL MEDICINE AND REHAB | Facility: CLINIC | Age: 84
End: 2024-11-05
Payer: COMMERCIAL

## 2024-11-05 DIAGNOSIS — M47.817 SPONDYLOSIS OF LUMBOSACRAL REGION WITHOUT MYELOPATHY OR RADICULOPATHY: Primary | ICD-10-CM

## 2024-11-05 DIAGNOSIS — F41.9 ANXIETY DUE TO INVASIVE PROCEDURE: Primary | ICD-10-CM

## 2024-11-05 RX ORDER — LORAZEPAM 0.5 MG/1
TABLET ORAL
Qty: 2 TABLET | Refills: 0 | Status: SHIPPED | OUTPATIENT
Start: 2024-11-05

## 2024-11-05 NOTE — TELEPHONE ENCOUNTER
Patient is requesting oral sedation medication for upcoming lumbar radiofrequency ablation procedure with Dr. Rodriguez as ordered by ARIE Jones.  She was informed that Krista would be made aware and send the prescription over to her pharmacy.  The RF is scheduled for 11/25/24.

## 2024-11-05 NOTE — TELEPHONE ENCOUNTER
----- Message from Krista Kaplan sent at 11/5/2024  9:25 AM CST -----  Regarding: RE: MBB/RFA Process  Positive response.  Recommend RFA.  ----- Message -----  From: Breyer, Nathan J, RN  Sent: 11/5/2024   9:24 AM CST  To: Krista Kaplan PA-C; #  Subject: FW: MBB/RFA Process                              Pain diary received and scanned into chart.  Please review and forward recommendation to Procedure Support Pool in-basket.  Thanks!  ----- Message -----  From: Rayna Willoughby RN  Sent: 10/30/2024   3:14 PM CST  To: [facility] [place] Procedure Support Pool  Subject: FW: MBB/RFA Process                              Patient here today, 10/30/2024 for confirmatory right L3, L4, L5 MBBs with Dr. Rodriguez as ordered by ARIE Jones with eye to RFA. Awaiting pain diary at this time.     ## PATIENT HAS BEEN PRE-CONSENTED FOR RFA AND WILL NEED RX FOR ORAL SEDATION IF MOVING FORWARD ##  ----- Message -----  From: Marie Velasquez  Sent: 10/23/2024   2:49 PM CDT  To: [facility] [place] Procedure Support Pool; #  Subject: RE: MBB/RFA Process                              Scheduled for October 30th  ----- Message -----  From: Migdalia Carlson RN  Sent: 10/23/2024   1:26 PM CDT  To: [facility] [place] Procedure Support Pool; #  Subject: RE: MBB/RFA Process                              Phone call to patient. Informed PSP recommending 2nd set of blocks. MBBs requirements reviewed.     Schedulers, please reach out to patient to schedule when you are free.     Vipin  ----- Message -----  From: Krista Kaplan PA-C  Sent: 10/22/2024   9:25 AM CDT  To: Nathan J Breyer, RN; #  Subject: RE: MBB/RFA Process                              Positive response.  Recommend confirmatory blocks.  ----- Message -----  From: Breyer, Nathan J, RN  Sent: 10/22/2024   9:20 AM CDT  To: Krista Tholl, PA-C; #  Subject: FW: MBB/RFA Process                              Pain diary received and scanned into chart.  Please review and forward recommendation to Procedure  Support Pool in-basket.  Thanks!  ----- Message -----  From: Maylin Duran RN  Sent: 10/15/2024   8:20 AM CDT  To: [facility] Procedure Support Pool  Subject: MBB/RFA Process                                  Pt was in today, 10-15, for right L3, L4, L5 MBBs as ordered by Krista and performed by Dr. Rodriguez. Awaiting return of pain diary at this time.

## 2024-11-06 ENCOUNTER — ONCOLOGY VISIT (OUTPATIENT)
Dept: ONCOLOGY | Facility: HOSPITAL | Age: 84
End: 2024-11-06
Attending: INTERNAL MEDICINE
Payer: COMMERCIAL

## 2024-11-06 VITALS
OXYGEN SATURATION: 97 % | HEIGHT: 68 IN | HEART RATE: 57 BPM | DIASTOLIC BLOOD PRESSURE: 70 MMHG | RESPIRATION RATE: 16 BRPM | SYSTOLIC BLOOD PRESSURE: 153 MMHG | BODY MASS INDEX: 26.52 KG/M2 | WEIGHT: 175 LBS | TEMPERATURE: 97.4 F

## 2024-11-06 DIAGNOSIS — D64.9 ANEMIA, UNSPECIFIED TYPE: ICD-10-CM

## 2024-11-06 DIAGNOSIS — D47.2 IGA MONOCLONAL GAMMOPATHY: Primary | ICD-10-CM

## 2024-11-06 PROCEDURE — 99213 OFFICE O/P EST LOW 20 MIN: CPT | Performed by: INTERNAL MEDICINE

## 2024-11-06 PROCEDURE — G0463 HOSPITAL OUTPT CLINIC VISIT: HCPCS | Performed by: INTERNAL MEDICINE

## 2024-11-06 ASSESSMENT — PAIN SCALES - GENERAL: PAINLEVEL_OUTOF10: NO PAIN (0)

## 2024-11-06 NOTE — LETTER
11/6/2024      Geeta Berry  [address]  [facility] [address]      Dear Colleague,    Thank you for referring your patient, Geeta Berry, to the [facility] [place]. Please see a copy of my visit note below.    [facility] Hematology and Oncology Progress Note    Patient: Geeta Berry  MRN: [id number]  11/06/24        Reason for Visit    Chief Complaint   Patient presents with     Oncology Clinic Visit     6 month follow up with prior lab review         Problem List Items Addressed This Visit    None  Visit Diagnoses       IgA monoclonal gammopathy    -  Primary    Relevant Orders    IgA    CBC with Platelets & Differential    Kappa and lambda light chain    Protein electrophoresis    Protein Immunofixation Serum    Ferritin    Iron & Iron Binding Capacity    CBC with Platelets & Differential    Basic metabolic panel  (Ca, Cl, CO2, Creat, Gluc, K, Na, BUN)    Anemia, unspecified type        Relevant Orders    Ferritin    Iron & Iron Binding Capacity              Assessment and Plan  Monoclonal gammopathy of unknown significance  IgA kappa, monoclonal peak of 0.3 g/dL   Anemia  I reviewed her labs from October 25, 2024.  CBC shows hemoglobin of 11.2.  Normal white count and platelet count.  Her hemoglobin has been pretty pretty stable over the last year or so.  Normal MCV.  Mildly elevated RDW.  Serum chemistry again shows mildly elevated creatinine at 1.10.  Normal calcium at 9.3.  SPEP shows stable monoclonal peak at 0.3 g/dL.  Kappa light chains up at 8.08 with normal lambda light chains and elevated kappa lambda ratio at 5.94.  Serum IgA level at 738.  This has barely increased over the last year.  Her monoclonal peak at the time of initial diagnosis was 0.3 g/dL.  So no significant change.  Plan is to continue observation with periodic labs.  Concern for myeloma is low.  Previously her iron studies showed borderline transferrin saturation.  And with elevated RDW  I wonder if she has subtle iron deficiency.  Recommend oral iron supplementation.    I will recheck her labs in 6 months and see her back in 1 year.  She is agreeable to the plan.     Cancer Staging   No matching staging information was found for the patient.      ECOG Performance    1 - Can't do physically strenuous work, but fully ambyulatory and can do light sedentary work         Problem List    Patient Active Problem List   Diagnosis     History of DVT (deep vein thrombosis), right LE 12/2016     Acute iritis, h/o in 2012     Choledocholithiasis     Constipation     Gastroesophageal reflux disease     Essential hypertension     Dyslipidemia, goal LDL below 70     Hypothyroidism     Osteoarthritis of lumbar spine     Osteoarthritis; knees, hips, cervical spine      Facet arthropathy, cervical     Dysthymic disorder     Hiatal hernia     Chronic rhinitis     Coronary artery disease involving native coronary artery of native heart without angina pectoris     Stage 3a chronic kidney disease (H)     Status post knee surgery     Neuropathy     Lumbar radiculopathy     Iron deficiency anemia due to chronic blood loss     Greater trochanteric bursitis of both hips     Epigastric pain     Vitamin D deficiency     Osteopenia of left forearm     Seborrheic dermatitis        Hematologic history  In September 2023 she was seen by her primary care provider at which time she had complained of worsening peripheral neuropathy. As a part of evaluation SPEP was ordered which came back showing a monoclonal protein, IgA kappa at a concentration of 0.3 g/dL. Light chains were not done. CBC showed mild anemia with hemoglobin 11.4. Normal platelet and white counts. Serum chemistry otherwise within normal limits. Iron studies were normal. B12 was normal.     Interval History   Geeta Berry is a 84 year old female with monoclonal gammopathy of unknown significance who is here for follow-up.    I saw her about a year ago for further  evaluation management of an IgA kappa monoclonal gammopathy of unknown significance.  She had a small IgA kappa monoclonal protein with minimal elevation in the kappa light chains.  She had peripheral neuropathy which was getting worse.  There was no correlation between the MGUS and neuropathy at the time so recommended repeat labs.  She is here with labs.  Denies any new issues today.  No bone pain.  No weight loss.  No fever chills or night sweats.        Review of Systems  A comprehensive review of systems was negative except for what is noted in the interval history    Current Outpatient Medications   Medication Sig Dispense Refill     acetaminophen (TYLENOL) 500 MG tablet Take 500 mg by mouth 4 times daily       amoxicillin (AMOXIL) 500 MG capsule Take 2,000 mg by mouth daily as needed (1 hour before dental appointments).       azelastine (ASTELIN) 0.1 % nasal spray Spray 1 spray into both nostrils at bedtime 90 mL 0     calcium citrate-vitamin D (CITRACAL) 200-6.25 MG-MCG TABS per tablet Take 1 tablet by mouth daily.       citalopram (CELEXA) 10 MG tablet Take 0.5 tablets (5 mg) by mouth daily 45 tablet 2     docusate sodium (COLACE) 100 MG tablet Take 100 mg by mouth 2 times daily       famotidine (PEPCID) 20 MG tablet Take by mouth.       fluticasone (FLONASE) 50 MCG/ACT nasal spray SPRAY 1 SPRAY INTO EACH NOSTRIL DAILY 16 mL 10     gabapentin 8%-lidocaine 2.5% in PLO cream Apply to painful feet 3 times a day as needed. 120 g 3     hydrochlorothiazide (HYDRODIURIL) 25 MG tablet Take 1 tablet (25 mg) by mouth daily 90 tablet 2     ketoconazole (NIZORAL) 2 % external shampoo Apply to the scalp. Leave on for 5 minutes then rinse off. May use 2-3 times per week 120 mL 2     levothyroxine (SYNTHROID/LEVOTHROID) 50 MCG tablet TAKE 1 TABLET BY MOUTH EVERY DAY 90 tablet 2     Lidocaine-Menthol (LIDOCAINE-MENTHOL ROLL-ON) 4-1 % LIQD Externally apply 1 Application topically as needed       loperamide (DIAMODE) 2 MG  tablet Take by mouth.       LORazepam (ATIVAN) 0.5 MG tablet Take 1 tab 2 hrs before procedure, take 1 tab 1 hr before procedure only if needed 2 tablet 0     multivitamin w/minerals (THERA-VIT-M) tablet Take 1 tablet by mouth daily       omeprazole (PRILOSEC) 40 MG DR capsule Take 1 capsule (40 mg) by mouth daily       polyethylene glycol (MIRALAX/GLYCOLAX) packet Take 1 packet by mouth daily       vitamin C (ASCORBIC ACID) 1000 MG TABS Take 1,000 mg by mouth daily       Vitamin D3 (VITAMIN D, CHOLECALCIFEROL,) 25 mcg (1000 units) tablet Take 1 tablet by mouth daily       No current facility-administered medications for this visit.        Physical Exam    Failed to redirect to the Timeline version of the [facility] SmartLink.    General: alert and cooperative  HEENT: Head: Normal, normocephalic, atraumatic.  Eye: Normal external eye, conjunctiva, lids cornea  CNS: Alert and oriented x3    Lab Results    No results found for this or any previous visit (from the past week).    Imaging    PAIN Medial Branch Block Lumbar Two Levels Right    Result Date: 10/30/2024  DIAGNOSTIC LUMBAR MEDIAL BRANCH BLOCKS WITH FLUOROSCOPIC GUIDANCE Performed on: 10/30/24 Ordered by: Krista Kaplan PA-C [address] [place] [address] [place] [address] Pre Procedure Diagnosis:  Lumbar spondylosis, LBP, Lumbar Facet Syndrome Post Procedure Diagnosis:  Same Procedure Performed:  Diagnostic Lumbar Medial Branch Block with Fluoroscopic Guidance  Clinical Scenario:  As per office notes Anesthesia/Fluids:  As per intra-procedure documentation Vital Signs:  As per intra-procedure documentation Level Injected: L3, L4, L5 medial branches (corresponding to L4-L5 and L5-S1 facet joints) Side Injected: Right Geeta Berry is a 84 year old female who presents today for lumbar medial branch block.   The patient complains of low back pain without significant radicular symptoms.  The patient understands that these are diagnostic injections only and will not provide  any long lasting relief.  Further treatment options can potentially be offered, depending on the amount of pain relief with today's injections.  Available imaging was reviewed prior to procedure.  The procedure of lumbar medial branch block was discussed in detail along with the attendant risks, including but not limited to:  back pain, nerve injury, infection, bleeding, worsened pain, allergic reaction.  The patient was also informed that there may be the need for additional injections (or other interventions, such as: facet joint injections, radiofrequency procedures) depending on response to the injections today.  The patient decided to proceed and signed informed consent. Patient denies diabetes, any blood thinners, or iodinated contrast allergies. The proceduralist wore a mask and sterile gloves, and all other staff wore a mask for the procedure. Extension tubing was used for all injected agents. The patient was placed in the prone position in the fluoroscopy table. A procedural time-out was performed to verify the patient's name and  as well as the site and side of the injection. The low back was then prepped and draped in the usual sterile fashion.  After localizing the anatomy under fluoroscopy, 3.5 inch 25 gauge spinal needles were introduced at each of the medial branch levels mentioned above under fluoroscopic guidance. Correct needle positioning was confirmed with multiplanar imaging.  After aspiration was negative for blood or CSF, approximately 0.1 ml of Omnipaque 300 was injected at each site.  There was no vascular or epidural uptake.  Subsequently, 0.5 ml of 2% lidocaine was injected at each level.  The needles were withdrawn without flushing. The procedure was only performed on the side noted above. Pre-procedure pain score:  7/10 Post-procedure pain score:  0/10 Pain diary given. The patient tolerated the procedure well and instructed in the use of a pain diary.  If there are questions or  concerns that arise, the patient was instructed to call the spine clinic.  After a short period of observation, the patient was discharged.  The patient will be contacted with the next steps in care after the pain diary is received and reviewed.     PAIN Medial Branch Block Lumbar Two Levels Right    Result Date: 10/15/2024  DIAGNOSTIC LUMBAR MEDIAL BRANCH BLOCKS WITH FLUOROSCOPIC GUIDANCE Performed on: 10/15/24 Ordered by: Krista Kaplan PA-C [address] [facility] [place] [address] Performed by: Griffin Rodriguez MD First assist: Ismael Ayoub MD Pre Procedure Diagnosis:  Lumbar spondylosis, LBP, Lumbar Facet Syndrome Post Procedure Diagnosis:  Same Procedure Performed:  Diagnostic Lumbar Medial Branch Block with Fluoroscopic Guidance  Clinical Scenario:  As per office notes Anesthesia/Fluids:  As per intra-procedure documentation Vital Signs:  As per intra-procedure documentation Level Injected: L3, L4, L5 medial branches (corresponding to L4-L5 and L5-S1 facet joints) Side Injected: Right Geeta Berry is a 84 year old female who presents today for lumbar medial branch block.   The patient complains of low back pain without significant radicular symptoms.  The patient understands that these are diagnostic injections only and will not provide any long lasting relief.  Further treatment options can potentially be offered, depending on the amount of pain relief with today's injections.  Available imaging was reviewed prior to procedure.  The procedure of lumbar medial branch block was discussed in detail along with the attendant risks, including but not limited to:  back pain, nerve injury, infection, bleeding, worsened pain, allergic reaction.  The patient was also informed that there may be the need for additional injections (or other interventions, such as: facet joint injections, radiofrequency procedures) depending on response to the injections today.  The patient decided to proceed and signed informed consent.  Patient denies diabetes, any blood thinners, or iodinated contrast allergies. The proceduralist wore a mask and sterile gloves, and all other staff wore a mask for the procedure. Extension tubing was used for all injected agents. The patient was placed in the prone position in the fluoroscopy table. A procedural time-out was performed to verify the patient's name and  as well as the site and side of the injection. The low back was then prepped and draped in the usual sterile fashion.  After localizing the anatomy under fluoroscopy, 3.5 inch 25 gauge spinal needles were introduced at each of the medial branch levels mentioned above under fluoroscopic guidance. Correct needle positioning was confirmed with multiplanar imaging.  After aspiration was negative for blood or CSF, approximately 0.1 ml of Omnipaque 300 was injected at each site.  There was no vascular or epidural uptake.  Subsequently, 0.5 ml of 0.5% bupivacaine was injected at each level.  The needles were withdrawn without flushing. The procedure was only performed on the side noted above. Pre-procedure pain score:  7/10 Post-procedure pain score:  1/10 Pain diary given. The patient tolerated the procedure well and instructed in the use of a pain diary.  If there are questions or concerns that arise, the patient was instructed to call the spine clinic.  After a short period of observation, the patient was discharged.  The patient will be contacted with the next steps in care after the pain diary is received and reviewed.      A total of 25 min was spent today on this visit which included face to face conversation with the patient, EMR review, counseling and co-ordination of care and medical documentation.    I reviewed each unique lab test personally and independently interpreted the results.    The longitudinal plan of care for the diagnosis(es)/condition(s) as documented were addressed during this visit. Due to the added complexity in care, I will  "continue to support Geeta in the subsequent management and with ongoing continuity of care.    Signed by: Ben Perez MD      Oncology Rooming Note    November 6, 2024 8:26 AM   Geeta Berry is a 84 year old female who presents for:    Chief Complaint   Patient presents with     Oncology Clinic Visit     6 month follow up with prior lab review     Initial Vitals: BP (!) 153/70 (BP Location: Left arm, Patient Position: Sitting, Cuff Size: Adult Regular)   Pulse 57   Temp 97.4  F (36.3  C) (Tympanic)   Resp 16   Ht 1.715 m (5' 7.5\")   Wt 79.4 kg (175 lb)   LMP  (LMP Unknown)   SpO2 97%   BMI 27.00 kg/m   Estimated body mass index is 27 kg/m  as calculated from the following:    Height as of this encounter: 1.715 m (5' 7.5\").    Weight as of this encounter: 79.4 kg (175 lb). Body surface area is 1.94 meters squared.  No Pain (0) Comment: Data Unavailable   No LMP recorded (lmp unknown). Patient is postmenopausal.  Allergies reviewed: Yes  Medications reviewed: Yes    Medications: Medication refills not needed today.  Pharmacy name entered into [employer]:    [id number] - [address] [facility] [address] [facility] [address]    Frailty Screening:   Is the patient here for a new oncology consult visit in cancer care? 2. No      Clinical concerns:       CHEYENNE MÁRQUEZ CMA                Again, thank you for allowing me to participate in the care of your patient.        Sincerely,        Ben Perez MD  "

## 2024-11-06 NOTE — PROGRESS NOTES
"Oncology Rooming Note    November 6, 2024 8:26 AM   Geeta Berry is a 84 year old female who presents for:    Chief Complaint   Patient presents with    Oncology Clinic Visit     6 month follow up with prior lab review     Initial Vitals: BP (!) 153/70 (BP Location: Left arm, Patient Position: Sitting, Cuff Size: Adult Regular)   Pulse 57   Temp 97.4  F (36.3  C) (Tympanic)   Resp 16   Ht 1.715 m (5' 7.5\")   Wt 79.4 kg (175 lb)   LMP  (LMP Unknown)   SpO2 97%   BMI 27.00 kg/m   Estimated body mass index is 27 kg/m  as calculated from the following:    Height as of this encounter: 1.715 m (5' 7.5\").    Weight as of this encounter: 79.4 kg (175 lb). Body surface area is 1.94 meters squared.  No Pain (0) Comment: Data Unavailable   No LMP recorded (lmp unknown). Patient is postmenopausal.  Allergies reviewed: Yes  Medications reviewed: Yes    Medications: Medication refills not needed today.  Pharmacy name entered into [facility]:    [id number] - [address] [facility] - [place], [place] - [address] [place], [place] [address] - [address]    Frailty Screening:   Is the patient here for a new oncology consult visit in cancer care? 2. No      Clinical concerns:       CHEYENNE MÁRQUEZ CMA              "

## 2024-11-06 NOTE — PROGRESS NOTES
[facility] Hematology and Oncology Progress Note    Patient: Geeta Berry  MRN: [id number]  11/06/24        Reason for Visit    Chief Complaint   Patient presents with    Oncology Clinic Visit     6 month follow up with prior lab review         Problem List Items Addressed This Visit    None  Visit Diagnoses       IgA monoclonal gammopathy    -  Primary    Relevant Orders    IgA    CBC with Platelets & Differential    Kappa and lambda light chain    Protein electrophoresis    Protein Immunofixation Serum    Ferritin    Iron & Iron Binding Capacity    CBC with Platelets & Differential    Basic metabolic panel  (Ca, Cl, CO2, Creat, Gluc, K, Na, BUN)    Anemia, unspecified type        Relevant Orders    Ferritin    Iron & Iron Binding Capacity              Assessment and Plan  Monoclonal gammopathy of unknown significance  IgA kappa, monoclonal peak of 0.3 g/dL   Anemia  I reviewed her labs from October 25, 2024.  CBC shows hemoglobin of 11.2.  Normal white count and platelet count.  Her hemoglobin has been pretty pretty stable over the last year or so.  Normal MCV.  Mildly elevated RDW.  Serum chemistry again shows mildly elevated creatinine at 1.10.  Normal calcium at 9.3.  SPEP shows stable monoclonal peak at 0.3 g/dL.  Kappa light chains up at 8.08 with normal lambda light chains and elevated kappa lambda ratio at 5.94.  Serum IgA level at 738.  This has barely increased over the last year.  Her monoclonal peak at the time of initial diagnosis was 0.3 g/dL.  So no significant change.  Plan is to continue observation with periodic labs.  Concern for myeloma is low.  Previously her iron studies showed borderline transferrin saturation.  And with elevated RDW I wonder if she has subtle iron deficiency.  Recommend oral iron supplementation.    I will recheck her labs in 6 months and see her back in 1 year.  She is agreeable to the plan.     Cancer Staging   No matching staging information was found for the  patient.      ECOG Performance    1 - Can't do physically strenuous work, but fully ambyulatory and can do light sedentary work         Problem List    Patient Active Problem List   Diagnosis    History of DVT (deep vein thrombosis), right LE 12/2016    Acute iritis, h/o in 2012    Choledocholithiasis    Constipation    Gastroesophageal reflux disease    Essential hypertension    Dyslipidemia, goal LDL below 70    Hypothyroidism    Osteoarthritis of lumbar spine    Osteoarthritis; knees, hips, cervical spine     Facet arthropathy, cervical    Dysthymic disorder    Hiatal hernia    Chronic rhinitis    Coronary artery disease involving native coronary artery of native heart without angina pectoris    Stage 3a chronic kidney disease (H)    Status post knee surgery    Neuropathy    Lumbar radiculopathy    Iron deficiency anemia due to chronic blood loss    Greater trochanteric bursitis of both hips    Epigastric pain    Vitamin D deficiency    Osteopenia of left forearm    Seborrheic dermatitis        Hematologic history  In September 2023 she was seen by her primary care provider at which time she had complained of worsening peripheral neuropathy. As a part of evaluation SPEP was ordered which came back showing a monoclonal protein, IgA kappa at a concentration of 0.3 g/dL. Light chains were not done. CBC showed mild anemia with hemoglobin 11.4. Normal platelet and white counts. Serum chemistry otherwise within normal limits. Iron studies were normal. B12 was normal.     Interval History   Geeta Berry is a 84 year old female with monoclonal gammopathy of unknown significance who is here for follow-up.    I saw her about a year ago for further evaluation management of an IgA kappa monoclonal gammopathy of unknown significance.  She had a small IgA kappa monoclonal protein with minimal elevation in the kappa light chains.  She had peripheral neuropathy which was getting worse.  There was no correlation between the MGUS  and neuropathy at the time so recommended repeat labs.  She is here with labs.  Denies any new issues today.  No bone pain.  No weight loss.  No fever chills or night sweats.        Review of Systems  A comprehensive review of systems was negative except for what is noted in the interval history    Current Outpatient Medications   Medication Sig Dispense Refill    acetaminophen (TYLENOL) 500 MG tablet Take 500 mg by mouth 4 times daily      amoxicillin (AMOXIL) 500 MG capsule Take 2,000 mg by mouth daily as needed (1 hour before dental appointments).      azelastine (ASTELIN) 0.1 % nasal spray Spray 1 spray into both nostrils at bedtime 90 mL 0    calcium citrate-vitamin D (CITRACAL) 200-6.25 MG-MCG TABS per tablet Take 1 tablet by mouth daily.      citalopram (CELEXA) 10 MG tablet Take 0.5 tablets (5 mg) by mouth daily 45 tablet 2    docusate sodium (COLACE) 100 MG tablet Take 100 mg by mouth 2 times daily      famotidine (PEPCID) 20 MG tablet Take by mouth.      fluticasone (FLONASE) 50 MCG/ACT nasal spray SPRAY 1 SPRAY INTO EACH NOSTRIL DAILY 16 mL 10    gabapentin 8%-lidocaine 2.5% in PLO cream Apply to painful feet 3 times a day as needed. 120 g 3    hydrochlorothiazide (HYDRODIURIL) 25 MG tablet Take 1 tablet (25 mg) by mouth daily 90 tablet 2    ketoconazole (NIZORAL) 2 % external shampoo Apply to the scalp. Leave on for 5 minutes then rinse off. May use 2-3 times per week 120 mL 2    levothyroxine (SYNTHROID/LEVOTHROID) 50 MCG tablet TAKE 1 TABLET BY MOUTH EVERY DAY 90 tablet 2    Lidocaine-Menthol (LIDOCAINE-MENTHOL ROLL-ON) 4-1 % LIQD Externally apply 1 Application topically as needed      loperamide (DIAMODE) 2 MG tablet Take by mouth.      LORazepam (ATIVAN) 0.5 MG tablet Take 1 tab 2 hrs before procedure, take 1 tab 1 hr before procedure only if needed 2 tablet 0    multivitamin w/minerals (THERA-VIT-M) tablet Take 1 tablet by mouth daily      omeprazole (PRILOSEC) 40 MG DR capsule Take 1 capsule (40  mg) by mouth daily      polyethylene glycol (MIRALAX/GLYCOLAX) packet Take 1 packet by mouth daily      vitamin C (ASCORBIC ACID) 1000 MG TABS Take 1,000 mg by mouth daily      Vitamin D3 (VITAMIN D, CHOLECALCIFEROL,) 25 mcg (1000 units) tablet Take 1 tablet by mouth daily       No current facility-administered medications for this visit.        Physical Exam    Failed to redirect to the Timeline version of the [facility] SmartLink.    General: alert and cooperative  HEENT: Head: Normal, normocephalic, atraumatic.  Eye: Normal external eye, conjunctiva, lids cornea  CNS: Alert and oriented x3    Lab Results    No results found for this or any previous visit (from the past week).    Imaging    PAIN Medial Branch Block Lumbar Two Levels Right    Result Date: 10/30/2024  DIAGNOSTIC LUMBAR MEDIAL BRANCH BLOCKS WITH FLUOROSCOPIC GUIDANCE Performed on: 10/30/24 Ordered by: Krista Kaplan PA-C [address] [place] [address] [place] [address] Pre Procedure Diagnosis:  Lumbar spondylosis, LBP, Lumbar Facet Syndrome Post Procedure Diagnosis:  Same Procedure Performed:  Diagnostic Lumbar Medial Branch Block with Fluoroscopic Guidance  Clinical Scenario:  As per office notes Anesthesia/Fluids:  As per intra-procedure documentation Vital Signs:  As per intra-procedure documentation Level Injected: L3, L4, L5 medial branches (corresponding to L4-L5 and L5-S1 facet joints) Side Injected: Right Geeta Berry is a 84 year old female who presents today for lumbar medial branch block.   The patient complains of low back pain without significant radicular symptoms.  The patient understands that these are diagnostic injections only and will not provide any long lasting relief.  Further treatment options can potentially be offered, depending on the amount of pain relief with today's injections.  Available imaging was reviewed prior to procedure.  The procedure of lumbar medial branch block was discussed in detail along with the attendant risks,  including but not limited to:  back pain, nerve injury, infection, bleeding, worsened pain, allergic reaction.  The patient was also informed that there may be the need for additional injections (or other interventions, such as: facet joint injections, radiofrequency procedures) depending on response to the injections today.  The patient decided to proceed and signed informed consent. Patient denies diabetes, any blood thinners, or iodinated contrast allergies. The proceduralist wore a mask and sterile gloves, and all other staff wore a mask for the procedure. Extension tubing was used for all injected agents. The patient was placed in the prone position in the fluoroscopy table. A procedural time-out was performed to verify the patient's name and  as well as the site and side of the injection. The low back was then prepped and draped in the usual sterile fashion.  After localizing the anatomy under fluoroscopy, 3.5 inch 25 gauge spinal needles were introduced at each of the medial branch levels mentioned above under fluoroscopic guidance. Correct needle positioning was confirmed with multiplanar imaging.  After aspiration was negative for blood or CSF, approximately 0.1 ml of Omnipaque 300 was injected at each site.  There was no vascular or epidural uptake.  Subsequently, 0.5 ml of 2% lidocaine was injected at each level.  The needles were withdrawn without flushing. The procedure was only performed on the side noted above. Pre-procedure pain score:  7/10 Post-procedure pain score:  0/10 Pain diary given. The patient tolerated the procedure well and instructed in the use of a pain diary.  If there are questions or concerns that arise, the patient was instructed to call the spine clinic.  After a short period of observation, the patient was discharged.  The patient will be contacted with the next steps in care after the pain diary is received and reviewed.     PAIN Medial Branch Block Lumbar Two Levels  Right    Result Date: 10/15/2024  DIAGNOSTIC LUMBAR MEDIAL BRANCH BLOCKS WITH FLUOROSCOPIC GUIDANCE Performed on: 10/15/24 Ordered by: Krista Kaplan PA-C [address] [place] [address] [place] [address] Performed by: Griffin Rodriguez MD First assist: Ismael Ayoub MD Pre Procedure Diagnosis:  Lumbar spondylosis, LBP, Lumbar Facet Syndrome Post Procedure Diagnosis:  Same Procedure Performed:  Diagnostic Lumbar Medial Branch Block with Fluoroscopic Guidance  Clinical Scenario:  As per office notes Anesthesia/Fluids:  As per intra-procedure documentation Vital Signs:  As per intra-procedure documentation Level Injected: L3, L4, L5 medial branches (corresponding to L4-L5 and L5-S1 facet joints) Side Injected: Right Geeta Berry is a 84 year old female who presents today for lumbar medial branch block.   The patient complains of low back pain without significant radicular symptoms.  The patient understands that these are diagnostic injections only and will not provide any long lasting relief.  Further treatment options can potentially be offered, depending on the amount of pain relief with today's injections.  Available imaging was reviewed prior to procedure.  The procedure of lumbar medial branch block was discussed in detail along with the attendant risks, including but not limited to:  back pain, nerve injury, infection, bleeding, worsened pain, allergic reaction.  The patient was also informed that there may be the need for additional injections (or other interventions, such as: facet joint injections, radiofrequency procedures) depending on response to the injections today.  The patient decided to proceed and signed informed consent. Patient denies diabetes, any blood thinners, or iodinated contrast allergies. The proceduralist wore a mask and sterile gloves, and all other staff wore a mask for the procedure. Extension tubing was used for all injected agents. The patient was placed in the prone position in the fluoroscopy  table. A procedural time-out was performed to verify the patient's name and  as well as the site and side of the injection. The low back was then prepped and draped in the usual sterile fashion.  After localizing the anatomy under fluoroscopy, 3.5 inch 25 gauge spinal needles were introduced at each of the medial branch levels mentioned above under fluoroscopic guidance. Correct needle positioning was confirmed with multiplanar imaging.  After aspiration was negative for blood or CSF, approximately 0.1 ml of Omnipaque 300 was injected at each site.  There was no vascular or epidural uptake.  Subsequently, 0.5 ml of 0.5% bupivacaine was injected at each level.  The needles were withdrawn without flushing. The procedure was only performed on the side noted above. Pre-procedure pain score:  7/10 Post-procedure pain score:  1/10 Pain diary given. The patient tolerated the procedure well and instructed in the use of a pain diary.  If there are questions or concerns that arise, the patient was instructed to call the spine clinic.  After a short period of observation, the patient was discharged.  The patient will be contacted with the next steps in care after the pain diary is received and reviewed.      A total of 25 min was spent today on this visit which included face to face conversation with the patient, EMR review, counseling and co-ordination of care and medical documentation.    I reviewed each unique lab test personally and independently interpreted the results.    The longitudinal plan of care for the diagnosis(es)/condition(s) as documented were addressed during this visit. Due to the added complexity in care, I will continue to support Geeta in the subsequent management and with ongoing continuity of care.    Signed by: Ben Perez MD

## 2024-11-25 ENCOUNTER — RADIOLOGY INJECTION OFFICE VISIT (OUTPATIENT)
Dept: PHYSICAL MEDICINE AND REHAB | Facility: CLINIC | Age: 84
End: 2024-11-25
Attending: PHYSICIAN ASSISTANT
Payer: COMMERCIAL

## 2024-11-25 VITALS
HEART RATE: 58 BPM | DIASTOLIC BLOOD PRESSURE: 64 MMHG | TEMPERATURE: 97.8 F | HEIGHT: 67 IN | SYSTOLIC BLOOD PRESSURE: 128 MMHG | WEIGHT: 167 LBS | RESPIRATION RATE: 16 BRPM | OXYGEN SATURATION: 98 % | BODY MASS INDEX: 26.21 KG/M2

## 2024-11-25 DIAGNOSIS — M47.817 SPONDYLOSIS OF LUMBOSACRAL REGION WITHOUT MYELOPATHY OR RADICULOPATHY: ICD-10-CM

## 2024-11-25 RX ORDER — BUPIVACAINE HYDROCHLORIDE 2.5 MG/ML
INJECTION, SOLUTION EPIDURAL; INFILTRATION; INTRACAUDAL
Status: COMPLETED | OUTPATIENT
Start: 2024-11-25 | End: 2024-11-25

## 2024-11-25 RX ORDER — LIDOCAINE HYDROCHLORIDE 10 MG/ML
INJECTION, SOLUTION EPIDURAL; INFILTRATION; INTRACAUDAL; PERINEURAL
Status: COMPLETED | OUTPATIENT
Start: 2024-11-25 | End: 2024-11-25

## 2024-11-25 RX ADMIN — LIDOCAINE HYDROCHLORIDE 3 ML: 10 INJECTION, SOLUTION EPIDURAL; INFILTRATION; INTRACAUDAL; PERINEURAL at 13:42

## 2024-11-25 RX ADMIN — BUPIVACAINE HYDROCHLORIDE 3 ML: 2.5 INJECTION, SOLUTION EPIDURAL; INFILTRATION; INTRACAUDAL at 13:43

## 2024-11-25 ASSESSMENT — PAIN SCALES - GENERAL
PAINLEVEL_OUTOF10: MILD PAIN (3)
PAINLEVEL_OUTOF10: MODERATE PAIN (5)

## 2024-11-25 NOTE — PATIENT INSTRUCTIONS
Follow-up visit with ARIE Jones in 6 weeks to discuss procedure outcome and determine care plan going forward.      DISCHARGE INSTRUCTIONS    During office hours (8:00 a.m.-4:00 p.m.) questions or concerns may be answered  by calling [facility] Navigation Nurses at  [phone].  Messages received after hours will be returned the following business day.      In the case of an emergency,please dial 911 or seek assistance at the nearest Emergency Room/Urgent Care facility.     All Patients:  You may experience an increase in your symptoms for the first 5-7 days. Soreness may persist during the first few weeks as it takes 4-6 weeks for the nerves to fully heal.    You may use ice on the injection site,as frequently as 20 minutes each hour if needed. It is recommended NOT to apply heat during the first 24 hours.    You may take your pain medicine.    You may continue taking your regular medication.    You may shower. No swimming, tub bath or hot tub for 48hours. This also includes no lotions, ointments or patches to the needle sites as well. You may remove your bandaid/bandage as soon as you are home.    You mayresume light activities, as tolerated.    Resume your usual diet as tolerated.    It is strongly advised that you do not drive for 1-3 hours post injection.    If you have had oral sedation:  Do not drive for 8 hours post injection.             POSSIBLE PROCEDURE SIDE EFFECTS    -Call the Spine Center if you are concerned    IncreasedPain           Increased numbness/tingling      Nausea/Vomiting          Bruising/bleeding at site      Redness or swelling  Difficulty walking      Weakness          Fever greater than 100.5

## 2024-12-09 ENCOUNTER — TRANSFERRED RECORDS (OUTPATIENT)
Dept: HEALTH INFORMATION MANAGEMENT | Facility: CLINIC | Age: 84
End: 2024-12-09
Payer: COMMERCIAL

## 2024-12-27 ENCOUNTER — MYC MEDICAL ADVICE (OUTPATIENT)
Dept: NEUROLOGY | Facility: CLINIC | Age: 84
End: 2024-12-27
Payer: COMMERCIAL

## 2024-12-27 DIAGNOSIS — M79.671 RIGHT FOOT PAIN: ICD-10-CM

## 2024-12-27 DIAGNOSIS — G62.9 NEUROPATHY: ICD-10-CM

## 2024-12-30 NOTE — TELEPHONE ENCOUNTER
Refill request for: gabapentin 8%-lidocaine 2.5% in PLO cream    Directions: Apply to painful feet 3 times a day as needed.     LOV: 9/18/24  NOV: 6/18/25    90 day supply with 2 refills Medication T'd for review and signature    Susan Uriostegui MA

## 2025-01-03 NOTE — PROGRESS NOTES
Assessment:   Geeta Berry is a 84 y.o. female with past medical history significant for neuropathy, GERD, hyperlipidemia, hypothyroidism, hypertension, coronary artery disease, chronic kidney disease stage III, iron deficiency anemia, dysthymia who presents today for follow-up regarding 2 concerns:  1.  Chronic low back pain, currently on the right.  My review of an MRI lumbar spine shows grade 1 degenerative spondylolisthesis L4-5 with moderate to severe spinal stenosis.  Patient is status post right L3, L4, L5 radiofrequency ablation November 25, 2024 which provided relief of pain in the L4-5 and L5-S1 regions but for the past 1 month she has had soreness slightly higher than her procedure.  Suspect she is symptomatic from the facet arthropathy on the right at L3-4.  I will check an x-ray to rule out compression fracture.  She did have tenderness on the right at the L3-4 level.  - Patient saw Dr. Rea February 13, 2024.  Dr. Rea recommended conservative treatment.  2.  Left occipital neuralgia.  Patient is status post a left occipital nerve block February 13, 2024 which provided greater than 50% relief of pain up until 2 months ago when pain worsened again.    Plan:     A shared decision making plan was used.  The patient's values and choices were respected.  The following represents what was discussed and decided upon by the physician assistant and the patient.      1.  DIAGNOSTIC TESTS:   -I reviewed the MRI lumbar spine.  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  - I reviewed the lumbar spine flexion-extension x-rays which were negative for dynamic instability.  - I ordered lumbar spine x-rays to rule out fracture.    2.  PHYSICAL THERAPY:  - Patient completed 6 sessions of physical therapy May 16, 2024.  No additional physical therapy was ordered.  She will continue with home exercises.      3.   MEDICATIONS:   - Patient uses Tylenol as needed.  - Patient uses lidocaine patches or roll-on.  -Patient did not tolerate gabapentin.  - Patient take citalopram for mood.  I suggested she discuss switching to duloxetine with her primary care provider to see if this could also help with pain.  She sees her primary care provider tomorrow.    4.  INTERVENTIONS: A left occipital nerve block was performed today.  Please see separate procedure note for details.      5.  PATIENT EDUCATION: Patient is in agreement with the above plan.  All questions were answered.    6.  FOLLOW-UP: My nurse to call the patient with results of her x-rays.  Patient will follow-up with me in 2 weeks following her occipital nerve block.  If she has questions or concerns in the meantime, she should not hesitate to call.  Subjective:     Geeta Berry is a 84 year old female who presents today for follow-up regarding chronic low back pain and left occipital neuralgia.  Patient is following up after right L3, L4, L5 radiofrequency ablation November 25, 2024.  Patient reports improvement in pain in the lower lumbar region.  However, for the past 1 months she has had more pain and tenderness slightly higher than her procedure site.    Patient complains right-sided low back pain.  Patient points to the right L3-4 facet when asked to point to the pain.  Pain does not radiate down the leg.  Pain is aggravated standing and moving and alleviated with applying a heating pad.    Patient also complains of increased left occipital neuralgia pain.  This pain flared up about 2 months ago.  It has been severe lately, especially last night.  She difficulty sleeping because of the pain.    Treatment to date:  - Completed 6 sessions of physical therapy for trochanteric bursitis May 16, 2024.  - Patient participated in physical therapy in November 2017.  - Patient had 6 sessions of physical therapy ending December 2021  - She also has had 4 sessions of physical  therapy for hip pain/weakness of hips ending March 2022  - Right L3, L4, L5 radiofrequency ablation November 25, 2024 with 100% relief of pain localized to the right L4-5 and L5-S1 facets.  - Right L3, L4, L5 medial branch blocks October 30, 2024-positive response  - Right L3, L4, L5 medial branch blocks October 15, 2024-positive response  - Left occipital nerve block February 13, 2024 with greater than 50% relief for 9 months  - Left trochanteric bursa injection January 30, 2024 with 100% relief  - L5-S1 interlaminar epidural steroid injection August 8, 2023 with greater than 50% relief of pain for 2 months  - Bilateral L4-5 transforaminal epidural steroid injection June 12,, 2023 with 50% relief  - Left L4-5 transforaminal epidural steroid injection October 21, 2021 with 60% relief of pain  - Left occipital nerve block July 11, 2018 with 80% relief of pain  - Left C2-3, C3-4, C4-5 facet joint injections June 27, 2018  - Left C2, C3, C4, C5 MBB June 7, 2018  - Left C1-C2 facet joint injection July 7, 2017  - Left C1-C2 facet joint injection April 6, 2017  -Did not tolerate gabapentin  - Tylenol as needed is helpful    Review of Systems:  Positive for headache, pain much worse at night.  Negative for numbness/tingling, weakness, loss of bowel/bladder control, inability to urinate, trip/stumble/falls, difficulty swallowing, difficulty with hand skills, fevers, unintentional weight loss.     Objective:   CONSTITUTIONAL:  Vital signs as above.  No acute distress.  The patient is well nourished and well groomed.   Patient appears tired.  PSYCHIATRIC:  The patient is awake, alert, oriented to person, place and time.  The patient is answering questions appropriately with clear speech.  Normal affect.   HEENT: Normocephalic, atraumatic.  Sclera clear.    SKIN: Exposed skin is clean, dry, intact without rashes.  MUSCULOSKELETAL: Patient ambulates with a mildly flexed forward posture at the hips.  Able to rise onto toes and  heels bilaterally with support.  Tender to palpation right lower lumbar paraspinous right L3-4.    5/5 strength bilateral shoulder abductors, elbow flexors/extensors, wrist extensors, finger flexors/abductors, hip flexors, knee flexors/extensors, ankle dorsi/plantar flexors.  NEUROLOGICAL: Tender to palpation left occipital nerve.  Cranial nerves II through XII grossly intact.  Sensation light touch intact bilateral upper and lower extremities throughout.     RESULTS: I reviewed the MRI lumbar spine from [place] dated July 7, 2023.  This shows multilevel lumbar spondylosis unchanged compared with an MRI lumbar spine from 2021.  At L2-3 there is mild to moderate spinal canal stenosis with moderate left and mild to moderate right foraminal stenosis.  At L3-4 there is mild spinal canal stenosis with mild to moderate right and mild left foraminal stenosis.  At L4-5 there is moderate to severe spinal canal stenosis with mild to moderate bilateral foraminal stenosis.    EMG bilateral lower extremities with neurology December 8, 2023:  SUMMARY  Nerve conduction and EMG study of bilateral lower extremities shows:     Normal right peroneal distal motor latency with decreased amplitude and conduction velocity.  Prolonged left peroneal distal motor latency with decreased amplitude and conduction velocity.  Normal right tibial distal motor latency with decreased amplitude and conduction velocity.  Normal left tibial distal motor latency with decreased amplitude and conduction velocity.  Abnormal bilateral sural and normal bilateral superficial peroneal sensory SNAP though amplitudes are low normal.  Monopolar needle exam is normal.     CLINICAL INTERPRETATION:  This is an abnormal nerve conduction and EMG study.  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  Further clinical correlation is needed.    Reviewed the lumbar spine flexion-extension x-rays from [facility] dated February 13, 2024.  This shows  6 mm anterolisthesis L4-5 unchanged on flexion and extension.  There is 3 mm anterolisthesis L5-S1 unchanged on flexion and extension.

## 2025-01-06 SDOH — HEALTH STABILITY: PHYSICAL HEALTH: ON AVERAGE, HOW MANY MINUTES DO YOU ENGAGE IN EXERCISE AT THIS LEVEL?: 60 MIN

## 2025-01-06 SDOH — HEALTH STABILITY: PHYSICAL HEALTH: ON AVERAGE, HOW MANY DAYS PER WEEK DO YOU ENGAGE IN MODERATE TO STRENUOUS EXERCISE (LIKE A BRISK WALK)?: 7 DAYS

## 2025-01-06 ASSESSMENT — SOCIAL DETERMINANTS OF HEALTH (SDOH): HOW OFTEN DO YOU GET TOGETHER WITH FRIENDS OR RELATIVES?: MORE THAN THREE TIMES A WEEK

## 2025-01-07 ENCOUNTER — OFFICE VISIT (OUTPATIENT)
Dept: PHYSICAL MEDICINE AND REHAB | Facility: CLINIC | Age: 85
End: 2025-01-07
Payer: COMMERCIAL

## 2025-01-07 VITALS
WEIGHT: 167 LBS | SYSTOLIC BLOOD PRESSURE: 138 MMHG | HEART RATE: 74 BPM | DIASTOLIC BLOOD PRESSURE: 63 MMHG | BODY MASS INDEX: 26.21 KG/M2 | HEIGHT: 67 IN

## 2025-01-07 DIAGNOSIS — M54.50 LUMBAR SPINE PAIN: Primary | ICD-10-CM

## 2025-01-07 DIAGNOSIS — M54.81 OCCIPITAL NEURALGIA OF LEFT SIDE: ICD-10-CM

## 2025-01-07 DIAGNOSIS — M47.816 LUMBAR FACET ARTHROPATHY: ICD-10-CM

## 2025-01-07 PROCEDURE — 64405 NJX AA&/STRD GR OCPL NRV: CPT | Mod: LT | Performed by: PHYSICIAN ASSISTANT

## 2025-01-07 PROCEDURE — 99214 OFFICE O/P EST MOD 30 MIN: CPT | Mod: 25 | Performed by: PHYSICIAN ASSISTANT

## 2025-01-07 RX ORDER — METHYLPREDNISOLONE ACETATE 40 MG/ML
40 INJECTION, SUSPENSION INTRA-ARTICULAR; INTRALESIONAL; INTRAMUSCULAR; SOFT TISSUE ONCE
Status: COMPLETED | OUTPATIENT
Start: 2025-01-07 | End: 2025-01-07

## 2025-01-07 RX ORDER — LIDOCAINE HYDROCHLORIDE 10 MG/ML
5 INJECTION, SOLUTION EPIDURAL; INFILTRATION; INTRACAUDAL; PERINEURAL ONCE
Status: COMPLETED | OUTPATIENT
Start: 2025-01-07 | End: 2025-01-07

## 2025-01-07 RX ADMIN — METHYLPREDNISOLONE ACETATE 40 MG: 40 INJECTION, SUSPENSION INTRA-ARTICULAR; INTRALESIONAL; INTRAMUSCULAR; SOFT TISSUE at 09:08

## 2025-01-07 RX ADMIN — LIDOCAINE HYDROCHLORIDE 2 ML: 10 INJECTION, SOLUTION EPIDURAL; INFILTRATION; INTRACAUDAL; PERINEURAL at 09:07

## 2025-01-07 ASSESSMENT — PATIENT HEALTH QUESTIONNAIRE - PHQ9
10. IF YOU CHECKED OFF ANY PROBLEMS, HOW DIFFICULT HAVE THESE PROBLEMS MADE IT FOR YOU TO DO YOUR WORK, TAKE CARE OF THINGS AT HOME, OR GET ALONG WITH OTHER PEOPLE: NOT DIFFICULT AT ALL
SUM OF ALL RESPONSES TO PHQ QUESTIONS 1-9: 3
SUM OF ALL RESPONSES TO PHQ QUESTIONS 1-9: 3

## 2025-01-07 ASSESSMENT — PAIN SCALES - GENERAL: PAINLEVEL_OUTOF10: SEVERE PAIN (7)

## 2025-01-07 NOTE — PATIENT INSTRUCTIONS
[facility] Scheduling    Please call [phone] to schedule your image(s) (select option#1). There are 3 different locations, see below. You can do walk-in visits for xray only images if you want.     [facility]  [address]  [address] [facility] - [place]  [address] [facility], [address]  [place] [address]

## 2025-01-07 NOTE — PROCEDURES
Pre-procedure diagnosis: Left occipital neuralgia  Post-procedure diagnosis:  Same  PROCEDURE: Left greater occipital nerve block.    The risks of the procedure were discussed with the patient.  These include infection, bleeding, increased pain, no change in pain were discussed with the patient.  The patient gave written and verbal consent to proceed with the procedure.    The most tender area of the left occipital nerve at the nuchal ridge was palpated.  This area was then cleansed with a betadine swab x 3.  A solution of 1 mL of 40 mg of Depomedrol mixed with 2 mL 1% lidocaine was drawn up.  A 25 guage 1 inch needle was inserted down to os.  After aspiration was negative, 1 mL of the solution was injected.  Without withdrawing the needle from the skin, the needle was redirected.  After aspiration was negative, 1 mL of the solution was injected.  Without withdrawing the needle from the skin, the needle was once again redirected.  After aspiration was negative, 1 mL of the remaining solution was injected.      The patient tolerated the procedure well.  The patient was monitored for a short period of time and then discharged under her own power.

## 2025-01-07 NOTE — LETTER
1/7/2025      Geeta Berry  [address]  [place] [address]      Dear Colleague,    Thank you for referring your patient, Geeta Berry, to the [facility]. Please see a copy of my visit note below.    Assessment:   Geeta Berry is a 84 y.o. female with past medical history significant for neuropathy, GERD, hyperlipidemia, hypothyroidism, hypertension, coronary artery disease, chronic kidney disease stage III, iron deficiency anemia, dysthymia who presents today for follow-up regarding 2 concerns:  1.  Chronic low back pain, currently on the right.  My review of an MRI lumbar spine shows grade 1 degenerative spondylolisthesis L4-5 with moderate to severe spinal stenosis.  Patient is status post right L3, L4, L5 radiofrequency ablation November 25, 2024 which provided relief of pain in the L4-5 and L5-S1 regions but for the past 1 month she has had soreness slightly higher than her procedure.  Suspect she is symptomatic from the facet arthropathy on the right at L3-4.  I will check an x-ray to rule out compression fracture.  She did have tenderness on the right at the L3-4 level.  - Patient saw Dr. Rea February 13, 2024.  Dr. Rea recommended conservative treatment.  2.  Left occipital neuralgia.  Patient is status post a left occipital nerve block February 13, 2024 which provided greater than 50% relief of pain up until 2 months ago when pain worsened again.    Plan:     A shared decision making plan was used.  The patient's values and choices were respected.  The following represents what was discussed and decided upon by the physician assistant and the patient.      1.  DIAGNOSTIC TESTS:   -I reviewed the MRI lumbar spine.  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  - I reviewed the lumbar spine flexion-extension x-rays which were negative for dynamic  instability.  - I ordered lumbar spine x-rays to rule out fracture.    2.  PHYSICAL THERAPY:  - Patient completed 6 sessions of physical therapy May 16, 2024.  No additional physical therapy was ordered.  She will continue with home exercises.      3.  MEDICATIONS:   - Patient uses Tylenol as needed.  - Patient uses lidocaine patches or roll-on.  -Patient did not tolerate gabapentin.  - Patient take citalopram for mood.  I suggested she discuss switching to duloxetine with her primary care provider to see if this could also help with pain.  She sees her primary care provider tomorrow.    4.  INTERVENTIONS: A left occipital nerve block was performed today.  Please see separate procedure note for details.      5.  PATIENT EDUCATION: Patient is in agreement with the above plan.  All questions were answered.    6.  FOLLOW-UP: My nurse to call the patient with results of her x-rays.  Patient will follow-up with me in 2 weeks following her occipital nerve block.  If she has questions or concerns in the meantime, she should not hesitate to call.  Subjective:     Geeta Berry is a 84 year old female who presents today for follow-up regarding chronic low back pain and left occipital neuralgia.  Patient is following up after right L3, L4, L5 radiofrequency ablation November 25, 2024.  Patient reports improvement in pain in the lower lumbar region.  However, for the past 1 months she has had more pain and tenderness slightly higher than her procedure site.    Patient complains right-sided low back pain.  Patient points to the right L3-4 facet when asked to point to the pain.  Pain does not radiate down the leg.  Pain is aggravated standing and moving and alleviated with applying a heating pad.    Patient also complains of increased left occipital neuralgia pain.  This pain flared up about 2 months ago.  It has been severe lately, especially last night.  She difficulty sleeping because of the pain.    Treatment to date:  -  Completed 6 sessions of physical therapy for trochanteric bursitis May 16, 2024.  - Patient participated in physical therapy in November 2017.  - Patient had 6 sessions of physical therapy ending December 2021  - She also has had 4 sessions of physical therapy for hip pain/weakness of hips ending March 2022  - Right L3, L4, L5 radiofrequency ablation November 25, 2024 with 100% relief of pain localized to the right L4-5 and L5-S1 facets.  - Right L3, L4, L5 medial branch blocks October 30, 2024-positive response  - Right L3, L4, L5 medial branch blocks October 15, 2024-positive response  - Left occipital nerve block February 13, 2024 with greater than 50% relief for 9 months  - Left trochanteric bursa injection January 30, 2024 with 100% relief  - L5-S1 interlaminar epidural steroid injection August 8, 2023 with greater than 50% relief of pain for 2 months  - Bilateral L4-5 transforaminal epidural steroid injection June 12,, 2023 with 50% relief  - Left L4-5 transforaminal epidural steroid injection October 21, 2021 with 60% relief of pain  - Left occipital nerve block July 11, 2018 with 80% relief of pain  - Left C2-3, C3-4, C4-5 facet joint injections June 27, 2018  - Left C2, C3, C4, C5 MBB June 7, 2018  - Left C1-C2 facet joint injection July 7, 2017  - Left C1-C2 facet joint injection April 6, 2017  -Did not tolerate gabapentin  - Tylenol as needed is helpful    Review of Systems:  Positive for headache, pain much worse at night.  Negative for numbness/tingling, weakness, loss of bowel/bladder control, inability to urinate, trip/stumble/falls, difficulty swallowing, difficulty with hand skills, fevers, unintentional weight loss.     Objective:   CONSTITUTIONAL:  Vital signs as above.  No acute distress.  The patient is well nourished and well groomed.   Patient appears tired.  PSYCHIATRIC:  The patient is awake, alert, oriented to person, place and time.  The patient is answering questions appropriately with  clear speech.  Normal affect.   HEENT: Normocephalic, atraumatic.  Sclera clear.    SKIN: Exposed skin is clean, dry, intact without rashes.  MUSCULOSKELETAL: Patient ambulates with a mildly flexed forward posture at the hips.  Able to rise onto toes and heels bilaterally with support.  Tender to palpation right lower lumbar paraspinous right L3-4.    5/5 strength bilateral shoulder abductors, elbow flexors/extensors, wrist extensors, finger flexors/abductors, hip flexors, knee flexors/extensors, ankle dorsi/plantar flexors.  NEUROLOGICAL: Tender to palpation left occipital nerve.  Cranial nerves II through XII grossly intact.  Sensation light touch intact bilateral upper and lower extremities throughout.     RESULTS: I reviewed the MRI lumbar spine from [place] dated July 7, 2023.  This shows multilevel lumbar spondylosis unchanged compared with an MRI lumbar spine from 2021.  At L2-3 there is mild to moderate spinal canal stenosis with moderate left and mild to moderate right foraminal stenosis.  At L3-4 there is mild spinal canal stenosis with mild to moderate right and mild left foraminal stenosis.  At L4-5 there is moderate to severe spinal canal stenosis with mild to moderate bilateral foraminal stenosis.    EMG bilateral lower extremities with neurology December 8, 2023:  SUMMARY  Nerve conduction and EMG study of bilateral lower extremities shows:     Normal right peroneal distal motor latency with decreased amplitude and conduction velocity.  Prolonged left peroneal distal motor latency with decreased amplitude and conduction velocity.  Normal right tibial distal motor latency with decreased amplitude and conduction velocity.  Normal left tibial distal motor latency with decreased amplitude and conduction velocity.  Abnormal bilateral sural and normal bilateral superficial peroneal sensory SNAP though amplitudes are low normal.  Monopolar needle exam is normal.     CLINICAL INTERPRETATION:  This is an  abnormal nerve conduction and EMG study.  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  Further clinical correlation is needed.    Reviewed the lumbar spine flexion-extension x-rays from [facility] dated February 13, 2024.  This shows 6 mm anterolisthesis L4-5 unchanged on flexion and extension.  There is 3 mm anterolisthesis L5-S1 unchanged on flexion and extension.      Again, thank you for allowing me to participate in the care of your patient.        Sincerely,        Krista Kaplan PA-C    Electronically signed

## 2025-01-08 ENCOUNTER — OFFICE VISIT (OUTPATIENT)
Dept: INTERNAL MEDICINE | Facility: CLINIC | Age: 85
End: 2025-01-08
Payer: COMMERCIAL

## 2025-01-08 ENCOUNTER — HOSPITAL ENCOUNTER (OUTPATIENT)
Dept: GENERAL RADIOLOGY | Facility: HOSPITAL | Age: 85
Discharge: HOME | End: 2025-01-08
Attending: PHYSICIAN ASSISTANT
Payer: COMMERCIAL

## 2025-01-08 VITALS
WEIGHT: 174 LBS | HEART RATE: 59 BPM | RESPIRATION RATE: 14 BRPM | TEMPERATURE: 98.1 F | BODY MASS INDEX: 27.97 KG/M2 | OXYGEN SATURATION: 99 % | HEIGHT: 66 IN | DIASTOLIC BLOOD PRESSURE: 68 MMHG | SYSTOLIC BLOOD PRESSURE: 130 MMHG

## 2025-01-08 DIAGNOSIS — F34.1 DYSTHYMIC DISORDER: ICD-10-CM

## 2025-01-08 DIAGNOSIS — G62.9 NEUROPATHY: ICD-10-CM

## 2025-01-08 DIAGNOSIS — M54.50 LUMBAR SPINE PAIN: ICD-10-CM

## 2025-01-08 DIAGNOSIS — E03.9 ACQUIRED HYPOTHYROIDISM: ICD-10-CM

## 2025-01-08 DIAGNOSIS — Z00.00 ENCOUNTER FOR MEDICARE ANNUAL WELLNESS EXAM: Primary | ICD-10-CM

## 2025-01-08 DIAGNOSIS — I10 ESSENTIAL HYPERTENSION: ICD-10-CM

## 2025-01-08 DIAGNOSIS — I25.10 CORONARY ARTERY DISEASE INVOLVING NATIVE CORONARY ARTERY OF NATIVE HEART WITHOUT ANGINA PECTORIS: ICD-10-CM

## 2025-01-08 DIAGNOSIS — I10 ESSENTIAL (PRIMARY) HYPERTENSION: ICD-10-CM

## 2025-01-08 DIAGNOSIS — D47.2 IGA MONOCLONAL GAMMOPATHY: ICD-10-CM

## 2025-01-08 DIAGNOSIS — N18.31 STAGE 3A CHRONIC KIDNEY DISEASE (H): ICD-10-CM

## 2025-01-08 DIAGNOSIS — K21.9 GASTROESOPHAGEAL REFLUX DISEASE WITHOUT ESOPHAGITIS: ICD-10-CM

## 2025-01-08 DIAGNOSIS — D50.0 IRON DEFICIENCY ANEMIA DUE TO CHRONIC BLOOD LOSS: ICD-10-CM

## 2025-01-08 LAB
CHOLEST SERPL-MCNC: 277 MG/DL
CREAT UR-MCNC: 37.2 MG/DL
FASTING STATUS PATIENT QL REPORTED: YES
HDLC SERPL-MCNC: 65 MG/DL
HGB BLD-MCNC: 10.9 G/DL (ref 11.7–15.7)
LDLC SERPL CALC-MCNC: 193 MG/DL
MICROALBUMIN UR-MCNC: <12 MG/L
MICROALBUMIN/CREAT UR: NORMAL MG/G{CREAT}
NONHDLC SERPL-MCNC: 212 MG/DL
TRIGL SERPL-MCNC: 93 MG/DL
TSH SERPL DL<=0.005 MIU/L-ACNC: 2.15 UIU/ML (ref 0.3–4.2)

## 2025-01-08 PROCEDURE — 72100 X-RAY EXAM L-S SPINE 2/3 VWS: CPT

## 2025-01-08 PROCEDURE — 36415 COLL VENOUS BLD VENIPUNCTURE: CPT | Performed by: NURSE PRACTITIONER

## 2025-01-08 PROCEDURE — G2211 COMPLEX E/M VISIT ADD ON: HCPCS | Performed by: NURSE PRACTITIONER

## 2025-01-08 PROCEDURE — 99214 OFFICE O/P EST MOD 30 MIN: CPT | Mod: 25 | Performed by: NURSE PRACTITIONER

## 2025-01-08 PROCEDURE — 85018 HEMOGLOBIN: CPT | Performed by: NURSE PRACTITIONER

## 2025-01-08 PROCEDURE — 91320 SARSCV2 VAC 30MCG TRS-SUC IM: CPT | Performed by: NURSE PRACTITIONER

## 2025-01-08 PROCEDURE — 82570 ASSAY OF URINE CREATININE: CPT | Performed by: NURSE PRACTITIONER

## 2025-01-08 PROCEDURE — 82043 UR ALBUMIN QUANTITATIVE: CPT | Performed by: NURSE PRACTITIONER

## 2025-01-08 PROCEDURE — 80061 LIPID PANEL: CPT | Performed by: NURSE PRACTITIONER

## 2025-01-08 PROCEDURE — G0439 PPPS, SUBSEQ VISIT: HCPCS | Performed by: NURSE PRACTITIONER

## 2025-01-08 PROCEDURE — 84443 ASSAY THYROID STIM HORMONE: CPT | Performed by: NURSE PRACTITIONER

## 2025-01-08 PROCEDURE — 90480 ADMN SARSCOV2 VAC 1/ONLY CMP: CPT | Performed by: NURSE PRACTITIONER

## 2025-01-08 RX ORDER — HYDROCHLOROTHIAZIDE 25 MG/1
25 TABLET ORAL DAILY
Qty: 90 TABLET | Refills: 2 | Status: SHIPPED | OUTPATIENT
Start: 2025-01-08

## 2025-01-08 RX ORDER — SENNOSIDES 8.6 MG
1 TABLET ORAL DAILY
COMMUNITY

## 2025-01-08 RX ORDER — CITALOPRAM HYDROBROMIDE 10 MG/1
5 TABLET ORAL DAILY
Qty: 45 TABLET | Refills: 2 | Status: SHIPPED | OUTPATIENT
Start: 2025-01-08

## 2025-01-08 RX ORDER — FERRIC GLYCINATE 18 MG/15ML
25 LIQUID (ML) ORAL DAILY
COMMUNITY

## 2025-01-08 NOTE — ASSESSMENT & PLAN NOTE
Can assume management so she does not need to continue to see neurologist. She gets a topical product through [employer] that has been effective for her.

## 2025-01-08 NOTE — PATIENT INSTRUCTIONS
Patient Education   Preventive Care Advice   This is general advice given by our system to help you stay healthy. However, your care team may have specific advice just for you. Please talk to your care team about your preventive care needs.  Nutrition  Eat 5 or more servings of fruits and vegetables each day.  Try wheat bread, brown rice and whole grain pasta (instead of white bread, rice, and pasta).  Get enough calcium and vitamin D. Check the label on foods and aim for 100% of the RDA (recommended daily allowance).  Lifestyle  Exercise at least 150 minutes each week  (30 minutes a day, 5 days a week).  Do muscle strengthening activities 2 days a week. These help control your weight and prevent disease.  No smoking.  Wear sunscreen to prevent skin cancer.  Have a dental exam and cleaning every 6 months.  Yearly exams  See your health care team every year to talk about:  Any changes in your health.  Any medicines your care team has prescribed.  Preventive care, family planning, and ways to prevent chronic diseases.  Shots (vaccines)   HPV shots (up to age 26), if you've never had them before.  Hepatitis B shots (up to age 59), if you've never had them before.  COVID-19 shot: Get this shot when it's due.  Flu shot: Get a flu shot every year.  Tetanus shot: Get a tetanus shot every 10 years.  Pneumococcal, hepatitis A, and RSV shots: Ask your care team if you need these based on your risk.  Shingles shot (for age 50 and up)  General health tests  Diabetes screening:  Starting at age 35, Get screened for diabetes at least every 3 years.  If you are younger than age 35, ask your care team if you should be screened for diabetes.  Cholesterol test: At age 39, start having a cholesterol test every 5 years, or more often if advised.  Bone density scan (DEXA): At age 50, ask your care team if you should have this scan for osteoporosis (brittle bones).  Hepatitis C: Get tested at least once in your life.  STIs (sexually  transmitted infections)  Before age 24: Ask your care team if you should be screened for STIs.  After age 24: Get screened for STIs if you're at risk. You are at risk for STIs (including HIV) if:  You are sexually active with more than one person.  You don't use condoms every time.  You or a partner was diagnosed with a sexually transmitted infection.  If you are at risk for HIV, ask about PrEP medicine to prevent HIV.  Get tested for HIV at least once in your life, whether you are at risk for HIV or not.  Cancer screening tests  Cervical cancer screening: If you have a cervix, begin getting regular cervical cancer screening tests starting at age 21.  Breast cancer scan (mammogram): If you've ever had breasts, begin having regular mammograms starting at age 40. This is a scan to check for breast cancer.  Colon cancer screening: It is important to start screening for colon cancer at age 45.  Have a colonoscopy test every 10 years (or more often if you're at risk) Or, ask your provider about stool tests like a FIT test every year or Cologuard test every 3 years.  To learn more about your testing options, visit:   .  For help making a decision, visit:   https://bit.ly/yz39967.  Prostate cancer screening test: If you have a prostate, ask your care team if a prostate cancer screening test (PSA) at age 55 is right for you.  Lung cancer screening: If you are a current or former smoker ages 50 to 80, ask your care team if ongoing lung cancer screenings are right for you.  For informational purposes only. Not to replace the advice of your health care provider. Copyright   2023 [facility]. All rights reserved. Clinically reviewed by the [facility]. [employer] 565440 - REV 01/24.  Learning About Activities of Daily Living  What are activities of daily living?     Activities of daily living (ADLs) are the basic self-care tasks you do every day. These include eating, bathing, dressing,  and moving around.  As you age, and if you have health problems, you may find that it's harder to do some of these tasks. If so, your doctor can suggest ideas that may help.  To measure what kind of help you may need, your doctor will ask how well you are able to do ADLs. Let your doctor know if there are any tasks that you are having trouble doing. This is an important first step to getting help. And when you have the help you need, you can stay as independent as possible.  How will a doctor assess your ADLs?  Asking about ADLs is part of a routine health checkup your doctor will likely do as you age. Your health check might be done in a doctor's office, in your home, or at a hospital. The goal is to find out if you are having any problems that could make it hard to care for yourself or that make it unsafe for you to be on your own.  To measure your ADLs, your doctor will ask how hard it is for you to do routine tasks. Your doctor may also want to know if you have changed the way you do a task because of a health problem. Your doctor may watch how you:  Walk back and forth.  Keep your balance while you stand or walk.  Move from sitting to standing or from a bed to a chair.  Button or unbutton a shirt or sweater.  Remove and put on your shoes.  It's common to feel a little worried or anxious if you find you can't do all the things you used to be able to do. Talking with your doctor about ADLs is a way to make sure you're as safe as possible and able to care for yourself as well as you can. You may want to bring a caregiver, friend, or family member to your checkup. They can help you talk to your doctor.  Follow-up care is a key part of your treatment and safety. Be sure to make and go to all appointments, and call your doctor if you are having problems. It's also a good idea to know your test results and keep a list of the medicines you take.  Current as of: October 24, 2023  Content Version: 14.3    2024 [facility] [address] [facility]  [employer].   Care instructions adapted under license by your healthcare professional. If you have questions about a medical condition or this instruction, always ask your healthcare professional. [facility] [employer] disclaims any warranty or liability for your use of this information.

## 2025-01-08 NOTE — PROGRESS NOTES
Preventive Care Visit  [facility] MOON Mishra NP  Jan 8, 2025      Assessment & Plan   Problem List Items Addressed This Visit       Gastroesophageal reflux disease     Doing very well with regards to reflux.  Notes that she has found dietary triggers and avoiding these has led to improvement in symptoms  -Continue with omeprazole 40 mg daily as well as famotidine 20 mg as needed         Relevant Medications    sennosides (SENOKOT) 8.6 MG tablet    Essential hypertension     Stable with current medication  -Continue hydrochlorothiazide 25 mg daily         Hypothyroidism     Repeat TSH level today  -If euthyroid continue with levothyroxine 50 mcg daily         Relevant Orders    TSH WITH FREE T4 REFLEX    Dysthymic disorder     Doing well with low-dose citalopram.  Spine clinic wondered about switching to duloxetine but she has had a headache associated with this medication in the past  -Continue with citalopram 5 mg daily         Relevant Medications    citalopram (CELEXA) 10 MG tablet    Coronary artery disease involving native coronary artery of native heart without angina pectoris     Mild coronary artery disease on coronary CT calcium score.  Due to reported myalgias we had to discontinue rosuvastatin and have not resumed an alternative.  Her preference is to avoid a statin medication and at her age I think this is reasonable           Relevant Orders    Lipid panel reflex to direct LDL Non-fasting    Stage 3a chronic kidney disease (H)     Check annual BMP and UACR  -Avoid nephrotoxic medications as possible.  She does take omeprazole but was unable to tolerate discontinuation of the medication           Relevant Orders    Albumin Random Urine Quantitative with Creat Ratio    Neuropathy     Can assume management so she does not need to continue to see neurologist. She gets a topical product through [employer] that has been effective for her.         Iron deficiency anemia due  "to chronic blood loss     Continue iron supplement         Relevant Medications    Iron 18 MG/15ML LIQD    Other Relevant Orders    Hemoglobin    IgA monoclonal gammopathy     She is to have labs repeated in May 2025.  If results remain stable, we discussed today that I could order the labs and monitor this through primary care.  If there is any change she could be referred back to hematology  -Labs in May   - I can order labs for November 2025          Other Visit Diagnoses       Encounter for Medicare annual wellness exam    -  Primary    Essential (primary) hypertension        Relevant Medications    hydrochlorothiazide (HYDRODIURIL) 25 MG tablet                  BMI  Estimated body mass index is 28.08 kg/m  as calculated from the following:    Height as of this encounter: 1.676 m (5' 6\").    Weight as of this encounter: 78.9 kg (174 lb).       Counseling  Appropriate preventive services were addressed with this patient via screening, questionnaire, or discussion as appropriate for fall prevention, nutrition, physical activity, Tobacco-use cessation, social engagement, weight loss and cognition.  Checklist reviewing preventive services available has been given to the patient.  Reviewed patient's diet, addressing concerns and/or questions.   Updated plan of care.  Patient reported difficulty with activities of daily living were addressed today.    Return in about 6 months (around 7/8/2025).      Bala Connor is a 84 year old, presenting for the following:  Annual Visit        1/8/2025     7:49 AM   Additional Questions   Roomed by Adelita   Accompanied by daughter Sabiha SAXENA  She had an occipital nerve block yesterday. She has felt a difference already.     She is status post right L3, L4, and L5 radiofrequency ablation November 2024. She is doing physical therapy exercises, walking, and riding a stationary bike.     Health Care Directive  Patient has a Health Care Directive on file  Advance care " planning document is on file and is current.        1/6/2025   General Health   How would you rate your overall physical health? (!) FAIR   Feel stress (tense, anxious, or unable to sleep) Not at all         1/6/2025   Nutrition   Diet: Regular (no restrictions)         1/6/2025   Exercise   Days per week of moderate/strenous exercise 7 days   Average minutes spent exercising at this level 60 min         1/6/2025   Social Factors   Frequency of gathering with friends or relatives More than three times a week   Worry food won't last until get money to buy more No   Food not last or not have enough money for food? No   Do you have housing? (Housing is defined as stable permanent housing and does not include staying ouside in a car, in a tent, in an abandoned building, in an overnight shelter, or couch-surfing.) Yes   Are you worried about losing your housing? No   Lack of transportation? No   Unable to get utilities (heat,electricity)? No         1/6/2025   Fall Risk   Fallen 2 or more times in the past year? No    No   Trouble with walking or balance? No    No       Multiple values from one day are sorted in reverse-chronological order          1/6/2025   Activities of Daily Living- Home Safety   Needs help with the following daily activites Money management   Safety concerns in the home None of the above         1/6/2025   Dental   Dentist two times every year? Yes         1/6/2025   Hearing Screening   Hearing concerns? None of the above         1/6/2025   Driving Risk Screening   Patient/family members have concerns about driving No         1/6/2025   General Alertness/Fatigue Screening   Have you been more tired than usual lately? No         1/6/2025   Urinary Incontinence Screening   Bothered by leaking urine in past 6 months No         1/6/2025   TB Screening   Were you born outside of the [place]? No       Today's PHQ-9 Score:       1/7/2025     9:55 AM   PHQ-9 SCORE   PHQ-9 Total Score MyChart 3 (Minimal  depression)   PHQ-9 Total Score 3        Patient-reported         1/6/2025   Substance Use   Alcohol more than 3/day or more than 7/wk Not Applicable   Do you have a current opioid prescription? No   How severe/bad is pain from 1 to 10? 5/10   Do you use any other substances recreationally? No     Social History     Tobacco Use    Smoking status: Never     Passive exposure: Never    Smokeless tobacco: Never   Vaping Use    Vaping status: Never Used   Substance Use Topics    Alcohol use: No    Drug use: No         9/23/2024   LAST FHS-7 RESULTS   1st degree relative breast or ovarian cancer No   Any relative bilateral breast cancer No   Any male have breast cancer No   Any ONE woman have BOTH breast AND ovarian cancer No   Any woman with breast cancer before 50yrs No   2 or more relatives with breast AND/OR ovarian cancer No   2 or more relatives with breast AND/OR bowel cancer No         Reviewed and updated as needed this visit by Provider   Tobacco  Allergies  Meds  Problems  Med Hx  Surg Hx  Fam Hx              Current providers sharing in care for this patient include:  Patient Care Team:  Jacy Mishra NP as PCP - General  Jacy Mishra NP as Assigned PCP  Ben Perez MD as MD (Medical Oncology)  Jacy Mishra NP as Nurse Practitioner  Ben Perez MD as Assigned Cancer Care Provider  Lucas Monzon MD as MD (Neurology)  Krista Kaplan PA-C as Assigned Musculoskeletal Provider  Lucas Monzon MD as Assigned Neuroscience Provider  Sanjuana Del Rio, RN as Specialty Care Coordinator (Hematology & Oncology)    The following health maintenance items are reviewed in Epic and correct as of today:  Health Maintenance   Topic Date Due    RSV VACCINE (1 - 1-dose 75+ series) Never done    COVID-19 Vaccine (8 - 2024-25 season) 09/17/2024    LIPID  09/19/2024    MICROALBUMIN  09/19/2024    TSH W/FREE T4 REFLEX  09/19/2024    ANNUAL REVIEW OF HM ORDERS  09/19/2024    MEDICARE ANNUAL WELLNESS VISIT   "10/04/2024    PHQ-9  07/08/2025    BMP  10/21/2025    HEMOGLOBIN  10/21/2025    FALL RISK ASSESSMENT  01/08/2026    ADVANCE CARE PLANNING  01/08/2030    DTAP/TDAP/TD IMMUNIZATION (3 - Td or Tdap) 07/19/2034    DEPRESSION ACTION PLAN  Completed    INFLUENZA VACCINE  Completed    Pneumococcal Vaccine: 50+ Years  Completed    URINALYSIS  Completed    ZOSTER IMMUNIZATION  Completed    HPV IMMUNIZATION  Aged Out    MENINGITIS IMMUNIZATION  Aged Out    RSV MONOCLONAL ANTIBODY  Aged Out    A1C  Discontinued    DEXA  Discontinued    DIABETIC FOOT EXAM  Discontinued    EYE EXAM  Discontinued            Objective    Exam  /68   Pulse 59   Temp 98.1  F (36.7  C) (Oral)   Resp 14   Ht 1.676 m (5' 6\")   Wt 78.9 kg (174 lb)   LMP  (LMP Unknown)   SpO2 99%   BMI 28.08 kg/m     Estimated body mass index is 28.08 kg/m  as calculated from the following:    Height as of this encounter: 1.676 m (5' 6\").    Weight as of this encounter: 78.9 kg (174 lb).    Physical Exam  GENERAL: alert and no distress  EYES: Eyes grossly normal to inspection, conjunctivae and sclerae normal  HENT: left ear canal occluded by cerumen. Right TM/canal normal  RESP: lungs clear to auscultation - no rales, rhonchi or wheezes  CV: regular rate and rhythm, normal S1 S2, no S3 or S4, no murmur, click or rub, no peripheral edema  PSYCH: mentation appears normal, affect normal/bright        1/8/2025   Mini Cog   Clock Draw Score 0 Abnormal   3 Item Recall 1 object recalled   Mini Cog Total Score 1          The longitudinal plan of care for the diagnosis(es)/condition(s) as documented were addressed during this visit. Due to the added complexity in care, I will continue to support Geeta in the subsequent management and with ongoing continuity of care.  Signed Electronically by: Jacy Mishra NP    "

## 2025-01-08 NOTE — ASSESSMENT & PLAN NOTE
She is to have labs repeated in May 2025.  If results remain stable, we discussed today that I could order the labs and monitor this through primary care.  If there is any change she could be referred back to hematology  -Labs in May   - I can order labs for November 2025

## 2025-01-08 NOTE — ASSESSMENT & PLAN NOTE
Mild coronary artery disease on coronary CT calcium score.  Due to reported myalgias we had to discontinue rosuvastatin and have not resumed an alternative.  Her preference is to avoid a statin medication and at her age I think this is reasonable

## 2025-01-08 NOTE — ASSESSMENT & PLAN NOTE
Doing well with low-dose citalopram.  Spine clinic wondered about switching to duloxetine but she has had a headache associated with this medication in the past  -Continue with citalopram 5 mg daily

## 2025-01-08 NOTE — LETTER
January 8, 2025      Geeta Berry  [address]   [facility] [address]        Dear ,    We are writing to inform you of your test results.    Your cholesterol is expectedly higher since we stopped the cholesterol medication. I do not intend to continue to check it since we decided that the side effects of the medication were not work the benefit of the medication.     Thyroid level is normal.     There is no excess protein in the urine.     Your hemoglobin is still low. You likely already received a call regarding this but I would like to increase your dose of iron to take this daily if you can tolerate it.     Resulted Orders   Lipid panel reflex to direct LDL Non-fasting   Result Value Ref Range    Cholesterol 277 (H) <200 mg/dL    Triglycerides 93 <150 mg/dL    Direct Measure HDL 65 >=50 mg/dL    LDL Cholesterol Calculated 193 (H) <100 mg/dL    Non HDL Cholesterol 212 (H) <130 mg/dL    Patient Fasting > 8hrs? Yes     Narrative    Cholesterol  Desirable: < 200 mg/dL  Borderline High: 200 - 239 mg/dL  High: >= 240 mg/dL    Triglycerides  Normal: < 150 mg/dL  Borderline High: 150 - 199 mg/dL  High: 200-499 mg/dL  Very High: >= 500 mg/dL    Direct Measure HDL  Female: >= 50 mg/dL   Male: >= 40 mg/dL    LDL Cholesterol  Desirable: < 100 mg/dL  Above Desirable: 100 - 129 mg/dL   Borderline High: 130 - 159 mg/dL   High:  160 - 189 mg/dL   Very High: >= 190 mg/dL    Non HDL Cholesterol  Desirable: < 130 mg/dL  Above Desirable: 130 - 159 mg/dL  Borderline High: 160 - 189 mg/dL  High: 190 - 219 mg/dL  Very High: >= 220 mg/dL   Albumin Random Urine Quantitative with Creat Ratio   Result Value Ref Range    Creatinine Urine mg/dL 37.2 mg/dL      Comment:      The reference ranges have not been established in urine creatinine. The results should be integrated into the clinical context for interpretation.    Albumin Urine mg/L <12.0 mg/L      Comment:      The reference ranges have not been established  in urine albumin. The results should be integrated into the clinical context for interpretation.    Albumin Urine mg/g Cr        Comment:      Unable to calculate, urine albumin and/or urine creatinine is outside detectable limits.  Microalbuminuria is defined as an albumin:creatinine ratio of 17 to 299 for males and 25 to 299 for females. A ratio of albumin:creatinine of 300 or higher is indicative of overt proteinuria.  Due to biologic variability, positive results should be confirmed by a second, first-morning random or 24-hour timed urine specimen. If there is discrepancy, a third specimen is recommended. When 2 out of 3 results are in the microalbuminuria range, this is evidence for incipient nephropathy and warrants increased efforts at glucose control, blood pressure control, and institution of therapy with an angiotensin-converting-enzyme (ACE) inhibitor (if the patient can tolerate it).     TSH WITH FREE T4 REFLEX   Result Value Ref Range    TSH 2.15 0.30 - 4.20 uIU/mL   Hemoglobin   Result Value Ref Range    Hemoglobin 10.9 (L) 11.7 - 15.7 g/dL       If you have any questions or concerns, please call the clinic at the number listed above.       Sincerely,      Jacy Mishra NP    Electronically signed

## 2025-01-08 NOTE — ASSESSMENT & PLAN NOTE
Doing very well with regards to reflux.  Notes that she has found dietary triggers and avoiding these has led to improvement in symptoms  -Continue with omeprazole 40 mg daily as well as famotidine 20 mg as needed

## 2025-01-08 NOTE — ASSESSMENT & PLAN NOTE
Check annual BMP and UACR  -Avoid nephrotoxic medications as possible.  She does take omeprazole but was unable to tolerate discontinuation of the medication

## 2025-01-09 ENCOUNTER — TELEPHONE (OUTPATIENT)
Dept: PHYSICAL MEDICINE AND REHAB | Facility: CLINIC | Age: 85
End: 2025-01-09
Payer: COMMERCIAL

## 2025-01-09 NOTE — TELEPHONE ENCOUNTER
----- Message from Krista Kaplan sent at 1/9/2025  7:36 AM CST -----  Please call this patient and let her know that her x-ray did not show any fractures.  There is significant wear-and-tear to the cartilage disks and arthritis in the joints, similar to previous imaging studies.

## 2025-01-21 NOTE — PROGRESS NOTES
Assessment:   Geeta Berry is a 84 y.o. female with past medical history significant for neuropathy, GERD, hyperlipidemia, hypothyroidism, hypertension, coronary artery disease, chronic kidney disease stage III, iron deficiency anemia, dysthymia who presents today for follow-up regarding 2 concerns:  1.  Chronic right low back pain.  X-ray January 8, 2025 was negative for fracture.  My review of an MRI lumbar spine shows grade 1 degenerative spondylolisthesis L4-5 with moderate to severe spinal stenosis.  Patient is status post right L3, L4, L5 radiofrequency ablation November 25, 2024 which provided relief of pain in the L4-5 and L5-S1 regions but she has persistent pain just above her ablation area.  Suspect she is symptomatic from the facet arthropathy on the right at L3-4.    - Patient saw Dr. Rea February 13, 2024.  Dr. Rea recommended conservative treatment.  2.  Left occipital neuralgia.  Patient is status post a left occipital nerve block January 7, 2025 which provided greater than 50% relief of pain.  She has had additional improvement in pain by changing her pillow.    Plan:     A shared decision making plan was used.  The patient's values and choices were respected.  The following represents what was discussed and decided upon by the physician assistant and the patient.      1.  DIAGNOSTIC TESTS:   -I reviewed the MRI lumbar spine.  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  - I reviewed the lumbar spine flexion-extension x-rays which were negative for dynamic instability.  - I reviewed the x-ray lumbar spine from January 2025 which was negative for fracture.    2.  PHYSICAL THERAPY:  - Patient completed 6 sessions of physical therapy May 16, 2024.  No additional physical therapy was ordered.  She will continue with home exercises.      3.  MEDICATIONS: No changes are made to the patient's  medications.  - Patient uses Tylenol as needed.  - Patient uses lidocaine patches or roll-on.  -Patient did not tolerate gabapentin.  -Did not tolerate duloxetine in the past due to headache    4.  INTERVENTIONS: No interventions were ordered today.  - We could repeat the left occipital nerve block as long as she has at least 50% relief of pain for at least 3 months.  - We are going to monitor her back pain for now.  She states the pain is manageable with applying heat.  I would like to see how she is doing in May, 2025 (6 months after her ablation).  If L4-5 pain is returning and she continues to have L3-4 pain I would recommend right L2, L3, L4 medial branch blocks as a workup toward radiofrequency ablation targeting the right L3-4 and L4-5 facet joints.  In reviewing her MRI, I think it is less likely that she has pain related to the L5-S1 facet joint.      5.  PATIENT EDUCATION: Patient is in agreement with the above plan.  All questions were answered.    6.  FOLLOW-UP: Patient will follow-up with me in 4 months.  If she has questions or concerns in the meantime, she should not hesitate to call.  Subjective:     Geeta Berry is a 84 year old female who presents today for follow-up regarding chronic low back pain and left occipital neuralgia.  Patient is following up after left occipital nerve block January 7, 2025.  Patient reports greater than 50% relief of pain.  She reports she has had additional improvement since changing her pillow.    Patient complains of minimal residual soreness left upper neck.    Patient is more concerned today about her ongoing right low back pain.  Pain is located just above where she had her ablation.  Denies pain down the legs.  She feels weakness in her back and legs when she transitions from seated to standing.  Denies numbness or tingling.  Denies loss of bowel or bladder control.  Denies fevers.  She rates her pain today as a 4 out of 10.  At its worst it is a 6 out of 10.  I  discussed that is a 1 out of 10.  Back pain is aggravated with standing for more than 15 minutes.  Pain is alleviated applying heat.  She denies any new symptoms since she was last seen.    Treatment to date:  - Completed 6 sessions of physical therapy for trochanteric bursitis May 16, 2024.  - Patient participated in physical therapy in November 2017.  - Patient had 6 sessions of physical therapy ending December 2021  - She also has had 4 sessions of physical therapy for hip pain/weakness of hips ending March 2022  - Left occipital nerve block January 7, 2025 with greater than 50% relief of pain  - Right L3, L4, L5 radiofrequency ablation November 25, 2024 with 100% relief of pain localized to the right L4-5 and L5-S1 facets.  - Right L3, L4, L5 medial branch blocks October 30, 2024-positive response  - Right L3, L4, L5 medial branch blocks October 15, 2024-positive response  - Left occipital nerve block February 13, 2024 with greater than 50% relief for 9 months  - Left trochanteric bursa injection January 30, 2024 with 100% relief  - L5-S1 interlaminar epidural steroid injection August 8, 2023 with greater than 50% relief of pain for 2 months  - Bilateral L4-5 transforaminal epidural steroid injection June 12,, 2023 with 50% relief  - Left L4-5 transforaminal epidural steroid injection October 21, 2021 with 60% relief of pain  - Left occipital nerve block July 11, 2018 with 80% relief of pain  - Left C2-3, C3-4, C4-5 facet joint injections June 27, 2018  - Left C2, C3, C4, C5 MBB June 7, 2018  - Left C1-C2 facet joint injection July 7, 2017  - Left C1-C2 facet joint injection April 6, 2017  -Did not tolerate gabapentin  - Tylenol as needed is helpful    Review of Systems:  Positive for pain much worse at night.  Negative for numbness/tingling, weakness, loss of bowel/bladder control, inability to urinate, trip/stumble/falls, difficulty swallowing, difficulty with hand skills, fevers, unintentional weight loss,  headache.     Objective:   CONSTITUTIONAL:  Vital signs as above.  No acute distress.  The patient is well nourished and well groomed.   Patient appears tired.  PSYCHIATRIC:  The patient is awake, alert, oriented to person, place and time.  The patient is answering questions appropriately with clear speech.  Normal affect.   HEENT: Normocephalic, atraumatic.  Sclera clear.    SKIN: Exposed skin is clean, dry, intact without rashes.  MUSCULOSKELETAL: Patient ambulates with a mildly flexed forward posture at the hips.  Able to rise onto toes and heels bilaterally with support.  Tender to palpation right lower lumbar paraspinous right L3-4.    5/5 strength bilateral hip flexors, knee flexors/extensors, ankle dorsi/plantar flexors.  NEUROLOGICAL: Sensation light touch intact bilateral lower extremities throughout.     RESULTS:   X-ray lumbar spine January 8, 2025 was negative for fracture.  It showed significant multilevel degenerative change in the setting of scoliosis.    I reviewed the MRI lumbar spine from [place] dated July 7, 2023.  This shows multilevel lumbar spondylosis unchanged compared with an MRI lumbar spine from 2021.  At L2-3 there is mild to moderate spinal canal stenosis with moderate left and mild to moderate right foraminal stenosis.  At L3-4 there is mild spinal canal stenosis with mild to moderate right and mild left foraminal stenosis.  At L4-5 there is moderate to severe spinal canal stenosis with mild to moderate bilateral foraminal stenosis.    EMG bilateral lower extremities with neurology December 8, 2023:       CLINICAL INTERPRETATION:  This is an abnormal nerve conduction and EMG study.  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  Further clinical correlation is needed.    Reviewed the lumbar spine flexion-extension x-rays from [facility] dated February 13, 2024.  This shows 6 mm anterolisthesis L4-5 unchanged on flexion and extension.  There is 3 mm  anterolisthesis L5-S1 unchanged on flexion and extension.

## 2025-01-27 ENCOUNTER — OFFICE VISIT (OUTPATIENT)
Dept: PHYSICAL MEDICINE AND REHAB | Facility: CLINIC | Age: 85
End: 2025-01-27
Payer: COMMERCIAL

## 2025-01-27 VITALS
DIASTOLIC BLOOD PRESSURE: 60 MMHG | SYSTOLIC BLOOD PRESSURE: 124 MMHG | WEIGHT: 174 LBS | HEIGHT: 66 IN | BODY MASS INDEX: 27.97 KG/M2 | HEART RATE: 61 BPM

## 2025-01-27 DIAGNOSIS — M47.816 LUMBAR FACET ARTHROPATHY: Primary | ICD-10-CM

## 2025-01-27 DIAGNOSIS — M54.81 OCCIPITAL NEURALGIA OF LEFT SIDE: ICD-10-CM

## 2025-01-27 PROCEDURE — 99213 OFFICE O/P EST LOW 20 MIN: CPT | Performed by: PHYSICIAN ASSISTANT

## 2025-01-27 ASSESSMENT — PAIN SCALES - GENERAL: PAINLEVEL_OUTOF10: MODERATE PAIN (4)

## 2025-01-27 NOTE — LETTER
1/27/2025      Geeta Berry  [address]  [facility] [address]      Dear Colleague,    Thank you for referring your patient, eGeta Berry, to the [facility]UR[facility]ERY. Please see a copy of my visit note below.    Assessment:   Geeta Berry is a 84 y.o. female with past medical history significant for neuropathy, GERD, hyperlipidemia, hypothyroidism, hypertension, coronary artery disease, chronic kidney disease stage III, iron deficiency anemia, dysthymia who presents today for follow-up regarding 2 concerns:  1.  Chronic right low back pain.  X-ray January 8, 2025 was negative for fracture.  My review of an MRI lumbar spine shows grade 1 degenerative spondylolisthesis L4-5 with moderate to severe spinal stenosis.  Patient is status post right L3, L4, L5 radiofrequency ablation November 25, 2024 which provided relief of pain in the L4-5 and L5-S1 regions but she has persistent pain just above her ablation area.  Suspect she is symptomatic from the facet arthropathy on the right at L3-4.    - Patient saw Dr. Rea February 13, 2024.  Dr. Rea recommended conservative treatment.  2.  Left occipital neuralgia.  Patient is status post a left occipital nerve block January 7, 2025 which provided greater than 50% relief of pain.  She has had additional improvement in pain by changing her pillow.    Plan:     A shared decision making plan was used.  The patient's values and choices were respected.  The following represents what was discussed and decided upon by the physician assistant and the patient.      1.  DIAGNOSTIC TESTS:   -I reviewed the MRI lumbar spine.  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  - I reviewed the lumbar spine flexion-extension x-rays which were negative for dynamic instability.  - I reviewed the x-ray lumbar spine from January 2025 which was negative for  fracture.    2.  PHYSICAL THERAPY:  - Patient completed 6 sessions of physical therapy May 16, 2024.  No additional physical therapy was ordered.  She will continue with home exercises.      3.  MEDICATIONS: No changes are made to the patient's medications.  - Patient uses Tylenol as needed.  - Patient uses lidocaine patches or roll-on.  -Patient did not tolerate gabapentin.  -Did not tolerate duloxetine in the past due to headache    4.  INTERVENTIONS: No interventions were ordered today.  - We could repeat the left occipital nerve block as long as she has at least 50% relief of pain for at least 3 months.  - We are going to monitor her back pain for now.  She states the pain is manageable with applying heat.  I would like to see how she is doing in May, 2025 (6 months after her ablation).  If L4-5 pain is returning and she continues to have L3-4 pain I would recommend right L2, L3, L4 medial branch blocks as a workup toward radiofrequency ablation targeting the right L3-4 and L4-5 facet joints.  In reviewing her MRI, I think it is less likely that she has pain related to the L5-S1 facet joint.      5.  PATIENT EDUCATION: Patient is in agreement with the above plan.  All questions were answered.    6.  FOLLOW-UP: Patient will follow-up with me in 4 months.  If she has questions or concerns in the meantime, she should not hesitate to call.  Subjective:     Geeta Berry is a 84 year old female who presents today for follow-up regarding chronic low back pain and left occipital neuralgia.  Patient is following up after left occipital nerve block January 7, 2025.  Patient reports greater than 50% relief of pain.  She reports she has had additional improvement since changing her pillow.    Patient complains of minimal residual soreness left upper neck.    Patient is more concerned today about her ongoing right low back pain.  Pain is located just above where she had her ablation.  Denies pain down the legs.  She feels  weakness in her back and legs when she transitions from seated to standing.  Denies numbness or tingling.  Denies loss of bowel or bladder control.  Denies fevers.  She rates her pain today as a 4 out of 10.  At its worst it is a 6 out of 10.  I discussed that is a 1 out of 10.  Back pain is aggravated with standing for more than 15 minutes.  Pain is alleviated applying heat.  She denies any new symptoms since she was last seen.    Treatment to date:  - Completed 6 sessions of physical therapy for trochanteric bursitis May 16, 2024.  - Patient participated in physical therapy in November 2017.  - Patient had 6 sessions of physical therapy ending December 2021  - She also has had 4 sessions of physical therapy for hip pain/weakness of hips ending March 2022  - Left occipital nerve block January 7, 2025 with greater than 50% relief of pain  - Right L3, L4, L5 radiofrequency ablation November 25, 2024 with 100% relief of pain localized to the right L4-5 and L5-S1 facets.  - Right L3, L4, L5 medial branch blocks October 30, 2024-positive response  - Right L3, L4, L5 medial branch blocks October 15, 2024-positive response  - Left occipital nerve block February 13, 2024 with greater than 50% relief for 9 months  - Left trochanteric bursa injection January 30, 2024 with 100% relief  - L5-S1 interlaminar epidural steroid injection August 8, 2023 with greater than 50% relief of pain for 2 months  - Bilateral L4-5 transforaminal epidural steroid injection June 12,, 2023 with 50% relief  - Left L4-5 transforaminal epidural steroid injection October 21, 2021 with 60% relief of pain  - Left occipital nerve block July 11, 2018 with 80% relief of pain  - Left C2-3, C3-4, C4-5 facet joint injections June 27, 2018  - Left C2, C3, C4, C5 MBB June 7, 2018  - Left C1-C2 facet joint injection July 7, 2017  - Left C1-C2 facet joint injection April 6, 2017  -Did not tolerate gabapentin  - Tylenol as needed is helpful    Review of  Systems:  Positive for pain much worse at night.  Negative for numbness/tingling, weakness, loss of bowel/bladder control, inability to urinate, trip/stumble/falls, difficulty swallowing, difficulty with hand skills, fevers, unintentional weight loss, headache.     Objective:   CONSTITUTIONAL:  Vital signs as above.  No acute distress.  The patient is well nourished and well groomed.   Patient appears tired.  PSYCHIATRIC:  The patient is awake, alert, oriented to person, place and time.  The patient is answering questions appropriately with clear speech.  Normal affect.   HEENT: Normocephalic, atraumatic.  Sclera clear.    SKIN: Exposed skin is clean, dry, intact without rashes.  MUSCULOSKELETAL: Patient ambulates with a mildly flexed forward posture at the hips.  Able to rise onto toes and heels bilaterally with support.  Tender to palpation right lower lumbar paraspinous right L3-4.    5/5 strength bilateral hip flexors, knee flexors/extensors, ankle dorsi/plantar flexors.  NEUROLOGICAL: Sensation light touch intact bilateral lower extremities throughout.     RESULTS:   X-ray lumbar spine January 8, 2025 was negative for fracture.  It showed significant multilevel degenerative change in the setting of scoliosis.    I reviewed the MRI lumbar spine from [place] dated July 7, 2023.  This shows multilevel lumbar spondylosis unchanged compared with an MRI lumbar spine from 2021.  At L2-3 there is mild to moderate spinal canal stenosis with moderate left and mild to moderate right foraminal stenosis.  At L3-4 there is mild spinal canal stenosis with mild to moderate right and mild left foraminal stenosis.  At L4-5 there is moderate to severe spinal canal stenosis with mild to moderate bilateral foraminal stenosis.    EMG bilateral lower extremities with neurology December 8, 2023:       CLINICAL INTERPRETATION:  This is an abnormal nerve conduction and EMG study.  The study is suggestive of a sensorimotor polyneuropathy  in both legs.  Further clinical correlation is needed.    Reviewed the lumbar spine flexion-extension x-rays from [facility] dated February 13, 2024.  This shows 6 mm anterolisthesis L4-5 unchanged on flexion and extension.  There is 3 mm anterolisthesis L5-S1 unchanged on flexion and extension.      Again, thank you for allowing me to participate in the care of your patient.        Sincerely,        Krista Kaplan PA-C    Electronically signed

## 2025-01-27 NOTE — PATIENT INSTRUCTIONS
I am so happy that you are feeling better!  If your head pain returns we can repeat the injection(s).  You can have injections up to 4 times per year.    If your pain returns or if you have any other questions or concerns please send me a Flypad message or call our nurse line at [phone].     We will see how your back is feeling in May.  We could pursue the medial branch blocks and radiofrequency ablation at L3/4 and L4/5 at that time.

## 2025-02-01 DIAGNOSIS — E03.9 HYPOTHYROIDISM, UNSPECIFIED: ICD-10-CM

## 2025-02-03 RX ORDER — LEVOTHYROXINE SODIUM 50 UG/1
50 TABLET ORAL DAILY
Qty: 90 TABLET | Refills: 2 | Status: SHIPPED | OUTPATIENT
Start: 2025-02-03

## 2025-02-05 ENCOUNTER — PATIENT OUTREACH (OUTPATIENT)
Dept: ONCOLOGY | Facility: HOSPITAL | Age: 85
End: 2025-02-05
Payer: COMMERCIAL

## 2025-02-05 NOTE — PROGRESS NOTES
"[facility]: Cancer Care                                                                                          Patient was last seen in the clinic by Dr Perez in November 2024.  He recommended a 6-month follow-up lab only which has been scheduled for 5/7/2025 at 8:00 AM.  Otherwise he recommended yearly follow-up.  I have sent a \"remind me\" to JOSE Wei care coordinator for the patient to watch for the lab results from May 7.    Otherwise I have closed her chart out since she is on yearly follow-up.    Signature:  Marilyn Gonzalez RN  "

## 2025-02-09 DIAGNOSIS — F34.1 DYSTHYMIC DISORDER: ICD-10-CM

## 2025-02-10 RX ORDER — CITALOPRAM HYDROBROMIDE 10 MG/1
5 TABLET ORAL DAILY
Qty: 45 TABLET | Refills: 1 | Status: SHIPPED | OUTPATIENT
Start: 2025-02-10

## 2025-02-18 ENCOUNTER — OFFICE VISIT (OUTPATIENT)
Dept: PHYSICAL MEDICINE AND REHAB | Facility: CLINIC | Age: 85
End: 2025-02-18
Payer: COMMERCIAL

## 2025-02-18 VITALS
HEIGHT: 66 IN | BODY MASS INDEX: 27.97 KG/M2 | SYSTOLIC BLOOD PRESSURE: 122 MMHG | WEIGHT: 174 LBS | HEART RATE: 60 BPM | DIASTOLIC BLOOD PRESSURE: 60 MMHG

## 2025-02-18 DIAGNOSIS — M54.81 OCCIPITAL NEURALGIA OF LEFT SIDE: Primary | ICD-10-CM

## 2025-02-18 DIAGNOSIS — M47.812 ARTHROPATHY OF CERVICAL FACET JOINT: ICD-10-CM

## 2025-02-18 PROCEDURE — 99213 OFFICE O/P EST LOW 20 MIN: CPT | Performed by: PHYSICIAN ASSISTANT

## 2025-02-18 RX ORDER — PREGABALIN 25 MG/1
25 CAPSULE ORAL AT BEDTIME
Qty: 30 CAPSULE | Refills: 2 | Status: SHIPPED | OUTPATIENT
Start: 2025-02-18

## 2025-02-18 ASSESSMENT — PAIN SCALES - GENERAL: PAINLEVEL_OUTOF10: MODERATE PAIN (5)

## 2025-02-18 NOTE — PATIENT INSTRUCTIONS
[facility] Scheduling    Please call [phone] to schedule your image(s) (select option#1).

## 2025-02-18 NOTE — PROGRESS NOTES
Assessment:   Geeta Berry is a 84 y.o. female with past medical history significant for neuropathy, GERD, hyperlipidemia, hypothyroidism, hypertension, coronary artery disease, chronic kidney disease stage III, iron deficiency anemia, dysthymia who presents today for follow-up regarding left-sided neck and head pain.  Patient is status post a left occipital nerve block January 7, 2025 which provided 100% relief of pain for about 2 weeks.  About 1 week ago, same pain returned.  She denies any injury or event to cause the recurrent pain.  Pain is making it difficult for her to sleep.  I am concerned patient has pain related to upper cervical facet arthropathy or possibly left C2 impingement.    Plan:     A shared decision making plan was used.  The patient's values and choices were respected.  The following represents what was discussed and decided upon by the physician assistant and the patient.      1.  DIAGNOSTIC TESTS:  - I reviewed an MRI cervical spine from 2018.  - I ordered an updated MRI cervical spine for further evaluation.    2.  PHYSICAL THERAPY:  - Patient will continue with home exercises.    3.  MEDICATIONS:  - Pregabalin 25 mg at bedtime was prescribed.  We could consider titrating her dose higher, depending on her response.  We will need to be mindful of kidney function.    4.  INTERVENTIONS:  - I checked with our insurance team regarding repeat occipital nerve block.  Her insurance requires at least 2 months of relief for the injection to be considered successful in order to have a repeat injection so she does not qualify.  She is interested in interventional pain management for the neck/head pain.  Will await the results of her MRI cervical spine.  I would likely offer her left C2, C3 medial branch blocks as a workup for radiofrequency ablation.        5.  PATIENT EDUCATION: Patient is in agreement with the above plan.  All questions were answered.    6.  FOLLOW-UP: My nurse will call the patient  with the results of her MRI.  If she has questions or concerns in the meantime, she should not hesitate to call.  Subjective:     Geeta Berry is a 84 year old female who presents today for follow-up regarding left-sided neck and head pain.  Patient had a left occipital nerve block January 7, 2025.  Patient reports the injection provided 100% relief of pain for 2 weeks.  About 1 week ago, same pain returned.  She denies any injury or event to cause the pain.  The pain is making it difficult to sleep.    Patient complains of left-sided neck pain.  Pain is located in the upper cervical region.  Pain radiates up into the occipital region and up to the vertex.  She rates her pain today as a 5 out of 10.  At its worst it is a 7 out of 10.  At its best it is a 3 out of 10.  Pain is aggravated with lying down.  Any increased pressure on the back of her head makes her pain worse.  She tries to sleep on her right side or in her recliner chair to try to minimize the pressure on the back of her head.  Pain is alleviated with applying a heating pad.  Denies any new symptoms since she was last seen.  Denies pain down the arms.    Treatment to date:  - Completed 6 sessions of physical therapy for trochanteric bursitis May 16, 2024.  - Patient participated in physical therapy in November 2017.  - Patient had 6 sessions of physical therapy ending December 2021  - She also has had 4 sessions of physical therapy for hip pain/weakness of hips ending March 2022  - Left occipital nerve block January 7, 2025 with greater than 50% relief of pain  - Right L3, L4, L5 radiofrequency ablation November 25, 2024 with 100% relief of pain localized to the right L4-5 and L5-S1 facets.  - Right L3, L4, L5 medial branch blocks October 30, 2024-positive response  - Right L3, L4, L5 medial branch blocks October 15, 2024-positive response  - Left occipital nerve block February 13, 2024 with greater than 50% relief for 9 months  - Left trochanteric  bursa injection January 30, 2024 with 100% relief  - L5-S1 interlaminar epidural steroid injection August 8, 2023 with greater than 50% relief of pain for 2 months  - Bilateral L4-5 transforaminal epidural steroid injection June 12,, 2023 with 50% relief  - Left L4-5 transforaminal epidural steroid injection October 21, 2021 with 60% relief of pain  - Left occipital nerve block July 11, 2018 with 80% relief of pain  - Left C2-3, C3-4, C4-5 facet joint injections June 27, 2018  - Left C2, C3, C4, C5 MBB June 7, 2018  - Left C1-C2 facet joint injection July 7, 2017  - Left C1-C2 facet joint injection April 6, 2017  -Did not tolerate gabapentin  - Tylenol as needed is helpful  - Duloxetine caused headache    Review of Systems:  Positive for numbness/tingling, pain much worse at night.  Negative for weakness, loss of bowel/bladder control, inability to urinate, headache, trip/stumble/falls, difficulty swallowing, difficulty with hand skills, fevers, unintentional weight loss.     Objective:   CONSTITUTIONAL:  Vital signs as above.  No acute distress.  The patient is well nourished and well groomed.   Patient appears tired.  PSYCHIATRIC:  The patient is awake, alert, oriented to person, place and time.  The patient is answering questions appropriately with clear speech.  Normal affect.   HEENT: Normocephalic, atraumatic.  Sclera clear.    SKIN: Exposed skin is clean, dry, intact without rashes.  MUSCULOSKELETAL: Tender to palpation left greater occipital nerve.  Tender to palpation left upper cervical facets.  Cervical range of motion is intact with flexion, severely restricted with extension with reproduction of pain, moderately restricted lateral rotation right, severely restricted lateral rotation left with reproduction of pain.  Unable to perform Kemps maneuver due to severe restriction with range of motion.  5/5 strength for the bilateral shoulder abductors, elbow flexors/extensors, wrist extensors, finger  flexors/abductors.  NEUROLOGICAL: Sensation light touch intact bilateral lower extremities throughout.     RESULTS:   I reviewed the MRI cervical spine from [facility] dated May 21, 2018. Patient has moderate to advanced cervical spondylosis with disc degeneration greatest from C4 through the abdomen and preferential left-sided facet arthropathy C2 C5. There are also degenerative changes of the left atlantoaxial joint. There is mild spinal canal stenosis at C5-6 and C6-7. There is multilevel moderate to severe foraminal stenosis C3 through C7. Please see report for further details.

## 2025-02-18 NOTE — LETTER
2/18/2025      Geeta Berry  [address]  [facility] [address]      Dear Colleague,    Thank you for referring your patient, Geeta Berry, to the [facility] SPINE AND NEUROSURGERY. Please see a copy of my visit note below.    Assessment:   Geeta Berry is a 84 y.o. female with past medical history significant for neuropathy, GERD, hyperlipidemia, hypothyroidism, hypertension, coronary artery disease, chronic kidney disease stage III, iron deficiency anemia, dysthymia who presents today for follow-up regarding left-sided neck and head pain.  Patient is status post a left occipital nerve block January 7, 2025 which provided 100% relief of pain for about 2 weeks.  About 1 week ago, same pain returned.  She denies any injury or event to cause the recurrent pain.  Pain is making it difficult for her to sleep.  I am concerned patient has pain related to upper cervical facet arthropathy or possibly left C2 impingement.    Plan:     A shared decision making plan was used.  The patient's values and choices were respected.  The following represents what was discussed and decided upon by the physician assistant and the patient.      1.  DIAGNOSTIC TESTS:  - I reviewed an MRI cervical spine from 2018.  - I ordered an updated MRI cervical spine for further evaluation.    2.  PHYSICAL THERAPY:  - Patient will continue with home exercises.    3.  MEDICATIONS:  - Pregabalin 25 mg at bedtime was prescribed.  We could consider titrating her dose higher, depending on her response.  We will need to be mindful of kidney function.    4.  INTERVENTIONS:  - I checked with our insurance team regarding repeat occipital nerve block.  Her insurance requires at least 2 months of relief for the injection to be considered successful in order to have a repeat injection so she does not qualify.  She is interested in interventional pain management for the neck/head pain.  Will await the results of her MRI cervical spine.   I would likely offer her left C2, C3 medial branch blocks as a workup for radiofrequency ablation.        5.  PATIENT EDUCATION: Patient is in agreement with the above plan.  All questions were answered.    6.  FOLLOW-UP: My nurse will call the patient with the results of her MRI.  If she has questions or concerns in the meantime, she should not hesitate to call.  Subjective:     Geeta Berry is a 84 year old female who presents today for follow-up regarding left-sided neck and head pain.  Patient had a left occipital nerve block January 7, 2025.  Patient reports the injection provided 100% relief of pain for 2 weeks.  About 1 week ago, same pain returned.  She denies any injury or event to cause the pain.  The pain is making it difficult to sleep.    Patient complains of left-sided neck pain.  Pain is located in the upper cervical region.  Pain radiates up into the occipital region and up to the vertex.  She rates her pain today as a 5 out of 10.  At its worst it is a 7 out of 10.  At its best it is a 3 out of 10.  Pain is aggravated with lying down.  Any increased pressure on the back of her head makes her pain worse.  She tries to sleep on her right side or in her recliner chair to try to minimize the pressure on the back of her head.  Pain is alleviated with applying a heating pad.  Denies any new symptoms since she was last seen.  Denies pain down the arms.    Treatment to date:  - Completed 6 sessions of physical therapy for trochanteric bursitis May 16, 2024.  - Patient participated in physical therapy in November 2017.  - Patient had 6 sessions of physical therapy ending December 2021  - She also has had 4 sessions of physical therapy for hip pain/weakness of hips ending March 2022  - Left occipital nerve block January 7, 2025 with greater than 50% relief of pain  - Right L3, L4, L5 radiofrequency ablation November 25, 2024 with 100% relief of pain localized to the right L4-5 and L5-S1 facets.  - Right L3,  L4, L5 medial branch blocks October 30, 2024-positive response  - Right L3, L4, L5 medial branch blocks October 15, 2024-positive response  - Left occipital nerve block February 13, 2024 with greater than 50% relief for 9 months  - Left trochanteric bursa injection January 30, 2024 with 100% relief  - L5-S1 interlaminar epidural steroid injection August 8, 2023 with greater than 50% relief of pain for 2 months  - Bilateral L4-5 transforaminal epidural steroid injection June 12,, 2023 with 50% relief  - Left L4-5 transforaminal epidural steroid injection October 21, 2021 with 60% relief of pain  - Left occipital nerve block July 11, 2018 with 80% relief of pain  - Left C2-3, C3-4, C4-5 facet joint injections June 27, 2018  - Left C2, C3, C4, C5 MBB June 7, 2018  - Left C1-C2 facet joint injection July 7, 2017  - Left C1-C2 facet joint injection April 6, 2017  -Did not tolerate gabapentin  - Tylenol as needed is helpful  - Duloxetine caused headache    Review of Systems:  Positive for numbness/tingling, pain much worse at night.  Negative for weakness, loss of bowel/bladder control, inability to urinate, headache, trip/stumble/falls, difficulty swallowing, difficulty with hand skills, fevers, unintentional weight loss.     Objective:   CONSTITUTIONAL:  Vital signs as above.  No acute distress.  The patient is well nourished and well groomed.   Patient appears tired.  PSYCHIATRIC:  The patient is awake, alert, oriented to person, place and time.  The patient is answering questions appropriately with clear speech.  Normal affect.   HEENT: Normocephalic, atraumatic.  Sclera clear.    SKIN: Exposed skin is clean, dry, intact without rashes.  MUSCULOSKELETAL: Tender to palpation left greater occipital nerve.  Tender to palpation left upper cervical facets.  Cervical range of motion is intact with flexion, severely restricted with extension with reproduction of pain, moderately restricted lateral rotation right, severely  restricted lateral rotation left with reproduction of pain.  Unable to perform Kemps maneuver due to severe restriction with range of motion.  5/5 strength for the bilateral shoulder abductors, elbow flexors/extensors, wrist extensors, finger flexors/abductors.  NEUROLOGICAL: Sensation light touch intact bilateral lower extremities throughout.     RESULTS:   I reviewed the MRI cervical spine from [facility] dated May 21, 2018. Patient has moderate to advanced cervical spondylosis with disc degeneration greatest from C4 through the abdomen and preferential left-sided facet arthropathy C2 C5. There are also degenerative changes of the left atlantoaxial joint. There is mild spinal canal stenosis at C5-6 and C6-7. There is multilevel moderate to severe foraminal stenosis C3 through C7. Please see report for further details.       Again, thank you for allowing me to participate in the care of your patient.        Sincerely,        Krista Kaplan PA-C    Electronically signed

## 2025-02-26 ENCOUNTER — HOSPITAL ENCOUNTER (OUTPATIENT)
Dept: MRI IMAGING | Facility: HOSPITAL | Age: 85
Discharge: HOME | End: 2025-02-26
Attending: PHYSICIAN ASSISTANT
Payer: COMMERCIAL

## 2025-02-26 DIAGNOSIS — M54.81 OCCIPITAL NEURALGIA OF LEFT SIDE: ICD-10-CM

## 2025-02-26 DIAGNOSIS — M47.812 ARTHROPATHY OF CERVICAL FACET JOINT: ICD-10-CM

## 2025-02-26 PROCEDURE — 72141 MRI NECK SPINE W/O DYE: CPT

## 2025-02-27 ENCOUNTER — TELEPHONE (OUTPATIENT)
Dept: PHYSICAL MEDICINE AND REHAB | Facility: CLINIC | Age: 85
End: 2025-02-27
Payer: COMMERCIAL

## 2025-02-27 NOTE — TELEPHONE ENCOUNTER
----- Message from Krista Kaplan sent at 2/27/2025  7:34 AM CST -----  Please call the patient and let her know that I reviewed her MRI cervical spine.  This shows severe arthritis on the left at C1-C2 and moderate arthritis on the left that C2-3.  Treating the C2-3 facet joint is safer than treating the left C1-C2 facet joints so I would recommend that we start with left C2, C3 medial branch blocks as a workup for radiofrequency ablation.

## 2025-02-27 NOTE — TELEPHONE ENCOUNTER
"Phone call to patient to review results and provider's recommendations. Results given and explained. \"That's a lot of information.\" Offered follow up with [facility] to further review imaging results as well as discuss the recommended MBBs/RFA. Patient wanting to get the follow up made so daughter can come as well. Transferred to scheduling to make appointment.    "

## 2025-03-03 NOTE — PROGRESS NOTES
Assessment:   Geeta Berry is a 84 y.o. female with past medical history significant for neuropathy, GERD, hyperlipidemia, hypothyroidism, hypertension, coronary artery disease, chronic kidney disease stage III, iron deficiency anemia, dysthymia who presents today for follow-up regarding left-sided neck and head pain.  My review of an MRI cervical spine shows severe degenerative changes at the left C1-C2 lateral mass articulation with minimal edema involving the left C1 and C2 lateral masses.  There is also moderate facet arthropathy on the left at C2-3 and C3-4.  There is multilevel spondylosis with up to mild spinal canal stenosis C4-5 through C6-7.  There is multilevel foraminal stenosis up to severe bilaterally C3-4 through C6-7.  Suspect she is symptomatic from the facet degenerative changes at C1-C2 (primarily), C2-3, and possibly C3-4.    Plan:     A shared decision making plan was used.  The patient's values and choices were respected.  The following represents what was discussed and decided upon by the physician assistant and the patient.      1.  DIAGNOSTIC TESTS:  - I reviewed an MRI cervical spine from 2025.  No additional diagnostic test were ordered.    2.  PHYSICAL THERAPY:  - Patient will continue with home exercises.    3.  MEDICATIONS:  - Patient tried pregabalin 25 mg at bedtime for a couple of days.  She did not experience any side effects but she is concerned about the possible side effects that were listed in her medication information.  I reviewed the most common side effects with the patient.  She will consider trying this again.    4.  INTERVENTIONS:  - I offer the patient left C2, C3 medial branch blocks as a workup for radiofrequency ablation.  Patient declined.  She wants to try to manage her pain with Tylenol and rest for now.  If pain worsens I would recommend this as the next step.  - I explained that radiofrequency ablation is not an option at the C1-C2 level.  C1-C2 facet joint  injections did not provide lasting relief in 2017        5.  PATIENT EDUCATION:  - Patient feels that for now she will just continue to manage her pain with rest, Tylenol, and a heating pad.  I told the patient this is perfectly reasonable.  -Patient is in agreement with the above plan.  All questions were answered.  - We briefly talked about surgical intervention for the spine pain.  Unfortunately this would involve a C1-C2 fusion which would eliminate her range of motion.  She does not wish to pursue this.    6.  FOLLOW-UP: Patient is going to follow-up as needed.  If she has questions or concerns, she should not hesitate to call.  Subjective:     Geeta Berry is a 84 year old female who presents today for follow-up regarding left-sided neck and head pain.  Patient returns today to review her MRI results.  She denies any change in her pain since she was last seen.    Patient complains of left-sided neck pain.  Pain is located in the left upper cervical region.  Pain radiates up into the occiput causing head pain.  She denies any pain radiating to the shoulders or down the arms.  She rates her pain today as a 3 out of 10.  At its worst it is a 6 out of 10.  At its best it is a 2 out of 10.  Pain is aggravated with movement and alleviated with applying a heating pad and rest.    Treatment to date:  - Completed 6 sessions of physical therapy for trochanteric bursitis May 16, 2024.  - Patient participated in physical therapy in November 2017.  - Patient had 6 sessions of physical therapy ending December 2021  - She also has had 4 sessions of physical therapy for hip pain/weakness of hips ending March 2022  - Left occipital nerve block January 7, 2025 with greater than 50% relief of pain  - Right L3, L4, L5 radiofrequency ablation November 25, 2024 with 100% relief of pain localized to the right L4-5 and L5-S1 facets.  - Right L3, L4, L5 medial branch blocks October 30, 2024-positive response  - Right L3, L4, L5  medial branch blocks October 15, 2024-positive response  - Left occipital nerve block February 13, 2024 with greater than 50% relief for 9 months  - Left trochanteric bursa injection January 30, 2024 with 100% relief  - L5-S1 interlaminar epidural steroid injection August 8, 2023 with greater than 50% relief of pain for 2 months  - Bilateral L4-5 transforaminal epidural steroid injection June 12,, 2023 with 50% relief  - Left L4-5 transforaminal epidural steroid injection October 21, 2021 with 60% relief of pain  - Left occipital nerve block July 11, 2018 with 80% relief of pain  - Left C2-3, C3-4, C4-5 facet joint injections June 27, 2018  - Left C2, C3, C4, C5 MBB June 7, 2018  - Left C4, C5, C6, C7 radiofrequency ablation November 2017 through [facility] with 50% relief of pain  - Left C1-C2 facet joint injection July 7, 2017  - Left C1-C2 facet joint injection April 6, 2017  -Did not tolerate gabapentin  - Tylenol as needed is helpful  - Duloxetine caused headache  - Pregabalin 25 mg at bedtime-tried twice, not sure if it helped    Review of Systems:  Positive for weakness, pain much worse at night.  Negative for numbness/tingling, loss of bowel/bladder control, inability to urinate, headache, trip/stumble/falls, difficulty swallowing, difficulty with hand skills, fevers, unintentional weight loss.     Objective:   CONSTITUTIONAL:  Vital signs as above.  No acute distress.  The patient is well nourished and well groomed.   Patient appears tired.  PSYCHIATRIC:  The patient is awake, alert, oriented to person, place and time.  The patient is answering questions appropriately with clear speech.  Normal affect.   HEENT: Normocephalic, atraumatic.  Sclera clear.    SKIN: Exposed skin is clean, dry, intact without rashes.  MUSCULOSKELETAL: Patient demonstrates restricted range of motion with lateral rotation bilaterally of the cervical spine.  Moving all extremities equally.  Transitions from seated to  standing and vice versa without difficulty.  NEUROLOGICAL: Cranial nerves II through XII grossly intact     RESULTS:   I reviewed the MRI cervical spine dated February 26, 2025.  At C1-C2 there is severe degenerative change of the left lateral mass articulation with minimal edema involving the left C1 and C2 lateral masses.  At C2-3 there is moderate left facet arthropathy with mild left foraminal stenosis.  At C3-4 there is moderate left and mild right facet arthropathy with severe bilateral foraminal stenosis.  At C5-6 there is a disc osteophyte complex and facet arthropathy with mild spinal canal stenosis and severe bilateral foraminal stenosis.  At C5-6 there is a disc osteophyte and facet degeneration with mild spinal canal stenosis and severe bilateral foraminal stenosis.  At C6-7 there is a disc osteophyte complex and mild facet degenerative changes with mild spinal canal stenosis and severe bilateral foraminal stenosis.  At C7-T1 there is moderate degenerative facet changes with mild bilateral foraminal stenosis.

## 2025-03-06 ENCOUNTER — OFFICE VISIT (OUTPATIENT)
Dept: PHYSICAL MEDICINE AND REHAB | Facility: CLINIC | Age: 85
End: 2025-03-06
Payer: COMMERCIAL

## 2025-03-06 VITALS — DIASTOLIC BLOOD PRESSURE: 63 MMHG | SYSTOLIC BLOOD PRESSURE: 128 MMHG | HEART RATE: 68 BPM

## 2025-03-06 DIAGNOSIS — M47.812 ARTHROPATHY OF CERVICAL FACET JOINT: Primary | ICD-10-CM

## 2025-03-06 ASSESSMENT — PAIN SCALES - GENERAL: PAINLEVEL_OUTOF10: MILD PAIN (3)

## 2025-03-06 NOTE — LETTER
3/6/2025      Geeta Berry  [address]  [place] [address]      Dear Colleague,    Thank you for referring your patient, Geeta Berry, to the [facility]. Please see a copy of my visit note below.    Assessment:   Geeta Berry is a 84 y.o. female with past medical history significant for neuropathy, GERD, hyperlipidemia, hypothyroidism, hypertension, coronary artery disease, chronic kidney disease stage III, iron deficiency anemia, dysthymia who presents today for follow-up regarding left-sided neck and head pain.  My review of an MRI cervical spine shows severe degenerative changes at the left C1-C2 lateral mass articulation with minimal edema involving the left C1 and C2 lateral masses.  There is also moderate facet arthropathy on the left at C2-3 and C3-4.  There is multilevel spondylosis with up to mild spinal canal stenosis C4-5 through C6-7.  There is multilevel foraminal stenosis up to severe bilaterally C3-4 through C6-7.  Suspect she is symptomatic from the facet degenerative changes at C1-C2 (primarily), C2-3, and possibly C3-4.    Plan:     A shared decision making plan was used.  The patient's values and choices were respected.  The following represents what was discussed and decided upon by the physician assistant and the patient.      1.  DIAGNOSTIC TESTS:  - I reviewed an MRI cervical spine from 2025.  No additional diagnostic test were ordered.    2.  PHYSICAL THERAPY:  - Patient will continue with home exercises.    3.  MEDICATIONS:  - Patient tried pregabalin 25 mg at bedtime for a couple of days.  She did not experience any side effects but she is concerned about the possible side effects that were listed in her medication information.  I reviewed the most common side effects with the patient.  She will consider trying this again.    4.  INTERVENTIONS:  - I offer the patient left C2, C3 medial branch blocks as a workup for radiofrequency  ablation.  Patient declined.  She wants to try to manage her pain with Tylenol and rest for now.  If pain worsens I would recommend this as the next step.  - I explained that radiofrequency ablation is not an option at the C1-C2 level.  C1-C2 facet joint injections did not provide lasting relief in 2017        5.  PATIENT EDUCATION:  - Patient feels that for now she will just continue to manage her pain with rest, Tylenol, and a heating pad.  I told the patient this is perfectly reasonable.  -Patient is in agreement with the above plan.  All questions were answered.  - We briefly talked about surgical intervention for the spine pain.  Unfortunately this would involve a C1-C2 fusion which would eliminate her range of motion.  She does not wish to pursue this.    6.  FOLLOW-UP: Patient is going to follow-up as needed.  If she has questions or concerns, she should not hesitate to call.  Subjective:     Geeta Berry is a 84 year old female who presents today for follow-up regarding left-sided neck and head pain.  Patient returns today to review her MRI results.  She denies any change in her pain since she was last seen.    Patient complains of left-sided neck pain.  Pain is located in the left upper cervical region.  Pain radiates up into the occiput causing head pain.  She denies any pain radiating to the shoulders or down the arms.  She rates her pain today as a 3 out of 10.  At its worst it is a 6 out of 10.  At its best it is a 2 out of 10.  Pain is aggravated with movement and alleviated with applying a heating pad and rest.    Treatment to date:  - Completed 6 sessions of physical therapy for trochanteric bursitis May 16, 2024.  - Patient participated in physical therapy in November 2017.  - Patient had 6 sessions of physical therapy ending December 2021  - She also has had 4 sessions of physical therapy for hip pain/weakness of hips ending March 2022  - Left occipital nerve block January 7, 2025 with greater  than 50% relief of pain  - Right L3, L4, L5 radiofrequency ablation November 25, 2024 with 100% relief of pain localized to the right L4-5 and L5-S1 facets.  - Right L3, L4, L5 medial branch blocks October 30, 2024-positive response  - Right L3, L4, L5 medial branch blocks October 15, 2024-positive response  - Left occipital nerve block February 13, 2024 with greater than 50% relief for 9 months  - Left trochanteric bursa injection January 30, 2024 with 100% relief  - L5-S1 interlaminar epidural steroid injection August 8, 2023 with greater than 50% relief of pain for 2 months  - Bilateral L4-5 transforaminal epidural steroid injection June 12,, 2023 with 50% relief  - Left L4-5 transforaminal epidural steroid injection October 21, 2021 with 60% relief of pain  - Left occipital nerve block July 11, 2018 with 80% relief of pain  - Left C2-3, C3-4, C4-5 facet joint injections June 27, 2018  - Left C2, C3, C4, C5 MBB June 7, 2018  - Left C4, C5, C6, C7 radiofrequency ablation November 2017 through [facility] with 50% relief of pain  - Left C1-C2 facet joint injection July 7, 2017  - Left C1-C2 facet joint injection April 6, 2017  -Did not tolerate gabapentin  - Tylenol as needed is helpful  - Duloxetine caused headache  - Pregabalin 25 mg at bedtime-tried twice, not sure if it helped    Review of Systems:  Positive for weakness, pain much worse at night.  Negative for numbness/tingling, loss of bowel/bladder control, inability to urinate, headache, trip/stumble/falls, difficulty swallowing, difficulty with hand skills, fevers, unintentional weight loss.     Objective:   CONSTITUTIONAL:  Vital signs as above.  No acute distress.  The patient is well nourished and well groomed.   Patient appears tired.  PSYCHIATRIC:  The patient is awake, alert, oriented to person, place and time.  The patient is answering questions appropriately with clear speech.  Normal affect.   HEENT: Normocephalic, atraumatic.  Sclera  clear.    SKIN: Exposed skin is clean, dry, intact without rashes.  MUSCULOSKELETAL: Patient demonstrates restricted range of motion with lateral rotation bilaterally of the cervical spine.  Moving all extremities equally.  Transitions from seated to standing and vice versa without difficulty.  NEUROLOGICAL: Cranial nerves II through XII grossly intact     RESULTS:   I reviewed the MRI cervical spine dated February 26, 2025.  At C1-C2 there is severe degenerative change of the left lateral mass articulation with minimal edema involving the left C1 and C2 lateral masses.  At C2-3 there is moderate left facet arthropathy with mild left foraminal stenosis.  At C3-4 there is moderate left and mild right facet arthropathy with severe bilateral foraminal stenosis.  At C5-6 there is a disc osteophyte complex and facet arthropathy with mild spinal canal stenosis and severe bilateral foraminal stenosis.  At C5-6 there is a disc osteophyte and facet degeneration with mild spinal canal stenosis and severe bilateral foraminal stenosis.  At C6-7 there is a disc osteophyte complex and mild facet degenerative changes with mild spinal canal stenosis and severe bilateral foraminal stenosis.  At C7-T1 there is moderate degenerative facet changes with mild bilateral foraminal stenosis.      Again, thank you for allowing me to participate in the care of your patient.        Sincerely,        Krista Kaplan PA-C    Electronically signed

## 2025-04-23 DIAGNOSIS — I10 ESSENTIAL (PRIMARY) HYPERTENSION: ICD-10-CM

## 2025-04-24 RX ORDER — HYDROCHLOROTHIAZIDE 25 MG/1
25 TABLET ORAL DAILY
Qty: 90 TABLET | Refills: 2 | OUTPATIENT
Start: 2025-04-24

## 2025-04-24 RX ORDER — HYDROCHLOROTHIAZIDE 25 MG/1
25 TABLET ORAL DAILY
Qty: 90 TABLET | Refills: 0 | Status: SHIPPED | OUTPATIENT
Start: 2025-04-24

## 2025-05-07 ENCOUNTER — LAB (OUTPATIENT)
Dept: INFUSION THERAPY | Facility: HOSPITAL | Age: 85
End: 2025-05-07
Attending: INTERNAL MEDICINE
Payer: COMMERCIAL

## 2025-05-07 ENCOUNTER — PATIENT OUTREACH (OUTPATIENT)
Dept: ONCOLOGY | Facility: HOSPITAL | Age: 85
End: 2025-05-07

## 2025-05-07 DIAGNOSIS — D47.2 IGA MONOCLONAL GAMMOPATHY: ICD-10-CM

## 2025-05-07 DIAGNOSIS — D64.9 ANEMIA, UNSPECIFIED TYPE: ICD-10-CM

## 2025-05-07 LAB
ANION GAP SERPL CALCULATED.3IONS-SCNC: 8 MMOL/L (ref 7–15)
BASOPHILS # BLD AUTO: 0 10E3/UL (ref 0–0.2)
BASOPHILS NFR BLD AUTO: 1 %
BUN SERPL-MCNC: 26.5 MG/DL (ref 8–23)
CALCIUM SERPL-MCNC: 9.1 MG/DL (ref 8.8–10.4)
CHLORIDE SERPL-SCNC: 102 MMOL/L (ref 98–107)
CREAT SERPL-MCNC: 1.03 MG/DL (ref 0.51–0.95)
EGFRCR SERPLBLD CKD-EPI 2021: 53 ML/MIN/1.73M2
EOSINOPHIL # BLD AUTO: 0.1 10E3/UL (ref 0–0.7)
EOSINOPHIL NFR BLD AUTO: 2 %
ERYTHROCYTE [DISTWIDTH] IN BLOOD BY AUTOMATED COUNT: 16 % (ref 10–15)
FERRITIN SERPL-MCNC: 39 NG/ML (ref 11–328)
GLUCOSE SERPL-MCNC: 92 MG/DL (ref 70–99)
HCO3 SERPL-SCNC: 31 MMOL/L (ref 22–29)
HCT VFR BLD AUTO: 32.9 % (ref 35–47)
HGB BLD-MCNC: 10.4 G/DL (ref 11.7–15.7)
IMM GRANULOCYTES # BLD: 0 10E3/UL
IMM GRANULOCYTES NFR BLD: 0 %
IRON BINDING CAPACITY (ROCHE): 272 UG/DL (ref 240–430)
IRON SATN MFR SERPL: 31 % (ref 15–46)
IRON SERPL-MCNC: 83 UG/DL (ref 37–145)
LYMPHOCYTES # BLD AUTO: 1.2 10E3/UL (ref 0.8–5.3)
LYMPHOCYTES NFR BLD AUTO: 37 %
MCH RBC QN AUTO: 31.1 PG (ref 26.5–33)
MCHC RBC AUTO-ENTMCNC: 31.6 G/DL (ref 31.5–36.5)
MCV RBC AUTO: 99 FL (ref 78–100)
MONOCYTES # BLD AUTO: 0.4 10E3/UL (ref 0–1.3)
MONOCYTES NFR BLD AUTO: 12 %
NEUTROPHILS # BLD AUTO: 1.5 10E3/UL (ref 1.6–8.3)
NEUTROPHILS NFR BLD AUTO: 49 %
NRBC # BLD AUTO: 0 10E3/UL
NRBC BLD AUTO-RTO: 0 /100
PLATELET # BLD AUTO: 198 10E3/UL (ref 150–450)
POTASSIUM SERPL-SCNC: 3.7 MMOL/L (ref 3.4–5.3)
RBC # BLD AUTO: 3.34 10E6/UL (ref 3.8–5.2)
SODIUM SERPL-SCNC: 141 MMOL/L (ref 135–145)
TOTAL PROTEIN SERUM FOR ELP: 6.3 G/DL (ref 6.4–8.3)
WBC # BLD AUTO: 3.2 10E3/UL (ref 4–11)

## 2025-05-07 PROCEDURE — 85004 AUTOMATED DIFF WBC COUNT: CPT

## 2025-05-07 PROCEDURE — 84165 PROTEIN E-PHORESIS SERUM: CPT | Mod: TC | Performed by: PATHOLOGY

## 2025-05-07 PROCEDURE — 82784 ASSAY IGA/IGD/IGG/IGM EACH: CPT

## 2025-05-07 PROCEDURE — 82728 ASSAY OF FERRITIN: CPT

## 2025-05-07 PROCEDURE — 84155 ASSAY OF PROTEIN SERUM: CPT

## 2025-05-07 PROCEDURE — 83521 IG LIGHT CHAINS FREE EACH: CPT

## 2025-05-07 PROCEDURE — 83550 IRON BINDING TEST: CPT

## 2025-05-07 PROCEDURE — 36415 COLL VENOUS BLD VENIPUNCTURE: CPT

## 2025-05-07 PROCEDURE — 86334 IMMUNOFIX E-PHORESIS SERUM: CPT | Performed by: PATHOLOGY

## 2025-05-07 PROCEDURE — 80048 BASIC METABOLIC PNL TOTAL CA: CPT

## 2025-05-07 NOTE — PROGRESS NOTES
[facility]: Cancer Care                                                                                      .RN Cancer Care Coordinator sent MyC about when return visit is.      Signature:  Sanjuana Del Rio RN

## 2025-05-08 LAB
ALBUMIN SERPL ELPH-MCNC: 3.8 G/DL (ref 3.7–5.1)
ALPHA1 GLOB SERPL ELPH-MCNC: 0.3 G/DL (ref 0.2–0.4)
ALPHA2 GLOB SERPL ELPH-MCNC: 0.5 G/DL (ref 0.5–0.9)
B-GLOBULIN SERPL ELPH-MCNC: 1.2 G/DL (ref 0.6–1)
GAMMA GLOB SERPL ELPH-MCNC: 0.5 G/DL (ref 0.7–1.6)
IGA SERPL-MCNC: 774 MG/DL (ref 84–499)
KAPPA LC FREE SER-MCNC: 9.2 MG/DL (ref 0.33–1.94)
KAPPA LC FREE/LAMBDA FREE SER NEPH: 5.64 {RATIO} (ref 0.26–1.65)
LAMBDA LC FREE SERPL-MCNC: 1.63 MG/DL (ref 0.57–2.63)
M PROTEIN SERPL ELPH-MCNC: 0.3 G/DL
PROT PATTERN SERPL ELPH-IMP: ABNORMAL
PROT PATTERN SERPL IFE-IMP: NORMAL

## 2025-05-17 DIAGNOSIS — J31.0 CHRONIC RHINITIS: ICD-10-CM

## 2025-05-19 RX ORDER — AZELASTINE HYDROCHLORIDE 137 UG/1
1 SPRAY, METERED NASAL AT BEDTIME
Qty: 30 ML | Refills: 2 | Status: SHIPPED | OUTPATIENT
Start: 2025-05-19

## 2025-05-29 ENCOUNTER — TELEPHONE (OUTPATIENT)
Dept: INTERNAL MEDICINE | Facility: CLINIC | Age: 85
End: 2025-05-29
Payer: COMMERCIAL

## 2025-05-29 ENCOUNTER — MYC MEDICAL ADVICE (OUTPATIENT)
Dept: INTERNAL MEDICINE | Facility: CLINIC | Age: 85
End: 2025-05-29
Payer: COMMERCIAL

## 2025-05-29 NOTE — TELEPHONE ENCOUNTER
Forms/Letter Request    Type of form/letter: DMV/Handicap Parking      Is Release of Information needed?: No    Do we have the form/letter: Yes:     Who is the form from? Patient    Where did/will the form come from? Patient or family brought in       When is form/letter needed by: ASAP    How would you like the form/letter returned:     Patient Notified form requests are processed in 5-7 business days:Yes    Could we send this information to you in [facility] or would you prefer to receive a phone call?:   Patient would prefer a phone call   Okay to leave a detailed message?: Yes at Other phone number:  [phone]

## 2025-06-05 NOTE — TELEPHONE ENCOUNTER
Copies made for monthly hold bin and sent to scan.    Original placed and  for .    Patient notified

## 2025-06-16 ENCOUNTER — MYC REFILL (OUTPATIENT)
Dept: NEUROLOGY | Facility: CLINIC | Age: 85
End: 2025-06-16
Payer: COMMERCIAL

## 2025-06-16 DIAGNOSIS — G62.9 NEUROPATHY: ICD-10-CM

## 2025-06-16 DIAGNOSIS — M79.671 RIGHT FOOT PAIN: ICD-10-CM

## 2025-06-19 NOTE — TELEPHONE ENCOUNTER
Refill request for: gabapentin 8%-lidocaine 2.5% in PLO cream    Directions: Apply to painful feet 3 times a day as needed.     LOV: 09/18/24  NOV: 07/08/25    30 day supply with 0 refills Medication T'd for review and signature    Clair More LPN on 6/19/2025 at 3:22 PM

## 2025-07-08 ENCOUNTER — OFFICE VISIT (OUTPATIENT)
Dept: INTERNAL MEDICINE | Facility: CLINIC | Age: 85
End: 2025-07-08
Payer: COMMERCIAL

## 2025-07-08 VITALS
WEIGHT: 174 LBS | RESPIRATION RATE: 16 BRPM | HEART RATE: 56 BPM | TEMPERATURE: 97.8 F | SYSTOLIC BLOOD PRESSURE: 114 MMHG | BODY MASS INDEX: 28.08 KG/M2 | OXYGEN SATURATION: 97 % | DIASTOLIC BLOOD PRESSURE: 68 MMHG

## 2025-07-08 DIAGNOSIS — K21.9 GASTROESOPHAGEAL REFLUX DISEASE WITHOUT ESOPHAGITIS: ICD-10-CM

## 2025-07-08 DIAGNOSIS — F34.1 DYSTHYMIC DISORDER: ICD-10-CM

## 2025-07-08 DIAGNOSIS — E03.9 ACQUIRED HYPOTHYROIDISM: ICD-10-CM

## 2025-07-08 DIAGNOSIS — G62.9 NEUROPATHY: ICD-10-CM

## 2025-07-08 DIAGNOSIS — D50.0 IRON DEFICIENCY ANEMIA DUE TO CHRONIC BLOOD LOSS: Primary | ICD-10-CM

## 2025-07-08 DIAGNOSIS — M47.816 SPONDYLOSIS OF LUMBAR REGION WITHOUT MYELOPATHY OR RADICULOPATHY: ICD-10-CM

## 2025-07-08 DIAGNOSIS — E55.9 VITAMIN D DEFICIENCY: ICD-10-CM

## 2025-07-08 DIAGNOSIS — D47.2 IGA MONOCLONAL GAMMOPATHY: ICD-10-CM

## 2025-07-08 DIAGNOSIS — I10 ESSENTIAL HYPERTENSION: ICD-10-CM

## 2025-07-08 DIAGNOSIS — N18.31 STAGE 3A CHRONIC KIDNEY DISEASE (H): ICD-10-CM

## 2025-07-08 DIAGNOSIS — E78.5 DYSLIPIDEMIA, GOAL LDL BELOW 70: ICD-10-CM

## 2025-07-08 LAB
HGB BLD-MCNC: 10.3 G/DL (ref 11.7–15.7)
MCV RBC AUTO: 98 FL (ref 78–100)

## 2025-07-08 PROCEDURE — 3078F DIAST BP <80 MM HG: CPT | Performed by: NURSE PRACTITIONER

## 2025-07-08 PROCEDURE — 36415 COLL VENOUS BLD VENIPUNCTURE: CPT | Performed by: NURSE PRACTITIONER

## 2025-07-08 PROCEDURE — 99214 OFFICE O/P EST MOD 30 MIN: CPT | Performed by: NURSE PRACTITIONER

## 2025-07-08 PROCEDURE — G2211 COMPLEX E/M VISIT ADD ON: HCPCS | Performed by: NURSE PRACTITIONER

## 2025-07-08 PROCEDURE — 3074F SYST BP LT 130 MM HG: CPT | Performed by: NURSE PRACTITIONER

## 2025-07-08 PROCEDURE — 85018 HEMOGLOBIN: CPT | Performed by: NURSE PRACTITIONER

## 2025-07-08 RX ORDER — FERROUS SULFATE 324(65)MG
1 TABLET, DELAYED RELEASE (ENTERIC COATED) ORAL EVERY OTHER DAY
COMMUNITY

## 2025-07-08 RX ORDER — VITAMIN B COMPLEX
1 TABLET ORAL DAILY
COMMUNITY

## 2025-07-08 ASSESSMENT — PATIENT HEALTH QUESTIONNAIRE - PHQ9
SUM OF ALL RESPONSES TO PHQ QUESTIONS 1-9: 0
SUM OF ALL RESPONSES TO PHQ QUESTIONS 1-9: 0
10. IF YOU CHECKED OFF ANY PROBLEMS, HOW DIFFICULT HAVE THESE PROBLEMS MADE IT FOR YOU TO DO YOUR WORK, TAKE CARE OF THINGS AT HOME, OR GET ALONG WITH OTHER PEOPLE: NOT DIFFICULT AT ALL

## 2025-07-08 NOTE — PROGRESS NOTES
{PROVIDER CHARTING PREFERENCE:468501}    Bala Connor is a 84 year old, presenting for the following health issues:  Follow Up (6 month follow up from 01/08/2025.), Arthritis (Would like to discuss arthritis on hips and knees ), and Recheck Medication (famotidine (PEPCID)  and gabapentin 8%-lidocaine)        7/8/2025     2:18 PM   Additional Questions   Roomed by Alexander Angela     Arthritis    History of Present Illness       Back Pain:  She presents for follow up of back pain. Patient's back pain is a chronic problem.  Location of back pain:  Right lower back, right middle of back and left side of neck  Description of back pain: sharp  Back pain spreads: right buttocks    Since patient first noticed back pain, pain is: always present, but gets better and worse  Does back pain interfere with her job:  Not applicable       Reason for visit:  Yearly checkup    She eats 4 or more servings of fruits and vegetables daily.She consumes 0 sweetened beverage(s) daily.She exercises with enough effort to increase her heart rate 60 or more minutes per day.  She exercises with enough effort to increase her heart rate 7 days per week.   She is taking medications regularly.        {MA/LPN/RN Pre-Provider Visit Orders- hCG/UA/Strep (Optional):739312}  {SUPERLIST (Optional):868328}  {additonal problems for provider to add (Optional):325537}    {ROS Picklists (Optional):658697}      Objective    /68   Pulse 56   Temp 97.8  F (36.6  C) (Oral)   Resp 16   Wt 78.9 kg (174 lb)   LMP  (LMP Unknown)   SpO2 97%   BMI 28.08 kg/m    Body mass index is 28.08 kg/m .  Physical Exam   {Exam List (Optional):249142}    {Diagnostic Test Results (Optional):263627}        Signed Electronically by: Jacy Mishra NP  {Email feedback regarding this note to [email or web address]   :[id number]}

## 2025-07-08 NOTE — PROGRESS NOTES
"  Internal Medicine Office Visit  [address]  [place] [address]  Phone: [phone]  Fax: [phone]        Patient Name: Nai Berry  Patient Age: 84 year old  YOB: 1940  MRN: [id number]    Date of Visit: 7/8/2025  Primary Provider: Jacy Mishra    Subjective   Patient presents with:  Follow Up: 6 month follow up from 01/08/2025.  Arthritis: Would like to discuss arthritis on hips and knees   Recheck Medication: famotidine (PEPCID)  and gabapentin 8%-lidocaine         7/8/2025     2:18 PM   Additional Questions   Roomed by Alexander Angela     HPI:  Nai Berry, 84-year-old female  - Reports memory loss, unsure of onset. Her daughter that is with her today is considering taking over medication management because Nai has been struggling with keeping medications straight.   - Noted black or dark stools, uncertain if related to iron supplementation  - Variable bowel movements, with episodes of easy passage and occasional \"blowout\"  - Reports sour taste in mouth in the morning after taking Lomotil, resolves after eating breakfast, no associated chest burning  - History of neuropathy with burning and abnormal sensations in extremities, previously evaluated by neurology, uses gabapentin-lidocaine cream and soaks feet in epsom salts, sometimes uses ice  - Reports right low back pain, persistent and worsened since ablation procedure in November 2024, pain improved temporarily after ablation but has since recurred, aggravated by prolonged standing (e.g., 15 minutes in the kitchen), uses pillow for neck pain at night with good effect  - History of muscle aches with prior rosuvastatin use, discontinued before this visit      Patient Active Problem List   Diagnosis    History of DVT (deep vein thrombosis), right LE 12/2016    Acute iritis, h/o in 2012    Choledocholithiasis    Constipation    Gastroesophageal reflux disease    Essential hypertension "    Dyslipidemia, goal LDL below 70    Hypothyroidism    Osteoarthritis of lumbar spine    Osteoarthritis; knees, hips, cervical spine     Facet arthropathy, cervical    Dysthymic disorder    Hiatal hernia    Chronic rhinitis    Coronary artery disease involving native coronary artery of native heart without angina pectoris    Stage 3a chronic kidney disease (H)    Status post knee surgery    Neuropathy    Lumbar radiculopathy    Iron deficiency anemia due to chronic blood loss    Greater trochanteric bursitis of both hips    Epigastric pain    Vitamin D deficiency    Osteopenia of left forearm    Seborrheic dermatitis    IgA monoclonal gammopathy     Current Scheduled Meds:  Current Outpatient Medications   Medication Sig Dispense Refill    acetaminophen (TYLENOL) 500 MG tablet Take 500 mg by mouth 4 times daily      amoxicillin (AMOXIL) 500 MG capsule Take 2,000 mg by mouth daily as needed (1 hour before dental appointments).      azelastine 137 MCG/SPRAY SOLN SPRAY 1 SPRAY INTO BOTH NOSTRILS AT BEDTIME 30 mL 2    citalopram (CELEXA) 10 MG tablet TAKE 1/2 TABLET BY MOUTH DAILY 45 tablet 1    docusate sodium (COLACE) 100 MG tablet Take 100 mg by mouth 2 times daily      famotidine (PEPCID) 20 MG tablet Take 20 mg by mouth as needed.      Ferrous Sulfate 324 (65 Fe) MG TBEC Take 1 tablet by mouth every other day.      fluticasone (FLONASE) 50 MCG/ACT nasal spray SPRAY 1 SPRAY INTO EACH NOSTRIL DAILY 16 mL 10    gabapentin 8%-lidocaine 2.5% in PLO cream Apply to painful feet 3 times a day as needed. 120 g 0    hydrochlorothiazide (HYDRODIURIL) 25 MG tablet Take 1 tablet (25 mg) by mouth daily. 90 tablet 0    ketoconazole (NIZORAL) 2 % external shampoo Apply to the scalp. Leave on for 5 minutes then rinse off. May use 2-3 times per week 120 mL 2    levothyroxine (SYNTHROID/LEVOTHROID) 50 MCG tablet TAKE 1 TABLET BY MOUTH EVERY DAY 90 tablet 2    Lidocaine-Menthol (LIDOCAINE-MENTHOL ROLL-ON) 4-1 % LIQD Externally apply  1 Application topically as needed (Externally apply 1 application topically as needed.). 120 mL 3    multivitamin w/minerals (THERA-VIT-M) tablet Take 1 tablet by mouth daily      omeprazole (PRILOSEC) 40 MG DR capsule Take 1 capsule (40 mg) by mouth daily      polyethylene glycol (MIRALAX/GLYCOLAX) packet Take 1 packet by mouth 2 times daily.      pregabalin (LYRICA) 25 MG capsule Take 1 capsule (25 mg) by mouth at bedtime. 30 capsule 2    sennosides (SENOKOT) 8.6 MG tablet Take 1 tablet by mouth daily. Takes 1 tablet Wednesday and Saturday      vitamin C (ASCORBIC ACID) 1000 MG TABS Take 1,000 mg by mouth daily      Vitamin D3 (VITAMIN D, CHOLECALCIFEROL,) 25 mcg (1000 units) tablet Take 1 tablet by mouth daily.           Objective   Physical Examination:  Vitals:    07/08/25 1412   BP: 114/68   Pulse: 56   Resp: 16   Temp: 97.8  F (36.6  C)   TempSrc: Oral   SpO2: 97%   Weight: 78.9 kg (174 lb)     Wt Readings from Last 5 Encounters:   07/08/25 78.9 kg (174 lb)   02/18/25 78.9 kg (174 lb)   01/27/25 78.9 kg (174 lb)   01/08/25 78.9 kg (174 lb)   01/07/25 75.8 kg (167 lb)     Body mass index is 28.08 kg/m .     Constitutional: In no apparent distress  Eyes: Conjunctivae clear. Non-icteric.   Respiratory: Clear to auscultation bilaterally. Normal inspiratory and expiratory effort  Cardiovascular: Regular rate and rhythm. No murmurs, rubs, or gallops. No edema.  Psych: Alert and oriented x3.     Diagnoses managed today:  1. Iron deficiency anemia due to chronic blood loss    2. Dyslipidemia, goal LDL below 70    3. Dysthymic disorder    4. Essential hypertension    5. Gastroesophageal reflux disease without esophagitis    6. IgA monoclonal gammopathy    7. Acquired hypothyroidism    8. Stage 3a chronic kidney disease (H)    9. Vitamin D deficiency    10. Spondylosis of lumbar region without myelopathy or radiculopathy    11. Neuropathy         Assessment & Plan     Iron deficiency anemia due to chronic blood  loss:  - She is reassured that stools likely dark due to iron supplementation; hemoglobin test ordered to verify hgb stable.   - Hemoglobin test ordered.    Dyslipidemia, goal LDL below 70:  - Rosuvastatin discontinued due to muscle aches; cholesterol medication deemed unnecessary at current age. As such, will not restart a statin or alternative lipid medication.     Gastroesophageal reflux disease without esophagitis:  - Sour taste possibly related to heartburn or medication side effect.  - No adjustment or additional medication recommended if symptoms improve after eating.    Acquired hypothyroidism:  - Euthyroid status confirmed as of January lab work.  - Continue current levothyroxine dose.    Vitamin D deficiency:  - Vitamin D dose set at 1000 units daily.    Spondylosis of lumbar region without myelopathy or radiculopathy:  - Pain management with pillow positioning; previous ablation procedure provided relief.  - Consider repeating ablation procedure if pain becomes intolerable; decision left to patient. She will follow up with the spine clinic as needed.     Neuropathy:  - Neuropathy symptoms managed with gabapentin lidocaine cream; no further neurology consultation needed unless future issues arise.  - Continue current management; refill requests to be sent to primary care for gabapentin lidocaine cream.    Essential hypertension:  - Continue monitoring blood pressure as part of ongoing management.        I have discontinued Geeta Berry's loperamide and Iron. I am also having her maintain her polyethylene glycol, docusate sodium, multivitamin w/minerals, amoxicillin, vitamin C, acetaminophen, ketoconazole, omeprazole, fluticasone, famotidine, Lidocaine-Menthol Roll-On, sennosides, levothyroxine, citalopram, pregabalin, hydrochlorothiazide, azelastine, gabapentin 8%-lidocaine 2.5%, Ferrous Sulfate, and Vitamin D3.        Orders Placed This Encounter   Procedures    Hemoglobin    Basic metabolic panel     CBC with platelets    TSH with free T4 reflex    Iron and iron binding capacity    Ferritin    Kappa and lambda light chain    IgA       Follow up: Return in about 6 months (around 1/8/2026) for AWV. Sooner if needed.    The longitudinal plan of care for the diagnosis(es)/condition(s) as documented were addressed during this visit. Due to the added complexity in care, I will continue to support Geeta in the subsequent management and with ongoing continuity of care.    Jacy Mishra, DNP, APRN, CNP   Electronically signed

## 2025-07-19 DIAGNOSIS — J31.0 CHRONIC RHINITIS: ICD-10-CM

## 2025-07-20 DIAGNOSIS — I10 ESSENTIAL (PRIMARY) HYPERTENSION: ICD-10-CM

## 2025-07-21 RX ORDER — FLUTICASONE PROPIONATE 50 MCG
SPRAY, SUSPENSION (ML) NASAL
Qty: 48 ML | Refills: 2 | Status: SHIPPED | OUTPATIENT
Start: 2025-07-21

## 2025-07-21 RX ORDER — HYDROCHLOROTHIAZIDE 25 MG/1
25 TABLET ORAL DAILY
Qty: 90 TABLET | Refills: 3 | Status: SHIPPED | OUTPATIENT
Start: 2025-07-21

## 2025-08-16 DIAGNOSIS — J31.0 CHRONIC RHINITIS: ICD-10-CM

## 2025-08-18 ENCOUNTER — MYC MEDICAL ADVICE (OUTPATIENT)
Dept: INTERNAL MEDICINE | Facility: CLINIC | Age: 85
End: 2025-08-18
Payer: COMMERCIAL

## 2025-08-18 RX ORDER — AZELASTINE HYDROCHLORIDE 137 UG/1
SPRAY, METERED NASAL
Qty: 30 ML | Refills: 2 | Status: SHIPPED | OUTPATIENT
Start: 2025-08-18

## 2025-08-19 ENCOUNTER — OFFICE VISIT (OUTPATIENT)
Dept: INTERNAL MEDICINE | Facility: CLINIC | Age: 85
End: 2025-08-19
Payer: COMMERCIAL

## 2025-08-19 VITALS
DIASTOLIC BLOOD PRESSURE: 72 MMHG | TEMPERATURE: 98.1 F | BODY MASS INDEX: 27.88 KG/M2 | OXYGEN SATURATION: 98 % | HEIGHT: 66 IN | RESPIRATION RATE: 16 BRPM | HEART RATE: 53 BPM | WEIGHT: 173.5 LBS | SYSTOLIC BLOOD PRESSURE: 118 MMHG

## 2025-08-19 DIAGNOSIS — R30.0 DYSURIA: Primary | ICD-10-CM

## 2025-08-19 DIAGNOSIS — G62.9 NEUROPATHY: ICD-10-CM

## 2025-08-19 DIAGNOSIS — N95.2 VAGINAL ATROPHY: ICD-10-CM

## 2025-08-19 DIAGNOSIS — N81.9 FEMALE GENITAL PROLAPSE, UNSPECIFIED TYPE: ICD-10-CM

## 2025-08-19 PROCEDURE — 81003 URINALYSIS AUTO W/O SCOPE: CPT | Performed by: NURSE PRACTITIONER

## 2025-08-19 PROCEDURE — 3074F SYST BP LT 130 MM HG: CPT | Performed by: NURSE PRACTITIONER

## 2025-08-19 PROCEDURE — 3078F DIAST BP <80 MM HG: CPT | Performed by: NURSE PRACTITIONER

## 2025-08-19 PROCEDURE — 99214 OFFICE O/P EST MOD 30 MIN: CPT | Performed by: NURSE PRACTITIONER

## 2025-08-19 PROCEDURE — G2211 COMPLEX E/M VISIT ADD ON: HCPCS | Performed by: NURSE PRACTITIONER

## 2025-08-19 RX ORDER — ESTRADIOL 10 UG/1
TABLET, FILM COATED VAGINAL
Qty: 24 TABLET | Refills: 1 | Status: SHIPPED | OUTPATIENT
Start: 2025-08-19 | End: 2025-09-25

## 2025-08-29 ENCOUNTER — HOSPITAL ENCOUNTER (INPATIENT)
Facility: HOSPITAL | Age: 85
LOS: 6 days | Discharge: NURSING, REHAB OR LONG-TERM CARE | End: 2025-09-04
Attending: EMERGENCY MEDICINE | Admitting: STUDENT IN AN ORGANIZED HEALTH CARE EDUCATION/TRAINING PROGRAM
Payer: COMMERCIAL

## 2025-08-29 DIAGNOSIS — R50.9 FEVER, UNSPECIFIED FEVER CAUSE: ICD-10-CM

## 2025-08-29 DIAGNOSIS — N95.2 VAGINAL ATROPHY: ICD-10-CM

## 2025-08-29 DIAGNOSIS — R78.81 BACTEREMIA: ICD-10-CM

## 2025-08-29 DIAGNOSIS — N39.0 URINARY TRACT INFECTION WITHOUT HEMATURIA, SITE UNSPECIFIED: ICD-10-CM

## 2025-08-29 DIAGNOSIS — N81.9 FEMALE GENITAL PROLAPSE, UNSPECIFIED TYPE: ICD-10-CM

## 2025-08-29 DIAGNOSIS — I25.10 CORONARY ARTERY DISEASE INVOLVING NATIVE CORONARY ARTERY OF NATIVE HEART WITHOUT ANGINA PECTORIS: ICD-10-CM

## 2025-08-29 DIAGNOSIS — I21.4 NSTEMI (NON-ST ELEVATED MYOCARDIAL INFARCTION) (H): Primary | ICD-10-CM

## 2025-08-29 LAB
ACB COMPLEX DNA BLD POS QL NAA+NON-PROBE: NOT DETECTED
ALBUMIN SERPL BCG-MCNC: 3.9 G/DL (ref 3.5–5.2)
ALBUMIN UR-MCNC: NEGATIVE MG/DL
ALP SERPL-CCNC: 146 U/L (ref 40–150)
ALT SERPL W P-5'-P-CCNC: 137 U/L (ref 0–50)
ANION GAP SERPL CALCULATED.3IONS-SCNC: 13 MMOL/L (ref 7–15)
APPEARANCE UR: CLEAR
APTT PPP: <20 SECONDS (ref 22–38)
AST SERPL W P-5'-P-CCNC: 205 U/L (ref 0–45)
B FRAGILIS DNA BLD POS QL NAA+NON-PROBE: NOT DETECTED
BACTERIA #/AREA URNS HPF: ABNORMAL /HPF
BASOPHILS # BLD AUTO: <0.03 10E3/UL (ref 0–0.2)
BASOPHILS NFR BLD AUTO: 0.5 %
BILIRUB SERPL-MCNC: 0.5 MG/DL
BILIRUB UR QL STRIP: NEGATIVE
BUN SERPL-MCNC: 22.9 MG/DL (ref 8–23)
C ALBICANS DNA BLD POS QL NAA+NON-PROBE: NOT DETECTED
C AURIS DNA BLD POS QL NAA+NON-PROBE: NOT DETECTED
C GATTII+NEOFOR DNA BLD POS QL NAA+N-PRB: NOT DETECTED
C GLABRATA DNA BLD POS QL NAA+NON-PROBE: NOT DETECTED
C KRUSEI DNA BLD POS QL NAA+NON-PROBE: NOT DETECTED
C PARAP DNA BLD POS QL NAA+NON-PROBE: NOT DETECTED
C TROPICLS DNA BLD POS QL NAA+NON-PROBE: NOT DETECTED
CALCIUM SERPL-MCNC: 9.2 MG/DL (ref 8.8–10.4)
CHLORIDE SERPL-SCNC: 103 MMOL/L (ref 98–107)
COLOR UR AUTO: ABNORMAL
CREAT SERPL-MCNC: 0.98 MG/DL (ref 0.51–0.95)
E CLOAC COMP DNA BLD POS NAA+NON-PROBE: NOT DETECTED
E COLI DNA BLD POS QL NAA+NON-PROBE: NOT DETECTED
E FAECALIS DNA BLD POS QL NAA+NON-PROBE: NOT DETECTED
E FAECIUM DNA BLD POS QL NAA+NON-PROBE: NOT DETECTED
EGFRCR SERPLBLD CKD-EPI 2021: 56 ML/MIN/1.73M2
ENTEROBACTERALES DNA BLD POS NAA+N-PRB: NOT DETECTED
EOSINOPHIL # BLD AUTO: <0.03 10E3/UL (ref 0–0.7)
EOSINOPHIL NFR BLD AUTO: 0.5 %
ERYTHROCYTE [DISTWIDTH] IN BLOOD BY AUTOMATED COUNT: 16.5 % (ref 10–15)
EST. AVERAGE GLUCOSE BLD GHB EST-MCNC: 128 MG/DL
FLUAV RNA SPEC QL NAA+PROBE: NEGATIVE
FLUBV RNA RESP QL NAA+PROBE: NEGATIVE
GLUCOSE BLDC GLUCOMTR-MCNC: 101 MG/DL (ref 70–99)
GLUCOSE SERPL-MCNC: 120 MG/DL (ref 70–99)
GLUCOSE UR STRIP-MCNC: NEGATIVE MG/DL
GP B STREP DNA BLD POS QL NAA+NON-PROBE: NOT DETECTED
HAEM INFLU DNA BLD POS QL NAA+NON-PROBE: NOT DETECTED
HBA1C MFR BLD: 6.1 %
HCO3 SERPL-SCNC: 25 MMOL/L (ref 22–29)
HCT VFR BLD AUTO: 32.4 % (ref 35–47)
HGB BLD-MCNC: 10.3 G/DL (ref 11.7–15.7)
HGB UR QL STRIP: NEGATIVE
IMM GRANULOCYTES # BLD: <0.03 10E3/UL
IMM GRANULOCYTES NFR BLD: 0 %
INR PPP: 0.96 (ref 0.85–1.15)
K OXYTOCA DNA BLD POS QL NAA+NON-PROBE: NOT DETECTED
KETONES UR STRIP-MCNC: NEGATIVE MG/DL
KLEBSIELLA SP DNA BLD POS QL NAA+NON-PRB: NOT DETECTED
KLEBSIELLA SP DNA BLD POS QL NAA+NON-PRB: NOT DETECTED
L MONOCYTOG DNA BLD POS QL NAA+NON-PROBE: NOT DETECTED
LACTATE SERPL-SCNC: 1.5 MMOL/L (ref 0.7–2)
LACTATE SERPL-SCNC: 2.5 MMOL/L (ref 0.7–2)
LEUKOCYTE ESTERASE UR QL STRIP: ABNORMAL
LIPASE SERPL-CCNC: 23 U/L (ref 13–60)
LYMPHOCYTES # BLD AUTO: 0.31 10E3/UL (ref 0.8–5.3)
LYMPHOCYTES NFR BLD AUTO: 16.2 %
MAGNESIUM SERPL-MCNC: 1.7 MG/DL (ref 1.7–2.3)
MCH RBC QN AUTO: 31 PG (ref 26.5–33)
MCHC RBC AUTO-ENTMCNC: 31.8 G/DL (ref 31.5–36.5)
MCV RBC AUTO: 97.6 FL (ref 78–100)
MONOCYTES # BLD AUTO: 0.06 10E3/UL (ref 0–1.3)
MONOCYTES NFR BLD AUTO: 3.1 %
N MEN DNA BLD POS QL NAA+NON-PROBE: NOT DETECTED
NEUTROPHILS # BLD AUTO: 1.52 10E3/UL (ref 1.6–8.3)
NEUTROPHILS NFR BLD AUTO: 79.7 %
NITRATE UR QL: NEGATIVE
NRBC # BLD AUTO: <0.03 10E3/UL
NRBC BLD AUTO-RTO: 0 /100
P AERUGINOSA DNA BLD POS NAA+NON-PROBE: NOT DETECTED
PH UR STRIP: 7 [PH] (ref 5–7)
PHOSPHATE SERPL-MCNC: 1.7 MG/DL (ref 2.5–4.5)
PLATELET # BLD AUTO: 177 10E3/UL (ref 150–450)
POTASSIUM SERPL-SCNC: 3.8 MMOL/L (ref 3.4–5.3)
PROT SERPL-MCNC: 6.5 G/DL (ref 6.4–8.3)
PROTEUS SP DNA BLD POS QL NAA+NON-PROBE: NOT DETECTED
PROTHROMBIN TIME: 13.1 SECONDS (ref 11.8–14.8)
RBC # BLD AUTO: 3.32 10E6/UL (ref 3.8–5.2)
RBC URINE: <1 /HPF
RSV RNA SPEC NAA+PROBE: NEGATIVE
S AUREUS DNA BLD POS QL NAA+NON-PROBE: NOT DETECTED
S AUREUS+CONS DNA BLD POS NAA+NON-PROBE: NOT DETECTED
S EPIDERMIDIS DNA BLD POS QL NAA+NON-PRB: NOT DETECTED
S LUGDUNENSIS DNA BLD POS QL NAA+NON-PRB: NOT DETECTED
S MALTOPHILIA DNA BLD POS QL NAA+NON-PRB: NOT DETECTED
S MARCESCENS DNA BLD POS NAA+NON-PROBE: NOT DETECTED
S PNEUM DNA BLD POS QL NAA+NON-PROBE: NOT DETECTED
S PYO DNA BLD POS QL NAA+NON-PROBE: NOT DETECTED
SALMONELLA DNA BLD POS QL NAA+NON-PROBE: NOT DETECTED
SARS-COV-2 RNA RESP QL NAA+PROBE: NEGATIVE
SODIUM SERPL-SCNC: 141 MMOL/L (ref 135–145)
SP GR UR STRIP: 1.01 (ref 1–1.03)
SQUAMOUS EPITHELIAL: 2 /HPF
STREPTOCOCCUS DNA BLD POS NAA+NON-PROBE: NOT DETECTED
TROPONIN T SERPL HS-MCNC: 107 NG/L
TROPONIN T SERPL HS-MCNC: 108 NG/L
TROPONIN T SERPL HS-MCNC: 27 NG/L
TROPONIN T SERPL HS-MCNC: 86 NG/L
TSH SERPL DL<=0.005 MIU/L-ACNC: 2.01 UIU/ML (ref 0.3–4.2)
UFH PPP CHRO-ACNC: 0.45 IU/ML (ref ?–1.1)
UROBILINOGEN UR STRIP-MCNC: NORMAL MG/DL
WBC # BLD AUTO: 1.91 10E3/UL (ref 4–11)
WBC URINE: 7 /HPF

## 2025-08-29 PROCEDURE — 99223 1ST HOSP IP/OBS HIGH 75: CPT | Performed by: STUDENT IN AN ORGANIZED HEALTH CARE EDUCATION/TRAINING PROGRAM

## 2025-08-29 PROCEDURE — 36415 COLL VENOUS BLD VENIPUNCTURE: CPT | Performed by: INTERNAL MEDICINE

## 2025-08-29 PROCEDURE — 255N000002 HC RX 255 OP 636: Performed by: EMERGENCY MEDICINE

## 2025-08-29 PROCEDURE — 250N000009 HC RX 250: Performed by: STUDENT IN AN ORGANIZED HEALTH CARE EDUCATION/TRAINING PROGRAM

## 2025-08-29 PROCEDURE — 87637 SARSCOV2&INF A&B&RSV AMP PRB: CPT | Performed by: EMERGENCY MEDICINE

## 2025-08-29 PROCEDURE — 84100 ASSAY OF PHOSPHORUS: CPT | Performed by: STUDENT IN AN ORGANIZED HEALTH CARE EDUCATION/TRAINING PROGRAM

## 2025-08-29 PROCEDURE — 36415 COLL VENOUS BLD VENIPUNCTURE: CPT | Performed by: EMERGENCY MEDICINE

## 2025-08-29 PROCEDURE — 258N000003 HC RX IP 258 OP 636: Performed by: STUDENT IN AN ORGANIZED HEALTH CARE EDUCATION/TRAINING PROGRAM

## 2025-08-29 PROCEDURE — 87186 SC STD MICRODIL/AGAR DIL: CPT | Performed by: EMERGENCY MEDICINE

## 2025-08-29 PROCEDURE — 93005 ELECTROCARDIOGRAM TRACING: CPT | Performed by: EMERGENCY MEDICINE

## 2025-08-29 PROCEDURE — 83036 HEMOGLOBIN GLYCOSYLATED A1C: CPT | Performed by: STUDENT IN AN ORGANIZED HEALTH CARE EDUCATION/TRAINING PROGRAM

## 2025-08-29 PROCEDURE — 82040 ASSAY OF SERUM ALBUMIN: CPT | Performed by: EMERGENCY MEDICINE

## 2025-08-29 PROCEDURE — 99285 EMERGENCY DEPT VISIT HI MDM: CPT | Mod: 25 | Performed by: EMERGENCY MEDICINE

## 2025-08-29 PROCEDURE — 87154 CUL TYP ID BLD PTHGN 6+ TRGT: CPT | Performed by: EMERGENCY MEDICINE

## 2025-08-29 PROCEDURE — 250N000011 HC RX IP 250 OP 636: Performed by: EMERGENCY MEDICINE

## 2025-08-29 PROCEDURE — 85520 HEPARIN ASSAY: CPT | Performed by: EMERGENCY MEDICINE

## 2025-08-29 PROCEDURE — 96374 THER/PROPH/DIAG INJ IV PUSH: CPT | Mod: 59

## 2025-08-29 PROCEDURE — 87086 URINE CULTURE/COLONY COUNT: CPT | Performed by: EMERGENCY MEDICINE

## 2025-08-29 PROCEDURE — 83605 ASSAY OF LACTIC ACID: CPT | Performed by: EMERGENCY MEDICINE

## 2025-08-29 PROCEDURE — 250N000013 HC RX MED GY IP 250 OP 250 PS 637: Performed by: EMERGENCY MEDICINE

## 2025-08-29 PROCEDURE — 81001 URINALYSIS AUTO W/SCOPE: CPT | Performed by: EMERGENCY MEDICINE

## 2025-08-29 PROCEDURE — 83690 ASSAY OF LIPASE: CPT | Performed by: EMERGENCY MEDICINE

## 2025-08-29 PROCEDURE — 120N000004 HC R&B MS OVERFLOW

## 2025-08-29 PROCEDURE — 250N000013 HC RX MED GY IP 250 OP 250 PS 637: Performed by: STUDENT IN AN ORGANIZED HEALTH CARE EDUCATION/TRAINING PROGRAM

## 2025-08-29 PROCEDURE — 84443 ASSAY THYROID STIM HORMONE: CPT | Performed by: EMERGENCY MEDICINE

## 2025-08-29 PROCEDURE — 96375 TX/PRO/DX INJ NEW DRUG ADDON: CPT

## 2025-08-29 PROCEDURE — 85610 PROTHROMBIN TIME: CPT | Performed by: EMERGENCY MEDICINE

## 2025-08-29 PROCEDURE — 84484 ASSAY OF TROPONIN QUANT: CPT | Performed by: EMERGENCY MEDICINE

## 2025-08-29 PROCEDURE — 84484 ASSAY OF TROPONIN QUANT: CPT | Performed by: INTERNAL MEDICINE

## 2025-08-29 PROCEDURE — 96361 HYDRATE IV INFUSION ADD-ON: CPT

## 2025-08-29 PROCEDURE — 250N000011 HC RX IP 250 OP 636: Performed by: STUDENT IN AN ORGANIZED HEALTH CARE EDUCATION/TRAINING PROGRAM

## 2025-08-29 PROCEDURE — 85730 THROMBOPLASTIN TIME PARTIAL: CPT | Performed by: EMERGENCY MEDICINE

## 2025-08-29 PROCEDURE — 85025 COMPLETE CBC W/AUTO DIFF WBC: CPT | Performed by: EMERGENCY MEDICINE

## 2025-08-29 PROCEDURE — 83735 ASSAY OF MAGNESIUM: CPT | Performed by: EMERGENCY MEDICINE

## 2025-08-29 PROCEDURE — 99222 1ST HOSP IP/OBS MODERATE 55: CPT | Performed by: INTERNAL MEDICINE

## 2025-08-29 PROCEDURE — 258N000003 HC RX IP 258 OP 636: Performed by: EMERGENCY MEDICINE

## 2025-08-29 RX ORDER — AMOXICILLIN 250 MG
2 CAPSULE ORAL 2 TIMES DAILY PRN
Status: DISCONTINUED | OUTPATIENT
Start: 2025-08-29 | End: 2025-09-04 | Stop reason: HOSPADM

## 2025-08-29 RX ORDER — HYDROMORPHONE HYDROCHLORIDE 1 MG/ML
0.4 INJECTION, SOLUTION INTRAMUSCULAR; INTRAVENOUS; SUBCUTANEOUS
Refills: 0 | Status: DISCONTINUED | OUTPATIENT
Start: 2025-08-29 | End: 2025-09-04 | Stop reason: HOSPADM

## 2025-08-29 RX ORDER — HYDRALAZINE HYDROCHLORIDE 20 MG/ML
10 INJECTION INTRAMUSCULAR; INTRAVENOUS EVERY 6 HOURS PRN
Status: DISCONTINUED | OUTPATIENT
Start: 2025-08-29 | End: 2025-09-04 | Stop reason: HOSPADM

## 2025-08-29 RX ORDER — POLYETHYLENE GLYCOL 3350 17 G/17G
17 POWDER, FOR SOLUTION ORAL 2 TIMES DAILY
Status: DISCONTINUED | OUTPATIENT
Start: 2025-08-29 | End: 2025-09-04 | Stop reason: HOSPADM

## 2025-08-29 RX ORDER — SODIUM CHLORIDE 9 MG/ML
INJECTION, SOLUTION INTRAVENOUS CONTINUOUS
Status: DISCONTINUED | OUTPATIENT
Start: 2025-08-29 | End: 2025-08-30

## 2025-08-29 RX ORDER — ONDANSETRON 2 MG/ML
4 INJECTION INTRAMUSCULAR; INTRAVENOUS EVERY 6 HOURS PRN
Status: DISCONTINUED | OUTPATIENT
Start: 2025-08-29 | End: 2025-09-04 | Stop reason: HOSPADM

## 2025-08-29 RX ORDER — MAGNESIUM HYDROXIDE/ALUMINUM HYDROXICE/SIMETHICONE 120; 1200; 1200 MG/30ML; MG/30ML; MG/30ML
15 SUSPENSION ORAL ONCE
Status: COMPLETED | OUTPATIENT
Start: 2025-08-29 | End: 2025-08-29

## 2025-08-29 RX ORDER — ACETAMINOPHEN 325 MG/1
650 TABLET ORAL ONCE
Status: COMPLETED | OUTPATIENT
Start: 2025-08-29 | End: 2025-08-29

## 2025-08-29 RX ORDER — CALCIUM CARBONATE 500 MG/1
1000 TABLET, CHEWABLE ORAL 4 TIMES DAILY PRN
Status: DISCONTINUED | OUTPATIENT
Start: 2025-08-29 | End: 2025-09-04 | Stop reason: HOSPADM

## 2025-08-29 RX ORDER — HYDROCHLOROTHIAZIDE 25 MG/1
25 TABLET ORAL DAILY
Status: DISCONTINUED | OUTPATIENT
Start: 2025-08-29 | End: 2025-09-04 | Stop reason: HOSPADM

## 2025-08-29 RX ORDER — HYDROMORPHONE HYDROCHLORIDE 1 MG/ML
0.2 INJECTION, SOLUTION INTRAMUSCULAR; INTRAVENOUS; SUBCUTANEOUS
Refills: 0 | Status: DISCONTINUED | OUTPATIENT
Start: 2025-08-29 | End: 2025-09-04 | Stop reason: HOSPADM

## 2025-08-29 RX ORDER — HEPARIN SODIUM 10000 [USP'U]/100ML
0-5000 INJECTION, SOLUTION INTRAVENOUS CONTINUOUS
Status: DISCONTINUED | OUTPATIENT
Start: 2025-08-29 | End: 2025-08-30

## 2025-08-29 RX ORDER — ASPIRIN 81 MG/1
162 TABLET, CHEWABLE ORAL ONCE
Status: COMPLETED | OUTPATIENT
Start: 2025-08-29 | End: 2025-08-29

## 2025-08-29 RX ORDER — CEFTRIAXONE 1 G/1
1 INJECTION, POWDER, FOR SOLUTION INTRAMUSCULAR; INTRAVENOUS ONCE
Status: COMPLETED | OUTPATIENT
Start: 2025-08-29 | End: 2025-08-29

## 2025-08-29 RX ORDER — ACETAMINOPHEN 325 MG/1
650 TABLET ORAL EVERY 6 HOURS PRN
Status: DISCONTINUED | OUTPATIENT
Start: 2025-08-29 | End: 2025-08-30

## 2025-08-29 RX ORDER — ONDANSETRON 4 MG/1
4 TABLET, ORALLY DISINTEGRATING ORAL EVERY 6 HOURS PRN
Status: DISCONTINUED | OUTPATIENT
Start: 2025-08-29 | End: 2025-09-04 | Stop reason: HOSPADM

## 2025-08-29 RX ORDER — AMOXICILLIN 250 MG
1 CAPSULE ORAL 2 TIMES DAILY PRN
Status: DISCONTINUED | OUTPATIENT
Start: 2025-08-29 | End: 2025-09-04 | Stop reason: HOSPADM

## 2025-08-29 RX ORDER — ACETAMINOPHEN 650 MG/1
650 SUPPOSITORY RECTAL EVERY 4 HOURS PRN
Status: DISCONTINUED | OUTPATIENT
Start: 2025-08-29 | End: 2025-09-04 | Stop reason: HOSPADM

## 2025-08-29 RX ORDER — LEVOTHYROXINE SODIUM 25 UG/1
50 TABLET ORAL DAILY
Status: DISCONTINUED | OUTPATIENT
Start: 2025-08-29 | End: 2025-09-04 | Stop reason: HOSPADM

## 2025-08-29 RX ORDER — OXYCODONE HYDROCHLORIDE 5 MG/1
5 TABLET ORAL EVERY 4 HOURS PRN
Refills: 0 | Status: DISCONTINUED | OUTPATIENT
Start: 2025-08-29 | End: 2025-09-04 | Stop reason: HOSPADM

## 2025-08-29 RX ORDER — PREGABALIN 25 MG/1
25 CAPSULE ORAL AT BEDTIME
Status: DISCONTINUED | OUTPATIENT
Start: 2025-08-29 | End: 2025-09-04 | Stop reason: HOSPADM

## 2025-08-29 RX ORDER — FLUTICASONE PROPIONATE 50 MCG
1 SPRAY, SUSPENSION (ML) NASAL DAILY PRN
COMMUNITY

## 2025-08-29 RX ORDER — LIDOCAINE 40 MG/G
CREAM TOPICAL
Status: DISCONTINUED | OUTPATIENT
Start: 2025-08-29 | End: 2025-09-04 | Stop reason: HOSPADM

## 2025-08-29 RX ORDER — ASPIRIN 81 MG/1
81 TABLET, CHEWABLE ORAL DAILY
Status: DISCONTINUED | OUTPATIENT
Start: 2025-08-30 | End: 2025-09-04 | Stop reason: HOSPADM

## 2025-08-29 RX ORDER — SENNOSIDES 8.6 MG/1
1 TABLET ORAL
COMMUNITY

## 2025-08-29 RX ORDER — CEFTRIAXONE 1 G/1
1 INJECTION, POWDER, FOR SOLUTION INTRAMUSCULAR; INTRAVENOUS EVERY 24 HOURS
Status: DISCONTINUED | OUTPATIENT
Start: 2025-08-30 | End: 2025-08-30

## 2025-08-29 RX ORDER — MAGNESIUM SULFATE 4 G/50ML
4 INJECTION INTRAVENOUS ONCE
Status: COMPLETED | OUTPATIENT
Start: 2025-08-29 | End: 2025-08-29

## 2025-08-29 RX ORDER — MAGNESIUM HYDROXIDE/ALUMINUM HYDROXICE/SIMETHICONE 120; 1200; 1200 MG/30ML; MG/30ML; MG/30ML
15 SUSPENSION ORAL 3 TIMES DAILY PRN
Status: DISCONTINUED | OUTPATIENT
Start: 2025-08-29 | End: 2025-09-04 | Stop reason: HOSPADM

## 2025-08-29 RX ORDER — AZELASTINE HYDROCHLORIDE 137 UG/1
1 SPRAY, METERED NASAL
COMMUNITY

## 2025-08-29 RX ORDER — ACETAMINOPHEN 325 MG/1
650 TABLET ORAL EVERY 4 HOURS PRN
Status: DISCONTINUED | OUTPATIENT
Start: 2025-08-29 | End: 2025-09-04 | Stop reason: HOSPADM

## 2025-08-29 RX ADMIN — ASPIRIN 81 MG CHEWABLE TABLET 162 MG: 81 TABLET CHEWABLE at 11:08

## 2025-08-29 RX ADMIN — ACETAMINOPHEN 650 MG: 325 TABLET ORAL at 15:23

## 2025-08-29 RX ADMIN — ALUMINUM HYDROXIDE, MAGNESIUM HYDROXIDE, AND SIMETHICONE 15 ML: 200; 200; 20 SUSPENSION ORAL at 08:33

## 2025-08-29 RX ADMIN — ACETAMINOPHEN 650 MG: 325 TABLET ORAL at 21:53

## 2025-08-29 RX ADMIN — MAGNESIUM SULFATE HEPTAHYDRATE 4 G: 4 INJECTION, SOLUTION INTRAVENOUS at 14:06

## 2025-08-29 RX ADMIN — ACETAMINOPHEN 650 MG: 325 TABLET ORAL at 08:33

## 2025-08-29 RX ADMIN — HYDROCHLOROTHIAZIDE 25 MG: 25 TABLET ORAL at 15:18

## 2025-08-29 RX ADMIN — HEPARIN SODIUM 850 UNITS/HR: 10000 INJECTION, SOLUTION INTRAVENOUS at 11:56

## 2025-08-29 RX ADMIN — PREGABALIN 25 MG: 25 CAPSULE ORAL at 21:50

## 2025-08-29 RX ADMIN — SODIUM PHOSPHATE, MONOBASIC, MONOHYDRATE AND SODIUM PHOSPHATE, DIBASIC, ANHYDROUS 30 MMOL: 142; 276 INJECTION, SOLUTION INTRAVENOUS at 17:00

## 2025-08-29 RX ADMIN — IOHEXOL 75 ML: 350 INJECTION, SOLUTION INTRAVENOUS at 09:54

## 2025-08-29 RX ADMIN — LEVOTHYROXINE SODIUM 50 MCG: 0.03 TABLET ORAL at 15:18

## 2025-08-29 RX ADMIN — CEFTRIAXONE SODIUM 1 G: 1 INJECTION, POWDER, FOR SOLUTION INTRAMUSCULAR; INTRAVENOUS at 09:21

## 2025-08-29 RX ADMIN — FAMOTIDINE 20 MG: 10 INJECTION, SOLUTION INTRAVENOUS at 08:33

## 2025-08-29 RX ADMIN — CITALOPRAM HYDROBROMIDE 5 MG: 10 TABLET ORAL at 15:18

## 2025-08-29 RX ADMIN — OXYCODONE HYDROCHLORIDE 2.5 MG: 5 TABLET ORAL at 23:36

## 2025-08-29 RX ADMIN — SODIUM CHLORIDE: 0.9 INJECTION, SOLUTION INTRAVENOUS at 14:06

## 2025-08-29 RX ADMIN — SODIUM CHLORIDE 1000 ML: 0.9 INJECTION, SOLUTION INTRAVENOUS at 08:52

## 2025-08-29 RX ADMIN — POLYETHYLENE GLYCOL 3350 17 G: 17 POWDER, FOR SOLUTION ORAL at 21:50

## 2025-08-29 ASSESSMENT — ACTIVITIES OF DAILY LIVING (ADL)
ADLS_ACUITY_SCORE: 55
ADLS_ACUITY_SCORE: 54
ADLS_ACUITY_SCORE: 41
ADLS_ACUITY_SCORE: 55
ADLS_ACUITY_SCORE: 54
ADLS_ACUITY_SCORE: 41
ADLS_ACUITY_SCORE: 54
ADLS_ACUITY_SCORE: 59

## 2025-08-29 ASSESSMENT — COLUMBIA-SUICIDE SEVERITY RATING SCALE - C-SSRS
6. HAVE YOU EVER DONE ANYTHING, STARTED TO DO ANYTHING, OR PREPARED TO DO ANYTHING TO END YOUR LIFE?: NO
1. IN THE PAST MONTH, HAVE YOU WISHED YOU WERE DEAD OR WISHED YOU COULD GO TO SLEEP AND NOT WAKE UP?: NO
2. HAVE YOU ACTUALLY HAD ANY THOUGHTS OF KILLING YOURSELF IN THE PAST MONTH?: NO

## 2025-08-30 ENCOUNTER — APPOINTMENT (OUTPATIENT)
Dept: CARDIOLOGY | Facility: HOSPITAL | Age: 85
End: 2025-08-30
Attending: INTERNAL MEDICINE
Payer: COMMERCIAL

## 2025-08-30 LAB
ALBUMIN SERPL BCG-MCNC: 3.3 G/DL (ref 3.5–5.2)
ALP SERPL-CCNC: 107 U/L (ref 40–150)
ALT SERPL W P-5'-P-CCNC: 114 U/L (ref 0–50)
ANION GAP SERPL CALCULATED.3IONS-SCNC: 8 MMOL/L (ref 7–15)
AST SERPL W P-5'-P-CCNC: 92 U/L (ref 0–45)
BACTERIA UR CULT: NORMAL
BILIRUB DIRECT SERPL-MCNC: 0.1 MG/DL (ref 0–0.3)
BILIRUB SERPL-MCNC: 0.3 MG/DL
BUN SERPL-MCNC: 17.7 MG/DL (ref 8–23)
CALCIUM SERPL-MCNC: 8.3 MG/DL (ref 8.8–10.4)
CHLORIDE SERPL-SCNC: 106 MMOL/L (ref 98–107)
CREAT SERPL-MCNC: 0.93 MG/DL (ref 0.51–0.95)
EGFRCR SERPLBLD CKD-EPI 2021: 60 ML/MIN/1.73M2
ERYTHROCYTE [DISTWIDTH] IN BLOOD BY AUTOMATED COUNT: 17.1 % (ref 10–15)
GLUCOSE BLDC GLUCOMTR-MCNC: 112 MG/DL (ref 70–99)
GLUCOSE BLDC GLUCOMTR-MCNC: 135 MG/DL (ref 70–99)
GLUCOSE SERPL-MCNC: 96 MG/DL (ref 70–99)
HCO3 SERPL-SCNC: 24 MMOL/L (ref 22–29)
HCT VFR BLD AUTO: 27 % (ref 35–47)
HGB BLD-MCNC: 8.8 G/DL (ref 11.7–15.7)
LVEF ECHO: NORMAL
MCH RBC QN AUTO: 31.4 PG (ref 26.5–33)
MCHC RBC AUTO-ENTMCNC: 32.6 G/DL (ref 31.5–36.5)
MCV RBC AUTO: 96.4 FL (ref 78–100)
PLATELET # BLD AUTO: 159 10E3/UL (ref 150–450)
POTASSIUM SERPL-SCNC: 3.6 MMOL/L (ref 3.4–5.3)
PROT SERPL-MCNC: 5.6 G/DL (ref 6.4–8.3)
RBC # BLD AUTO: 2.8 10E6/UL (ref 3.8–5.2)
SODIUM SERPL-SCNC: 138 MMOL/L (ref 135–145)
UFH PPP CHRO-ACNC: 0.35 IU/ML (ref ?–1.1)
WBC # BLD AUTO: 6.02 10E3/UL (ref 4–11)

## 2025-08-30 PROCEDURE — 258N000003 HC RX IP 258 OP 636: Performed by: STUDENT IN AN ORGANIZED HEALTH CARE EDUCATION/TRAINING PROGRAM

## 2025-08-30 PROCEDURE — 93306 TTE W/DOPPLER COMPLETE: CPT

## 2025-08-30 PROCEDURE — 250N000011 HC RX IP 250 OP 636: Performed by: STUDENT IN AN ORGANIZED HEALTH CARE EDUCATION/TRAINING PROGRAM

## 2025-08-30 PROCEDURE — 36415 COLL VENOUS BLD VENIPUNCTURE: CPT | Performed by: STUDENT IN AN ORGANIZED HEALTH CARE EDUCATION/TRAINING PROGRAM

## 2025-08-30 PROCEDURE — 99232 SBSQ HOSP IP/OBS MODERATE 35: CPT | Performed by: INTERNAL MEDICINE

## 2025-08-30 PROCEDURE — 85014 HEMATOCRIT: CPT | Performed by: STUDENT IN AN ORGANIZED HEALTH CARE EDUCATION/TRAINING PROGRAM

## 2025-08-30 PROCEDURE — 80053 COMPREHEN METABOLIC PANEL: CPT | Performed by: STUDENT IN AN ORGANIZED HEALTH CARE EDUCATION/TRAINING PROGRAM

## 2025-08-30 PROCEDURE — 250N000013 HC RX MED GY IP 250 OP 250 PS 637: Performed by: STUDENT IN AN ORGANIZED HEALTH CARE EDUCATION/TRAINING PROGRAM

## 2025-08-30 PROCEDURE — 99233 SBSQ HOSP IP/OBS HIGH 50: CPT | Performed by: STUDENT IN AN ORGANIZED HEALTH CARE EDUCATION/TRAINING PROGRAM

## 2025-08-30 PROCEDURE — 85520 HEPARIN ASSAY: CPT | Performed by: STUDENT IN AN ORGANIZED HEALTH CARE EDUCATION/TRAINING PROGRAM

## 2025-08-30 PROCEDURE — 93306 TTE W/DOPPLER COMPLETE: CPT | Mod: 26 | Performed by: INTERNAL MEDICINE

## 2025-08-30 PROCEDURE — 82248 BILIRUBIN DIRECT: CPT | Performed by: STUDENT IN AN ORGANIZED HEALTH CARE EDUCATION/TRAINING PROGRAM

## 2025-08-30 PROCEDURE — 120N000004 HC R&B MS OVERFLOW

## 2025-08-30 RX ORDER — NALOXONE HYDROCHLORIDE 0.4 MG/ML
0.4 INJECTION, SOLUTION INTRAMUSCULAR; INTRAVENOUS; SUBCUTANEOUS
Status: DISCONTINUED | OUTPATIENT
Start: 2025-08-30 | End: 2025-09-04 | Stop reason: HOSPADM

## 2025-08-30 RX ORDER — NALOXONE HYDROCHLORIDE 0.4 MG/ML
0.2 INJECTION, SOLUTION INTRAMUSCULAR; INTRAVENOUS; SUBCUTANEOUS
Status: DISCONTINUED | OUTPATIENT
Start: 2025-08-30 | End: 2025-09-04 | Stop reason: HOSPADM

## 2025-08-30 RX ORDER — CEFTRIAXONE 1 G/1
1 INJECTION, POWDER, FOR SOLUTION INTRAMUSCULAR; INTRAVENOUS ONCE
Status: COMPLETED | OUTPATIENT
Start: 2025-08-30 | End: 2025-08-30

## 2025-08-30 RX ORDER — CEFTRIAXONE 2 G/1
2 INJECTION, POWDER, FOR SOLUTION INTRAMUSCULAR; INTRAVENOUS EVERY 24 HOURS
Status: DISCONTINUED | OUTPATIENT
Start: 2025-08-31 | End: 2025-08-31

## 2025-08-30 RX ADMIN — POLYETHYLENE GLYCOL 3350 17 G: 17 POWDER, FOR SOLUTION ORAL at 08:46

## 2025-08-30 RX ADMIN — CITALOPRAM HYDROBROMIDE 5 MG: 10 TABLET ORAL at 09:02

## 2025-08-30 RX ADMIN — ACETAMINOPHEN 650 MG: 325 TABLET ORAL at 22:15

## 2025-08-30 RX ADMIN — HYDROCHLOROTHIAZIDE 25 MG: 25 TABLET ORAL at 08:40

## 2025-08-30 RX ADMIN — ACETAMINOPHEN 650 MG: 325 TABLET ORAL at 15:57

## 2025-08-30 RX ADMIN — LEVOTHYROXINE SODIUM 50 MCG: 0.03 TABLET ORAL at 08:40

## 2025-08-30 RX ADMIN — FAMOTIDINE 20 MG: 10 INJECTION, SOLUTION INTRAVENOUS at 08:49

## 2025-08-30 RX ADMIN — CEFTRIAXONE SODIUM 1 G: 1 INJECTION, POWDER, FOR SOLUTION INTRAMUSCULAR; INTRAVENOUS at 08:53

## 2025-08-30 RX ADMIN — CEFTRIAXONE SODIUM 1 G: 1 INJECTION, POWDER, FOR SOLUTION INTRAMUSCULAR; INTRAVENOUS at 16:00

## 2025-08-30 RX ADMIN — ASPIRIN 81 MG: 81 TABLET, CHEWABLE ORAL at 08:40

## 2025-08-30 RX ADMIN — ACETAMINOPHEN 650 MG: 325 TABLET ORAL at 03:41

## 2025-08-30 RX ADMIN — SODIUM CHLORIDE: 0.9 INJECTION, SOLUTION INTRAVENOUS at 03:38

## 2025-08-30 RX ADMIN — PANTOPRAZOLE SODIUM 40 MG: 40 INJECTION, POWDER, FOR SOLUTION INTRAVENOUS at 08:47

## 2025-08-30 RX ADMIN — PREGABALIN 25 MG: 25 CAPSULE ORAL at 20:52

## 2025-08-30 RX ADMIN — POLYETHYLENE GLYCOL 3350 17 G: 17 POWDER, FOR SOLUTION ORAL at 20:52

## 2025-08-30 ASSESSMENT — ACTIVITIES OF DAILY LIVING (ADL)
ADLS_ACUITY_SCORE: 43
ADLS_ACUITY_SCORE: 41
ADLS_ACUITY_SCORE: 43
ADLS_ACUITY_SCORE: 46
ADLS_ACUITY_SCORE: 43
ADLS_ACUITY_SCORE: 46
ADLS_ACUITY_SCORE: 43
ADLS_ACUITY_SCORE: 46
ADLS_ACUITY_SCORE: 43
ADLS_ACUITY_SCORE: 43

## 2025-08-31 LAB
ALBUMIN SERPL BCG-MCNC: 3.4 G/DL (ref 3.5–5.2)
ALP SERPL-CCNC: 96 U/L (ref 40–150)
ALT SERPL W P-5'-P-CCNC: 83 U/L (ref 0–50)
ANION GAP SERPL CALCULATED.3IONS-SCNC: 10 MMOL/L (ref 7–15)
AST SERPL W P-5'-P-CCNC: 52 U/L (ref 0–45)
BACTERIA SPEC CULT: ABNORMAL
BACTERIA SPEC CULT: ABNORMAL
BILIRUB SERPL-MCNC: 0.3 MG/DL
BUN SERPL-MCNC: 14.6 MG/DL (ref 8–23)
CALCIUM SERPL-MCNC: 8.8 MG/DL (ref 8.8–10.4)
CHLORIDE SERPL-SCNC: 107 MMOL/L (ref 98–107)
CREAT SERPL-MCNC: 0.95 MG/DL (ref 0.51–0.95)
EGFRCR SERPLBLD CKD-EPI 2021: 58 ML/MIN/1.73M2
ERYTHROCYTE [DISTWIDTH] IN BLOOD BY AUTOMATED COUNT: 17.1 % (ref 10–15)
GLUCOSE BLDC GLUCOMTR-MCNC: 114 MG/DL (ref 70–99)
GLUCOSE BLDC GLUCOMTR-MCNC: 118 MG/DL (ref 70–99)
GLUCOSE SERPL-MCNC: 106 MG/DL (ref 70–99)
HCO3 SERPL-SCNC: 24 MMOL/L (ref 22–29)
HCT VFR BLD AUTO: 27.2 % (ref 35–47)
HGB BLD-MCNC: 8.4 G/DL (ref 11.7–15.7)
IRON BINDING CAPACITY (ROCHE): 220 UG/DL (ref 240–430)
IRON SATN MFR SERPL: 22 % (ref 15–46)
IRON SERPL-MCNC: 49 UG/DL (ref 37–145)
MAGNESIUM SERPL-MCNC: 2.2 MG/DL (ref 1.7–2.3)
MCH RBC QN AUTO: 30.4 PG (ref 26.5–33)
MCHC RBC AUTO-ENTMCNC: 30.9 G/DL (ref 31.5–36.5)
MCV RBC AUTO: 98.6 FL (ref 78–100)
PHOSPHATE SERPL-MCNC: 2.6 MG/DL (ref 2.5–4.5)
PLATELET # BLD AUTO: 176 10E3/UL (ref 150–450)
POTASSIUM SERPL-SCNC: 3.8 MMOL/L (ref 3.4–5.3)
PROT SERPL-MCNC: 5.8 G/DL (ref 6.4–8.3)
RBC # BLD AUTO: 2.76 10E6/UL (ref 3.8–5.2)
SODIUM SERPL-SCNC: 141 MMOL/L (ref 135–145)
VIT B12 SERPL-MCNC: 374 PG/ML (ref 232–1245)
WBC # BLD AUTO: 3.92 10E3/UL (ref 4–11)

## 2025-08-31 PROCEDURE — 97116 GAIT TRAINING THERAPY: CPT | Mod: GP | Performed by: PHYSICAL THERAPIST

## 2025-08-31 PROCEDURE — 250N000011 HC RX IP 250 OP 636: Performed by: STUDENT IN AN ORGANIZED HEALTH CARE EDUCATION/TRAINING PROGRAM

## 2025-08-31 PROCEDURE — 82607 VITAMIN B-12: CPT | Performed by: STUDENT IN AN ORGANIZED HEALTH CARE EDUCATION/TRAINING PROGRAM

## 2025-08-31 PROCEDURE — 80053 COMPREHEN METABOLIC PANEL: CPT | Performed by: STUDENT IN AN ORGANIZED HEALTH CARE EDUCATION/TRAINING PROGRAM

## 2025-08-31 PROCEDURE — 36415 COLL VENOUS BLD VENIPUNCTURE: CPT | Performed by: STUDENT IN AN ORGANIZED HEALTH CARE EDUCATION/TRAINING PROGRAM

## 2025-08-31 PROCEDURE — 84100 ASSAY OF PHOSPHORUS: CPT | Performed by: STUDENT IN AN ORGANIZED HEALTH CARE EDUCATION/TRAINING PROGRAM

## 2025-08-31 PROCEDURE — 97535 SELF CARE MNGMENT TRAINING: CPT | Mod: GO

## 2025-08-31 PROCEDURE — 250N000013 HC RX MED GY IP 250 OP 250 PS 637: Performed by: STUDENT IN AN ORGANIZED HEALTH CARE EDUCATION/TRAINING PROGRAM

## 2025-08-31 PROCEDURE — 120N000004 HC R&B MS OVERFLOW

## 2025-08-31 PROCEDURE — 83550 IRON BINDING TEST: CPT | Performed by: STUDENT IN AN ORGANIZED HEALTH CARE EDUCATION/TRAINING PROGRAM

## 2025-08-31 PROCEDURE — 97161 PT EVAL LOW COMPLEX 20 MIN: CPT | Mod: GP | Performed by: PHYSICAL THERAPIST

## 2025-08-31 PROCEDURE — 85027 COMPLETE CBC AUTOMATED: CPT | Performed by: STUDENT IN AN ORGANIZED HEALTH CARE EDUCATION/TRAINING PROGRAM

## 2025-08-31 PROCEDURE — 99232 SBSQ HOSP IP/OBS MODERATE 35: CPT | Performed by: STUDENT IN AN ORGANIZED HEALTH CARE EDUCATION/TRAINING PROGRAM

## 2025-08-31 PROCEDURE — 97166 OT EVAL MOD COMPLEX 45 MIN: CPT | Mod: GO

## 2025-08-31 PROCEDURE — 97530 THERAPEUTIC ACTIVITIES: CPT | Mod: GP | Performed by: PHYSICAL THERAPIST

## 2025-08-31 PROCEDURE — 83735 ASSAY OF MAGNESIUM: CPT | Performed by: STUDENT IN AN ORGANIZED HEALTH CARE EDUCATION/TRAINING PROGRAM

## 2025-08-31 RX ORDER — LEVOFLOXACIN 750 MG/1
750 TABLET, FILM COATED ORAL
Status: DISCONTINUED | OUTPATIENT
Start: 2025-09-01 | End: 2025-09-04 | Stop reason: HOSPADM

## 2025-08-31 RX ORDER — ENOXAPARIN SODIUM 100 MG/ML
40 INJECTION SUBCUTANEOUS EVERY 24 HOURS
Status: DISCONTINUED | OUTPATIENT
Start: 2025-08-31 | End: 2025-09-04 | Stop reason: HOSPADM

## 2025-08-31 RX ORDER — LEVOFLOXACIN 5 MG/ML
750 INJECTION, SOLUTION INTRAVENOUS EVERY 24 HOURS
Status: DISCONTINUED | OUTPATIENT
Start: 2025-09-01 | End: 2025-08-31

## 2025-08-31 RX ADMIN — FAMOTIDINE 20 MG: 10 INJECTION, SOLUTION INTRAVENOUS at 08:25

## 2025-08-31 RX ADMIN — ASPIRIN 81 MG: 81 TABLET, CHEWABLE ORAL at 08:22

## 2025-08-31 RX ADMIN — ACETAMINOPHEN 650 MG: 325 TABLET ORAL at 21:43

## 2025-08-31 RX ADMIN — PANTOPRAZOLE SODIUM 40 MG: 40 INJECTION, POWDER, FOR SOLUTION INTRAVENOUS at 08:23

## 2025-08-31 RX ADMIN — ACETAMINOPHEN 650 MG: 325 TABLET ORAL at 03:26

## 2025-08-31 RX ADMIN — HYDROCHLOROTHIAZIDE 25 MG: 25 TABLET ORAL at 08:23

## 2025-08-31 RX ADMIN — ACETAMINOPHEN 650 MG: 325 TABLET ORAL at 08:36

## 2025-08-31 RX ADMIN — CITALOPRAM HYDROBROMIDE 5 MG: 10 TABLET ORAL at 09:22

## 2025-08-31 RX ADMIN — POLYETHYLENE GLYCOL 3350 17 G: 17 POWDER, FOR SOLUTION ORAL at 08:32

## 2025-08-31 RX ADMIN — CEFTRIAXONE SODIUM 2 G: 2 INJECTION, POWDER, FOR SOLUTION INTRAMUSCULAR; INTRAVENOUS at 08:24

## 2025-08-31 RX ADMIN — ENOXAPARIN SODIUM 40 MG: 40 INJECTION SUBCUTANEOUS at 21:17

## 2025-08-31 RX ADMIN — POLYETHYLENE GLYCOL 3350 17 G: 17 POWDER, FOR SOLUTION ORAL at 21:19

## 2025-08-31 RX ADMIN — PREGABALIN 25 MG: 25 CAPSULE ORAL at 21:19

## 2025-08-31 RX ADMIN — LEVOTHYROXINE SODIUM 50 MCG: 0.03 TABLET ORAL at 08:22

## 2025-08-31 ASSESSMENT — ACTIVITIES OF DAILY LIVING (ADL)
ADLS_ACUITY_SCORE: 46
DEPENDENT_IADLS:: MONEY MANAGEMENT;MEDICATION MANAGEMENT
ADLS_ACUITY_SCORE: 46
ADLS_ACUITY_SCORE: 47
ADLS_ACUITY_SCORE: 47
ADLS_ACUITY_SCORE: 46
IADL_COMMENTS: PT INDEPENDENT WITH IADLS AT BASELINE.
ADLS_ACUITY_SCORE: 46

## 2025-09-01 LAB
ALBUMIN SERPL BCG-MCNC: 3.5 G/DL (ref 3.5–5.2)
ALP SERPL-CCNC: 98 U/L (ref 40–150)
ALT SERPL W P-5'-P-CCNC: 68 U/L (ref 0–50)
ANION GAP SERPL CALCULATED.3IONS-SCNC: 12 MMOL/L (ref 7–15)
AST SERPL W P-5'-P-CCNC: 34 U/L (ref 0–45)
BACTERIA SPEC CULT: ABNORMAL
BILIRUB SERPL-MCNC: 0.2 MG/DL
BUN SERPL-MCNC: 15.1 MG/DL (ref 8–23)
CALCIUM SERPL-MCNC: 9.3 MG/DL (ref 8.8–10.4)
CHLORIDE SERPL-SCNC: 105 MMOL/L (ref 98–107)
CREAT SERPL-MCNC: 0.96 MG/DL (ref 0.51–0.95)
EGFRCR SERPLBLD CKD-EPI 2021: 57 ML/MIN/1.73M2
ERYTHROCYTE [DISTWIDTH] IN BLOOD BY AUTOMATED COUNT: 16.8 % (ref 10–15)
FERRITIN SERPL-MCNC: 91 NG/ML (ref 11–328)
FOLATE SERPL-MCNC: 17.4 NG/ML (ref 4.6–34.8)
GLUCOSE BLDC GLUCOMTR-MCNC: 104 MG/DL (ref 70–99)
GLUCOSE SERPL-MCNC: 94 MG/DL (ref 70–99)
HCO3 SERPL-SCNC: 25 MMOL/L (ref 22–29)
HCT VFR BLD AUTO: 27.5 % (ref 35–47)
HGB BLD-MCNC: 9 G/DL (ref 11.7–15.7)
MAGNESIUM SERPL-MCNC: 1.9 MG/DL (ref 1.7–2.3)
MCH RBC QN AUTO: 31.4 PG (ref 26.5–33)
MCHC RBC AUTO-ENTMCNC: 32.7 G/DL (ref 31.5–36.5)
MCV RBC AUTO: 95.8 FL (ref 78–100)
PHOSPHATE SERPL-MCNC: 3.2 MG/DL (ref 2.5–4.5)
PLATELET # BLD AUTO: 182 10E3/UL (ref 150–450)
POTASSIUM SERPL-SCNC: 3.7 MMOL/L (ref 3.4–5.3)
PROT SERPL-MCNC: 6 G/DL (ref 6.4–8.3)
RBC # BLD AUTO: 2.87 10E6/UL (ref 3.8–5.2)
SODIUM SERPL-SCNC: 142 MMOL/L (ref 135–145)
WBC # BLD AUTO: 3.85 10E3/UL (ref 4–11)

## 2025-09-01 PROCEDURE — 82247 BILIRUBIN TOTAL: CPT | Performed by: STUDENT IN AN ORGANIZED HEALTH CARE EDUCATION/TRAINING PROGRAM

## 2025-09-01 PROCEDURE — 250N000011 HC RX IP 250 OP 636: Performed by: STUDENT IN AN ORGANIZED HEALTH CARE EDUCATION/TRAINING PROGRAM

## 2025-09-01 PROCEDURE — 250N000013 HC RX MED GY IP 250 OP 250 PS 637: Performed by: STUDENT IN AN ORGANIZED HEALTH CARE EDUCATION/TRAINING PROGRAM

## 2025-09-01 PROCEDURE — 99232 SBSQ HOSP IP/OBS MODERATE 35: CPT | Performed by: STUDENT IN AN ORGANIZED HEALTH CARE EDUCATION/TRAINING PROGRAM

## 2025-09-01 PROCEDURE — 82746 ASSAY OF FOLIC ACID SERUM: CPT | Performed by: STUDENT IN AN ORGANIZED HEALTH CARE EDUCATION/TRAINING PROGRAM

## 2025-09-01 PROCEDURE — 82728 ASSAY OF FERRITIN: CPT | Performed by: STUDENT IN AN ORGANIZED HEALTH CARE EDUCATION/TRAINING PROGRAM

## 2025-09-01 PROCEDURE — 120N000004 HC R&B MS OVERFLOW

## 2025-09-01 PROCEDURE — 85014 HEMATOCRIT: CPT | Performed by: STUDENT IN AN ORGANIZED HEALTH CARE EDUCATION/TRAINING PROGRAM

## 2025-09-01 PROCEDURE — 83735 ASSAY OF MAGNESIUM: CPT | Performed by: STUDENT IN AN ORGANIZED HEALTH CARE EDUCATION/TRAINING PROGRAM

## 2025-09-01 PROCEDURE — 84100 ASSAY OF PHOSPHORUS: CPT | Performed by: STUDENT IN AN ORGANIZED HEALTH CARE EDUCATION/TRAINING PROGRAM

## 2025-09-01 PROCEDURE — 36415 COLL VENOUS BLD VENIPUNCTURE: CPT | Performed by: STUDENT IN AN ORGANIZED HEALTH CARE EDUCATION/TRAINING PROGRAM

## 2025-09-01 RX ADMIN — ENOXAPARIN SODIUM 40 MG: 40 INJECTION SUBCUTANEOUS at 21:15

## 2025-09-01 RX ADMIN — POLYETHYLENE GLYCOL 3350 17 G: 17 POWDER, FOR SOLUTION ORAL at 21:15

## 2025-09-01 RX ADMIN — ACETAMINOPHEN 650 MG: 325 TABLET ORAL at 12:00

## 2025-09-01 RX ADMIN — PREGABALIN 25 MG: 25 CAPSULE ORAL at 21:15

## 2025-09-01 RX ADMIN — LEVOFLOXACIN 750 MG: 750 TABLET, FILM COATED ORAL at 08:03

## 2025-09-01 RX ADMIN — POLYETHYLENE GLYCOL 3350 17 G: 17 POWDER, FOR SOLUTION ORAL at 08:03

## 2025-09-01 RX ADMIN — ACETAMINOPHEN 650 MG: 325 TABLET ORAL at 07:02

## 2025-09-01 RX ADMIN — PANTOPRAZOLE SODIUM 40 MG: 40 INJECTION, POWDER, FOR SOLUTION INTRAVENOUS at 07:58

## 2025-09-01 RX ADMIN — LEVOTHYROXINE SODIUM 50 MCG: 0.03 TABLET ORAL at 07:59

## 2025-09-01 RX ADMIN — ACETAMINOPHEN 650 MG: 325 TABLET ORAL at 16:14

## 2025-09-01 RX ADMIN — ASPIRIN 81 MG: 81 TABLET, CHEWABLE ORAL at 08:00

## 2025-09-01 RX ADMIN — CITALOPRAM HYDROBROMIDE 5 MG: 10 TABLET ORAL at 07:59

## 2025-09-01 RX ADMIN — FAMOTIDINE 20 MG: 10 INJECTION, SOLUTION INTRAVENOUS at 07:58

## 2025-09-01 RX ADMIN — HYDROCHLOROTHIAZIDE 25 MG: 25 TABLET ORAL at 07:59

## 2025-09-01 ASSESSMENT — ACTIVITIES OF DAILY LIVING (ADL)
ADLS_ACUITY_SCORE: 45
ADLS_ACUITY_SCORE: 48
ADLS_ACUITY_SCORE: 45
ADLS_ACUITY_SCORE: 45
ADLS_ACUITY_SCORE: 49
ADLS_ACUITY_SCORE: 45
ADLS_ACUITY_SCORE: 48
ADLS_ACUITY_SCORE: 49
ADLS_ACUITY_SCORE: 48
ADLS_ACUITY_SCORE: 49
ADLS_ACUITY_SCORE: 49
ADLS_ACUITY_SCORE: 45
ADLS_ACUITY_SCORE: 47
ADLS_ACUITY_SCORE: 49
ADLS_ACUITY_SCORE: 45
ADLS_ACUITY_SCORE: 49
ADLS_ACUITY_SCORE: 45
ADLS_ACUITY_SCORE: 48

## 2025-09-02 LAB
ALBUMIN SERPL BCG-MCNC: 3.5 G/DL (ref 3.5–5.2)
ALP SERPL-CCNC: 89 U/L (ref 40–150)
ALT SERPL W P-5'-P-CCNC: 55 U/L (ref 0–50)
ANION GAP SERPL CALCULATED.3IONS-SCNC: 10 MMOL/L (ref 7–15)
AST SERPL W P-5'-P-CCNC: 27 U/L (ref 0–45)
ATRIAL RATE - MUSE: 78 BPM
BILIRUB SERPL-MCNC: 0.3 MG/DL
BUN SERPL-MCNC: 15.6 MG/DL (ref 8–23)
CALCIUM SERPL-MCNC: 9.5 MG/DL (ref 8.8–10.4)
CHLORIDE SERPL-SCNC: 104 MMOL/L (ref 98–107)
CREAT SERPL-MCNC: 1.1 MG/DL (ref 0.51–0.95)
DIASTOLIC BLOOD PRESSURE - MUSE: 59 MMHG
EGFRCR SERPLBLD CKD-EPI 2021: 49 ML/MIN/1.73M2
ERYTHROCYTE [DISTWIDTH] IN BLOOD BY AUTOMATED COUNT: 16.6 % (ref 10–15)
GLUCOSE BLDC GLUCOMTR-MCNC: 104 MG/DL (ref 70–99)
GLUCOSE BLDC GLUCOMTR-MCNC: 119 MG/DL (ref 70–99)
GLUCOSE SERPL-MCNC: 100 MG/DL (ref 70–99)
HCO3 SERPL-SCNC: 27 MMOL/L (ref 22–29)
HCT VFR BLD AUTO: 28.6 % (ref 35–47)
HGB BLD-MCNC: 9.1 G/DL (ref 11.7–15.7)
INTERPRETATION ECG - MUSE: NORMAL
MAGNESIUM SERPL-MCNC: 1.9 MG/DL (ref 1.7–2.3)
MCH RBC QN AUTO: 31 PG (ref 26.5–33)
MCHC RBC AUTO-ENTMCNC: 31.8 G/DL (ref 31.5–36.5)
MCV RBC AUTO: 97.3 FL (ref 78–100)
P AXIS - MUSE: 58 DEGREES
PHOSPHATE SERPL-MCNC: 3.4 MG/DL (ref 2.5–4.5)
PLATELET # BLD AUTO: 204 10E3/UL (ref 150–450)
POTASSIUM SERPL-SCNC: 4.1 MMOL/L (ref 3.4–5.3)
PR INTERVAL - MUSE: 186 MS
PROT SERPL-MCNC: 6.1 G/DL (ref 6.4–8.3)
QRS DURATION - MUSE: 104 MS
QT - MUSE: 378 MS
QTC - MUSE: 430 MS
R AXIS - MUSE: 55 DEGREES
RBC # BLD AUTO: 2.94 10E6/UL (ref 3.8–5.2)
SODIUM SERPL-SCNC: 141 MMOL/L (ref 135–145)
SYSTOLIC BLOOD PRESSURE - MUSE: 119 MMHG
T AXIS - MUSE: 28 DEGREES
VENTRICULAR RATE- MUSE: 78 BPM
WBC # BLD AUTO: 3.37 10E3/UL (ref 4–11)

## 2025-09-02 PROCEDURE — 250N000011 HC RX IP 250 OP 636: Performed by: STUDENT IN AN ORGANIZED HEALTH CARE EDUCATION/TRAINING PROGRAM

## 2025-09-02 PROCEDURE — 83735 ASSAY OF MAGNESIUM: CPT | Performed by: STUDENT IN AN ORGANIZED HEALTH CARE EDUCATION/TRAINING PROGRAM

## 2025-09-02 PROCEDURE — 87040 BLOOD CULTURE FOR BACTERIA: CPT | Performed by: INTERNAL MEDICINE

## 2025-09-02 PROCEDURE — 36415 COLL VENOUS BLD VENIPUNCTURE: CPT | Performed by: STUDENT IN AN ORGANIZED HEALTH CARE EDUCATION/TRAINING PROGRAM

## 2025-09-02 PROCEDURE — 82040 ASSAY OF SERUM ALBUMIN: CPT | Performed by: STUDENT IN AN ORGANIZED HEALTH CARE EDUCATION/TRAINING PROGRAM

## 2025-09-02 PROCEDURE — 36415 COLL VENOUS BLD VENIPUNCTURE: CPT | Performed by: INTERNAL MEDICINE

## 2025-09-02 PROCEDURE — 84100 ASSAY OF PHOSPHORUS: CPT | Performed by: STUDENT IN AN ORGANIZED HEALTH CARE EDUCATION/TRAINING PROGRAM

## 2025-09-02 PROCEDURE — 120N000004 HC R&B MS OVERFLOW

## 2025-09-02 PROCEDURE — 97110 THERAPEUTIC EXERCISES: CPT | Mod: GO

## 2025-09-02 PROCEDURE — 250N000013 HC RX MED GY IP 250 OP 250 PS 637: Performed by: STUDENT IN AN ORGANIZED HEALTH CARE EDUCATION/TRAINING PROGRAM

## 2025-09-02 PROCEDURE — 97110 THERAPEUTIC EXERCISES: CPT | Mod: GP

## 2025-09-02 PROCEDURE — 97116 GAIT TRAINING THERAPY: CPT | Mod: GP

## 2025-09-02 PROCEDURE — 85018 HEMOGLOBIN: CPT | Performed by: STUDENT IN AN ORGANIZED HEALTH CARE EDUCATION/TRAINING PROGRAM

## 2025-09-02 PROCEDURE — 97535 SELF CARE MNGMENT TRAINING: CPT | Mod: GO

## 2025-09-02 RX ORDER — PANTOPRAZOLE SODIUM 40 MG/1
40 TABLET, DELAYED RELEASE ORAL
Status: DISCONTINUED | OUTPATIENT
Start: 2025-09-03 | End: 2025-09-04 | Stop reason: HOSPADM

## 2025-09-02 RX ORDER — FAMOTIDINE 20 MG/1
20 TABLET, FILM COATED ORAL DAILY
Status: DISCONTINUED | OUTPATIENT
Start: 2025-09-03 | End: 2025-09-04 | Stop reason: HOSPADM

## 2025-09-02 RX ADMIN — LEVOTHYROXINE SODIUM 50 MCG: 0.03 TABLET ORAL at 08:52

## 2025-09-02 RX ADMIN — ACETAMINOPHEN 650 MG: 325 TABLET ORAL at 17:30

## 2025-09-02 RX ADMIN — HYDROCHLOROTHIAZIDE 25 MG: 25 TABLET ORAL at 08:52

## 2025-09-02 RX ADMIN — FAMOTIDINE 20 MG: 10 INJECTION, SOLUTION INTRAVENOUS at 08:52

## 2025-09-02 RX ADMIN — PANTOPRAZOLE SODIUM 40 MG: 40 INJECTION, POWDER, FOR SOLUTION INTRAVENOUS at 08:52

## 2025-09-02 RX ADMIN — POLYETHYLENE GLYCOL 3350 17 G: 17 POWDER, FOR SOLUTION ORAL at 21:21

## 2025-09-02 RX ADMIN — ACETAMINOPHEN 650 MG: 325 TABLET ORAL at 00:14

## 2025-09-02 RX ADMIN — ASPIRIN 81 MG: 81 TABLET, CHEWABLE ORAL at 08:52

## 2025-09-02 RX ADMIN — ENOXAPARIN SODIUM 40 MG: 40 INJECTION SUBCUTANEOUS at 21:21

## 2025-09-02 RX ADMIN — CITALOPRAM HYDROBROMIDE 5 MG: 10 TABLET ORAL at 09:00

## 2025-09-02 RX ADMIN — PREGABALIN 25 MG: 25 CAPSULE ORAL at 21:21

## 2025-09-02 RX ADMIN — POLYETHYLENE GLYCOL 3350 17 G: 17 POWDER, FOR SOLUTION ORAL at 08:52

## 2025-09-02 RX ADMIN — ACETAMINOPHEN 650 MG: 325 TABLET ORAL at 07:07

## 2025-09-02 ASSESSMENT — ACTIVITIES OF DAILY LIVING (ADL)
ADLS_ACUITY_SCORE: 48

## 2025-09-03 LAB
CREAT SERPL-MCNC: 1.01 MG/DL (ref 0.51–0.95)
EGFRCR SERPLBLD CKD-EPI 2021: 54 ML/MIN/1.73M2
ERYTHROCYTE [DISTWIDTH] IN BLOOD BY AUTOMATED COUNT: 16.6 % (ref 10–15)
GLUCOSE BLDC GLUCOMTR-MCNC: 128 MG/DL (ref 70–99)
GLUCOSE BLDC GLUCOMTR-MCNC: 97 MG/DL (ref 70–99)
HCT VFR BLD AUTO: 29.4 % (ref 35–47)
HGB BLD-MCNC: 9.1 G/DL (ref 11.7–15.7)
MCH RBC QN AUTO: 30 PG (ref 26.5–33)
MCHC RBC AUTO-ENTMCNC: 31 G/DL (ref 31.5–36.5)
MCV RBC AUTO: 97 FL (ref 78–100)
PLATELET # BLD AUTO: 204 10E3/UL (ref 150–450)
RBC # BLD AUTO: 3.03 10E6/UL (ref 3.8–5.2)
WBC # BLD AUTO: 3.73 10E3/UL (ref 4–11)

## 2025-09-03 PROCEDURE — 250N000011 HC RX IP 250 OP 636: Performed by: STUDENT IN AN ORGANIZED HEALTH CARE EDUCATION/TRAINING PROGRAM

## 2025-09-03 PROCEDURE — 250N000013 HC RX MED GY IP 250 OP 250 PS 637: Performed by: STUDENT IN AN ORGANIZED HEALTH CARE EDUCATION/TRAINING PROGRAM

## 2025-09-03 PROCEDURE — 120N000004 HC R&B MS OVERFLOW

## 2025-09-03 PROCEDURE — 36415 COLL VENOUS BLD VENIPUNCTURE: CPT | Performed by: STUDENT IN AN ORGANIZED HEALTH CARE EDUCATION/TRAINING PROGRAM

## 2025-09-03 PROCEDURE — 82565 ASSAY OF CREATININE: CPT | Performed by: STUDENT IN AN ORGANIZED HEALTH CARE EDUCATION/TRAINING PROGRAM

## 2025-09-03 PROCEDURE — 97110 THERAPEUTIC EXERCISES: CPT | Mod: GO

## 2025-09-03 PROCEDURE — 85018 HEMOGLOBIN: CPT | Performed by: INTERNAL MEDICINE

## 2025-09-03 PROCEDURE — 250N000013 HC RX MED GY IP 250 OP 250 PS 637: Performed by: INTERNAL MEDICINE

## 2025-09-03 PROCEDURE — A9585 GADOBUTROL INJECTION: HCPCS | Performed by: INTERNAL MEDICINE

## 2025-09-03 PROCEDURE — 86682 HELMINTH ANTIBODY: CPT | Performed by: STUDENT IN AN ORGANIZED HEALTH CARE EDUCATION/TRAINING PROGRAM

## 2025-09-03 PROCEDURE — 97535 SELF CARE MNGMENT TRAINING: CPT | Mod: GO

## 2025-09-03 PROCEDURE — 255N000002 HC RX 255 OP 636: Performed by: INTERNAL MEDICINE

## 2025-09-03 RX ORDER — GADOBUTROL 604.72 MG/ML
8 INJECTION INTRAVENOUS ONCE
Status: COMPLETED | OUTPATIENT
Start: 2025-09-03 | End: 2025-09-03

## 2025-09-03 RX ADMIN — ACETAMINOPHEN 650 MG: 325 TABLET ORAL at 21:15

## 2025-09-03 RX ADMIN — ACETAMINOPHEN 650 MG: 325 TABLET ORAL at 07:42

## 2025-09-03 RX ADMIN — CITALOPRAM HYDROBROMIDE 5 MG: 10 TABLET ORAL at 08:13

## 2025-09-03 RX ADMIN — LEVOTHYROXINE SODIUM 50 MCG: 0.03 TABLET ORAL at 08:13

## 2025-09-03 RX ADMIN — ACETAMINOPHEN 650 MG: 325 TABLET ORAL at 16:32

## 2025-09-03 RX ADMIN — ASPIRIN 81 MG: 81 TABLET, CHEWABLE ORAL at 08:13

## 2025-09-03 RX ADMIN — PREGABALIN 25 MG: 25 CAPSULE ORAL at 21:16

## 2025-09-03 RX ADMIN — FAMOTIDINE 20 MG: 20 TABLET, FILM COATED ORAL at 08:13

## 2025-09-03 RX ADMIN — ENOXAPARIN SODIUM 40 MG: 40 INJECTION SUBCUTANEOUS at 21:15

## 2025-09-03 RX ADMIN — GADOBUTROL 8 ML: 604.72 INJECTION INTRAVENOUS at 12:21

## 2025-09-03 RX ADMIN — PANTOPRAZOLE SODIUM 40 MG: 40 TABLET, DELAYED RELEASE ORAL at 07:42

## 2025-09-03 RX ADMIN — LEVOFLOXACIN 750 MG: 750 TABLET, FILM COATED ORAL at 08:13

## 2025-09-03 RX ADMIN — HYDROCHLOROTHIAZIDE 25 MG: 25 TABLET ORAL at 08:13

## 2025-09-03 ASSESSMENT — ACTIVITIES OF DAILY LIVING (ADL)
ADLS_ACUITY_SCORE: 49
ADLS_ACUITY_SCORE: 50
ADLS_ACUITY_SCORE: 50
ADLS_ACUITY_SCORE: 49
ADLS_ACUITY_SCORE: 48
ADLS_ACUITY_SCORE: 49
ADLS_ACUITY_SCORE: 49
ADLS_ACUITY_SCORE: 50
ADLS_ACUITY_SCORE: 49
ADLS_ACUITY_SCORE: 49
ADLS_ACUITY_SCORE: 48
ADLS_ACUITY_SCORE: 48
ADLS_ACUITY_SCORE: 50
ADLS_ACUITY_SCORE: 49
ADLS_ACUITY_SCORE: 50

## 2025-09-04 ENCOUNTER — PATIENT OUTREACH (OUTPATIENT)
Dept: CARE COORDINATION | Facility: CLINIC | Age: 85
End: 2025-09-04
Payer: COMMERCIAL

## 2025-09-04 VITALS
TEMPERATURE: 97.6 F | DIASTOLIC BLOOD PRESSURE: 64 MMHG | RESPIRATION RATE: 18 BRPM | WEIGHT: 173.1 LBS | OXYGEN SATURATION: 94 % | SYSTOLIC BLOOD PRESSURE: 140 MMHG | HEIGHT: 67 IN | HEART RATE: 56 BPM | BODY MASS INDEX: 27.17 KG/M2

## 2025-09-04 LAB
BACTERIA SPEC CULT: NORMAL
BACTERIA SPEC CULT: NORMAL
GLUCOSE BLDC GLUCOMTR-MCNC: 105 MG/DL (ref 70–99)

## 2025-09-04 PROCEDURE — 250N000013 HC RX MED GY IP 250 OP 250 PS 637: Performed by: STUDENT IN AN ORGANIZED HEALTH CARE EDUCATION/TRAINING PROGRAM

## 2025-09-04 PROCEDURE — 250N000013 HC RX MED GY IP 250 OP 250 PS 637: Performed by: INTERNAL MEDICINE

## 2025-09-04 PROCEDURE — 97530 THERAPEUTIC ACTIVITIES: CPT | Mod: GO

## 2025-09-04 RX ORDER — LEVOFLOXACIN 750 MG/1
750 TABLET, FILM COATED ORAL
DISCHARGE
Start: 2025-09-05 | End: 2025-09-10

## 2025-09-04 RX ORDER — ASPIRIN 81 MG/1
81 TABLET, CHEWABLE ORAL DAILY
DISCHARGE
Start: 2025-09-05

## 2025-09-04 RX ORDER — ESTRADIOL 10 UG/1
TABLET, FILM COATED VAGINAL
DISCHARGE
Start: 2025-09-04 | End: 2025-10-11

## 2025-09-04 RX ADMIN — PANTOPRAZOLE SODIUM 40 MG: 40 TABLET, DELAYED RELEASE ORAL at 06:30

## 2025-09-04 RX ADMIN — HYDROCHLOROTHIAZIDE 25 MG: 25 TABLET ORAL at 08:12

## 2025-09-04 RX ADMIN — ASPIRIN 81 MG: 81 TABLET, CHEWABLE ORAL at 08:12

## 2025-09-04 RX ADMIN — ACETAMINOPHEN 650 MG: 325 TABLET ORAL at 08:12

## 2025-09-04 RX ADMIN — CITALOPRAM HYDROBROMIDE 5 MG: 10 TABLET ORAL at 08:12

## 2025-09-04 RX ADMIN — FAMOTIDINE 20 MG: 20 TABLET, FILM COATED ORAL at 08:12

## 2025-09-04 RX ADMIN — LEVOTHYROXINE SODIUM 50 MCG: 0.03 TABLET ORAL at 08:12

## 2025-09-04 RX ADMIN — POLYETHYLENE GLYCOL 3350 17 G: 17 POWDER, FOR SOLUTION ORAL at 08:15

## 2025-09-04 ASSESSMENT — ACTIVITIES OF DAILY LIVING (ADL)
ADLS_ACUITY_SCORE: 48
ADLS_ACUITY_SCORE: 46
ADLS_ACUITY_SCORE: 50
ADLS_ACUITY_SCORE: 50
ADLS_ACUITY_SCORE: 46
ADLS_ACUITY_SCORE: 48
ADLS_ACUITY_SCORE: 46
ADLS_ACUITY_SCORE: 46
ADLS_ACUITY_SCORE: 50
ADLS_ACUITY_SCORE: 46
ADLS_ACUITY_SCORE: 46
ADLS_ACUITY_SCORE: 50
ADLS_ACUITY_SCORE: 46

## (undated) DEVICE — PLATE GROUNDING ADULT W/CORD 9165L

## (undated) DEVICE — DEVICE INFLATION ENCORE 26 6670

## (undated) DEVICE — TUBING SUCTION MEDI-VAC 1/4"X20' N620A - HE

## (undated) DEVICE — CATH BALLOON DILATING BIL FUSION TITAN 12FRX10MM FS-BDB-10X4

## (undated) DEVICE — ENDO FUSION OMNI-TOME G31903

## (undated) DEVICE — BALLOON EXTRACTION 20X1950MM 3.2MM TL B-V443Q-A

## (undated) DEVICE — SOL WATER IRRIG 1000ML BOTTLE 2F7114

## (undated) DEVICE — WIRE GUIDE 0.35X270CM STRAIGHT TIP VISIGLIDE G-260-3527S

## (undated) DEVICE — SUCTION MANIFOLD NEPTUNE 2 SYS 1 PORT 702-025-000

## (undated) DEVICE — CATH BALLOON DILATING BIL FUSION TITAN 12FR 4MMX4CM G49219

## (undated) RX ORDER — PROPOFOL 10 MG/ML
INJECTION, EMULSION INTRAVENOUS
Status: DISPENSED
Start: 2022-06-06

## (undated) RX ORDER — FENTANYL CITRATE 50 UG/ML
INJECTION, SOLUTION INTRAMUSCULAR; INTRAVENOUS
Status: DISPENSED
Start: 2022-06-06